# Patient Record
Sex: MALE | Race: WHITE | NOT HISPANIC OR LATINO | Employment: FULL TIME | URBAN - METROPOLITAN AREA
[De-identification: names, ages, dates, MRNs, and addresses within clinical notes are randomized per-mention and may not be internally consistent; named-entity substitution may affect disease eponyms.]

---

## 2017-09-05 ENCOUNTER — HOSPITAL ENCOUNTER (EMERGENCY)
Facility: HOSPITAL | Age: 32
Discharge: HOME | End: 2017-09-05
Attending: EMERGENCY MEDICINE
Payer: MEDICAID

## 2017-09-05 VITALS
OXYGEN SATURATION: 93 % | DIASTOLIC BLOOD PRESSURE: 94 MMHG | TEMPERATURE: 98 F | BODY MASS INDEX: 30.46 KG/M2 | WEIGHT: 224.88 LBS | HEIGHT: 72 IN | HEART RATE: 88 BPM | SYSTOLIC BLOOD PRESSURE: 145 MMHG | RESPIRATION RATE: 20 BRPM

## 2017-09-05 DIAGNOSIS — R10.9 FLANK PAIN: Primary | ICD-10-CM

## 2017-09-05 LAB
ALBUMIN SERPL BCP-MCNC: 3.6 G/DL
ALP SERPL-CCNC: 105 U/L
ALT SERPL W/O P-5'-P-CCNC: 32 U/L
ANION GAP SERPL CALC-SCNC: 13 MMOL/L
AST SERPL-CCNC: 14 U/L
BACTERIA #/AREA URNS HPF: NORMAL /HPF
BASOPHILS NFR BLD: 0 %
BILIRUB SERPL-MCNC: 0.5 MG/DL
BILIRUB UR QL STRIP: NEGATIVE
BUN SERPL-MCNC: 28 MG/DL
CALCIUM SERPL-MCNC: 9.4 MG/DL
CHLORIDE SERPL-SCNC: 105 MMOL/L
CLARITY UR: CLEAR
CO2 SERPL-SCNC: 20 MMOL/L
COLOR UR: YELLOW
CREAT SERPL-MCNC: 2 MG/DL
DIFFERENTIAL METHOD: ABNORMAL
EOSINOPHIL NFR BLD: 4 %
ERYTHROCYTE [DISTWIDTH] IN BLOOD BY AUTOMATED COUNT: 15.4 %
EST. GFR  (AFRICAN AMERICAN): 50 ML/MIN/1.73 M^2
EST. GFR  (NON AFRICAN AMERICAN): 43 ML/MIN/1.73 M^2
GLUCOSE SERPL-MCNC: 229 MG/DL
GLUCOSE UR QL STRIP: ABNORMAL
HCT VFR BLD AUTO: 49.4 %
HGB BLD-MCNC: 16.7 G/DL
HGB UR QL STRIP: ABNORMAL
HYALINE CASTS #/AREA URNS LPF: 1 /LPF
KETONES UR QL STRIP: NEGATIVE
LEUKOCYTE ESTERASE UR QL STRIP: NEGATIVE
LYMPHOCYTES NFR BLD: 40 %
MCH RBC QN AUTO: 29 PG
MCHC RBC AUTO-ENTMCNC: 33.9 G/DL
MCV RBC AUTO: 86 FL
MICROSCOPIC COMMENT: NORMAL
MONOCYTES NFR BLD: 4 %
NEUTROPHILS NFR BLD: 52 %
NITRITE UR QL STRIP: NEGATIVE
PH UR STRIP: 5 [PH] (ref 5–8)
PLATELET # BLD AUTO: 215 K/UL
PLATELET BLD QL SMEAR: ABNORMAL
PMV BLD AUTO: 8.7 FL
POTASSIUM SERPL-SCNC: 3.8 MMOL/L
PROT SERPL-MCNC: 7.1 G/DL
PROT UR QL STRIP: ABNORMAL
RBC # BLD AUTO: 5.77 M/UL
RBC #/AREA URNS HPF: 0 /HPF (ref 0–4)
SODIUM SERPL-SCNC: 138 MMOL/L
SP GR UR STRIP: 1.02 (ref 1–1.03)
URN SPEC COLLECT METH UR: ABNORMAL
UROBILINOGEN UR STRIP-ACNC: NEGATIVE EU/DL
WBC # BLD AUTO: 15.8 K/UL
WBC #/AREA URNS HPF: 1 /HPF (ref 0–5)

## 2017-09-05 PROCEDURE — 96361 HYDRATE IV INFUSION ADD-ON: CPT

## 2017-09-05 PROCEDURE — 81000 URINALYSIS NONAUTO W/SCOPE: CPT

## 2017-09-05 PROCEDURE — 99284 EMERGENCY DEPT VISIT MOD MDM: CPT | Mod: 25

## 2017-09-05 PROCEDURE — 25000003 PHARM REV CODE 250: Performed by: EMERGENCY MEDICINE

## 2017-09-05 PROCEDURE — 80053 COMPREHEN METABOLIC PANEL: CPT

## 2017-09-05 PROCEDURE — 96376 TX/PRO/DX INJ SAME DRUG ADON: CPT

## 2017-09-05 PROCEDURE — 96374 THER/PROPH/DIAG INJ IV PUSH: CPT

## 2017-09-05 PROCEDURE — 85027 COMPLETE CBC AUTOMATED: CPT

## 2017-09-05 PROCEDURE — 63600175 PHARM REV CODE 636 W HCPCS: Performed by: EMERGENCY MEDICINE

## 2017-09-05 PROCEDURE — 85007 BL SMEAR W/DIFF WBC COUNT: CPT

## 2017-09-05 PROCEDURE — 36415 COLL VENOUS BLD VENIPUNCTURE: CPT

## 2017-09-05 RX ORDER — MORPHINE SULFATE 4 MG/ML
4 INJECTION, SOLUTION INTRAMUSCULAR; INTRAVENOUS
Status: COMPLETED | OUTPATIENT
Start: 2017-09-05 | End: 2017-09-05

## 2017-09-05 RX ORDER — MORPHINE SULFATE 4 MG/ML
8 INJECTION, SOLUTION INTRAMUSCULAR; INTRAVENOUS
Status: COMPLETED | OUTPATIENT
Start: 2017-09-05 | End: 2017-09-05

## 2017-09-05 RX ADMIN — MORPHINE SULFATE 4 MG: 4 INJECTION, SOLUTION INTRAMUSCULAR; INTRAVENOUS at 07:09

## 2017-09-05 RX ADMIN — MORPHINE SULFATE 8 MG: 4 INJECTION, SOLUTION INTRAMUSCULAR; INTRAVENOUS at 06:09

## 2017-09-05 RX ADMIN — SODIUM CHLORIDE 1000 ML: 0.9 INJECTION, SOLUTION INTRAVENOUS at 07:09

## 2017-09-05 RX ADMIN — SODIUM CHLORIDE 1000 ML: 0.9 INJECTION, SOLUTION INTRAVENOUS at 06:09

## 2017-09-05 NOTE — ED NOTES
Patient identifiers for Jose Miguel Brennan checked and correct.  LOC: Patient is awake, alert, and aware of environment with an appropriate affect. Patient is oriented x 3 and speaking appropriately.  APPEARANCE: Patient resting comfortably and in no acute distress. Patient is clean and well groomed, patient's clothing is properly fastened.  SKIN: The skin is warm and dry. Patient has normal skin turgor and moist mucus membrances. Skin is intact; no bruising or breakdown noted.  MUSCULOSKELETAL: Patient is moving all extremities well, no obvious deformities noted. Pulses intact.   RESPIRATORY: Airway is open and patent. Respirations are spontaneous and non-labored with normal effort and rate.  CARDIAC: Patient has a normal rate and rhythm. No peripheral edema noted. Capillary refill < 3 seconds.  ABDOMEN: No distention noted. Bowel sounds active in all 4 quadrants. Pain to the left lower quadrants that radiates over the umbilical area to the left flank. Denies n/v/d  NEUROLOGICAL: PERRL. Facial expression is symmetrical. Hand grasps are equal bilaterally. Normal sensation in all extremities when touched with finger.  Allergies reported:   Review of patient's allergies indicates:   Allergen Reactions    Zithromax [azithromycin]

## 2017-09-05 NOTE — ED PROVIDER NOTES
Encounter Date: 9/5/2017       History     Chief Complaint   Patient presents with    Flank Pain     bilateral     HPI   31-year-old man with a history of IgA nephropathy, diabetes, asthma who presents emergency department complaining of constant bilateral flank pain over the past couple of days which was worse this morning.  He has had several similar episodes over the past month.  States he usually gets IV fluids and pain medicine with improvement.  He has multiple episodes of these a month.  Denies any fever.  No nausea vomiting or shortness of breath.  Does have a history of kidney stones.  Review of patient's allergies indicates:   Allergen Reactions    Zithromax [azithromycin]      Past Medical History:   Diagnosis Date    Asthma     Diabetes mellitus     diet controlled    IgA nephropathy      Past Surgical History:   Procedure Laterality Date    nose polyp removal       Family History   Problem Relation Age of Onset    Hypertension Maternal Grandmother     Cancer Maternal Grandfather     Diabetes Maternal Grandfather     Heart disease Maternal Grandfather     Heart disease Mother     Stroke Mother     Diabetes Mother      Social History   Substance Use Topics    Smoking status: Former Smoker     Types: Cigars, Cigarettes     Quit date: 12/29/2014    Smokeless tobacco: Never Used    Alcohol use No     Review of Systems  REVIEW OF SYSTEMS  CONSTITUTIONAL: Negative for fever.  HEENT:  Negative for sore throat.   HEART:   Negative for chest pain..  LUNG:  Negative for shortness of breath.  ABDOMEN:  Negative for nausea.  Positive for flank pain  :  No discharge, dysuria  EXTREMITIES:  No swelling  NEURO:  Negative for weakness.   SKIN:  Negative for rash.  Psych: No depression  HEME: Does not bruise/bleed easily.           Physical Exam     Initial Vitals [09/05/17 0548]   BP Pulse Resp Temp SpO2   (!) 145/94 88 20 97.9 °F (36.6 °C) (!) 93 %      MAP       111         Physical Exam  Physical  Exam   Nursing note and vitals reviewed.   Constitutional: Oriented to person, place, and time. Appears well-developed and well-nourished.   HENT:   Head: Normocephalic and atraumatic.   Eyes: EOM are normal.   Neck: Normal range of motion. Neck supple.   Cardiovascular: Normal rate.   Pulmonary/Chest: Effort normal. Clear BS b/l   Abdominal: Soft, Non tender, no distension.  Bilateral CVA tenderness.    Musculoskeletal: Normal range of motion.  No midline tenderness to palpation of the spine.    Neurological:Alert and oriented to person, place, and time.   Psychiatric: Normal mood and affect. Behavior is normal.         ED Course   Procedures  Labs Reviewed   URINALYSIS - Abnormal; Notable for the following:        Result Value    Protein, UA 2+ (*)     Glucose, UA 2+ (*)     Occult Blood UA Trace (*)     All other components within normal limits   CBC W/ AUTO DIFFERENTIAL - Abnormal; Notable for the following:     WBC 15.80 (*)     RDW 15.4 (*)     MPV 8.7 (*)     All other components within normal limits   COMPREHENSIVE METABOLIC PANEL - Abnormal; Notable for the following:     CO2 20 (*)     Glucose 229 (*)     BUN, Bld 28 (*)     Creatinine 2.0 (*)     eGFR if  50 (*)     eGFR if non  43 (*)     All other components within normal limits   URINALYSIS MICROSCOPIC             Medical Decision Making:   Initial Assessment:   31-year-old man with history of IgA nephropathy and frequent visits for bilateral flank pain presents for bilateral flank pain.  Leukocytosis of 15 K without fever.  He has had chronic leukocytosis of his past 4 ED visits.  Renal function is at his baseline.  Urinalysis shows no evidence of infection or hematuria.  Doubt obstructive uropathy or kidney stone.  Patient receiving IV fluids and pain medicine pending reassessment.  Care turned over to Dr. Maynard at end of shift pending  Pain controll and PO challenge.                   ED Course      Clinical  Impression:   The encounter diagnosis was Flank pain.                           Mikel Mancuso MD  09/11/17 0518

## 2017-09-06 ENCOUNTER — HOSPITAL ENCOUNTER (EMERGENCY)
Facility: HOSPITAL | Age: 32
Discharge: HOME | End: 2017-09-06
Attending: EMERGENCY MEDICINE
Payer: MEDICAID

## 2017-09-06 VITALS
TEMPERATURE: 98 F | BODY MASS INDEX: 30.34 KG/M2 | OXYGEN SATURATION: 92 % | DIASTOLIC BLOOD PRESSURE: 70 MMHG | HEART RATE: 81 BPM | RESPIRATION RATE: 22 BRPM | HEIGHT: 72 IN | WEIGHT: 224 LBS | SYSTOLIC BLOOD PRESSURE: 134 MMHG

## 2017-09-06 DIAGNOSIS — R73.9 HYPERGLYCEMIA: ICD-10-CM

## 2017-09-06 DIAGNOSIS — K52.9 GASTROENTERITIS: ICD-10-CM

## 2017-09-06 DIAGNOSIS — R10.9 ABDOMINAL PAIN: ICD-10-CM

## 2017-09-06 DIAGNOSIS — E86.0 DEHYDRATION: Primary | ICD-10-CM

## 2017-09-06 LAB
ALBUMIN SERPL BCP-MCNC: 3.7 G/DL
ALP SERPL-CCNC: 111 U/L
ALT SERPL W/O P-5'-P-CCNC: 33 U/L
AMORPH CRY URNS QL MICRO: NORMAL
ANION GAP SERPL CALC-SCNC: 11 MMOL/L
AST SERPL-CCNC: 15 U/L
BACTERIA #/AREA URNS HPF: NORMAL /HPF
BASOPHILS # BLD AUTO: 0.1 K/UL
BASOPHILS NFR BLD: 0.7 %
BILIRUB SERPL-MCNC: 0.6 MG/DL
BILIRUB UR QL STRIP: NEGATIVE
BUN SERPL-MCNC: 28 MG/DL
CALCIUM SERPL-MCNC: 10.1 MG/DL
CHLORIDE SERPL-SCNC: 104 MMOL/L
CLARITY UR: CLEAR
CO2 SERPL-SCNC: 22 MMOL/L
COLOR UR: YELLOW
CREAT SERPL-MCNC: 1.9 MG/DL
DIFFERENTIAL METHOD: ABNORMAL
EOSINOPHIL # BLD AUTO: 0.5 K/UL
EOSINOPHIL NFR BLD: 3.8 %
ERYTHROCYTE [DISTWIDTH] IN BLOOD BY AUTOMATED COUNT: 14.8 %
EST. GFR  (AFRICAN AMERICAN): 53 ML/MIN/1.73 M^2
EST. GFR  (NON AFRICAN AMERICAN): 46 ML/MIN/1.73 M^2
GLUCOSE SERPL-MCNC: 277 MG/DL
GLUCOSE UR QL STRIP: ABNORMAL
HCT VFR BLD AUTO: 50.9 %
HGB BLD-MCNC: 17 G/DL
HGB UR QL STRIP: ABNORMAL
HYALINE CASTS #/AREA URNS LPF: 1 /LPF
KETONES UR QL STRIP: NEGATIVE
LACTATE SERPL-SCNC: 1.6 MMOL/L
LEUKOCYTE ESTERASE UR QL STRIP: NEGATIVE
LIPASE SERPL-CCNC: 94 U/L
LYMPHOCYTES # BLD AUTO: 3.6 K/UL
LYMPHOCYTES NFR BLD: 27 %
MCH RBC QN AUTO: 28.9 PG
MCHC RBC AUTO-ENTMCNC: 33.5 G/DL
MCV RBC AUTO: 86 FL
MICROSCOPIC COMMENT: NORMAL
MONOCYTES # BLD AUTO: 0.5 K/UL
MONOCYTES NFR BLD: 3.4 %
NEUTROPHILS # BLD AUTO: 8.7 K/UL
NEUTROPHILS NFR BLD: 65.1 %
NITRITE UR QL STRIP: NEGATIVE
PH UR STRIP: 6 [PH] (ref 5–8)
PLATELET # BLD AUTO: 205 K/UL
PMV BLD AUTO: 8.9 FL
POTASSIUM SERPL-SCNC: 4.1 MMOL/L
PROT SERPL-MCNC: 7.3 G/DL
PROT UR QL STRIP: ABNORMAL
RBC # BLD AUTO: 5.89 M/UL
RBC #/AREA URNS HPF: 2 /HPF (ref 0–4)
SODIUM SERPL-SCNC: 137 MMOL/L
SP GR UR STRIP: 1.02 (ref 1–1.03)
SQUAMOUS #/AREA URNS HPF: 2 /HPF
URN SPEC COLLECT METH UR: ABNORMAL
UROBILINOGEN UR STRIP-ACNC: NEGATIVE EU/DL
WBC # BLD AUTO: 13.3 K/UL
WBC #/AREA URNS HPF: 1 /HPF (ref 0–5)

## 2017-09-06 PROCEDURE — 96374 THER/PROPH/DIAG INJ IV PUSH: CPT

## 2017-09-06 PROCEDURE — 83605 ASSAY OF LACTIC ACID: CPT

## 2017-09-06 PROCEDURE — 25000003 PHARM REV CODE 250: Performed by: EMERGENCY MEDICINE

## 2017-09-06 PROCEDURE — 99284 EMERGENCY DEPT VISIT MOD MDM: CPT | Mod: 25

## 2017-09-06 PROCEDURE — 36415 COLL VENOUS BLD VENIPUNCTURE: CPT

## 2017-09-06 PROCEDURE — 85025 COMPLETE CBC W/AUTO DIFF WBC: CPT

## 2017-09-06 PROCEDURE — 80053 COMPREHEN METABOLIC PANEL: CPT

## 2017-09-06 PROCEDURE — 63600175 PHARM REV CODE 636 W HCPCS: Performed by: EMERGENCY MEDICINE

## 2017-09-06 PROCEDURE — 83690 ASSAY OF LIPASE: CPT

## 2017-09-06 PROCEDURE — 96372 THER/PROPH/DIAG INJ SC/IM: CPT

## 2017-09-06 PROCEDURE — 81000 URINALYSIS NONAUTO W/SCOPE: CPT

## 2017-09-06 PROCEDURE — 96375 TX/PRO/DX INJ NEW DRUG ADDON: CPT

## 2017-09-06 RX ORDER — DICYCLOMINE HYDROCHLORIDE 10 MG/ML
20 INJECTION INTRAMUSCULAR
Status: COMPLETED | OUTPATIENT
Start: 2017-09-06 | End: 2017-09-06

## 2017-09-06 RX ORDER — DIPHENOXYLATE HYDROCHLORIDE AND ATROPINE SULFATE 2.5; .025 MG/1; MG/1
2 TABLET ORAL 4 TIMES DAILY PRN
Qty: 14 TABLET | Refills: 0 | Status: ON HOLD | OUTPATIENT
Start: 2017-09-06 | End: 2017-09-09 | Stop reason: HOSPADM

## 2017-09-06 RX ORDER — SODIUM CHLORIDE 9 MG/ML
1000 INJECTION, SOLUTION INTRAVENOUS
Status: COMPLETED | OUTPATIENT
Start: 2017-09-06 | End: 2017-09-06

## 2017-09-06 RX ORDER — METOCLOPRAMIDE HYDROCHLORIDE 5 MG/ML
10 INJECTION INTRAMUSCULAR; INTRAVENOUS
Status: COMPLETED | OUTPATIENT
Start: 2017-09-06 | End: 2017-09-06

## 2017-09-06 RX ORDER — MORPHINE SULFATE 10 MG/ML
10 INJECTION INTRAMUSCULAR; INTRAVENOUS; SUBCUTANEOUS
Status: COMPLETED | OUTPATIENT
Start: 2017-09-06 | End: 2017-09-06

## 2017-09-06 RX ADMIN — SODIUM CHLORIDE 1000 ML: 0.9 INJECTION, SOLUTION INTRAVENOUS at 06:09

## 2017-09-06 RX ADMIN — DICYCLOMINE HYDROCHLORIDE 20 MG: 10 INJECTION INTRAMUSCULAR at 06:09

## 2017-09-06 RX ADMIN — MORPHINE SULFATE 10 MG: 10 INJECTION, SOLUTION INTRAMUSCULAR; INTRAVENOUS at 06:09

## 2017-09-06 RX ADMIN — METOCLOPRAMIDE 10 MG: 5 INJECTION, SOLUTION INTRAMUSCULAR; INTRAVENOUS at 06:09

## 2017-09-06 NOTE — ED NOTES
Pt up to the bathroom for the second time. Having diarrhea. Foul smelling gas. Pt reports being nauseated. Pt says abdominal pain started around 4 am. No vomiting but diarrhea. Pt alert and oriented and moaning in pain with every breath.

## 2017-09-06 NOTE — ED NOTES
Aware of D/C pending. Pt calling for safe ride home since recent narcotic pain meds given prior to D/C. Stable

## 2017-09-06 NOTE — ED PROVIDER NOTES
Encounter Date: 9/6/2017       History     Chief Complaint   Patient presents with    Abdominal Pain     lower quadrant abd pain unchanged from previous ER visit     Chief complaint: Flank and abdominal pain    History of present illness:Jose Miguel Brennan is a 31 y.o. male who presents with  a 3-4 day history of bilateral flank pain for a one-day history of diffuse abdominal pain.  She was seen here yesterday for same with worsening symptoms.  He has recurrent episodes of flank pain that is routinely treated with IV analgesics and IV hydration.  He denies any fever is a one-day history of diarrhea.  He has nausea without vomiting.  His past medical history is significant for diabetes, asthma and IgA nephropathy.          Review of patient's allergies indicates:   Allergen Reactions    Zithromax [azithromycin]      Past Medical History:   Diagnosis Date    Asthma     Diabetes mellitus     diet controlled    IgA nephropathy      Past Surgical History:   Procedure Laterality Date    nose polyp removal       Family History   Problem Relation Age of Onset    Hypertension Maternal Grandmother     Cancer Maternal Grandfather     Diabetes Maternal Grandfather     Heart disease Maternal Grandfather     Heart disease Mother     Stroke Mother     Diabetes Mother      Social History   Substance Use Topics    Smoking status: Former Smoker     Types: Cigars, Cigarettes     Quit date: 12/29/2014    Smokeless tobacco: Never Used    Alcohol use No     Review of Systems   Constitutional: Negative for activity change, appetite change, chills, fatigue and fever.   Eyes: Negative for visual disturbance.   Respiratory: Negative for apnea and shortness of breath.    Cardiovascular: Negative for chest pain and palpitations.   Gastrointestinal: Positive for abdominal pain, diarrhea and nausea. Negative for abdominal distention and vomiting.   Genitourinary: Positive for flank pain. Negative for difficulty urinating.    Musculoskeletal: Negative for neck pain.   Skin: Negative for pallor and rash.   Neurological: Negative for headaches.   Hematological: Does not bruise/bleed easily.   Psychiatric/Behavioral: Negative for agitation.       Physical Exam     Initial Vitals [09/06/17 0528]   BP Pulse Resp Temp SpO2   (!) 177/122 96 (!) 22 97.8 °F (36.6 °C) 95 %      MAP       140.33         Physical Exam    Nursing note and vitals reviewed.  Constitutional: He appears well-developed and well-nourished.   HENT:   Head: Normocephalic and atraumatic.   Eyes: Conjunctivae are normal.   Neck: Normal range of motion. Neck supple.   Cardiovascular: Normal rate.   Pulmonary/Chest: No respiratory distress.   Abdominal: Soft. There is tenderness (iffuse abdominal tenderness). There is no rebound and no guarding.   Musculoskeletal: Normal range of motion.   Neurological: He is alert and oriented to person, place, and time.   Skin: Skin is warm and dry.   Psychiatric: He has a normal mood and affect.         ED Course   Procedures  Labs Reviewed   CBC W/ AUTO DIFFERENTIAL   COMPREHENSIVE METABOLIC PANEL   LACTIC ACID, PLASMA   LIPASE   URINALYSIS             Medical Decision Making:   History:   Old Records Summarized: records from previous admission(s).  Clinical Tests:   Lab Tests: Ordered and Reviewed  The following lab test(s) were unremarkable: CBC and CMP  ED Management:  Jose Miguel rBennan is a 31 y.o. male who presents with  diffuse crampy abdominal pain with associated flank pain and diarrhea.  CT of the abdomen and pelvis is essentially normal.  Symptoms are most consistent with a viral gastroenteritis.  There is no evidence of bowel obstruction, ureterolithiasis or bacterial enteritis.                   ED Course      Clinical Impression:   The primary encounter diagnosis was Dehydration. Diagnoses of Abdominal pain, Gastroenteritis, and Hyperglycemia were also pertinent to this visit.                           David Groves  MD GERARDO  09/06/17 0707

## 2017-09-07 ENCOUNTER — HOSPITAL ENCOUNTER (OUTPATIENT)
Facility: HOSPITAL | Age: 32
Discharge: HOME | End: 2017-09-09
Attending: EMERGENCY MEDICINE
Payer: MEDICAID

## 2017-09-07 DIAGNOSIS — N18.9 ACUTE ON CHRONIC RENAL INSUFFICIENCY: ICD-10-CM

## 2017-09-07 DIAGNOSIS — N28.9 ACUTE ON CHRONIC RENAL INSUFFICIENCY: ICD-10-CM

## 2017-09-07 DIAGNOSIS — E86.0 SEVERE DEHYDRATION: Primary | ICD-10-CM

## 2017-09-07 LAB
BASOPHILS # BLD AUTO: 0.1 K/UL
BASOPHILS NFR BLD: 0.6 %
DIFFERENTIAL METHOD: ABNORMAL
EOSINOPHIL # BLD AUTO: 0.5 K/UL
EOSINOPHIL NFR BLD: 3.7 %
ERYTHROCYTE [DISTWIDTH] IN BLOOD BY AUTOMATED COUNT: 15 %
HCT VFR BLD AUTO: 52.8 %
HGB BLD-MCNC: 17.7 G/DL
LYMPHOCYTES # BLD AUTO: 4 K/UL
LYMPHOCYTES NFR BLD: 32.2 %
MCH RBC QN AUTO: 28.6 PG
MCHC RBC AUTO-ENTMCNC: 33.6 G/DL
MCV RBC AUTO: 85 FL
MONOCYTES # BLD AUTO: 0.7 K/UL
MONOCYTES NFR BLD: 6 %
NEUTROPHILS # BLD AUTO: 7.2 K/UL
NEUTROPHILS NFR BLD: 57.5 %
PLATELET # BLD AUTO: 220 K/UL
PMV BLD AUTO: 9.2 FL
RBC # BLD AUTO: 6.2 M/UL
WBC # BLD AUTO: 12.4 K/UL

## 2017-09-07 PROCEDURE — 96375 TX/PRO/DX INJ NEW DRUG ADDON: CPT

## 2017-09-07 PROCEDURE — 96367 TX/PROPH/DG ADDL SEQ IV INF: CPT

## 2017-09-07 PROCEDURE — 85025 COMPLETE CBC W/AUTO DIFF WBC: CPT

## 2017-09-07 PROCEDURE — 99284 EMERGENCY DEPT VISIT MOD MDM: CPT | Mod: 25

## 2017-09-07 PROCEDURE — 36415 COLL VENOUS BLD VENIPUNCTURE: CPT

## 2017-09-07 PROCEDURE — 83690 ASSAY OF LIPASE: CPT

## 2017-09-07 PROCEDURE — 80053 COMPREHEN METABOLIC PANEL: CPT

## 2017-09-07 PROCEDURE — 25000003 PHARM REV CODE 250: Performed by: EMERGENCY MEDICINE

## 2017-09-07 PROCEDURE — 86140 C-REACTIVE PROTEIN: CPT

## 2017-09-07 PROCEDURE — 63600175 PHARM REV CODE 636 W HCPCS: Performed by: EMERGENCY MEDICINE

## 2017-09-07 PROCEDURE — 96365 THER/PROPH/DIAG IV INF INIT: CPT

## 2017-09-07 RX ADMIN — SODIUM CHLORIDE 1000 ML: 0.9 INJECTION, SOLUTION INTRAVENOUS at 11:09

## 2017-09-07 RX ADMIN — PROMETHAZINE HYDROCHLORIDE 25 MG: 25 INJECTION INTRAMUSCULAR; INTRAVENOUS at 11:09

## 2017-09-08 PROBLEM — N28.9 ACUTE ON CHRONIC RENAL INSUFFICIENCY: Status: ACTIVE | Noted: 2017-09-08

## 2017-09-08 PROBLEM — E44.1 MILD MALNUTRITION: Status: ACTIVE | Noted: 2017-09-08

## 2017-09-08 PROBLEM — N18.9 ACUTE ON CHRONIC RENAL INSUFFICIENCY: Status: ACTIVE | Noted: 2017-09-08

## 2017-09-08 PROBLEM — R19.7 DIARRHEA: Status: ACTIVE | Noted: 2017-09-08

## 2017-09-08 PROBLEM — E86.0 DEHYDRATION: Status: ACTIVE | Noted: 2017-09-08

## 2017-09-08 PROBLEM — R74.8 ELEVATED LIPASE: Status: ACTIVE | Noted: 2017-09-08

## 2017-09-08 PROBLEM — J45.909 ASTHMA: Status: ACTIVE | Noted: 2017-09-08

## 2017-09-08 PROBLEM — K52.9 GASTROENTERITIS: Status: ACTIVE | Noted: 2017-09-08

## 2017-09-08 PROBLEM — I16.0 HYPERTENSIVE URGENCY: Status: ACTIVE | Noted: 2017-09-08

## 2017-09-08 PROBLEM — N17.9 AKI (ACUTE KIDNEY INJURY): Status: ACTIVE | Noted: 2017-09-08

## 2017-09-08 LAB
ALBUMIN SERPL BCP-MCNC: 3 G/DL
ALBUMIN SERPL BCP-MCNC: 3.6 G/DL
ALP SERPL-CCNC: 116 U/L
ALP SERPL-CCNC: 95 U/L
ALT SERPL W/O P-5'-P-CCNC: 31 U/L
ALT SERPL W/O P-5'-P-CCNC: 35 U/L
ANION GAP SERPL CALC-SCNC: 14 MMOL/L
ANION GAP SERPL CALC-SCNC: 9 MMOL/L
AST SERPL-CCNC: 16 U/L
AST SERPL-CCNC: 19 U/L
BACTERIA #/AREA URNS HPF: ABNORMAL /HPF
BASOPHILS # BLD AUTO: 0 K/UL
BASOPHILS NFR BLD: 0.2 %
BILIRUB SERPL-MCNC: 0.7 MG/DL
BILIRUB SERPL-MCNC: 1 MG/DL
BILIRUB UR QL STRIP: NEGATIVE
BUN SERPL-MCNC: 29 MG/DL
BUN SERPL-MCNC: 30 MG/DL
CALCIUM SERPL-MCNC: 8.5 MG/DL
CALCIUM SERPL-MCNC: 9.5 MG/DL
CHLORIDE SERPL-SCNC: 106 MMOL/L
CHLORIDE SERPL-SCNC: 109 MMOL/L
CLARITY UR: CLEAR
CO2 SERPL-SCNC: 18 MMOL/L
CO2 SERPL-SCNC: 20 MMOL/L
COLOR UR: YELLOW
CREAT SERPL-MCNC: 2.3 MG/DL
CREAT SERPL-MCNC: 2.8 MG/DL
CRP SERPL-MCNC: 32.2 MG/L
DIFFERENTIAL METHOD: ABNORMAL
EOSINOPHIL # BLD AUTO: 0.5 K/UL
EOSINOPHIL NFR BLD: 4.7 %
ERYTHROCYTE [DISTWIDTH] IN BLOOD BY AUTOMATED COUNT: 15 %
EST. GFR  (AFRICAN AMERICAN): 33 ML/MIN/1.73 M^2
EST. GFR  (AFRICAN AMERICAN): 42 ML/MIN/1.73 M^2
EST. GFR  (NON AFRICAN AMERICAN): 29 ML/MIN/1.73 M^2
EST. GFR  (NON AFRICAN AMERICAN): 36 ML/MIN/1.73 M^2
GLUCOSE SERPL-MCNC: 167 MG/DL
GLUCOSE SERPL-MCNC: 194 MG/DL
GLUCOSE UR QL STRIP: NEGATIVE
HCT VFR BLD AUTO: 46.5 %
HGB BLD-MCNC: 15.6 G/DL
HGB UR QL STRIP: ABNORMAL
HYALINE CASTS #/AREA URNS LPF: 1 /LPF
KETONES UR QL STRIP: NEGATIVE
LEUKOCYTE ESTERASE UR QL STRIP: NEGATIVE
LIPASE SERPL-CCNC: 104 U/L
LYMPHOCYTES # BLD AUTO: 3.7 K/UL
LYMPHOCYTES NFR BLD: 37.6 %
MAGNESIUM SERPL-MCNC: 1.8 MG/DL
MCH RBC QN AUTO: 28.9 PG
MCHC RBC AUTO-ENTMCNC: 33.6 G/DL
MCV RBC AUTO: 86 FL
MICROSCOPIC COMMENT: ABNORMAL
MONOCYTES # BLD AUTO: 0.7 K/UL
MONOCYTES NFR BLD: 7 %
NEUTROPHILS # BLD AUTO: 5 K/UL
NEUTROPHILS NFR BLD: 50.5 %
NITRITE UR QL STRIP: NEGATIVE
PH UR STRIP: 6 [PH] (ref 5–8)
PHOSPHATE SERPL-MCNC: 3.3 MG/DL
PLATELET # BLD AUTO: 181 K/UL
PMV BLD AUTO: 9.8 FL
POCT GLUCOSE: 135 MG/DL (ref 70–110)
POCT GLUCOSE: 171 MG/DL (ref 70–110)
POTASSIUM SERPL-SCNC: 3.9 MMOL/L
POTASSIUM SERPL-SCNC: 4 MMOL/L
PROT SERPL-MCNC: 5.9 G/DL
PROT SERPL-MCNC: 7.3 G/DL
PROT UR QL STRIP: ABNORMAL
RBC # BLD AUTO: 5.4 M/UL
RBC #/AREA URNS HPF: 5 /HPF (ref 0–4)
SODIUM SERPL-SCNC: 138 MMOL/L
SODIUM SERPL-SCNC: 138 MMOL/L
SP GR UR STRIP: 1.02 (ref 1–1.03)
SQUAMOUS #/AREA URNS HPF: 7 /HPF
URN SPEC COLLECT METH UR: ABNORMAL
UROBILINOGEN UR STRIP-ACNC: NEGATIVE EU/DL
WBC # BLD AUTO: 9.9 K/UL
WBC #/AREA URNS HPF: 3 /HPF (ref 0–5)

## 2017-09-08 PROCEDURE — 84100 ASSAY OF PHOSPHORUS: CPT

## 2017-09-08 PROCEDURE — 81000 URINALYSIS NONAUTO W/SCOPE: CPT

## 2017-09-08 PROCEDURE — 25000003 PHARM REV CODE 250: Performed by: NURSE PRACTITIONER

## 2017-09-08 PROCEDURE — 85025 COMPLETE CBC W/AUTO DIFF WBC: CPT

## 2017-09-08 PROCEDURE — 63600175 PHARM REV CODE 636 W HCPCS: Performed by: EMERGENCY MEDICINE

## 2017-09-08 PROCEDURE — S0030 INJECTION, METRONIDAZOLE: HCPCS | Performed by: NURSE PRACTITIONER

## 2017-09-08 PROCEDURE — 96365 THER/PROPH/DIAG IV INF INIT: CPT

## 2017-09-08 PROCEDURE — 96376 TX/PRO/DX INJ SAME DRUG ADON: CPT

## 2017-09-08 PROCEDURE — 83036 HEMOGLOBIN GLYCOSYLATED A1C: CPT

## 2017-09-08 PROCEDURE — 96372 THER/PROPH/DIAG INJ SC/IM: CPT

## 2017-09-08 PROCEDURE — 82962 GLUCOSE BLOOD TEST: CPT

## 2017-09-08 PROCEDURE — G0378 HOSPITAL OBSERVATION PER HR: HCPCS

## 2017-09-08 PROCEDURE — 80053 COMPREHEN METABOLIC PANEL: CPT

## 2017-09-08 PROCEDURE — 96366 THER/PROPH/DIAG IV INF ADDON: CPT

## 2017-09-08 PROCEDURE — 36415 COLL VENOUS BLD VENIPUNCTURE: CPT

## 2017-09-08 PROCEDURE — 96367 TX/PROPH/DG ADDL SEQ IV INF: CPT

## 2017-09-08 PROCEDURE — 96375 TX/PRO/DX INJ NEW DRUG ADDON: CPT

## 2017-09-08 PROCEDURE — 83735 ASSAY OF MAGNESIUM: CPT

## 2017-09-08 PROCEDURE — 99900035 HC TECH TIME PER 15 MIN (STAT)

## 2017-09-08 PROCEDURE — 63600175 PHARM REV CODE 636 W HCPCS: Performed by: NURSE PRACTITIONER

## 2017-09-08 RX ORDER — AMOXICILLIN 250 MG
1 CAPSULE ORAL 2 TIMES DAILY
Status: DISCONTINUED | OUTPATIENT
Start: 2017-09-08 | End: 2017-09-09 | Stop reason: HOSPADM

## 2017-09-08 RX ORDER — HEPARIN SODIUM 5000 [USP'U]/ML
5000 INJECTION, SOLUTION INTRAVENOUS; SUBCUTANEOUS EVERY 12 HOURS
Status: DISCONTINUED | OUTPATIENT
Start: 2017-09-08 | End: 2017-09-09 | Stop reason: HOSPADM

## 2017-09-08 RX ORDER — ONDANSETRON 2 MG/ML
8 INJECTION INTRAMUSCULAR; INTRAVENOUS EVERY 8 HOURS PRN
Status: DISCONTINUED | OUTPATIENT
Start: 2017-09-08 | End: 2017-09-08

## 2017-09-08 RX ORDER — IBUPROFEN 200 MG
16 TABLET ORAL
Status: DISCONTINUED | OUTPATIENT
Start: 2017-09-08 | End: 2017-09-09 | Stop reason: HOSPADM

## 2017-09-08 RX ORDER — PANTOPRAZOLE SODIUM 40 MG/1
40 TABLET, DELAYED RELEASE ORAL DAILY
Status: DISCONTINUED | OUTPATIENT
Start: 2017-09-08 | End: 2017-09-09 | Stop reason: HOSPADM

## 2017-09-08 RX ORDER — CIPROFLOXACIN 2 MG/ML
400 INJECTION, SOLUTION INTRAVENOUS
Status: DISCONTINUED | OUTPATIENT
Start: 2017-09-08 | End: 2017-09-09 | Stop reason: HOSPADM

## 2017-09-08 RX ORDER — INSULIN ASPART 100 [IU]/ML
0-5 INJECTION, SOLUTION INTRAVENOUS; SUBCUTANEOUS
Status: DISCONTINUED | OUTPATIENT
Start: 2017-09-08 | End: 2017-09-09 | Stop reason: HOSPADM

## 2017-09-08 RX ORDER — ACETAMINOPHEN 325 MG/1
650 TABLET ORAL EVERY 6 HOURS PRN
Status: DISCONTINUED | OUTPATIENT
Start: 2017-09-08 | End: 2017-09-09 | Stop reason: HOSPADM

## 2017-09-08 RX ORDER — ONDANSETRON 2 MG/ML
4 INJECTION INTRAMUSCULAR; INTRAVENOUS EVERY 4 HOURS PRN
Status: DISCONTINUED | OUTPATIENT
Start: 2017-09-08 | End: 2017-09-09 | Stop reason: HOSPADM

## 2017-09-08 RX ORDER — MORPHINE SULFATE 2 MG/ML
6 INJECTION, SOLUTION INTRAMUSCULAR; INTRAVENOUS
Status: COMPLETED | OUTPATIENT
Start: 2017-09-08 | End: 2017-09-08

## 2017-09-08 RX ORDER — MORPHINE SULFATE 4 MG/ML
4 INJECTION, SOLUTION INTRAMUSCULAR; INTRAVENOUS EVERY 4 HOURS PRN
Status: DISCONTINUED | OUTPATIENT
Start: 2017-09-08 | End: 2017-09-08

## 2017-09-08 RX ORDER — METRONIDAZOLE 500 MG/100ML
500 INJECTION, SOLUTION INTRAVENOUS
Status: DISCONTINUED | OUTPATIENT
Start: 2017-09-08 | End: 2017-09-09 | Stop reason: HOSPADM

## 2017-09-08 RX ORDER — DICYCLOMINE HYDROCHLORIDE 10 MG/1
10 CAPSULE ORAL 4 TIMES DAILY
Status: DISCONTINUED | OUTPATIENT
Start: 2017-09-08 | End: 2017-09-09 | Stop reason: HOSPADM

## 2017-09-08 RX ORDER — LANOLIN ALCOHOL/MO/W.PET/CERES
400 CREAM (GRAM) TOPICAL DAILY
Status: DISCONTINUED | OUTPATIENT
Start: 2017-09-08 | End: 2017-09-09 | Stop reason: HOSPADM

## 2017-09-08 RX ORDER — GLUCAGON 1 MG
1 KIT INJECTION
Status: DISCONTINUED | OUTPATIENT
Start: 2017-09-08 | End: 2017-09-09 | Stop reason: HOSPADM

## 2017-09-08 RX ORDER — SODIUM CHLORIDE 9 MG/ML
INJECTION, SOLUTION INTRAVENOUS CONTINUOUS
Status: DISCONTINUED | OUTPATIENT
Start: 2017-09-08 | End: 2017-09-09 | Stop reason: HOSPADM

## 2017-09-08 RX ORDER — IBUPROFEN 200 MG
24 TABLET ORAL
Status: DISCONTINUED | OUTPATIENT
Start: 2017-09-08 | End: 2017-09-09 | Stop reason: HOSPADM

## 2017-09-08 RX ORDER — IPRATROPIUM BROMIDE AND ALBUTEROL SULFATE 2.5; .5 MG/3ML; MG/3ML
3 SOLUTION RESPIRATORY (INHALATION) EVERY 4 HOURS PRN
Status: DISCONTINUED | OUTPATIENT
Start: 2017-09-08 | End: 2017-09-09 | Stop reason: HOSPADM

## 2017-09-08 RX ADMIN — CIPROFLOXACIN 400 MG: 2 INJECTION, SOLUTION INTRAVENOUS at 03:09

## 2017-09-08 RX ADMIN — PROMETHAZINE HYDROCHLORIDE 6.25 MG: 25 INJECTION INTRAMUSCULAR; INTRAVENOUS at 09:09

## 2017-09-08 RX ADMIN — SODIUM CHLORIDE: 0.9 INJECTION, SOLUTION INTRAVENOUS at 03:09

## 2017-09-08 RX ADMIN — MORPHINE SULFATE 6 MG: 2 INJECTION, SOLUTION INTRAMUSCULAR; INTRAVENOUS at 12:09

## 2017-09-08 RX ADMIN — METRONIDAZOLE 500 MG: 500 INJECTION, SOLUTION INTRAVENOUS at 03:09

## 2017-09-08 RX ADMIN — HEPARIN SODIUM 5000 UNITS: 5000 INJECTION, SOLUTION INTRAVENOUS; SUBCUTANEOUS at 09:09

## 2017-09-08 RX ADMIN — DICYCLOMINE HYDROCHLORIDE 10 MG: 10 CAPSULE ORAL at 12:09

## 2017-09-08 RX ADMIN — CIPROFLOXACIN 400 MG: 2 INJECTION, SOLUTION INTRAVENOUS at 02:09

## 2017-09-08 RX ADMIN — DICYCLOMINE HYDROCHLORIDE 10 MG: 10 CAPSULE ORAL at 03:09

## 2017-09-08 RX ADMIN — ONDANSETRON 8 MG: 2 INJECTION INTRAMUSCULAR; INTRAVENOUS at 03:09

## 2017-09-08 RX ADMIN — STANDARDIZED SENNA CONCENTRATE AND DOCUSATE SODIUM 1 TABLET: 8.6; 5 TABLET, FILM COATED ORAL at 08:09

## 2017-09-08 RX ADMIN — METRONIDAZOLE 500 MG: 500 INJECTION, SOLUTION INTRAVENOUS at 05:09

## 2017-09-08 RX ADMIN — SODIUM CHLORIDE: 0.9 INJECTION, SOLUTION INTRAVENOUS at 02:09

## 2017-09-08 RX ADMIN — MORPHINE SULFATE 4 MG: 4 INJECTION INTRAVENOUS at 09:09

## 2017-09-08 RX ADMIN — DICYCLOMINE HYDROCHLORIDE 10 MG: 10 CAPSULE ORAL at 11:09

## 2017-09-08 RX ADMIN — MAGNESIUM OXIDE TAB 400 MG (241.3 MG ELEMENTAL MG) 400 MG: 400 (241.3 MG) TAB at 02:09

## 2017-09-08 RX ADMIN — DICYCLOMINE HYDROCHLORIDE 10 MG: 10 CAPSULE ORAL at 05:09

## 2017-09-08 RX ADMIN — MORPHINE SULFATE 4 MG: 4 INJECTION INTRAVENOUS at 03:09

## 2017-09-08 RX ADMIN — METRONIDAZOLE 500 MG: 500 INJECTION, SOLUTION INTRAVENOUS at 10:09

## 2017-09-08 RX ADMIN — PANTOPRAZOLE SODIUM 40 MG: 40 TABLET, DELAYED RELEASE ORAL at 09:09

## 2017-09-08 RX ADMIN — HEPARIN SODIUM 5000 UNITS: 5000 INJECTION, SOLUTION INTRAVENOUS; SUBCUTANEOUS at 08:09

## 2017-09-08 NOTE — PLAN OF CARE
Problem: Fall Risk (Adult)  Goal: Absence of Falls  Patient will demonstrate the desired outcomes by discharge/transition of care.   Outcome: Ongoing (interventions implemented as appropriate)  Alert/oriented  Pain to abdomen controlled with prn medication as ordered  Denies nausea  Glucose monitored  Sinus rhythm   Tolerating IVFs  Safe

## 2017-09-08 NOTE — ED PROVIDER NOTES
Encounter Date: 9/7/2017    SCRIBE #1 NOTE: Mini ALFONSO, am scribing for, and in the presence of, Dr. Hairston.       History     Chief Complaint   Patient presents with    Abdominal Pain     c/o generalized abdominal pain x 3 days with N/V/D     9/7/2017  11:15 PM     Chief Complaint: Abdominal pain    The patient is a 31 y.o. male with PMHx of IgA nephropathy, asthma and DM who presents with abdominal pain. Patient c/o gradual onset of crampy diffuse abdominal pain which has been intermittent for the past 3 days. Pt states his symptoms started within 2 hours of eating home cooked chicken. Associated with nausea, vomiting, diarrhea and decreased appetite. His wife had chicken as well and developed diarrhea. However, her symptoms subsided. Denies any fevers or foul smelling or dark colored feces. No recent sick contacts. No recent exposure to health care settings. Pt states he had 3 episodes of diarrhea while waiting in the ED. Shx of nose polyp removal.    This is the patient's third visit to the ED for the same symptoms. He had a negative CT scan of abdomen and diagnosed with viral enteritis. He was sent home with rx for immodium.      The history is provided by the patient.     Review of patient's allergies indicates:   Allergen Reactions    Zithromax [azithromycin]      Past Medical History:   Diagnosis Date    Asthma     Diabetes mellitus     diet controlled    IgA nephropathy      Past Surgical History:   Procedure Laterality Date    nose polyp removal       Family History   Problem Relation Age of Onset    Hypertension Maternal Grandmother     Cancer Maternal Grandfather     Diabetes Maternal Grandfather     Heart disease Maternal Grandfather     Heart disease Mother     Stroke Mother     Diabetes Mother      Social History   Substance Use Topics    Smoking status: Former Smoker     Types: Cigars, Cigarettes     Quit date: 12/29/2014    Smokeless tobacco: Never Used    Alcohol use No      Review of Systems   Constitutional: Negative for appetite change, chills and fever.   HENT: Negative for congestion, rhinorrhea and sore throat.    Respiratory: Negative for cough and shortness of breath.    Cardiovascular: Negative for chest pain.   Gastrointestinal: Negative for abdominal pain, diarrhea, nausea and vomiting.   Genitourinary: Negative for dysuria.   Musculoskeletal: Negative for back pain and myalgias.   Skin: Negative for rash.   Neurological: Negative for weakness and numbness.   Hematological: Does not bruise/bleed easily.   All other systems reviewed and are negative.      Physical Exam     Initial Vitals [09/07/17 2151]   BP Pulse Resp Temp SpO2   (!) 134/91 100 16 98 °F (36.7 °C) 96 %      MAP       105.33         Physical Exam    Nursing note and vitals reviewed.  Constitutional: He appears well-developed and well-nourished. No distress.   HENT:   Head: Normocephalic and atraumatic.   Mouth/Throat: Mucous membranes are normal.   Eyes: EOM are normal. Pupils are equal, round, and reactive to light.   Neck: Normal range of motion.   Cardiovascular: Normal rate, regular rhythm, normal heart sounds and intact distal pulses. Exam reveals no gallop and no friction rub.    No murmur heard.  Pulmonary/Chest: Breath sounds normal. He has no wheezes. He has no rhonchi. He has no rales.   Abdominal: Soft. He exhibits no distension. There is tenderness. There is no rebound and no guarding.   Reports generalized abdominal TTP.   Musculoskeletal: Normal range of motion. He exhibits no edema.   Neurological: He is alert and oriented to person, place, and time.   Skin: Skin is dry. No rash noted.   Psychiatric: He has a normal mood and affect.         ED Course   Procedures  Labs Reviewed - No data to display          Medical Decision Making:   Initial Assessment:   Patient was interviewed and examined and is progressing with ongoing abdominal pain and does have concern for underlying dehydration.  He  has a history of IgA nephropathy and has had intractable diarrhea and vomiting for the patient.  IV was established and he was provided bolus and maintain his hydration with antiemetic.  Screening labs will be obtained to assess for evidence underlying dehydration, as well as radiographs of the abdomen to rule out evidence of obstruction, ileus, perforation.  ED Management:  Labs are most significant for evidence of acute on chronic kidney disease with an elevation in creatinine and BUNs.  This is consistent with prerenal causes including dehydration.  I do think the patient warrants admission for further stabilization of his symptoms and correction of his kidney function.  The patient is in agreement with this.  Case was discussed with Mrs. Del Rosario who agreed to admit the patient.  He was transferred to a [facility] telemetry bed in guarded condition.            Scribe Attestation:   Scribe #1: I performed the above scribed service and the documentation accurately describes the services I performed. I attest to the accuracy of the note.    Attending Attestation:           Physician Attestation for Scribe:  Physician Attestation Statement for Scribe #1: I, Dr. Hairston, reviewed documentation, as scribed by Mini Portillo in my presence, and it is both accurate and complete.                 ED Course      Clinical Impression:   The primary encounter diagnosis was Severe dehydration. A diagnosis of Acute on chronic renal insufficiency was also pertinent to this visit.    Disposition:   Disposition: Placed in Observation  Condition: Leigha Hairston MD  09/09/17 3983

## 2017-09-08 NOTE — SUBJECTIVE & OBJECTIVE
Interval History:  Reports feels better    Review of Systems   Constitutional: Positive for appetite change. Negative for activity change, chills, diaphoresis, fatigue and fever.   HENT: Negative for ear discharge, ear pain and facial swelling.    Eyes: Negative for pain and redness.   Respiratory: Negative for apnea, cough, choking, chest tightness, shortness of breath, wheezing and stridor.    Cardiovascular: Negative for chest pain, palpitations and leg swelling.   Gastrointestinal: Positive for abdominal pain, diarrhea and nausea. Negative for constipation and vomiting.   Endocrine: Negative for polydipsia and polyphagia.   Genitourinary: Negative for difficulty urinating, dysuria, flank pain and hematuria.   Musculoskeletal: Negative for arthralgias, gait problem, neck pain and neck stiffness.   Skin: Negative for color change.   Allergic/Immunologic: Negative for food allergies.   Neurological: Negative for seizures, syncope, speech difficulty and weakness.   Hematological: Does not bruise/bleed easily.   Psychiatric/Behavioral: Negative for agitation, behavioral problems, confusion, hallucinations and suicidal ideas. The patient is not nervous/anxious.      Objective:     Vital Signs (Most Recent):  Temp: 97.8 °F (36.6 °C) (09/08/17 1500)  Pulse: 71 (09/08/17 1500)  Resp: 18 (09/08/17 1500)  BP: 108/61 (09/08/17 1500)  SpO2: 100 % (09/08/17 1500) Vital Signs (24h Range):  Temp:  [97.5 °F (36.4 °C)-98 °F (36.7 °C)] 97.8 °F (36.6 °C)  Pulse:  [] 71  Resp:  [16-18] 18  SpO2:  [96 %-100 %] 100 %  BP: (102-134)/(59-91) 108/61     Weight: 99.8 kg (220 lb)  Body mass index is 29.84 kg/m².    Intake/Output Summary (Last 24 hours) at 09/08/17 1808  Last data filed at 09/08/17 1700   Gross per 24 hour   Intake             1020 ml   Output             1350 ml   Net             -330 ml      Physical Exam   Constitutional: He is oriented to person, place, and time. He appears well-developed and well-nourished. No  distress.   HENT:   Head: Normocephalic and atraumatic.   Eyes: Conjunctivae and EOM are normal. Pupils are equal, round, and reactive to light. Right eye exhibits no discharge. Left eye exhibits no discharge.   Neck: Normal range of motion. Neck supple. No JVD present.   Cardiovascular: Normal rate, regular rhythm, normal heart sounds and intact distal pulses.    No murmur heard.  Pulmonary/Chest: Effort normal and breath sounds normal. No stridor. No respiratory distress. He has no wheezes. He has no rales. He exhibits no tenderness.   Abdominal: Soft. Bowel sounds are normal. There is tenderness. There is no guarding.   Obese  Mild generalized discomfort with palpation- No rebound   Genitourinary:   Genitourinary Comments: Not examined   Musculoskeletal: Normal range of motion. He exhibits no edema, tenderness or deformity.   Neurological: He is alert and oriented to person, place, and time. No cranial nerve deficit.   Skin: Skin is warm and dry. Capillary refill takes less than 2 seconds. He is not diaphoretic.   Psychiatric: He has a normal mood and affect. His behavior is normal. Judgment and thought content normal.       Significant Labs: Reviewed    Significant Imaging: Reviewed

## 2017-09-08 NOTE — ASSESSMENT & PLAN NOTE
Elevated lipase/ Diarrhea-  CL diet and advance as tolerated to 1800 ADA  Continue IVF  Continue cipro and flagyl  Prn pain and antiemetic medication

## 2017-09-08 NOTE — H&P
[facility] Hospital Medicine  History & Physical    Patient Name: Jose Miguel Brennan  MRN: [id number]  Admission Date: 9/7/2017  Attending Physician: Tisha Dewitt MD   Primary Care Provider: Harman Lugo MD         Patient information was obtained from patient and ER records.     Subjective:     Principal Problem:Acute on chronic renal insufficiency    Chief Complaint:   Chief Complaint   Patient presents with    Abdominal Pain     c/o generalized abdominal pain x 3 days with N/V/D        HPI: This is a 31 y.o. male with PMHx of IgA nephropathy, asthma and DM who presents with abdominal pain. Patient c/o gradual onset of crampy diffuse abdominal pain which has been intermittent for the past 3 days. Pt states his symptoms started within 2 hours of eating home cooked chicken. Associated with nausea, vomiting, diarrhea and decreased appetite. His wife had chicken as well and developed diarrhea. However, her symptoms subsided. Denies any fevers or foul smelling or dark colored feces. No recent sick contacts. No recent exposure to health care settings. Pt states he had 3 episodes of diarrhea while waiting in the ED. Shx of nose polyp removal.     This is the patient's third visit to the ED for the same symptoms. He had a recent negative CT scan of abdomen and diagnosed with viral enteritis. He was sent home with rx for immodium. Patient had KUB and ct abdomen without contrast with no acute issues. Patient placed in hospital for possible gastroenteritis for further evaluation and treatment.     Interval History:  Reports feels better    Review of Systems   Constitutional: Positive for appetite change. Negative for activity change, chills, diaphoresis, fatigue and fever.   HENT: Negative for ear discharge, ear pain and facial swelling.    Eyes: Negative for pain and redness.   Respiratory: Negative for apnea, cough, choking, chest tightness, shortness of breath, wheezing and stridor.     Cardiovascular: Negative for chest pain, palpitations and leg swelling.   Gastrointestinal: Positive for abdominal pain, diarrhea and nausea. Negative for constipation and vomiting.   Endocrine: Negative for polydipsia and polyphagia.   Genitourinary: Negative for difficulty urinating, dysuria, flank pain and hematuria.   Musculoskeletal: Negative for arthralgias, gait problem, neck pain and neck stiffness.   Skin: Negative for color change.   Allergic/Immunologic: Negative for food allergies.   Neurological: Negative for seizures, syncope, speech difficulty and weakness.   Hematological: Does not bruise/bleed easily.   Psychiatric/Behavioral: Negative for agitation, behavioral problems, confusion, hallucinations and suicidal ideas. The patient is not nervous/anxious.      Objective:     Vital Signs (Most Recent):  Temp: 97.8 °F (36.6 °C) (09/08/17 1500)  Pulse: 71 (09/08/17 1500)  Resp: 18 (09/08/17 1500)  BP: 108/61 (09/08/17 1500)  SpO2: 100 % (09/08/17 1500) Vital Signs (24h Range):  Temp:  [97.5 °F (36.4 °C)-98 °F (36.7 °C)] 97.8 °F (36.6 °C)  Pulse:  [] 71  Resp:  [16-18] 18  SpO2:  [96 %-100 %] 100 %  BP: (102-134)/(59-91) 108/61     Weight: 99.8 kg (220 lb)  Body mass index is 29.84 kg/m².    Intake/Output Summary (Last 24 hours) at 09/08/17 1808  Last data filed at 09/08/17 1700   Gross per 24 hour   Intake             1020 ml   Output             1350 ml   Net             -330 ml      Physical Exam   Constitutional: He is oriented to person, place, and time. He appears well-developed and well-nourished. No distress.   HENT:   Head: Normocephalic and atraumatic.   Eyes: Conjunctivae and EOM are normal. Pupils are equal, round, and reactive to light. Right eye exhibits no discharge. Left eye exhibits no discharge.   Neck: Normal range of motion. Neck supple. No JVD present.   Cardiovascular: Normal rate, regular rhythm, normal heart sounds and intact distal pulses.    No murmur  heard.  Pulmonary/Chest: Effort normal and breath sounds normal. No stridor. No respiratory distress. He has no wheezes. He has no rales. He exhibits no tenderness.   Abdominal: Soft. Bowel sounds are normal. There is tenderness. There is no guarding.   Obese  Mild generalized discomfort with palpation- No rebound   Genitourinary:   Genitourinary Comments: Not examined   Musculoskeletal: Normal range of motion. He exhibits no edema, tenderness or deformity.   Neurological: He is alert and oriented to person, place, and time. No cranial nerve deficit.   Skin: Skin is warm and dry. Capillary refill takes less than 2 seconds. He is not diaphoretic.   Psychiatric: He has a normal mood and affect. His behavior is normal. Judgment and thought content normal.       Significant Labs: Reviewed    Significant Imaging: Reviewed    Assessment/Plan:     * Acute on chronic renal insufficiency    TRACEY Secondary to dehydration-  Continue IVF  Trend BMP          Elevated lipase    Monitor  Trend          Diarrhea              Mild malnutrition    Monitor          Dehydration    Continue IVF          TRACEY (acute kidney injury)              Hypertensive urgency    Resolved          Asthma    Monitor O2 sats  Duonebs q 4 hours prn          Gastroenteritis    Elevated lipase/ Diarrhea-  CL diet and advance as tolerated to 1800 ADA  Continue IVF  Continue cipro and flagyl  Check stools  Prn pain and antiemetic medication          Type 2 diabetes mellitus with stage 3 chronic kidney disease, without long-term current use of insulin    DM diet  Accuchecks with correctional SSI          Hypertension associated with diabetes    Monitor  Home atenolol on hold at this time            VTE Risk Mitigation         Ordered     heparin (porcine) injection 5,000 Units  Every 12 hours     Route:  Subcutaneous        09/08/17 0234     Medium Risk of VTE  Once      09/08/17 0234        ROLAND Barron  Department of Hospital Medicine   [facility]    Time spent seeing patient( greater than 1/2 spent in direct contact) : 78 minutes

## 2017-09-08 NOTE — PLAN OF CARE
Problem: Patient Care Overview  Goal: Plan of Care Review  Outcome: Ongoing (interventions implemented as appropriate)  Pt lying in bed, respirations even and unlabored, no acute distress noted.  He has been sleeping a lot this shift.  He has also been medicated a couple of times.  Denies pain and discomfort at present.  Antibiotic therapy continues.  Side rails up times two, bed low and locked, call light within reach.

## 2017-09-08 NOTE — HPI
This is a 31 y.o. male with PMHx of IgA nephropathy, asthma and DM who presents with abdominal pain. Patient c/o gradual onset of crampy diffuse abdominal pain which has been intermittent for the past 3 days. Pt states his symptoms started within 2 hours of eating home cooked chicken. Associated with nausea, vomiting, diarrhea and decreased appetite. His wife had chicken as well and developed diarrhea. However, her symptoms subsided. Denies any fevers or foul smelling or dark colored feces. No recent sick contacts. No recent exposure to health care settings. Pt states he had 3 episodes of diarrhea while waiting in the ED. Shx of nose polyp removal.     This is the patient's third visit to the ED for the same symptoms. He had a recent negative CT scan of abdomen and diagnosed with viral enteritis. He was sent home with rx for immodium. Patient had KUB and ct abdomen without contrast with no acute issues. Patient placed in hospital for possible gastroenteritis for further evaluation and treatment.

## 2017-09-08 NOTE — PLAN OF CARE
CM met with patient at bedside and address verified, patient denies having insurance. PCP is Dr. Lugo, Patient lives with Nancy Cornejo- [phone]- spouse and reports independence at home, works for a [employer], pharmacy is [facility]. Patient plans to return home with no needs.

## 2017-09-08 NOTE — NURSING
Received patient to floor. C/o pain to upper abdominal area. Abdomen guarded. Non distended. States the last time he vomited was 12 noon yesterday 9/7/2017. Denies any other ailments at this time. Discussed plan of care and pain management. Verbalized understanding. Call light in reach.

## 2017-09-08 NOTE — ED NOTES
Assumed care:  Patient awake, alert and oriented x 3, skin warm and dry, in NAD.  CO NV&D and abd pain.  States that he came in yesterday and was given medication to take at home, but is now getting worse.

## 2017-09-09 VITALS
BODY MASS INDEX: 29.8 KG/M2 | HEART RATE: 76 BPM | RESPIRATION RATE: 18 BRPM | DIASTOLIC BLOOD PRESSURE: 73 MMHG | WEIGHT: 220 LBS | SYSTOLIC BLOOD PRESSURE: 136 MMHG | OXYGEN SATURATION: 94 % | HEIGHT: 72 IN | TEMPERATURE: 99 F

## 2017-09-09 PROBLEM — R19.7 DIARRHEA: Status: RESOLVED | Noted: 2017-09-08 | Resolved: 2017-09-09

## 2017-09-09 PROBLEM — I16.0 HYPERTENSIVE URGENCY: Status: RESOLVED | Noted: 2017-09-08 | Resolved: 2017-09-09

## 2017-09-09 PROBLEM — R74.8 ELEVATED LIPASE: Status: RESOLVED | Noted: 2017-09-08 | Resolved: 2017-09-09

## 2017-09-09 PROBLEM — N17.9 AKI (ACUTE KIDNEY INJURY): Status: RESOLVED | Noted: 2017-09-08 | Resolved: 2017-09-09

## 2017-09-09 PROBLEM — N18.9 ACUTE ON CHRONIC RENAL INSUFFICIENCY: Status: RESOLVED | Noted: 2017-09-08 | Resolved: 2017-09-09

## 2017-09-09 PROBLEM — E86.0 DEHYDRATION: Status: RESOLVED | Noted: 2017-09-08 | Resolved: 2017-09-09

## 2017-09-09 PROBLEM — N28.9 ACUTE ON CHRONIC RENAL INSUFFICIENCY: Status: RESOLVED | Noted: 2017-09-08 | Resolved: 2017-09-09

## 2017-09-09 LAB
ALBUMIN SERPL BCP-MCNC: 3 G/DL
ALP SERPL-CCNC: 97 U/L
ALT SERPL W/O P-5'-P-CCNC: 38 U/L
ANION GAP SERPL CALC-SCNC: 9 MMOL/L
AST SERPL-CCNC: 19 U/L
BASOPHILS # BLD AUTO: 0 K/UL
BASOPHILS NFR BLD: 0.2 %
BILIRUB SERPL-MCNC: 0.4 MG/DL
BUN SERPL-MCNC: 23 MG/DL
C DIFF GDH STL QL: NEGATIVE
C DIFF TOX A+B STL QL IA: NEGATIVE
CALCIUM SERPL-MCNC: 9.3 MG/DL
CHLORIDE SERPL-SCNC: 109 MMOL/L
CO2 SERPL-SCNC: 22 MMOL/L
CREAT SERPL-MCNC: 1.9 MG/DL
DIFFERENTIAL METHOD: ABNORMAL
EOSINOPHIL # BLD AUTO: 0.5 K/UL
EOSINOPHIL NFR BLD: 6 %
ERYTHROCYTE [DISTWIDTH] IN BLOOD BY AUTOMATED COUNT: 14.9 %
EST. GFR  (AFRICAN AMERICAN): 53 ML/MIN/1.73 M^2
EST. GFR  (NON AFRICAN AMERICAN): 46 ML/MIN/1.73 M^2
ESTIMATED AVG GLUCOSE: 214 MG/DL
GLUCOSE SERPL-MCNC: 132 MG/DL
HBA1C MFR BLD HPLC: 9.1 %
HCT VFR BLD AUTO: 45.7 %
HGB BLD-MCNC: 15.4 G/DL
LIPASE SERPL-CCNC: 36 U/L
LYMPHOCYTES # BLD AUTO: 2 K/UL
LYMPHOCYTES NFR BLD: 24.2 %
MCH RBC QN AUTO: 29.2 PG
MCHC RBC AUTO-ENTMCNC: 33.8 G/DL
MCV RBC AUTO: 87 FL
MONOCYTES # BLD AUTO: 0.4 K/UL
MONOCYTES NFR BLD: 4.9 %
NEUTROPHILS # BLD AUTO: 5.3 K/UL
NEUTROPHILS NFR BLD: 64.7 %
PLATELET # BLD AUTO: 166 K/UL
PMV BLD AUTO: 9.5 FL
POCT GLUCOSE: 154 MG/DL (ref 70–110)
POTASSIUM SERPL-SCNC: 3.9 MMOL/L
PROT SERPL-MCNC: 6.1 G/DL
RBC # BLD AUTO: 5.28 M/UL
SODIUM SERPL-SCNC: 140 MMOL/L
WBC # BLD AUTO: 8.2 K/UL

## 2017-09-09 PROCEDURE — 82962 GLUCOSE BLOOD TEST: CPT

## 2017-09-09 PROCEDURE — 63600175 PHARM REV CODE 636 W HCPCS: Performed by: NURSE PRACTITIONER

## 2017-09-09 PROCEDURE — 36415 COLL VENOUS BLD VENIPUNCTURE: CPT

## 2017-09-09 PROCEDURE — 96361 HYDRATE IV INFUSION ADD-ON: CPT

## 2017-09-09 PROCEDURE — 25000003 PHARM REV CODE 250: Performed by: NURSE PRACTITIONER

## 2017-09-09 PROCEDURE — 96372 THER/PROPH/DIAG INJ SC/IM: CPT

## 2017-09-09 PROCEDURE — G0378 HOSPITAL OBSERVATION PER HR: HCPCS

## 2017-09-09 PROCEDURE — 96376 TX/PRO/DX INJ SAME DRUG ADON: CPT

## 2017-09-09 PROCEDURE — 99900035 HC TECH TIME PER 15 MIN (STAT)

## 2017-09-09 PROCEDURE — 87449 NOS EACH ORGANISM AG IA: CPT

## 2017-09-09 PROCEDURE — 96367 TX/PROPH/DG ADDL SEQ IV INF: CPT

## 2017-09-09 PROCEDURE — 87427 SHIGA-LIKE TOXIN AG IA: CPT

## 2017-09-09 PROCEDURE — 85025 COMPLETE CBC W/AUTO DIFF WBC: CPT

## 2017-09-09 PROCEDURE — 87045 FECES CULTURE AEROBIC BACT: CPT

## 2017-09-09 PROCEDURE — 87046 STOOL CULTR AEROBIC BACT EA: CPT

## 2017-09-09 PROCEDURE — 83690 ASSAY OF LIPASE: CPT

## 2017-09-09 PROCEDURE — S0030 INJECTION, METRONIDAZOLE: HCPCS | Performed by: NURSE PRACTITIONER

## 2017-09-09 PROCEDURE — 80053 COMPREHEN METABOLIC PANEL: CPT

## 2017-09-09 RX ORDER — DICYCLOMINE HYDROCHLORIDE 10 MG/1
10 CAPSULE ORAL 3 TIMES DAILY PRN
Qty: 20 CAPSULE | Refills: 0 | Status: SHIPPED | OUTPATIENT
Start: 2017-09-09 | End: 2017-09-29

## 2017-09-09 RX ORDER — DICYCLOMINE HYDROCHLORIDE 10 MG/1
10 CAPSULE ORAL 3 TIMES DAILY PRN
Qty: 20 CAPSULE | Refills: 0 | Status: SHIPPED | OUTPATIENT
Start: 2017-09-09 | End: 2017-09-09

## 2017-09-09 RX ORDER — CIPROFLOXACIN 500 MG/1
500 TABLET ORAL EVERY 12 HOURS
Qty: 14 TABLET | Refills: 0 | Status: SHIPPED | OUTPATIENT
Start: 2017-09-09 | End: 2017-09-16

## 2017-09-09 RX ORDER — LANOLIN ALCOHOL/MO/W.PET/CERES
400 CREAM (GRAM) TOPICAL DAILY
Refills: 0 | COMMUNITY
Start: 2017-09-10 | End: 2019-07-07

## 2017-09-09 RX ORDER — METRONIDAZOLE 500 MG/1
500 TABLET ORAL EVERY 8 HOURS
Qty: 30 TABLET | Refills: 0 | Status: SHIPPED | OUTPATIENT
Start: 2017-09-09 | End: 2017-09-09

## 2017-09-09 RX ORDER — METRONIDAZOLE 500 MG/1
500 TABLET ORAL EVERY 8 HOURS
Qty: 30 TABLET | Refills: 0 | Status: SHIPPED | OUTPATIENT
Start: 2017-09-09 | End: 2017-09-19

## 2017-09-09 RX ORDER — ACETAMINOPHEN 325 MG/1
650 TABLET ORAL EVERY 6 HOURS PRN
Refills: 0 | COMMUNITY
Start: 2017-09-09 | End: 2019-07-07

## 2017-09-09 RX ORDER — PANTOPRAZOLE SODIUM 40 MG/1
40 TABLET, DELAYED RELEASE ORAL DAILY
Qty: 14 TABLET | Refills: 0 | Status: SHIPPED | OUTPATIENT
Start: 2017-09-10 | End: 2017-09-09

## 2017-09-09 RX ORDER — CIPROFLOXACIN 500 MG/1
500 TABLET ORAL EVERY 12 HOURS
Qty: 14 TABLET | Refills: 0 | Status: SHIPPED | OUTPATIENT
Start: 2017-09-09 | End: 2017-09-09

## 2017-09-09 RX ORDER — PANTOPRAZOLE SODIUM 40 MG/1
40 TABLET, DELAYED RELEASE ORAL DAILY
Qty: 14 TABLET | Refills: 0 | Status: SHIPPED | OUTPATIENT
Start: 2017-09-10 | End: 2019-07-07

## 2017-09-09 RX ADMIN — PANTOPRAZOLE SODIUM 40 MG: 40 TABLET, DELAYED RELEASE ORAL at 09:09

## 2017-09-09 RX ADMIN — CIPROFLOXACIN 400 MG: 2 INJECTION, SOLUTION INTRAVENOUS at 03:09

## 2017-09-09 RX ADMIN — METRONIDAZOLE 500 MG: 500 INJECTION, SOLUTION INTRAVENOUS at 02:09

## 2017-09-09 RX ADMIN — DICYCLOMINE HYDROCHLORIDE 10 MG: 10 CAPSULE ORAL at 06:09

## 2017-09-09 RX ADMIN — HEPARIN SODIUM 5000 UNITS: 5000 INJECTION, SOLUTION INTRAVENOUS; SUBCUTANEOUS at 09:09

## 2017-09-09 RX ADMIN — DICYCLOMINE HYDROCHLORIDE 10 MG: 10 CAPSULE ORAL at 12:09

## 2017-09-09 RX ADMIN — MAGNESIUM OXIDE TAB 400 MG (241.3 MG ELEMENTAL MG) 400 MG: 400 (241.3 MG) TAB at 09:09

## 2017-09-09 RX ADMIN — SODIUM CHLORIDE: 0.9 INJECTION, SOLUTION INTRAVENOUS at 03:09

## 2017-09-09 RX ADMIN — METRONIDAZOLE 500 MG: 500 INJECTION, SOLUTION INTRAVENOUS at 09:09

## 2017-09-09 NOTE — DISCHARGE INSTRUCTIONS
Thank you for choosing [facility] for your medical care. The primary doctor who is taking care of you at the time of your discharge is Shane Pena MD.     You were admitted to the hospital with Acute on chronic renal insufficiency.     Please note your discharge instructions, including diet/activity restrictions, follow-up appointments, and medication changes.  If you have any questions about your medical issues, prescriptions, or any other questions, please feel free to contact the [facility] Medicine Dept at [phone] and we will help.    Please direct all long term medication refills and follow up to your primary care provider, Harman Lugo MD. Thank you again for letting us take care of your health care needs.    Please note the following discharge instructions per your discharging physician-  Coretta Flor NP

## 2017-09-09 NOTE — PLAN OF CARE
09/09/17 1335   Final Note   Assessment Type Final Discharge Note   Discharge Disposition Home

## 2017-09-09 NOTE — HOSPITAL COURSE
The patient was monitored closely during his stay. He was placed on IVF for hydration and placed on bentyl, cipro, and flagyl for suspected gastroenteritis. Patient had dramatic improvement overnight and was discharged to home. His diarrhea and abdominal discomfort was resolving and he was tolerating po intake well.

## 2017-09-09 NOTE — PLAN OF CARE
09/08/17 2004   Patient Assessment/Suction   Level of Consciousness (AVPU) alert   Respiratory Effort Normal;Unlabored   Expansion/Accessory Muscles/Retractions no use of accessory muscles   PRE-TX-O2-ETCO2   O2 Device (Oxygen Therapy) room air   SpO2 95 %   Pulse 63   Aerosol Therapy   $ Aerosol Therapy Charges PRN treatment not required   Respiratory Treatment Status PRN treatment not required   Pt tolerates RA well. No PRN treatment required at this time.

## 2017-09-09 NOTE — PLAN OF CARE
Problem: Patient Care Overview  Goal: Plan of Care Review  Outcome: Ongoing (interventions implemented as appropriate)  Pt safe. ABx infused as ordered. BG monitored, insulin given per sliding scale. NS @ 100ml/hr.

## 2017-09-09 NOTE — PLAN OF CARE
09/09/17 0800   Patient Assessment/Suction   Level of Consciousness (AVPU) alert   All Lung Fields Breath Sounds clear   PRE-TX-O2-ETCO2   O2 Device (Oxygen Therapy) room air   SpO2 99 %   Pulse 62   Resp 16

## 2017-09-09 NOTE — PROGRESS NOTES
Pt cleared for d/c. Education provided regarding home medication regimen, follow up appts, when to seek medical attention, bland diet, chronic kidney disease, DM2 and dehydration.  Verbalized understanding.  Pt waiting for arrival of family member who will drive him home.

## 2017-09-09 NOTE — DISCHARGE SUMMARY
[facility] Hospital Medicine  Discharge Summary      Patient Name: Jose Miguel Brennan  MRN: [id number]  Admission Date: 9/7/2017  Hospital Length of Stay: 0 days  Discharge Date and Time:  09/09/2017 12:03 PM  Attending Physician: Shane Pena MD   Discharging Provider: ROLAND Barron  Primary Care Provider: Harman Lugo MD      HPI:   This is a 31 y.o. male with PMHx of IgA nephropathy, asthma and DM who presents with abdominal pain. Patient c/o gradual onset of crampy diffuse abdominal pain which has been intermittent for the past 3 days. Pt states his symptoms started within 2 hours of eating home cooked chicken. Associated with nausea, vomiting, diarrhea and decreased appetite. His wife had chicken as well and developed diarrhea. However, her symptoms subsided. Denies any fevers or foul smelling or dark colored feces. No recent sick contacts. No recent exposure to health care settings. Pt states he had 3 episodes of diarrhea while waiting in the ED. Shx of nose polyp removal.     This is the patient's third visit to the ED for the same symptoms. He had a recent negative CT scan of abdomen and diagnosed with viral enteritis. He was sent home with rx for immodium. Patient had KUB and ct abdomen without contrast with no acute issues. Patient placed in hospital for possible gastroenteritis for further evaluation and treatment.     * No surgery found *      Indwelling Lines/Drains at time of discharge:   Lines/Drains/Airways          No matching active lines, drains, or airways        Hospital Course:   The patient was monitored closely during his stay. He was placed on IVF for hydration and placed on bentyl, cipro, and flagyl for suspected gastroenteritis. Patient had dramatic improvement overnight and was discharged to home. His diarrhea and abdominal discomfort was resolving and he was tolerating po intake well. Stools still pending at time of discharge.     Consults:      Significant Diagnostic Studies: Labs:   CMP   Recent Labs  Lab 09/07/17  2345 09/08/17  0413 09/09/17  0433    138 140   K 3.9 4.0 3.9    109 109   CO2 18* 20* 22*   * 167* 132*   BUN 30* 29* 23*   CREATININE 2.8* 2.3* 1.9*   CALCIUM 9.5 8.5* 9.3   PROT 7.3 5.9* 6.1   ALBUMIN 3.6 3.0* 3.0*   BILITOT 1.0 0.7 0.4   ALKPHOS 116 95 97   AST 19 16 19   ALT 35 31 38   ANIONGAP 14 9 9   ESTGFRAFRICA 33* 42* 53*   EGFRNONAA 29* 36* 46*    and CBC   Recent Labs  Lab 09/07/17  2345 09/08/17 0413 09/09/17  0433   WBC 12.40 9.90 8.20   HGB 17.7 15.6 15.4   HCT 52.8 46.5 45.7    181 166       Pending Diagnostic Studies:     None        Final Active Diagnoses:    Diagnosis Date Noted POA    Gastroenteritis [K52.9] 09/08/2017 Yes    Asthma [J45.909] 09/08/2017 Yes    Mild malnutrition [E44.1] 09/08/2017 Yes    Type 2 diabetes mellitus with stage 3 chronic kidney disease, without long-term current use of insulin [E11.22, N18.3] 04/10/2014 Yes    Hypertension associated with diabetes [E11.59, I10] 03/12/2014 Yes      Problems Resolved During this Admission:    Diagnosis Date Noted Date Resolved POA    PRINCIPAL PROBLEM:  Acute on chronic renal insufficiency [N28.9, N18.9] 09/08/2017 09/09/2017 Yes    Hypertensive urgency [I16.0] 09/08/2017 09/09/2017 Yes    TRACEY (acute kidney injury) [N17.9] 09/08/2017 09/09/2017 Yes    Dehydration [E86.0] 09/08/2017 09/09/2017 Yes    Diarrhea [R19.7] 09/08/2017 09/09/2017 Yes    Elevated lipase [R74.8] 09/08/2017 09/09/2017 Yes      No new Assessment & Plan notes have been filed under this hospital service since the last note was generated.  Service: Hospital Medicine      Discharged Condition: stable    Disposition: Home or Self Care    Follow Up:  Follow-up Information     Harman Lugo MD In 2 weeks.    Specialty:  Family Medicine  Why:  Follow up for gastroenteritis, TRACEY, dehydration  Contact information:  [id number]  [facility]  [id number]  [phone]                 Patient Instructions:     Diet general   Order Comments: Soft bland diet  Drink plenty of fluids     Activity as tolerated     Call MD for:  temperature >100.4     Call MD for:  persistent nausea and vomiting or diarrhea     Call MD for:  severe uncontrolled pain     Call MD for:   Order Comments: Any decline in condition       Medications:  Reconciled Home Medications:   Current Discharge Medication List      START taking these medications    Details   acetaminophen (TYLENOL) 325 MG tablet Take 2 tablets (650 mg total) by mouth every 6 (six) hours as needed.  Refills: 0      ciprofloxacin HCl (CIPRO) 500 MG tablet Take 1 tablet (500 mg total) by mouth every 12 (twelve) hours.  Qty: 14 tablet, Refills: 0      dicyclomine (BENTYL) 10 MG capsule Take 1 capsule (10 mg total) by mouth 3 (three) times daily as needed (abd pain/ cramps).  Qty: 20 capsule, Refills: 0      magnesium oxide (MAG-OX) 400 mg tablet Take 1 tablet (400 mg total) by mouth once daily.  Refills: 0      metronidazole (FLAGYL) 500 MG tablet Take 1 tablet (500 mg total) by mouth every 8 (eight) hours.  Qty: 30 tablet, Refills: 0      pantoprazole (PROTONIX) 40 MG tablet Take 1 tablet (40 mg total) by mouth once daily.  Qty: 14 tablet, Refills: 0         CONTINUE these medications which have NOT CHANGED    Details   atenolol (TENORMIN) 50 MG tablet Take 50 mg by mouth once daily.         STOP taking these medications       diphenoxylate-atropine 2.5-0.025 mg (LOMOTIL) 2.5-0.025 mg per tablet Comments:   Reason for Stopping:             Time spent on the discharge of patient: 48 minutes    HOS POC IP DISCHARGE SUMMARY    Coretta Flor, ROLAND  Department of Hospital Medicine  [facility]

## 2017-09-10 ENCOUNTER — HOSPITAL ENCOUNTER (EMERGENCY)
Facility: HOSPITAL | Age: 32
Discharge: HOME | End: 2017-09-10
Attending: EMERGENCY MEDICINE
Payer: MEDICAID

## 2017-09-10 VITALS
SYSTOLIC BLOOD PRESSURE: 166 MMHG | OXYGEN SATURATION: 99 % | RESPIRATION RATE: 18 BRPM | HEIGHT: 72 IN | TEMPERATURE: 98 F | DIASTOLIC BLOOD PRESSURE: 107 MMHG | HEART RATE: 97 BPM

## 2017-09-10 DIAGNOSIS — K90.0 CELIAC DISEASE: ICD-10-CM

## 2017-09-10 DIAGNOSIS — R11.2 NON-INTRACTABLE VOMITING WITH NAUSEA, UNSPECIFIED VOMITING TYPE: Primary | ICD-10-CM

## 2017-09-10 DIAGNOSIS — R10.9 ABDOMINAL PAIN, UNSPECIFIED LOCATION: ICD-10-CM

## 2017-09-10 LAB
ALBUMIN SERPL BCP-MCNC: 3.8 G/DL
ALP SERPL-CCNC: 114 U/L
ALT SERPL W/O P-5'-P-CCNC: 81 U/L
ANION GAP SERPL CALC-SCNC: 11 MMOL/L
AST SERPL-CCNC: 60 U/L
BACTERIA #/AREA URNS HPF: ABNORMAL /HPF
BASOPHILS # BLD AUTO: 0.1 K/UL
BASOPHILS NFR BLD: 0.6 %
BILIRUB SERPL-MCNC: 0.6 MG/DL
BILIRUB UR QL STRIP: NEGATIVE
BUN SERPL-MCNC: 19 MG/DL
CALCIUM SERPL-MCNC: 10.5 MG/DL
CHLORIDE SERPL-SCNC: 110 MMOL/L
CLARITY UR: ABNORMAL
CO2 SERPL-SCNC: 21 MMOL/L
COLOR UR: YELLOW
CREAT SERPL-MCNC: 1.9 MG/DL
DIFFERENTIAL METHOD: ABNORMAL
EOSINOPHIL # BLD AUTO: 0.7 K/UL
EOSINOPHIL NFR BLD: 5.5 %
ERYTHROCYTE [DISTWIDTH] IN BLOOD BY AUTOMATED COUNT: 15.3 %
EST. GFR  (AFRICAN AMERICAN): 53 ML/MIN/1.73 M^2
EST. GFR  (NON AFRICAN AMERICAN): 46 ML/MIN/1.73 M^2
GLUCOSE SERPL-MCNC: 130 MG/DL
GLUCOSE UR QL STRIP: ABNORMAL
HCT VFR BLD AUTO: 48.7 %
HGB BLD-MCNC: 16.8 G/DL
HGB UR QL STRIP: ABNORMAL
HYALINE CASTS #/AREA URNS LPF: 10 /LPF
KETONES UR QL STRIP: NEGATIVE
LACTATE SERPL-SCNC: 1.2 MMOL/L
LEUKOCYTE ESTERASE UR QL STRIP: NEGATIVE
LIPASE SERPL-CCNC: 39 U/L
LYMPHOCYTES # BLD AUTO: 3 K/UL
LYMPHOCYTES NFR BLD: 25 %
MCH RBC QN AUTO: 29.3 PG
MCHC RBC AUTO-ENTMCNC: 34.4 G/DL
MCV RBC AUTO: 85 FL
MICROSCOPIC COMMENT: ABNORMAL
MONOCYTES # BLD AUTO: 0.5 K/UL
MONOCYTES NFR BLD: 4.6 %
NEUTROPHILS # BLD AUTO: 7.6 K/UL
NEUTROPHILS NFR BLD: 64.3 %
NITRITE UR QL STRIP: NEGATIVE
PH UR STRIP: 6 [PH] (ref 5–8)
PLATELET # BLD AUTO: 220 K/UL
PMV BLD AUTO: 8.7 FL
POTASSIUM SERPL-SCNC: 4 MMOL/L
PROT SERPL-MCNC: 7.3 G/DL
PROT UR QL STRIP: ABNORMAL
RBC # BLD AUTO: 5.73 M/UL
RBC #/AREA URNS HPF: 5 /HPF (ref 0–4)
SODIUM SERPL-SCNC: 142 MMOL/L
SP GR UR STRIP: 1.02 (ref 1–1.03)
URN SPEC COLLECT METH UR: ABNORMAL
UROBILINOGEN UR STRIP-ACNC: NEGATIVE EU/DL
WBC # BLD AUTO: 11.8 K/UL
WBC #/AREA URNS HPF: 5 /HPF (ref 0–5)

## 2017-09-10 PROCEDURE — 80053 COMPREHEN METABOLIC PANEL: CPT

## 2017-09-10 PROCEDURE — 63600175 PHARM REV CODE 636 W HCPCS: Performed by: EMERGENCY MEDICINE

## 2017-09-10 PROCEDURE — 96375 TX/PRO/DX INJ NEW DRUG ADDON: CPT

## 2017-09-10 PROCEDURE — 96374 THER/PROPH/DIAG INJ IV PUSH: CPT

## 2017-09-10 PROCEDURE — 25000003 PHARM REV CODE 250: Performed by: EMERGENCY MEDICINE

## 2017-09-10 PROCEDURE — 83605 ASSAY OF LACTIC ACID: CPT

## 2017-09-10 PROCEDURE — 99284 EMERGENCY DEPT VISIT MOD MDM: CPT | Mod: 25

## 2017-09-10 PROCEDURE — 85025 COMPLETE CBC W/AUTO DIFF WBC: CPT

## 2017-09-10 PROCEDURE — 81000 URINALYSIS NONAUTO W/SCOPE: CPT

## 2017-09-10 PROCEDURE — 96361 HYDRATE IV INFUSION ADD-ON: CPT

## 2017-09-10 PROCEDURE — 36415 COLL VENOUS BLD VENIPUNCTURE: CPT

## 2017-09-10 PROCEDURE — 83516 IMMUNOASSAY NONANTIBODY: CPT

## 2017-09-10 PROCEDURE — 83690 ASSAY OF LIPASE: CPT

## 2017-09-10 RX ORDER — PROMETHAZINE HYDROCHLORIDE 25 MG/1
25 SUPPOSITORY RECTAL EVERY 6 HOURS PRN
Qty: 10 SUPPOSITORY | Refills: 0 | Status: SHIPPED | OUTPATIENT
Start: 2017-09-10 | End: 2019-07-07

## 2017-09-10 RX ORDER — METOCLOPRAMIDE HYDROCHLORIDE 5 MG/ML
10 INJECTION INTRAMUSCULAR; INTRAVENOUS
Status: COMPLETED | OUTPATIENT
Start: 2017-09-10 | End: 2017-09-10

## 2017-09-10 RX ORDER — HALOPERIDOL 5 MG/ML
5 INJECTION INTRAMUSCULAR
Status: COMPLETED | OUTPATIENT
Start: 2017-09-10 | End: 2017-09-10

## 2017-09-10 RX ORDER — HYOSCYAMINE SULFATE 0.5 MG/ML
0.5 INJECTION, SOLUTION SUBCUTANEOUS
Status: DISCONTINUED | OUTPATIENT
Start: 2017-09-10 | End: 2017-09-10

## 2017-09-10 RX ORDER — MORPHINE SULFATE 10 MG/ML
10 INJECTION INTRAMUSCULAR; INTRAVENOUS; SUBCUTANEOUS
Status: COMPLETED | OUTPATIENT
Start: 2017-09-10 | End: 2017-09-10

## 2017-09-10 RX ORDER — SODIUM CHLORIDE 9 MG/ML
1000 INJECTION, SOLUTION INTRAVENOUS
Status: COMPLETED | OUTPATIENT
Start: 2017-09-10 | End: 2017-09-10

## 2017-09-10 RX ADMIN — MORPHINE SULFATE 10 MG: 10 INJECTION, SOLUTION INTRAMUSCULAR; INTRAVENOUS at 10:09

## 2017-09-10 RX ADMIN — SODIUM CHLORIDE 1000 ML: 0.9 INJECTION, SOLUTION INTRAVENOUS at 08:09

## 2017-09-10 RX ADMIN — METOCLOPRAMIDE 10 MG: 5 INJECTION, SOLUTION INTRAMUSCULAR; INTRAVENOUS at 08:09

## 2017-09-10 RX ADMIN — HALOPERIDOL LACTATE 5 MG: 5 INJECTION, SOLUTION INTRAMUSCULAR at 09:09

## 2017-09-11 ENCOUNTER — TELEPHONE (OUTPATIENT)
Dept: MEDSURG UNIT | Facility: HOSPITAL | Age: 32
End: 2017-09-11

## 2017-09-11 NOTE — ED PROVIDER NOTES
Encounter Date: 9/10/2017    SCRIBE #1 NOTE: I, Vira Rush, am scribing for, and in the presence of, Dr. Groves.       History     Chief Complaint   Patient presents with    Abdominal Pain     and kidney problems and just left the hospital yesterday       09/10/2017 8:09 PM     Chief Complaint: Abd pain      Jose Miguel Brennan is a 31 y.o. male with a history of IgA nephropathy, Asthma, and DM who presents to the ED with complaints of abd pain associates with nausea, vomiting and diarrhea. Pt also notes a decrease in appetite. He states his last meal was yesterday. Pt reports he was discharged yesterday. He denies abd bloating. Allergens include Zithromax.     The history is provided by the patient.     Review of patient's allergies indicates:   Allergen Reactions    Zithromax [azithromycin]      Past Medical History:   Diagnosis Date    Asthma     Diabetes mellitus     diet controlled    IgA nephropathy      Past Surgical History:   Procedure Laterality Date    nose polyp removal       Family History   Problem Relation Age of Onset    Hypertension Maternal Grandmother     Cancer Maternal Grandfather     Diabetes Maternal Grandfather     Heart disease Maternal Grandfather     Heart disease Mother     Stroke Mother     Diabetes Mother      Social History   Substance Use Topics    Smoking status: Former Smoker     Types: Cigars, Cigarettes     Quit date: 12/29/2014    Smokeless tobacco: Never Used    Alcohol use No     Review of Systems   Constitutional: Positive for appetite change. Negative for activity change, chills, fatigue and fever.   Eyes: Negative for visual disturbance.   Respiratory: Negative for apnea and shortness of breath.    Cardiovascular: Negative for chest pain and palpitations.   Gastrointestinal: Positive for abdominal pain, diarrhea, nausea and vomiting. Negative for abdominal distention.   Genitourinary: Negative for difficulty urinating.   Musculoskeletal: Negative for neck  pain.   Skin: Negative for pallor and rash.   Neurological: Negative for headaches.   Hematological: Does not bruise/bleed easily.   Psychiatric/Behavioral: Negative for agitation.       Physical Exam     Initial Vitals [09/10/17 1950]   BP Pulse Resp Temp SpO2   (!) 166/107 97 18 97.9 °F (36.6 °C) 99 %      MAP       126.67         Physical Exam    Nursing note and vitals reviewed.  Constitutional: He appears well-developed and well-nourished.   HENT:   Head: Normocephalic and atraumatic.   Eyes: Conjunctivae and EOM are normal. Pupils are equal, round, and reactive to light.   Neck: Normal range of motion. Neck supple.   Cardiovascular: Normal rate and regular rhythm. Exam reveals no gallop and no friction rub.    No murmur heard.  Pulmonary/Chest: Breath sounds normal. No respiratory distress. He has no wheezes. He has no rhonchi. He has no rales.   Abdominal: Soft. He exhibits no distension. There is no tenderness.   Musculoskeletal: Normal range of motion. He exhibits no tenderness.   Neurological: He is alert and oriented to person, place, and time.   Skin: Skin is warm and dry.   Psychiatric: He has a normal mood and affect.         ED Course   Procedures  Labs Reviewed   CBC W/ AUTO DIFFERENTIAL - Abnormal; Notable for the following:        Result Value    RDW 15.3 (*)     MPV 8.7 (*)     Eos # 0.7 (*)     All other components within normal limits   COMPREHENSIVE METABOLIC PANEL - Abnormal; Notable for the following:     CO2 21 (*)     Glucose 130 (*)     Creatinine 1.9 (*)     AST 60 (*)     ALT 81 (*)     eGFR if  53 (*)     eGFR if non  46 (*)     All other components within normal limits   URINALYSIS - Abnormal; Notable for the following:     Appearance, UA Hazy (*)     Protein, UA 2+ (*)     Glucose, UA 1+ (*)     Occult Blood UA Trace (*)     All other components within normal limits   URINALYSIS MICROSCOPIC - Abnormal; Notable for the following:     RBC, UA 5 (*)      Bacteria, UA Few (*)     Hyaline Casts, UA 10 (*)     All other components within normal limits   LACTIC ACID, PLASMA   LIPASE   TISSUE TRANSGLUTAMINASE, IGA             Medical Decision Making:   History:   Old Records Summarized: records from previous admission(s).  Clinical Tests:   Lab Tests: Ordered and Reviewed  The following lab test(s) were unremarkable: CBC, CMP, Lipase and Lactate  ED Management:  Jose Miguel Brennan is a 31 y.o. male who presents with  recurrent diffuse crampy abdominal pain with associated nausea and vomiting.  He has had multiple presentations to the emergency department for same and was recently admitted.  He has IgA nephropathy which is associated with as high is a 35% coexistence of celiac disease.  I suspect that the symptoms are related to celiac disease.  He is encouraged to strictly adhere to a gluten-free diet.  Once again he has no gross cytosis to suggest bacterial enteritis or bowel perforation.  He is encouraged to follow-up with gastroenterology for further evaluation.  Anti-tissue transglutaminase is submitted            Scribe Attestation:   Scribe #1: I performed the above scribed service and the documentation accurately describes the services I performed. I attest to the accuracy of the note.    Attending Attestation:           Physician Attestation for Scribe:  Physician Attestation Statement for Scribe #1: I, Dr. Groves, reviewed documentation, as scribed by Vira Rush in my presence, and it is both accurate and complete.                 ED Course      Clinical Impression:   No diagnosis found.                           David Groves III, MD  09/10/17 4363

## 2017-09-13 LAB — TTG IGA SER IA-ACNC: 5 UNITS

## 2017-09-14 LAB — BACTERIA STL CULT: NORMAL

## 2017-10-22 ENCOUNTER — HOSPITAL ENCOUNTER (EMERGENCY)
Facility: HOSPITAL | Age: 32
Discharge: HOME | End: 2017-10-22
Attending: EMERGENCY MEDICINE
Payer: MEDICAID

## 2017-10-22 VITALS
BODY MASS INDEX: 30.48 KG/M2 | RESPIRATION RATE: 20 BRPM | HEIGHT: 72 IN | WEIGHT: 225 LBS | DIASTOLIC BLOOD PRESSURE: 91 MMHG | OXYGEN SATURATION: 100 % | SYSTOLIC BLOOD PRESSURE: 136 MMHG | HEART RATE: 80 BPM | TEMPERATURE: 98 F

## 2017-10-22 DIAGNOSIS — R10.9 FLANK PAIN: Primary | ICD-10-CM

## 2017-10-22 DIAGNOSIS — E86.0 DEHYDRATION: ICD-10-CM

## 2017-10-22 DIAGNOSIS — N18.9 CHRONIC KIDNEY DISEASE, UNSPECIFIED CKD STAGE: ICD-10-CM

## 2017-10-22 LAB
ALBUMIN SERPL BCP-MCNC: 3.6 G/DL
ALP SERPL-CCNC: 111 U/L
ALT SERPL W/O P-5'-P-CCNC: 27 U/L
ANION GAP SERPL CALC-SCNC: 10 MMOL/L
AST SERPL-CCNC: 16 U/L
BACTERIA #/AREA URNS HPF: ABNORMAL /HPF
BASOPHILS # BLD AUTO: 0.1 K/UL
BASOPHILS NFR BLD: 0.7 %
BILIRUB SERPL-MCNC: 1.1 MG/DL
BILIRUB UR QL STRIP: NEGATIVE
BUN SERPL-MCNC: 18 MG/DL
CALCIUM SERPL-MCNC: 9.6 MG/DL
CHLORIDE SERPL-SCNC: 107 MMOL/L
CLARITY UR: CLEAR
CO2 SERPL-SCNC: 21 MMOL/L
COLOR UR: YELLOW
CREAT SERPL-MCNC: 1.9 MG/DL
DIFFERENTIAL METHOD: ABNORMAL
EOSINOPHIL # BLD AUTO: 1 K/UL
EOSINOPHIL NFR BLD: 7.1 %
ERYTHROCYTE [DISTWIDTH] IN BLOOD BY AUTOMATED COUNT: 14.9 %
EST. GFR  (AFRICAN AMERICAN): 53 ML/MIN/1.73 M^2
EST. GFR  (NON AFRICAN AMERICAN): 46 ML/MIN/1.73 M^2
GLUCOSE SERPL-MCNC: 193 MG/DL
GLUCOSE UR QL STRIP: ABNORMAL
HCT VFR BLD AUTO: 51.3 %
HGB BLD-MCNC: 17.3 G/DL
HGB UR QL STRIP: ABNORMAL
HYALINE CASTS #/AREA URNS LPF: 3 /LPF
KETONES UR QL STRIP: NEGATIVE
LEUKOCYTE ESTERASE UR QL STRIP: NEGATIVE
LYMPHOCYTES # BLD AUTO: 3.8 K/UL
LYMPHOCYTES NFR BLD: 26.9 %
MCH RBC QN AUTO: 29.2 PG
MCHC RBC AUTO-ENTMCNC: 33.6 G/DL
MCV RBC AUTO: 87 FL
MICROSCOPIC COMMENT: ABNORMAL
MONOCYTES # BLD AUTO: 0.7 K/UL
MONOCYTES NFR BLD: 5.3 %
NEUTROPHILS # BLD AUTO: 8.5 K/UL
NEUTROPHILS NFR BLD: 60 %
NITRITE UR QL STRIP: NEGATIVE
PH UR STRIP: 6 [PH] (ref 5–8)
PLATELET # BLD AUTO: 233 K/UL
PMV BLD AUTO: 9.4 FL
POTASSIUM SERPL-SCNC: 4.1 MMOL/L
PROT SERPL-MCNC: 7 G/DL
PROT UR QL STRIP: ABNORMAL
RBC # BLD AUTO: 5.91 M/UL
RBC #/AREA URNS HPF: 1 /HPF (ref 0–4)
SODIUM SERPL-SCNC: 138 MMOL/L
SP GR UR STRIP: >=1.03 (ref 1–1.03)
SQUAMOUS #/AREA URNS HPF: 1 /HPF
URN SPEC COLLECT METH UR: ABNORMAL
UROBILINOGEN UR STRIP-ACNC: NEGATIVE EU/DL
WBC # BLD AUTO: 14.1 K/UL
WBC #/AREA URNS HPF: 2 /HPF (ref 0–5)

## 2017-10-22 PROCEDURE — 96361 HYDRATE IV INFUSION ADD-ON: CPT

## 2017-10-22 PROCEDURE — 99283 EMERGENCY DEPT VISIT LOW MDM: CPT | Mod: 25

## 2017-10-22 PROCEDURE — 36415 COLL VENOUS BLD VENIPUNCTURE: CPT

## 2017-10-22 PROCEDURE — 25000003 PHARM REV CODE 250: Performed by: PHYSICIAN ASSISTANT

## 2017-10-22 PROCEDURE — 85025 COMPLETE CBC W/AUTO DIFF WBC: CPT

## 2017-10-22 PROCEDURE — 80053 COMPREHEN METABOLIC PANEL: CPT

## 2017-10-22 PROCEDURE — 96374 THER/PROPH/DIAG INJ IV PUSH: CPT

## 2017-10-22 PROCEDURE — 63600175 PHARM REV CODE 636 W HCPCS: Performed by: PHYSICIAN ASSISTANT

## 2017-10-22 PROCEDURE — 81000 URINALYSIS NONAUTO W/SCOPE: CPT

## 2017-10-22 RX ORDER — MORPHINE SULFATE 2 MG/ML
6 INJECTION, SOLUTION INTRAMUSCULAR; INTRAVENOUS
Status: COMPLETED | OUTPATIENT
Start: 2017-10-22 | End: 2017-10-22

## 2017-10-22 RX ADMIN — SODIUM CHLORIDE 1000 ML: 0.9 INJECTION, SOLUTION INTRAVENOUS at 12:10

## 2017-10-22 RX ADMIN — MORPHINE SULFATE 6 MG: 2 INJECTION, SOLUTION INTRAMUSCULAR; INTRAVENOUS at 11:10

## 2017-10-22 NOTE — DISCHARGE INSTRUCTIONS
Drink plenty of fluids.  Make an appointment with your nephrologist and primary care provider.  For worsening symptoms, chest pain, shortness of breath, increased abdominal pain, high grade fever, stroke or stroke like symptoms, immediately go to the nearest Emergency Room or call 911 as soon as possible.

## 2017-10-22 NOTE — ED PROVIDER NOTES
Encounter Date: 10/22/2017       History     Chief Complaint   Patient presents with    Flank Pain     bilateral pain started Friday     Patient is a 32 year old male who presents with bilateral kidney pain for about a week. He has PMH significant for asthma, DM and IgA nephropathy. He reports he has been trying to drink more water with no improvement. He sees Dr. Jordan, nephrology, at [facility]. He reports he usually has to get IVF and pain medication with resolution of his symptoms. He does report urinary frequency. He denied dysuria, difficulty urinating, hematuria, nausea, vomiting, abdominal pain or fever.      The history is provided by the patient.     Review of patient's allergies indicates:   Allergen Reactions    Zithromax [azithromycin]      Past Medical History:   Diagnosis Date    Asthma     Diabetes mellitus     diet controlled    IgA nephropathy      Past Surgical History:   Procedure Laterality Date    nose polyp removal       Family History   Problem Relation Age of Onset    Hypertension Maternal Grandmother     Cancer Maternal Grandfather     Diabetes Maternal Grandfather     Heart disease Maternal Grandfather     Heart disease Mother     Stroke Mother     Diabetes Mother      Social History   Substance Use Topics    Smoking status: Former Smoker     Types: Cigars, Cigarettes     Quit date: 12/29/2014    Smokeless tobacco: Never Used    Alcohol use No     Review of Systems   Constitutional: Negative for activity change, appetite change, chills and fever.   HENT: Negative for congestion, rhinorrhea and sore throat.    Respiratory: Negative for cough, chest tightness and shortness of breath.    Cardiovascular: Negative for chest pain.   Gastrointestinal: Negative for abdominal pain, diarrhea, nausea and vomiting.   Genitourinary: Positive for flank pain and frequency. Negative for dysuria and hematuria.   Musculoskeletal: Negative for back pain, neck pain and neck  stiffness.   Skin: Negative for rash.   Neurological: Negative for dizziness, syncope, numbness and headaches.       Physical Exam     Initial Vitals [10/22/17 1111]   BP Pulse Resp Temp SpO2   (!) 169/91 100 20 97.9 °F (36.6 °C) 100 %      MAP       117         Physical Exam    Constitutional: Vital signs are normal. He appears well-developed and well-nourished. He is cooperative.  Non-toxic appearance. He does not have a sickly appearance.   HENT:   Head: Normocephalic and atraumatic.   Right Ear: External ear normal.   Left Ear: External ear normal.   Nose: Nose normal.   Mouth/Throat: Oropharynx is clear and moist.   Eyes: Conjunctivae and lids are normal. Pupils are equal, round, and reactive to light.   Neck: Normal range of motion and full passive range of motion without pain. Neck supple.   Cardiovascular: Normal rate and regular rhythm.   Pulmonary/Chest: Breath sounds normal. No respiratory distress. He has no wheezes.   Abdominal: Soft. Normal appearance. There is no tenderness. There is CVA tenderness. There is no rigidity, no rebound and no guarding.   Abdomen is soft and non-tender. Bilateral CVA tenderness.    Neurological: He is alert and oriented to person, place, and time.   Skin: Skin is warm, dry and intact. No rash noted.         ED Course   Procedures  Labs Reviewed   CBC W/ AUTO DIFFERENTIAL - Abnormal; Notable for the following:        Result Value    WBC 14.10 (*)     RDW 14.9 (*)     Gran # 8.5 (*)     Eos # 1.0 (*)     All other components within normal limits   COMPREHENSIVE METABOLIC PANEL - Abnormal; Notable for the following:     CO2 21 (*)     Glucose 193 (*)     Creatinine 1.9 (*)     Total Bilirubin 1.1 (*)     eGFR if  53 (*)     eGFR if non  46 (*)     All other components within normal limits   URINALYSIS - Abnormal; Notable for the following:     Specific Gravity, UA >=1.030 (*)     Protein, UA 3+ (*)     Glucose, UA 1+ (*)     Occult Blood UA  Trace (*)     All other components within normal limits   URINALYSIS MICROSCOPIC - Abnormal; Notable for the following:     Bacteria, UA Few (*)     Hyaline Casts, UA 3 (*)     All other components within normal limits             Medical Decision Making:   History:   Old Medical Records: I decided to obtain old medical records.  Clinical Tests:   Lab Tests: Ordered       APC / Resident Notes:   This is an urgent evaluation of a 32 year old male who presents with bilateral flank pain and dehydration. He reports multiple episodes of similar symptoms which usually resolves with IVF hydration and pain medication. He denied nausea, vomiting or abdominal pain. He reports urinary frequency. He is well appearing. Vital signs are stable. His abdomen is soft and non-tender. There is no rebound or guarding. Mild bilateral CVA tenderness. UA is negative. He has stable CKD, unchanged. Mild leukocytosis which is similar to multiple previous visits. He was given two liters of IVF and morphine with resolution of his symptoms. He will be discharged to follow up with nephrology. Discussed results with patient. Return precautions given. Patient is to follow up with their primary care provider. Case was discussed with Dr. Mancuso who is in agreement with the plan of care. All questions answered.                 ED Course      Clinical Impression:   The primary encounter diagnosis was Flank pain. Diagnoses of Dehydration and Chronic kidney disease, unspecified CKD stage were also pertinent to this visit.                           Bri Song PA-C  10/22/17 0495

## 2017-10-22 NOTE — ED NOTES
"Pt here for eval of acute on chronic "kidney problems" (presents with bilat flank tenderness). Otherwise, unremarkable exam. "I have to get IVFs every months or so" per pt  "

## 2017-11-24 ENCOUNTER — HOSPITAL ENCOUNTER (EMERGENCY)
Facility: HOSPITAL | Age: 32
Discharge: HOME | End: 2017-11-24
Attending: EMERGENCY MEDICINE
Payer: MEDICAID

## 2017-11-24 VITALS
DIASTOLIC BLOOD PRESSURE: 76 MMHG | TEMPERATURE: 99 F | BODY MASS INDEX: 30.52 KG/M2 | WEIGHT: 225 LBS | RESPIRATION RATE: 16 BRPM | HEART RATE: 76 BPM | SYSTOLIC BLOOD PRESSURE: 151 MMHG | OXYGEN SATURATION: 98 %

## 2017-11-24 DIAGNOSIS — R10.9 FLANK PAIN: Primary | ICD-10-CM

## 2017-11-24 LAB
ALBUMIN SERPL BCP-MCNC: 3.6 G/DL
ALLENS TEST: ABNORMAL
ALP SERPL-CCNC: 108 U/L
ALT SERPL W/O P-5'-P-CCNC: 19 U/L
ANION GAP SERPL CALC-SCNC: 14 MMOL/L
AST SERPL-CCNC: 17 U/L
B-OH-BUTYR BLD STRIP-SCNC: 0.2 MMOL/L
BACTERIA #/AREA URNS HPF: NORMAL /HPF
BASOPHILS # BLD AUTO: 0 K/UL
BASOPHILS NFR BLD: 0.3 %
BILIRUB SERPL-MCNC: 0.3 MG/DL
BILIRUB UR QL STRIP: NEGATIVE
BUN SERPL-MCNC: 17 MG/DL
CALCIUM SERPL-MCNC: 9.4 MG/DL
CHLORIDE SERPL-SCNC: 107 MMOL/L
CLARITY UR: CLEAR
CO2 SERPL-SCNC: 16 MMOL/L
COLOR UR: YELLOW
CREAT SERPL-MCNC: 1.5 MG/DL
DELSYS: ABNORMAL
DIFFERENTIAL METHOD: ABNORMAL
EOSINOPHIL # BLD AUTO: 0.9 K/UL
EOSINOPHIL NFR BLD: 6.1 %
ERYTHROCYTE [DISTWIDTH] IN BLOOD BY AUTOMATED COUNT: 14.1 %
EST. GFR  (AFRICAN AMERICAN): >60 ML/MIN/1.73 M^2
EST. GFR  (NON AFRICAN AMERICAN): >60 ML/MIN/1.73 M^2
GLUCOSE SERPL-MCNC: 269 MG/DL
GLUCOSE UR QL STRIP: ABNORMAL
HCO3 UR-SCNC: 24.2 MMOL/L (ref 24–28)
HCT VFR BLD AUTO: 47.9 %
HGB BLD-MCNC: 16.7 G/DL
HGB UR QL STRIP: ABNORMAL
HYALINE CASTS #/AREA URNS LPF: 0 /LPF
KETONES UR QL STRIP: NEGATIVE
LEUKOCYTE ESTERASE UR QL STRIP: NEGATIVE
LYMPHOCYTES # BLD AUTO: 3.8 K/UL
LYMPHOCYTES NFR BLD: 25.7 %
MCH RBC QN AUTO: 30.4 PG
MCHC RBC AUTO-ENTMCNC: 34.9 G/DL
MCV RBC AUTO: 87 FL
MICROSCOPIC COMMENT: NORMAL
MODE: ABNORMAL
MONOCYTES # BLD AUTO: 0.7 K/UL
MONOCYTES NFR BLD: 4.7 %
NEUTROPHILS # BLD AUTO: 9.3 K/UL
NEUTROPHILS NFR BLD: 63.2 %
NITRITE UR QL STRIP: NEGATIVE
PCO2 BLDA: 44.4 MMHG (ref 35–45)
PH SMN: 7.34 [PH] (ref 7.35–7.45)
PH UR STRIP: 6 [PH] (ref 5–8)
PLATELET # BLD AUTO: 230 K/UL
PMV BLD AUTO: 9.3 FL
PO2 BLDA: 59 MMHG (ref 40–60)
POC BE: -1 MMOL/L
POC SATURATED O2: 89 % (ref 95–100)
POC TCO2: 26 MMOL/L (ref 24–29)
POCT GLUCOSE: 213 MG/DL (ref 70–110)
POTASSIUM SERPL-SCNC: 4.1 MMOL/L
PROT SERPL-MCNC: 9 G/DL
PROT UR QL STRIP: ABNORMAL
RBC # BLD AUTO: 5.5 M/UL
RBC #/AREA URNS HPF: 0 /HPF (ref 0–4)
SAMPLE: ABNORMAL
SITE: ABNORMAL
SODIUM SERPL-SCNC: 137 MMOL/L
SP GR UR STRIP: 1.02 (ref 1–1.03)
SQUAMOUS #/AREA URNS HPF: NORMAL /HPF
URN SPEC COLLECT METH UR: ABNORMAL
UROBILINOGEN UR STRIP-ACNC: NEGATIVE EU/DL
WBC # BLD AUTO: 14.7 K/UL
WBC #/AREA URNS HPF: 0 /HPF (ref 0–5)
YEAST URNS QL MICRO: NORMAL

## 2017-11-24 PROCEDURE — 82010 KETONE BODYS QUAN: CPT

## 2017-11-24 PROCEDURE — 36415 COLL VENOUS BLD VENIPUNCTURE: CPT

## 2017-11-24 PROCEDURE — 80053 COMPREHEN METABOLIC PANEL: CPT

## 2017-11-24 PROCEDURE — 99283 EMERGENCY DEPT VISIT LOW MDM: CPT | Mod: 25

## 2017-11-24 PROCEDURE — 25000003 PHARM REV CODE 250: Performed by: NURSE PRACTITIONER

## 2017-11-24 PROCEDURE — 96361 HYDRATE IV INFUSION ADD-ON: CPT

## 2017-11-24 PROCEDURE — 82962 GLUCOSE BLOOD TEST: CPT

## 2017-11-24 PROCEDURE — 85025 COMPLETE CBC W/AUTO DIFF WBC: CPT

## 2017-11-24 PROCEDURE — 63600175 PHARM REV CODE 636 W HCPCS

## 2017-11-24 PROCEDURE — 96360 HYDRATION IV INFUSION INIT: CPT

## 2017-11-24 PROCEDURE — 81000 URINALYSIS NONAUTO W/SCOPE: CPT

## 2017-11-24 PROCEDURE — 82803 BLOOD GASES ANY COMBINATION: CPT

## 2017-11-24 PROCEDURE — 25000003 PHARM REV CODE 250: Performed by: EMERGENCY MEDICINE

## 2017-11-24 RX ORDER — OXYCODONE AND ACETAMINOPHEN 5; 325 MG/1; MG/1
1 TABLET ORAL
Status: COMPLETED | OUTPATIENT
Start: 2017-11-24 | End: 2017-11-24

## 2017-11-24 RX ORDER — MORPHINE SULFATE 4 MG/ML
INJECTION, SOLUTION INTRAMUSCULAR; INTRAVENOUS
Status: COMPLETED
Start: 2017-11-24 | End: 2017-11-24

## 2017-11-24 RX ORDER — HYDROMORPHONE HYDROCHLORIDE 1 MG/ML
1 INJECTION, SOLUTION INTRAMUSCULAR; INTRAVENOUS; SUBCUTANEOUS
Status: DISCONTINUED | OUTPATIENT
Start: 2017-11-24 | End: 2017-11-24

## 2017-11-24 RX ORDER — MORPHINE SULFATE 2 MG/ML
6 INJECTION, SOLUTION INTRAMUSCULAR; INTRAVENOUS
Status: DISCONTINUED | OUTPATIENT
Start: 2017-11-24 | End: 2017-11-24 | Stop reason: HOSPADM

## 2017-11-24 RX ADMIN — OXYCODONE AND ACETAMINOPHEN 1 TABLET: 5; 325 TABLET ORAL at 01:11

## 2017-11-24 RX ADMIN — MORPHINE SULFATE 6 MG: 4 INJECTION INTRAVENOUS at 10:11

## 2017-11-24 RX ADMIN — SODIUM CHLORIDE 1000 ML: 0.9 INJECTION, SOLUTION INTRAVENOUS at 10:11

## 2017-11-24 RX ADMIN — DICYCLOMINE HYDROCHLORIDE: 10 SOLUTION ORAL at 10:11

## 2017-11-24 RX ADMIN — SODIUM CHLORIDE 1000 ML: 0.9 INJECTION, SOLUTION INTRAVENOUS at 12:11

## 2017-11-24 NOTE — ED PROVIDER NOTES
"Encounter Date: 11/24/2017    SCRIBE #1 NOTE: I, Miriam Funes, am scribing for, and in the presence of, BRANNON Corbett.       History     Chief Complaint   Patient presents with    Flank Pain     bilat. started last night       11/24/2017 10:22 AM     Chief complaint: Flank pain      Jose Miguel Brennan is a 32 y.o. male with DM IgA nephropathy and asthma who presents to the ED with an onset of bilateral flank upon waking up this morning with associated mild dysuria. The pain intermittently radiates to the abdomen. He states that he "needs fluids." The patient was seen in the ER on 9/10 and on 10/22 for the similar symptoms where he received IV fluids & pain medication and discharge. His Nephrologist is Dr. Ari Jordan. He denies fever, chills, nausea, vomiting, hematuria, urinary frequency or urgency, or any other symptoms at this time. No pertinent SHx noted. Azithromycin drug allergy noted.      The history is provided by the patient.     Review of patient's allergies indicates:   Allergen Reactions    Zithromax [azithromycin]      Past Medical History:   Diagnosis Date    Asthma     Diabetes mellitus     diet controlled    IgA nephropathy      Past Surgical History:   Procedure Laterality Date    nose polyp removal       Family History   Problem Relation Age of Onset    Hypertension Maternal Grandmother     Cancer Maternal Grandfather     Diabetes Maternal Grandfather     Heart disease Maternal Grandfather     Heart disease Mother     Stroke Mother     Diabetes Mother      Social History   Substance Use Topics    Smoking status: Former Smoker     Types: Cigars, Cigarettes     Quit date: 12/29/2014    Smokeless tobacco: Never Used    Alcohol use No     Review of Systems   Constitutional: Negative for chills and fever.   HENT: Negative for congestion, rhinorrhea and sore throat.    Eyes: Negative for pain and redness.   Respiratory: Negative for cough and shortness of breath.    Cardiovascular: " Negative for chest pain and palpitations.   Gastrointestinal: Positive for abdominal pain (radiating, intermittent). Negative for diarrhea and nausea.   Genitourinary: Positive for dysuria (mild) and flank pain (bilateral). Negative for frequency, hematuria and urgency.   Musculoskeletal: Negative for gait problem, myalgias and neck pain.   Skin: Negative for rash.   Neurological: Negative for dizziness, light-headedness and headaches.     Physical Exam     Initial Vitals [11/24/17 1006]   BP Pulse Resp Temp SpO2   (!) 166/114 84 16 98.7 °F (37.1 °C) 95 %      MAP       131.33         Physical Exam    Nursing note and vitals reviewed.  Constitutional: Vital signs are normal. He appears well-developed and well-nourished. He is not diaphoretic. He is active. He does not have a sickly appearance. No distress.   HENT:   Head: Normocephalic and atraumatic.   Eyes: Conjunctivae are normal.   Neck: Normal range of motion and full passive range of motion without pain.   Cardiovascular: Normal rate, regular rhythm and normal heart sounds. Exam reveals no gallop and no friction rub.    No murmur heard.  Pulmonary/Chest: Breath sounds normal. He has no wheezes. He has no rhonchi. He has no rales.   Abdominal: Soft. Bowel sounds are normal. There is no tenderness. There is CVA tenderness.   Bilateral CVA tenderness.    Musculoskeletal: Normal range of motion.   Neurological: He is alert and oriented to person, place, and time. Gait normal.   Skin: Skin is warm and dry. Capillary refill takes less than 2 seconds.   Psychiatric: He has a normal mood and affect.       ED Course   Procedures  Labs Reviewed   CBC W/ AUTO DIFFERENTIAL - Abnormal; Notable for the following:        Result Value    WBC 14.70 (*)     Gran # 9.3 (*)     Eos # 0.9 (*)     All other components within normal limits   COMPREHENSIVE METABOLIC PANEL - Abnormal; Notable for the following:     CO2 16 (*)     Glucose 269 (*)     Creatinine 1.5 (*)     Total  Protein 9.0 (*)     All other components within normal limits   URINALYSIS - Abnormal; Notable for the following:     Protein, UA 2+ (*)     Glucose, UA 4+ (*)     Occult Blood UA Trace (*)     All other components within normal limits   ISTAT PROCEDURE - Abnormal; Notable for the following:     POC PH 7.344 (*)     POC SATURATED O2 89 (*)     All other components within normal limits   POCT GLUCOSE - Abnormal; Notable for the following:     POCT Glucose 213 (*)     All other components within normal limits   BETA - HYDROXYBUTYRATE, SERUM   URINALYSIS MICROSCOPIC   POCT GLUCOSE MONITORING CONTINUOUS           Medical Decision Making:   History:   Old Medical Records: I decided to obtain old medical records.  Clinical Tests:   Lab Tests: Reviewed and Ordered       APC / Resident Notes:   Jose Miguel Peguero is a 32 year old male presenting to the ED with c/o bilateral flank pain. He requests IV pain medication and fluids. He was given two liters NS and IV morphine. He stated that his pain was minimally improved. PO percocet was given with some improvement as well. Mild leukocytosis without fever or evidence of UTI on urinalysis. Creatinine is at 1.5 which is lower than it has been on previous labs. Patient's CO2 was 16 and glucose was >200 so additional labs were drawn to r/o DKA. These labs were unremarkable. I do not suspect nephrolithiasis, pyeloneprhitis. He was instructed to follow up with his nephrologist but was also provided with specific return precautions. Based on my clinical evaluation, I do not appreciate any immediate, emergent, or life threatening condition or etiology that warrants additional workup today and feel that the patient can be discharged with close follow up care.          Scribe Attestation:   Scribe #1: I performed the above scribed service and the documentation accurately describes the services I performed. I attest to the accuracy of the note.    I, Ginger Corbett NP-C, personally  performed the services described in this documentation. All medical record entries made by the scribe were at my direction and in my presence.  I have reviewed the chart and agree that the record reflects my personal performance and is accurate and complete. BRANNON Jackson.  10:45 PM 11/24/2017          ED Course      Clinical Impression:     1. Flank pain          Disposition:   Disposition: Discharged  Condition: Stable                        Fern Corbett NP  11/24/17 9239

## 2017-11-29 ENCOUNTER — HOSPITAL ENCOUNTER (EMERGENCY)
Facility: HOSPITAL | Age: 32
Discharge: HOME | End: 2017-11-29
Attending: EMERGENCY MEDICINE
Payer: MEDICAID

## 2017-11-29 VITALS
OXYGEN SATURATION: 98 % | SYSTOLIC BLOOD PRESSURE: 152 MMHG | HEART RATE: 91 BPM | BODY MASS INDEX: 30.54 KG/M2 | RESPIRATION RATE: 16 BRPM | TEMPERATURE: 98 F | WEIGHT: 225.5 LBS | HEIGHT: 72 IN | DIASTOLIC BLOOD PRESSURE: 104 MMHG

## 2017-11-29 DIAGNOSIS — S39.012A LUMBAR STRAIN, INITIAL ENCOUNTER: Primary | ICD-10-CM

## 2017-11-29 DIAGNOSIS — R10.9 FLANK PAIN: ICD-10-CM

## 2017-11-29 LAB
ALBUMIN SERPL BCP-MCNC: 3.5 G/DL
ALP SERPL-CCNC: 113 U/L
ALT SERPL W/O P-5'-P-CCNC: 26 U/L
ANION GAP SERPL CALC-SCNC: 10 MMOL/L
AST SERPL-CCNC: 13 U/L
BACTERIA #/AREA URNS HPF: ABNORMAL /HPF
BASOPHILS # BLD AUTO: 0.5 K/UL
BASOPHILS NFR BLD: 2.8 %
BILIRUB SERPL-MCNC: 0.7 MG/DL
BILIRUB UR QL STRIP: NEGATIVE
BUN SERPL-MCNC: 21 MG/DL
CALCIUM SERPL-MCNC: 9.5 MG/DL
CHLORIDE SERPL-SCNC: 104 MMOL/L
CLARITY UR: CLEAR
CO2 SERPL-SCNC: 24 MMOL/L
COLOR UR: YELLOW
CREAT SERPL-MCNC: 2.1 MG/DL
DIFFERENTIAL METHOD: ABNORMAL
EOSINOPHIL # BLD AUTO: 1.1 K/UL
EOSINOPHIL NFR BLD: 6.8 %
ERYTHROCYTE [DISTWIDTH] IN BLOOD BY AUTOMATED COUNT: 14.9 %
EST. GFR  (AFRICAN AMERICAN): 47 ML/MIN/1.73 M^2
EST. GFR  (NON AFRICAN AMERICAN): 40 ML/MIN/1.73 M^2
GLUCOSE SERPL-MCNC: 199 MG/DL
GLUCOSE UR QL STRIP: NEGATIVE
HCT VFR BLD AUTO: 48 %
HGB BLD-MCNC: 16.2 G/DL
HGB UR QL STRIP: ABNORMAL
HYALINE CASTS #/AREA URNS LPF: 5 /LPF
KETONES UR QL STRIP: NEGATIVE
LEUKOCYTE ESTERASE UR QL STRIP: NEGATIVE
LYMPHOCYTES # BLD AUTO: 5 K/UL
LYMPHOCYTES NFR BLD: 30.6 %
MCH RBC QN AUTO: 29.4 PG
MCHC RBC AUTO-ENTMCNC: 33.7 G/DL
MCV RBC AUTO: 87 FL
MICROSCOPIC COMMENT: ABNORMAL
MONOCYTES # BLD AUTO: 0.8 K/UL
MONOCYTES NFR BLD: 4.8 %
NEUTROPHILS # BLD AUTO: 9 K/UL
NEUTROPHILS NFR BLD: 55 %
NITRITE UR QL STRIP: NEGATIVE
PH UR STRIP: 6 [PH] (ref 5–8)
PLATELET # BLD AUTO: 217 K/UL
PMV BLD AUTO: 9 FL
POTASSIUM SERPL-SCNC: 3.8 MMOL/L
PROT SERPL-MCNC: 6.6 G/DL
PROT UR QL STRIP: ABNORMAL
RBC # BLD AUTO: 5.51 M/UL
RBC #/AREA URNS HPF: 0 /HPF (ref 0–4)
SODIUM SERPL-SCNC: 138 MMOL/L
SP GR UR STRIP: >=1.03 (ref 1–1.03)
URN SPEC COLLECT METH UR: ABNORMAL
UROBILINOGEN UR STRIP-ACNC: NEGATIVE EU/DL
WBC # BLD AUTO: 16.3 K/UL
WBC #/AREA URNS HPF: 1 /HPF (ref 0–5)

## 2017-11-29 PROCEDURE — 85025 COMPLETE CBC W/AUTO DIFF WBC: CPT

## 2017-11-29 PROCEDURE — 99284 EMERGENCY DEPT VISIT MOD MDM: CPT

## 2017-11-29 PROCEDURE — 81000 URINALYSIS NONAUTO W/SCOPE: CPT

## 2017-11-29 PROCEDURE — 80053 COMPREHEN METABOLIC PANEL: CPT

## 2017-11-29 PROCEDURE — 36415 COLL VENOUS BLD VENIPUNCTURE: CPT

## 2017-11-29 PROCEDURE — 25000003 PHARM REV CODE 250: Performed by: EMERGENCY MEDICINE

## 2017-11-29 RX ORDER — TRAMADOL HYDROCHLORIDE 50 MG/1
50 TABLET ORAL EVERY 6 HOURS PRN
Qty: 20 TABLET | Refills: 0 | Status: SHIPPED | OUTPATIENT
Start: 2017-11-29 | End: 2017-12-09

## 2017-11-29 RX ORDER — OXYCODONE HYDROCHLORIDE 5 MG/1
10 TABLET ORAL
Status: COMPLETED | OUTPATIENT
Start: 2017-11-29 | End: 2017-11-29

## 2017-11-29 RX ORDER — METAXALONE 800 MG/1
800 TABLET ORAL 3 TIMES DAILY
Qty: 15 TABLET | Refills: 0 | Status: SHIPPED | OUTPATIENT
Start: 2017-11-29 | End: 2017-12-04

## 2017-11-29 RX ADMIN — OXYCODONE HYDROCHLORIDE 10 MG: 5 TABLET ORAL at 06:11

## 2017-11-29 NOTE — ED PROVIDER NOTES
Encounter Date: 11/29/2017       History     Chief Complaint   Patient presents with    Flank Pain     Pt reports passing a kidney stone at home this morning     Chief complaint: I have kidney stones    History of present illness:Jose Miguel Brennan is a 32 y.o. male who presents with  a five-day history of bilateral sharp back and flank pain.  The pain is worse with movement.  He reports that he suspects that he has a kidney stone and saw something that he felt was a kidney stone.  He has had 5 renal ultrasounds and 2 CTs of the abdomen with no evidence of ureterolithiasis.  He denies any fever and has no dysuria or urinary frequency.  He has no weakness or numbness and denies any known injury.  Past medical history is significant for IgA nephropathy, diabetes and has had frequent ED visits for flank and abdominal pain.          Review of patient's allergies indicates:   Allergen Reactions    Zithromax [azithromycin]      Past Medical History:   Diagnosis Date    Asthma     Diabetes mellitus     diet controlled    IgA nephropathy      Past Surgical History:   Procedure Laterality Date    nose polyp removal       Family History   Problem Relation Age of Onset    Hypertension Maternal Grandmother     Cancer Maternal Grandfather     Diabetes Maternal Grandfather     Heart disease Maternal Grandfather     Heart disease Mother     Stroke Mother     Diabetes Mother      Social History   Substance Use Topics    Smoking status: Former Smoker     Types: Cigars, Cigarettes     Quit date: 12/29/2014    Smokeless tobacco: Never Used    Alcohol use No     Review of Systems   Constitutional: Negative for activity change, appetite change, chills, fatigue and fever.   Eyes: Negative for visual disturbance.   Respiratory: Negative for apnea and shortness of breath.    Cardiovascular: Negative for chest pain and palpitations.   Gastrointestinal: Negative for abdominal distention and abdominal pain.   Genitourinary:  Positive for flank pain. Negative for difficulty urinating.   Musculoskeletal: Negative for neck pain.   Skin: Negative for pallor and rash.   Neurological: Negative for headaches.   Hematological: Does not bruise/bleed easily.   Psychiatric/Behavioral: Negative for agitation.       Physical Exam     Initial Vitals [11/29/17 0606]   BP Pulse Resp Temp SpO2   (!) 152/104 91 16 97.8 °F (36.6 °C) 98 %      MAP       120         Physical Exam    Nursing note and vitals reviewed.  Constitutional: He appears well-developed and well-nourished.   HENT:   Head: Normocephalic and atraumatic.   Eyes: Conjunctivae are normal.   Neck: Normal range of motion. Neck supple.   Cardiovascular: Normal rate, regular rhythm and normal heart sounds. Exam reveals no gallop and no friction rub.    No murmur heard.  Pulmonary/Chest: Breath sounds normal. No respiratory distress. He has no wheezes. He has no rhonchi. He has no rales.   Abdominal: Soft. He exhibits no distension. There is no tenderness. There is no rebound.   No CVA tenderness   Musculoskeletal: Normal range of motion. He exhibits tenderness (mild bilateralupper lumbar paraspinous tenderness).   Neurological: He is alert and oriented to person, place, and time.   Skin: Skin is warm and dry.   Psychiatric: He has a normal mood and affect.         ED Course   Procedures  Labs Reviewed   URINALYSIS - Abnormal; Notable for the following:        Result Value    Specific Gravity, UA >=1.030 (*)     Protein, UA 2+ (*)     Occult Blood UA Trace (*)     All other components within normal limits   COMPREHENSIVE METABOLIC PANEL - Abnormal; Notable for the following:     Glucose 199 (*)     BUN, Bld 21 (*)     Creatinine 2.1 (*)     eGFR if  47 (*)     eGFR if non  40 (*)     All other components within normal limits   CBC W/ AUTO DIFFERENTIAL - Abnormal; Notable for the following:     WBC 16.30 (*)     RDW 14.9 (*)     MPV 9.0 (*)     Gran # 9.0 (*)      Lymph # 5.0 (*)     Eos # 1.1 (*)     Baso # 0.50 (*)     Basophil% 2.8 (*)     All other components within normal limits   URINALYSIS MICROSCOPIC - Abnormal; Notable for the following:     Hyaline Casts, UA 5 (*)     All other components within normal limits             Medical Decision Making:   History:   Old Records Summarized: records from previous admission(s).  Independently Interpreted Test(s):   I have ordered and independently interpreted X-rays - see summary below.       <> Summary of X-Ray Reading(s): KUB independently interpreted by me demonstrates a normal gas pattern with no radiopaque stones seen  ED Management:  Jose Miguel Brennan is a 32 y.o. male who presents with  positional back pain strongly suggestive of a myofascial strain.  X-rays fail to demonstrate any radiopaque stone with no microscopic hematuria.                   ED Course      Clinical Impression:   The primary encounter diagnosis was Lumbar strain, initial encounter. A diagnosis of Flank pain was also pertinent to this visit.                           David Groves III, MD  12/06/17 3920

## 2018-06-27 NOTE — ASSESSMENT & PLAN NOTE
Continue IVF     Quality 130: Documentation Of Current Medications In The Medical Record: Current Medications Documented Detail Level: Detailed Quality 110: Preventive Care And Screening: Influenza Immunization: Influenza Immunization Ordered or Recommended, but not Administered due to system reason Quality 111:Pneumonia Vaccination Status For Older Adults: Pneumococcal Vaccination not Administered or Previously Received, Reason not Otherwise Specified

## 2018-10-20 ENCOUNTER — OCCUPATIONAL HEALTH (OUTPATIENT)
Dept: URGENT CARE | Facility: CLINIC | Age: 33
End: 2018-10-20

## 2018-10-20 ENCOUNTER — HOSPITAL ENCOUNTER (EMERGENCY)
Facility: HOSPITAL | Age: 33
Discharge: HOME | End: 2018-10-20
Attending: EMERGENCY MEDICINE
Payer: MEDICAID

## 2018-10-20 VITALS
SYSTOLIC BLOOD PRESSURE: 158 MMHG | BODY MASS INDEX: 29.8 KG/M2 | TEMPERATURE: 98 F | OXYGEN SATURATION: 98 % | WEIGHT: 220 LBS | HEIGHT: 72 IN | DIASTOLIC BLOOD PRESSURE: 108 MMHG | RESPIRATION RATE: 18 BRPM | HEART RATE: 80 BPM

## 2018-10-20 DIAGNOSIS — R10.9 FLANK PAIN: ICD-10-CM

## 2018-10-20 LAB
ALBUMIN SERPL BCP-MCNC: 3.8 G/DL
ALP SERPL-CCNC: 93 U/L
ALT SERPL W/O P-5'-P-CCNC: 30 U/L
ANION GAP SERPL CALC-SCNC: 12 MMOL/L
AST SERPL-CCNC: 14 U/L
BACTERIA #/AREA URNS HPF: NORMAL /HPF
BASOPHILS # BLD AUTO: 0.1 K/UL
BASOPHILS NFR BLD: 1 %
BILIRUB SERPL-MCNC: 0.5 MG/DL
BILIRUB UR QL STRIP: NEGATIVE
BUN SERPL-MCNC: 20 MG/DL
CALCIUM SERPL-MCNC: 10 MG/DL
CHLORIDE SERPL-SCNC: 102 MMOL/L
CLARITY UR: CLEAR
CO2 SERPL-SCNC: 23 MMOL/L
COLOR UR: YELLOW
CREAT SERPL-MCNC: 1.8 MG/DL
DIFFERENTIAL METHOD: ABNORMAL
EOSINOPHIL # BLD AUTO: 0.7 K/UL
EOSINOPHIL NFR BLD: 5.7 %
ERYTHROCYTE [DISTWIDTH] IN BLOOD BY AUTOMATED COUNT: 14.6 %
EST. GFR  (AFRICAN AMERICAN): 56 ML/MIN/1.73 M^2
EST. GFR  (NON AFRICAN AMERICAN): 48 ML/MIN/1.73 M^2
GLUCOSE SERPL-MCNC: 232 MG/DL
GLUCOSE UR QL STRIP: ABNORMAL
HCT VFR BLD AUTO: 49.4 %
HGB BLD-MCNC: 16.3 G/DL
HGB UR QL STRIP: NEGATIVE
HYALINE CASTS #/AREA URNS LPF: 0 /LPF
KETONES UR QL STRIP: NEGATIVE
LEUKOCYTE ESTERASE UR QL STRIP: NEGATIVE
LYMPHOCYTES # BLD AUTO: 3.5 K/UL
LYMPHOCYTES NFR BLD: 28.4 %
MCH RBC QN AUTO: 28.9 PG
MCHC RBC AUTO-ENTMCNC: 33 G/DL
MCV RBC AUTO: 88 FL
MICROSCOPIC COMMENT: NORMAL
MONOCYTES # BLD AUTO: 0.5 K/UL
MONOCYTES NFR BLD: 4.4 %
NEUTROPHILS # BLD AUTO: 7.4 K/UL
NEUTROPHILS NFR BLD: 60.5 %
NITRITE UR QL STRIP: NEGATIVE
PH UR STRIP: 6 [PH] (ref 5–8)
PLATELET # BLD AUTO: 219 K/UL
PMV BLD AUTO: 9.4 FL
POTASSIUM SERPL-SCNC: 4.3 MMOL/L
PROT SERPL-MCNC: 6.8 G/DL
PROT UR QL STRIP: ABNORMAL
RBC # BLD AUTO: 5.64 M/UL
RBC #/AREA URNS HPF: 0 /HPF (ref 0–4)
SODIUM SERPL-SCNC: 137 MMOL/L
SP GR UR STRIP: 1.02 (ref 1–1.03)
SQUAMOUS #/AREA URNS HPF: 1 /HPF
URN SPEC COLLECT METH UR: ABNORMAL
UROBILINOGEN UR STRIP-ACNC: NEGATIVE EU/DL
WBC # BLD AUTO: 12.3 K/UL
WBC #/AREA URNS HPF: 1 /HPF (ref 0–5)
YEAST URNS QL MICRO: NORMAL

## 2018-10-20 PROCEDURE — 96361 HYDRATE IV INFUSION ADD-ON: CPT

## 2018-10-20 PROCEDURE — 80305 DRUG TEST PRSMV DIR OPT OBS: CPT | Mod: S$GLB,,, | Performed by: EMERGENCY MEDICINE

## 2018-10-20 PROCEDURE — 99285 EMERGENCY DEPT VISIT HI MDM: CPT | Mod: 25

## 2018-10-20 PROCEDURE — 81000 URINALYSIS NONAUTO W/SCOPE: CPT

## 2018-10-20 PROCEDURE — 96374 THER/PROPH/DIAG INJ IV PUSH: CPT

## 2018-10-20 PROCEDURE — 80053 COMPREHEN METABOLIC PANEL: CPT

## 2018-10-20 PROCEDURE — 85025 COMPLETE CBC W/AUTO DIFF WBC: CPT

## 2018-10-20 PROCEDURE — 36415 COLL VENOUS BLD VENIPUNCTURE: CPT

## 2018-10-20 PROCEDURE — 25000003 PHARM REV CODE 250: Performed by: EMERGENCY MEDICINE

## 2018-10-20 PROCEDURE — 63600175 PHARM REV CODE 636 W HCPCS: Performed by: EMERGENCY MEDICINE

## 2018-10-20 RX ORDER — MORPHINE SULFATE 4 MG/ML
2 INJECTION, SOLUTION INTRAMUSCULAR; INTRAVENOUS
Status: COMPLETED | OUTPATIENT
Start: 2018-10-20 | End: 2018-10-20

## 2018-10-20 RX ADMIN — MORPHINE SULFATE 2 MG: 4 INJECTION, SOLUTION INTRAMUSCULAR; INTRAVENOUS at 12:10

## 2018-10-20 RX ADMIN — SODIUM CHLORIDE 1000 ML: 0.9 INJECTION, SOLUTION INTRAVENOUS at 12:10

## 2018-10-20 NOTE — ED PROVIDER NOTES
"Encounter Date: 10/20/2018    SCRIBE #1 NOTE: I, Shane Hadley, am scribing for, and in the presence of, Dr. Mancuso .       History     Chief Complaint   Patient presents with    Flank Pain     bilateral x 3 days     Testicle Pain     Time seen by provider: 11:54 AM on 10/20/2018      Jose Miguel Brennan is a 33 y.o. male with a PMHx of renal stones and DM who presents to the ED for further evaluation of bilateral flank pain that started 3 days ago. Patient states that he has a history of kidney stones and reports that this pain feels similar. Patient admits that the pain started to radiate down into his testicles yesterday evening. The patient reports that he currently does not have testicle pain. He does endorse difficulty urinating, admitting that "I haven't been able to go all morning." Patient denies redness or swelling to the testicles, he denies fever, hematuria, abdominal pain, nausea, and vomiting. Patient has no pertinent PSHx.       The history is provided by the patient.     Review of patient's allergies indicates:   Allergen Reactions    Zithromax [azithromycin]      Past Medical History:   Diagnosis Date    Asthma     Diabetes mellitus     diet controlled    IgA nephropathy      Past Surgical History:   Procedure Laterality Date    nose polyp removal       Family History   Problem Relation Age of Onset    Hypertension Maternal Grandmother     Cancer Maternal Grandfather     Diabetes Maternal Grandfather     Heart disease Maternal Grandfather     Heart disease Mother     Stroke Mother     Diabetes Mother      Social History     Tobacco Use    Smoking status: Former Smoker     Types: Cigars, Cigarettes     Last attempt to quit: 12/29/2014     Years since quitting: 3.8    Smokeless tobacco: Never Used   Substance Use Topics    Alcohol use: No    Drug use: No     Review of Systems  REVIEW OF SYSTEMS  CONSTITUTIONAL: Negative for fever.  HEENT:  Negative for sore throat.   HEART:   " Negative for chest pain..  LUNG:  Negative for shortness of breath.  ABDOMEN:  Negative for nausea., vomiting, and abdominal pain.   :  No discharge, dysuria, hematuria. Positive for difficulty urinating, flank pain, testicle pain, and decrease urination.   EXTREMITIES:  No swelling  NEURO:  Negative for weakness.   SKIN:  Negative for rash.  Psych: No depression  HEME: Does not bruise/bleed easily.           Physical Exam     Initial Vitals [10/20/18 1133]   BP Pulse Resp Temp SpO2   (!) 158/108 80 18 97.8 °F (36.6 °C) 98 %      MAP       --         Physical Exam    Nursing note and vitals reviewed.  Constitutional: He appears well-developed and well-nourished.  Non-toxic appearance. No distress.   HENT:   Head: Normocephalic and atraumatic.   Eyes: EOM are normal. Pupils are equal, round, and reactive to light.   Neck: Normal range of motion. Neck supple. No neck rigidity. No JVD present.   Cardiovascular: Normal rate, regular rhythm, normal heart sounds and intact distal pulses. Exam reveals no gallop and no friction rub.    No murmur heard.  Pulmonary/Chest: Breath sounds normal. He has no wheezes. He has no rhonchi. He has no rales.   Abdominal: Soft. Bowel sounds are normal. He exhibits no distension. There is no tenderness. There is no rigidity, no rebound, no guarding and no CVA tenderness.   Genitourinary: Testes normal and penis normal. Cremasteric reflex is present. Right testis shows no swelling and no tenderness. Cremasteric reflex is not absent on the right side. Left testis shows no swelling and no tenderness. Cremasteric reflex is not absent on the left side. Circumcised.   Musculoskeletal: Normal range of motion.   Neurological: He is alert and oriented to person, place, and time. He has normal strength and normal reflexes. No cranial nerve deficit or sensory deficit. He exhibits normal muscle tone. Coordination normal. GCS eye subscore is 4. GCS verbal subscore is 5. GCS motor subscore is 6.    Skin: Skin is warm and dry.   Psychiatric: He has a normal mood and affect. His speech is normal and behavior is normal. He is not actively hallucinating.         ED Course   Procedures  Labs Reviewed   URINALYSIS - Abnormal; Notable for the following components:       Result Value    Protein, UA 2+ (*)     Glucose, UA 4+ (*)     All other components within normal limits   CBC W/ AUTO DIFFERENTIAL - Abnormal; Notable for the following components:    RDW 14.6 (*)     Eos # 0.7 (*)     All other components within normal limits   COMPREHENSIVE METABOLIC PANEL - Abnormal; Notable for the following components:    Glucose 232 (*)     Creatinine 1.8 (*)     eGFR if  56 (*)     eGFR if non  48 (*)     All other components within normal limits   URINALYSIS MICROSCOPIC          Imaging Results          X-Ray Abdomen AP 1 View (KUB) (Final result)  Result time 10/20/18 12:13:04    Final result by Erwin Fenton MD (10/20/18 12:13:04)                 Impression:      1. There is a nonspecific, nonobstructed bowel gas pattern.      Electronically signed by: Erwin Fenton MD  Date:    10/20/2018  Time:    12:13             Narrative:    EXAMINATION:  XR ABDOMEN AP 1 VIEW    CLINICAL HISTORY:  Unspecified abdominal pain    TECHNIQUE:  AP View(s) of the abdomen was performed.    COMPARISON:  Plain films of the abdomen dated 11/29/2017 and 09/07/2017    FINDINGS:  There is a nonspecific bowel gas pattern without dilated loops of bowel and scattered air is seen throughout the colon.  There is no obvious soft tissue mass.  There are no abnormal calcifications.  Bone density is normal.  The psoas shadows are intact.                                 Medical Decision Making:   History:   Old Medical Records: I decided to obtain old medical records.  Initial Assessment:   33-year-old man with a history of IgA nephropathy and renal stones presents emergency department complaining of bilateral  flank pain radiating to his testicle.  He is afebrile well-appearing, nontoxic and does not appear to be in any pain. No CVA tenderness or abdominal tenderness.  Genitourinary examination is normal. Laboratory evaluation reveals improving renal function.  No leukocytosis or evidence of urinary tract infection.  Urinalysis without red blood cells.  X-ray abdomen KUB shows no radiopaque renal stones or acute abnormalities.  I have low suspicion for obstructive renal stone, testicular torsion, appendicitis, diverticulitis, incarcerated hernia.  Patient is provided with reassurance.  Return precautions discussed.  He is discharged improved in no acute distress.  Clinical Tests:   Lab Tests: Ordered and Reviewed  Radiological Study: Ordered and Reviewed            Scribe Attestation:   Scribe #1: I performed the above scribed service and the documentation accurately describes the services I performed. I attest to the accuracy of the note.     I, Bartolome Ruelas, personally performed the services described in this documentation. All medical record entries made by the scribe were at my direction and in my presence.  I have reviewed the chart and agree that the record reflects my personal performance and is accurate and complete. Mikel Mancuso MD.           Clinical Impression:   The encounter diagnosis was Flank pain.      Disposition:   Disposition: Discharged  Condition: Stable                        Mikel Mancuso MD  10/20/18 1285

## 2018-12-14 ENCOUNTER — HOSPITAL ENCOUNTER (EMERGENCY)
Facility: HOSPITAL | Age: 33
Discharge: HOME | End: 2018-12-14
Attending: EMERGENCY MEDICINE
Payer: MEDICAID

## 2018-12-14 VITALS
SYSTOLIC BLOOD PRESSURE: 159 MMHG | DIASTOLIC BLOOD PRESSURE: 112 MMHG | HEIGHT: 72 IN | HEART RATE: 92 BPM | TEMPERATURE: 98 F | WEIGHT: 220 LBS | OXYGEN SATURATION: 97 % | RESPIRATION RATE: 20 BRPM | BODY MASS INDEX: 29.8 KG/M2

## 2018-12-14 DIAGNOSIS — R10.9 BILATERAL FLANK PAIN: Primary | ICD-10-CM

## 2018-12-14 LAB
ALBUMIN SERPL BCP-MCNC: 3.9 G/DL
ALP SERPL-CCNC: 117 U/L
ALT SERPL W/O P-5'-P-CCNC: 25 U/L
ANION GAP SERPL CALC-SCNC: 8 MMOL/L
AST SERPL-CCNC: 18 U/L
BACTERIA #/AREA URNS HPF: NORMAL /HPF
BASOPHILS # BLD AUTO: 0.2 K/UL
BASOPHILS NFR BLD: 1.2 %
BILIRUB SERPL-MCNC: 0.6 MG/DL
BILIRUB UR QL STRIP: NEGATIVE
BUN SERPL-MCNC: 22 MG/DL
CALCIUM SERPL-MCNC: 9.8 MG/DL
CHLORIDE SERPL-SCNC: 106 MMOL/L
CLARITY UR: CLEAR
CO2 SERPL-SCNC: 22 MMOL/L
COLOR UR: YELLOW
CREAT SERPL-MCNC: 1.8 MG/DL
DIFFERENTIAL METHOD: ABNORMAL
EOSINOPHIL # BLD AUTO: 1 K/UL
EOSINOPHIL NFR BLD: 7.2 %
ERYTHROCYTE [DISTWIDTH] IN BLOOD BY AUTOMATED COUNT: 14.2 %
EST. GFR  (AFRICAN AMERICAN): 56 ML/MIN/1.73 M^2
EST. GFR  (NON AFRICAN AMERICAN): 48 ML/MIN/1.73 M^2
GLUCOSE SERPL-MCNC: 257 MG/DL
GLUCOSE UR QL STRIP: ABNORMAL
HCT VFR BLD AUTO: 51.9 %
HGB BLD-MCNC: 17.1 G/DL
HGB UR QL STRIP: NEGATIVE
HYALINE CASTS #/AREA URNS LPF: 0 /LPF
KETONES UR QL STRIP: NEGATIVE
LEUKOCYTE ESTERASE UR QL STRIP: NEGATIVE
LYMPHOCYTES # BLD AUTO: 4.3 K/UL
LYMPHOCYTES NFR BLD: 31.2 %
MCH RBC QN AUTO: 28.7 PG
MCHC RBC AUTO-ENTMCNC: 33 G/DL
MCV RBC AUTO: 87 FL
MICROSCOPIC COMMENT: NORMAL
MONOCYTES # BLD AUTO: 0.7 K/UL
MONOCYTES NFR BLD: 5 %
NEUTROPHILS # BLD AUTO: 7.6 K/UL
NEUTROPHILS NFR BLD: 55.4 %
NITRITE UR QL STRIP: NEGATIVE
PH UR STRIP: 6 [PH] (ref 5–8)
PLATELET # BLD AUTO: 233 K/UL
PMV BLD AUTO: 8.9 FL
POTASSIUM SERPL-SCNC: 4.8 MMOL/L
PROT SERPL-MCNC: 7.4 G/DL
PROT UR QL STRIP: ABNORMAL
RBC # BLD AUTO: 5.97 M/UL
RBC #/AREA URNS HPF: 0 /HPF (ref 0–4)
SODIUM SERPL-SCNC: 136 MMOL/L
SP GR UR STRIP: 1.02 (ref 1–1.03)
SQUAMOUS #/AREA URNS HPF: 1 /HPF
URN SPEC COLLECT METH UR: ABNORMAL
UROBILINOGEN UR STRIP-ACNC: NEGATIVE EU/DL
WBC # BLD AUTO: 13.8 K/UL
WBC #/AREA URNS HPF: 2 /HPF (ref 0–5)
YEAST URNS QL MICRO: NORMAL

## 2018-12-14 PROCEDURE — 99284 EMERGENCY DEPT VISIT MOD MDM: CPT

## 2018-12-14 PROCEDURE — 81000 URINALYSIS NONAUTO W/SCOPE: CPT

## 2018-12-14 PROCEDURE — 96374 THER/PROPH/DIAG INJ IV PUSH: CPT

## 2018-12-14 PROCEDURE — 85025 COMPLETE CBC W/AUTO DIFF WBC: CPT

## 2018-12-14 PROCEDURE — 63600175 PHARM REV CODE 636 W HCPCS: Performed by: EMERGENCY MEDICINE

## 2018-12-14 PROCEDURE — 80053 COMPREHEN METABOLIC PANEL: CPT

## 2018-12-14 PROCEDURE — 36415 COLL VENOUS BLD VENIPUNCTURE: CPT

## 2018-12-14 RX ORDER — KETOROLAC TROMETHAMINE 30 MG/ML
10 INJECTION, SOLUTION INTRAMUSCULAR; INTRAVENOUS
Status: COMPLETED | OUTPATIENT
Start: 2018-12-14 | End: 2018-12-14

## 2018-12-14 RX ADMIN — KETOROLAC TROMETHAMINE 10 MG: 30 INJECTION, SOLUTION INTRAMUSCULAR at 01:12

## 2018-12-14 NOTE — ED NOTES
To Rm 2 still C/O bilat flank pain, non-specific to palpation. States hurting several days and no different today.

## 2018-12-14 NOTE — ED PROVIDER NOTES
Encounter Date: 12/14/2018    SCRIBE #1 NOTE: I, Shane Hadley, am scribing for, and in the presence of, Dr. Ramirez.       History     Chief Complaint   Patient presents with    Flank Pain     bilateral flank and radiates to testicles history of kidney stones     Time seen by provider: 1:03 PM on 12/14/2018      Jose Miguel Brennan is a 33 y.o. male with a PMHx of kidney stones, DM, IgA nephropathy, and chronic flank pain who presents to the ED for further evaluation of bilateral testicular pain that started 1 day ago. The patient reports that he started with the testicular pain last night, along with bilateral flank pain and burning with urination. He states that this testicular pain is deep inside the testicle and is achy. He relays that the pain is constant. Patient admits that this flank pain is achy and sore in the back. He admits that both sides are equal. Patient does state that he did pass a kidney stone last night. He does admit that 1 night ago he had night sweats too. The patient relays that he is having a slight crampy pain in his lower abdomen. Patient does admit that he has had frequent chronic flank pain and admits that it typically goes away He denies diarrhea, vomiting, blood in urine, blood in stool, SOB, chest pain, or fever. The patient has a PSHx of nose polyp removal.       The history is provided by the patient.     Review of patient's allergies indicates:   Allergen Reactions    Zithromax [azithromycin]      Past Medical History:   Diagnosis Date    Asthma     Diabetes mellitus     diet controlled    IgA nephropathy      Past Surgical History:   Procedure Laterality Date    nose polyp removal       Family History   Problem Relation Age of Onset    Hypertension Maternal Grandmother     Cancer Maternal Grandfather     Diabetes Maternal Grandfather     Heart disease Maternal Grandfather     Heart disease Mother     Stroke Mother     Diabetes Mother      Social History      Tobacco Use    Smoking status: Former Smoker     Types: Cigars, Cigarettes     Last attempt to quit: 12/29/2014     Years since quitting: 3.9    Smokeless tobacco: Never Used   Substance Use Topics    Alcohol use: No    Drug use: No     Review of Systems   Constitutional: Positive for diaphoresis. Negative for fever.   HENT: Negative for congestion.    Respiratory: Negative for shortness of breath.    Cardiovascular: Negative for chest pain.   Gastrointestinal: Positive for abdominal pain. Negative for blood in stool, diarrhea, nausea and vomiting.   Genitourinary: Positive for dysuria, flank pain and testicular pain. Negative for hematuria.   Musculoskeletal: Negative for myalgias.   Skin: Negative for rash.   Neurological: Negative for headaches.   Hematological: Does not bruise/bleed easily.       Physical Exam     Initial Vitals [12/14/18 1021]   BP Pulse Resp Temp SpO2   (!) 159/112 92 20 98.2 °F (36.8 °C) 97 %      MAP       --         Physical Exam    Nursing note and vitals reviewed.  Constitutional: He appears well-developed and well-nourished. He is not diaphoretic. No distress.   HENT:   Head: Normocephalic and atraumatic.   Mouth/Throat: Oropharynx is clear and moist.   Eyes: Conjunctivae are normal.   Neck: Neck supple.   Cardiovascular: Normal rate, regular rhythm, normal heart sounds and intact distal pulses. Exam reveals no gallop and no friction rub.    No murmur heard.  2+ DP and PT pulses bilaterally   Pulmonary/Chest: Breath sounds normal. He has no wheezes. He has no rhonchi. He has no rales.   Abdominal: Soft. He exhibits no distension. There is no tenderness.   Genitourinary: Testes normal and penis normal. Right testis shows no tenderness. Left testis shows no tenderness. Uncircumcised. No discharge found.   Genitourinary Comments: Testicular exam preformed standing with the testicles having normal lie. No testicle or epididymal pain or tenderness. No erythema or excessive warmth to  the testicle or scrotal region.    Musculoskeletal: Normal range of motion. He exhibits tenderness.   Bilateral flank tenderness. No midline spinal tenderness.    Neurological: He is alert and oriented to person, place, and time.   5/5 strength and normal sensation in bilateral upper and lower extremities.    Skin: No rash noted. No erythema.         ED Course   Procedures  Labs Reviewed   URINALYSIS, REFLEX TO URINE CULTURE - Abnormal; Notable for the following components:       Result Value    Protein, UA 2+ (*)     Glucose, UA 4+ (*)     All other components within normal limits    Narrative:     Preferred Collection Type->Urine, Clean Catch   CBC W/ AUTO DIFFERENTIAL - Abnormal; Notable for the following components:    WBC 13.80 (*)     MPV 8.9 (*)     Eos # 1.0 (*)     All other components within normal limits   COMPREHENSIVE METABOLIC PANEL - Abnormal; Notable for the following components:    CO2 22 (*)     Glucose 257 (*)     BUN, Bld 22 (*)     Creatinine 1.8 (*)     eGFR if  56 (*)     eGFR if non  48 (*)     All other components within normal limits   URINALYSIS MICROSCOPIC    Narrative:     Preferred Collection Type->Urine, Clean Catch          Imaging Results          CT Renal Stone Study ABD Pelvis WO (Final result)  Result time 12/14/18 13:17:16    Final result by Minal Langston MD (12/14/18 13:17:16)                 Impression:      No opaque renal or ureteral stone or ureteral obstruction.  Questionable mild diffuse bladder wall thickening versus merely incomplete distention and recommend correlation clinically if there is clinical consideration for cystitis.    Diffuse fatty infiltration of the liver.      Electronically signed by: Minal Langston MD  Date:    12/14/2018  Time:    13:17             Narrative:    EXAMINATION:  CT RENAL STONE STUDY ABD PELVIS WO    CLINICAL HISTORY:  Flank pain, stone disease suspected;    TECHNIQUE:  Low dose axial images, sagittal  and coronal reformations were obtained from the lung bases to the pubic symphysis.  Contrast was not administered.    COMPARISON:  9/6/2017    FINDINGS:  Liver; diffuse fatty infiltration    Gallbladder and bile ducts; faintly opaque stones suggested layering in the gallbladder without CT findings of acute cholecystitis or biliary duct dilatation    Spleen; unremarkable appearance    Adrenal glands; unremarkable appearance    Pancreas; unremarkable appearance    Abdominal aorta; no aneurysm    Stomach, bowel, mesentery normal appendix.  No appreciable bowel wall thickening or inflammatory change.  No free intraperitoneal air or fluid.  No abscess    Kidneys, ureters, bladder; no hydronephrosis opaque renal or ureteral stone or ureteral obstruction.  Unremarkable appearance of the kidneys.  Mild diffuse bladder wall thickening versus incomplete distention.    Osseous structures; degenerative changes at the hips and in the visualized thoracolumbar spine.  Slight cuneiform appearance of T11 appearing similar to the prior exam                            (rad read)    The patient was informed of the incidental finding(s) as well as the need for PCP or specialist follow-up for reevaluation and possible further investigation or monitoring.     Medical Decision Making:   History:   Old Medical Records: I decided to obtain old medical records.  Clinical Tests:   Lab Tests: Ordered and Reviewed  Radiological Study: Ordered and Reviewed            Scribe Attestation:   Scribe #1: I performed the above scribed service and the documentation accurately describes the services I performed. I attest to the accuracy of the note.    I, Dr. Uzair Ramirez, personally performed the services described in this documentation. All medical record entries made by the scribe were at my direction and in my presence.  I have reviewed the chart and agree that the record reflects my personal performance and is accurate and complete. Uzair  MD James.  10:21 PM 12/14/2018    Jose Miguel Brennan is a 33 y.o. male presenting with new caps intermittent, recurrent bilateral flank pain occasionally radiating to testicles.  He has no reproducible testicular tenderness to palpation or other objective findings and has had similar pain in the past.  I have very low suspicion for testicular torsion, abscess, epididymitis.  I do not think further testicular imaging is indicated.  He has no abdominal pain or tenderness on exam with very low suspicion for emergent intra-abdominal process.  CT protocol prior to my seeing the patient shows no acute process including no obstructive uropathy.  UTI is unlikely. I doubt atypical appendicitis, psoas abscess.  He is appropriate for outpatient follow-up.  Symptomatic treatment as necessary reviewed. The patient has a nonfocal neurological examination with no red flags.  I doubt acute spinal cord processes requiring further spinal imaging such as CT or MRI.  I doubt epidural abscesses, severe deep space infection, transverse myelitis, discitis, cauda equina syndrome, or other emergent conditions.  Return precautions reviewed.           Clinical Impression:   The encounter diagnosis was Bilateral flank pain.      Disposition:   Disposition: Discharged  Condition: Stable                        Uzair Ramirez MD  12/14/18 [id number]

## 2018-12-14 NOTE — PROGRESS NOTES
12/14/201811:59 AM  I have evaluated and performed a medical screening assessment on this patient while awaiting a thorough evaluation and treatment. All of the emergency department beds are occupied at this time. When appropriate, laboratory studies will be ordered from triage. The patient has been advised of this process and care plan. Patient complains of flank pain and dysuria and testicular pain.     Orders pending:urinalysis and labs  Disposition:stable

## 2019-07-07 ENCOUNTER — HOSPITAL ENCOUNTER (EMERGENCY)
Facility: HOSPITAL | Age: 34
Discharge: HOME | End: 2019-07-07
Attending: FAMILY MEDICINE
Payer: MEDICAID

## 2019-07-07 VITALS
DIASTOLIC BLOOD PRESSURE: 82 MMHG | WEIGHT: 218.25 LBS | OXYGEN SATURATION: 96 % | TEMPERATURE: 98 F | RESPIRATION RATE: 14 BRPM | HEART RATE: 65 BPM | BODY MASS INDEX: 29.6 KG/M2 | SYSTOLIC BLOOD PRESSURE: 124 MMHG

## 2019-07-07 DIAGNOSIS — R10.9 FLANK PAIN: ICD-10-CM

## 2019-07-07 DIAGNOSIS — R10.13 EPIGASTRIC ABDOMINAL PAIN: ICD-10-CM

## 2019-07-07 DIAGNOSIS — N30.90 CYSTITIS: Primary | ICD-10-CM

## 2019-07-07 LAB
ALBUMIN SERPL BCP-MCNC: 3.7 G/DL (ref 3.5–5.2)
ALP SERPL-CCNC: 119 U/L (ref 55–135)
ALT SERPL W/O P-5'-P-CCNC: 20 U/L (ref 10–44)
ANION GAP SERPL CALC-SCNC: 16 MMOL/L (ref 8–16)
AST SERPL-CCNC: 13 U/L (ref 10–40)
BACTERIA #/AREA URNS AUTO: NORMAL /HPF
BASOPHILS # BLD AUTO: 0.04 K/UL (ref 0–0.2)
BASOPHILS NFR BLD: 0.3 % (ref 0–1.9)
BILIRUB SERPL-MCNC: 0.5 MG/DL (ref 0.1–1)
BILIRUB UR QL STRIP: NEGATIVE
BUN SERPL-MCNC: 24 MG/DL (ref 6–20)
CALCIUM SERPL-MCNC: 9.1 MG/DL (ref 8.7–10.5)
CHLORIDE SERPL-SCNC: 107 MMOL/L (ref 95–110)
CK SERPL-CCNC: 77 U/L (ref 20–200)
CLARITY UR REFRACT.AUTO: CLEAR
CO2 SERPL-SCNC: 16 MMOL/L (ref 23–29)
COLOR UR AUTO: YELLOW
CREAT SERPL-MCNC: 2.2 MG/DL (ref 0.5–1.4)
DIFFERENTIAL METHOD: ABNORMAL
EOSINOPHIL # BLD AUTO: 0.7 K/UL (ref 0–0.5)
EOSINOPHIL NFR BLD: 4.8 % (ref 0–8)
ERYTHROCYTE [DISTWIDTH] IN BLOOD BY AUTOMATED COUNT: 14.4 % (ref 11.5–14.5)
EST. GFR  (AFRICAN AMERICAN): 43.9 ML/MIN/1.73 M^2
EST. GFR  (NON AFRICAN AMERICAN): 38 ML/MIN/1.73 M^2
GLUCOSE SERPL-MCNC: 169 MG/DL (ref 70–110)
GLUCOSE UR QL STRIP: ABNORMAL
HCT VFR BLD AUTO: 48.4 % (ref 40–54)
HGB BLD-MCNC: 16.5 G/DL (ref 14–18)
HGB UR QL STRIP: ABNORMAL
HYALINE CASTS UR QL AUTO: 0 /LPF
KETONES UR QL STRIP: NEGATIVE
LEUKOCYTE ESTERASE UR QL STRIP: NEGATIVE
LIPASE SERPL-CCNC: 109 U/L (ref 4–60)
LYMPHOCYTES # BLD AUTO: 5.2 K/UL (ref 1–4.8)
LYMPHOCYTES NFR BLD: 34 % (ref 18–48)
MCH RBC QN AUTO: 29 PG (ref 27–31)
MCHC RBC AUTO-ENTMCNC: 34.1 G/DL (ref 32–36)
MCV RBC AUTO: 85 FL (ref 82–98)
MICROSCOPIC COMMENT: NORMAL
MONOCYTES # BLD AUTO: 0.8 K/UL (ref 0.3–1)
MONOCYTES NFR BLD: 5.5 % (ref 4–15)
NEUTROPHILS # BLD AUTO: 8.4 K/UL (ref 1.8–7.7)
NEUTROPHILS NFR BLD: 55.4 % (ref 38–73)
NITRITE UR QL STRIP: NEGATIVE
PH UR STRIP: 6 [PH] (ref 5–8)
PLATELET # BLD AUTO: 220 K/UL (ref 150–350)
PMV BLD AUTO: 10.7 FL (ref 9.2–12.9)
POTASSIUM SERPL-SCNC: 3.7 MMOL/L (ref 3.5–5.1)
PROT SERPL-MCNC: 6.8 G/DL (ref 6–8.4)
PROT UR QL STRIP: ABNORMAL
RBC # BLD AUTO: 5.69 M/UL (ref 4.6–6.2)
RBC #/AREA URNS AUTO: 0 /HPF (ref 0–4)
SODIUM SERPL-SCNC: 139 MMOL/L (ref 136–145)
SP GR UR STRIP: 1.02 (ref 1–1.03)
TROPONIN I SERPL DL<=0.01 NG/ML-MCNC: 0.01 NG/ML (ref 0–0.03)
URN SPEC COLLECT METH UR: ABNORMAL
UROBILINOGEN UR STRIP-ACNC: NEGATIVE EU/DL
WBC # BLD AUTO: 15.21 K/UL (ref 3.9–12.7)
WBC #/AREA URNS AUTO: 1 /HPF (ref 0–5)

## 2019-07-07 PROCEDURE — 82550 ASSAY OF CK (CPK): CPT | Mod: ER

## 2019-07-07 PROCEDURE — 99285 EMERGENCY DEPT VISIT HI MDM: CPT | Mod: 25,ER

## 2019-07-07 PROCEDURE — 93010 EKG 12-LEAD: ICD-10-PCS | Mod: ,,, | Performed by: NUCLEAR MEDICINE

## 2019-07-07 PROCEDURE — 80053 COMPREHEN METABOLIC PANEL: CPT | Mod: ER

## 2019-07-07 PROCEDURE — 83690 ASSAY OF LIPASE: CPT | Mod: ER

## 2019-07-07 PROCEDURE — 93005 ELECTROCARDIOGRAM TRACING: CPT | Mod: ER

## 2019-07-07 PROCEDURE — 93010 ELECTROCARDIOGRAM REPORT: CPT | Mod: ,,, | Performed by: NUCLEAR MEDICINE

## 2019-07-07 PROCEDURE — 96374 THER/PROPH/DIAG INJ IV PUSH: CPT | Mod: ER,59

## 2019-07-07 PROCEDURE — 63600175 PHARM REV CODE 636 W HCPCS: Mod: ER | Performed by: FAMILY MEDICINE

## 2019-07-07 PROCEDURE — 96361 HYDRATE IV INFUSION ADD-ON: CPT | Mod: ER

## 2019-07-07 PROCEDURE — 81000 URINALYSIS NONAUTO W/SCOPE: CPT | Mod: ER

## 2019-07-07 PROCEDURE — 84484 ASSAY OF TROPONIN QUANT: CPT | Mod: ER

## 2019-07-07 PROCEDURE — 99900035 HC TECH TIME PER 15 MIN (STAT): Mod: ER

## 2019-07-07 PROCEDURE — 85025 COMPLETE CBC W/AUTO DIFF WBC: CPT | Mod: ER

## 2019-07-07 PROCEDURE — 25000003 PHARM REV CODE 250: Mod: ER | Performed by: FAMILY MEDICINE

## 2019-07-07 RX ORDER — MORPHINE SULFATE 4 MG/ML
4 INJECTION, SOLUTION INTRAMUSCULAR; INTRAVENOUS
Status: COMPLETED | OUTPATIENT
Start: 2019-07-07 | End: 2019-07-07

## 2019-07-07 RX ORDER — HYDROCODONE BITARTRATE AND ACETAMINOPHEN 5; 325 MG/1; MG/1
1 TABLET ORAL
Status: DISCONTINUED | OUTPATIENT
Start: 2019-07-07 | End: 2019-07-07

## 2019-07-07 RX ORDER — CEPHALEXIN 500 MG/1
500 CAPSULE ORAL EVERY 12 HOURS
Qty: 10 CAPSULE | Refills: 0 | Status: SHIPPED | OUTPATIENT
Start: 2019-07-07 | End: 2019-07-12

## 2019-07-07 RX ADMIN — MORPHINE SULFATE 4 MG: 4 INJECTION INTRAVENOUS at 05:07

## 2019-07-07 RX ADMIN — SODIUM CHLORIDE 1000 ML: 0.9 INJECTION, SOLUTION INTRAVENOUS at 06:07

## 2019-07-07 RX ADMIN — SODIUM CHLORIDE 1000 ML: 0.9 INJECTION, SOLUTION INTRAVENOUS at 05:07

## 2019-07-07 NOTE — ED PROVIDER NOTES
"Encounter Date: 7/7/2019       History     Chief Complaint   Patient presents with    Flank Pain     Pt states "I have kidney issues and I think I'm dehydrated".     This is a 33-year-old  male with history of IgA nephropathy who presents emergency department with 1 day history of bilateral flank pain. Patient states that he felt dehydrated and feels that it is kidneys.  Reports aching sensation in the flanks, has had 2 similar episodes in the past.  Patient also has history of kidney stones.  Reports urinary hesitancy/ urgency but denies dysuria or hematuria.  Denies fevers chills. Reports mild epigastric pain with no radiation.  States that the last time this happened, he was diagnosed with pancreatitis.  Has not taken anything to help relieve symptoms. Unknown exacerbating factors.  Denies nausea, vomiting. Diarrhea, constipation.        Review of patient's allergies indicates:   Allergen Reactions    Zithromax [azithromycin]      Past Medical History:   Diagnosis Date    Asthma     Diabetes mellitus     diet controlled    IgA nephropathy      Past Surgical History:   Procedure Laterality Date    nose polyp removal       Family History   Problem Relation Age of Onset    Hypertension Maternal Grandmother     Cancer Maternal Grandfather     Diabetes Maternal Grandfather     Heart disease Maternal Grandfather     Heart disease Mother     Stroke Mother     Diabetes Mother      Social History     Tobacco Use    Smoking status: Current Every Day Smoker     Types: Cigarettes    Smokeless tobacco: Never Used    Tobacco comment: 1-2 cig per day   Substance Use Topics    Alcohol use: No    Drug use: No     Review of Systems   Constitutional: Negative for chills, fatigue and fever.   HENT: Negative for postnasal drip, rhinorrhea, sneezing, sore throat and trouble swallowing.    Respiratory: Negative for cough, chest tightness and shortness of breath.    Cardiovascular: Negative for chest pain and " palpitations.   Gastrointestinal: Negative for abdominal distention, abdominal pain, constipation, diarrhea, nausea and vomiting.   Genitourinary: Positive for difficulty urinating, flank pain and urgency. Negative for dysuria, enuresis and hematuria.   Musculoskeletal: Negative for arthralgias, back pain, gait problem and myalgias.   Skin: Negative for rash.   Neurological: Negative for dizziness, syncope, weakness and light-headedness.   Hematological: Does not bruise/bleed easily.       Physical Exam     Initial Vitals [07/07/19 0440]   BP Pulse Resp Temp SpO2   (!) 144/105 87 18 97.9 °F (36.6 °C) 96 %      MAP       --         Vitals:    07/07/19 0547 07/07/19 0601 07/07/19 0616 07/07/19 0701   BP: (!) 148/100 (!) 153/98 (!) 154/102 138/88   Pulse: 80 75 71 69   Resp: 18 18 18 16   Temp:       TempSrc:       SpO2: 96% 96% 96% 97%   Weight:        07/07/19 0732   BP: 124/82   Pulse: 65   Resp: 14   Temp:    TempSrc:    SpO2: 96%   Weight:        Physical Exam    Nursing note and vitals reviewed.  Constitutional: He appears well-developed and well-nourished. No distress.   HENT:   Head: Normocephalic.   Right Ear: External ear normal.   Left Ear: External ear normal.   Nose: Nose normal.   Mouth/Throat: Oropharynx is clear and moist.   Eyes: Conjunctivae and EOM are normal. Pupils are equal, round, and reactive to light.   Neck: Normal range of motion. Neck supple.   Cardiovascular: Normal rate, regular rhythm and normal heart sounds.   Pulmonary/Chest: Breath sounds normal. No respiratory distress.   Abdominal: Soft. Bowel sounds are normal. He exhibits no distension. There is no tenderness. There is no rebound and no guarding.   Genitourinary:   Genitourinary Comments: Bilateral CVA tenderness   Musculoskeletal: Normal range of motion.   Neurological: He is alert and oriented to person, place, and time. He has normal strength. No sensory deficit.   Skin: Skin is warm. Capillary refill takes less than 2 seconds.  No rash noted.   Psychiatric: He has a normal mood and affect. His behavior is normal. Thought content normal.         ED Course   Procedures  Labs Reviewed   CBC W/ AUTO DIFFERENTIAL - Abnormal; Notable for the following components:       Result Value    WBC 15.21 (*)     Gran # (ANC) 8.4 (*)     Lymph # 5.2 (*)     Eos # 0.7 (*)     All other components within normal limits   COMPREHENSIVE METABOLIC PANEL - Abnormal; Notable for the following components:    CO2 16 (*)     Glucose 169 (*)     BUN, Bld 24 (*)     Creatinine 2.2 (*)     eGFR if  43.9 (*)     eGFR if non  38.0 (*)     All other components within normal limits   URINALYSIS, REFLEX TO URINE CULTURE - Abnormal; Notable for the following components:    Protein, UA 2+ (*)     Glucose, UA Trace (*)     Occult Blood UA Trace (*)     All other components within normal limits    Narrative:     Preferred Collection Type->Urine, Clean Catch   LIPASE - Abnormal; Notable for the following components:    Lipase 109 (*)     All other components within normal limits   TROPONIN I   CK   URINALYSIS MICROSCOPIC    Narrative:     Preferred Collection Type->Urine, Clean Catch   CK     EKG Readings: (Independently Interpreted)   Heart rate 84, normal sinus rhythm, no STEMI     ECG Results          EKG 12-lead (In process)  Result time 07/07/19 09:34:02    In process by Interface, Lab In [facility] (07/07/19 09:34:02)                 Narrative:    Test Reason :     Vent. Rate : 084 BPM     Atrial Rate : 084 BPM     P-R Int : 164 ms          QRS Dur : 098 ms      QT Int : 396 ms       P-R-T Axes : 044 -68 030 degrees     QTc Int : 467 ms    Normal sinus rhythm  Possible Left atrial enlargement  Left anterior fascicular block  Abnormal ECG  When compared with ECG of 16-MAR-2014 16:51,  Left anterior fascicular block is now Present  RSR' pattern in V1 is no longer Present    Referred By: [email or web address]   SELF           Confirmed By:                               Results for orders placed or performed during the hospital encounter of 07/07/19   CBC auto differential   Result Value Ref Range    WBC 15.21 (H) 3.90 - 12.70 K/uL    RBC 5.69 4.60 - 6.20 M/uL    Hemoglobin 16.5 14.0 - 18.0 g/dL    Hematocrit 48.4 40.0 - 54.0 %    Mean Corpuscular Volume 85 82 - 98 fL    Mean Corpuscular Hemoglobin 29.0 27.0 - 31.0 pg    Mean Corpuscular Hemoglobin Conc 34.1 32.0 - 36.0 g/dL    RDW 14.4 11.5 - 14.5 %    Platelets 220 150 - 350 K/uL    MPV 10.7 9.2 - 12.9 fL    Gran # (ANC) 8.4 (H) 1.8 - 7.7 K/uL    Lymph # 5.2 (H) 1.0 - 4.8 K/uL    Mono # 0.8 0.3 - 1.0 K/uL    Eos # 0.7 (H) 0.0 - 0.5 K/uL    Baso # 0.04 0.00 - 0.20 K/uL    Gran% 55.4 38.0 - 73.0 %    Lymph% 34.0 18.0 - 48.0 %    Mono% 5.5 4.0 - 15.0 %    Eosinophil% 4.8 0.0 - 8.0 %    Basophil% 0.3 0.0 - 1.9 %    Differential Method Automated    Comprehensive metabolic panel   Result Value Ref Range    Sodium 139 136 - 145 mmol/L    Potassium 3.7 3.5 - 5.1 mmol/L    Chloride 107 95 - 110 mmol/L    CO2 16 (L) 23 - 29 mmol/L    Glucose 169 (H) 70 - 110 mg/dL    BUN, Bld 24 (H) 6 - 20 mg/dL    Creatinine 2.2 (H) 0.5 - 1.4 mg/dL    Calcium 9.1 8.7 - 10.5 mg/dL    Total Protein 6.8 6.0 - 8.4 g/dL    Albumin 3.7 3.5 - 5.2 g/dL    Total Bilirubin 0.5 0.1 - 1.0 mg/dL    Alkaline Phosphatase 119 55 - 135 U/L    AST 13 10 - 40 U/L    ALT 20 10 - 44 U/L    Anion Gap 16 8 - 16 mmol/L    eGFR if African American 43.9 (A) >60 mL/min/1.73 m^2    eGFR if non  38.0 (A) >60 mL/min/1.73 m^2   Urinalysis, Reflex to Urine Culture Urine, Clean Catch   Result Value Ref Range    Specimen UA Urine, Clean Catch     Color, UA Yellow Yellow, Straw, Kristina    Appearance, UA Clear Clear    pH, UA 6.0 5.0 - 8.0    Specific Gravity, UA 1.025 1.005 - 1.030    Protein, UA 2+ (A) Negative    Glucose, UA Trace (A) Negative    Ketones, UA Negative Negative    Bilirubin (UA) Negative Negative    Occult Blood UA Trace (A) Negative    Nitrite, UA  Negative Negative    Urobilinogen, UA Negative <2.0 EU/dL    Leukocytes, UA Negative Negative   Lipase   Result Value Ref Range    Lipase 109 (H) 4 - 60 U/L   Troponin I   Result Value Ref Range    Troponin I 0.008 0.000 - 0.026 ng/mL   Urinalysis Microscopic   Result Value Ref Range    RBC, UA 0 0 - 4 /hpf    WBC, UA 1 0 - 5 /hpf    Bacteria Rare None-Occ /hpf    Hyaline Casts, UA 0 0-1/lpf /lpf    Microscopic Comment SEE COMMENT    CK   Result Value Ref Range    CPK 77 20 - 200 U/L       Imaging Results          CT Renal Stone Study ABD Pelvis WO (Final result)  Result time 07/07/19 06:49:03    Final result by Jay Funes MD (07/07/19 06:49:03)                 Impression:      1. There is no CT evidence of obstructive uropathy or urolithiasis.  2. There is no CT evidence of an acute intra-abdominal inflammatory process.  3. Stable pattern bladder wall thickening in the setting of suboptimal bladder distention.  This is nonspecific.  Cystitis sequela not excluded.  4. Mild fatty liver.  5. Mild distal colonic diverticulosis.  All CT scans at this facility are performed  using dose modulation techniques as appropriate to performed exam including the following:  automated exposure control; adjustment of mA and/or kV according to the patients size (this includes techniques or standardized protocols for targeted exams where dose is matched to indication/reason for exam: i.e. extremities or head);  iterative reconstruction technique.      Electronically signed by: Jay Funes MD  Date:    07/07/2019  Time:    06:49             Narrative:    EXAMINATION:  CT RENAL STONE STUDY ABD PELVIS WO    CLINICAL HISTORY:  Flank pain, stone disease suspected;  bilateral flank pain.  IgA nephropathy.    TECHNIQUE:  Abdominal and pelvic CT performed without contrast.  Coronal and sagittal reformations.    COMPARISON:  12/14/2018.    FINDINGS:  Abdominal CT.  The lung bases are clear.    Mild fatty liver focal sparing gallbladder  fossa.  Otherwise, noncontrast evaluation liver, spleen, pancreas, adrenal glands and kidneys is normal.  Specifically, there is no obstructive uropathy or urolithiasis.    Normal aorta.  No adenopathy.  The gallbladder is unremarkable.  There is no bile duct dilatation.    No bowel obstruction.  Normal appendix.  Mild diverticulosis descending and sigmoid colon without diverticulitis.  The GI tract is otherwise unremarkable.    The bones are unremarkable.    Pelvic CT.  The pelvic ring is intact.  Pincer type acetabular morphology/spurring.  The pelvic bowel loops are otherwise unremarkable.  There is a stable pattern of thick walled bladder with suboptimal distension.  Normal ureters.  Normal sized prostate gland.  No pelvic mass, free fluid, or lymphadenopathy.                                 Medical Decision Making:   ED Management:  Jose Miguel Brennan was given education on their disease process and medications.  Please keep hydrated and drink lots of fluids.  At this time her creatinine level is 2.2 which is around your most recent baseline.  Take antibiotics as prescribed.  Have your creatinine level redrawn within 3-5 days by her primary care physician.  If he have worsening symptoms/pain fevers chills, please do not hesitate to come back to the emergency department.    At this time patient is medically stable for discharge. Patient's creatinine level is 2.2 which is approximately at baseline for the past year but slightly increased.  Patient received 2 L in total with clinical improvement.  At this time I have no clinical suspicion for intra-abdominal process including appendicitis, infectious intra-abdominal etiology, cholelithiasis.  All questions are answered appropriately patient was stable for discharge. Patient verbalized understanding.  Discussed discharge instructions thoroughly as above.                    Medications   sodium chloride 0.9% bolus 1,000 mL (0 mLs Intravenous Stopped 7/7/19 0602)    morphine injection 4 mg (4 mg Intravenous Given 7/7/19 0537)   sodium chloride 0.9% bolus 1,000 mL (0 mLs Intravenous Stopped 7/7/19 0730)       ED Course as of Jul 08 0047   Sun Jul 07, 2019 0522 Patient handed off to Dr. Leyva, who will resume medical management at this time    [MICHAEL]   0550 RE-eval: Patient reports mild to moderate improvement flank pain.    [MICHAEL]   0555 Lipase(!): 109 [MICHAEL]   0555 Lipase(!): 109 [MICHAEL]   0555 Lipase(!): 109 [MICHAEL]   0610 Upon review of patient's chart, I have noted that patient was last admitted in 2017 for abdominal pain and Jer.  Patient's creatinine level was 2.8 upon that of admission and had significantly improved with IV hydration.  At this time patient's creatinine is about 2.2, slightly above his  baseline but is not equivalent to an acute kidney injury based on criteria.  Patient had a similar presentation of this nature upon last ED visit and was discharged appropriately with close follow-up.  I will give patient 1 L fluids and antibiotics outpatient for potential cystitis as the clinical diagnosis as well as read on CT.  I have educated the patient on hydration at home and to be compliant with antibiotics.  Recommend that he have a creatinine level done within 3-5 days by primary care physician.  If there is any worsening symptoms and abdominal pain, fevers chills I advised patient to come back to the emergency department without hesitation.  At this time I do not have a clinical suspicion for appendicitis, pancreatitis or acute infectious intra-abdominal process involving large and small intestines resulting in an acute abdomen.  Patient verbalizes understanding and is hemodynamically stable for discharge. All questions were answered appropriately     [MICHAEL]      ED Course User Index  [MICHAEL] Erica Sandoval MD     Clinical Impression:       ICD-10-CM ICD-9-CM   1. Cystitis N30.90 595.9   2. Epigastric abdominal pain R10.13 789.06   3. Flank pain R10.9 789.09  "        Disposition:   Disposition: Discharged  Condition: Stable       Portions of this note may have been created with voice recognition software. Occasional "wrong-word" or "sound-a-like" substitutions may have occurred due to the inherent limitations of voice recognition software. Please, read the note carefully and recognize, using context, where substitutions have occurred.                        Erica Sandoval MD  07/08/19 0050    "

## 2019-07-07 NOTE — ED NOTES
"Patient denies having a ride home. Patient states "I parked at the truck stop and walked to ER. 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 I will go to my truck and go to sleep after this." MD aware. Patient instructed that he will have to wait a few hours prior to leaving ED due to receiving Morphine and don't have a ride home.   "

## 2019-07-07 NOTE — ED NOTES
Pt lying in stretcher, NAD. Resp e/u. Reports flank pain has improved since starting second liter of fluids. Fluids infusing to gravity without s/s of infection or infiltration. No other questions or complaints at this time. Pt aware of plan of care. Will continue to monitor.

## 2019-07-07 NOTE — DISCHARGE INSTRUCTIONS
Please keep hydrated and drink lots of fluids.  At this time her creatinine level is 2.2 which is around your most recent baseline.  Take antibiotics as prescribed.  Have your creatinine level redrawn within 3-5 days by her primary care physician.  If he have worsening symptoms/pain fevers chills, please do not hesitate to come back to the emergency department.

## 2019-08-06 DIAGNOSIS — M25.562 PAIN IN LEFT KNEE: Primary | ICD-10-CM

## 2019-08-26 ENCOUNTER — HOSPITAL ENCOUNTER (EMERGENCY)
Facility: HOSPITAL | Age: 34
Discharge: HOME | End: 2019-08-26
Attending: EMERGENCY MEDICINE
Payer: MEDICAID

## 2019-08-26 VITALS
SYSTOLIC BLOOD PRESSURE: 139 MMHG | WEIGHT: 222 LBS | RESPIRATION RATE: 18 BRPM | BODY MASS INDEX: 30.07 KG/M2 | HEART RATE: 60 BPM | TEMPERATURE: 98 F | OXYGEN SATURATION: 99 % | HEIGHT: 72 IN | DIASTOLIC BLOOD PRESSURE: 96 MMHG

## 2019-08-26 DIAGNOSIS — E86.0 DEHYDRATION: Primary | ICD-10-CM

## 2019-08-26 LAB
ALBUMIN SERPL BCP-MCNC: 3.7 G/DL (ref 3.5–5.2)
ALP SERPL-CCNC: 91 U/L (ref 55–135)
ALT SERPL W/O P-5'-P-CCNC: 25 U/L (ref 10–44)
ANION GAP SERPL CALC-SCNC: 10 MMOL/L (ref 8–16)
AST SERPL-CCNC: 16 U/L (ref 10–40)
BACTERIA #/AREA URNS HPF: NEGATIVE /HPF
BASOPHILS # BLD AUTO: 0.07 K/UL (ref 0–0.2)
BASOPHILS NFR BLD: 0.7 % (ref 0–1.9)
BILIRUB SERPL-MCNC: 0.9 MG/DL (ref 0.1–1)
BILIRUB UR QL STRIP: NEGATIVE
BUN SERPL-MCNC: 29 MG/DL (ref 6–20)
CALCIUM SERPL-MCNC: 9 MG/DL (ref 8.7–10.5)
CHLORIDE SERPL-SCNC: 102 MMOL/L (ref 95–110)
CLARITY UR: CLEAR
CO2 SERPL-SCNC: 22 MMOL/L (ref 23–29)
COLOR UR: COLORLESS
CREAT SERPL-MCNC: 2 MG/DL (ref 0.5–1.4)
DIFFERENTIAL METHOD: ABNORMAL
EOSINOPHIL # BLD AUTO: 0.7 K/UL (ref 0–0.5)
EOSINOPHIL NFR BLD: 6.4 % (ref 0–8)
ERYTHROCYTE [DISTWIDTH] IN BLOOD BY AUTOMATED COUNT: 13.6 % (ref 11.5–14.5)
EST. GFR  (AFRICAN AMERICAN): 49.2 ML/MIN/1.73 M^2
EST. GFR  (NON AFRICAN AMERICAN): 42.6 ML/MIN/1.73 M^2
GLUCOSE SERPL-MCNC: 240 MG/DL (ref 70–110)
GLUCOSE UR QL STRIP: ABNORMAL
HCT VFR BLD AUTO: 49.3 % (ref 40–54)
HGB BLD-MCNC: 16.1 G/DL (ref 14–18)
HGB UR QL STRIP: NEGATIVE
HYALINE CASTS #/AREA URNS LPF: 0 /LPF
IMM GRANULOCYTES # BLD AUTO: 0.12 K/UL (ref 0–0.04)
IMM GRANULOCYTES NFR BLD AUTO: 1.2 % (ref 0–0.5)
KETONES UR QL STRIP: NEGATIVE
LEUKOCYTE ESTERASE UR QL STRIP: NEGATIVE
LIPASE SERPL-CCNC: 211 U/L (ref 4–60)
LYMPHOCYTES # BLD AUTO: 3.5 K/UL (ref 1–4.8)
LYMPHOCYTES NFR BLD: 34.1 % (ref 18–48)
MCH RBC QN AUTO: 29.1 PG (ref 27–31)
MCHC RBC AUTO-ENTMCNC: 32.7 G/DL (ref 32–36)
MCV RBC AUTO: 89 FL (ref 82–98)
MICROSCOPIC COMMENT: ABNORMAL
MONOCYTES # BLD AUTO: 0.6 K/UL (ref 0.3–1)
MONOCYTES NFR BLD: 5.8 % (ref 4–15)
NEUTROPHILS # BLD AUTO: 5.4 K/UL (ref 1.8–7.7)
NEUTROPHILS NFR BLD: 51.8 % (ref 38–73)
NITRITE UR QL STRIP: NEGATIVE
NRBC BLD-RTO: 0 /100 WBC
PH UR STRIP: 6 [PH] (ref 5–8)
PLATELET # BLD AUTO: 225 K/UL (ref 150–350)
PMV BLD AUTO: 10.9 FL (ref 9.2–12.9)
POTASSIUM SERPL-SCNC: 4.9 MMOL/L (ref 3.5–5.1)
PROT SERPL-MCNC: 6.5 G/DL (ref 6–8.4)
PROT UR QL STRIP: ABNORMAL
RBC # BLD AUTO: 5.54 M/UL (ref 4.6–6.2)
RBC #/AREA URNS HPF: 0 /HPF (ref 0–4)
SODIUM SERPL-SCNC: 134 MMOL/L (ref 136–145)
SP GR UR STRIP: ABNORMAL (ref 1–1.03)
SQUAMOUS #/AREA URNS HPF: 0 /HPF
URN SPEC COLLECT METH UR: ABNORMAL
UROBILINOGEN UR STRIP-ACNC: NEGATIVE EU/DL
WBC # BLD AUTO: 10.34 K/UL (ref 3.9–12.7)
WBC #/AREA URNS HPF: 0 /HPF (ref 0–5)
YEAST URNS QL MICRO: ABNORMAL

## 2019-08-26 PROCEDURE — 85025 COMPLETE CBC W/AUTO DIFF WBC: CPT

## 2019-08-26 PROCEDURE — 83690 ASSAY OF LIPASE: CPT

## 2019-08-26 PROCEDURE — 96360 HYDRATION IV INFUSION INIT: CPT

## 2019-08-26 PROCEDURE — 36415 COLL VENOUS BLD VENIPUNCTURE: CPT

## 2019-08-26 PROCEDURE — 81001 URINALYSIS AUTO W/SCOPE: CPT

## 2019-08-26 PROCEDURE — 99284 EMERGENCY DEPT VISIT MOD MDM: CPT | Mod: 25

## 2019-08-26 PROCEDURE — 80053 COMPREHEN METABOLIC PANEL: CPT

## 2019-08-26 PROCEDURE — 63600175 PHARM REV CODE 636 W HCPCS: Performed by: EMERGENCY MEDICINE

## 2019-08-26 RX ADMIN — SODIUM CHLORIDE 500 ML: 0.9 INJECTION, SOLUTION INTRAVENOUS at 04:08

## 2019-08-26 NOTE — ED NOTES
"Pt states he is having stomache aches and back pain. States "his kidneys are hurting." Also thinks he is a little dehydrated.He is alert and oriented and in no acute distress.  "

## 2019-08-26 NOTE — ED PROVIDER NOTES
Encounter Date: 8/26/2019       History     Chief Complaint   Patient presents with    Dehydration     33-year-old male presents complaining of dehydration patient reports that he is having a dry mouth, patient has a history of IgA nephropathy and diabetes patient has no other complaints at this time.  Patient reports that usually when he feels this way he has some dehydration.        Review of patient's allergies indicates:   Allergen Reactions    Zithromax [azithromycin]      Past Medical History:   Diagnosis Date    Asthma     Diabetes mellitus     diet controlled    IgA nephropathy      Past Surgical History:   Procedure Laterality Date    nose polyp removal       Family History   Problem Relation Age of Onset    Hypertension Maternal Grandmother     Cancer Maternal Grandfather     Diabetes Maternal Grandfather     Heart disease Maternal Grandfather     Heart disease Mother     Stroke Mother     Diabetes Mother      Social History     Tobacco Use    Smoking status: Current Every Day Smoker     Types: Cigarettes    Smokeless tobacco: Never Used    Tobacco comment: 1-2 cig per day   Substance Use Topics    Alcohol use: No    Drug use: No     Review of Systems   Constitutional: Negative for fever.   HENT: Negative for congestion, rhinorrhea, sore throat and trouble swallowing.    Eyes: Negative for visual disturbance.   Respiratory: Negative for cough, chest tightness, shortness of breath and wheezing.    Cardiovascular: Negative for chest pain, palpitations and leg swelling.   Gastrointestinal: Negative for abdominal distention, abdominal pain, constipation, diarrhea, nausea and vomiting.   Genitourinary: Negative for difficulty urinating, dysuria, flank pain and frequency.   Musculoskeletal: Negative for arthralgias, back pain, joint swelling and neck pain.   Skin: Negative for color change and rash.   Neurological: Negative for dizziness, syncope, speech difficulty, weakness, numbness and  headaches.   All other systems reviewed and are negative.      Physical Exam     Initial Vitals [08/26/19 1238]   BP Pulse Resp Temp SpO2   (!) 139/93 69 18 98.2 °F (36.8 °C) 100 %      MAP       --         Physical Exam    Nursing note and vitals reviewed.  Constitutional: He appears well-developed and well-nourished. He is not diaphoretic. No distress.   HENT:   Head: Normocephalic and atraumatic.   Right Ear: External ear normal.   Left Ear: External ear normal.   Nose: Nose normal.   Mouth/Throat: Oropharynx is clear and moist. No oropharyngeal exudate.   Eyes: Conjunctivae and EOM are normal. Pupils are equal, round, and reactive to light. Right eye exhibits no discharge. Left eye exhibits no discharge. No scleral icterus.   Neck: Normal range of motion. Neck supple. No thyromegaly present. No tracheal deviation present. No JVD present.   Cardiovascular: Normal rate, regular rhythm, normal heart sounds and intact distal pulses. Exam reveals no gallop and no friction rub.    No murmur heard.  Pulmonary/Chest: Breath sounds normal. No stridor. No respiratory distress. He has no wheezes. He has no rhonchi. He has no rales. He exhibits no tenderness.   Abdominal: Soft. Bowel sounds are normal. He exhibits no distension and no mass. There is no tenderness. There is no rebound and no guarding.   Musculoskeletal: Normal range of motion. He exhibits no edema or tenderness.   Lymphadenopathy:     He has no cervical adenopathy.   Neurological: He is alert and oriented to person, place, and time. He has normal strength and normal reflexes. He displays normal reflexes. No cranial nerve deficit or sensory deficit.   Skin: Skin is warm and dry. No rash noted. No erythema.         ED Course   Procedures  Labs Reviewed   CBC W/ AUTO DIFFERENTIAL - Abnormal; Notable for the following components:       Result Value    Immature Granulocytes 1.2 (*)     Immature Grans (Abs) 0.12 (*)     Eos # 0.7 (*)     All other components  within normal limits    Narrative:     For upper or mid abdominal pain.   COMPREHENSIVE METABOLIC PANEL - Abnormal; Notable for the following components:    Sodium 134 (*)     CO2 22 (*)     Glucose 240 (*)     BUN, Bld 29 (*)     Creatinine 2.0 (*)     eGFR if  49.2 (*)     eGFR if non  42.6 (*)     All other components within normal limits    Narrative:     For upper or mid abdominal pain.   LIPASE - Abnormal; Notable for the following components:    Lipase 211 (*)     All other components within normal limits    Narrative:     For upper or mid abdominal pain.   URINALYSIS - Abnormal; Notable for the following components:    Color, UA Colorless (*)     Protein, UA 1+ (*)     Glucose, UA 3+ (*)     All other components within normal limits    Narrative:     In and Out Cath as needed it patient unable to void   URINALYSIS MICROSCOPIC - Abnormal; Notable for the following components:    Hyaline Casts, UA 0.00 (*)     All other components within normal limits    Narrative:     In and Out Cath as needed it patient unable to void          Imaging Results    None          Medical Decision Making:   History:   Old Medical Records: I decided to obtain old medical records.  Initial Assessment:   Urgent evaluation of a 33-year-old male presenting with complaint of dehydration patient will be given IV fluids, will monitor, patient's labs show no significant acute abnormalities at this time patient does have chronic kidney injury.              Attending Attestation:             Attending ED Notes:   The patient's labs show chronic kidney disease without signs of acute worsening.  Patient tolerating p.o. in the ER.  Patient is diagnosed with dehydration he is to follow up with PCP in the next 2-3 days and cautioned to return immediately to the ER for any worsening or for any further concerns.             Clinical Impression:       ICD-10-CM ICD-9-CM   1. Dehydration E86.0 276.51                                 Regan Peguero MD  08/26/19 4330

## 2019-10-17 ENCOUNTER — HOSPITAL ENCOUNTER (OUTPATIENT)
Facility: HOSPITAL | Age: 34
LOS: 1 days | Discharge: HOME | End: 2019-10-19
Attending: EMERGENCY MEDICINE | Admitting: HOSPITALIST
Payer: MEDICAID

## 2019-10-17 DIAGNOSIS — N18.9 RENAL FAILURE (ARF), ACUTE ON CHRONIC: ICD-10-CM

## 2019-10-17 DIAGNOSIS — R07.9 CHEST PAIN: ICD-10-CM

## 2019-10-17 DIAGNOSIS — N17.9 AKI (ACUTE KIDNEY INJURY): ICD-10-CM

## 2019-10-17 DIAGNOSIS — N17.9 RENAL FAILURE (ARF), ACUTE ON CHRONIC: ICD-10-CM

## 2019-10-17 DIAGNOSIS — K52.9 GASTROENTERITIS: Primary | ICD-10-CM

## 2019-10-17 DIAGNOSIS — R19.7 DIARRHEA, UNSPECIFIED TYPE: ICD-10-CM

## 2019-10-17 DIAGNOSIS — E86.0 DEHYDRATION: ICD-10-CM

## 2019-10-17 DIAGNOSIS — R19.7 DIARRHEA: ICD-10-CM

## 2019-10-17 LAB
ALBUMIN SERPL BCP-MCNC: 3.9 G/DL (ref 3.5–5.2)
ALP SERPL-CCNC: 109 U/L (ref 55–135)
ALT SERPL W/O P-5'-P-CCNC: 21 U/L (ref 10–44)
ANION GAP SERPL CALC-SCNC: 11 MMOL/L (ref 8–16)
AST SERPL-CCNC: 13 U/L (ref 10–40)
BACTERIA #/AREA URNS HPF: NEGATIVE /HPF
BASOPHILS # BLD AUTO: 0.04 K/UL (ref 0–0.2)
BASOPHILS NFR BLD: 0.3 % (ref 0–1.9)
BILIRUB SERPL-MCNC: 1.1 MG/DL (ref 0.1–1)
BILIRUB UR QL STRIP: NEGATIVE
BUN SERPL-MCNC: 47 MG/DL (ref 6–20)
CALCIUM SERPL-MCNC: 9.1 MG/DL (ref 8.7–10.5)
CHLORIDE SERPL-SCNC: 105 MMOL/L (ref 95–110)
CLARITY UR: CLEAR
CO2 SERPL-SCNC: 20 MMOL/L (ref 23–29)
COLOR UR: YELLOW
CREAT SERPL-MCNC: 4.3 MG/DL (ref 0.5–1.4)
DIFFERENTIAL METHOD: ABNORMAL
EOSINOPHIL # BLD AUTO: 0.7 K/UL (ref 0–0.5)
EOSINOPHIL NFR BLD: 5 % (ref 0–8)
ERYTHROCYTE [DISTWIDTH] IN BLOOD BY AUTOMATED COUNT: 13.8 % (ref 11.5–14.5)
EST. GFR  (AFRICAN AMERICAN): 19.4 ML/MIN/1.73 M^2
EST. GFR  (NON AFRICAN AMERICAN): 16.8 ML/MIN/1.73 M^2
ESTIMATED AVG GLUCOSE: 203 MG/DL (ref 68–131)
GLUCOSE SERPL-MCNC: 131 MG/DL (ref 70–110)
GLUCOSE SERPL-MCNC: 153 MG/DL (ref 70–110)
GLUCOSE SERPL-MCNC: 159 MG/DL (ref 70–110)
GLUCOSE SERPL-MCNC: 99 MG/DL (ref 70–110)
GLUCOSE UR QL STRIP: NEGATIVE
HBA1C MFR BLD HPLC: 8.7 % (ref 4.5–6.2)
HCT VFR BLD AUTO: 53 % (ref 40–54)
HGB BLD-MCNC: 17 G/DL (ref 14–18)
HGB UR QL STRIP: ABNORMAL
HYALINE CASTS #/AREA URNS LPF: 32 /LPF
IMM GRANULOCYTES # BLD AUTO: 0.06 K/UL (ref 0–0.04)
IMM GRANULOCYTES NFR BLD AUTO: 0.4 % (ref 0–0.5)
KETONES UR QL STRIP: NEGATIVE
LEUKOCYTE ESTERASE UR QL STRIP: NEGATIVE
LIPASE SERPL-CCNC: 42 U/L (ref 4–60)
LYMPHOCYTES # BLD AUTO: 3.7 K/UL (ref 1–4.8)
LYMPHOCYTES NFR BLD: 27.4 % (ref 18–48)
MCH RBC QN AUTO: 28.5 PG (ref 27–31)
MCHC RBC AUTO-ENTMCNC: 32.1 G/DL (ref 32–36)
MCV RBC AUTO: 89 FL (ref 82–98)
MICROSCOPIC COMMENT: ABNORMAL
MONOCYTES # BLD AUTO: 1.4 K/UL (ref 0.3–1)
MONOCYTES NFR BLD: 10 % (ref 4–15)
NEUTROPHILS # BLD AUTO: 7.7 K/UL (ref 1.8–7.7)
NEUTROPHILS NFR BLD: 56.9 % (ref 38–73)
NITRITE UR QL STRIP: NEGATIVE
NRBC BLD-RTO: 0 /100 WBC
PH UR STRIP: 6 [PH] (ref 5–8)
PLATELET # BLD AUTO: 258 K/UL (ref 150–350)
PMV BLD AUTO: 11.3 FL (ref 9.2–12.9)
POTASSIUM SERPL-SCNC: 4.4 MMOL/L (ref 3.5–5.1)
PROT SERPL-MCNC: 7.3 G/DL (ref 6–8.4)
PROT UR QL STRIP: ABNORMAL
RBC # BLD AUTO: 5.96 M/UL (ref 4.6–6.2)
RBC #/AREA URNS HPF: 28 /HPF (ref 0–4)
SODIUM SERPL-SCNC: 136 MMOL/L (ref 136–145)
SP GR UR STRIP: 1.01 (ref 1–1.03)
SQUAMOUS #/AREA URNS HPF: 2 /HPF
URN SPEC COLLECT METH UR: ABNORMAL
UROBILINOGEN UR STRIP-ACNC: NEGATIVE EU/DL
WBC # BLD AUTO: 13.53 K/UL (ref 3.9–12.7)
WBC #/AREA URNS HPF: 0 /HPF (ref 0–5)

## 2019-10-17 PROCEDURE — 83036 HEMOGLOBIN GLYCOSYLATED A1C: CPT

## 2019-10-17 PROCEDURE — 96361 HYDRATE IV INFUSION ADD-ON: CPT

## 2019-10-17 PROCEDURE — G0378 HOSPITAL OBSERVATION PER HR: HCPCS

## 2019-10-17 PROCEDURE — 96376 TX/PRO/DX INJ SAME DRUG ADON: CPT

## 2019-10-17 PROCEDURE — 81001 URINALYSIS AUTO W/SCOPE: CPT

## 2019-10-17 PROCEDURE — 63600175 PHARM REV CODE 636 W HCPCS: Performed by: HOSPITALIST

## 2019-10-17 PROCEDURE — 80053 COMPREHEN METABOLIC PANEL: CPT

## 2019-10-17 PROCEDURE — 99285 EMERGENCY DEPT VISIT HI MDM: CPT | Mod: 25

## 2019-10-17 PROCEDURE — 83690 ASSAY OF LIPASE: CPT

## 2019-10-17 PROCEDURE — 96375 TX/PRO/DX INJ NEW DRUG ADDON: CPT

## 2019-10-17 PROCEDURE — 96372 THER/PROPH/DIAG INJ SC/IM: CPT | Mod: 59

## 2019-10-17 PROCEDURE — 96374 THER/PROPH/DIAG INJ IV PUSH: CPT

## 2019-10-17 PROCEDURE — 85025 COMPLETE CBC W/AUTO DIFF WBC: CPT

## 2019-10-17 PROCEDURE — 63600175 PHARM REV CODE 636 W HCPCS: Performed by: EMERGENCY MEDICINE

## 2019-10-17 RX ORDER — SODIUM,POTASSIUM PHOSPHATES 280-250MG
2 POWDER IN PACKET (EA) ORAL
Status: DISCONTINUED | OUTPATIENT
Start: 2019-10-17 | End: 2019-10-19 | Stop reason: HOSPADM

## 2019-10-17 RX ORDER — ATORVASTATIN CALCIUM 20 MG/1
20 TABLET, FILM COATED ORAL DAILY
COMMUNITY
End: 2020-06-11

## 2019-10-17 RX ORDER — LISINOPRIL 20 MG/1
20 TABLET ORAL DAILY
Status: ON HOLD | COMMUNITY
End: 2020-05-09 | Stop reason: HOSPADM

## 2019-10-17 RX ORDER — MORPHINE SULFATE 2 MG/ML
2 INJECTION, SOLUTION INTRAMUSCULAR; INTRAVENOUS EVERY 6 HOURS PRN
Status: DISCONTINUED | OUTPATIENT
Start: 2019-10-17 | End: 2019-10-19 | Stop reason: HOSPADM

## 2019-10-17 RX ORDER — SODIUM CHLORIDE 9 MG/ML
1000 INJECTION, SOLUTION INTRAVENOUS
Status: COMPLETED | OUTPATIENT
Start: 2019-10-17 | End: 2019-10-17

## 2019-10-17 RX ORDER — IBUPROFEN 800 MG/1
800 TABLET ORAL 3 TIMES DAILY PRN
Status: ON HOLD | COMMUNITY
End: 2019-10-19 | Stop reason: HOSPADM

## 2019-10-17 RX ORDER — ACETAMINOPHEN 10 MG/ML
1000 INJECTION, SOLUTION INTRAVENOUS ONCE
Status: COMPLETED | OUTPATIENT
Start: 2019-10-17 | End: 2019-10-17

## 2019-10-17 RX ORDER — POTASSIUM CHLORIDE 20 MEQ/15ML
40 SOLUTION ORAL
Status: DISCONTINUED | OUTPATIENT
Start: 2019-10-17 | End: 2019-10-19 | Stop reason: HOSPADM

## 2019-10-17 RX ORDER — ACETAMINOPHEN 325 MG/1
650 TABLET ORAL EVERY 4 HOURS PRN
Status: DISCONTINUED | OUTPATIENT
Start: 2019-10-17 | End: 2019-10-19 | Stop reason: HOSPADM

## 2019-10-17 RX ORDER — IBUPROFEN 200 MG
16 TABLET ORAL
Status: DISCONTINUED | OUTPATIENT
Start: 2019-10-17 | End: 2019-10-19 | Stop reason: HOSPADM

## 2019-10-17 RX ORDER — DICYCLOMINE HYDROCHLORIDE 10 MG/1
10 CAPSULE ORAL 4 TIMES DAILY
Status: DISCONTINUED | OUTPATIENT
Start: 2019-10-17 | End: 2019-10-17

## 2019-10-17 RX ORDER — SODIUM CHLORIDE 0.9 % (FLUSH) 0.9 %
10 SYRINGE (ML) INJECTION
Status: DISCONTINUED | OUTPATIENT
Start: 2019-10-17 | End: 2019-10-19 | Stop reason: HOSPADM

## 2019-10-17 RX ORDER — DICYCLOMINE HYDROCHLORIDE 10 MG/ML
20 INJECTION INTRAMUSCULAR
Status: COMPLETED | OUTPATIENT
Start: 2019-10-17 | End: 2019-10-17

## 2019-10-17 RX ORDER — INSULIN ASPART 100 [IU]/ML
1-10 INJECTION, SOLUTION INTRAVENOUS; SUBCUTANEOUS
Status: DISCONTINUED | OUTPATIENT
Start: 2019-10-17 | End: 2019-10-19 | Stop reason: HOSPADM

## 2019-10-17 RX ORDER — LANOLIN ALCOHOL/MO/W.PET/CERES
800 CREAM (GRAM) TOPICAL
Status: DISCONTINUED | OUTPATIENT
Start: 2019-10-17 | End: 2019-10-19 | Stop reason: HOSPADM

## 2019-10-17 RX ORDER — DICYCLOMINE HYDROCHLORIDE 10 MG/ML
10 INJECTION INTRAMUSCULAR 4 TIMES DAILY
Status: DISCONTINUED | OUTPATIENT
Start: 2019-10-17 | End: 2019-10-18

## 2019-10-17 RX ORDER — GLUCAGON 1 MG
1 KIT INJECTION
Status: DISCONTINUED | OUTPATIENT
Start: 2019-10-17 | End: 2019-10-19 | Stop reason: HOSPADM

## 2019-10-17 RX ORDER — INSULIN GLARGINE 300 [IU]/ML
15 INJECTION, SOLUTION SUBCUTANEOUS DAILY
Status: ON HOLD | COMMUNITY
End: 2020-05-09 | Stop reason: HOSPADM

## 2019-10-17 RX ORDER — GLIPIZIDE 10 MG/1
10 TABLET ORAL
COMMUNITY
End: 2019-10-17

## 2019-10-17 RX ORDER — ONDANSETRON 2 MG/ML
4 INJECTION INTRAMUSCULAR; INTRAVENOUS
Status: COMPLETED | OUTPATIENT
Start: 2019-10-17 | End: 2019-10-17

## 2019-10-17 RX ORDER — IBUPROFEN 200 MG
24 TABLET ORAL
Status: DISCONTINUED | OUTPATIENT
Start: 2019-10-17 | End: 2019-10-19 | Stop reason: HOSPADM

## 2019-10-17 RX ORDER — ONDANSETRON 2 MG/ML
4 INJECTION INTRAMUSCULAR; INTRAVENOUS EVERY 8 HOURS PRN
Status: DISCONTINUED | OUTPATIENT
Start: 2019-10-17 | End: 2019-10-19 | Stop reason: HOSPADM

## 2019-10-17 RX ORDER — SODIUM CHLORIDE 9 MG/ML
1000 INJECTION, SOLUTION INTRAVENOUS
Status: DISCONTINUED | OUTPATIENT
Start: 2019-10-17 | End: 2019-10-17

## 2019-10-17 RX ORDER — SODIUM CHLORIDE 9 MG/ML
INJECTION, SOLUTION INTRAVENOUS CONTINUOUS
Status: DISCONTINUED | OUTPATIENT
Start: 2019-10-17 | End: 2019-10-19

## 2019-10-17 RX ADMIN — SODIUM CHLORIDE 1000 ML: 900 INJECTION INTRAVENOUS at 06:10

## 2019-10-17 RX ADMIN — DICYCLOMINE HYDROCHLORIDE 20 MG: 10 INJECTION INTRAMUSCULAR at 07:10

## 2019-10-17 RX ADMIN — SODIUM CHLORIDE: 0.9 INJECTION, SOLUTION INTRAVENOUS at 09:10

## 2019-10-17 RX ADMIN — SODIUM CHLORIDE 1000 ML: 0.9 INJECTION, SOLUTION INTRAVENOUS at 07:10

## 2019-10-17 RX ADMIN — ONDANSETRON 4 MG: 2 INJECTION INTRAMUSCULAR; INTRAVENOUS at 02:10

## 2019-10-17 RX ADMIN — MORPHINE SULFATE 2 MG: 2 INJECTION, SOLUTION INTRAMUSCULAR; INTRAVENOUS at 06:10

## 2019-10-17 RX ADMIN — ACETAMINOPHEN 1000 MG: 10 INJECTION, SOLUTION INTRAVENOUS at 03:10

## 2019-10-17 RX ADMIN — ONDANSETRON 4 MG: 2 INJECTION INTRAMUSCULAR; INTRAVENOUS at 06:10

## 2019-10-17 RX ADMIN — ONDANSETRON 4 MG: 2 INJECTION INTRAMUSCULAR; INTRAVENOUS at 07:10

## 2019-10-17 RX ADMIN — SODIUM CHLORIDE: 0.9 INJECTION, SOLUTION INTRAVENOUS at 06:10

## 2019-10-17 NOTE — H&P
[facility] Medicine  History & Physical    DOS:10/17/2019  Time: 09:30am    Patient Name: Jose Miguel Brennan  MRN: [id number]  Admission Date: 10/17/2019  Attending Physician: Saul Leung MD   Primary Care Provider: Ritchie Funes MD         Patient information was obtained from patient and ER records.     Subjective:     Principal Problem:Renal failure (ARF), acute on chronic    Chief Complaint:   Chief Complaint   Patient presents with    Diarrhea    Nausea        HPI: 34-year-old gentleman with past medical history of IgA nephropathy(baseline serum creatinine of 2), hypertension, and IDDM, asthma presented with chief complaint of diarrhea.  Upon further asking patient mentioned that he was in his usual state of health until 3 days ago when he started experiencing nausea followed by multiple watery bowel movements to a point where he was not able to keep anything down.  He reports multiple family members are experiencing similar symptoms and thought his symptoms will resolve however his diarrhea got worse and he decided to come to ED.  He reports some chills otherwise denies any fever, headache, dizziness, chest pain, palpitations, vomiting, abdominal pain or dysuria.  He has noticed that his urine output has decreased.    Past Medical History:   Diagnosis Date    Asthma     Diabetes mellitus     diet controlled    IgA nephropathy        Past Surgical History:   Procedure Laterality Date    nose polyp removal         Review of patient's allergies indicates:   Allergen Reactions    Zithromax [azithromycin]        No current facility-administered medications on file prior to encounter.      Current Outpatient Medications on File Prior to Encounter   Medication Sig    atenolol (TENORMIN) 50 MG tablet Take 50 mg by mouth once daily.    atorvastatin (LIPITOR) 20 MG tablet Take 20 mg by mouth once daily.    ibuprofen (ADVIL,MOTRIN) 800 MG tablet Take 800 mg by mouth 3 (three)  times daily as needed for Pain.    insulin glargine, TOUJEO, (TOUJEO SOLOSTAR U-300 INSULIN) 300 unit/mL (1.5 mL) InPn pen Inject 15 Units into the skin once daily.    lisinopril (PRINIVIL,ZESTRIL) 20 MG tablet Take 20 mg by mouth once daily.    [DISCONTINUED] glipiZIDE (GLUCOTROL) 10 MG tablet Take 10 mg by mouth 2 (two) times daily before meals.    [DISCONTINUED] baclofen (LIORESAL) 10 MG tablet Take 1 tablet (10 mg total) by mouth 4 (four) times daily. for 5 days     Family History     Problem Relation (Age of Onset)    Cancer Maternal Grandfather    Diabetes Maternal Grandfather, Mother    Heart disease Maternal Grandfather, Mother    Hypertension Maternal Grandmother    Stroke Mother        Tobacco Use    Smoking status: Current Every Day Smoker     Types: Cigarettes    Smokeless tobacco: Never Used    Tobacco comment: 1-2 cig per day   Substance and Sexual Activity    Alcohol use: No    Drug use: No    Sexual activity: Yes     Partners: Female     Review of Systems   Constitutional: Positive for chills and fatigue. Negative for activity change and appetite change.   HENT: Negative for congestion and sore throat.    Eyes: Negative for discharge and visual disturbance.   Respiratory: Negative for cough and chest tightness.    Cardiovascular: Negative for chest pain and leg swelling.   Gastrointestinal: Positive for diarrhea and nausea. Negative for abdominal pain.   Genitourinary: Negative for dysuria and hematuria.   Musculoskeletal: Negative for myalgias.   Skin: Negative for pallor and rash.   Neurological: Negative for dizziness and weakness.   Psychiatric/Behavioral: Negative for behavioral problems. The patient is not nervous/anxious.    All other systems reviewed and are negative.    Objective:     Vital Signs (Most Recent):  Temp: 97.4 °F (36.3 °C) (10/17/19 0934)  Pulse: 70 (10/17/19 0934)  Resp: 16 (10/17/19 0934)  BP: 130/86 (10/17/19 0934)  SpO2: 100 % (10/17/19 0934) Vital Signs (24h  Range):  Temp:  [97.4 °F (36.3 °C)-98.4 °F (36.9 °C)] 97.4 °F (36.3 °C)  Pulse:  [60-87] 70  Resp:  [16] 16  SpO2:  [98 %-100 %] 100 %  BP: (117-130)/(77-86) 130/86     Weight: 98.1 kg (216 lb 4.3 oz)  Body mass index is 29.33 kg/m².    Physical Exam   Constitutional: He is oriented to person, place, and time. He appears well-developed and well-nourished.   HENT:   Head: Atraumatic.   Dry oral mucosa   Eyes: Pupils are equal, round, and reactive to light. EOM are normal.   Neck: Neck supple. No JVD present.   Cardiovascular: Normal rate, regular rhythm and normal heart sounds. Exam reveals no gallop.   No murmur heard.  Pulmonary/Chest: Breath sounds normal. No respiratory distress. He has no wheezes.   Abdominal: Soft. Bowel sounds are normal. He exhibits no distension. There is no rebound and no guarding.   Musculoskeletal: He exhibits no edema.   Lymphadenopathy:     He has no cervical adenopathy.   Neurological: He is alert and oriented to person, place, and time. No cranial nerve deficit.   Skin: Skin is warm. Capillary refill takes less than 2 seconds. No rash noted.   Psychiatric: His behavior is normal.   Nursing note and vitals reviewed.        CRANIAL NERVES     CN III, IV, VI   Pupils are equal, round, and reactive to light.  Extraocular motions are normal.        Significant Labs: All pertinent labs within the past 24 hours have been reviewed.    Significant Imaging: I have reviewed all pertinent imaging results/findings within the past 24 hours.    Assessment/Plan:     * Renal failure (ARF), acute on chronic  Patient has known IgA nephropathy proven by biopsy(as per patient)  According to patient his baseline serum creatinine is 2  He has noticed decreased urine output for last 3 days  Has not followed up with his nephrologist for a long time  Current worsening may be due to viral gastroenteritis/worsening IgA nephropathy  Continue IV hydration  Check urine studies  Renal ultrasound  Consult  Nephrology  Accurate charting of urine output    Diarrhea  Likely viral gastroenteritis vs worsening IgA nephropathy  Check stool studies  Currently we will watch him without antibiotics and CT abdomen did not show any signs of diverticulitis  Aggressive IV hydration and electrolyte replacement  Clear liquids if tolerated      IgA nephropathy  Patient says he was diagnosed with IgA nephropathy by biopsy at [facility]  Baseline creatinine is 2  Consult Nephrology      Hypertension associated with diabetes  Resume home medications with holding parameters      Type 2 diabetes mellitus with stage 3 chronic kidney disease, without long-term current use of insulin  Accu-Cheks  Sliding scale insulin  Check A1c        VTE Risk Mitigation (From admission, onward)         Ordered     Place sequential compression device  Until discontinued      10/17/19 0832     IP VTE LOW RISK PATIENT  Once      10/17/19 0832                   Saul Leung MD  Department of Hospital Medicine   [facility]

## 2019-10-17 NOTE — HPI
34-year-old gentleman with past medical history of IgA nephropathy(baseline serum creatinine of 2), hypertension, and IDDM, asthma presented with chief complaint of diarrhea.  Upon further asking patient mentioned that he was in his usual state of health until 3 days ago when he started experiencing nausea followed by multiple watery bowel movements to a point where he was not able to keep anything down.  He reports multiple family members are experiencing similar symptoms and thought his symptoms will resolve however his diarrhea got worse and he decided to come to ED.  He reports some chills otherwise denies any fever, headache, dizziness, chest pain, palpitations, vomiting, abdominal pain or dysuria.  He has noticed that his urine output has decreased.

## 2019-10-17 NOTE — ASSESSMENT & PLAN NOTE
Patient has known IgA nephropathy proven by biopsy(as per patient)  According to patient his baseline serum creatinine is 2  He has noticed decreased urine output for last 3 days  Has not followed up with his nephrologist for a long time  Current worsening may be due to viral gastroenteritis/worsening IgA nephropathy  Continue IV hydration  Check urine studies  Renal ultrasound  Consult Nephrology  Accurate charting of urine output

## 2019-10-17 NOTE — ED PROVIDER NOTES
Encounter Date: 10/17/2019       History     Chief Complaint   Patient presents with    Diarrhea    Nausea     Patient here with reported nausea, diarrhea onset 3 days ago patient states that gastrointestinal illness this present in the family however he has been persistently ill he denies eating anything out of the ordinary prior to onset of the symptoms he has had chills but no specific fever patient has a history of IgA nephropathy and diabetes he denies any blood in the stool he denies any vomiting he states he does have some crampy abdominal pain associated with the diarrhea and the symptoms are exacerbated by anything p.o. he denies previous gastrointestinal illnesses states that his uncle has a history of colitis but he has never had difficulty himself he has taken Zofran at home without improvement        Review of patient's allergies indicates:   Allergen Reactions    Zithromax [azithromycin]      Past Medical History:   Diagnosis Date    Asthma     Diabetes mellitus     diet controlled    IgA nephropathy      Past Surgical History:   Procedure Laterality Date    nose polyp removal       Family History   Problem Relation Age of Onset    Hypertension Maternal Grandmother     Cancer Maternal Grandfather     Diabetes Maternal Grandfather     Heart disease Maternal Grandfather     Heart disease Mother     Stroke Mother     Diabetes Mother      Social History     Tobacco Use    Smoking status: Current Every Day Smoker     Types: Cigarettes    Smokeless tobacco: Never Used    Tobacco comment: 1-2 cig per day   Substance Use Topics    Alcohol use: No    Drug use: No     Review of Systems   Constitutional: Positive for appetite change and chills. Negative for diaphoresis and fever.   HENT: Negative.    Eyes: Negative.    Respiratory: Negative.    Cardiovascular: Negative.    Gastrointestinal: Positive for abdominal pain, diarrhea and nausea. Negative for anal bleeding, blood in stool,  constipation and vomiting.   Endocrine: Negative.    Genitourinary: Negative.    Musculoskeletal: Negative.    Skin: Negative.    Allergic/Immunologic: Negative.    Neurological: Negative.    Hematological: Negative.    Psychiatric/Behavioral: Negative.        Physical Exam     Initial Vitals [10/17/19 0513]   BP Pulse Resp Temp SpO2   119/77 87 16 98.4 °F (36.9 °C) 100 %      MAP       --         Physical Exam    Constitutional: He appears well-developed and well-nourished. No distress.   HENT:   Head: Normocephalic and atraumatic.   Right Ear: External ear normal.   Left Ear: External ear normal.   Nose: Nose normal.   Mouth/Throat: Oropharynx is clear and moist.   Eyes: Conjunctivae and EOM are normal. Pupils are equal, round, and reactive to light.   Neck: Normal range of motion. Neck supple.   Cardiovascular: Normal rate, regular rhythm, normal heart sounds and intact distal pulses.   Pulmonary/Chest: Breath sounds normal.   Abdominal: Soft. Bowel sounds are increased. There is generalized tenderness.   Musculoskeletal: Normal range of motion. He exhibits no edema.   Neurological: He is alert and oriented to person, place, and time. He has normal strength. GCS score is 15. GCS eye subscore is 4. GCS verbal subscore is 5. GCS motor subscore is 6.   Skin: Skin is warm and dry. Capillary refill takes less than 2 seconds.   Psychiatric: He has a normal mood and affect. His behavior is normal.         ED Course   Procedures  Labs Reviewed   CBC W/ AUTO DIFFERENTIAL - Abnormal; Notable for the following components:       Result Value    WBC 13.53 (*)     Immature Grans (Abs) 0.06 (*)     Mono # 1.4 (*)     Eos # 0.7 (*)     All other components within normal limits   COMPREHENSIVE METABOLIC PANEL - Abnormal; Notable for the following components:    CO2 20 (*)     Glucose 159 (*)     BUN, Bld 47 (*)     Creatinine 4.3 (*)     Total Bilirubin 1.1 (*)     eGFR if  19.4 (*)     eGFR if non   American 16.8 (*)     All other components within normal limits   URINALYSIS, REFLEX TO URINE CULTURE - Abnormal; Notable for the following components:    Protein, UA 2+ (*)     Occult Blood UA Trace (*)     All other components within normal limits    Narrative:     Preferred Collection Type->Urine, Clean Catch  Specimen Source->Urine   URINALYSIS MICROSCOPIC - Abnormal; Notable for the following components:    RBC, UA 28 (*)     Hyaline Casts, UA 32 (*)     All other components within normal limits    Narrative:     Preferred Collection Type->Urine, Clean Catch  Specimen Source->Urine   LIPASE          Imaging Results    None          Medical Decision Making:   ED Management:  Patient discussed the case with Dr. Leung who will evaluate patient will admit for hydration secondary to acute kidney injury                      Clinical Impression:       ICD-10-CM ICD-9-CM   1. Gastroenteritis K52.9 558.9   2. Diarrhea, unspecified type R19.7 787.91   3. Dehydration E86.0 276.51   4. TRACEY (acute kidney injury) N17.9 584.9   5. Diarrhea R19.7 787.91   6. Chest pain R07.9 786.50                                Raheel Dixon MD  10/17/19 0833       Raheel Dixon MD  01/28/20 2024

## 2019-10-17 NOTE — ASSESSMENT & PLAN NOTE
Patient says he was diagnosed with IgA nephropathy by biopsy at [facility]  Baseline creatinine is 2  Consult Nephrology

## 2019-10-17 NOTE — ED NOTES
Presents to ER with c/o nausea and diarrhea for 3 days. Also s/o some mild abdominal pain. No vomiting or fever. AAOx4

## 2019-10-17 NOTE — CONSULTS
INPATIENT NEPHROLOGY CONSULT   [facility] NEPHROLOGY    Patient Name: Jose Miguel Brennan  Date: 10/17/2019    Reason for consultation: TRACEY    Chief Complaint:   Chief Complaint   Patient presents with    Diarrhea    Nausea       History of Present Illness:  34-year-old gentleman with past medical history of HTN, HLD, DM and IgA nephropathy(baseline serum creatinine of 2; biopsied at age 15), hypertension, and IDDM, asthma presented with chief complaint of diarrhea.  Upon further asking patient mentioned that he was in his usual state of health until 3 days ago when he started experiencing nausea followed by multiple watery bowel movements to a point where he was not able to keep anything down.  He reports multiple family members are experiencing similar symptoms and thought his symptoms will resolve however his diarrhea got worse and he decided to come to ED.  He reports some chills otherwise denies any fever, headache, dizziness, chest pain, palpitations, vomiting, abdominal pain or dysuria.  He has noticed that his urine output has decreased. There are no exac/reliev factors. We are consulted for TRACEY.    Baseline Cr 1386-9913- Cr 1.8-2.2  UA 2+ protein, trace blood    Renal US pending  CT AP negative    Notable home meds: lisinopril, ibuprofen PRN only    Followed by nephrology on [place]- has been a while since has seen them but says Cr 2.    Plan of Care:    Assessment:  [place], baseline stage III CKD- hx of IgA nephropathy  Hypotension/Volume depletion 2/2 suspected gastroenteritis  Acidosis  Proteinuria/Hematuria (presevered albumin)    Plan:    -  I think TRACEY is prerenal 2/2 infectious gastroenteritis rather than flare of IgA nephropathy. Agree with fluid challenge. Keep lisinopril on hold. No NSAIDs. Renal u/s is pending. Monitor UOP.  - Acidosis is 2/2 diarrhea and mild- will monitor.   - If there is no improvement in renal function, will discuss renal bx. I am not that impressed with urinary  findings (can see proteinuria/hematuria from diabetic nephropathy- A1C 8.7 and/or ATN).     Thank you for allowing us to participate in this patient's care. We will continue to follow.    Vital Signs:  Temp Readings from Last 3 Encounters:   10/17/19 97.4 °F (36.3 °C) (Oral)   08/26/19 97.6 °F (36.4 °C) (Oral)   07/07/19 98.1 °F (36.7 °C)       Pulse Readings from Last 3 Encounters:   10/17/19 70   08/26/19 60   07/07/19 65       BP Readings from Last 3 Encounters:   10/17/19 130/86   08/26/19 (!) 139/96   07/07/19 (!) 142/97       Weight:  Wt Readings from Last 3 Encounters:   10/17/19 98.1 kg (216 lb 4.3 oz)   08/26/19 100.7 kg (222 lb)   07/07/19 100.3 kg (221 lb 1.2 oz)       Past Medical & Surgical History:  Past Medical History:   Diagnosis Date    Asthma     Diabetes mellitus     diet controlled    IgA nephropathy        Past Surgical History:   Procedure Laterality Date    nose polyp removal         Past Social History:  Social History     Socioeconomic History    Marital status: Single     Spouse name: Not on file    Number of children: Not on file    Years of education: Not on file    Highest education level: Not on file   Occupational History    Not on file   Social Needs    Financial resource strain: Not on file    Food insecurity:     Worry: Not on file     Inability: Not on file    Transportation needs:     Medical: Not on file     Non-medical: Not on file   Tobacco Use    Smoking status: Current Every Day Smoker     Types: Cigarettes    Smokeless tobacco: Never Used    Tobacco comment: 1-2 cig per day   Substance and Sexual Activity    Alcohol use: No    Drug use: No    Sexual activity: Yes     Partners: Female   Lifestyle    Physical activity:     Days per week: Not on file     Minutes per session: Not on file    Stress: Not on file   Relationships    Social connections:     Talks on phone: Not on file     Gets together: Not on file     Attends Jain service: Not on file      Active member of club or organization: Not on file     Attends meetings of clubs or organizations: Not on file     Relationship status: Not on file   Other Topics Concern    Not on file   Social History Narrative    Not on file       Medications:  No current facility-administered medications on file prior to encounter.      Current Outpatient Medications on File Prior to Encounter   Medication Sig Dispense Refill    atenolol (TENORMIN) 50 MG tablet Take 50 mg by mouth once daily.      atorvastatin (LIPITOR) 20 MG tablet Take 20 mg by mouth once daily.      ibuprofen (ADVIL,MOTRIN) 800 MG tablet Take 800 mg by mouth 3 (three) times daily as needed for Pain.      insulin glargine, TOUJEO, (TOUJEO SOLOSTAR U-300 INSULIN) 300 unit/mL (1.5 mL) InPn pen Inject 15 Units into the skin once daily.      lisinopril (PRINIVIL,ZESTRIL) 20 MG tablet Take 20 mg by mouth once daily.      [DISCONTINUED] glipiZIDE (GLUCOTROL) 10 MG tablet Take 10 mg by mouth 2 (two) times daily before meals.      [DISCONTINUED] baclofen (LIORESAL) 10 MG tablet Take 1 tablet (10 mg total) by mouth 4 (four) times daily. for 5 days 20 tablet 0     Scheduled Meds:  Continuous Infusions:   sodium chloride 0.9%       PRN Meds:.acetaminophen, dextrose 50%, dextrose 50%, glucagon (human recombinant), glucose, glucose, insulin aspart U-100, magnesium oxide, magnesium oxide, ondansetron, potassium chloride 10%, potassium chloride 10%, potassium, sodium phosphates, potassium, sodium phosphates, sodium chloride 0.9%    Allergies:  Zithromax [azithromycin]    Past Family History:  Reviewed; refer to Hospitalist Admission Note    Review of Systems:  Review of Systems - All 14 systems reviewed and negative, except as noted in HPI    Physical Exam:  /86 (BP Location: Right arm, Patient Position: Lying)   Pulse 70   Temp 97.4 °F (36.3 °C) (Oral)   Resp 16   Ht 6' (1.829 m)   Wt 98.1 kg (216 lb 4.3 oz)   SpO2 100%   BMI 29.33 kg/m²      INS/OUTS:  No intake/output data recorded.  No intake/output data recorded.    General Appearance:    NAD, AAO x 3, cooperative, appears stated age   Head:    Normocephalic, atraumatic   Eyes:    PER, EOMI, and conjunctiva/sclera clear bilaterally        Throat:   Moist mucus membranes, no thrush or oral lesions, normal      dentition   Back:     No CVA tenderness   Lungs:     Clear to auscultation bilaterally, no wheeze, crackles, rales      or rhonchi, symmetric air movement, respirations unlabored   Heart:    Regular rate and rhythm, S1 and S2 normal, no murmur, rub   or gallop   Abdomen:     Soft, non-tender, non-distended, bowel sounds active all four   quadrants, no RT or guarding, no masses, no organomegaly   Extremities:   Warm and well perfused, distal pulses intact, no cyanosis or    peripheral edema   MSK:   No joint or muscle swelling, tenderness or deformity   Skin:   Skin color, texture, turgor normal, no rashes or lesions   Neurologic/Psychiatric:   CNII-XII intact, normal strength and sensation       throughout, no asterixis; normal affect, memory, judgement     and insight     Results:  Lab Results   Component Value Date     10/17/2019    K 4.4 10/17/2019     10/17/2019    CO2 20 (L) 10/17/2019    BUN 47 (H) 10/17/2019    CREATININE 4.3 (H) 10/17/2019    CALCIUM 9.1 10/17/2019    ANIONGAP 11 10/17/2019    ESTGFRAFRICA 19.4 (A) 10/17/2019    EGFRNONAA 16.8 (A) 10/17/2019       Lab Results   Component Value Date    CALCIUM 9.1 10/17/2019    PHOS 3.3 09/08/2017       Recent Labs   Lab 10/17/19  0542   WBC 13.53*   RBC 5.96   HGB 17.0   HCT 53.0      MCV 89   MCH 28.5   MCHC 32.1       I have personally reviewed pertinent radiological imaging and reports.    I have spent > 70 minutes providing care for this patient for the above diagnoses. These services have included chart/data/imaging review, evaluation, exam, formulation of plan, , note preparation, and discussions  with staff involved in this patient's care.    Mahnaz Conte MD MPH  [facility] [address] [address]  [phone] (p)  [phone] (f)

## 2019-10-18 LAB
ALBUMIN SERPL BCP-MCNC: 3.7 G/DL (ref 3.5–5.2)
ANION GAP SERPL CALC-SCNC: 5 MMOL/L (ref 8–16)
BASOPHILS # BLD AUTO: 0.04 K/UL (ref 0–0.2)
BASOPHILS NFR BLD: 0.4 % (ref 0–1.9)
BUN SERPL-MCNC: 30 MG/DL (ref 6–20)
C DIFF GDH STL QL: NEGATIVE
C DIFF TOX A+B STL QL IA: NEGATIVE
CALCIUM SERPL-MCNC: 9 MG/DL (ref 8.7–10.5)
CHLORIDE SERPL-SCNC: 109 MMOL/L (ref 95–110)
CHLORIDE UR-SCNC: 83 MMOL/L (ref 25–200)
CO2 SERPL-SCNC: 23 MMOL/L (ref 23–29)
CREAT SERPL-MCNC: 2.5 MG/DL (ref 0.5–1.4)
CREAT UR-MCNC: 52 MG/DL (ref 23–375)
CRP SERPL-MCNC: 1.65 MG/DL (ref 0–0.75)
DIFFERENTIAL METHOD: ABNORMAL
EOSINOPHIL # BLD AUTO: 0.4 K/UL (ref 0–0.5)
EOSINOPHIL NFR BLD: 3.9 % (ref 0–8)
ERYTHROCYTE [DISTWIDTH] IN BLOOD BY AUTOMATED COUNT: 14 % (ref 11.5–14.5)
EST. GFR  (AFRICAN AMERICAN): 37.3 ML/MIN/1.73 M^2
EST. GFR  (NON AFRICAN AMERICAN): 32.3 ML/MIN/1.73 M^2
GLUCOSE SERPL-MCNC: 101 MG/DL (ref 70–110)
GLUCOSE SERPL-MCNC: 103 MG/DL (ref 70–110)
GLUCOSE SERPL-MCNC: 105 MG/DL (ref 70–110)
GLUCOSE SERPL-MCNC: 113 MG/DL (ref 70–110)
HCT VFR BLD AUTO: 51.4 % (ref 40–54)
HGB BLD-MCNC: 16.2 G/DL (ref 14–18)
IMM GRANULOCYTES # BLD AUTO: 0.05 K/UL (ref 0–0.04)
IMM GRANULOCYTES NFR BLD AUTO: 0.5 % (ref 0–0.5)
LYMPHOCYTES # BLD AUTO: 3.3 K/UL (ref 1–4.8)
LYMPHOCYTES NFR BLD: 30.5 % (ref 18–48)
MAGNESIUM SERPL-MCNC: 1.8 MG/DL (ref 1.6–2.6)
MCH RBC QN AUTO: 28.7 PG (ref 27–31)
MCHC RBC AUTO-ENTMCNC: 31.5 G/DL (ref 32–36)
MCV RBC AUTO: 91 FL (ref 82–98)
MONOCYTES # BLD AUTO: 0.8 K/UL (ref 0.3–1)
MONOCYTES NFR BLD: 7.6 % (ref 4–15)
NEUTROPHILS # BLD AUTO: 6.2 K/UL (ref 1.8–7.7)
NEUTROPHILS NFR BLD: 57.1 % (ref 38–73)
NRBC BLD-RTO: 0 /100 WBC
OB PNL STL: NEGATIVE
OSMOLALITY UR: 296 MOSM/KG (ref 50–1200)
PHOSPHATE SERPL-MCNC: 2.8 MG/DL (ref 2.7–4.5)
PHOSPHATE SERPL-MCNC: 2.8 MG/DL (ref 2.7–4.5)
PLATELET # BLD AUTO: 200 K/UL (ref 150–350)
PMV BLD AUTO: 11.1 FL (ref 9.2–12.9)
POTASSIUM SERPL-SCNC: 4.6 MMOL/L (ref 3.5–5.1)
POTASSIUM UR-SCNC: 15 MMOL/L (ref 15–95)
PROT UR-MCNC: 25 MG/DL (ref 0–15)
PROT/CREAT UR: 0.48 MG/G{CREAT} (ref 0–0.2)
RBC # BLD AUTO: 5.64 M/UL (ref 4.6–6.2)
SODIUM SERPL-SCNC: 137 MMOL/L (ref 136–145)
SODIUM UR-SCNC: 70 MMOL/L (ref 20–250)
UUN UR-MCNC: 291 MG/DL (ref 140–1050)
WBC # BLD AUTO: 10.77 K/UL (ref 3.9–12.7)
WBC #/AREA STL HPF: NORMAL /[HPF]

## 2019-10-18 PROCEDURE — 84133 ASSAY OF URINE POTASSIUM: CPT

## 2019-10-18 PROCEDURE — 82570 ASSAY OF URINE CREATININE: CPT

## 2019-10-18 PROCEDURE — 85025 COMPLETE CBC W/AUTO DIFF WBC: CPT

## 2019-10-18 PROCEDURE — 84300 ASSAY OF URINE SODIUM: CPT

## 2019-10-18 PROCEDURE — 87449 NOS EACH ORGANISM AG IA: CPT

## 2019-10-18 PROCEDURE — 86140 C-REACTIVE PROTEIN: CPT

## 2019-10-18 PROCEDURE — 84540 ASSAY OF URINE/UREA-N: CPT

## 2019-10-18 PROCEDURE — 63600175 PHARM REV CODE 636 W HCPCS: Performed by: HOSPITALIST

## 2019-10-18 PROCEDURE — 36415 COLL VENOUS BLD VENIPUNCTURE: CPT

## 2019-10-18 PROCEDURE — G0378 HOSPITAL OBSERVATION PER HR: HCPCS

## 2019-10-18 PROCEDURE — 80069 RENAL FUNCTION PANEL: CPT

## 2019-10-18 PROCEDURE — 82272 OCCULT BLD FECES 1-3 TESTS: CPT

## 2019-10-18 PROCEDURE — 83935 ASSAY OF URINE OSMOLALITY: CPT

## 2019-10-18 PROCEDURE — 82436 ASSAY OF URINE CHLORIDE: CPT

## 2019-10-18 PROCEDURE — 87046 STOOL CULTR AEROBIC BACT EA: CPT | Mod: 59

## 2019-10-18 PROCEDURE — 83735 ASSAY OF MAGNESIUM: CPT

## 2019-10-18 PROCEDURE — 63600175 PHARM REV CODE 636 W HCPCS: Performed by: INTERNAL MEDICINE

## 2019-10-18 PROCEDURE — 87045 FECES CULTURE AEROBIC BACT: CPT

## 2019-10-18 PROCEDURE — 89055 LEUKOCYTE ASSESSMENT FECAL: CPT

## 2019-10-18 RX ORDER — DICYCLOMINE HYDROCHLORIDE 10 MG/1
10 CAPSULE ORAL 4 TIMES DAILY
Status: DISCONTINUED | OUTPATIENT
Start: 2019-10-18 | End: 2019-10-19 | Stop reason: HOSPADM

## 2019-10-18 RX ADMIN — MORPHINE SULFATE 2 MG: 2 INJECTION, SOLUTION INTRAMUSCULAR; INTRAVENOUS at 05:10

## 2019-10-18 RX ADMIN — SODIUM CHLORIDE: 0.9 INJECTION, SOLUTION INTRAVENOUS at 05:10

## 2019-10-18 RX ADMIN — SODIUM CHLORIDE: 0.9 INJECTION, SOLUTION INTRAVENOUS at 08:10

## 2019-10-18 NOTE — PROGRESS NOTES
[facility] Medicine  Progress Note    Patient Name: Jose Miguel Brennan  MRN: [id number]  Patient Class: OP- Observation   Admission Date: 10/17/2019  Length of Stay: 1 days  Attending Physician: Tapan Dasilva MD  Primary Care Provider: Ritchie Funes MD        Subjective:     Principal Problem:Renal failure (ARF), acute on chronic        HPI:  34-year-old gentleman with past medical history of IgA nephropathy(baseline serum creatinine of 2), hypertension, and IDDM, asthma presented with chief complaint of diarrhea.  Upon further asking patient mentioned that he was in his usual state of health until 3 days ago when he started experiencing nausea followed by multiple watery bowel movements to a point where he was not able to keep anything down.  He reports multiple family members are experiencing similar symptoms and thought his symptoms will resolve however his diarrhea got worse and he decided to come to ED.  He reports some chills otherwise denies any fever, headache, dizziness, chest pain, palpitations, vomiting, abdominal pain or dysuria.  He has noticed that his urine output has decreased.    Overview/Hospital Course:  10/18: Loose stools resolved. Denies abdominal pain. Wants to eat.         Review of Systems   Constitutional: Positive for chills and fatigue. Negative for activity change and appetite change.   HENT: Negative for congestion and sore throat.    Eyes: Negative for discharge and visual disturbance.   Respiratory: Negative for cough and chest tightness.    Cardiovascular: Negative for chest pain and leg swelling.   Gastrointestinal: Positive for diarrhea and nausea. Negative for abdominal pain.   Genitourinary: Negative for dysuria and hematuria.   Musculoskeletal: Negative for myalgias.   Skin: Negative for pallor and rash.   Neurological: Negative for dizziness and weakness.   Psychiatric/Behavioral: Negative for behavioral problems. The patient is not  nervous/anxious.    All other systems reviewed and are negative.    Objective:     Vital Signs (Most Recent):  Temp: 97.8 °F (36.6 °C) (10/18/19 1700)  Pulse: 68 (10/18/19 1700)  Resp: 18 (10/18/19 1700)  BP: 136/78 (10/18/19 1700)  SpO2: 99 % (10/18/19 1700) Vital Signs (24h Range):  Temp:  [97.8 °F (36.6 °C)-98.1 °F (36.7 °C)] 97.8 °F (36.6 °C)  Pulse:  [65-68] 68  Resp:  [17-18] 18  SpO2:  [96 %-100 %] 99 %  BP: (122-138)/(74-97) 136/78     Weight: 98.1 kg (216 lb 4.3 oz)  Body mass index is 29.33 kg/m².    Physical Exam   Constitutional: He is oriented to person, place, and time. He appears well-developed and well-nourished.   HENT:   Head: Atraumatic.   Dry oral mucosa   Eyes: Pupils are equal, round, and reactive to light. EOM are normal.   Neck: Neck supple. No JVD present.   Cardiovascular: Normal rate, regular rhythm and normal heart sounds. Exam reveals no gallop.   No murmur heard.  Pulmonary/Chest: Breath sounds normal. No respiratory distress. He has no wheezes.   Abdominal: Soft. Bowel sounds are normal. He exhibits no distension. There is no rebound and no guarding.   Musculoskeletal: He exhibits no edema.   Lymphadenopathy:     He has no cervical adenopathy.   Neurological: He is alert and oriented to person, place, and time. No cranial nerve deficit.   Skin: Skin is warm. Capillary refill takes less than 2 seconds. No rash noted.   Psychiatric: His behavior is normal.   Nursing note and vitals reviewed.         Significant Labs: All pertinent labs within the past 24 hours have been reviewed.   Cr improving    Significant Imaging: I have reviewed all pertinent imaging results/findings     Reading Physician Reading Date Result Priority   Neymar Zamarripa MD 10/17/2019 STAT      Narrative       CMS MANDATED QUALITY DATA - CT RADIATION - 436    All CT scans at this facility utilize dose modulation, iterative reconstruction, and/or weight based dosing when appropriate to reduce radiation dose to as low as  reasonably achievable.    EXAMINATION:  CT ABDOMEN PELVIS WITHOUT CONTRAST    CLINICAL HISTORY:  Nausea, vomiting, diarrhea;    TECHNIQUE:  CT abdomen and pelvis without IV or oral contrast. Study is tailored for detection of urinary tract calculi and evaluation of solid organs, hollow viscera, and vascular structures is limited.    COMPARISON:  CT 07/04/2018 partially visualized lung bases are clear.    FINDINGS:  CT Abdomen:    Partially visualized lung bases are clear.    Noncontrast liver, contracted gallbladder pancreas, spleen, and left adrenal are.  4 mm right adrenal myelolipoma unchanged.    Noncontrast kidneys are normal without hydronephrosis or urolithiasis.    Aorta is of normal caliber.  Few colonic diverticula are present.  Normal appendix is noted.  Intestines show no other abnormality.  No free intraperitoneal gas.    No acute osseous abnormality.    CT Pelvis:    Bladder is normal.  No free pelvic fluid.      Impression       Negative for hydronephrosis, urolithiasis, or other acute abnormality.             Assessment/Plan:      * Renal failure (ARF), acute on chronic  Patient has known IgA nephropathy proven by biopsy(as per patient)  Likely pre-renal etiology in setting of viral GE and hypovolemia, continue IVF and repeat labs in am     Diarrhea  Likely viral gastroenteritis  Stool studies negative   Continue IVF  ADAT      Type 2 diabetes mellitus with stage 3 chronic kidney disease, without long-term current use of insulin  Accu-Cheks  Sliding scale insulin      Hypertension associated with diabetes  Resume home medications with holding parameters      IgA nephropathy  Diagnosed with IgA nephropathy by biopsy at [facility]  Nephrology recommendations appreciated        VTE Risk Mitigation (From admission, onward)         Ordered     Place sequential compression device  Until discontinued      10/17/19 0832     IP VTE LOW RISK PATIENT  Once      10/17/19 0832                       Tapan Dasilva MD  Department of Hospital Medicine   [facility]

## 2019-10-18 NOTE — ASSESSMENT & PLAN NOTE
Patient has known IgA nephropathy proven by biopsy(as per patient)  Likely pre-renal etiology in setting of viral GE and hypovolemia, continue IVF and repeat labs in am

## 2019-10-18 NOTE — PROGRESS NOTES
INPATIENT NEPHROLOGY PROGRESS NOTE  [facility] NEPHROLOGY    Patient Name: Jose Miguel Brennan  Date: 10/18/2019    Reason for consultation: TRACEY    Chief Complaint:   Chief Complaint   Patient presents with    Diarrhea    Nausea       History of Present Illness:  34-year-old gentleman with past medical history of HTN, HLD, DM and IgA nephropathy(baseline serum creatinine of 2; biopsied at age 15), hypertension, and IDDM, asthma presented with chief complaint of diarrhea. We are consulted for TRACEY.    · Interval History/Subjective:    - BP is better, on RA, UOP 2L  - c/w abd pain, loose stools improved, no n/v  - stool studies are all negative- likely viral gastroenteritis     · Review of Systems: All 14 systems reviewed and negative, except as noted in HPI    Plan of Care:    Assessment:  Madonna, baseline stage III CKD- hx of IgA nephropathy- prerenal 2/2 diarrhea  Hypotension/Volume depletion 2/2 suspected gastroenteritis  Acidosis  Proteinuria/Hematuria (presevered albumin; A1C 8.7- likely chronic issue)     Plan:     - Renal function is improved with fluid challenge. He is nonoliguric. Cut back NS to 75cc/hr- hope to d/c in AM. Keep lisinopril on hold. No NSAIDs. Renal u/s is negative for obstruction.  - Acidosis is resolved with improvement in renal function and GI symptoms.  - I see no indication for a renal biopsy. Will recheck UA as outpatient for hematuria/proteinuria.     Thank you for allowing us to participate in this patient's care. We will continue to follow.    Medications:  No current facility-administered medications on file prior to encounter.      Current Outpatient Medications on File Prior to Encounter   Medication Sig Dispense Refill    atenolol (TENORMIN) 50 MG tablet Take 50 mg by mouth once daily.      atorvastatin (LIPITOR) 20 MG tablet Take 20 mg by mouth once daily.      ibuprofen (ADVIL,MOTRIN) 800 MG tablet Take 800 mg by mouth 3 (three) times daily as needed for Pain.      insulin  glargine, TOUJEO, (TOUJEO SOLOSTAR U-300 INSULIN) 300 unit/mL (1.5 mL) InPn pen Inject 15 Units into the skin once daily.      lisinopril (PRINIVIL,ZESTRIL) 20 MG tablet Take 20 mg by mouth once daily.       Scheduled Meds:   dicyclomine  10 mg Intramuscular QID     Continuous Infusions:   sodium chloride 0.9% 100 mL/hr at 10/18/19 0525     PRN Meds:.acetaminophen, dextrose 50%, dextrose 50%, glucagon (human recombinant), glucose, glucose, insulin aspart U-100, magnesium oxide, magnesium oxide, morphine, ondansetron, potassium chloride 10%, potassium chloride 10%, potassium, sodium phosphates, potassium, sodium phosphates, sodium chloride 0.9%    Allergies:  Zithromax [azithromycin]    Vital Signs:  Temp Readings from Last 3 Encounters:   10/18/19 97.8 °F (36.6 °C) (Oral)   08/26/19 97.6 °F (36.4 °C) (Oral)   07/07/19 98.1 °F (36.7 °C)       Pulse Readings from Last 3 Encounters:   10/18/19 67   08/26/19 60   07/07/19 65       BP Readings from Last 3 Encounters:   10/18/19 134/88   08/26/19 (!) 139/96   07/07/19 (!) 142/97       Weight:  Wt Readings from Last 3 Encounters:   10/17/19 98.1 kg (216 lb 4.3 oz)   08/26/19 100.7 kg (222 lb)   07/07/19 100.3 kg (221 lb 1.2 oz)       INS/OUTS:  I/O last 3 completed shifts:  In: 3340 [P.O.:2140; I.V.:1200]  Out: 2000 [Urine:2000]  No intake/output data recorded.    Physical Exam:  Constitutional: nad, aao x 3  Heart: rrr, no m/r/g, wwp, no edema  Lungs: ctab, no w/r/r/c, no lb  Abdomen: s/t/nd, +BS    Results:  Lab Results   Component Value Date     10/18/2019    K 4.6 10/18/2019     10/18/2019    CO2 23 10/18/2019    BUN 30 (H) 10/18/2019    CREATININE 2.5 (H) 10/18/2019    CALCIUM 9.0 10/18/2019    ANIONGAP 5 (L) 10/18/2019    ESTGFRAFRICA 37.3 (A) 10/18/2019    EGFRNONAA 32.3 (A) 10/18/2019       Lab Results   Component Value Date    CALCIUM 9.0 10/18/2019    PHOS 2.8 10/18/2019    PHOS 2.8 10/18/2019       Recent Labs   Lab 10/18/19  0544   WBC 10.77    RBC 5.64   HGB 16.2   HCT 51.4      MCV 91   MCH 28.7   MCHC 31.5*       I have personally reviewed pertinent radiological imaging and reports.    I have spent > 35 minutes providing care for this patient for the above diagnoses. These services have included chart/data/imaging review, evaluation, exam, formulation of plan, , note preparation, and discussions with staff involved in this patient's care.    Mahnaz Conte MD MPH  [facility] [address] [address]  [phone] (p)  [phone] (f)

## 2019-10-18 NOTE — PLAN OF CARE
Voices some improvement in abdominal pain and reduction of loose stools. Stools remain loose. Awaiting stool results.  Fluids continue at 100cc.hr to prevent Dehydration. Clear fluids continues and encouraged. Intake and output continues. Aware of need for urine. Aware to call for assist and needs.

## 2019-10-18 NOTE — HOSPITAL COURSE
10/18: Loose stools resolved. Denies abdominal pain. Wants to eat.   10/19: Feels back to baseline, asymptomatic. Cr back to baseline. BP running high but meds have been on hold.   Will d/c home with follow up outpatient with nephrology for repeat labs/urine studies.   Advised to seek immediate medical attention for any new, worsening, or recurrent symptoms.

## 2019-10-18 NOTE — SUBJECTIVE & OBJECTIVE
Review of Systems   Constitutional: Positive for chills and fatigue. Negative for activity change and appetite change.   HENT: Negative for congestion and sore throat.    Eyes: Negative for discharge and visual disturbance.   Respiratory: Negative for cough and chest tightness.    Cardiovascular: Negative for chest pain and leg swelling.   Gastrointestinal: Positive for diarrhea and nausea. Negative for abdominal pain.   Genitourinary: Negative for dysuria and hematuria.   Musculoskeletal: Negative for myalgias.   Skin: Negative for pallor and rash.   Neurological: Negative for dizziness and weakness.   Psychiatric/Behavioral: Negative for behavioral problems. The patient is not nervous/anxious.    All other systems reviewed and are negative.    Objective:     Vital Signs (Most Recent):  Temp: 97.8 °F (36.6 °C) (10/18/19 1700)  Pulse: 68 (10/18/19 1700)  Resp: 18 (10/18/19 1700)  BP: 136/78 (10/18/19 1700)  SpO2: 99 % (10/18/19 1700) Vital Signs (24h Range):  Temp:  [97.8 °F (36.6 °C)-98.1 °F (36.7 °C)] 97.8 °F (36.6 °C)  Pulse:  [65-68] 68  Resp:  [17-18] 18  SpO2:  [96 %-100 %] 99 %  BP: (122-138)/(74-97) 136/78     Weight: 98.1 kg (216 lb 4.3 oz)  Body mass index is 29.33 kg/m².    Physical Exam   Constitutional: He is oriented to person, place, and time. He appears well-developed and well-nourished.   HENT:   Head: Atraumatic.   Dry oral mucosa   Eyes: Pupils are equal, round, and reactive to light. EOM are normal.   Neck: Neck supple. No JVD present.   Cardiovascular: Normal rate, regular rhythm and normal heart sounds. Exam reveals no gallop.   No murmur heard.  Pulmonary/Chest: Breath sounds normal. No respiratory distress. He has no wheezes.   Abdominal: Soft. Bowel sounds are normal. He exhibits no distension. There is no rebound and no guarding.   Musculoskeletal: He exhibits no edema.   Lymphadenopathy:     He has no cervical adenopathy.   Neurological: He is alert and oriented to person, place, and  time. No cranial nerve deficit.   Skin: Skin is warm. Capillary refill takes less than 2 seconds. No rash noted.   Psychiatric: His behavior is normal.   Nursing note and vitals reviewed.         Significant Labs: All pertinent labs within the past 24 hours have been reviewed.   Cr improving    Significant Imaging: I have reviewed all pertinent imaging results/findings     Reading Physician Reading Date Result Priority   Neymar Zamarripa MD 10/17/2019 STAT      Narrative       CMS MANDATED QUALITY DATA - CT RADIATION - 436    All CT scans at this facility utilize dose modulation, iterative reconstruction, and/or weight based dosing when appropriate to reduce radiation dose to as low as reasonably achievable.    EXAMINATION:  CT ABDOMEN PELVIS WITHOUT CONTRAST    CLINICAL HISTORY:  Nausea, vomiting, diarrhea;    TECHNIQUE:  CT abdomen and pelvis without IV or oral contrast. Study is tailored for detection of urinary tract calculi and evaluation of solid organs, hollow viscera, and vascular structures is limited.    COMPARISON:  CT 07/04/2018 partially visualized lung bases are clear.    FINDINGS:  CT Abdomen:    Partially visualized lung bases are clear.    Noncontrast liver, contracted gallbladder pancreas, spleen, and left adrenal are.  4 mm right adrenal myelolipoma unchanged.    Noncontrast kidneys are normal without hydronephrosis or urolithiasis.    Aorta is of normal caliber.  Few colonic diverticula are present.  Normal appendix is noted.  Intestines show no other abnormality.  No free intraperitoneal gas.    No acute osseous abnormality.    CT Pelvis:    Bladder is normal.  No free pelvic fluid.      Impression       Negative for hydronephrosis, urolithiasis, or other acute abnormality.

## 2019-10-18 NOTE — PLAN OF CARE
Problem: Adult Inpatient Plan of Care  Goal: Plan of Care Review  Outcome: Ongoing, Progressing  Goal: Patient-Specific Goal (Individualization)  Outcome: Ongoing, Progressing  Goal: Absence of Hospital-Acquired Illness or Injury  Outcome: Ongoing, Progressing  Goal: Optimal Comfort and Wellbeing  Outcome: Ongoing, Progressing  Goal: Readiness for Transition of Care  Outcome: Ongoing, Progressing  Goal: Rounds/Family Conference  Outcome: Ongoing, Progressing     Problem: Diabetes Comorbidity  Goal: Blood Glucose Level Within Desired Range  Outcome: Ongoing, Progressing     Problem: Fall Injury Risk  Goal: Absence of Fall and Fall-Related Injury  Outcome: Ongoing, Progressing     Problem: Infection  Goal: Infection Symptom Resolution  Outcome: Ongoing, Progressing     Problem: Pain Acute  Goal: Optimal Pain Control  Outcome: Ongoing, Progressing     Problem: Fluid Volume Deficit  Goal: Fluid Balance  Outcome: Ongoing, Progressing     Problem: Diarrhea (Gastroenteritis)  Goal: Effective Diarrhea Management  Outcome: Ongoing, Progressing     Problem: Fluid Imbalance (Gastroenteritis)  Goal: Fluid Balance  Outcome: Ongoing, Progressing     Problem: Nausea and Vomiting (Gastroenteritis)  Goal: Nausea and Vomiting Relief  Outcome: Ongoing, Progressing

## 2019-10-18 NOTE — ASSESSMENT & PLAN NOTE
Diagnosed with IgA nephropathy by biopsy at [facility]  Nephrology recommendations appreciated

## 2019-10-18 NOTE — PLAN OF CARE
Pt has no complaints of pain. No longer having diarrhea, advanced diet to full liquid with mashed potatoes or grits, no dairy

## 2019-10-19 VITALS
HEIGHT: 72 IN | OXYGEN SATURATION: 97 % | RESPIRATION RATE: 18 BRPM | TEMPERATURE: 98 F | SYSTOLIC BLOOD PRESSURE: 144 MMHG | DIASTOLIC BLOOD PRESSURE: 88 MMHG | HEART RATE: 74 BPM | BODY MASS INDEX: 29.29 KG/M2 | WEIGHT: 216.25 LBS

## 2019-10-19 PROBLEM — N17.9 RENAL FAILURE (ARF), ACUTE ON CHRONIC: Status: RESOLVED | Noted: 2019-10-17 | Resolved: 2019-10-19

## 2019-10-19 PROBLEM — N18.9 RENAL FAILURE (ARF), ACUTE ON CHRONIC: Status: RESOLVED | Noted: 2019-10-17 | Resolved: 2019-10-19

## 2019-10-19 PROBLEM — R19.7 DIARRHEA: Status: RESOLVED | Noted: 2019-10-17 | Resolved: 2019-10-19

## 2019-10-19 LAB
ALBUMIN SERPL BCP-MCNC: 3.3 G/DL (ref 3.5–5.2)
ANION GAP SERPL CALC-SCNC: 5 MMOL/L (ref 8–16)
BUN SERPL-MCNC: 20 MG/DL (ref 6–20)
CALCIUM SERPL-MCNC: 9 MG/DL (ref 8.7–10.5)
CHLORIDE SERPL-SCNC: 111 MMOL/L (ref 95–110)
CO2 SERPL-SCNC: 21 MMOL/L (ref 23–29)
CREAT SERPL-MCNC: 1.8 MG/DL (ref 0.5–1.4)
EST. GFR  (AFRICAN AMERICAN): 55.5 ML/MIN/1.73 M^2
EST. GFR  (NON AFRICAN AMERICAN): 48 ML/MIN/1.73 M^2
GLUCOSE SERPL-MCNC: 126 MG/DL (ref 70–110)
GLUCOSE SERPL-MCNC: 98 MG/DL (ref 70–110)
PHOSPHATE SERPL-MCNC: 2.8 MG/DL (ref 2.7–4.5)
POTASSIUM SERPL-SCNC: 4.1 MMOL/L (ref 3.5–5.1)
SODIUM SERPL-SCNC: 137 MMOL/L (ref 136–145)

## 2019-10-19 PROCEDURE — 82962 GLUCOSE BLOOD TEST: CPT

## 2019-10-19 PROCEDURE — 25000003 PHARM REV CODE 250: Performed by: INTERNAL MEDICINE

## 2019-10-19 PROCEDURE — 36415 COLL VENOUS BLD VENIPUNCTURE: CPT

## 2019-10-19 PROCEDURE — 80069 RENAL FUNCTION PANEL: CPT

## 2019-10-19 PROCEDURE — 63600175 PHARM REV CODE 636 W HCPCS: Performed by: HOSPITALIST

## 2019-10-19 PROCEDURE — G0378 HOSPITAL OBSERVATION PER HR: HCPCS

## 2019-10-19 RX ADMIN — MORPHINE SULFATE 2 MG: 2 INJECTION, SOLUTION INTRAMUSCULAR; INTRAVENOUS at 03:10

## 2019-10-19 RX ADMIN — DICYCLOMINE HYDROCHLORIDE 10 MG: 10 CAPSULE ORAL at 09:10

## 2019-10-19 NOTE — ASSESSMENT & PLAN NOTE
Diagnosed with IgA nephropathy by biopsy at [facility]  Nephrology recommendations appreciated

## 2019-10-19 NOTE — PROGRESS NOTES
INPATIENT NEPHROLOGY PROGRESS NOTE  [facility] NEPHROLOGY    Patient Name: Jose Miguel Brennan  Date: 10/19/2019    Reason for consultation: TRACEY    Chief Complaint:   Chief Complaint   Patient presents with    Diarrhea    Nausea       History of Present Illness:  34-year-old gentleman with past medical history of HTN, HLD, DM and IgA nephropathy(baseline serum creatinine of 2; biopsied at age 15), hypertension, and IDDM, asthma presented with chief complaint of diarrhea. We are consulted for TRACEY.    · Interval History/Subjective:    - -140s, on RA, UOP 1.1L  - no cp, dyspnea, abd pain, edema    · Review of Systems: All 14 systems reviewed and negative, except as noted in HPI    Plan of Care:    Assessment:  Madonna, baseline stage III CKD- hx of IgA nephropathy- prerenal 2/2 diarrhea  Hypotension/Volume depletion 2/2 suspected gastroenteritis  Acidosis  Proteinuria/Hematuria (presevered albumin; A1C 8.7- likely chronic issue)     Plan:     - Renal function is improved with fluid challenge, now back to baseline range. He is nonoliguric. D/C NS IVFs. No NSAIDs.   - Hypotension is resolved. Can resume lisinopril at discharge.  - Acidosis remains resolved with improvement in renal function and GI symptoms.  - I see no indication for a renal biopsy. Will recheck UA as outpatient for hematuria/proteinuria.    He is stable for d/c from a renal standpoint.  Renal panel in 1 week.  Renal f/u in 4 weeks.  Updated Dr. Dasilva.     Thank you for allowing us to participate in this patient's care. We will continue to follow.    Medications:  No current facility-administered medications on file prior to encounter.      Current Outpatient Medications on File Prior to Encounter   Medication Sig Dispense Refill    atenolol (TENORMIN) 50 MG tablet Take 50 mg by mouth once daily.      atorvastatin (LIPITOR) 20 MG tablet Take 20 mg by mouth once daily.      ibuprofen (ADVIL,MOTRIN) 800 MG tablet Take 800 mg by mouth 3 (three)  times daily as needed for Pain.      insulin glargine, TOUJEO, (TOUJEO SOLOSTAR U-300 INSULIN) 300 unit/mL (1.5 mL) InPn pen Inject 15 Units into the skin once daily.      lisinopril (PRINIVIL,ZESTRIL) 20 MG tablet Take 20 mg by mouth once daily.       Scheduled Meds:   dicyclomine  10 mg Oral QID     Continuous Infusions:   sodium chloride 0.9% 75 mL/hr at 10/18/19 2009     PRN Meds:.acetaminophen, dextrose 50%, dextrose 50%, glucagon (human recombinant), glucose, glucose, insulin aspart U-100, magnesium oxide, magnesium oxide, morphine, ondansetron, potassium chloride 10%, potassium chloride 10%, potassium, sodium phosphates, potassium, sodium phosphates, sodium chloride 0.9%    Allergies:  Zithromax [azithromycin]    Vital Signs:  Temp Readings from Last 3 Encounters:   10/19/19 97.9 °F (36.6 °C) (Oral)   08/26/19 97.6 °F (36.4 °C) (Oral)   07/07/19 98.1 °F (36.7 °C)       Pulse Readings from Last 3 Encounters:   10/19/19 74   08/26/19 60   07/07/19 65       BP Readings from Last 3 Encounters:   10/19/19 (!) 144/88   08/26/19 (!) 139/96   07/07/19 (!) 142/97       Weight:  Wt Readings from Last 3 Encounters:   10/17/19 98.1 kg (216 lb 4.3 oz)   08/26/19 100.7 kg (222 lb)   07/07/19 100.3 kg (221 lb 1.2 oz)       INS/OUTS:  I/O last 3 completed shifts:  In: 6401.3 [P.O.:1300; I.V.:5101.3]  Out: 2000 [Urine:2000]  No intake/output data recorded.    Physical Exam:  Constitutional: nad, aao x 3  Heart: rrr, no m/r/g, wwp, no edema  Lungs: ctab, no w/r/r/c, no lb  Abdomen: s/nt/nd, +BS    Results:  Lab Results   Component Value Date     10/19/2019    K 4.1 10/19/2019     (H) 10/19/2019    CO2 21 (L) 10/19/2019    BUN 20 10/19/2019    CREATININE 1.8 (H) 10/19/2019    CALCIUM 9.0 10/19/2019    ANIONGAP 5 (L) 10/19/2019    ESTGFRAFRICA 55.5 (A) 10/19/2019    EGFRNONAA 48.0 (A) 10/19/2019       Lab Results   Component Value Date    CALCIUM 9.0 10/19/2019    PHOS 2.8 10/19/2019       No results for  input(s): WBC, RBC, HGB, HCT, PLT, MCV, MCH, MCHC in the last 24 hours.    I have personally reviewed pertinent radiological imaging and reports.    I have spent > 35 minutes providing care for this patient for the above diagnoses. These services have included chart/data/imaging review, evaluation, exam, formulation of plan, , note preparation, and discussions with staff involved in this patient's care.    Mahnaz Conte MD MPH  [facility] [address] [address]  [phone] (p)  [phone] (f)

## 2019-10-19 NOTE — DISCHARGE SUMMARY
[facility] Medicine  Discharge Summary      Patient Name: Jose Miguel Brennan  MRN: [id number]  Admission Date: 10/17/2019  Hospital Length of Stay: 1 days  Discharge Date and Time:  10/19/2019 10:43 AM  Attending Physician: Tapan Dasilva MD   Discharging Provider: Tapan Dasilva MD  Primary Care Provider: Ritchie Funes MD      HPI:   34-year-old gentleman with past medical history of IgA nephropathy(baseline serum creatinine of 2), hypertension, and IDDM, asthma presented with chief complaint of diarrhea.  Upon further asking patient mentioned that he was in his usual state of health until 3 days ago when he started experiencing nausea followed by multiple watery bowel movements to a point where he was not able to keep anything down.  He reports multiple family members are experiencing similar symptoms and thought his symptoms will resolve however his diarrhea got worse and he decided to come to ED.  He reports some chills otherwise denies any fever, headache, dizziness, chest pain, palpitations, vomiting, abdominal pain or dysuria.  He has noticed that his urine output has decreased.          Hospital Course:   10/18: Loose stools resolved. Denies abdominal pain. Wants to eat.   10/19: Feels back to baseline, asymptomatic. Cr back to baseline. BP running high but meds have been on hold.   Will d/c home with follow up outpatient with nephrology for repeat labs/urine studies.   Advised to seek immediate medical attention for any new, worsening, or recurrent symptoms.      Consults:   Consults (From admission, onward)        Status Ordering Provider     Inpatient consult to Nephrology  Once     Provider:  Mahnaz Conte MD    [facility]          Type 2 diabetes mellitus with stage 3 chronic kidney disease, without long-term current use of insulin  Resume home insulin       Hypertension associated with diabetes  Resume home medications       IgA  nephropathy  Diagnosed with IgA nephropathy by biopsy at [facility]  Nephrology recommendations appreciated        Final Active Diagnoses:    Diagnosis Date Noted POA    Type 2 diabetes mellitus with stage 3 chronic kidney disease, without long-term current use of insulin [E11.22, N18.3] 04/10/2014 Yes    IgA nephropathy [N02.8] 03/12/2014 Yes    Hypertension associated with diabetes [E11.59, I10] 03/12/2014 Yes      Problems Resolved During this Admission:    Diagnosis Date Noted Date Resolved POA    PRINCIPAL PROBLEM:  Renal failure (ARF), acute on chronic [N17.9, N18.9] 10/17/2019 10/19/2019 Yes    Diarrhea [R19.7] 10/17/2019 10/19/2019 Yes       Discharged Condition: good    Disposition: Home or Self Care    Follow Up:  Follow-up Information     Mahnaz Conte MD In 1 month.    Specialty:  Nephrology  Contact information:  [facility] [address] [facility]  [place] [address]  [phone]                 Patient Instructions:      Diet diabetic     Activity as tolerated       Significant Diagnostic Studies: Labs:   BMP:   Recent Labs   Lab 10/18/19  0544 10/19/19  0610   * 98    137   K 4.6 4.1    111*   CO2 23 21*   BUN 30* 20   CREATININE 2.5* 1.8*   CALCIUM 9.0 9.0   MG 1.8  --        Pending Diagnostic Studies:     None         Medications:  Reconciled Home Medications:      Medication List      CONTINUE taking these medications    atenolol 50 MG tablet  Commonly known as:  TENORMIN  Take 50 mg by mouth once daily.     atorvastatin 20 MG tablet  Commonly known as:  LIPITOR  Take 20 mg by mouth once daily.     lisinopril 20 MG tablet  Commonly known as:  PRINIVIL,ZESTRIL  Take 20 mg by mouth once daily.     TOUJEO SOLOSTAR U-300 INSULIN 300 unit/mL (1.5 mL) Inpn pen  Generic drug:  insulin glargine (TOUJEO)  Inject 15 Units into the skin once daily.        STOP taking these medications    ibuprofen 800 MG tablet  Commonly known as:  ADVIL,MOTRIN             Indwelling Lines/Drains at time of discharge:   Lines/Drains/Airways     None                 Time spent on the discharge of patient: 32 minutes  Patient was seen and examined on the date of discharge and determined to be suitable for discharge.         Tapan Dasilva MD  Department of Hospital Medicine  [facility]

## 2019-10-20 LAB
BACTERIA STL CULT: NORMAL
BACTERIA STL CULT: NORMAL

## 2019-12-03 ENCOUNTER — HOSPITAL ENCOUNTER (EMERGENCY)
Facility: HOSPITAL | Age: 34
Discharge: HOME | End: 2019-12-03
Attending: EMERGENCY MEDICINE
Payer: MEDICAID

## 2019-12-03 VITALS
SYSTOLIC BLOOD PRESSURE: 114 MMHG | HEART RATE: 53 BPM | DIASTOLIC BLOOD PRESSURE: 76 MMHG | HEIGHT: 72 IN | BODY MASS INDEX: 29.8 KG/M2 | OXYGEN SATURATION: 97 % | RESPIRATION RATE: 16 BRPM | TEMPERATURE: 98 F | WEIGHT: 220 LBS

## 2019-12-03 DIAGNOSIS — M54.6 ACUTE BILATERAL THORACIC BACK PAIN: ICD-10-CM

## 2019-12-03 DIAGNOSIS — R73.9 HYPERGLYCEMIA: ICD-10-CM

## 2019-12-03 DIAGNOSIS — N28.9 NEPHROPATHY: ICD-10-CM

## 2019-12-03 DIAGNOSIS — R10.9 FLANK PAIN: Primary | ICD-10-CM

## 2019-12-03 LAB
ALBUMIN SERPL BCP-MCNC: 3.8 G/DL (ref 3.5–5.2)
ALP SERPL-CCNC: 83 U/L (ref 55–135)
ALT SERPL W/O P-5'-P-CCNC: 23 U/L (ref 10–44)
ANION GAP SERPL CALC-SCNC: 7 MMOL/L (ref 8–16)
AST SERPL-CCNC: 17 U/L (ref 10–40)
BACTERIA #/AREA URNS HPF: NEGATIVE /HPF
BASOPHILS # BLD AUTO: 0.08 K/UL (ref 0–0.2)
BASOPHILS NFR BLD: 0.6 % (ref 0–1.9)
BILIRUB SERPL-MCNC: 0.8 MG/DL (ref 0.1–1)
BILIRUB UR QL STRIP: NEGATIVE
BUN SERPL-MCNC: 28 MG/DL (ref 6–20)
CALCIUM SERPL-MCNC: 8.9 MG/DL (ref 8.7–10.5)
CHLORIDE SERPL-SCNC: 103 MMOL/L (ref 95–110)
CLARITY UR: CLEAR
CO2 SERPL-SCNC: 27 MMOL/L (ref 23–29)
COLOR UR: YELLOW
CREAT SERPL-MCNC: 1.9 MG/DL (ref 0.5–1.4)
DIFFERENTIAL METHOD: ABNORMAL
EOSINOPHIL # BLD AUTO: 0.8 K/UL (ref 0–0.5)
EOSINOPHIL NFR BLD: 6.2 % (ref 0–8)
ERYTHROCYTE [DISTWIDTH] IN BLOOD BY AUTOMATED COUNT: 13.6 % (ref 11.5–14.5)
EST. GFR  (AFRICAN AMERICAN): 52 ML/MIN/1.73 M^2
EST. GFR  (NON AFRICAN AMERICAN): 45 ML/MIN/1.73 M^2
GLUCOSE SERPL-MCNC: 203 MG/DL (ref 70–110)
GLUCOSE UR QL STRIP: ABNORMAL
HCT VFR BLD AUTO: 49.4 % (ref 40–54)
HGB BLD-MCNC: 16.1 G/DL (ref 14–18)
HGB UR QL STRIP: NEGATIVE
HYALINE CASTS #/AREA URNS LPF: 0 /LPF
IMM GRANULOCYTES # BLD AUTO: 0.05 K/UL (ref 0–0.04)
IMM GRANULOCYTES NFR BLD AUTO: 0.4 % (ref 0–0.5)
KETONES UR QL STRIP: NEGATIVE
LEUKOCYTE ESTERASE UR QL STRIP: NEGATIVE
LYMPHOCYTES # BLD AUTO: 4.2 K/UL (ref 1–4.8)
LYMPHOCYTES NFR BLD: 31.5 % (ref 18–48)
MCH RBC QN AUTO: 28.7 PG (ref 27–31)
MCHC RBC AUTO-ENTMCNC: 32.6 G/DL (ref 32–36)
MCV RBC AUTO: 88 FL (ref 82–98)
MICROSCOPIC COMMENT: NORMAL
MONOCYTES # BLD AUTO: 0.7 K/UL (ref 0.3–1)
MONOCYTES NFR BLD: 5.1 % (ref 4–15)
NEUTROPHILS # BLD AUTO: 7.5 K/UL (ref 1.8–7.7)
NEUTROPHILS NFR BLD: 56.2 % (ref 38–73)
NITRITE UR QL STRIP: NEGATIVE
NRBC BLD-RTO: 0 /100 WBC
PH UR STRIP: 6 [PH] (ref 5–8)
PLATELET # BLD AUTO: 199 K/UL (ref 150–350)
PMV BLD AUTO: 10.7 FL (ref 9.2–12.9)
POTASSIUM SERPL-SCNC: 4.2 MMOL/L (ref 3.5–5.1)
PROT SERPL-MCNC: 6.5 G/DL (ref 6–8.4)
PROT UR QL STRIP: ABNORMAL
RBC # BLD AUTO: 5.61 M/UL (ref 4.6–6.2)
RBC #/AREA URNS HPF: 0 /HPF (ref 0–4)
SODIUM SERPL-SCNC: 137 MMOL/L (ref 136–145)
SP GR UR STRIP: 1.01 (ref 1–1.03)
SQUAMOUS #/AREA URNS HPF: 0 /HPF
URN SPEC COLLECT METH UR: ABNORMAL
UROBILINOGEN UR STRIP-ACNC: NEGATIVE EU/DL
WBC # BLD AUTO: 13.41 K/UL (ref 3.9–12.7)
WBC #/AREA URNS HPF: 0 /HPF (ref 0–5)

## 2019-12-03 PROCEDURE — 99283 EMERGENCY DEPT VISIT LOW MDM: CPT

## 2019-12-03 PROCEDURE — 80053 COMPREHEN METABOLIC PANEL: CPT

## 2019-12-03 PROCEDURE — 81001 URINALYSIS AUTO W/SCOPE: CPT

## 2019-12-03 PROCEDURE — 85025 COMPLETE CBC W/AUTO DIFF WBC: CPT

## 2019-12-03 RX ORDER — PEN NEEDLE, DIABETIC 31 GX5/16"
NEEDLE, DISPOSABLE MISCELLANEOUS
Refills: 3 | COMMUNITY
Start: 2019-09-19 | End: 2019-12-03 | Stop reason: ALTCHOICE

## 2019-12-03 RX ORDER — TRAMADOL HYDROCHLORIDE 50 MG/1
50 TABLET ORAL EVERY 6 HOURS PRN
Qty: 20 TABLET | Refills: 0 | Status: SHIPPED | OUTPATIENT
Start: 2019-12-03 | End: 2020-01-08 | Stop reason: ALTCHOICE

## 2019-12-03 RX ORDER — METHOCARBAMOL 500 MG/1
500 TABLET, FILM COATED ORAL 4 TIMES DAILY
Qty: 28 TABLET | Refills: 0 | Status: SHIPPED | OUTPATIENT
Start: 2019-12-03 | End: 2019-12-10

## 2019-12-03 NOTE — ED PROVIDER NOTES
Encounter Date: 12/3/2019       History     Chief Complaint   Patient presents with    Flank Pain     BILAT, ONSET 2 DAYS     34-year-old male who has a history of an IgA nephropathy and diabetes mellitus, presents emergency room with complaints of bilateral flank pain of 2 days duration.  No history of any trauma.  Patient admits that his pain is worse with movement.  No fever or chills. No radiation to his buttock legs.  No complaints of any dysuria or hematuria.  He does complain of some urinary frequency.  No associated nausea vomiting. The patient denies having tried any over-the-counter medication for his discomfort thus far.        Review of patient's allergies indicates:   Allergen Reactions    Zithromax [azithromycin]      Past Medical History:   Diagnosis Date    Asthma     Diabetes mellitus     diet controlled    Hyperlipidemia     Hypertension     IgA nephropathy      Past Surgical History:   Procedure Laterality Date    nose polyp removal       Family History   Problem Relation Age of Onset    Hypertension Maternal Grandmother     Cancer Maternal Grandfather     Diabetes Maternal Grandfather     Heart disease Maternal Grandfather     Heart disease Mother     Stroke Mother     Diabetes Mother      Social History     Tobacco Use    Smoking status: Current Every Day Smoker     Types: Cigarettes    Smokeless tobacco: Never Used    Tobacco comment: 1-2 cig per day   Substance Use Topics    Alcohol use: No    Drug use: No     Review of Systems   Constitutional: Negative for activity change, chills, diaphoresis and fever.   HENT: Negative.  Negative for congestion, ear pain, rhinorrhea, sinus pain and sore throat.    Eyes: Negative for pain.   Respiratory: Negative for cough and shortness of breath.    Cardiovascular: Negative for chest pain.   Gastrointestinal: Negative for abdominal pain, constipation, diarrhea, nausea and vomiting.   Genitourinary: Positive for flank pain and frequency.  Negative for hematuria.   Musculoskeletal: Positive for back pain.   Skin: Negative for pallor, rash and wound.   Neurological: Negative for syncope, speech difficulty and headaches.   All other systems reviewed and are negative.      Physical Exam     Initial Vitals [12/03/19 0934]   BP Pulse Resp Temp SpO2   (!) 149/97 66 16 97.8 °F (36.6 °C) 98 %      MAP       --         Physical Exam    Vitals reviewed.  Constitutional: He appears well-developed and well-nourished. He is not diaphoretic. No distress.   HENT:   Head: Normocephalic and atraumatic.   Right Ear: External ear normal.   Left Ear: External ear normal.   Nose: Nose normal.   Mouth/Throat: Oropharynx is clear and moist.   Eyes: Conjunctivae and EOM are normal. Pupils are equal, round, and reactive to light. Right eye exhibits no discharge. Left eye exhibits no discharge.   Neck: Normal range of motion. Neck supple. No thyromegaly present. No JVD present.   Cardiovascular: Normal rate, regular rhythm, normal heart sounds and intact distal pulses. Exam reveals no gallop and no friction rub.    No murmur heard.  Pulmonary/Chest: Breath sounds normal. No respiratory distress. He has no wheezes. He has no rhonchi. He has no rales. He exhibits no tenderness.   Abdominal: Soft. Bowel sounds are normal. He exhibits no distension. There is no tenderness. There is no rebound and no guarding.   Musculoskeletal: Normal range of motion. He exhibits no edema or tenderness.   Lymphadenopathy:     He has no cervical adenopathy.   Neurological: He is alert and oriented to person, place, and time. He has normal strength and normal reflexes. No cranial nerve deficit or sensory deficit. GCS score is 15. GCS eye subscore is 4. GCS verbal subscore is 5. GCS motor subscore is 6.   Skin: Skin is warm and dry. Capillary refill takes less than 2 seconds. No rash noted. No erythema. No pallor.   Psychiatric: He has a normal mood and affect. His behavior is normal. Judgment and  thought content normal.         ED Course   Procedures  Labs Reviewed   CBC W/ AUTO DIFFERENTIAL - Abnormal; Notable for the following components:       Result Value    WBC 13.41 (*)     Immature Grans (Abs) 0.05 (*)     Eos # 0.8 (*)     All other components within normal limits   COMPREHENSIVE METABOLIC PANEL   URINALYSIS, REFLEX TO URINE CULTURE   URINALYSIS MICROSCOPIC          Imaging Results    None                       Attending Attestation:             Attending ED Notes:   Diagnostic studies include a urinalysis which shows some 2+ proteinuria and 2+ glycosuria.  CBC is white count 13.4.  Chemistries are remarkable for glucose of 203, BUN 20 and creatinine 1.9.  This patient clinically has musculoskeletal lower thoracic paravertebral muscle tenderness. He is unable to take NSAIDs therefore be given prescriptions for Robaxin and tramadol.                        Clinical Impression:       ICD-10-CM ICD-9-CM   1. Flank pain R10.9 789.09   2. Acute bilateral thoracic back pain M54.6 724.1   3. Nephropathy N28.9 583.9   4. Hyperglycemia R73.9 790.29                             Austin Velásquez Jr., MD  12/03/19 1126

## 2020-01-08 ENCOUNTER — HOSPITAL ENCOUNTER (OUTPATIENT)
Facility: HOSPITAL | Age: 35
Discharge: HOME | End: 2020-01-09
Attending: EMERGENCY MEDICINE | Admitting: INTERNAL MEDICINE
Payer: MEDICAID

## 2020-01-08 DIAGNOSIS — N17.9 ACUTE KIDNEY INJURY: ICD-10-CM

## 2020-01-08 DIAGNOSIS — E87.5 HYPERKALEMIA: ICD-10-CM

## 2020-01-08 DIAGNOSIS — E86.0 DEHYDRATION: ICD-10-CM

## 2020-01-08 DIAGNOSIS — K52.9 GASTROENTERITIS: Primary | ICD-10-CM

## 2020-01-08 DIAGNOSIS — R19.7 DIARRHEA, UNSPECIFIED TYPE: ICD-10-CM

## 2020-01-08 LAB
ALBUMIN SERPL BCP-MCNC: 4.1 G/DL (ref 3.5–5.2)
ALP SERPL-CCNC: 109 U/L (ref 55–135)
ALT SERPL W/O P-5'-P-CCNC: 24 U/L (ref 10–44)
ANION GAP SERPL CALC-SCNC: 10 MMOL/L (ref 8–16)
ANION GAP SERPL CALC-SCNC: 13 MMOL/L (ref 8–16)
AST SERPL-CCNC: 18 U/L (ref 10–40)
BACTERIA #/AREA URNS HPF: NEGATIVE /HPF
BASOPHILS # BLD AUTO: 0.05 K/UL (ref 0–0.2)
BASOPHILS NFR BLD: 0.2 % (ref 0–1.9)
BILIRUB SERPL-MCNC: 1.2 MG/DL (ref 0.1–1)
BILIRUB UR QL STRIP: NEGATIVE
BUN SERPL-MCNC: 38 MG/DL (ref 6–20)
BUN SERPL-MCNC: 44 MG/DL (ref 6–20)
CALCIUM SERPL-MCNC: 8.1 MG/DL (ref 8.7–10.5)
CALCIUM SERPL-MCNC: 9.1 MG/DL (ref 8.7–10.5)
CHLORIDE SERPL-SCNC: 103 MMOL/L (ref 95–110)
CHLORIDE SERPL-SCNC: 111 MMOL/L (ref 95–110)
CLARITY UR: CLEAR
CO2 SERPL-SCNC: 16 MMOL/L (ref 23–29)
CO2 SERPL-SCNC: 18 MMOL/L (ref 23–29)
COLOR UR: YELLOW
CREAT SERPL-MCNC: 3.1 MG/DL (ref 0.5–1.4)
CREAT SERPL-MCNC: 3.5 MG/DL (ref 0.5–1.4)
DIFFERENTIAL METHOD: ABNORMAL
EOSINOPHIL # BLD AUTO: 0.4 K/UL (ref 0–0.5)
EOSINOPHIL NFR BLD: 2.1 % (ref 0–8)
ERYTHROCYTE [DISTWIDTH] IN BLOOD BY AUTOMATED COUNT: 13.7 % (ref 11.5–14.5)
EST. GFR  (AFRICAN AMERICAN): 24.9 ML/MIN/1.73 M^2
EST. GFR  (AFRICAN AMERICAN): 28.8 ML/MIN/1.73 M^2
EST. GFR  (NON AFRICAN AMERICAN): 21.5 ML/MIN/1.73 M^2
EST. GFR  (NON AFRICAN AMERICAN): 24.9 ML/MIN/1.73 M^2
GLUCOSE SERPL-MCNC: 156 MG/DL (ref 70–110)
GLUCOSE SERPL-MCNC: 201 MG/DL (ref 70–110)
GLUCOSE SERPL-MCNC: 333 MG/DL (ref 70–110)
GLUCOSE UR QL STRIP: ABNORMAL
HCT VFR BLD AUTO: 58.1 % (ref 40–54)
HGB BLD-MCNC: 18.8 G/DL (ref 14–18)
HGB UR QL STRIP: NEGATIVE
HYALINE CASTS #/AREA URNS LPF: 6 /LPF
IMM GRANULOCYTES # BLD AUTO: 0.12 K/UL (ref 0–0.04)
IMM GRANULOCYTES NFR BLD AUTO: 0.6 % (ref 0–0.5)
KETONES UR QL STRIP: NEGATIVE
LACTATE SERPL-SCNC: 2.6 MMOL/L (ref 0.5–1.9)
LEUKOCYTE ESTERASE UR QL STRIP: NEGATIVE
LIPASE SERPL-CCNC: 206 U/L (ref 4–60)
LYMPHOCYTES # BLD AUTO: 2 K/UL (ref 1–4.8)
LYMPHOCYTES NFR BLD: 9.4 % (ref 18–48)
MCH RBC QN AUTO: 27.7 PG (ref 27–31)
MCHC RBC AUTO-ENTMCNC: 32.4 G/DL (ref 32–36)
MCV RBC AUTO: 86 FL (ref 82–98)
MICROSCOPIC COMMENT: ABNORMAL
MONOCYTES # BLD AUTO: 1.1 K/UL (ref 0.3–1)
MONOCYTES NFR BLD: 5.2 % (ref 4–15)
NEUTROPHILS # BLD AUTO: 17.6 K/UL (ref 1.8–7.7)
NEUTROPHILS NFR BLD: 82.5 % (ref 38–73)
NITRITE UR QL STRIP: NEGATIVE
NRBC BLD-RTO: 0 /100 WBC
PH UR STRIP: 6 [PH] (ref 5–8)
PLATELET # BLD AUTO: 263 K/UL (ref 150–350)
PMV BLD AUTO: 11.4 FL (ref 9.2–12.9)
POTASSIUM SERPL-SCNC: 4.4 MMOL/L (ref 3.5–5.1)
POTASSIUM SERPL-SCNC: 6.4 MMOL/L (ref 3.5–5.1)
PROT SERPL-MCNC: 6.9 G/DL (ref 6–8.4)
PROT UR QL STRIP: ABNORMAL
RBC # BLD AUTO: 6.78 M/UL (ref 4.6–6.2)
RBC #/AREA URNS HPF: 1 /HPF (ref 0–4)
SODIUM SERPL-SCNC: 132 MMOL/L (ref 136–145)
SODIUM SERPL-SCNC: 139 MMOL/L (ref 136–145)
SP GR UR STRIP: 1.01 (ref 1–1.03)
SQUAMOUS #/AREA URNS HPF: 1 /HPF
URN SPEC COLLECT METH UR: ABNORMAL
UROBILINOGEN UR STRIP-ACNC: NEGATIVE EU/DL
WBC # BLD AUTO: 21.36 K/UL (ref 3.9–12.7)
WBC #/AREA URNS HPF: 1 /HPF (ref 0–5)
YEAST URNS QL MICRO: ABNORMAL

## 2020-01-08 PROCEDURE — 25000003 PHARM REV CODE 250: Performed by: INTERNAL MEDICINE

## 2020-01-08 PROCEDURE — G0378 HOSPITAL OBSERVATION PER HR: HCPCS

## 2020-01-08 PROCEDURE — 80048 BASIC METABOLIC PNL TOTAL CA: CPT

## 2020-01-08 PROCEDURE — 87040 BLOOD CULTURE FOR BACTERIA: CPT | Mod: 59

## 2020-01-08 PROCEDURE — S0028 INJECTION, FAMOTIDINE, 20 MG: HCPCS | Performed by: EMERGENCY MEDICINE

## 2020-01-08 PROCEDURE — 99900035 HC TECH TIME PER 15 MIN (STAT)

## 2020-01-08 PROCEDURE — 89055 LEUKOCYTE ASSESSMENT FECAL: CPT

## 2020-01-08 PROCEDURE — 96374 THER/PROPH/DIAG INJ IV PUSH: CPT

## 2020-01-08 PROCEDURE — 63600175 PHARM REV CODE 636 W HCPCS: Performed by: INTERNAL MEDICINE

## 2020-01-08 PROCEDURE — 96375 TX/PRO/DX INJ NEW DRUG ADDON: CPT

## 2020-01-08 PROCEDURE — 25000242 PHARM REV CODE 250 ALT 637 W/ HCPCS: Performed by: EMERGENCY MEDICINE

## 2020-01-08 PROCEDURE — 99285 EMERGENCY DEPT VISIT HI MDM: CPT | Mod: 25

## 2020-01-08 PROCEDURE — 36415 COLL VENOUS BLD VENIPUNCTURE: CPT

## 2020-01-08 PROCEDURE — 96365 THER/PROPH/DIAG IV INF INIT: CPT | Mod: 59

## 2020-01-08 PROCEDURE — 63600175 PHARM REV CODE 636 W HCPCS: Performed by: NURSE PRACTITIONER

## 2020-01-08 PROCEDURE — 81001 URINALYSIS AUTO W/SCOPE: CPT

## 2020-01-08 PROCEDURE — 25000003 PHARM REV CODE 250: Performed by: EMERGENCY MEDICINE

## 2020-01-08 PROCEDURE — 96361 HYDRATE IV INFUSION ADD-ON: CPT

## 2020-01-08 PROCEDURE — S0030 INJECTION, METRONIDAZOLE: HCPCS | Performed by: INTERNAL MEDICINE

## 2020-01-08 PROCEDURE — 83690 ASSAY OF LIPASE: CPT

## 2020-01-08 PROCEDURE — 83605 ASSAY OF LACTIC ACID: CPT | Mod: 91

## 2020-01-08 PROCEDURE — 93005 ELECTROCARDIOGRAM TRACING: CPT

## 2020-01-08 PROCEDURE — 63600175 PHARM REV CODE 636 W HCPCS: Performed by: EMERGENCY MEDICINE

## 2020-01-08 PROCEDURE — 83605 ASSAY OF LACTIC ACID: CPT

## 2020-01-08 PROCEDURE — 96366 THER/PROPH/DIAG IV INF ADDON: CPT

## 2020-01-08 PROCEDURE — 85025 COMPLETE CBC W/AUTO DIFF WBC: CPT

## 2020-01-08 PROCEDURE — 80053 COMPREHEN METABOLIC PANEL: CPT

## 2020-01-08 PROCEDURE — 96365 THER/PROPH/DIAG IV INF INIT: CPT

## 2020-01-08 PROCEDURE — 94644 CONT INHLJ TX 1ST HOUR: CPT

## 2020-01-08 RX ORDER — IBUPROFEN 200 MG
24 TABLET ORAL
Status: DISCONTINUED | OUTPATIENT
Start: 2020-01-08 | End: 2020-01-09 | Stop reason: HOSPADM

## 2020-01-08 RX ORDER — IBUPROFEN 200 MG
16 TABLET ORAL
Status: DISCONTINUED | OUTPATIENT
Start: 2020-01-08 | End: 2020-01-09 | Stop reason: HOSPADM

## 2020-01-08 RX ORDER — ONDANSETRON 2 MG/ML
4 INJECTION INTRAMUSCULAR; INTRAVENOUS EVERY 8 HOURS PRN
Status: DISCONTINUED | OUTPATIENT
Start: 2020-01-08 | End: 2020-01-09 | Stop reason: HOSPADM

## 2020-01-08 RX ORDER — ACETAMINOPHEN 325 MG/1
650 TABLET ORAL EVERY 4 HOURS PRN
Status: DISCONTINUED | OUTPATIENT
Start: 2020-01-08 | End: 2020-01-09 | Stop reason: HOSPADM

## 2020-01-08 RX ORDER — INSULIN ASPART 100 [IU]/ML
0-5 INJECTION, SOLUTION INTRAVENOUS; SUBCUTANEOUS
Status: DISCONTINUED | OUTPATIENT
Start: 2020-01-08 | End: 2020-01-09 | Stop reason: HOSPADM

## 2020-01-08 RX ORDER — ACETAMINOPHEN 325 MG/1
650 TABLET ORAL EVERY 8 HOURS PRN
Status: DISCONTINUED | OUTPATIENT
Start: 2020-01-08 | End: 2020-01-09 | Stop reason: HOSPADM

## 2020-01-08 RX ORDER — ALBUTEROL SULFATE 0.83 MG/ML
10 SOLUTION RESPIRATORY (INHALATION)
Status: COMPLETED | OUTPATIENT
Start: 2020-01-08 | End: 2020-01-08

## 2020-01-08 RX ORDER — LEVOFLOXACIN 5 MG/ML
750 INJECTION, SOLUTION INTRAVENOUS
Status: DISCONTINUED | OUTPATIENT
Start: 2020-01-08 | End: 2020-01-09 | Stop reason: HOSPADM

## 2020-01-08 RX ORDER — HYOSCYAMINE SULFATE 0.12 MG/1
0.12 TABLET SUBLINGUAL
Status: COMPLETED | OUTPATIENT
Start: 2020-01-08 | End: 2020-01-08

## 2020-01-08 RX ORDER — HYDROCODONE BITARTRATE AND ACETAMINOPHEN 5; 325 MG/1; MG/1
1 TABLET ORAL ONCE
Status: COMPLETED | OUTPATIENT
Start: 2020-01-08 | End: 2020-01-08

## 2020-01-08 RX ORDER — SODIUM CHLORIDE 9 MG/ML
INJECTION, SOLUTION INTRAVENOUS CONTINUOUS
Status: DISCONTINUED | OUTPATIENT
Start: 2020-01-08 | End: 2020-01-09 | Stop reason: HOSPADM

## 2020-01-08 RX ORDER — ONDANSETRON 2 MG/ML
4 INJECTION INTRAMUSCULAR; INTRAVENOUS
Status: DISCONTINUED | OUTPATIENT
Start: 2020-01-08 | End: 2020-01-08

## 2020-01-08 RX ORDER — INDOMETHACIN 25 MG/1
50 CAPSULE ORAL
Status: COMPLETED | OUTPATIENT
Start: 2020-01-08 | End: 2020-01-08

## 2020-01-08 RX ORDER — ONDANSETRON 2 MG/ML
4 INJECTION INTRAMUSCULAR; INTRAVENOUS
Status: COMPLETED | OUTPATIENT
Start: 2020-01-08 | End: 2020-01-08

## 2020-01-08 RX ORDER — SODIUM CHLORIDE 0.9 % (FLUSH) 0.9 %
10 SYRINGE (ML) INJECTION
Status: DISCONTINUED | OUTPATIENT
Start: 2020-01-08 | End: 2020-01-09 | Stop reason: HOSPADM

## 2020-01-08 RX ORDER — GLUCAGON 1 MG
1 KIT INJECTION
Status: DISCONTINUED | OUTPATIENT
Start: 2020-01-08 | End: 2020-01-09 | Stop reason: HOSPADM

## 2020-01-08 RX ORDER — TALC
6 POWDER (GRAM) TOPICAL NIGHTLY PRN
Status: DISCONTINUED | OUTPATIENT
Start: 2020-01-08 | End: 2020-01-09 | Stop reason: HOSPADM

## 2020-01-08 RX ORDER — FAMOTIDINE 10 MG/ML
20 INJECTION INTRAVENOUS
Status: COMPLETED | OUTPATIENT
Start: 2020-01-08 | End: 2020-01-08

## 2020-01-08 RX ORDER — METRONIDAZOLE 500 MG/100ML
500 INJECTION, SOLUTION INTRAVENOUS
Status: DISCONTINUED | OUTPATIENT
Start: 2020-01-08 | End: 2020-01-09 | Stop reason: HOSPADM

## 2020-01-08 RX ORDER — ATORVASTATIN CALCIUM 20 MG/1
20 TABLET, FILM COATED ORAL DAILY
Status: DISCONTINUED | OUTPATIENT
Start: 2020-01-09 | End: 2020-01-09 | Stop reason: HOSPADM

## 2020-01-08 RX ORDER — IPRATROPIUM BROMIDE AND ALBUTEROL SULFATE 2.5; .5 MG/3ML; MG/3ML
3 SOLUTION RESPIRATORY (INHALATION) EVERY 6 HOURS PRN
Status: DISCONTINUED | OUTPATIENT
Start: 2020-01-08 | End: 2020-01-09 | Stop reason: HOSPADM

## 2020-01-08 RX ADMIN — SODIUM CHLORIDE, SODIUM LACTATE, POTASSIUM CHLORIDE, AND CALCIUM CHLORIDE 1000 ML: .6; .31; .03; .02 INJECTION, SOLUTION INTRAVENOUS at 01:01

## 2020-01-08 RX ADMIN — HYDROCODONE BITARTRATE AND ACETAMINOPHEN 1 TABLET: 5; 325 TABLET ORAL at 09:01

## 2020-01-08 RX ADMIN — SODIUM CHLORIDE: 0.9 INJECTION, SOLUTION INTRAVENOUS at 08:01

## 2020-01-08 RX ADMIN — CALCIUM GLUCONATE 1 G: 94 INJECTION, SOLUTION INTRAVENOUS at 03:01

## 2020-01-08 RX ADMIN — HUMAN INSULIN 10 UNITS: 100 INJECTION, SOLUTION SUBCUTANEOUS at 02:01

## 2020-01-08 RX ADMIN — ALBUTEROL SULFATE 10 MG: 2.5 SOLUTION RESPIRATORY (INHALATION) at 02:01

## 2020-01-08 RX ADMIN — HYOSCYAMINE SULFATE 0.12 MG: 0.12 TABLET ORAL at 03:01

## 2020-01-08 RX ADMIN — ONDANSETRON HYDROCHLORIDE 4 MG: 2 INJECTION, SOLUTION INTRAMUSCULAR; INTRAVENOUS at 12:01

## 2020-01-08 RX ADMIN — FAMOTIDINE 20 MG: 10 INJECTION INTRAVENOUS at 12:01

## 2020-01-08 RX ADMIN — METRONIDAZOLE 500 MG: 500 INJECTION, SOLUTION INTRAVENOUS at 04:01

## 2020-01-08 RX ADMIN — LEVOFLOXACIN 750 MG: 750 INJECTION, SOLUTION INTRAVENOUS at 06:01

## 2020-01-08 RX ADMIN — SODIUM BICARBONATE 50 MEQ: 84 INJECTION, SOLUTION INTRAVENOUS at 03:01

## 2020-01-08 RX ADMIN — DEXTROSE MONOHYDRATE 25 G: 25 INJECTION, SOLUTION INTRAVENOUS at 03:01

## 2020-01-08 RX ADMIN — SODIUM CHLORIDE 1000 ML: 0.9 INJECTION, SOLUTION INTRAVENOUS at 12:01

## 2020-01-08 RX ADMIN — SODIUM CHLORIDE 2994 ML: 0.9 INJECTION, SOLUTION INTRAVENOUS at 02:01

## 2020-01-08 NOTE — HPI
34-year-old white male with known past medical history of insulin-dependent diabetes mellitus, hypertension, hyperlipidemia, IgA nephropathy, CKD 3 with baseline creatinine of 2 presented to emergency room today 01/08/2020 with complains of profuse diarrhea for 2 days.  Patient reports he is having watery diarrhea for the last 2 days with 20 episodes during the day.  Patient denies any blood or mucus in his stools but reports diarrhea wakes him up in the middle of the night as well.  Patient's significant other reports that since he found out that she is pregnant, patient developed severe diarrhea and thinks that it is nerves related.  Patient report he tried to replace the lost fluid with plain water rather than power rate or Pedialyte and this morning he developed severe cough and leg cramping.  Patient denies any history of contact with sick individual, denies fever, chills.   Patient reports he was admitted 3 months ago with the same complaint and he failed to follow-up with his nephrologist after the hospital discharge. Patient reports last he saw a nephrologist was greater than 5 years ago in [facility].  Patient reports he does not have a primary care physician either.  Patient reports his diabetes is diet controlled mostly but when he has an infection his blood glucose starts to run higher.  Patient reports he takes insulin if needed   Patient denied history of hyperlipidemia but noted in his Mar that he does take Lipitor  When patient arrived to the ER his blood pressure was 93/54 sitting with pulse of 80 and O2 sat of 97%.  Patient looks severely dehydrated.  Fluid bolus was given in the ER and antibiotic started by the ER physician  Blood work personally reviewed shows WBC count of 21.36, H&H 18.8/58.1 with no bands  CMP showed sodium of 132, potassium of 6.4, bicarb of 16 with anion gap of 13, BUN creatinine 44/3.5 with GFR of 21.5, glucose was 333 and lipase was 206  Patient did not look septic but  did look severely dehydrated and sick with diarrhea  Blood cultures were collected  CT abdomen and pelvis without contrast was done which showed mildly dilated loops of small bowel in left upper quadrant with stranding in adjacent mesentery and small subcentimeter mesenteric lymph nodes.  Finding secondary to enteritis due to infection or inflammatory process  X-ray personally reviewed chest was negative  Hospital Medicine was consulted for observation for gastroenteritis, Messi I have with hyperkalemia and hyperglycemia  At the time of my examination patient blood pressure was 103/73 in the room.  Patient reported abdominal pain has improved but .  Nausea was controlled.  Discussed will keep him NPO till his nausea resolved and abdominal pain is better and then we will resume him with clear liquids and gradually advance with cardiac/renal/diabetic diet  Discussed will do stool studies to rule out bacterial infection as well  Did discussed with patient that we will hold his blood pressure medications for now and will resume as blood pressure allows  Patient verbalized understanding

## 2020-01-08 NOTE — ASSESSMENT & PLAN NOTE
Bacterial versus viral  Twenty watery bowel movements in the last 24 hr  Gastroenteritis causing ROSIO   Will continue levofloxacin 750 mg Q 48 hr due to Rosio and Flagyl 500 mg q.8 hours  Stool studies ordered for WBC, ova parasite, stool culture  Blood cultures collected.  Patient does not look septic but does look ill  IV fluid normal saline at 125 mL/hour  Med surge with tele monitoring  NPO for now.  Will start clear liquid once nausea and abdominal pain resolves and will advance as tolerated.  Keeping in mind patient will need to be on cardiac/renal/diabetic diet

## 2020-01-08 NOTE — ASSESSMENT & PLAN NOTE
BP upon arrival to the ER was 93/54  On my examination, blood pressure was improved to 103/73  Will hold anti hypertensive medications for now, will resume as blood pressure allows

## 2020-01-08 NOTE — ASSESSMENT & PLAN NOTE
Last A1c noted in chart was 10/17/2019 and was 8.7  Currently NPO, will continue insulin sliding scale low  CBG AC and HS  Noted from patient's chart review that patient has not been taking his insulin for the last 5-6 months as he has not established himself with a primary care physician  Patient reports his diabetes is diet controlled and is elevated only when he is having infection  Will repeat A1c in a.m.

## 2020-01-08 NOTE — ASSESSMENT & PLAN NOTE
Possibly due to profuse diarrhea  BUN creatinine 44/3.5 with GFR 21.5  Patient does have an underlying CKD 3 due to IgA nephropathy  Continue normal saline at 125 cc/hour  Strict I&Os  Repeat BMP at 6:00 p.m. today and again in the morning

## 2020-01-08 NOTE — H&P
[facility] Medicine  History & Physical    Patient Name: Jose Miguel Brennan  MRN: [id number]  Admission Date: 1/8/2020  Attending Physician: Drake Pringle MD   Primary Care Provider: Horace Doherty Jr, MD         Patient information was obtained from patient, spouse/SO, past medical records and ER records.   Patient personally discussed with emergency physician Dr. Aj    Subjective:     Principal Problem:Gastroenteritis    Chief Complaint:   Chief Complaint   Patient presents with    Abdominal Pain    Diarrhea    Nausea        HPI: 34-year-old white male with known past medical history of insulin-dependent diabetes mellitus, hypertension, hyperlipidemia, IgA nephropathy, CKD 3 with baseline creatinine of 2 presented to emergency room today 01/08/2020 with complains of profuse diarrhea for 2 days.  Patient reports he is having watery diarrhea for the last 2 days with 20 episodes during the day.  Patient denies any blood or mucus in his stools but reports diarrhea wakes him up in the middle of the night as well.  Patient's significant other reports that since he found out that she is pregnant, patient developed severe diarrhea and thinks that it is nerves related.  Patient report he tried to replace the lost fluid with plain water rather than power rate or Pedialyte and this morning he developed severe cough and leg cramping.  Patient denies any history of contact with sick individual, denies fever, chills.   Patient reports he was admitted 3 months ago with the same complaint and he failed to follow-up with his nephrologist after the hospital discharge. Patient reports last he saw a nephrologist was greater than 5 years ago in [facility].  Patient reports he does not have a primary care physician either.  Patient reports his diabetes is diet controlled mostly but when he has an infection his blood glucose starts to run higher.  Patient reports he takes insulin if needed   Patient  denied history of hyperlipidemia but noted in his Mar that he does take Lipitor  When patient arrived to the ER his blood pressure was 93/54 sitting with pulse of 80 and O2 sat of 97%.  Patient looks severely dehydrated.  Fluid bolus was given in the ER and antibiotic started by the ER physician  Blood work personally reviewed shows WBC count of 21.36, H&H 18.8/58.1 with no bands  CMP showed sodium of 132, potassium of 6.4, bicarb of 16 with anion gap of 13, BUN creatinine 44/3.5 with GFR of 21.5, glucose was 333 and lipase was 206  Patient did not look septic but did look severely dehydrated and sick with diarrhea  Blood cultures were collected  CT abdomen and pelvis without contrast was done which showed mildly dilated loops of small bowel in left upper quadrant with stranding in adjacent mesentery and small subcentimeter mesenteric lymph nodes.  Finding secondary to enteritis due to infection or inflammatory process  X-ray personally reviewed chest was negative  Hospital Medicine was consulted for observation for gastroenteritis, Messi I have with hyperkalemia and hyperglycemia  At the time of my examination patient blood pressure was 103/73 in the room.  Patient reported abdominal pain has improved but .  Nausea was controlled.  Discussed will keep him NPO till his nausea resolved and abdominal pain is better and then we will resume him with clear liquids and gradually advance with cardiac/renal/diabetic diet  Discussed will do stool studies to rule out bacterial infection as well  Did discussed with patient that we will hold his blood pressure medications for now and will resume as blood pressure allows  Patient verbalized understanding    Past Medical History:   Diagnosis Date    Asthma     Diabetes mellitus     diet controlled    Hyperlipidemia     Hypertension     IgA nephropathy        Past Surgical History:   Procedure Laterality Date    nose polyp removal         Review of patient's  allergies indicates:   Allergen Reactions    Zithromax [azithromycin]        No current facility-administered medications on file prior to encounter.      Current Outpatient Medications on File Prior to Encounter   Medication Sig    atenolol (TENORMIN) 50 MG tablet Take 50 mg by mouth once daily.    atorvastatin (LIPITOR) 20 MG tablet Take 20 mg by mouth once daily.    lisinopril (PRINIVIL,ZESTRIL) 20 MG tablet Take 20 mg by mouth once daily.    insulin glargine, TOUJEO, (TOUJEO SOLOSTAR U-300 INSULIN) 300 unit/mL (1.5 mL) InPn pen Inject 15 Units into the skin once daily.    [DISCONTINUED] traMADol (ULTRAM) 50 mg tablet Take 1 tablet (50 mg total) by mouth every 6 (six) hours as needed for Pain.     Family History     Problem Relation (Age of Onset)    Cancer Maternal Grandfather    Diabetes Maternal Grandfather, Mother    Heart disease Maternal Grandfather, Mother    Hypertension Maternal Grandmother    Stroke Mother        Tobacco Use    Smoking status: Current Every Day Smoker     Types: Cigars    Smokeless tobacco: Never Used    Tobacco comment: One cigar a day   Substance and Sexual Activity    Alcohol use: No    Drug use: No    Sexual activity: Yes     Partners: Female     Review of Systems   Constitutional: Positive for activity change, appetite change and fatigue. Negative for chills, diaphoresis and fever.   HENT: Negative.    Eyes: Negative.    Respiratory: Negative.    Cardiovascular: Negative.    Gastrointestinal: Positive for abdominal pain, diarrhea (Profuse watery with no blood or mucus) and nausea. Negative for vomiting.   Endocrine: Negative.    Genitourinary:        IgA nephropathy, baseline CKD 3   Musculoskeletal:        Muscle cramps this morning   Skin: Negative.    Neurological: Positive for weakness.   Psychiatric/Behavioral: Negative.      Objective:     Vital Signs (Most Recent):  Temp: 97.6 °F (36.4 °C) (01/08/20 1121)  Pulse: 82 (01/08/20 1445)  Resp: 18 (01/08/20 1445)  BP:  (!) 93/54 (01/08/20 1121)  SpO2: 100 % (01/08/20 1445) Vital Signs (24h Range):  Temp:  [97.6 °F (36.4 °C)] 97.6 °F (36.4 °C)  Pulse:  [80-82] 82  Resp:  [16-18] 18  SpO2:  [97 %-100 %] 100 %  BP: (93)/(54) 93/54     Weight: 99.8 kg (220 lb)  Body mass index is 29.84 kg/m².    Physical Exam   Constitutional: He is oriented to person, place, and time. He appears well-developed and well-nourished. No distress.   Ill looking   HENT:   Head: Normocephalic and atraumatic.   Mouth/Throat: Oropharynx is clear and moist.   Eyes: Pupils are equal, round, and reactive to light. EOM are normal. No scleral icterus.   Neck: Normal range of motion. Neck supple.   Cardiovascular: Normal rate, regular rhythm and intact distal pulses. Exam reveals no gallop and no friction rub.   Murmur (2/6 systolic murmur) heard.  Pulmonary/Chest: Effort normal and breath sounds normal. He has no wheezes. He has no rales.   Abdominal: There is tenderness.   Hyperactive bowel sounds   Musculoskeletal: Normal range of motion. He exhibits no edema, tenderness or deformity.   Neurological: He is alert and oriented to person, place, and time.   Skin: Skin is warm and dry. No rash noted. He is not diaphoretic.   Psychiatric: He has a normal mood and affect. His behavior is normal. Judgment and thought content normal.   Nursing note and vitals reviewed.        CRANIAL NERVES     CN III, IV, VI   Pupils are equal, round, and reactive to light.  Extraocular motions are normal.        Significant Labs:   A1C:   Recent Labs   Lab 10/17/19  0542   HGBA1C 8.7*     Blood Culture: No results for input(s): LABBLOO in the last 48 hours.  CBC:   Recent Labs   Lab 01/08/20  1220   WBC 21.36*   HGB 18.8*   HCT 58.1*        CMP:   Recent Labs   Lab 01/08/20  1220   *   K 6.4*      CO2 16*   *   BUN 44*   CREATININE 3.5*   CALCIUM 9.1   PROT 6.9   ALBUMIN 4.1   BILITOT 1.2*   ALKPHOS 109   AST 18   ALT 24   ANIONGAP 13   EGFRNONAA 21.5*      Cardiac Markers: No results for input(s): CKMB, MYOGLOBIN, BNP, TROPISTAT in the last 48 hours.  Lactic Acid: No results for input(s): LACTATE in the last 48 hours.  Lipase:   Recent Labs   Lab 01/08/20  1220   LIPASE 206*     Lipid Panel: No results for input(s): CHOL, HDL, LDLCALC, TRIG, CHOLHDL in the last 48 hours.  Magnesium: No results for input(s): MG in the last 48 hours.  TSH: No results for input(s): TSH in the last 4320 hours.  Urine Studies: No results for input(s): COLORU, APPEARANCEUA, PHUR, SPECGRAV, PROTEINUA, GLUCUA, KETONESU, BILIRUBINUA, OCCULTUA, NITRITE, UROBILINOGEN, LEUKOCYTESUR, RBCUA, WBCUA, BACTERIA, SQUAMEPITHEL, HYALINECASTS in the last 48 hours.    Invalid input(s): WRIGHTSUR    Significant Imaging: I have reviewed all pertinent imaging results/findings within the past 24 hours.   X-Ray Chest AP Portable   Final Result      No acute cardiopulmonary abnormality.         Electronically signed by: Jose Miguel Gilman MD   Date:    01/08/2020   Time:    15:35      CT Abdomen Pelvis  Without Contrast   Final Result      Mildly dilated loops of small bowel in the left upper quadrant with stranding in the adjacent mesentery and small subcentimeter mesenteric lymph nodes.  Findings most likely are secondary to enteritis due to infectious or inflammatory process.      There is no free fluid or adenopathy      No evidence of renal stones         Electronically signed by: Lori Emerson MD   Date:    01/08/2020   Time:    11:56        Personally reviewed patient last admit from [id number].  Noted his last A1c done on 10/17/2019 was 8.7    Assessment/Plan:     * Gastroenteritis  Bacterial versus viral  Twenty watery bowel movements in the last 24 hr  Gastroenteritis causing ROSIO   Will continue levofloxacin 750 mg Q 48 hr due to Rosio and Flagyl 500 mg q.8 hours  Stool studies ordered for WBC, ova parasite, stool culture  Blood cultures collected.  Patient does not look septic but does look ill  IV  fluid normal saline at 125 mL/hour  Med surge with tele monitoring  NPO for now.  Will start clear liquid once nausea and abdominal pain resolves and will advance as tolerated.  Keeping in mind patient will need to be on cardiac/renal/diabetic diet  Lactic acid ordered, results pending/unavailable at in the system      TRACEY (acute kidney injury)  Possibly due to profuse diarrhea  BUN creatinine 44/3.5 with GFR 21.5  Patient does have an underlying CKD 3 due to IgA nephropathy  Continue normal saline at 125 cc/hour  Strict I&Os  Repeat BMP at 6:00 p.m. today and again in the morning  In the ER Pt K was 6.6 and pt received albuterol neb 10 mg x1, dextrose 25 g IV x1, regular insulin 10 units IV x1 and bicarb 50 mEq IV x1,   Ordered repeat BMP at 6:00 p.m.      Type 2 diabetes mellitus with stage 3 chronic kidney disease, without long-term current use of insulin  Last A1c noted in chart was 10/17/2019 and was 8.7  Currently NPO, will continue insulin sliding scale low  CBG AC and HS  Noted from patient's chart review that patient has not been taking his insulin for the last 5-6 months as he has not established himself with a primary care physician  Patient reports his diabetes is diet controlled and is elevated only when he is having infection  Will repeat A1c in a.m.      IgA nephropathy  Diagnosed at the age of 15 with renal biopsy  Patient was seen by Dr. Conte on his last admissions in October 2019 but fails to follow up with her in outpatient setting after the discharge  Counseled patient regarding compliance with his appointments and follow ups with primary care and nephrologist      Hypertension associated with diabetes  BP upon arrival to the ER was 93/54  On my examination, blood pressure was improved to 103/73  Will hold anti hypertensive medications for now, will resume as blood pressure allows      Asthma  Duo nebs q.6 hours p.r.n.  Currently not wheezing on admission        VTE Risk Mitigation (From  admission, onward)         Ordered     Place sequential compression device  Until discontinued      01/08/20 1612     Place OPHELIA hose  Until discontinued      01/08/20 1612                   Drake Pringle MD  Department of Hospital Medicine   [facility]

## 2020-01-08 NOTE — ASSESSMENT & PLAN NOTE
Diagnosed at the age of 15 with renal biopsy  Patient was seen by Dr. Conte on his last admissions in October 2019 but fails to follow up with her in outpatient setting after the discharge  Counseled patient regarding compliance with his appointments and follow ups with primary care and nephrologist

## 2020-01-08 NOTE — SUBJECTIVE & OBJECTIVE
Past Medical History:   Diagnosis Date    Asthma     Diabetes mellitus     diet controlled    Hyperlipidemia     Hypertension     IgA nephropathy        Past Surgical History:   Procedure Laterality Date    nose polyp removal         Review of patient's allergies indicates:   Allergen Reactions    Zithromax [azithromycin]        No current facility-administered medications on file prior to encounter.      Current Outpatient Medications on File Prior to Encounter   Medication Sig    atenolol (TENORMIN) 50 MG tablet Take 50 mg by mouth once daily.    atorvastatin (LIPITOR) 20 MG tablet Take 20 mg by mouth once daily.    lisinopril (PRINIVIL,ZESTRIL) 20 MG tablet Take 20 mg by mouth once daily.    insulin glargine, TOUJEO, (TOUJEO SOLOSTAR U-300 INSULIN) 300 unit/mL (1.5 mL) InPn pen Inject 15 Units into the skin once daily.    [DISCONTINUED] traMADol (ULTRAM) 50 mg tablet Take 1 tablet (50 mg total) by mouth every 6 (six) hours as needed for Pain.     Family History     Problem Relation (Age of Onset)    Cancer Maternal Grandfather    Diabetes Maternal Grandfather, Mother    Heart disease Maternal Grandfather, Mother    Hypertension Maternal Grandmother    Stroke Mother        Tobacco Use    Smoking status: Current Every Day Smoker     Types: Cigars    Smokeless tobacco: Never Used    Tobacco comment: One cigar a day   Substance and Sexual Activity    Alcohol use: No    Drug use: No    Sexual activity: Yes     Partners: Female     Review of Systems   Constitutional: Positive for activity change, appetite change and fatigue. Negative for chills, diaphoresis and fever.   HENT: Negative.    Eyes: Negative.    Respiratory: Negative.    Cardiovascular: Negative.    Gastrointestinal: Positive for abdominal pain, diarrhea (Profuse watery with no blood or mucus) and nausea. Negative for vomiting.   Endocrine: Negative.    Genitourinary:        IgA nephropathy, baseline CKD 3   Musculoskeletal:        Muscle  cramps this morning   Skin: Negative.    Neurological: Positive for weakness.   Psychiatric/Behavioral: Negative.      Objective:     Vital Signs (Most Recent):  Temp: 97.6 °F (36.4 °C) (01/08/20 1121)  Pulse: 82 (01/08/20 1445)  Resp: 18 (01/08/20 1445)  BP: (!) 93/54 (01/08/20 1121)  SpO2: 100 % (01/08/20 1445) Vital Signs (24h Range):  Temp:  [97.6 °F (36.4 °C)] 97.6 °F (36.4 °C)  Pulse:  [80-82] 82  Resp:  [16-18] 18  SpO2:  [97 %-100 %] 100 %  BP: (93)/(54) 93/54     Weight: 99.8 kg (220 lb)  Body mass index is 29.84 kg/m².    Physical Exam   Constitutional: He is oriented to person, place, and time. He appears well-developed and well-nourished. No distress.   Ill looking   HENT:   Head: Normocephalic and atraumatic.   Mouth/Throat: Oropharynx is clear and moist.   Eyes: Pupils are equal, round, and reactive to light. EOM are normal. No scleral icterus.   Neck: Normal range of motion. Neck supple.   Cardiovascular: Normal rate, regular rhythm and intact distal pulses. Exam reveals no gallop and no friction rub.   Murmur (2/6 systolic murmur) heard.  Pulmonary/Chest: Effort normal and breath sounds normal. He has no wheezes. He has no rales.   Abdominal: There is tenderness.   Hyperactive bowel sounds   Musculoskeletal: Normal range of motion. He exhibits no edema, tenderness or deformity.   Neurological: He is alert and oriented to person, place, and time.   Skin: Skin is warm and dry. No rash noted. He is not diaphoretic.   Psychiatric: He has a normal mood and affect. His behavior is normal. Judgment and thought content normal.   Nursing note and vitals reviewed.        CRANIAL NERVES     CN III, IV, VI   Pupils are equal, round, and reactive to light.  Extraocular motions are normal.        Significant Labs:   A1C:   Recent Labs   Lab 10/17/19  0542   HGBA1C 8.7*     Blood Culture: No results for input(s): LABBLOO in the last 48 hours.  CBC:   Recent Labs   Lab 01/08/20  1220   WBC 21.36*   HGB 18.8*   HCT  58.1*        CMP:   Recent Labs   Lab 01/08/20  1220   *   K 6.4*      CO2 16*   *   BUN 44*   CREATININE 3.5*   CALCIUM 9.1   PROT 6.9   ALBUMIN 4.1   BILITOT 1.2*   ALKPHOS 109   AST 18   ALT 24   ANIONGAP 13   EGFRNONAA 21.5*     Cardiac Markers: No results for input(s): CKMB, MYOGLOBIN, BNP, TROPISTAT in the last 48 hours.  Lactic Acid: No results for input(s): LACTATE in the last 48 hours.  Lipase:   Recent Labs   Lab 01/08/20  1220   LIPASE 206*     Lipid Panel: No results for input(s): CHOL, HDL, LDLCALC, TRIG, CHOLHDL in the last 48 hours.  Magnesium: No results for input(s): MG in the last 48 hours.  TSH: No results for input(s): TSH in the last 4320 hours.  Urine Studies: No results for input(s): COLORU, APPEARANCEUA, PHUR, SPECGRAV, PROTEINUA, GLUCUA, KETONESU, BILIRUBINUA, OCCULTUA, NITRITE, UROBILINOGEN, LEUKOCYTESUR, RBCUA, WBCUA, BACTERIA, SQUAMEPITHEL, HYALINECASTS in the last 48 hours.    Invalid input(s): WRIGHTSUR    Significant Imaging: I have reviewed all pertinent imaging results/findings within the past 24 hours.   X-Ray Chest AP Portable   Final Result      No acute cardiopulmonary abnormality.         Electronically signed by: Jose Miguel Gilman MD   Date:    01/08/2020   Time:    15:35      CT Abdomen Pelvis  Without Contrast   Final Result      Mildly dilated loops of small bowel in the left upper quadrant with stranding in the adjacent mesentery and small subcentimeter mesenteric lymph nodes.  Findings most likely are secondary to enteritis due to infectious or inflammatory process.      There is no free fluid or adenopathy      No evidence of renal stones         Electronically signed by: Lori Emerson MD   Date:    01/08/2020   Time:    11:56        Personally reviewed patient last admit from [id number].  Noted his last A1c done on 10/17/2019 was 8.7

## 2020-01-08 NOTE — ED PROVIDER NOTES
Encounter Date: 1/8/2020       History     Chief Complaint   Patient presents with    Abdominal Pain    Diarrhea    Nausea     34-year-old male presented emergency department with abdominal pain and cramps and nausea and diarrhea for the past 2 days.  Patient said he had about 20 bowel movements daily for the past 2 days.  Patient denies any chest pain or shortness of breath. Patient complains of generalized malaise and weakness. Denies fever or chills.        Review of patient's allergies indicates:   Allergen Reactions    Zithromax [azithromycin]      Past Medical History:   Diagnosis Date    Asthma     Diabetes mellitus     diet controlled    Hyperlipidemia     Hypertension     IgA nephropathy      Past Surgical History:   Procedure Laterality Date    nose polyp removal       Family History   Problem Relation Age of Onset    Hypertension Maternal Grandmother     Cancer Maternal Grandfather     Diabetes Maternal Grandfather     Heart disease Maternal Grandfather     Heart disease Mother     Stroke Mother     Diabetes Mother      Social History     Tobacco Use    Smoking status: Former Smoker     Types: Cigarettes    Smokeless tobacco: Never Used    Tobacco comment: stopped few days ago   Substance Use Topics    Alcohol use: No    Drug use: No     Review of Systems   Constitutional: Negative.    HENT: Negative.    Eyes: Negative.    Respiratory: Negative.    Cardiovascular: Negative.    Gastrointestinal: Positive for abdominal pain, diarrhea and nausea. Negative for blood in stool, constipation, rectal pain and vomiting.   Endocrine: Negative.    Genitourinary: Negative.    Musculoskeletal: Negative.    Skin: Negative.    Allergic/Immunologic: Negative.    Neurological: Negative.    Hematological: Negative.    Psychiatric/Behavioral: Negative for agitation.   All other systems reviewed and are negative.      Physical Exam     Initial Vitals [01/08/20 1121]   BP Pulse Resp Temp SpO2   (!) 93/54  80 16 97.6 °F (36.4 °C) 97 %      MAP       --         Physical Exam    Nursing note and vitals reviewed.  Constitutional: He appears well-developed and well-nourished.   HENT:   Head: Normocephalic and atraumatic.   Nose: Nose normal.   Eyes: Conjunctivae and EOM are normal.   Neck: Normal range of motion. No tracheal deviation present.   Cardiovascular: Normal rate, regular rhythm, normal heart sounds and intact distal pulses. Exam reveals no friction rub.    No murmur heard.  Pulmonary/Chest: Breath sounds normal. No respiratory distress. He has no wheezes. He has no rales.   Abdominal: Soft. He exhibits no distension. There is tenderness in the epigastric area and left lower quadrant. There is no CVA tenderness.   Musculoskeletal: Normal range of motion.   Neurological: He is alert and oriented to person, place, and time. He has normal strength.   Skin: Skin is warm and dry. Capillary refill takes less than 2 seconds.   Psychiatric: He has a normal mood and affect. Thought content normal.         ED Course   Procedures  Labs Reviewed   CBC W/ AUTO DIFFERENTIAL   COMPREHENSIVE METABOLIC PANEL   LIPASE   URINALYSIS          Imaging Results    None          Medical Decision Making:   Differential Diagnosis:   34-year-old male presented emergency department with abdominal cramps and nausea and diarrhea for the past 2 days.  Patient extremely weak and feeling dehydrated.  Patient said he was having more than 20 bowel movements a day.  Patient feeling extremely ill.  Patient hypotensive and responded to IV fluids.  Patient has evidence of acute kidney injury and symptoms consistent with gastroenteritis.  Broad-spectrum antibiotic given given the acuity of illness.  CT scan shows evidence of enteritis.  As patient started feeling better Hospital Medicine consulted for evaluation for admission.  Patient noted to be hyperkalemic likely secondary to acute kidney injury and hyperkalemia treated.  Patient did have  improvement of symptoms.  Hospital Medicine evaluating the patient for admission.  Clinical Tests:   Lab Tests: Reviewed  Radiological Study: Reviewed  Medical Tests: Reviewed                                 Clinical Impression:       ICD-10-CM ICD-9-CM   1. Gastroenteritis K52.9 558.9   2. Hyperkalemia E87.5 276.7   3. Dehydration E86.0 276.51   4. Acute kidney injury N17.9 584.9   5. Diarrhea, unspecified type R19.7 787.91                             Chris Aj MD  01/08/20 1449

## 2020-01-09 VITALS
DIASTOLIC BLOOD PRESSURE: 74 MMHG | TEMPERATURE: 98 F | SYSTOLIC BLOOD PRESSURE: 115 MMHG | HEIGHT: 72 IN | RESPIRATION RATE: 18 BRPM | HEART RATE: 69 BPM | BODY MASS INDEX: 29.51 KG/M2 | WEIGHT: 217.88 LBS | OXYGEN SATURATION: 97 %

## 2020-01-09 LAB
ALBUMIN SERPL BCP-MCNC: 3 G/DL (ref 3.5–5.2)
ALP SERPL-CCNC: 77 U/L (ref 55–135)
ALT SERPL W/O P-5'-P-CCNC: 18 U/L (ref 10–44)
ANION GAP SERPL CALC-SCNC: 7 MMOL/L (ref 8–16)
AST SERPL-CCNC: 10 U/L (ref 10–40)
BASOPHILS # BLD AUTO: 0.02 K/UL (ref 0–0.2)
BASOPHILS NFR BLD: 0.2 % (ref 0–1.9)
BILIRUB SERPL-MCNC: 0.9 MG/DL (ref 0.1–1)
BUN SERPL-MCNC: 33 MG/DL (ref 6–20)
CALCIUM SERPL-MCNC: 8.2 MG/DL (ref 8.7–10.5)
CHLORIDE SERPL-SCNC: 111 MMOL/L (ref 95–110)
CO2 SERPL-SCNC: 19 MMOL/L (ref 23–29)
CREAT SERPL-MCNC: 2.7 MG/DL (ref 0.5–1.4)
DIFFERENTIAL METHOD: ABNORMAL
EOSINOPHIL # BLD AUTO: 0.5 K/UL (ref 0–0.5)
EOSINOPHIL NFR BLD: 4.8 % (ref 0–8)
ERYTHROCYTE [DISTWIDTH] IN BLOOD BY AUTOMATED COUNT: 13.7 % (ref 11.5–14.5)
EST. GFR  (AFRICAN AMERICAN): 34 ML/MIN/1.73 M^2
EST. GFR  (NON AFRICAN AMERICAN): 29.4 ML/MIN/1.73 M^2
GLUCOSE SERPL-MCNC: 114 MG/DL (ref 70–110)
GLUCOSE SERPL-MCNC: 126 MG/DL (ref 70–110)
HCT VFR BLD AUTO: 46.8 % (ref 40–54)
HGB BLD-MCNC: 15.5 G/DL (ref 14–18)
IMM GRANULOCYTES # BLD AUTO: 0.05 K/UL (ref 0–0.04)
IMM GRANULOCYTES NFR BLD AUTO: 0.5 % (ref 0–0.5)
LACTATE SERPL-SCNC: 1.5 MMOL/L (ref 0.5–1.9)
LDH SERPL L TO P-CCNC: 2.53 MMOL/L (ref 0.5–2.2)
LYMPHOCYTES # BLD AUTO: 3.1 K/UL (ref 1–4.8)
LYMPHOCYTES NFR BLD: 29.9 % (ref 18–48)
MAGNESIUM SERPL-MCNC: 1.7 MG/DL (ref 1.6–2.6)
MCH RBC QN AUTO: 28.9 PG (ref 27–31)
MCHC RBC AUTO-ENTMCNC: 33.1 G/DL (ref 32–36)
MCV RBC AUTO: 87 FL (ref 82–98)
MONOCYTES # BLD AUTO: 0.6 K/UL (ref 0.3–1)
MONOCYTES NFR BLD: 5.4 % (ref 4–15)
NEUTROPHILS # BLD AUTO: 6.2 K/UL (ref 1.8–7.7)
NEUTROPHILS NFR BLD: 59.2 % (ref 38–73)
NRBC BLD-RTO: 0 /100 WBC
PHOSPHATE SERPL-MCNC: 3.2 MG/DL (ref 2.7–4.5)
PLATELET # BLD AUTO: 163 K/UL (ref 150–350)
PMV BLD AUTO: 11.2 FL (ref 9.2–12.9)
POTASSIUM SERPL-SCNC: 4.5 MMOL/L (ref 3.5–5.1)
PROT SERPL-MCNC: 5.4 G/DL (ref 6–8.4)
RBC # BLD AUTO: 5.37 M/UL (ref 4.6–6.2)
SAMPLE: ABNORMAL
SODIUM SERPL-SCNC: 137 MMOL/L (ref 136–145)
WBC # BLD AUTO: 10.38 K/UL (ref 3.9–12.7)
WBC #/AREA STL HPF: ABNORMAL /[HPF]

## 2020-01-09 PROCEDURE — 85025 COMPLETE CBC W/AUTO DIFF WBC: CPT

## 2020-01-09 PROCEDURE — 96376 TX/PRO/DX INJ SAME DRUG ADON: CPT

## 2020-01-09 PROCEDURE — S0030 INJECTION, METRONIDAZOLE: HCPCS | Performed by: INTERNAL MEDICINE

## 2020-01-09 PROCEDURE — 84100 ASSAY OF PHOSPHORUS: CPT

## 2020-01-09 PROCEDURE — 80053 COMPREHEN METABOLIC PANEL: CPT

## 2020-01-09 PROCEDURE — G0378 HOSPITAL OBSERVATION PER HR: HCPCS

## 2020-01-09 PROCEDURE — 25000003 PHARM REV CODE 250: Performed by: INTERNAL MEDICINE

## 2020-01-09 PROCEDURE — 83735 ASSAY OF MAGNESIUM: CPT

## 2020-01-09 PROCEDURE — 99900035 HC TECH TIME PER 15 MIN (STAT)

## 2020-01-09 PROCEDURE — 36415 COLL VENOUS BLD VENIPUNCTURE: CPT

## 2020-01-09 PROCEDURE — 94761 N-INVAS EAR/PLS OXIMETRY MLT: CPT

## 2020-01-09 RX ORDER — METRONIDAZOLE 500 MG/1
500 TABLET ORAL EVERY 8 HOURS
Qty: 15 TABLET | Refills: 0 | Status: SHIPPED | OUTPATIENT
Start: 2020-01-09 | End: 2020-01-14

## 2020-01-09 RX ORDER — LEVOFLOXACIN 500 MG/1
500 TABLET, FILM COATED ORAL DAILY
Status: DISCONTINUED | OUTPATIENT
Start: 2020-01-09 | End: 2020-01-09 | Stop reason: HOSPADM

## 2020-01-09 RX ORDER — LEVOFLOXACIN 500 MG/1
500 TABLET, FILM COATED ORAL DAILY
Qty: 5 TABLET | Refills: 0 | Status: SHIPPED | OUTPATIENT
Start: 2020-01-09 | End: 2020-01-14

## 2020-01-09 RX ORDER — METRONIDAZOLE 250 MG/1
500 TABLET ORAL EVERY 8 HOURS
Status: DISCONTINUED | OUTPATIENT
Start: 2020-01-09 | End: 2020-01-09 | Stop reason: HOSPADM

## 2020-01-09 RX ADMIN — ATORVASTATIN CALCIUM 20 MG: 20 TABLET, FILM COATED ORAL at 09:01

## 2020-01-09 RX ADMIN — METRONIDAZOLE 500 MG: 500 INJECTION, SOLUTION INTRAVENOUS at 01:01

## 2020-01-09 RX ADMIN — METRONIDAZOLE 500 MG: 500 INJECTION, SOLUTION INTRAVENOUS at 09:01

## 2020-01-09 NOTE — PLAN OF CARE
01/09/20 0816   Patient Assessment/Suction   Level of Consciousness (AVPU) alert   Respiratory Effort Unlabored;Normal   Expansion/Accessory Muscles/Retractions no use of accessory muscles;no retractions;expansion symmetric   All Lung Fields Breath Sounds clear   Rhythm/Pattern, Respiratory unlabored;pattern regular;depth regular   Cough Frequency infrequent   Cough Type good   PRE-TX-O2   O2 Device (Oxygen Therapy) room air   SpO2 97 %   Pulse Oximetry Type Intermittent   $ Pulse Oximetry - Multiple Charge Pulse Oximetry - Multiple   Pulse 69   Resp 18   Aerosol Therapy   $ Aerosol Therapy Charges PRN treatment not required

## 2020-01-09 NOTE — DISCHARGE SUMMARY
[facility] Medicine  Discharge Summary      Patient Name: Jose Miguel Brennan  MRN: [id number]  Admission Date: 1/8/2020  Hospital Length of Stay: 0 days  Discharge Date and Time:  01/09/2020 9:39 AM  Attending Physician: Raul Martinez DO   Discharging Provider: Raul Martinez DO  Primary Care Provider: Horace Doherty Jr, MD      HPI:   34-year-old white male with known past medical history of insulin-dependent diabetes mellitus, hypertension, hyperlipidemia, IgA nephropathy, CKD 3 with baseline creatinine of 2 presented to emergency room today 01/08/2020 with complains of profuse diarrhea for 2 days.  Patient reports he is having watery diarrhea for the last 2 days with 20 episodes during the day.  Patient denies any blood or mucus in his stools but reports diarrhea wakes him up in the middle of the night as well.  Patient's significant other reports that since he found out that she is pregnant, patient developed severe diarrhea and thinks that it is nerves related.  Patient report he tried to replace the lost fluid with plain water rather than power rate or Pedialyte and this morning he developed severe cough and leg cramping.  Patient denies any history of contact with sick individual, denies fever, chills.   Patient reports he was admitted 3 months ago with the same complaint and he failed to follow-up with his nephrologist after the hospital discharge. Patient reports last he saw a nephrologist was greater than 5 years ago in [facility].  Patient reports he does not have a primary care physician either.  Patient reports his diabetes is diet controlled mostly but when he has an infection his blood glucose starts to run higher.  Patient reports he takes insulin if needed   Patient denied history of hyperlipidemia but noted in his Mar that he does take Lipitor  When patient arrived to the ER his blood pressure was 93/54 sitting with pulse of 80 and O2 sat of 97%.  Patient looks  severely dehydrated.  Fluid bolus was given in the ER and antibiotic started by the ER physician  Blood work personally reviewed shows WBC count of 21.36, H&H 18.8/58.1 with no bands  CMP showed sodium of 132, potassium of 6.4, bicarb of 16 with anion gap of 13, BUN creatinine 44/3.5 with GFR of 21.5, glucose was 333 and lipase was 206  Patient did not look septic but did look severely dehydrated and sick with diarrhea  Blood cultures were collected  CT abdomen and pelvis without contrast was done which showed mildly dilated loops of small bowel in left upper quadrant with stranding in adjacent mesentery and small subcentimeter mesenteric lymph nodes.  Finding secondary to enteritis due to infection or inflammatory process  X-ray personally reviewed chest was negative  Hospital Medicine was consulted for observation for gastroenteritis, Messi I have with hyperkalemia and hyperglycemia  At the time of my examination patient blood pressure was 103/73 in the room.  Patient reported abdominal pain has improved but .  Nausea was controlled.  Discussed will keep him NPO till his nausea resolved and abdominal pain is better and then we will resume him with clear liquids and gradually advance with cardiac/renal/diabetic diet  Discussed will do stool studies to rule out bacterial infection as well  Did discussed with patient that we will hold his blood pressure medications for now and will resume as blood pressure allows  Patient verbalized understanding    * No surgery found *      Hospital Course:   Patient is a 34-year-old male who was admitted with crampy abdominal pain and diarrhea.  He was placed on IV Levaquin and Flagyl.  His white count is now normal.  He states his diarrhea has resolved.  Patient's denies abdominal pain nausea vomiting.  He will be given diabetic diet.  If he tolerates patient can be discharged home later today.  Follow-up with PCP in 3 days  Follow-up with Dr. Conte 1 week  Return  emergency department of [facility]     Consults:   Consults (From admission, onward)        Status Ordering Provider     Inpatient consult to Hospitalist  Once     Provider:  Drake Fnues MD    Acknowledged DRAKE FUNES          No new Assessment & Plan notes have been filed under this hospital service since the last note was generated.  Service: Hospital Medicine    Final Active Diagnoses:    Diagnosis Date Noted POA    PRINCIPAL PROBLEM:  Gastroenteritis [K52.9] 09/08/2017 Yes    TRACEY (acute kidney injury) [N17.9] 09/08/2017 Unknown    Asthma [J45.909] 09/08/2017 Yes    Type 2 diabetes mellitus with stage 3 chronic kidney disease, without long-term current use of insulin [E11.22, N18.3] 04/10/2014 Yes    IgA nephropathy [N02.8] 03/12/2014 Yes    Hypertension associated with diabetes [E11.59, I10] 03/12/2014 Yes      Problems Resolved During this Admission:       Discharged Condition: good  Patient appears in no distress  Interacting appropriately  Heart is regular  Lungs are clear  Abdomen is soft nontender  Neuro patient is alert oriented x3  Disposition: Home or Self Care    Follow Up:  Follow-up Information     Horace Doherty Jr, MD In 3 days.    Specialty:  Family Medicine  Contact information:  [address] [facility] [address]  [phone]             Mahnaz Conte MD In 1 week.    Specialty:  Nephrology  Contact information:  [address] [facility] [address] [facility] [address]  [place] [address]  [phone]                 Patient Instructions:      Diet diabetic     Diet renal       Significant Diagnostic Studies: Labs:   BMP:   Recent Labs   Lab 01/08/20  1220 01/08/20 1915 01/09/20  0456   * 201* 126*   * 139 137   K 6.4* 4.4 4.5    111* 111*   CO2 16* 18* 19*   BUN 44* 38* 33*   CREATININE 3.5* 3.1* 2.7*   CALCIUM 9.1 8.1* 8.2*   MG  --   --  1.7   , CMP   Recent Labs   Lab 01/08/20  1220 01/08/20 1915 01/09/20  0456   * 139 137   K 6.4* 4.4 4.5    111* 111*   CO2 16* 18*  19*   * 201* 126*   BUN 44* 38* 33*   CREATININE 3.5* 3.1* 2.7*   CALCIUM 9.1 8.1* 8.2*   PROT 6.9  --  5.4*   ALBUMIN 4.1  --  3.0*   BILITOT 1.2*  --  0.9   ALKPHOS 109  --  77   AST 18  --  10   ALT 24  --  18   ANIONGAP 13 10 7*   ESTGFRAFRICA 24.9* 28.8* 34.0*   EGFRNONAA 21.5* 24.9* 29.4*    and CBC   Recent Labs   Lab 01/08/20  1220 01/09/20  0456   WBC 21.36* 10.38   HGB 18.8* 15.5   HCT 58.1* 46.8    163       Pending Diagnostic Studies:     Procedure Component Value Units Date/Time    Stool Exam-Ova,Cysts,Parasites [[id number]] Collected:  01/08/20 1818    Order Status:  Sent Lab Status:  In process Updated:  01/08/20 1819    Specimen:  Stool     Urinalysis - Clean Catch [[id number]] Collected:  01/08/20 1630    Order Status:  Sent Lab Status:  In process Updated:  01/08/20 1636    Specimen:  Urine, Clean Catch          Medications:  Reconciled Home Medications:      Medication List      START taking these medications    levoFLOXacin 500 MG tablet  Commonly known as:  LEVAQUIN  Take 1 tablet (500 mg total) by mouth once daily. for 5 days     metroNIDAZOLE 500 MG tablet  Commonly known as:  FLAGYL  Take 1 tablet (500 mg total) by mouth every 8 (eight) hours. for 5 days        CONTINUE taking these medications    atenolol 50 MG tablet  Commonly known as:  TENORMIN  Take 50 mg by mouth once daily.     atorvastatin 20 MG tablet  Commonly known as:  LIPITOR  Take 20 mg by mouth once daily.     lisinopril 20 MG tablet  Commonly known as:  PRINIVIL,ZESTRIL  Take 20 mg by mouth once daily.     Toujeo SoloStar U-300 Insulin 300 unit/mL (1.5 mL) Inpn pen  Generic drug:  insulin glargine (TOUJEO)  Inject 15 Units into the skin once daily.            Indwelling Lines/Drains at time of discharge:   Lines/Drains/Airways     None                 Time spent on the discharge of patient: 20 minutes  Patient was seen and examined on the date of discharge and determined to be suitable for discharge.         Raul TODD  DO Juan  Department of Hospital Medicine  [facility]

## 2020-01-09 NOTE — NURSING
Pt given discharge instructions, understanding verbalized. IVs discontinued, pt tolerated well. Pt discharged via ambulation to personal vehicle.

## 2020-01-09 NOTE — PLAN OF CARE
01/09/20 0950   Final Note   Assessment Type Final Discharge Note   Anticipated Discharge Disposition Home

## 2020-01-09 NOTE — PLAN OF CARE
01/08/20 2100   Patient Assessment/Suction   Level of Consciousness (AVPU) alert   Respiratory Effort Unlabored   Expansion/Accessory Muscles/Retractions no use of accessory muscles   All Lung Fields Breath Sounds clear   Rhythm/Pattern, Respiratory unlabored   Cough Frequency infrequent   Cough Type nonproductive   PRE-TX-O2   O2 Device (Oxygen Therapy) room air   SpO2 98 %   Pulse 64   Resp 18   Aerosol Therapy   $ Aerosol Therapy Charges PRN treatment not required   Respiratory Treatment Status (SVN) PRN treatment not required

## 2020-01-09 NOTE — HOSPITAL COURSE
Patient is a 34-year-old male who was admitted with crampy abdominal pain and diarrhea.  He was placed on IV Levaquin and Flagyl.  His white count is now normal.  He states his diarrhea has resolved.  Patient's denies abdominal pain nausea vomiting.  He will be given diabetic diet.  If he tolerates patient can be discharged home later today.  Follow-up with PCP in 3 days  Follow-up with Dr. Conte 1 week  Return emergency department of [facility]

## 2020-01-09 NOTE — PLAN OF CARE
Problem: Fall Injury Risk  Goal: Absence of Fall and Fall-Related Injury  Outcome: Ongoing, Progressing     Problem: Diabetes Comorbidity  Goal: Blood Glucose Level Within Desired Range  Outcome: Ongoing, Progressing

## 2020-01-13 LAB
BACTERIA BLD CULT: NORMAL
BACTERIA BLD CULT: NORMAL

## 2020-01-22 NOTE — SUBJECTIVE & OBJECTIVE
Past Medical History:   Diagnosis Date    Asthma     Diabetes mellitus     diet controlled    IgA nephropathy        Past Surgical History:   Procedure Laterality Date    nose polyp removal         Review of patient's allergies indicates:   Allergen Reactions    Zithromax [azithromycin]        No current facility-administered medications on file prior to encounter.      Current Outpatient Medications on File Prior to Encounter   Medication Sig    atenolol (TENORMIN) 50 MG tablet Take 50 mg by mouth once daily.    atorvastatin (LIPITOR) 20 MG tablet Take 20 mg by mouth once daily.    ibuprofen (ADVIL,MOTRIN) 800 MG tablet Take 800 mg by mouth 3 (three) times daily as needed for Pain.    insulin glargine, TOUJEO, (TOUJEO SOLOSTAR U-300 INSULIN) 300 unit/mL (1.5 mL) InPn pen Inject 15 Units into the skin once daily.    lisinopril (PRINIVIL,ZESTRIL) 20 MG tablet Take 20 mg by mouth once daily.    [DISCONTINUED] glipiZIDE (GLUCOTROL) 10 MG tablet Take 10 mg by mouth 2 (two) times daily before meals.    [DISCONTINUED] baclofen (LIORESAL) 10 MG tablet Take 1 tablet (10 mg total) by mouth 4 (four) times daily. for 5 days     Family History     Problem Relation (Age of Onset)    Cancer Maternal Grandfather    Diabetes Maternal Grandfather, Mother    Heart disease Maternal Grandfather, Mother    Hypertension Maternal Grandmother    Stroke Mother        Tobacco Use    Smoking status: Current Every Day Smoker     Types: Cigarettes    Smokeless tobacco: Never Used    Tobacco comment: 1-2 cig per day   Substance and Sexual Activity    Alcohol use: No    Drug use: No    Sexual activity: Yes     Partners: Female     Review of Systems   Constitutional: Positive for chills and fatigue. Negative for activity change and appetite change.   HENT: Negative for congestion and sore throat.    Eyes: Negative for discharge and visual disturbance.   Respiratory: Negative for cough and chest tightness.    Cardiovascular:  Negative for chest pain and leg swelling.   Gastrointestinal: Positive for diarrhea and nausea. Negative for abdominal pain.   Genitourinary: Negative for dysuria and hematuria.   Musculoskeletal: Negative for myalgias.   Skin: Negative for pallor and rash.   Neurological: Negative for dizziness and weakness.   Psychiatric/Behavioral: Negative for behavioral problems. The patient is not nervous/anxious.    All other systems reviewed and are negative.    Objective:     Vital Signs (Most Recent):  Temp: 97.4 °F (36.3 °C) (10/17/19 0934)  Pulse: 70 (10/17/19 0934)  Resp: 16 (10/17/19 0934)  BP: 130/86 (10/17/19 0934)  SpO2: 100 % (10/17/19 0934) Vital Signs (24h Range):  Temp:  [97.4 °F (36.3 °C)-98.4 °F (36.9 °C)] 97.4 °F (36.3 °C)  Pulse:  [60-87] 70  Resp:  [16] 16  SpO2:  [98 %-100 %] 100 %  BP: (117-130)/(77-86) 130/86     Weight: 98.1 kg (216 lb 4.3 oz)  Body mass index is 29.33 kg/m².    Physical Exam   Constitutional: He is oriented to person, place, and time. He appears well-developed and well-nourished.   HENT:   Head: Atraumatic.   Dry oral mucosa   Eyes: Pupils are equal, round, and reactive to light. EOM are normal.   Neck: Neck supple. No JVD present.   Cardiovascular: Normal rate, regular rhythm and normal heart sounds. Exam reveals no gallop.   No murmur heard.  Pulmonary/Chest: Breath sounds normal. No respiratory distress. He has no wheezes.   Abdominal: Soft. Bowel sounds are normal. He exhibits no distension. There is no rebound and no guarding.   Musculoskeletal: He exhibits no edema.   Lymphadenopathy:     He has no cervical adenopathy.   Neurological: He is alert and oriented to person, place, and time. No cranial nerve deficit.   Skin: Skin is warm. Capillary refill takes less than 2 seconds. No rash noted.   Psychiatric: His behavior is normal.   Nursing note and vitals reviewed.        CRANIAL NERVES     CN III, IV, VI   Pupils are equal, round, and reactive to light.  Extraocular motions are  normal.        Significant Labs: All pertinent labs within the past 24 hours have been reviewed.    Significant Imaging: I have reviewed all pertinent imaging results/findings within the past 24 hours.   Fusiform Excision Additional Text (Leave Blank If You Do Not Want): The margin was drawn around the clinically apparent lesion.  A fusiform shape was then drawn on the skin incorporating the lesion and margins.  Incisions were then made along these lines to the appropriate tissue plane and the lesion was extirpated.

## 2020-01-28 ENCOUNTER — HOSPITAL ENCOUNTER (EMERGENCY)
Facility: HOSPITAL | Age: 35
Discharge: HOME | End: 2020-01-28
Attending: EMERGENCY MEDICINE
Payer: MEDICAID

## 2020-01-28 VITALS
SYSTOLIC BLOOD PRESSURE: 121 MMHG | OXYGEN SATURATION: 98 % | RESPIRATION RATE: 18 BRPM | HEIGHT: 72 IN | HEART RATE: 68 BPM | WEIGHT: 219 LBS | TEMPERATURE: 98 F | BODY MASS INDEX: 29.66 KG/M2 | DIASTOLIC BLOOD PRESSURE: 68 MMHG

## 2020-01-28 DIAGNOSIS — R19.7 DIARRHEA, UNSPECIFIED TYPE: Primary | ICD-10-CM

## 2020-01-28 LAB
ALBUMIN SERPL BCP-MCNC: 4.3 G/DL (ref 3.5–5.2)
ALP SERPL-CCNC: 91 U/L (ref 55–135)
ALT SERPL W/O P-5'-P-CCNC: 28 U/L (ref 10–44)
ANION GAP SERPL CALC-SCNC: 9 MMOL/L (ref 8–16)
AST SERPL-CCNC: 23 U/L (ref 10–40)
BACTERIA #/AREA URNS HPF: NEGATIVE /HPF
BASOPHILS # BLD AUTO: 0.14 K/UL (ref 0–0.2)
BASOPHILS NFR BLD: 0.6 % (ref 0–1.9)
BILIRUB SERPL-MCNC: 1.1 MG/DL (ref 0.1–1)
BILIRUB UR QL STRIP: NEGATIVE
BUN SERPL-MCNC: 24 MG/DL (ref 6–20)
CALCIUM SERPL-MCNC: 9.8 MG/DL (ref 8.7–10.5)
CHLORIDE SERPL-SCNC: 103 MMOL/L (ref 95–110)
CLARITY UR: CLEAR
CO2 SERPL-SCNC: 28 MMOL/L (ref 23–29)
COLOR UR: YELLOW
CREAT SERPL-MCNC: 2.1 MG/DL (ref 0.5–1.4)
DIFFERENTIAL METHOD: ABNORMAL
EOSINOPHIL # BLD AUTO: 2 K/UL (ref 0–0.5)
EOSINOPHIL NFR BLD: 8.5 % (ref 0–8)
ERYTHROCYTE [DISTWIDTH] IN BLOOD BY AUTOMATED COUNT: 13.4 % (ref 11.5–14.5)
EST. GFR  (AFRICAN AMERICAN): 46.1 ML/MIN/1.73 M^2
EST. GFR  (NON AFRICAN AMERICAN): 39.9 ML/MIN/1.73 M^2
GLUCOSE SERPL-MCNC: 166 MG/DL (ref 70–110)
GLUCOSE UR QL STRIP: ABNORMAL
HCT VFR BLD AUTO: 56.5 % (ref 40–54)
HGB BLD-MCNC: 17.9 G/DL (ref 14–18)
HGB UR QL STRIP: ABNORMAL
HYALINE CASTS #/AREA URNS LPF: 15 /LPF
IMM GRANULOCYTES # BLD AUTO: 0.11 K/UL (ref 0–0.04)
IMM GRANULOCYTES NFR BLD AUTO: 0.5 % (ref 0–0.5)
KETONES UR QL STRIP: NEGATIVE
LEUKOCYTE ESTERASE UR QL STRIP: NEGATIVE
LIPASE SERPL-CCNC: 76 U/L (ref 4–60)
LYMPHOCYTES # BLD AUTO: 3.7 K/UL (ref 1–4.8)
LYMPHOCYTES NFR BLD: 16 % (ref 18–48)
MCH RBC QN AUTO: 27.9 PG (ref 27–31)
MCHC RBC AUTO-ENTMCNC: 31.7 G/DL (ref 32–36)
MCV RBC AUTO: 88 FL (ref 82–98)
MICROSCOPIC COMMENT: ABNORMAL
MONOCYTES # BLD AUTO: 1.6 K/UL (ref 0.3–1)
MONOCYTES NFR BLD: 7 % (ref 4–15)
NEUTROPHILS # BLD AUTO: 15.7 K/UL (ref 1.8–7.7)
NEUTROPHILS NFR BLD: 67.4 % (ref 38–73)
NITRITE UR QL STRIP: NEGATIVE
NRBC BLD-RTO: 0 /100 WBC
PH UR STRIP: 6 [PH] (ref 5–8)
PLATELET # BLD AUTO: 276 K/UL (ref 150–350)
PMV BLD AUTO: 11.1 FL (ref 9.2–12.9)
POTASSIUM SERPL-SCNC: 4.9 MMOL/L (ref 3.5–5.1)
PROT SERPL-MCNC: 7.5 G/DL (ref 6–8.4)
PROT UR QL STRIP: ABNORMAL
RBC # BLD AUTO: 6.42 M/UL (ref 4.6–6.2)
RBC #/AREA URNS HPF: 2 /HPF (ref 0–4)
SODIUM SERPL-SCNC: 140 MMOL/L (ref 136–145)
SP GR UR STRIP: 1.01 (ref 1–1.03)
SQUAMOUS #/AREA URNS HPF: 5 /HPF
URN SPEC COLLECT METH UR: ABNORMAL
UROBILINOGEN UR STRIP-ACNC: NEGATIVE EU/DL
WBC # BLD AUTO: 23.19 K/UL (ref 3.9–12.7)
WBC #/AREA URNS HPF: 5 /HPF (ref 0–5)

## 2020-01-28 PROCEDURE — 85025 COMPLETE CBC W/AUTO DIFF WBC: CPT

## 2020-01-28 PROCEDURE — 99284 EMERGENCY DEPT VISIT MOD MDM: CPT | Mod: 25

## 2020-01-28 PROCEDURE — 81001 URINALYSIS AUTO W/SCOPE: CPT

## 2020-01-28 PROCEDURE — 80053 COMPREHEN METABOLIC PANEL: CPT

## 2020-01-28 PROCEDURE — 96361 HYDRATE IV INFUSION ADD-ON: CPT

## 2020-01-28 PROCEDURE — 36415 COLL VENOUS BLD VENIPUNCTURE: CPT

## 2020-01-28 PROCEDURE — 96374 THER/PROPH/DIAG INJ IV PUSH: CPT

## 2020-01-28 PROCEDURE — 83690 ASSAY OF LIPASE: CPT

## 2020-01-28 PROCEDURE — 63600175 PHARM REV CODE 636 W HCPCS: Performed by: EMERGENCY MEDICINE

## 2020-01-28 RX ORDER — ONDANSETRON 2 MG/ML
4 INJECTION INTRAMUSCULAR; INTRAVENOUS
Status: COMPLETED | OUTPATIENT
Start: 2020-01-28 | End: 2020-01-28

## 2020-01-28 RX ORDER — CIPROFLOXACIN 500 MG/1
500 TABLET ORAL 2 TIMES DAILY
Qty: 10 TABLET | Refills: 0 | Status: SHIPPED | OUTPATIENT
Start: 2020-01-28 | End: 2020-02-02

## 2020-01-28 RX ORDER — ONDANSETRON 4 MG/1
4 TABLET, ORALLY DISINTEGRATING ORAL EVERY 8 HOURS PRN
Qty: 12 TABLET | Refills: 0 | Status: ON HOLD | OUTPATIENT
Start: 2020-01-28 | End: 2020-05-09 | Stop reason: HOSPADM

## 2020-01-28 RX ADMIN — ONDANSETRON HYDROCHLORIDE 4 MG: 2 INJECTION, SOLUTION INTRAMUSCULAR; INTRAVENOUS at 05:01

## 2020-01-28 RX ADMIN — SODIUM CHLORIDE, SODIUM LACTATE, POTASSIUM CHLORIDE, AND CALCIUM CHLORIDE 1000 ML: .6; .31; .03; .02 INJECTION, SOLUTION INTRAVENOUS at 06:01

## 2020-01-28 NOTE — ED PROVIDER NOTES
Encounter Date: 1/28/2020       History     Chief Complaint   Patient presents with    Diarrhea     started today at 7AM     34-year-old male with a past medical history of diabetes, hypertension, hyperlipidemia, IgA nephropathy presents emergency department diarrhea.  The patient states that he woke up this morning with diarrhea.  He has had 6 episodes of watery stools.  Patient denies any abdominal pain.  He is tolerating p.o. well with no nausea or vomiting.  Denies any fevers or chills.  He has no urinary symptoms.  His last hospitalization was earlier this month for similar symptoms. He did complete a course of antibiotics after discharge. No other recent antibiotics or travel.        Review of patient's allergies indicates:   Allergen Reactions    Zithromax [azithromycin] Rash     Past Medical History:   Diagnosis Date    Asthma     Diabetes mellitus     diet controlled    Hyperlipidemia     Hypertension     IgA nephropathy      Past Surgical History:   Procedure Laterality Date    nose polyp removal       Family History   Problem Relation Age of Onset    Hypertension Maternal Grandmother     Cancer Maternal Grandfather     Diabetes Maternal Grandfather     Heart disease Maternal Grandfather     Heart disease Mother     Stroke Mother     Diabetes Mother      Social History     Tobacco Use    Smoking status: Current Every Day Smoker     Types: Cigars    Smokeless tobacco: Never Used    Tobacco comment: One cigar a day   Substance Use Topics    Alcohol use: No    Drug use: No     Review of Systems   Constitutional: Negative for chills, diaphoresis, fatigue and fever.   HENT: Negative for congestion.    Eyes: Negative for visual disturbance.   Respiratory: Negative for cough, shortness of breath, wheezing and stridor.    Cardiovascular: Negative for chest pain.   Gastrointestinal: Positive for diarrhea. Negative for abdominal pain, nausea and vomiting.   Genitourinary: Negative for decreased  urine volume, dysuria, flank pain and hematuria.   Musculoskeletal: Negative for back pain.   Skin: Negative for rash.   Neurological: Negative for weakness, light-headedness, numbness and headaches.   Psychiatric/Behavioral: Negative for confusion.   All other systems reviewed and are negative.      Physical Exam     Initial Vitals [01/28/20 1529]   BP Pulse Resp Temp SpO2   (!) 140/92 77 16 97.6 °F (36.4 °C) 99 %      MAP       --         Physical Exam    Nursing note and vitals reviewed.  Constitutional: He appears well-developed and well-nourished. No distress.   HENT:   Head: Normocephalic and atraumatic.   Eyes: EOM are normal.   Neck: Normal range of motion. Neck supple.   Cardiovascular: Normal rate, regular rhythm, normal heart sounds and intact distal pulses.   No murmur heard.  Pulmonary/Chest: Breath sounds normal. No respiratory distress. He has no wheezes. He has no rhonchi. He has no rales.   Abdominal: Soft. Bowel sounds are normal. He exhibits no distension. There is no tenderness. There is no rebound and no guarding.   Musculoskeletal: Normal range of motion.   Neurological: He is alert and oriented to person, place, and time.   Skin: Skin is warm and dry. Capillary refill takes less than 2 seconds.   Psychiatric: He has a normal mood and affect.         ED Course   Procedures  Labs Reviewed   CBC W/ AUTO DIFFERENTIAL - Abnormal; Notable for the following components:       Result Value    WBC 23.19 (*)     RBC 6.42 (*)     Hematocrit 56.5 (*)     Mean Corpuscular Hemoglobin Conc 31.7 (*)     Gran # (ANC) 15.7 (*)     Immature Grans (Abs) 0.11 (*)     Mono # 1.6 (*)     Eos # 2.0 (*)     Lymph% 16.0 (*)     Eosinophil% 8.5 (*)     All other components within normal limits    Narrative:     For upper or mid abdominal pain.   COMPREHENSIVE METABOLIC PANEL - Abnormal; Notable for the following components:    Glucose 166 (*)     BUN, Bld 24 (*)     Creatinine 2.1 (*)     Total Bilirubin 1.1 (*)     eGFR  if  46.1 (*)     eGFR if non  39.9 (*)     All other components within normal limits    Narrative:     For upper or mid abdominal pain.   LIPASE - Abnormal; Notable for the following components:    Lipase 76 (*)     All other components within normal limits    Narrative:     For upper or mid abdominal pain.   CULTURE, STOOL   CLOSTRIDIUM DIFFICILE   URINALYSIS   URINALYSIS MICROSCOPIC   STOOL EXAM-OVA,CYSTS,PARASITES   WBC, STOOL   OCCULT BLOOD X 1, STOOL          Imaging Results    None          Medical Decision Making:   ED Management:  34-year-old male presents emergency department with diarrhea.  Differential includes infectious versus viral versus inflammatory versus intra-abdominal process.  The patient's abdominal exam is extremely benign.  He does have a leukocytosis however this could be a stress response.  His renal function is at baseline.  Electrolytes are within normal limits.  Lipase is essentially unremarkable. The patient was in the emergency department worse for several hours and cannot provide any stool samples.  He will be treated empirically with ciprofloxacin and encouraged follow-up with PCP with a stool sample.  He will also be discharged Zofran instructions to stay hydrated .  Detailed return precautions were discussed.    Sruthi Dudley MD  Emergency Medicine  01/28/2020 6:05 PM                                   Clinical Impression:       ICD-10-CM ICD-9-CM   1. Diarrhea, unspecified type R19.7 787.91                             Sruthi Dudley MD  01/28/20 1805

## 2020-01-29 NOTE — DISCHARGE INSTRUCTIONS
Drink At least 100 oz of water for the next 2-3 days as well as a couple of bottles of electrolyte replacement solution such as Gatorade or Powerade.  Take antibiotics as prescribed.  Follow up with your primary care physician was stool sample to send for studies.  Return for worsening symptoms as discussed.

## 2020-03-09 ENCOUNTER — CLINICAL SUPPORT (OUTPATIENT)
Dept: URGENT CARE | Facility: CLINIC | Age: 35
End: 2020-03-09

## 2020-03-09 DIAGNOSIS — Z02.89 ENCOUNTER FOR PHYSICAL EXAMINATION RELATED TO EMPLOYMENT: ICD-10-CM

## 2020-03-09 PROCEDURE — 99499 PR PHYSICAL - DOT/CDL: ICD-10-PCS | Mod: S$GLB,,, | Performed by: EMERGENCY MEDICINE

## 2020-03-09 PROCEDURE — 99499 UNLISTED E&M SERVICE: CPT | Mod: S$GLB,,, | Performed by: EMERGENCY MEDICINE

## 2020-05-07 ENCOUNTER — HOSPITAL ENCOUNTER (INPATIENT)
Facility: HOSPITAL | Age: 35
LOS: 2 days | Discharge: HOME | DRG: 682 | End: 2020-05-09
Attending: EMERGENCY MEDICINE | Admitting: INTERNAL MEDICINE
Payer: MEDICAID

## 2020-05-07 DIAGNOSIS — K52.9 GASTROENTERITIS: ICD-10-CM

## 2020-05-07 DIAGNOSIS — E86.0 SEVERE DEHYDRATION: ICD-10-CM

## 2020-05-07 DIAGNOSIS — R07.9 CHEST PAIN: ICD-10-CM

## 2020-05-07 DIAGNOSIS — R19.7 INTRACTABLE DIARRHEA: ICD-10-CM

## 2020-05-07 DIAGNOSIS — R73.9 HYPERGLYCEMIA: ICD-10-CM

## 2020-05-07 DIAGNOSIS — N17.9 AKI (ACUTE KIDNEY INJURY): Primary | ICD-10-CM

## 2020-05-07 DIAGNOSIS — R19.7 DIARRHEA, UNSPECIFIED TYPE: ICD-10-CM

## 2020-05-07 PROBLEM — K85.90 ACUTE PANCREATITIS: Status: ACTIVE | Noted: 2020-05-07

## 2020-05-07 LAB
ALBUMIN SERPL BCP-MCNC: 4.1 G/DL (ref 3.5–5.2)
ALP SERPL-CCNC: 111 U/L (ref 55–135)
ALT SERPL W/O P-5'-P-CCNC: 28 U/L (ref 10–44)
ANION GAP SERPL CALC-SCNC: 12 MMOL/L (ref 8–16)
AST SERPL-CCNC: 13 U/L (ref 10–40)
BACTERIA #/AREA URNS HPF: NEGATIVE /HPF
BASOPHILS # BLD AUTO: 0.06 K/UL (ref 0–0.2)
BASOPHILS NFR BLD: 0.4 % (ref 0–1.9)
BILIRUB SERPL-MCNC: 0.9 MG/DL (ref 0.1–1)
BILIRUB UR QL STRIP: NEGATIVE
BUN SERPL-MCNC: 48 MG/DL (ref 6–20)
C DIFF GDH STL QL: NEGATIVE
C DIFF TOX A+B STL QL IA: NEGATIVE
CALCIUM SERPL-MCNC: 9.4 MG/DL (ref 8.7–10.5)
CHLORIDE SERPL-SCNC: 102 MMOL/L (ref 95–110)
CLARITY UR: CLEAR
CO2 SERPL-SCNC: 19 MMOL/L (ref 23–29)
COLOR UR: YELLOW
CREAT SERPL-MCNC: 2.5 MG/DL (ref 0.5–1.4)
CRP SERPL-MCNC: 0.83 MG/DL
DIFFERENTIAL METHOD: ABNORMAL
EOSINOPHIL # BLD AUTO: 0.5 K/UL (ref 0–0.5)
EOSINOPHIL NFR BLD: 3.4 % (ref 0–8)
ERYTHROCYTE [DISTWIDTH] IN BLOOD BY AUTOMATED COUNT: 13.8 % (ref 11.5–14.5)
EST. GFR  (AFRICAN AMERICAN): 37.3 ML/MIN/1.73 M^2
EST. GFR  (NON AFRICAN AMERICAN): 32.3 ML/MIN/1.73 M^2
ESTIMATED AVG GLUCOSE: 266 MG/DL (ref 68–131)
GLUCOSE SERPL-MCNC: 234 MG/DL (ref 70–110)
GLUCOSE SERPL-MCNC: 268 MG/DL (ref 70–110)
GLUCOSE SERPL-MCNC: 301 MG/DL (ref 70–110)
GLUCOSE SERPL-MCNC: 353 MG/DL (ref 70–110)
GLUCOSE UR QL STRIP: ABNORMAL
HBA1C MFR BLD HPLC: 10.9 % (ref 4.5–6.2)
HCT VFR BLD AUTO: 52.8 % (ref 40–54)
HGB BLD-MCNC: 17.2 G/DL (ref 14–18)
HGB UR QL STRIP: NEGATIVE
HYALINE CASTS #/AREA URNS LPF: 2 /LPF
IMM GRANULOCYTES # BLD AUTO: 0.09 K/UL (ref 0–0.04)
IMM GRANULOCYTES NFR BLD AUTO: 0.6 % (ref 0–0.5)
KETONES UR QL STRIP: NEGATIVE
LEUKOCYTE ESTERASE UR QL STRIP: NEGATIVE
LIPASE SERPL-CCNC: 269 U/L (ref 4–60)
LYMPHOCYTES # BLD AUTO: 4 K/UL (ref 1–4.8)
LYMPHOCYTES NFR BLD: 25.5 % (ref 18–48)
MCH RBC QN AUTO: 28.7 PG (ref 27–31)
MCHC RBC AUTO-ENTMCNC: 32.6 G/DL (ref 32–36)
MCV RBC AUTO: 88 FL (ref 82–98)
MICROSCOPIC COMMENT: ABNORMAL
MONOCYTES # BLD AUTO: 1 K/UL (ref 0.3–1)
MONOCYTES NFR BLD: 6 % (ref 4–15)
NEUTROPHILS # BLD AUTO: 10.2 K/UL (ref 1.8–7.7)
NEUTROPHILS NFR BLD: 64.1 % (ref 38–73)
NITRITE UR QL STRIP: NEGATIVE
NRBC BLD-RTO: 0 /100 WBC
OB PNL STL: POSITIVE
PH UR STRIP: 6 [PH] (ref 5–8)
PLATELET # BLD AUTO: 227 K/UL (ref 150–350)
PMV BLD AUTO: 11 FL (ref 9.2–12.9)
POTASSIUM SERPL-SCNC: 4.2 MMOL/L (ref 3.5–5.1)
PROT SERPL-MCNC: 7.4 G/DL (ref 6–8.4)
PROT UR QL STRIP: ABNORMAL
RBC # BLD AUTO: 6 M/UL (ref 4.6–6.2)
RBC #/AREA URNS HPF: 0 /HPF (ref 0–4)
SARS-COV-2 RDRP RESP QL NAA+PROBE: NEGATIVE
SODIUM SERPL-SCNC: 133 MMOL/L (ref 136–145)
SP GR UR STRIP: 1.01 (ref 1–1.03)
SQUAMOUS #/AREA URNS HPF: 0 /HPF
TSH SERPL DL<=0.005 MIU/L-ACNC: 1.5 UIU/ML (ref 0.34–5.6)
URN SPEC COLLECT METH UR: ABNORMAL
UROBILINOGEN UR STRIP-ACNC: NEGATIVE EU/DL
WBC # BLD AUTO: 15.84 K/UL (ref 3.9–12.7)
WBC #/AREA STL HPF: ABNORMAL /[HPF]
WBC #/AREA URNS HPF: 0 /HPF (ref 0–5)
YEAST URNS QL MICRO: ABNORMAL

## 2020-05-07 PROCEDURE — 86140 C-REACTIVE PROTEIN: CPT

## 2020-05-07 PROCEDURE — 84443 ASSAY THYROID STIM HORMONE: CPT

## 2020-05-07 PROCEDURE — 63600175 PHARM REV CODE 636 W HCPCS: Performed by: FAMILY MEDICINE

## 2020-05-07 PROCEDURE — 36415 COLL VENOUS BLD VENIPUNCTURE: CPT

## 2020-05-07 PROCEDURE — 87324 CLOSTRIDIUM AG IA: CPT

## 2020-05-07 PROCEDURE — 63600175 PHARM REV CODE 636 W HCPCS: Performed by: INTERNAL MEDICINE

## 2020-05-07 PROCEDURE — 87046 STOOL CULTR AEROBIC BACT EA: CPT | Mod: 59

## 2020-05-07 PROCEDURE — 63600175 PHARM REV CODE 636 W HCPCS

## 2020-05-07 PROCEDURE — 83690 ASSAY OF LIPASE: CPT

## 2020-05-07 PROCEDURE — 12000002 HC ACUTE/MED SURGE SEMI-PRIVATE ROOM

## 2020-05-07 PROCEDURE — 89055 LEUKOCYTE ASSESSMENT FECAL: CPT

## 2020-05-07 PROCEDURE — 83036 HEMOGLOBIN GLYCOSYLATED A1C: CPT

## 2020-05-07 PROCEDURE — 87389 HIV-1 AG W/HIV-1&-2 AB AG IA: CPT

## 2020-05-07 PROCEDURE — 81001 URINALYSIS AUTO W/SCOPE: CPT

## 2020-05-07 PROCEDURE — G0378 HOSPITAL OBSERVATION PER HR: HCPCS

## 2020-05-07 PROCEDURE — U0002 COVID-19 LAB TEST NON-CDC: HCPCS

## 2020-05-07 PROCEDURE — 87045 FECES CULTURE AEROBIC BACT: CPT

## 2020-05-07 PROCEDURE — 82272 OCCULT BLD FECES 1-3 TESTS: CPT

## 2020-05-07 PROCEDURE — 25000003 PHARM REV CODE 250: Performed by: INTERNAL MEDICINE

## 2020-05-07 PROCEDURE — 87015 SPECIMEN INFECT AGNT CONCNTJ: CPT

## 2020-05-07 PROCEDURE — 87086 URINE CULTURE/COLONY COUNT: CPT

## 2020-05-07 PROCEDURE — 96361 HYDRATE IV INFUSION ADD-ON: CPT

## 2020-05-07 PROCEDURE — 99285 EMERGENCY DEPT VISIT HI MDM: CPT | Mod: 25

## 2020-05-07 PROCEDURE — 96374 THER/PROPH/DIAG INJ IV PUSH: CPT

## 2020-05-07 PROCEDURE — 25000003 PHARM REV CODE 250

## 2020-05-07 PROCEDURE — 87449 NOS EACH ORGANISM AG IA: CPT

## 2020-05-07 PROCEDURE — 25000003 PHARM REV CODE 250: Performed by: FAMILY MEDICINE

## 2020-05-07 PROCEDURE — 87209 SMEAR COMPLEX STAIN: CPT

## 2020-05-07 PROCEDURE — 80053 COMPREHEN METABOLIC PANEL: CPT

## 2020-05-07 PROCEDURE — 87040 BLOOD CULTURE FOR BACTERIA: CPT | Mod: 59

## 2020-05-07 PROCEDURE — 85025 COMPLETE CBC W/AUTO DIFF WBC: CPT

## 2020-05-07 RX ORDER — IBUPROFEN 200 MG
16 TABLET ORAL
Status: DISCONTINUED | OUTPATIENT
Start: 2020-05-07 | End: 2020-05-09 | Stop reason: HOSPADM

## 2020-05-07 RX ORDER — ONDANSETRON 2 MG/ML
4 INJECTION INTRAMUSCULAR; INTRAVENOUS
Status: COMPLETED | OUTPATIENT
Start: 2020-05-07 | End: 2020-05-07

## 2020-05-07 RX ORDER — POLYETHYLENE GLYCOL 3350 17 G/17G
340 POWDER, FOR SOLUTION ORAL ONCE
Status: COMPLETED | OUTPATIENT
Start: 2020-05-07 | End: 2020-05-07

## 2020-05-07 RX ORDER — ATENOLOL 25 MG/1
50 TABLET ORAL DAILY
Status: DISCONTINUED | OUTPATIENT
Start: 2020-05-07 | End: 2020-05-09 | Stop reason: HOSPADM

## 2020-05-07 RX ORDER — SODIUM CHLORIDE 9 MG/ML
INJECTION, SOLUTION INTRAVENOUS CONTINUOUS
Status: DISCONTINUED | OUTPATIENT
Start: 2020-05-07 | End: 2020-05-09 | Stop reason: HOSPADM

## 2020-05-07 RX ORDER — INSULIN ASPART 100 [IU]/ML
1-10 INJECTION, SOLUTION INTRAVENOUS; SUBCUTANEOUS
Status: DISCONTINUED | OUTPATIENT
Start: 2020-05-07 | End: 2020-05-09 | Stop reason: HOSPADM

## 2020-05-07 RX ORDER — GLUCAGON 1 MG
1 KIT INJECTION
Status: DISCONTINUED | OUTPATIENT
Start: 2020-05-07 | End: 2020-05-09 | Stop reason: HOSPADM

## 2020-05-07 RX ORDER — SODIUM CHLORIDE 0.9 % (FLUSH) 0.9 %
10 SYRINGE (ML) INJECTION
Status: DISCONTINUED | OUTPATIENT
Start: 2020-05-07 | End: 2020-05-09 | Stop reason: HOSPADM

## 2020-05-07 RX ORDER — LEVOFLOXACIN 5 MG/ML
500 INJECTION, SOLUTION INTRAVENOUS
Status: DISCONTINUED | OUTPATIENT
Start: 2020-05-07 | End: 2020-05-07

## 2020-05-07 RX ORDER — CIPROFLOXACIN 500 MG/1
500 TABLET ORAL
Status: DISCONTINUED | OUTPATIENT
Start: 2020-05-07 | End: 2020-05-07

## 2020-05-07 RX ORDER — MORPHINE SULFATE 4 MG/ML
INJECTION, SOLUTION INTRAMUSCULAR; INTRAVENOUS
Status: COMPLETED
Start: 2020-05-07 | End: 2020-05-07

## 2020-05-07 RX ORDER — MORPHINE SULFATE 4 MG/ML
4 INJECTION, SOLUTION INTRAMUSCULAR; INTRAVENOUS EVERY 4 HOURS PRN
Status: DISCONTINUED | OUTPATIENT
Start: 2020-05-07 | End: 2020-05-09 | Stop reason: HOSPADM

## 2020-05-07 RX ORDER — ONDANSETRON 4 MG/1
4 TABLET, ORALLY DISINTEGRATING ORAL EVERY 8 HOURS PRN
Status: DISCONTINUED | OUTPATIENT
Start: 2020-05-07 | End: 2020-05-09 | Stop reason: HOSPADM

## 2020-05-07 RX ORDER — DICYCLOMINE HYDROCHLORIDE 10 MG/1
20 CAPSULE ORAL
Status: COMPLETED | OUTPATIENT
Start: 2020-05-07 | End: 2020-05-07

## 2020-05-07 RX ORDER — IBUPROFEN 200 MG
24 TABLET ORAL
Status: DISCONTINUED | OUTPATIENT
Start: 2020-05-07 | End: 2020-05-09 | Stop reason: HOSPADM

## 2020-05-07 RX ADMIN — INSULIN ASPART 3 UNITS: 100 INJECTION, SOLUTION INTRAVENOUS; SUBCUTANEOUS at 09:05

## 2020-05-07 RX ADMIN — ONDANSETRON 4 MG: 2 INJECTION INTRAMUSCULAR; INTRAVENOUS at 05:05

## 2020-05-07 RX ADMIN — PIPERACILLIN AND TAZOBACTAM 3.38 G: 3; .375 INJECTION, POWDER, FOR SOLUTION INTRAVENOUS at 11:05

## 2020-05-07 RX ADMIN — MORPHINE SULFATE 4 MG: 4 INJECTION INTRAVENOUS at 11:05

## 2020-05-07 RX ADMIN — ONDANSETRON 4 MG: 4 TABLET, ORALLY DISINTEGRATING ORAL at 11:05

## 2020-05-07 RX ADMIN — POLYETHYLENE GLYCOL 3350 340 G: 17 POWDER, FOR SOLUTION ORAL at 02:05

## 2020-05-07 RX ADMIN — ONDANSETRON 4 MG: 4 TABLET, ORALLY DISINTEGRATING ORAL at 09:05

## 2020-05-07 RX ADMIN — SODIUM CHLORIDE, SODIUM LACTATE, POTASSIUM CHLORIDE, AND CALCIUM CHLORIDE 1000 ML: .6; .31; .03; .02 INJECTION, SOLUTION INTRAVENOUS at 06:05

## 2020-05-07 RX ADMIN — PIPERACILLIN AND TAZOBACTAM 3.38 G: 3; .375 INJECTION, POWDER, FOR SOLUTION INTRAVENOUS at 07:05

## 2020-05-07 RX ADMIN — DICYCLOMINE HYDROCHLORIDE 20 MG: 10 CAPSULE ORAL at 05:05

## 2020-05-07 RX ADMIN — INSULIN ASPART 8 UNITS: 100 INJECTION, SOLUTION INTRAVENOUS; SUBCUTANEOUS at 01:05

## 2020-05-07 RX ADMIN — ATENOLOL 50 MG: 25 TABLET ORAL at 11:05

## 2020-05-07 RX ADMIN — SODIUM CHLORIDE, SODIUM LACTATE, POTASSIUM CHLORIDE, AND CALCIUM CHLORIDE 1000 ML: .6; .31; .03; .02 INJECTION, SOLUTION INTRAVENOUS at 05:05

## 2020-05-07 RX ADMIN — MORPHINE SULFATE 4 MG: 4 INJECTION INTRAVENOUS at 06:05

## 2020-05-07 RX ADMIN — SODIUM CHLORIDE: 0.9 INJECTION, SOLUTION INTRAVENOUS at 11:05

## 2020-05-07 NOTE — PLAN OF CARE
Problem: Adult Inpatient Plan of Care  Goal: Plan of Care Review  Outcome: Ongoing, Progressing  Goal: Patient-Specific Goal (Individualization)  Outcome: Ongoing, Progressing  Goal: Absence of Hospital-Acquired Illness or Injury  Outcome: Ongoing, Progressing  Goal: Optimal Comfort and Wellbeing  Outcome: Ongoing, Progressing  Goal: Readiness for Transition of Care  Outcome: Ongoing, Progressing  Goal: Rounds/Family Conference  Outcome: Ongoing, Progressing     Problem: Diabetes Comorbidity  Goal: Blood Glucose Level Within Desired Range  Outcome: Ongoing, Progressing     Problem: Electrolyte Imbalance (Acute Kidney Injury/Impairment)  Goal: Serum Electrolyte Balance  Outcome: Ongoing, Progressing     Problem: Fluid Imbalance (Acute Kidney Injury/Impairment)  Goal: Optimal Fluid Balance  Outcome: Ongoing, Progressing     Problem: Hematologic Alteration (Acute Kidney Injury/Impairment)  Goal: Hemoglobin, Hematocrit and Platelets Within Normal Range  Outcome: Ongoing, Progressing     Problem: Oral Intake Inadequate (Acute Kidney Injury/Impairment)  Goal: Optimal Nutrition Intake  Outcome: Ongoing, Progressing     Problem: Renal Function Impairment (Acute Kidney Injury/Impairment)  Goal: Effective Renal Function  Outcome: Ongoing, Progressing     Problem: Infection  Goal: Infection Symptom Resolution  Outcome: Ongoing, Progressing     Problem: Fall Injury Risk  Goal: Absence of Fall and Fall-Related Injury  Outcome: Ongoing, Progressing     Problem: Pain Acute  Goal: Optimal Pain Control  Outcome: Ongoing, Progressing     Problem: Diarrhea  Goal: Fluid and Electrolyte Balance  Outcome: Ongoing, Progressing

## 2020-05-07 NOTE — ASSESSMENT & PLAN NOTE
Slight Messi I but relatively kidney function near baseline    Plan   IV fluids  Patient follow-up with Dr. Conte and last seen about a week ago

## 2020-05-07 NOTE — ASSESSMENT & PLAN NOTE
Patient qualify for chronic diarrhea given started previous 6 months ago failed lactose restriction  Previous possible positive E coli O157:H7     Plan   Stool culture  C diff  study  IV hydration  IV antibiotics  GI consultation.  Patient may benefit from colonoscopy possible inpatient or outpatient  Lipase elevation possible from diarrhea, unlikely pancreatitis  Possible trial of loperamide, cholestyramine if c diff is negative

## 2020-05-07 NOTE — H&P
"[facility] Medicine  History & Physical    Patient Name: Jose Miguel Brennan  MRN: [id number]  Admission Date: 5/7/2020  Attending Physician: No att. providers found   Primary Care Provider: Horace Doherty Jr, MD         Patient information was obtained from patient and ER records.     Subjective:     Principal Problem:Diarrhea    Chief Complaint:   Chief Complaint   Patient presents with    Diarrhea     "diarrhea since yesterday / dehydrated"         HPI: 35yo M with PMH of  DM, IgA nephropathy/CKD, HTN, HLD , chronic diarrhoea came  with recurrent diarrhea.  He had  previous repeated episodes of diarrhea  with acute kidney injuries and admitted to the hospital.  His chronic diarrhea started about 6 months ago but he was doing well in between.    This time he was having diarrhea about 10-15 times of watery and associated with nausea  And crampy abdominal pain and came to the hospital.  He was recently admitted to the hospital with the same episode and CT scan was done with enteritis but with no other mass or other gross abnormality was found.  It ER patient had acute kidney injury, elevated white cell and patient was admitted.  He was given IV fluid and stool studies are sent including  C diff.  No sick contacts. No fevers. No recent illnesses. He had EGD in the past about a year ago and he was told Sofia but he never done a colonoscopy before.  Previous stool culture appear to be with  E coli O157:H7 isolated but the way it was reported in the system is extremely confusing and it need to confirm with micro lab    Past Medical History:   Diagnosis Date    Asthma     Diabetes mellitus     diet controlled    Hyperlipidemia     Hypertension     IgA nephropathy        Past Surgical History:   Procedure Laterality Date    nose polyp removal         Review of patient's allergies indicates:   Allergen Reactions    Zithromax [azithromycin] Rash       No current facility-administered " medications on file prior to encounter.      Current Outpatient Medications on File Prior to Encounter   Medication Sig    atenolol (TENORMIN) 50 MG tablet Take 50 mg by mouth once daily.    atorvastatin (LIPITOR) 20 MG tablet Take 20 mg by mouth once daily.    insulin glargine, TOUJEO, (TOUJEO SOLOSTAR U-300 INSULIN) 300 unit/mL (1.5 mL) InPn pen Inject 15 Units into the skin once daily.    lisinopril (PRINIVIL,ZESTRIL) 20 MG tablet Take 20 mg by mouth once daily.    ondansetron (ZOFRAN-ODT) 4 MG TbDL Take 1 tablet (4 mg total) by mouth every 8 (eight) hours as needed.     Family History     Problem Relation (Age of Onset)    Cancer Maternal Grandfather    Diabetes Maternal Grandfather, Mother    Heart disease Maternal Grandfather, Mother    Hypertension Maternal Grandmother    Stroke Mother        Tobacco Use    Smoking status: Current Every Day Smoker     Types: Cigars    Smokeless tobacco: Never Used    Tobacco comment: One cigar a day   Substance and Sexual Activity    Alcohol use: No    Drug use: No    Sexual activity: Yes     Partners: Female     Review of Systems   Constitutional: Negative for activity change.   HENT: Negative for congestion.    Respiratory: Negative for shortness of breath.    Cardiovascular: Negative for chest pain.   Gastrointestinal: Positive for abdominal distention and abdominal pain.   Neurological: Negative for dizziness and facial asymmetry.   Psychiatric/Behavioral: Negative for agitation.     Objective:     Vital Signs (Most Recent):  Temp: 98.2 °F (36.8 °C) (05/07/20 0434)  Pulse: 71 (05/07/20 0530)  Resp: 16 (05/07/20 0434)  BP: 126/83 (05/07/20 0530)  SpO2: 97 % (05/07/20 0530) Vital Signs (24h Range):  Temp:  [98.2 °F (36.8 °C)] 98.2 °F (36.8 °C)  Pulse:  [71-80] 71  Resp:  [16] 16  SpO2:  [97 %-99 %] 97 %  BP: (126-158)/(83-94) 126/83     Weight: 102.1 kg (225 lb)  Body mass index is 30.52 kg/m².    Physical Exam   Constitutional: He is oriented to person, place,  and time. He appears well-developed. No distress.   HENT:   Head: Normocephalic.   Eyes: Pupils are equal, round, and reactive to light. EOM are normal.   Neck: Neck supple.   Cardiovascular: Normal rate and regular rhythm.   Pulmonary/Chest: No respiratory distress.   Abdominal: He exhibits distension. He exhibits no mass. There is tenderness. There is no guarding.   Neurological: He is alert and oriented to person, place, and time.   Skin: No rash noted.         CRANIAL NERVES     CN III, IV, VI   Pupils are equal, round, and reactive to light.  Extraocular motions are normal.        Significant Labs:   BMP:   Recent Labs   Lab 05/07/20  0502   *   *   K 4.2      CO2 19*   BUN 48*   CREATININE 2.5*   CALCIUM 9.4     CBC:   Recent Labs   Lab 05/07/20  0502   WBC 15.84*   HGB 17.2   HCT 52.8          Significant Imaging: I have reviewed and interpreted all pertinent imaging results/findings within the past 24 hours.    Assessment/Plan:     * Diarrhea  Patient qualify for chronic diarrhea given started previous 6 months ago failed lactose restriction  Previous possible positive E coli O157:H7     Plan   Stool culture  C diff  study  IV hydration  IV antibiotics  GI consultation.  Patient may benefit from colonoscopy possible inpatient or outpatient  Lipase elevation possible from diarrhea, unlikely pancreatitis  Possible trial of loperamide, cholestyramine if c diff is negative    MESSI (acute kidney injury)  IV hydration       IgA nephropathy  Slight Messi I but relatively kidney function near baseline    Plan   IV fluids  Patient follow-up with Dr. Conte and last seen about a week ago      Type 2 diabetes mellitus with stage 3 chronic kidney disease, without long-term current use of insulin  Continue home insulin   Monitor renal function       Hypertension associated with diabetes  Stable   Home meds      VTE Risk Mitigation (From admission, onward)         Ordered     IP VTE HIGH RISK PATIENT   Once      05/07/20 0621     Place sequential compression device  Until discontinued      05/07/20 0621                   Darryl Griffiht MD  Department of Hospital Medicine   Former[facility]

## 2020-05-07 NOTE — ED NOTES
LOC: The patient is awake, alert, and oriented to place, time, situation. Affect is appropriate.  Speech is appropriate and clear.     APPEARANCE: Patient resting comfortably in no acute distress.  Patient is clean and well groomed.    SKIN: The skin is warm and dry; color consistent with ethnicity.  Patient has normal skin turgor and moist mucus membranes.  Skin intact; no breakdown or bruising noted.     MUSCULOSKELETAL: Patient moving upper and lower extremities without difficulty.  Denies weakness.     RESPIRATORY: Airway is open and patent. Respirations spontaneous, even, easy, and non-labored.  Patient has a normal effort and rate.  No accessory muscle use noted. Denies cough.     CARDIAC:  Normal rhythm and rate noted.  No peripheral edema noted. No complaints of chest pain.      ABDOMEN: Abdomen is distended and tender to palpation.. Patient c/o of multiple diarrhea episodes. Patient states this has happened a few times within the last 6 months.      NEUROLOGIC: Eyes open spontaneously.  Behavior appropriate to situation.  Follows commands; facial expression symmetrical.  Purposeful motor response noted; normal sensation in all extremities.

## 2020-05-07 NOTE — HPI
35yo M with PMH of  DM, IgA nephropathy/CKD, HTN, HLD , chronic diarrhoea came  with recurrent diarrhea.  He had  previous repeated episodes of diarrhea  with acute kidney injuries and admitted to the hospital.  His chronic diarrhea started about 6 months ago but he was doing well in between.    This time he was having diarrhea about 10-15 times of watery and associated with nausea  And crampy abdominal pain and came to the hospital.  He was recently admitted to the hospital with the same episode and CT scan was done with enteritis but with no other mass or other gross abnormality was found.  It ER patient had acute kidney injury, elevated white cell and patient was admitted.  He was given IV fluid and stool studies are sent including  C diff.  No sick contacts. No fevers. No recent illnesses. He had EGD in the past about a year ago and he was told Sofia but he never done a colonoscopy before.  Previous stool culture appear to be with  E coli O157:H7 isolated but the way it was reported in the system is extremely confusing and it need to confirm with micro lab

## 2020-05-07 NOTE — SUBJECTIVE & OBJECTIVE
Past Medical History:   Diagnosis Date    Asthma     Diabetes mellitus     diet controlled    Hyperlipidemia     Hypertension     IgA nephropathy        Past Surgical History:   Procedure Laterality Date    nose polyp removal         Review of patient's allergies indicates:   Allergen Reactions    Zithromax [azithromycin] Rash       No current facility-administered medications on file prior to encounter.      Current Outpatient Medications on File Prior to Encounter   Medication Sig    atenolol (TENORMIN) 50 MG tablet Take 50 mg by mouth once daily.    atorvastatin (LIPITOR) 20 MG tablet Take 20 mg by mouth once daily.    insulin glargine, TOUJEO, (TOUJEO SOLOSTAR U-300 INSULIN) 300 unit/mL (1.5 mL) InPn pen Inject 15 Units into the skin once daily.    lisinopril (PRINIVIL,ZESTRIL) 20 MG tablet Take 20 mg by mouth once daily.    ondansetron (ZOFRAN-ODT) 4 MG TbDL Take 1 tablet (4 mg total) by mouth every 8 (eight) hours as needed.     Family History     Problem Relation (Age of Onset)    Cancer Maternal Grandfather    Diabetes Maternal Grandfather, Mother    Heart disease Maternal Grandfather, Mother    Hypertension Maternal Grandmother    Stroke Mother        Tobacco Use    Smoking status: Current Every Day Smoker     Types: Cigars    Smokeless tobacco: Never Used    Tobacco comment: One cigar a day   Substance and Sexual Activity    Alcohol use: No    Drug use: No    Sexual activity: Yes     Partners: Female     Review of Systems   Constitutional: Negative for activity change.   HENT: Negative for congestion.    Respiratory: Negative for shortness of breath.    Cardiovascular: Negative for chest pain.   Gastrointestinal: Positive for abdominal distention and abdominal pain.   Neurological: Negative for dizziness and facial asymmetry.   Psychiatric/Behavioral: Negative for agitation.     Objective:     Vital Signs (Most Recent):  Temp: 98.2 °F (36.8 °C) (05/07/20 0434)  Pulse: 71 (05/07/20  0530)  Resp: 16 (05/07/20 0434)  BP: 126/83 (05/07/20 0530)  SpO2: 97 % (05/07/20 0530) Vital Signs (24h Range):  Temp:  [98.2 °F (36.8 °C)] 98.2 °F (36.8 °C)  Pulse:  [71-80] 71  Resp:  [16] 16  SpO2:  [97 %-99 %] 97 %  BP: (126-158)/(83-94) 126/83     Weight: 102.1 kg (225 lb)  Body mass index is 30.52 kg/m².    Physical Exam   Constitutional: He is oriented to person, place, and time. He appears well-developed. No distress.   HENT:   Head: Normocephalic.   Eyes: Pupils are equal, round, and reactive to light. EOM are normal.   Neck: Neck supple.   Cardiovascular: Normal rate and regular rhythm.   Pulmonary/Chest: No respiratory distress.   Abdominal: He exhibits distension. He exhibits no mass. There is tenderness. There is no guarding.   Neurological: He is alert and oriented to person, place, and time.   Skin: No rash noted.         CRANIAL NERVES     CN III, IV, VI   Pupils are equal, round, and reactive to light.  Extraocular motions are normal.        Significant Labs:   BMP:   Recent Labs   Lab 05/07/20  0502   *   *   K 4.2      CO2 19*   BUN 48*   CREATININE 2.5*   CALCIUM 9.4     CBC:   Recent Labs   Lab 05/07/20  0502   WBC 15.84*   HGB 17.2   HCT 52.8          Significant Imaging: I have reviewed and interpreted all pertinent imaging results/findings within the past 24 hours.

## 2020-05-07 NOTE — CONSULTS
"GASTROENTEROLOGY INPATIENT CONSULT NOTE  Patient Name: Jose Miguel Brennan  Patient MRN: [id number]  Patient : 1985    Admit Date: 2020  Service date: 2020    Reason for Consult: diarrhea    PCP: Horace Doherty Jr, MD    Chief Complaint   Patient presents with    Diarrhea     "diarrhea since yesterday / dehydrated"        HPI: Patient is a 34 y.o. male with PMHx DM I, HTN, HLD, IgA nephropathy w/ CKD presents for evaluation of diarrhea / weakness. Acute / subacute, intermittent, progressive over past 1-2 days. States has had episodes similar to this x 2 over past 6 months that last 2-3 days then spontaneously resolve. No signs of bleeding but endorses some cramping with these episodes. Remote EGD years ago but no prior colon. No current treatment for IgA    CHART REVIEW;   Labs  - Negative O&P / C. Diff neg  CT Abd - mild enteritis to mid small bowel  '17 ttg IGA negative     Past Medical History:  Past Medical History:   Diagnosis Date    Asthma     Diabetes mellitus     diet controlled    Hyperlipidemia     Hypertension     IgA nephropathy         Past Surgical History:  Past Surgical History:   Procedure Laterality Date    nose polyp removal          Home Medications:  Medications Prior to Admission   Medication Sig Dispense Refill Last Dose    atenolol (TENORMIN) 50 MG tablet Take 50 mg by mouth once daily.   2020    atorvastatin (LIPITOR) 20 MG tablet Take 20 mg by mouth once daily.   Unknown    insulin glargine, TOUJEO, (TOUJEO SOLOSTAR U-300 INSULIN) 300 unit/mL (1.5 mL) InPn pen Inject 15 Units into the skin once daily.   Unknown    lisinopril (PRINIVIL,ZESTRIL) 20 MG tablet Take 20 mg by mouth once daily.   Unknown    ondansetron (ZOFRAN-ODT) 4 MG TbDL Take 1 tablet (4 mg total) by mouth every 8 (eight) hours as needed. 12 tablet 0 Unknown       Inpatient Medications:   atenoloL  50 mg Oral Daily    insulin detemir U-100  15 Units Subcutaneous Daily    " levoFLOXacin  500 mg Intravenous Q24H     dextrose 50%, dextrose 50%, glucagon (human recombinant), glucose, glucose, ondansetron, sodium chloride 0.9%    Review of patient's allergies indicates:   Allergen Reactions    Zithromax [azithromycin] Rash       Social History:   Social History     Occupational History    Not on file   Tobacco Use    Smoking status: Current Every Day Smoker     Types: Cigars    Smokeless tobacco: Never Used    Tobacco comment: One cigar a day   Substance and Sexual Activity    Alcohol use: No    Drug use: No    Sexual activity: Yes     Partners: Female       Family History:   Family History   Problem Relation Age of Onset    Hypertension Maternal Grandmother     Cancer Maternal Grandfather     Diabetes Maternal Grandfather     Heart disease Maternal Grandfather     Heart disease Mother     Stroke Mother     Diabetes Mother        Review of Systems:  A 10 point review of systems was performed and was normal, except as mentioned in the HPI, including constitutional, HEENT, heme, lymph, cardiovascular, respiratory, gastrointestinal, genitourinary, neurologic, endocrine, psychiatric and musculoskeletal.      OBJECTIVE:    Physical Exam:  24 Hour Vital Sign Ranges: Temp:  [98.2 °F (36.8 °C)] 98.2 °F (36.8 °C)  Pulse:  [71-80] 71  Resp:  [16] 16  SpO2:  [97 %-99 %] 97 %  BP: (126-158)/(83-94) 126/83  Most recent vitals: /83   Pulse 71   Temp 98.2 °F (36.8 °C) (Oral)   Resp 16   Ht 6' (1.829 m)   Wt 102.1 kg (225 lb)   SpO2 97%   BMI 30.52 kg/m²    GEN: well-developed, well-nourished, awake and alert, non-toxic appearing adult  HEENT: PERRL, sclera anicteric, oral mucosa pink and moist without lesion  NECK: trachea midline; Good ROM  CV: regular rate and rhythm, no murmurs or gallops  RESP: clear to auscultation bilaterally, no wheezes, rhonci or rales  ABD: soft, min-tender, non-distended, normal bowel sounds  EXT: no swelling or edema, 2+ pulses distally  SKIN: no  "rashes or jaundice  PSYCH: normal affect    Labs:   Recent Labs     05/07/20  0502   WBC 15.84*   MCV 88        Recent Labs     05/07/20  0502   *   K 4.2      CO2 19*   BUN 48*   *     No results for input(s): ALB in the last 72 hours.    Invalid input(s): ALKP, SGOT, SGPT, TBIL, DBIL, TPRO  No results for input(s): PT, INR, PTT in the last 72 hours.      Radiology Review:  No orders to display         IMPRESSION / RECOMMENDATIONS:  34 y.o. male with PMHx DM I, HTN, HLD, IgA nephropathy w/ CKD presents for evaluation of diarrhea / weakness. No clear etiology and unusual w/ cyclical episodes. With h/o other autoimmune diseases and "enteritis" in 1/'20 on imaging, concerns for celiac, IBD, etc. Will also check elastase to ensure no EPI. Risks, benefits, alternative discussed in detail with patient / family regarding anticipated procedure and possible complications.     -Send HIV, stool PCR, TSH, CRP, elastase (CRP / elastase may be falsely abnormal given other issues)  -EGD / colon w/ biopsies and TI intubation to r/o celiac, IBD, etc.   -If w/u unremarkable, may need evaluation of small bowel w/ enterography +/- VCE    Thank you for this consult.    Hammad Castorena III  5/7/2020  9:30 AM        "

## 2020-05-07 NOTE — ED PROVIDER NOTES
"Encounter Date: 5/7/2020       History     Chief Complaint   Patient presents with    Diarrhea     "diarrhea since yesterday / dehydrated"      HPI   35yo M with h/o DM, IgA nephropathy/CKD, HTN, HLD who presents with diarrhea. The pt has had 2 similar episodes, and required admission in January for TRACEY on CKD associated with his severe diarrhea. This episode started 2 days ago. He has frequent non bloody diarrhea. Associated crampy abdominal pain that is diffuse and improves with defecation. No change in urination. He has been eating and drinking normally with some nausea but no vomiting. No sick contacts. No fevers. No recent illnesses. He states he had a colonoscopy years ago that was normal.    Review of patient's allergies indicates:   Allergen Reactions    Zithromax [azithromycin] Rash     Past Medical History:   Diagnosis Date    Asthma     Diabetes mellitus     diet controlled    Hyperlipidemia     Hypertension     IgA nephropathy      Past Surgical History:   Procedure Laterality Date    nose polyp removal       Family History   Problem Relation Age of Onset    Hypertension Maternal Grandmother     Cancer Maternal Grandfather     Diabetes Maternal Grandfather     Heart disease Maternal Grandfather     Heart disease Mother     Stroke Mother     Diabetes Mother      Social History     Tobacco Use    Smoking status: Current Every Day Smoker     Types: Cigars    Smokeless tobacco: Never Used    Tobacco comment: One cigar a day   Substance Use Topics    Alcohol use: No    Drug use: No     Review of Systems   Constitutional: Negative for fever.   HENT: Negative for sore throat.    Respiratory: Negative for shortness of breath.    Cardiovascular: Negative for chest pain.   Gastrointestinal: Positive for abdominal pain, diarrhea and nausea.   Genitourinary: Negative for dysuria.   Musculoskeletal: Negative for back pain.   Skin: Negative for rash.   Neurological: Negative for weakness. "   Hematological: Does not bruise/bleed easily.       Physical Exam     Initial Vitals [05/07/20 0434]   BP Pulse Resp Temp SpO2   (!) 158/94 80 16 98.2 °F (36.8 °C) 99 %      MAP       --         Physical Exam    Constitutional: He appears well-developed and well-nourished. He is not diaphoretic.   HENT:   Head: Normocephalic and atraumatic.   Eyes: Conjunctivae and EOM are normal.   Neck: Normal range of motion. Neck supple.   Cardiovascular: Normal rate, regular rhythm and normal heart sounds. Exam reveals no gallop and no friction rub.    No murmur heard.  Pulmonary/Chest: Breath sounds normal. No respiratory distress. He has no wheezes. He has no rhonchi. He has no rales.   Abdominal: Soft. He exhibits no distension. There is tenderness. There is no rebound and no guarding.   There is tenderness to palpation, worst in the epigastrium. No guarding or rebound.   Musculoskeletal: He exhibits no edema or tenderness.   Neurological: He is alert and oriented to person, place, and time.   Skin: Skin is warm and dry.   Psychiatric: He has a normal mood and affect. Thought content normal.       ED Course   Procedures  Labs Reviewed   CULTURE, STOOL   CLOSTRIDIUM DIFFICILE   COMPREHENSIVE METABOLIC PANEL   CBC W/ AUTO DIFFERENTIAL   LIPASE   STOOL EXAM-OVA,CYSTS,PARASITES   WBC, STOOL   OCCULT BLOOD X 1, STOOL      MDM  33yo M with h/o CKD and DM presents with diarrhea  VS remarkable for HTN to 158/94. Afebrile. Given cramping nature of diarrhea with no focal pain and resolution after defecation, I have lower suspicion for surgical abdomen. Will check basic labs to evaluate lytes and renal function. Lipase added to r/o pancreatitis. Stool studies pending. 1L LR ordered along with bentyl and zofran.    Shane Veliz MD  Resident, PGY-3  5/7/2020 5:22 AM     PGY3 UPDATE:  Patient has had 5 episodes of watery diarrhea since arrival. 2nd liter IVF ordered. His Cr today is 2.5 with eGFR 32.3. He is around baseline GFR. His  glucose of 353 is likely worsening his fluid deficit. Will administer insulin (which he has not taken today). Leukocytosis of 15.84 suggests possible infection. Lipase is 269 which has been elevated previously as well. He was given 500mg levofloxacin po for infectious diarrhea. With his high risk renal disorder, it is concerning that he will be unable to keep up with hydration at home. Prescribed 500mg cipro here and have consulted hospitalist service.    Shane Veliz MD  Resident, PGY-3  5/7/2020 6:12 AM      Imaging Results    None                                          Clinical Impression:       ICD-10-CM ICD-9-CM   1. TRACEY (acute kidney injury) N17.9 584.9   2. Intractable diarrhea R19.7 787.91   3. Severe dehydration E86.0 276.51   4. Hyperglycemia R73.9 790.29                                Shane Veliz MD  Resident  05/07/20 0606

## 2020-05-08 LAB
ALBUMIN SERPL BCP-MCNC: 3.3 G/DL (ref 3.5–5.2)
ALP SERPL-CCNC: 97 U/L (ref 55–135)
ALT SERPL W/O P-5'-P-CCNC: 57 U/L (ref 10–44)
ANION GAP SERPL CALC-SCNC: 7 MMOL/L (ref 8–16)
AST SERPL-CCNC: 42 U/L (ref 10–40)
BASOPHILS # BLD AUTO: 0.01 K/UL (ref 0–0.2)
BASOPHILS NFR BLD: 0.1 % (ref 0–1.9)
BILIRUB SERPL-MCNC: 1.5 MG/DL (ref 0.1–1)
BUN SERPL-MCNC: 37 MG/DL (ref 6–20)
CALCIUM SERPL-MCNC: 8.4 MG/DL (ref 8.7–10.5)
CHLORIDE SERPL-SCNC: 107 MMOL/L (ref 95–110)
CO2 SERPL-SCNC: 20 MMOL/L (ref 23–29)
CREAT SERPL-MCNC: 2.8 MG/DL (ref 0.5–1.4)
DIFFERENTIAL METHOD: ABNORMAL
EOSINOPHIL # BLD AUTO: 0.6 K/UL (ref 0–0.5)
EOSINOPHIL NFR BLD: 5.3 % (ref 0–8)
ERYTHROCYTE [DISTWIDTH] IN BLOOD BY AUTOMATED COUNT: 14 % (ref 11.5–14.5)
EST. GFR  (AFRICAN AMERICAN): 32.5 ML/MIN/1.73 M^2
EST. GFR  (NON AFRICAN AMERICAN): 28.2 ML/MIN/1.73 M^2
GLUCOSE SERPL-MCNC: 148 MG/DL (ref 70–110)
GLUCOSE SERPL-MCNC: 208 MG/DL (ref 70–110)
GLUCOSE SERPL-MCNC: 227 MG/DL (ref 70–110)
GLUCOSE SERPL-MCNC: 246 MG/DL (ref 70–110)
HCT VFR BLD AUTO: 48.2 % (ref 40–54)
HGB BLD-MCNC: 15.6 G/DL (ref 14–18)
HIV 1+2 AB+HIV1 P24 AG SERPL QL IA: NON REACTIVE
IMM GRANULOCYTES # BLD AUTO: 0.05 K/UL (ref 0–0.04)
IMM GRANULOCYTES NFR BLD AUTO: 0.5 % (ref 0–0.5)
LYMPHOCYTES # BLD AUTO: 2.4 K/UL (ref 1–4.8)
LYMPHOCYTES NFR BLD: 22.4 % (ref 18–48)
MAGNESIUM SERPL-MCNC: 1.8 MG/DL (ref 1.6–2.6)
MCH RBC QN AUTO: 29 PG (ref 27–31)
MCHC RBC AUTO-ENTMCNC: 32.4 G/DL (ref 32–36)
MCV RBC AUTO: 90 FL (ref 82–98)
MONOCYTES # BLD AUTO: 0.9 K/UL (ref 0.3–1)
MONOCYTES NFR BLD: 8.8 % (ref 4–15)
NEUTROPHILS # BLD AUTO: 6.7 K/UL (ref 1.8–7.7)
NEUTROPHILS NFR BLD: 62.9 % (ref 38–73)
NRBC BLD-RTO: 0 /100 WBC
O+P STL TRI STN: NORMAL
PLATELET # BLD AUTO: 153 K/UL (ref 150–350)
PMV BLD AUTO: 11 FL (ref 9.2–12.9)
POTASSIUM SERPL-SCNC: 4.3 MMOL/L (ref 3.5–5.1)
PROT SERPL-MCNC: 5.9 G/DL (ref 6–8.4)
RBC # BLD AUTO: 5.38 M/UL (ref 4.6–6.2)
SODIUM SERPL-SCNC: 134 MMOL/L (ref 136–145)
WBC # BLD AUTO: 10.61 K/UL (ref 3.9–12.7)

## 2020-05-08 PROCEDURE — 12000002 HC ACUTE/MED SURGE SEMI-PRIVATE ROOM

## 2020-05-08 PROCEDURE — 80053 COMPREHEN METABOLIC PANEL: CPT

## 2020-05-08 PROCEDURE — 43239 EGD BIOPSY SINGLE/MULTIPLE: CPT | Performed by: INTERNAL MEDICINE

## 2020-05-08 PROCEDURE — 36415 COLL VENOUS BLD VENIPUNCTURE: CPT

## 2020-05-08 PROCEDURE — 85025 COMPLETE CBC W/AUTO DIFF WBC: CPT

## 2020-05-08 PROCEDURE — 99152 MOD SED SAME PHYS/QHP 5/>YRS: CPT | Performed by: INTERNAL MEDICINE

## 2020-05-08 PROCEDURE — 25000003 PHARM REV CODE 250: Performed by: FAMILY MEDICINE

## 2020-05-08 PROCEDURE — 63600175 PHARM REV CODE 636 W HCPCS: Performed by: INTERNAL MEDICINE

## 2020-05-08 PROCEDURE — 99153 MOD SED SAME PHYS/QHP EA: CPT | Performed by: INTERNAL MEDICINE

## 2020-05-08 PROCEDURE — 63600175 PHARM REV CODE 636 W HCPCS: Performed by: FAMILY MEDICINE

## 2020-05-08 PROCEDURE — 27200043 HC FORCEPS, BIOPSY: Performed by: INTERNAL MEDICINE

## 2020-05-08 PROCEDURE — 83735 ASSAY OF MAGNESIUM: CPT

## 2020-05-08 PROCEDURE — 45380 COLONOSCOPY AND BIOPSY: CPT | Performed by: INTERNAL MEDICINE

## 2020-05-08 RX ORDER — DIAZEPAM 10 MG/2ML
INJECTION INTRAMUSCULAR
Status: COMPLETED | OUTPATIENT
Start: 2020-05-08 | End: 2020-05-08

## 2020-05-08 RX ORDER — SODIUM CHLORIDE 9 MG/ML
INJECTION, SOLUTION INTRAVENOUS CONTINUOUS
Status: DISCONTINUED | OUTPATIENT
Start: 2020-05-08 | End: 2020-05-08

## 2020-05-08 RX ORDER — FENTANYL CITRATE 50 UG/ML
INJECTION, SOLUTION INTRAMUSCULAR; INTRAVENOUS
Status: COMPLETED | OUTPATIENT
Start: 2020-05-08 | End: 2020-05-08

## 2020-05-08 RX ORDER — MIDAZOLAM HYDROCHLORIDE 5 MG/ML
INJECTION INTRAMUSCULAR; INTRAVENOUS
Status: COMPLETED | OUTPATIENT
Start: 2020-05-08 | End: 2020-05-08

## 2020-05-08 RX ORDER — SODIUM CHLORIDE 0.9 % (FLUSH) 0.9 %
2 SYRINGE (ML) INJECTION
Status: DISCONTINUED | OUTPATIENT
Start: 2020-05-08 | End: 2020-05-09 | Stop reason: HOSPADM

## 2020-05-08 RX ORDER — ENOXAPARIN SODIUM 100 MG/ML
40 INJECTION SUBCUTANEOUS NIGHTLY
Status: DISCONTINUED | OUTPATIENT
Start: 2020-05-08 | End: 2020-05-09 | Stop reason: HOSPADM

## 2020-05-08 RX ADMIN — MIDAZOLAM HYDROCHLORIDE 2 MG: 5 INJECTION, SOLUTION INTRAMUSCULAR; INTRAVENOUS at 10:05

## 2020-05-08 RX ADMIN — FENTANYL CITRATE 50 MCG: 50 INJECTION, SOLUTION INTRAMUSCULAR; INTRAVENOUS at 10:05

## 2020-05-08 RX ADMIN — PIPERACILLIN AND TAZOBACTAM 3.38 G: 3; .375 INJECTION, POWDER, FOR SOLUTION INTRAVENOUS at 02:05

## 2020-05-08 RX ADMIN — SODIUM CHLORIDE: 0.9 INJECTION, SOLUTION INTRAVENOUS at 11:05

## 2020-05-08 RX ADMIN — PIPERACILLIN AND TAZOBACTAM 3.38 G: 3; .375 INJECTION, POWDER, FOR SOLUTION INTRAVENOUS at 10:05

## 2020-05-08 RX ADMIN — MIDAZOLAM HYDROCHLORIDE 1 MG: 5 INJECTION, SOLUTION INTRAMUSCULAR; INTRAVENOUS at 10:05

## 2020-05-08 RX ADMIN — DIAZEPAM 5 MG: 5 INJECTION, SOLUTION INTRAMUSCULAR; INTRAVENOUS at 11:05

## 2020-05-08 RX ADMIN — MORPHINE SULFATE 4 MG: 4 INJECTION INTRAVENOUS at 09:05

## 2020-05-08 RX ADMIN — INSULIN ASPART 2 UNITS: 100 INJECTION, SOLUTION INTRAVENOUS; SUBCUTANEOUS at 09:05

## 2020-05-08 RX ADMIN — ENOXAPARIN SODIUM 40 MG: 100 INJECTION SUBCUTANEOUS at 09:05

## 2020-05-08 RX ADMIN — DIAZEPAM 5 MG: 5 INJECTION, SOLUTION INTRAMUSCULAR; INTRAVENOUS at 10:05

## 2020-05-08 RX ADMIN — MORPHINE SULFATE 4 MG: 4 INJECTION INTRAVENOUS at 02:05

## 2020-05-08 RX ADMIN — SODIUM CHLORIDE: 0.9 INJECTION, SOLUTION INTRAVENOUS at 01:05

## 2020-05-08 RX ADMIN — FENTANYL CITRATE 25 MCG: 50 INJECTION, SOLUTION INTRAMUSCULAR; INTRAVENOUS at 10:05

## 2020-05-08 NOTE — PROGRESS NOTES
[facility] Medicine  Progress Note    Patient Name: Jose Miguel Brennan  MRN: [id number]  Patient Class: OP- Observation   Admission Date: 5/7/2020  4:34 AM  Attending Physician: Theresa Mendez MD  Primary Care Provider: Horace Doherty Jr, MD  Face-to-Face encounter date: 05/08/2020    Assessment & Plan:   Jose Miguel Brennan is a 34 y.o. male with:    TRACEY on CKD + Hypotension due to Volume Depletion  Due to acute on chronic cyclical diarrhea due to possible gastroenteritis  Concern for celiac, IBD  Acute Pancreatitis   Complicated by IgA nephropathy   - GI following, thank you  - EGD, colonscopy  - aggressive IVFs  - zosyn  - infectious workup including stool studies    Uncontrolled DM1 with hyperglycemia  - A1c 10.9  - SSI  - levamir   - adjusting DM meds as needed    Other chronic conditions:  Home meds as appropriate  Electrolyte derangement:  Trending BMP, Mg; Replacement prn  DVT ppx:  lovenox  FULL CODE    Discharge Planning:     Home when medically stable with nephrology and GI follow-up.    Subjective:      HPI:    33yo M with PMH of  DM, IgA nephropathy/CKD, HTN, HLD , chronic diarrhoea came  with recurrent diarrhea.  He had  previous repeated episodes of diarrhea  with acute kidney injuries and admitted to the hospital.  His chronic diarrhea started about 6 months ago but he was doing well in between.    This time he was having diarrhea about 10-15 times of watery and associated with nausea  And crampy abdominal pain and came to the hospital.  He was recently admitted to the hospital with the same episode and CT scan was done with enteritis but with no other mass or other gross abnormality was found.  It ER patient had acute kidney injury, elevated white cell and patient was admitted.  He was given IV fluid and stool studies are sent including  C diff.  No sick contacts. No fevers. No recent illnesses. He had EGD in the past about a year ago and he was told Sofia but he never  done a colonoscopy before.  Previous stool culture appear to be with  E coli O157:H7 isolated but the way it was reported in the system is extremely confusing and it need to confirm with micro lab    Hospital Course:    5/8:  Pt reports he feels overall improved since yesterday, but continues to have abdominal pain and diarrhea.      Review of Systems   All systems reviewed and are negative except as noted per above.  Objective:     Physical Exam  BP 98/60   Pulse 64   Temp 98.6 °F (37 °C)   Resp 18   Ht 6' (1.829 m)   Wt 98.6 kg (217 lb 6 oz)   SpO2 98%   BMI 29.48 kg/m²     Gen: alert, responsive   HEENT:  Eyes - no pallor  External ears with no lesions  Nares patent  Mouth - lips chapped  CV: RRR  Lungs: CTA B/L  Abd: +BS, soft, ND, +generalized TTP  Ext: no atrophy or edema  Skin: warm, dry  Neuro: grossly intact  Psych: pleasant    Recent Labs   Lab 05/08/20  0457   WBC 10.61   HGB 15.6   HCT 48.2        Recent Labs   Lab 05/08/20  0457   CALCIUM 8.4*   ALBUMIN 3.3*   PROT 5.9*   *   K 4.3   CO2 20*      BUN 37*   CREATININE 2.8*   ALKPHOS 97   ALT 57*   AST 42*   BILITOT 1.5*     No results found for: POCTGLUCOSE   Microbiology Results (last 7 days)     Procedure Component Value Units Date/Time    Stool culture **cannot be ordered stat** [[id number]] Collected:  05/07/20 0517    Order Status:  Completed Specimen:  Stool Updated:  05/08/20 1008     Stool Culture No Salmonella,Shigella,Vibrio,Campylobacter.      No E coli 0157:H7 isolated.    Urine Culture High Risk [[id number]] Collected:  05/07/20 1100    Order Status:  Completed Specimen:  Urine, Clean Catch Updated:  05/08/20 0826     Urine Culture, Routine No growth to date    Narrative:       Indicated criteria for high risk culture:->Other  Other (specify):->high risk    Blood culture [[id number]] Collected:  05/07/20 1818    Order Status:  Completed Specimen:  Blood from Antecubital, Right Updated:  05/08/20 0117     Blood Culture,  Routine No Growth to date    Narrative:       Collection has been rescheduled by KS3 at 05/07/2020 16:23 Reason:   Patient unavailable  Collection has been rescheduled by KS3 at 05/07/2020 16:23 Reason:   Patient unavailable    Blood culture [[id number]] Collected:  05/07/20 1822    Order Status:  Completed Specimen:  Blood Updated:  05/08/20 0117     Blood Culture, Routine No Growth to date    Narrative:       Collection has been rescheduled by KS3 at 05/07/2020 16:23 Reason:   Patient unavailable  Collection has been rescheduled by KS3 at 05/07/2020 16:23 Reason:   Patient unavailable    Clostridium difficile EIA [[id number]] Collected:  05/07/20 0517    Order Status:  Completed Specimen:  Stool Updated:  05/07/20 0758     C. diff Antigen Negative     C difficile Toxins A+B, EIA Negative     Comment: Testing not recommended for children <24 months old.           No orders to display       All labs, images, and other studies - including those not included in this note -- were reviewed personally by me.    Inpatient medications  Scheduled Meds:   atenoloL  50 mg Oral Daily    insulin detemir U-100  15 Units Subcutaneous Daily    piperacillin-tazobactam (ZOSYN) IVPB  3.375 g Intravenous Q8H     Continuous Infusions:   sodium chloride 0.9% 200 mL/hr at 05/08/20 0117    sodium chloride 0.9%      sodium chloride 0.9%       PRN Meds:.dextrose 50%, dextrose 50%, glucagon (human recombinant), glucose, glucose, insulin aspart U-100, morphine, ondansetron, sodium chloride 0.9%, sodium chloride 0.9%    Above encounter included review of the medical records, interviewing and examining the patient face-to-face, discussion with family and other health care providers, ordering and interpreting lab/test results and formulating a plan of care.     Theresa Mendez MD  [facility] Hospitalist

## 2020-05-08 NOTE — [employer] PATIENT INSTRUCTIONS
Discharge Summary/Instructions after an Endoscopic Procedure  Patient Name: Jose Miguel Lebron  Patient MRN: [id number]  Patient YOB: 1985  Friday, May 8, 2020  Hammad Castorena III, MD  RESTRICTIONS:  During your procedure today, you received medications for sedation.  These   medications may affect your judgment, balance and coordination.  Therefore,   for 24 hours, you have the following restrictions:   - DO NOT drive a car, operate machinery, make legal/financial decisions,   sign important papers or drink alcohol.    ACTIVITY:  Today: no heavy lifting, straining or running due to procedural   sedation/anesthesia.  The following day: return to full activity including work.  DIET:  Eat and drink normally unless instructed otherwise.     TREATMENT FOR COMMON SIDE EFFECTS:  - Mild abdominal pain, nausea, belching, bloating or excessive gas:  rest,   eat lightly and use a heating pad.  - Sore Throat: treat with throat lozenges and/or gargle with warm salt   water.  - Because air was used during the procedure, expelling large amounts of air   from your rectum or belching is normal.  - If a bowel prep was taken, you may not have a bowel movement for 1-3 days.    This is normal.  SYMPTOMS TO WATCH FOR AND REPORT TO YOUR PHYSICIAN:  1. Abdominal pain or bloating, other than gas cramps.  2. Chest pain.  3. Back pain.  4. Signs of infection such as: chills or fever occurring within 24 hours   after the procedure.  5. Rectal bleeding, which would show as bright red, maroon, or black stools.   (A tablespoon of blood from the rectum is not serious, especially if   hemorrhoids are present.)  6. Vomiting.  7. Weakness or dizziness.  GO DIRECTLY TO THE NEAREST EMERGENCY ROOM IF YOU HAVE ANY OF THE FOLLOWING:      Difficulty breathing              Chills and/or fever over 101 F   Persistent vomiting and/or vomiting blood   Severe abdominal pain   Severe chest pain   Black, tarry stools   Bleeding- more than one  tablespoon   Any other symptom or condition that you feel may need urgent attention  Your doctor recommends these additional instructions:  If any biopsies were taken, your doctors clinic will contact you in 1 to 2   weeks with any results.  - Return patient to hospital ren for ongoing care.   - F/u pathology from biopsies and f/u elastase / PCR (HIV, CRP, TSH negative   thusfar)  For questions, problems or results please call your physician - Hammad Castorena III, MD at Work:  [phone].  [facility], EMERGENCY ROOM PHONE NUMBER: [phone]  IF A COMPLICATION OR EMERGENCY SITUATION ARISES AND YOU ARE UNABLE TO REACH   YOUR PHYSICIAN - GO DIRECTLY TO THE EMERGENCY ROOM.  Hammad Castorena III, MD  5/8/2020 11:24:22 AM  This report has been verified and signed electronically.  [employer]

## 2020-05-08 NOTE — PROVATION PATIENT INSTRUCTIONS
Discharge Summary/Instructions after an Endoscopic Procedure  Patient Name: Jose Miguel Lebron  Patient MRN: [id number]  Patient YOB: 1985  Friday, May 8, 2020  Hammad Castorena III, MD  RESTRICTIONS:  During your procedure today, you received medications for sedation.  These   medications may affect your judgment, balance and coordination.  Therefore,   for 24 hours, you have the following restrictions:   - DO NOT drive a car, operate machinery, make legal/financial decisions,   sign important papers or drink alcohol.    ACTIVITY:  Today: no heavy lifting, straining or running due to procedural   sedation/anesthesia.  The following day: return to full activity including work.  DIET:  Eat and drink normally unless instructed otherwise.     TREATMENT FOR COMMON SIDE EFFECTS:  - Mild abdominal pain, nausea, belching, bloating or excessive gas:  rest,   eat lightly and use a heating pad.  - Sore Throat: treat with throat lozenges and/or gargle with warm salt   water.  - Because air was used during the procedure, expelling large amounts of air   from your rectum or belching is normal.  - If a bowel prep was taken, you may not have a bowel movement for 1-3 days.    This is normal.  SYMPTOMS TO WATCH FOR AND REPORT TO YOUR PHYSICIAN:  1. Abdominal pain or bloating, other than gas cramps.  2. Chest pain.  3. Back pain.  4. Signs of infection such as: chills or fever occurring within 24 hours   after the procedure.  5. Rectal bleeding, which would show as bright red, maroon, or black stools.   (A tablespoon of blood from the rectum is not serious, especially if   hemorrhoids are present.)  6. Vomiting.  7. Weakness or dizziness.  GO DIRECTLY TO THE NEAREST EMERGENCY ROOM IF YOU HAVE ANY OF THE FOLLOWING:      Difficulty breathing              Chills and/or fever over 101 F   Persistent vomiting and/or vomiting blood   Severe abdominal pain   Severe chest pain   Black, tarry stools   Bleeding- more than one  tablespoon   Any other symptom or condition that you feel may need urgent attention  Your doctor recommends these additional instructions:  If any biopsies were taken, your doctors clinic will contact you in 1 to 2   weeks with any results.  - Return patient to hospital ren for ongoing care.   - Await pathology results.  For questions, problems or results please call your physician - Hammad Castorena III, MD at Work:  [phone].  [facility], EMERGENCY ROOM PHONE NUMBER: [phone]  IF A COMPLICATION OR EMERGENCY SITUATION ARISES AND YOU ARE UNABLE TO REACH   YOUR PHYSICIAN - GO DIRECTLY TO THE EMERGENCY ROOM.  Hammad Castorena III, MD  5/8/2020 11:21:38 AM  This report has been verified and signed electronically.  [employer]

## 2020-05-09 VITALS
TEMPERATURE: 98 F | WEIGHT: 217.38 LBS | RESPIRATION RATE: 19 BRPM | DIASTOLIC BLOOD PRESSURE: 87 MMHG | SYSTOLIC BLOOD PRESSURE: 139 MMHG | HEIGHT: 72 IN | BODY MASS INDEX: 29.44 KG/M2 | OXYGEN SATURATION: 100 % | HEART RATE: 76 BPM

## 2020-05-09 PROBLEM — K85.90 ACUTE PANCREATITIS: Status: RESOLVED | Noted: 2020-05-07 | Resolved: 2020-05-09

## 2020-05-09 PROBLEM — R19.7 DIARRHEA: Status: RESOLVED | Noted: 2019-10-17 | Resolved: 2020-05-09

## 2020-05-09 PROBLEM — K52.9 GASTROENTERITIS: Status: RESOLVED | Noted: 2017-09-08 | Resolved: 2020-05-09

## 2020-05-09 LAB
ALBUMIN SERPL BCP-MCNC: 2.9 G/DL (ref 3.5–5.2)
ALP SERPL-CCNC: 107 U/L (ref 55–135)
ALT SERPL W/O P-5'-P-CCNC: 63 U/L (ref 10–44)
ANION GAP SERPL CALC-SCNC: 4 MMOL/L (ref 8–16)
AST SERPL-CCNC: 23 U/L (ref 10–40)
BASOPHILS # BLD AUTO: 0.02 K/UL (ref 0–0.2)
BASOPHILS NFR BLD: 0.2 % (ref 0–1.9)
BILIRUB SERPL-MCNC: 0.7 MG/DL (ref 0.1–1)
BUN SERPL-MCNC: 25 MG/DL (ref 6–20)
CALCIUM SERPL-MCNC: 8.3 MG/DL (ref 8.7–10.5)
CHLORIDE SERPL-SCNC: 111 MMOL/L (ref 95–110)
CO2 SERPL-SCNC: 22 MMOL/L (ref 23–29)
CREAT SERPL-MCNC: 2.1 MG/DL (ref 0.5–1.4)
DIFFERENTIAL METHOD: ABNORMAL
EOSINOPHIL # BLD AUTO: 0.6 K/UL (ref 0–0.5)
EOSINOPHIL NFR BLD: 6.6 % (ref 0–8)
ERYTHROCYTE [DISTWIDTH] IN BLOOD BY AUTOMATED COUNT: 14.1 % (ref 11.5–14.5)
EST. GFR  (AFRICAN AMERICAN): 46.1 ML/MIN/1.73 M^2
EST. GFR  (NON AFRICAN AMERICAN): 39.9 ML/MIN/1.73 M^2
GLUCOSE SERPL-MCNC: 178 MG/DL (ref 70–110)
GLUCOSE SERPL-MCNC: 183 MG/DL (ref 70–110)
GLUCOSE SERPL-MCNC: 224 MG/DL (ref 70–110)
HCT VFR BLD AUTO: 46.1 % (ref 40–54)
HGB BLD-MCNC: 14.6 G/DL (ref 14–18)
IMM GRANULOCYTES # BLD AUTO: 0.04 K/UL (ref 0–0.04)
IMM GRANULOCYTES NFR BLD AUTO: 0.5 % (ref 0–0.5)
LYMPHOCYTES # BLD AUTO: 3.3 K/UL (ref 1–4.8)
LYMPHOCYTES NFR BLD: 37.9 % (ref 18–48)
MAGNESIUM SERPL-MCNC: 1.8 MG/DL (ref 1.6–2.6)
MCH RBC QN AUTO: 28.6 PG (ref 27–31)
MCHC RBC AUTO-ENTMCNC: 31.7 G/DL (ref 32–36)
MCV RBC AUTO: 90 FL (ref 82–98)
MONOCYTES # BLD AUTO: 0.5 K/UL (ref 0.3–1)
MONOCYTES NFR BLD: 5.8 % (ref 4–15)
NEUTROPHILS # BLD AUTO: 4.2 K/UL (ref 1.8–7.7)
NEUTROPHILS NFR BLD: 49 % (ref 38–73)
NRBC BLD-RTO: 0 /100 WBC
PLATELET # BLD AUTO: 144 K/UL (ref 150–350)
PMV BLD AUTO: 10.8 FL (ref 9.2–12.9)
POTASSIUM SERPL-SCNC: 4.1 MMOL/L (ref 3.5–5.1)
PROT SERPL-MCNC: 5.6 G/DL (ref 6–8.4)
RBC # BLD AUTO: 5.1 M/UL (ref 4.6–6.2)
SODIUM SERPL-SCNC: 137 MMOL/L (ref 136–145)
STOOL CULTURE: NORMAL
STOOL CULTURE: NORMAL
WBC # BLD AUTO: 8.61 K/UL (ref 3.9–12.7)

## 2020-05-09 PROCEDURE — 87507 IADNA-DNA/RNA PROBE TQ 12-25: CPT

## 2020-05-09 PROCEDURE — 63600175 PHARM REV CODE 636 W HCPCS: Performed by: FAMILY MEDICINE

## 2020-05-09 PROCEDURE — 83735 ASSAY OF MAGNESIUM: CPT

## 2020-05-09 PROCEDURE — 36415 COLL VENOUS BLD VENIPUNCTURE: CPT

## 2020-05-09 PROCEDURE — 63600175 PHARM REV CODE 636 W HCPCS: Performed by: INTERNAL MEDICINE

## 2020-05-09 PROCEDURE — 85025 COMPLETE CBC W/AUTO DIFF WBC: CPT

## 2020-05-09 PROCEDURE — 25000003 PHARM REV CODE 250: Performed by: INTERNAL MEDICINE

## 2020-05-09 PROCEDURE — C9399 UNCLASSIFIED DRUGS OR BIOLOG: HCPCS | Performed by: INTERNAL MEDICINE

## 2020-05-09 PROCEDURE — 82656 EL-1 FECAL QUAL/SEMIQ: CPT

## 2020-05-09 PROCEDURE — 80053 COMPREHEN METABOLIC PANEL: CPT

## 2020-05-09 RX ORDER — CEFDINIR 300 MG/1
300 CAPSULE ORAL 2 TIMES DAILY
Qty: 20 CAPSULE | Refills: 0 | Status: SHIPPED | OUTPATIENT
Start: 2020-05-09 | End: 2020-05-19

## 2020-05-09 RX ORDER — HYDROCODONE BITARTRATE AND ACETAMINOPHEN 5; 325 MG/1; MG/1
1 TABLET ORAL EVERY 6 HOURS PRN
Qty: 15 TABLET | Refills: 0 | Status: SHIPPED | OUTPATIENT
Start: 2020-05-09 | End: 2020-06-11

## 2020-05-09 RX ORDER — INSULIN GLARGINE 100 [IU]/ML
20 INJECTION, SOLUTION SUBCUTANEOUS DAILY
Qty: 3 ML | Refills: 0 | Status: SHIPPED | OUTPATIENT
Start: 2020-05-09 | End: 2021-05-09

## 2020-05-09 RX ORDER — ONDANSETRON 4 MG/1
8 TABLET, ORALLY DISINTEGRATING ORAL EVERY 6 HOURS PRN
Qty: 30 TABLET | Refills: 0 | Status: SHIPPED | OUTPATIENT
Start: 2020-05-09 | End: 2021-08-17

## 2020-05-09 RX ADMIN — ATENOLOL 50 MG: 25 TABLET ORAL at 10:05

## 2020-05-09 RX ADMIN — PIPERACILLIN AND TAZOBACTAM 3.38 G: 3; .375 INJECTION, POWDER, FOR SOLUTION INTRAVENOUS at 10:05

## 2020-05-09 RX ADMIN — PIPERACILLIN AND TAZOBACTAM 3.38 G: 3; .375 INJECTION, POWDER, FOR SOLUTION INTRAVENOUS at 06:05

## 2020-05-09 RX ADMIN — MORPHINE SULFATE 4 MG: 4 INJECTION INTRAVENOUS at 04:05

## 2020-05-09 RX ADMIN — INSULIN ASPART 2 UNITS: 100 INJECTION, SOLUTION INTRAVENOUS; SUBCUTANEOUS at 10:05

## 2020-05-09 RX ADMIN — INSULIN DETEMIR 15 UNITS: 100 INJECTION, SOLUTION SUBCUTANEOUS at 10:05

## 2020-05-09 RX ADMIN — INSULIN ASPART 4 UNITS: 100 INJECTION, SOLUTION INTRAVENOUS; SUBCUTANEOUS at 01:05

## 2020-05-09 NOTE — DISCHARGE SUMMARY
Counts include 234 beds at the [facility] Medicine  Discharge Summary      Patient Name: Jose Miguel Brennan  MRN: [id number]  Admission Date: 5/7/2020  Discharge Date and Time: 5/9/2020  1:59 PM  Discharging Provider: Theresa Mendez MD  Primary Care Provider: Horace Doherty Jr, MD    HPI:     33yo M with PMH of  DM, IgA nephropathy/CKD, HTN, HLD , chronic diarrhoea came  with recurrent diarrhea.  He had  previous repeated episodes of diarrhea  with acute kidney injuries and admitted to the hospital.  His chronic diarrhea started about 6 months ago but he was doing well in between.    This time he was having diarrhea about 10-15 times of watery and associated with nausea  And crampy abdominal pain and came to the hospital.  He was recently admitted to the hospital with the same episode and CT scan was done with enteritis but with no other mass or other gross abnormality was found.  It ER patient had acute kidney injury, elevated white cell and patient was admitted.  He was given IV fluid and stool studies are sent including  C diff.  No sick contacts. No fevers. No recent illnesses. He had EGD in the past about a year ago and he was told Sofia but he never done a colonoscopy before.  Previous stool culture appear to be with  E coli O157:H7 isolated but the way it was reported in the system is extremely confusing and it need to confirm with micro lab    Procedure(s) (LRB):  EGD (ESOPHAGOGASTRODUODENOSCOPY) (N/A)  COLONOSCOPY (N/A)      Hospital Course:     5/8:  Pt reports he feels overall improved since yesterday, but continues to have abdominal pain and diarrhea.      5/9:  Pt's abdominal pain and diarrhea have resolved.  TRACEY improved.  Pt considered stable for discharge home and will f/u with outpt specialists, including nephrology and GI follow-up.    TRACEY (improved) on CKD + Hypotension (resolved) due to Volume Depletion (resolved)  Due to acute on chronic cyclical diarrhea (improved) due to possible  gastroenteritis  Concern for celiac, IBD  Acute Pancreatitis (resolved)  Complicated by IgA nephropathy (stable)  - GI following, thank you  - EGD, colonscopy this admission with results as noted below  - aggressive IVFs  - zosyn  - infectious workup including stool studies     Uncontrolled DM1 with hyperglycemia  - A1c 10.9  - SSI  - levamir   - adjusting DM meds as needed     Other chronic conditions:  Home meds as appropriate  Electrolyte derangement:  Trending BMP, Mg; Replacement prn  DVT ppx:  lovenox  FULL CODE    DISCHARGE PHYSICAL EXAM  /87 (BP Location: Left arm, Patient Position: Lying)   Pulse 76   Temp 97.8 °F (36.6 °C) (Oral)   Resp 19   Ht 6' (1.829 m)   Wt 98.6 kg (217 lb 6 oz)   SpO2 100%   BMI 29.48 kg/m²     Gen: alert, responsive  HEENT:  Eyes - no pallor  External ears with no lesions  Nares patent  Mouth - lips chapped  CV: RRR  Lungs: CTA B/L  Abd: +BS, soft, NT, ND  Ext: no atrophy or edema  Skin: warm, dry  Neuro: grossly intact  Psych: pleasant    Colonscopy and Upper GI 5/8/2020:    The esophagus was normal.       Patchy moderate inflammation characterized by congestion (edema),        erosions, erythema and friability was found in the gastric body.        Biopsies were taken with a cold forceps for histology.       The examined duodenum and second portion of the duodenum were        normal. Biopsies for histology were taken with a cold forceps for        evaluation of celiac disease.       A few localized erosions without bleeding were found in the duodenal        bulb. Biopsies were taken with a cold forceps for histology.         The terminal ileum appeared normal.       The entire examined colon appeared normal. Biopsies were taken with        a cold forceps for histology.       A moderate amount of stool was found in the entire colon,        interfering with visualization.    Consults:   Consults (From admission, onward)        Status Ordering Provider     Inpatient  consult to Gastroenterology  Once     Provider:  Hammad Castorena III, MD    Completed SD LASSITER        Final Active Diagnoses:    Diagnosis Date Noted POA    TRACEY (acute kidney injury) [N17.9] 09/08/2017 Yes    Type 2 diabetes mellitus with stage 3 chronic kidney disease, without long-term current use of insulin [E11.22, N18.3] 04/10/2014 Yes    IgA nephropathy [N02.8] 03/12/2014 Yes    Hypertension associated with diabetes [E11.59, I10] 03/12/2014 Yes      Problems Resolved During this Admission:    Diagnosis Date Noted Date Resolved POA    PRINCIPAL PROBLEM:  Gastroenteritis [K52.9] 09/08/2017 05/09/2020 Yes    Acute pancreatitis [K85.90] 05/07/2020 05/09/2020 Yes    Diarrhea [R19.7] 10/17/2019 05/09/2020 Yes       Discharged Condition: stable    Disposition: Home or Self Care    Follow Up:  Follow-up Information     Horace Doherty Jr, MD. Call today.    Specialty:  Family Medicine  Why:  for a tele-health visit to manage your diabetes and follow-up with your kidney function.  Contact information:  [address] [facility] [place] [address]  [phone]                 Patient Instructions:      Ambulatory referral/consult to Gastroenterology   Standing Status: Future   Referral Priority: Routine Referral Type: Consultation   Referral Reason: Specialty Services Required   Referred to Provider: HAMMAD CASTORENA III Requested Specialty: Gastroenterology   Number of Visits Requested: 1     Pending Diagnostic Studies:     Procedure Component Value Units Date/Time    Gastrointestinal Pathogens Panel, PCR [[id number]] Collected:  05/09/20 0815    Order Status:  Sent Lab Status:  In process Updated:  05/09/20 0828    Specimen:  Stool     Pancreatic elastase, fecal [[id number]] Collected:  05/09/20 0815    Order Status:  Sent Lab Status:  In process Updated:  05/09/20 0828    Specimen:  Stool     Specimen to Pathology - Surgery [[id number]] Collected:  05/08/20 1123    Order Status:  Sent Lab Status:  No result      Specimen:  Tissue          Medications:  Reconciled Home Medications:      Medication List      START taking these medications    cefdinir 300 MG capsule  Commonly known as:  OMNICEF  Take 1 capsule (300 mg total) by mouth 2 (two) times daily. for 10 days     HYDROcodone-acetaminophen 5-325 mg per tablet  Commonly known as:  NORCO  Take 1 tablet by mouth every 6 (six) hours as needed (SEVERE PAIN).     insulin glargine 100 units/mL (3mL) SubQ pen  Commonly known as:  BASAGLAR KWIKPEN U-100 INSULIN  Inject 20 Units into the skin once daily.  Replaces:  TOUJEO SOLOSTAR U-300 INSULIN 300 unit/mL (1.5 mL) Inpn pen        CHANGE how you take these medications    ondansetron 4 MG Tbdl  Commonly known as:  ZOFRAN-ODT  Take 2 tablets (8 mg total) by mouth every 6 (six) hours as needed (nausea; dissolve in your mouth).  What changed:    · how much to take  · when to take this  · reasons to take this        CONTINUE taking these medications    atenoloL 50 MG tablet  Commonly known as:  TENORMIN  Take 50 mg by mouth once daily.     atorvastatin 20 MG tablet  Commonly known as:  LIPITOR  Take 20 mg by mouth once daily.        STOP taking these medications    lisinopriL 20 MG tablet  Commonly known as:  PRINIVIL,ZESTRIL     TOUJEO SOLOSTAR U-300 INSULIN 300 unit/mL (1.5 mL) Inpn pen  Generic drug:  insulin glargine (TOUJEO)  Replaced by:  insulin glargine 100 units/mL (3mL) SubQ pen          Theresa Mendez MD  Department of Hospital Medicine  [facility]

## 2020-05-10 LAB — BACTERIA UR CULT: NO GROWTH

## 2020-05-12 LAB
ADV 40+41 DNA STL QL NAA+NON-PROBE: NOT DETECTED
ASTRO TYP 1-8 RNA STL QL NAA+NON-PROBE: NOT DETECTED
BACTERIA BLD CULT: NORMAL
BACTERIA BLD CULT: NORMAL
C CAYETANENSIS DNA STL QL NAA+NON-PROBE: NOT DETECTED
C COLI+JEJ+UPSA DNA STL QL NAA+NON-PROBE: NOT DETECTED
C DIF TOX TCDA+TCDB STL QL NAA+NON-PROBE: NOT DETECTED
CRYPTOSP DNA STL QL NAA+NON-PROBE: NOT DETECTED
E COLI O157 DNA STL QL NAA+NON-PROBE: NORMAL
E HISTOLYT DNA STL QL NAA+NON-PROBE: NOT DETECTED
EC STX1+STX2 GENES STL QL NAA+NON-PROBE: NOT DETECTED
ENTEROAGGREGATIVE E COLI: NOT DETECTED
ENTEROPATHOGENIC E COLI: NOT DETECTED
ETEC LTA+ST1A+ST1B TOX ST NAA+NON-PROBE: NOT DETECTED
G LAMBLIA DNA STL QL NAA+NON-PROBE: NOT DETECTED
GPP - SALMONELLA: NOT DETECTED
GPP - VIBRIO CHOLERA: NOT DETECTED
GPP - YERSINIA ENTEROCOLITICA: NOT DETECTED
NOROVIRUS GI+II RNA STL QL NAA+NON-PROBE: NOT DETECTED
PLESIOMONAS SHIGELLOIDES: NOT DETECTED
RVA RNA STL QL NAA+NON-PROBE: NOT DETECTED
SAPO I+II+IV+V RNA STL QL NAA+NON-PROBE: NOT DETECTED
SHIGELLA SP+EIEC IPAH STL QL NAA+PROBE: NOT DETECTED
VIBRIO: NOT DETECTED

## 2020-05-16 LAB — ELASTASE PANC STL-MCNT: 432 UG ELAST./G

## 2020-06-11 ENCOUNTER — HOSPITAL ENCOUNTER (EMERGENCY)
Facility: HOSPITAL | Age: 35
Discharge: HOME | End: 2020-06-11
Attending: EMERGENCY MEDICINE
Payer: MEDICAID

## 2020-06-11 VITALS
SYSTOLIC BLOOD PRESSURE: 120 MMHG | RESPIRATION RATE: 16 BRPM | OXYGEN SATURATION: 97 % | HEART RATE: 57 BPM | DIASTOLIC BLOOD PRESSURE: 76 MMHG | BODY MASS INDEX: 29.8 KG/M2 | WEIGHT: 220 LBS | HEIGHT: 72 IN | TEMPERATURE: 99 F

## 2020-06-11 DIAGNOSIS — N17.9 AKI (ACUTE KIDNEY INJURY): Primary | ICD-10-CM

## 2020-06-11 LAB
ALBUMIN SERPL BCP-MCNC: 3.7 G/DL (ref 3.5–5.2)
ALP SERPL-CCNC: 104 U/L (ref 55–135)
ALT SERPL W/O P-5'-P-CCNC: 26 U/L (ref 10–44)
ANION GAP SERPL CALC-SCNC: 11 MMOL/L (ref 8–16)
AST SERPL-CCNC: 17 U/L (ref 10–40)
BACTERIA #/AREA URNS HPF: NEGATIVE /HPF
BASOPHILS # BLD AUTO: 0.06 K/UL (ref 0–0.2)
BASOPHILS NFR BLD: 0.5 % (ref 0–1.9)
BILIRUB SERPL-MCNC: 1.1 MG/DL (ref 0.1–1)
BILIRUB UR QL STRIP: NEGATIVE
BUN SERPL-MCNC: 30 MG/DL (ref 6–20)
CALCIUM SERPL-MCNC: 9.1 MG/DL (ref 8.7–10.5)
CHLORIDE SERPL-SCNC: 102 MMOL/L (ref 95–110)
CLARITY UR: CLEAR
CO2 SERPL-SCNC: 22 MMOL/L (ref 23–29)
COLOR UR: YELLOW
CREAT SERPL-MCNC: 3.1 MG/DL (ref 0.5–1.4)
DIFFERENTIAL METHOD: ABNORMAL
EOSINOPHIL # BLD AUTO: 0.8 K/UL (ref 0–0.5)
EOSINOPHIL NFR BLD: 6.6 % (ref 0–8)
ERYTHROCYTE [DISTWIDTH] IN BLOOD BY AUTOMATED COUNT: 14 % (ref 11.5–14.5)
EST. GFR  (AFRICAN AMERICAN): 28.8 ML/MIN/1.73 M^2
EST. GFR  (NON AFRICAN AMERICAN): 24.9 ML/MIN/1.73 M^2
GLUCOSE SERPL-MCNC: 321 MG/DL (ref 70–110)
GLUCOSE UR QL STRIP: ABNORMAL
HCT VFR BLD AUTO: 50 % (ref 40–54)
HGB BLD-MCNC: 16.4 G/DL (ref 14–18)
HGB UR QL STRIP: ABNORMAL
HYALINE CASTS #/AREA URNS LPF: 2 /LPF
IMM GRANULOCYTES # BLD AUTO: 0.06 K/UL (ref 0–0.04)
IMM GRANULOCYTES NFR BLD AUTO: 0.5 % (ref 0–0.5)
KETONES UR QL STRIP: NEGATIVE
LEUKOCYTE ESTERASE UR QL STRIP: NEGATIVE
LYMPHOCYTES # BLD AUTO: 3.9 K/UL (ref 1–4.8)
LYMPHOCYTES NFR BLD: 31.2 % (ref 18–48)
MCH RBC QN AUTO: 28.6 PG (ref 27–31)
MCHC RBC AUTO-ENTMCNC: 32.8 G/DL (ref 32–36)
MCV RBC AUTO: 87 FL (ref 82–98)
MICROSCOPIC COMMENT: ABNORMAL
MONOCYTES # BLD AUTO: 0.8 K/UL (ref 0.3–1)
MONOCYTES NFR BLD: 6.2 % (ref 4–15)
NEUTROPHILS # BLD AUTO: 7 K/UL (ref 1.8–7.7)
NEUTROPHILS NFR BLD: 55 % (ref 38–73)
NITRITE UR QL STRIP: NEGATIVE
NRBC BLD-RTO: 0 /100 WBC
PH UR STRIP: 6 [PH] (ref 5–8)
PLATELET # BLD AUTO: 192 K/UL (ref 150–350)
PMV BLD AUTO: 11 FL (ref 9.2–12.9)
POTASSIUM SERPL-SCNC: 3.9 MMOL/L (ref 3.5–5.1)
PROT SERPL-MCNC: 6.5 G/DL (ref 6–8.4)
PROT UR QL STRIP: ABNORMAL
RBC # BLD AUTO: 5.73 M/UL (ref 4.6–6.2)
RBC #/AREA URNS HPF: 3 /HPF (ref 0–4)
SARS-COV-2 RDRP RESP QL NAA+PROBE: NEGATIVE
SODIUM SERPL-SCNC: 135 MMOL/L (ref 136–145)
SP GR UR STRIP: 1.01 (ref 1–1.03)
SQUAMOUS #/AREA URNS HPF: 0 /HPF
URN SPEC COLLECT METH UR: ABNORMAL
UROBILINOGEN UR STRIP-ACNC: NEGATIVE EU/DL
WBC # BLD AUTO: 12.61 K/UL (ref 3.9–12.7)
WBC #/AREA URNS HPF: 0 /HPF (ref 0–5)
YEAST URNS QL MICRO: ABNORMAL

## 2020-06-11 PROCEDURE — 63600175 PHARM REV CODE 636 W HCPCS: Performed by: EMERGENCY MEDICINE

## 2020-06-11 PROCEDURE — 99285 EMERGENCY DEPT VISIT HI MDM: CPT | Mod: 25

## 2020-06-11 PROCEDURE — 81001 URINALYSIS AUTO W/SCOPE: CPT

## 2020-06-11 PROCEDURE — U0002 COVID-19 LAB TEST NON-CDC: HCPCS

## 2020-06-11 PROCEDURE — 85025 COMPLETE CBC W/AUTO DIFF WBC: CPT

## 2020-06-11 PROCEDURE — 96375 TX/PRO/DX INJ NEW DRUG ADDON: CPT

## 2020-06-11 PROCEDURE — 25000003 PHARM REV CODE 250: Performed by: EMERGENCY MEDICINE

## 2020-06-11 PROCEDURE — 80053 COMPREHEN METABOLIC PANEL: CPT

## 2020-06-11 PROCEDURE — 96374 THER/PROPH/DIAG INJ IV PUSH: CPT

## 2020-06-11 PROCEDURE — 96361 HYDRATE IV INFUSION ADD-ON: CPT

## 2020-06-11 RX ORDER — SODIUM CHLORIDE 9 MG/ML
1000 INJECTION, SOLUTION INTRAVENOUS
Status: COMPLETED | OUTPATIENT
Start: 2020-06-11 | End: 2020-06-11

## 2020-06-11 RX ORDER — MORPHINE SULFATE 4 MG/ML
4 INJECTION, SOLUTION INTRAMUSCULAR; INTRAVENOUS
Status: COMPLETED | OUTPATIENT
Start: 2020-06-11 | End: 2020-06-11

## 2020-06-11 RX ORDER — ONDANSETRON 2 MG/ML
4 INJECTION INTRAMUSCULAR; INTRAVENOUS
Status: COMPLETED | OUTPATIENT
Start: 2020-06-11 | End: 2020-06-11

## 2020-06-11 RX ORDER — ERGOCALCIFEROL 1.25 MG/1
50000 CAPSULE ORAL
COMMUNITY
Start: 2020-04-29 | End: 2021-08-17

## 2020-06-11 RX ADMIN — SODIUM CHLORIDE 1000 ML: 0.9 INJECTION, SOLUTION INTRAVENOUS at 08:06

## 2020-06-11 RX ADMIN — ONDANSETRON 4 MG: 2 INJECTION INTRAMUSCULAR; INTRAVENOUS at 07:06

## 2020-06-11 RX ADMIN — SODIUM CHLORIDE 1000 ML: 0.9 INJECTION, SOLUTION INTRAVENOUS at 07:06

## 2020-06-11 RX ADMIN — MORPHINE SULFATE 4 MG: 4 INJECTION INTRAVENOUS at 07:06

## 2020-06-11 NOTE — ED PROVIDER NOTES
Encounter Date: 6/11/2020       History     Chief Complaint   Patient presents with    Flank Pain     Patient presents complaining of 4 days of hematuria, bilateral back pain.  Patient has a history of IgA nephropathy, nephritis.  Patient had recent admission in May for similar presentation but that time he had diarrhea.  He denies any nausea vomiting or diarrhea.  At the worst symptoms are moderate.  No fever.  Nothing really makes it better or worse.        Review of patient's allergies indicates:   Allergen Reactions    Zithromax [azithromycin] Rash     Past Medical History:   Diagnosis Date    Asthma     Diabetes mellitus     diet controlled    Hyperlipidemia     Hypertension     IgA nephropathy      Past Surgical History:   Procedure Laterality Date    COLONOSCOPY N/A 5/8/2020    Procedure: COLONOSCOPY;  Surgeon: Hammad Castorena III, MD;  Location: [facility];  Service: Endoscopy;  Laterality: N/A;    ESOPHAGOGASTRODUODENOSCOPY N/A 5/8/2020    Procedure: EGD (ESOPHAGOGASTRODUODENOSCOPY);  Surgeon: Hammad Castorena III, MD;  Location: [facility];  Service: Endoscopy;  Laterality: N/A;    nose polyp removal       Family History   Problem Relation Age of Onset    Hypertension Maternal Grandmother     Cancer Maternal Grandfather     Diabetes Maternal Grandfather     Heart disease Maternal Grandfather     Heart disease Mother     Stroke Mother     Diabetes Mother      Social History     Tobacco Use    Smoking status: Current Every Day Smoker     Types: Cigars    Smokeless tobacco: Never Used    Tobacco comment: One cigar a day   Substance Use Topics    Alcohol use: No    Drug use: No     Review of Systems   Gastrointestinal: Negative for abdominal pain, nausea and vomiting.   Genitourinary: Positive for hematuria.   All other systems reviewed and are negative.      Physical Exam     Initial Vitals [06/11/20 0621]   BP Pulse Resp Temp SpO2   (!) 157/107 73 16 99 °F (37.2 °C) 99 %      MAP        --         Physical Exam    Nursing note and vitals reviewed.  Constitutional: He appears well-developed and well-nourished. He is not diaphoretic. No distress.   HENT:   Head: Normocephalic and atraumatic.   Mouth/Throat: Oropharynx is clear and moist.   Eyes: EOM are normal.   Neck: Normal range of motion. Neck supple.   Cardiovascular: Normal rate, regular rhythm, normal heart sounds and intact distal pulses.   Pulmonary/Chest: Breath sounds normal. No respiratory distress.   Abdominal: Soft.   Musculoskeletal: Normal range of motion.   Neurological: He is alert and oriented to person, place, and time. He has normal strength.   Skin: Skin is warm and dry.   Psychiatric: He has a normal mood and affect. His behavior is normal. Judgment and thought content normal.         ED Course   Procedures  Labs Reviewed   CBC W/ AUTO DIFFERENTIAL   COMPREHENSIVE METABOLIC PANEL   URINALYSIS, REFLEX TO URINE CULTURE          Imaging Results    None          Medical Decision Making:   ED Management:  Patient presenting with hematuria.  Blood work shows acute on chronic kidney injury.  CT scan is without acute findings.  Patient receiving IV fluids require admission to the hospital for further IV hydration secondary to worsening kidney function.     After consulted Hospital Medicine and Nephrology.  Hospital medicine and I agree patient is not require admission.  Nephrology review the case Dr. Martino and agrees patient can be safely discharged with follow-up within 1 week.  He recommends repeating test in 1 week.  Advised return if any worsening hematuria or any concern.  Patient be discharged in stable condition detailed return precautions discussed.                                 Clinical Impression:       ICD-10-CM ICD-9-CM   1. TRACEY (acute kidney injury) N17.9 584.9                                Regan Lee MD  06/11/20 1028

## 2020-06-11 NOTE — ED NOTES
"C/O BILATERAL FLANK PAIN FOR 4 DAYS.  STATES BLOOD IN URINE YESTERDAY.  ALERT , ORIENTED AND AMBULATORY. NO RD. NON DISTENDED ABDOMEN.  MAEW.  NO DEFORMITY. ++ PULSES X 4.  < 3" CAPILLARY REFILL.  SKIN WDI.  UA REQUESTED. IV ACCESS OBTAINED  AND LABS DRAWN. 10/10 PAIN REPORTED.  "

## 2020-06-24 ENCOUNTER — CLINICAL SUPPORT (OUTPATIENT)
Dept: URGENT CARE | Facility: CLINIC | Age: 35
End: 2020-06-24
Payer: MEDICAID

## 2020-06-24 VITALS
DIASTOLIC BLOOD PRESSURE: 86 MMHG | BODY MASS INDEX: 30.88 KG/M2 | HEART RATE: 70 BPM | SYSTOLIC BLOOD PRESSURE: 124 MMHG | TEMPERATURE: 97 F | RESPIRATION RATE: 16 BRPM | HEIGHT: 72 IN | WEIGHT: 228 LBS | OXYGEN SATURATION: 98 %

## 2020-06-24 DIAGNOSIS — M25.512 ACUTE PAIN OF LEFT SHOULDER: ICD-10-CM

## 2020-06-24 DIAGNOSIS — S43.402A SPRAIN OF LEFT SHOULDER, UNSPECIFIED SHOULDER SPRAIN TYPE, INITIAL ENCOUNTER: Primary | ICD-10-CM

## 2020-06-24 PROCEDURE — 73030 X-RAY EXAM OF SHOULDER: CPT | Mod: LT,S$GLB,, | Performed by: NURSE PRACTITIONER

## 2020-06-24 PROCEDURE — 99214 OFFICE O/P EST MOD 30 MIN: CPT | Mod: 25,S$GLB,, | Performed by: NURSE PRACTITIONER

## 2020-06-24 PROCEDURE — 99214 PR OFFICE/OUTPT VISIT, EST, LEVL IV, 30-39 MIN: ICD-10-PCS | Mod: 25,S$GLB,, | Performed by: NURSE PRACTITIONER

## 2020-06-24 PROCEDURE — 73030 PR  X-RAY SHOULDER 2+ VW: ICD-10-PCS | Mod: LT,S$GLB,, | Performed by: NURSE PRACTITIONER

## 2020-06-24 RX ORDER — TRAMADOL HYDROCHLORIDE 50 MG/1
50 TABLET ORAL EVERY 6 HOURS
Qty: 8 TABLET | Refills: 0 | Status: SHIPPED | OUTPATIENT
Start: 2020-06-24 | End: 2021-08-17

## 2020-06-24 NOTE — PATIENT INSTRUCTIONS
RICE     Rest an injury, elevate it, and use ice and compression as directed.   RICE stands for rest, ice, compression, and elevation. These can limit pain and swelling after an injury. RICE may be recommended to help treat fractures, sprains, strains, and bruises or bumps.   Home care  The following explain the details of RICE:  · Rest. Limit the use of the injured body part. This helps prevent further damage to the body part and gives it time to heal. In some cases, you may need a sling, brace, splint, or cast to help keep the body part still until it has healed.  · Ice. Applying ice right after an injury helps relieve pain and swelling. Wrap a bag of ice in a thin towel. Then, place it over the injured area. Do this for 10 to 15 minutes every 3 to 4 hours. Continue for the next 1 to 3 days or until your symptoms improve. Never put ice directly on your skin or ice an area longer than 15 minutes at a time.  · Compression. Putting pressure on an injury helps reduce swelling and provides support. Wrap the injured area firmly with an elastic bandage/wrap. Make sure not to wrap the bandage too tightly or you will cut off blood flow to the injured area. If your bandage loosens, rewrap it.  · Elevation. Keeping an injury raised above the level of your heart reduces swelling, pain, and throbbing. For instance, if you have a broken leg, it may help to rest your leg on several pillows when sitting or lying down. Try to keep the injured area elevated for at least 2 to 3 hours per day.  Follow-up care  Follow up with your healthcare provider, or as advised.  When to seek medical advice  Call your healthcare provider right away if any of these occur:  · Fever of 100.4°F (38°C) or higher, or as directed by your healthcare provider  · Increased pain or swelling in the injured body part  · Injured body part becomes cold, blue, numb, or tingly  · Signs of infection. These include warmth in the skin, redness, drainage, or bad  smell coming from the injured body part.  Date Last Reviewed: 1/18/2016  © 7989-9454 [employer]. [address] [place] [address], [place] [address]. All rights reserved. This information is not intended as a substitute for professional medical care. Always follow your healthcare professional's instructions.        Shoulder Sprain  A sprain is a stretching or tearing of the ligaments that hold a joint together. A sprain may take up to 8 weeks to fully heal, depending on how severe it is. Moderate to severe shoulder sprains are treated with a sling or shoulder immobilizer. Minor sprains can be treated without any special support.  Home care  The following guidelines will help you care for your injury at home:  · If a sling was given to you, leave it in place for the time advised by your healthcare provider. If you arent sure how long to wear it, ask for advice. If the sling becomes loose, adjust it so that your forearm is level with the ground. Your shoulder should feel well supported.  · Put an ice pack on the injured area for 20 minutes every 1 to 2 hours the first day. You can make your own ice pack by putting ice cubes in a plastic bag. A bag of frozen peas or something similar works well too. Wrap the bag in a thin towel. Continue with ice packs 3 to 4 times a day for the next 2 to 3 days. Then use the pack as needed to ease pain and swelling.  · You may use acetaminophen or ibuprofen to control pain, unless another pain medicine was prescribed. If you have chronic liver or kidney disease, talk with your healthcare provider before using these medicines. Also talk with your provider if youve had a stomach ulcer or gastrointestinal bleeding.  · Shoulder joints become stiff if left in a sling for too long. You should start range of motion exercises about 7 to 10 days after the injury. Talk with your provider to find out what type of exercises to do and how soon to start.  Follow-up care  Follow up with your  healthcare provider, or as advised.  Any X-rays you had today dont show any broken bones, breaks, or fractures. Sometimes fractures dont show up on the first X-ray. Bruises and sprains can sometimes hurt as much as a fracture. These injuries can take time to heal completely. If your symptoms dont improve or they get worse, talk with your provider. You may need a repeat X-ray or other treatments.  When to seek medical advice  Call your healthcare provider right away if any of these occur:  · Shoulder pain or swelling in your arm that gets worse  · Fingers become cold, blue, numb, or tingly  · Large amount of bruising of the shoulder or upper arm  · Fever or chills  Date Last Reviewed: 8/1/2016 © 2000-2017 [employer]. [address] [place] [address], [place] [address]. All rights reserved. This information is not intended as a substitute for professional medical care. Always follow your healthcare professional's instructions.        Shoulder Problems  Arthritis, injury, bone disease, and torn muscles and tendons can cause pain, stiffness, and sometimes swelling in your shoulder. Then even simple movements become painful and difficult.    Osteoarthritis  Osteoarthritis is a wearing away of your joint. Your cartilage becomes cracked and pitted, and your socket may wear down. Eventually, your bone is exposed and may develop growths called spurs. Without a cushion of cartilage, your joint becomes stiff and painful. It may feel as if its grinding or slipping out of place when you move your arm.    Inflammatory (rheumatoid) arthritis  Inflammatory arthritis is a chronic joint disease. Your synovium (the membrane that lines your joints) thickens. It then forms a tissue growth (pannus) that clings to your cartilage and releases chemicals that destroy it. Your joint may become red, swollen, and warm. Pain may radiate into your neck and arm. Over time, your joint may get stiff and your muscles may weaken from  disuse. Your bone may also be destroyed.     Fracture  A fracture can occur when you fall on an outstretched hand or elbow. The ball and/or tuberosities can break off, leaving your arm bone in pieces. A fractured shoulder is painful and may be black and blue and look deformed.    Avascular necrosis  A number of conditions, including long-term use of steroids or alcohol, can cause the blood supply to your bone to be cut off. As the bone dies, it collapses. Your shoulder becomes painful and movement is limited.    Rotator cuff tear  A chronic rotator cuff tear may lead to severe arthritis. As the ball rides up against your acromion, your joint becomes painful, stiff, and weak. Surgery can relieve the pain, but you may never regain flexibility and strength.  Date Last Reviewed: 9/26/2015  © 2458-0649 [employer]. [address] [place] [address]. All rights reserved. This information is not intended as a substitute for professional medical care. Always follow your healthcare professional's instructions.

## 2020-06-24 NOTE — PROGRESS NOTES
Subjective:       Patient ID: Jose Miguel Brennan is a 34 y.o. male.    Vitals:  height is 6' (1.829 m) and weight is 103.4 kg (228 lb). His temperature is 97.4 °F (36.3 °C). His blood pressure is 124/86 and his pulse is 70. His respiration is 16 and oxygen saturation is 98%.     Chief Complaint: Shoulder Pain (acute onset of left shoulder pain with edema. Pt. denies any trauma to area)    HPI    Constitution: Negative for appetite change, chills and fever.   HENT: Negative for ear pain, congestion and sore throat.    Neck: Negative for painful lymph nodes.   Eyes: Negative for eye discharge and eye redness.   Respiratory: Negative for cough.    Gastrointestinal: Negative for vomiting and diarrhea.   Genitourinary: Negative for dysuria.   Musculoskeletal: Positive for pain and trauma (lifted a heavy generator, pain began shortly after with swelling to shoulder apperaing the next day). Negative for muscle ache.   Skin: Negative for rash.   Neurological: Negative for headaches and seizures.   Hematologic/Lymphatic: Negative for swollen lymph nodes.       Objective:      Physical Exam   Constitutional: He is oriented to person, place, and time. He appears well-developed. He is cooperative.  Non-toxic appearance. He does not appear ill. No distress.   HENT:   Head: Normocephalic and atraumatic.   Right Ear: Hearing, tympanic membrane, external ear and ear canal normal.   Left Ear: Hearing, tympanic membrane, external ear and ear canal normal.   Nose: Nose normal. No mucosal edema, rhinorrhea or nasal deformity. No epistaxis. Right sinus exhibits no maxillary sinus tenderness and no frontal sinus tenderness. Left sinus exhibits no maxillary sinus tenderness and no frontal sinus tenderness.   Mouth/Throat: Uvula is midline, oropharynx is clear and moist and mucous membranes are normal. No trismus in the jaw. Normal dentition. No uvula swelling. No posterior oropharyngeal erythema.   Eyes: Conjunctivae and lids are  normal. Right eye exhibits no discharge. Left eye exhibits no discharge. No scleral icterus.   Neck: Trachea normal, normal range of motion, full passive range of motion without pain and phonation normal. Neck supple.   Cardiovascular: Normal rate, regular rhythm, normal heart sounds and normal pulses.   Pulmonary/Chest: Effort normal and breath sounds normal. No respiratory distress.   Abdominal: Soft. Normal appearance and bowel sounds are normal. He exhibits no distension, no pulsatile midline mass and no mass. There is no abdominal tenderness.   Musculoskeletal:      Left shoulder: He exhibits decreased range of motion, tenderness, swelling and pain. He exhibits no bony tenderness, no effusion, no crepitus, no deformity, no laceration, no spasm, normal pulse and normal strength.      Comments: Decreased active and passive ROM, localized swelling to anterior shoulder over glenohumeral area   Neurological: He is alert and oriented to person, place, and time. He exhibits normal muscle tone. Coordination normal.   Skin: Skin is warm, dry, intact, not diaphoretic and not pale.   Psychiatric: His speech is normal and behavior is normal. Judgment and thought content normal.   Nursing note and vitals reviewed.        Assessment:       1. Sprain of left shoulder, unspecified shoulder sprain type, initial encounter    2. Acute pain of left shoulder        Plan:     xray reviewed by me, no fracture or dislocation  Will prescribe some tramadol, recommended tylenol as well for pain as patient has stage 3 kidney disease and uncontrolled DM    Sprain of left shoulder, unspecified shoulder sprain type, initial encounter  -     Ambulatory referral/consult to Orthopedics    Acute pain of left shoulder  -     XR SHOULDER COMPLETE 2 OR MORE VIEWS LEFT; Future; Expected date: 06/24/2020    Other orders  -     traMADoL (ULTRAM) 50 mg tablet; Take 1 tablet (50 mg total) by mouth every 6 (six) hours.  Dispense: 8 tablet; Refill:  0

## 2020-06-30 ENCOUNTER — OFFICE VISIT (OUTPATIENT)
Dept: URGENT CARE | Facility: CLINIC | Age: 35
End: 2020-06-30
Payer: MEDICAID

## 2020-06-30 VITALS
RESPIRATION RATE: 16 BRPM | HEART RATE: 72 BPM | TEMPERATURE: 98 F | HEIGHT: 72 IN | SYSTOLIC BLOOD PRESSURE: 138 MMHG | DIASTOLIC BLOOD PRESSURE: 90 MMHG | WEIGHT: 230 LBS | BODY MASS INDEX: 31.15 KG/M2 | OXYGEN SATURATION: 98 %

## 2020-06-30 DIAGNOSIS — N17.9 AKI (ACUTE KIDNEY INJURY): ICD-10-CM

## 2020-06-30 DIAGNOSIS — E11.22 TYPE 2 DIABETES MELLITUS WITH STAGE 3 CHRONIC KIDNEY DISEASE, WITHOUT LONG-TERM CURRENT USE OF INSULIN: Primary | ICD-10-CM

## 2020-06-30 DIAGNOSIS — Z76.0 MEDICATION REFILL: ICD-10-CM

## 2020-06-30 DIAGNOSIS — N18.30 TYPE 2 DIABETES MELLITUS WITH STAGE 3 CHRONIC KIDNEY DISEASE, WITHOUT LONG-TERM CURRENT USE OF INSULIN: Primary | ICD-10-CM

## 2020-06-30 PROCEDURE — 99214 OFFICE O/P EST MOD 30 MIN: CPT | Mod: S$GLB,,, | Performed by: NURSE PRACTITIONER

## 2020-06-30 PROCEDURE — 99214 PR OFFICE/OUTPT VISIT, EST, LEVL IV, 30-39 MIN: ICD-10-PCS | Mod: S$GLB,,, | Performed by: NURSE PRACTITIONER

## 2020-06-30 RX ORDER — INSULIN GLARGINE 300 U/ML
30 INJECTION, SOLUTION SUBCUTANEOUS DAILY
Qty: 3 ML | Refills: 0 | Status: SHIPPED | OUTPATIENT
Start: 2020-06-30 | End: 2021-08-17

## 2020-06-30 NOTE — PROGRESS NOTES
Subjective:       Patient ID: Jose Miguel Brennan is a 34 y.o. male.    Vitals:  height is 6' (1.829 m) and weight is 104.3 kg (230 lb). His oral temperature is 97.9 °F (36.6 °C). His blood pressure is 138/90 (abnormal) and his pulse is 72. His respiration is 16 and oxygen saturation is 98%.     Chief Complaint: Medication Refill    Here for medication refill of toujeo; pt states cant get into see his new PCP until a month    Medication Refill  Pertinent negatives include no chills, congestion, coughing, fever, headaches, myalgias, rash, sore throat or vomiting.       Constitution: Negative for appetite change, chills and fever.   HENT: Negative for ear pain, congestion and sore throat.    Neck: Negative for painful lymph nodes.   Eyes: Negative for eye discharge and eye redness.   Respiratory: Negative for cough.    Gastrointestinal: Negative for vomiting and diarrhea.   Genitourinary: Negative for dysuria.   Musculoskeletal: Negative for muscle ache.   Skin: Negative for rash and erythema.   Neurological: Negative for headaches and seizures.   Hematologic/Lymphatic: Negative for swollen lymph nodes.       Objective:      Physical Exam   Constitutional: He is oriented to person, place, and time. He appears well-developed.   HENT:   Head: Normocephalic and atraumatic. Head is without abrasion, without contusion and without laceration.   Right Ear: External ear normal.   Left Ear: External ear normal.   Nose: Nose normal.   Mouth/Throat: Oropharynx is clear and moist and mucous membranes are normal.   Eyes: Pupils are equal, round, and reactive to light. Conjunctivae, EOM and lids are normal.   Neck: Trachea normal, full passive range of motion without pain and phonation normal. Neck supple.   Cardiovascular: Normal rate, regular rhythm and normal heart sounds.   Pulmonary/Chest: Effort normal and breath sounds normal. No stridor. No respiratory distress.   Musculoskeletal: Normal range of motion.   Neurological:  He is alert and oriented to person, place, and time.   Skin: Skin is warm, dry, intact and no rash. Capillary refill takes less than 2 seconds. abrasion, burn, bruising, erythema and ecchymosis  Psychiatric: His speech is normal and behavior is normal. Judgment and thought content normal.   Nursing note and vitals reviewed.        Assessment:       1. Type 2 diabetes mellitus with stage 3 chronic kidney disease, without long-term current use of insulin    2. TRACEY (acute kidney injury)    3. Medication refill        Plan:       Spoke with pharmacy who provided Toujeo RX information. Will refill for a month and instructed to keep appointment with PCP  Type 2 diabetes mellitus with stage 3 chronic kidney disease, without long-term current use of insulin    TRACEY (acute kidney injury)    Medication refill    Other orders  -     insulin glargine U-300 conc (TOUJEO MAX U-300 SOLOSTAR) 300 unit/mL (3 mL) InPn; Inject 30 Units into the skin once daily.  Dispense: 3 mL; Refill: 0

## 2020-07-07 ENCOUNTER — HOSPITAL ENCOUNTER (EMERGENCY)
Facility: HOSPITAL | Age: 35
Discharge: HOME | End: 2020-07-07
Attending: EMERGENCY MEDICINE
Payer: MEDICAID

## 2020-07-07 VITALS
TEMPERATURE: 98 F | BODY MASS INDEX: 29.8 KG/M2 | RESPIRATION RATE: 18 BRPM | DIASTOLIC BLOOD PRESSURE: 107 MMHG | WEIGHT: 220 LBS | HEART RATE: 65 BPM | HEIGHT: 72 IN | SYSTOLIC BLOOD PRESSURE: 158 MMHG | OXYGEN SATURATION: 96 %

## 2020-07-07 DIAGNOSIS — E10.10 DIABETIC KETOACIDOSIS WITHOUT COMA ASSOCIATED WITH TYPE 1 DIABETES MELLITUS: ICD-10-CM

## 2020-07-07 DIAGNOSIS — E10.65 UNCONTROLLED TYPE 1 DIABETES MELLITUS WITH HYPERGLYCEMIA: Primary | ICD-10-CM

## 2020-07-07 LAB
ALBUMIN SERPL BCP-MCNC: 3.7 G/DL (ref 3.5–5.2)
ALP SERPL-CCNC: 89 U/L (ref 55–135)
ALT SERPL W/O P-5'-P-CCNC: 29 U/L (ref 10–44)
ANION GAP SERPL CALC-SCNC: 12 MMOL/L (ref 8–16)
ANION GAP SERPL CALC-SCNC: 9 MMOL/L (ref 8–16)
AST SERPL-CCNC: 17 U/L (ref 10–40)
B-OH-BUTYR BLD STRIP-SCNC: 0.1 MMOL/L (ref 0–0.5)
BACTERIA #/AREA URNS HPF: NEGATIVE /HPF
BASOPHILS # BLD AUTO: 0.06 K/UL (ref 0–0.2)
BASOPHILS NFR BLD: 0.5 % (ref 0–1.9)
BILIRUB SERPL-MCNC: 0.8 MG/DL (ref 0.1–1)
BILIRUB UR QL STRIP: NEGATIVE
BUN SERPL-MCNC: 29 MG/DL (ref 6–20)
BUN SERPL-MCNC: 37 MG/DL (ref 6–20)
CALCIUM SERPL-MCNC: 8.1 MG/DL (ref 8.7–10.5)
CALCIUM SERPL-MCNC: 8.4 MG/DL (ref 8.7–10.5)
CHLORIDE SERPL-SCNC: 100 MMOL/L (ref 95–110)
CHLORIDE SERPL-SCNC: 108 MMOL/L (ref 95–110)
CLARITY UR: CLEAR
CO2 SERPL-SCNC: 19 MMOL/L (ref 23–29)
CO2 SERPL-SCNC: 21 MMOL/L (ref 23–29)
COLOR UR: COLORLESS
CREAT SERPL-MCNC: 1.8 MG/DL (ref 0.5–1.4)
CREAT SERPL-MCNC: 2.1 MG/DL (ref 0.5–1.4)
DIFFERENTIAL METHOD: ABNORMAL
EOSINOPHIL # BLD AUTO: 0.8 K/UL (ref 0–0.5)
EOSINOPHIL NFR BLD: 7 % (ref 0–8)
ERYTHROCYTE [DISTWIDTH] IN BLOOD BY AUTOMATED COUNT: 13.9 % (ref 11.5–14.5)
EST. GFR  (AFRICAN AMERICAN): 46.1 ML/MIN/1.73 M^2
EST. GFR  (AFRICAN AMERICAN): 55.5 ML/MIN/1.73 M^2
EST. GFR  (NON AFRICAN AMERICAN): 39.9 ML/MIN/1.73 M^2
EST. GFR  (NON AFRICAN AMERICAN): 48 ML/MIN/1.73 M^2
GLUCOSE SERPL-MCNC: 145 MG/DL (ref 70–110)
GLUCOSE SERPL-MCNC: 204 MG/DL (ref 70–110)
GLUCOSE SERPL-MCNC: 220 MG/DL (ref 70–110)
GLUCOSE SERPL-MCNC: 249 MG/DL (ref 70–110)
GLUCOSE SERPL-MCNC: 345 MG/DL (ref 70–110)
GLUCOSE SERPL-MCNC: 353 MG/DL (ref 70–110)
GLUCOSE UR QL STRIP: ABNORMAL
HCT VFR BLD AUTO: 48.8 % (ref 40–54)
HGB BLD-MCNC: 16.2 G/DL (ref 14–18)
HGB UR QL STRIP: NEGATIVE
HYALINE CASTS #/AREA URNS LPF: 0 /LPF
IMM GRANULOCYTES # BLD AUTO: 0.08 K/UL (ref 0–0.04)
IMM GRANULOCYTES NFR BLD AUTO: 0.7 % (ref 0–0.5)
KETONES UR QL STRIP: NEGATIVE
LEUKOCYTE ESTERASE UR QL STRIP: NEGATIVE
LYMPHOCYTES # BLD AUTO: 4.4 K/UL (ref 1–4.8)
LYMPHOCYTES NFR BLD: 36.8 % (ref 18–48)
MCH RBC QN AUTO: 29.2 PG (ref 27–31)
MCHC RBC AUTO-ENTMCNC: 33.2 G/DL (ref 32–36)
MCV RBC AUTO: 88 FL (ref 82–98)
MICROSCOPIC COMMENT: NORMAL
MONOCYTES # BLD AUTO: 0.7 K/UL (ref 0.3–1)
MONOCYTES NFR BLD: 5.8 % (ref 4–15)
NEUTROPHILS # BLD AUTO: 5.9 K/UL (ref 1.8–7.7)
NEUTROPHILS NFR BLD: 49.2 % (ref 38–73)
NITRITE UR QL STRIP: NEGATIVE
NRBC BLD-RTO: 0 /100 WBC
PH UR STRIP: 6 [PH] (ref 5–8)
PLATELET # BLD AUTO: 189 K/UL (ref 150–350)
PMV BLD AUTO: 11.2 FL (ref 9.2–12.9)
POTASSIUM SERPL-SCNC: 4.1 MMOL/L (ref 3.5–5.1)
POTASSIUM SERPL-SCNC: 4.4 MMOL/L (ref 3.5–5.1)
PROT SERPL-MCNC: 6.2 G/DL (ref 6–8.4)
PROT UR QL STRIP: ABNORMAL
RBC # BLD AUTO: 5.54 M/UL (ref 4.6–6.2)
RBC #/AREA URNS HPF: 0 /HPF (ref 0–4)
SODIUM SERPL-SCNC: 131 MMOL/L (ref 136–145)
SODIUM SERPL-SCNC: 138 MMOL/L (ref 136–145)
SP GR UR STRIP: 1.01 (ref 1–1.03)
SQUAMOUS #/AREA URNS HPF: 0 /HPF
URN SPEC COLLECT METH UR: ABNORMAL
UROBILINOGEN UR STRIP-ACNC: NEGATIVE EU/DL
WBC # BLD AUTO: 12.06 K/UL (ref 3.9–12.7)
WBC #/AREA URNS HPF: 0 /HPF (ref 0–5)
YEAST URNS QL MICRO: NORMAL

## 2020-07-07 PROCEDURE — 82010 KETONE BODYS QUAN: CPT

## 2020-07-07 PROCEDURE — 25000003 PHARM REV CODE 250: Performed by: EMERGENCY MEDICINE

## 2020-07-07 PROCEDURE — 99284 EMERGENCY DEPT VISIT MOD MDM: CPT | Mod: 25

## 2020-07-07 PROCEDURE — 85025 COMPLETE CBC W/AUTO DIFF WBC: CPT

## 2020-07-07 PROCEDURE — 80053 COMPREHEN METABOLIC PANEL: CPT

## 2020-07-07 PROCEDURE — 96376 TX/PRO/DX INJ SAME DRUG ADON: CPT

## 2020-07-07 PROCEDURE — 80048 BASIC METABOLIC PNL TOTAL CA: CPT | Mod: XB

## 2020-07-07 PROCEDURE — 36415 COLL VENOUS BLD VENIPUNCTURE: CPT

## 2020-07-07 PROCEDURE — 96374 THER/PROPH/DIAG INJ IV PUSH: CPT

## 2020-07-07 PROCEDURE — 63600175 PHARM REV CODE 636 W HCPCS: Performed by: EMERGENCY MEDICINE

## 2020-07-07 PROCEDURE — 96361 HYDRATE IV INFUSION ADD-ON: CPT

## 2020-07-07 PROCEDURE — 82962 GLUCOSE BLOOD TEST: CPT

## 2020-07-07 PROCEDURE — 81001 URINALYSIS AUTO W/SCOPE: CPT

## 2020-07-07 RX ORDER — SODIUM CHLORIDE 9 MG/ML
1000 INJECTION, SOLUTION INTRAVENOUS
Status: COMPLETED | OUTPATIENT
Start: 2020-07-07 | End: 2020-07-07

## 2020-07-07 RX ORDER — ALLOPURINOL 100 MG/1
100 TABLET ORAL DAILY
COMMUNITY
End: 2021-08-17

## 2020-07-07 RX ADMIN — SODIUM CHLORIDE 1000 ML: 0.9 INJECTION, SOLUTION INTRAVENOUS at 02:07

## 2020-07-07 RX ADMIN — HUMAN INSULIN 4 UNITS: 100 INJECTION, SOLUTION SUBCUTANEOUS at 10:07

## 2020-07-07 RX ADMIN — SODIUM CHLORIDE 1000 ML: 0.9 INJECTION, SOLUTION INTRAVENOUS at 10:07

## 2020-07-07 RX ADMIN — HUMAN INSULIN 4 UNITS: 100 INJECTION, SOLUTION SUBCUTANEOUS at 12:07

## 2020-07-07 RX ADMIN — SODIUM CHLORIDE 1000 ML: 0.9 INJECTION, SOLUTION INTRAVENOUS at 12:07

## 2020-07-07 RX ADMIN — SODIUM CHLORIDE 1000 ML: 0.9 INJECTION, SOLUTION INTRAVENOUS at 07:07

## 2020-07-07 RX ADMIN — HUMAN INSULIN 8 UNITS: 100 INJECTION, SOLUTION SUBCUTANEOUS at 07:07

## 2020-07-07 RX ADMIN — HUMAN INSULIN 6 UNITS: 100 INJECTION, SOLUTION SUBCUTANEOUS at 02:07

## 2020-07-07 NOTE — ED PROVIDER NOTES
Encounter Date: 7/7/2020       History     Chief Complaint   Patient presents with    Hyperglycemia     has not taken insulin in 3 weeks    Flank Pain     both sides states kidneys acting up     34-year-old male who has a history of hypertension, hyperlipidemia, asthma, insulin-dependent diabetes mellitus and gout, presents emergency room with complaints of having elevated blood sugar.  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  The patient additionally has some IgA  nephropathy.  He has denied any fever.  No nausea vomiting or diarrhea.  No dysuria and but notes that diminished urinary output.  The patient has stage III  CKD.  No respiratory complaints including cough shortness of breath or wheeze.  Patient does smoke 1 cigar daily but denies alcohol use.  He is employed as a .        Review of patient's allergies indicates:   Allergen Reactions    Zithromax [azithromycin] Rash     Past Medical History:   Diagnosis Date    Asthma     Diabetes mellitus     diet controlled    Diabetes mellitus, type 2     Hyperlipidemia     Hypertension     IgA nephropathy      Past Surgical History:   Procedure Laterality Date    COLONOSCOPY N/A 5/8/2020    Procedure: COLONOSCOPY;  Surgeon: Hammad Castorena III, MD;  Location: [facility];  Service: Endoscopy;  Laterality: N/A;    ESOPHAGOGASTRODUODENOSCOPY N/A 5/8/2020    Procedure: EGD (ESOPHAGOGASTRODUODENOSCOPY);  Surgeon: Hammad Castorena III, MD;  Location: [facility];  Service: Endoscopy;  Laterality: N/A;    nose polyp removal       Family History   Problem Relation Age of Onset    Hypertension Maternal Grandmother     Cancer Maternal Grandfather     Diabetes Maternal Grandfather     Heart disease Maternal Grandfather     Heart disease Mother     Stroke Mother     Diabetes Mother      Social History     Tobacco Use    Smoking status: Current Every Day Smoker     Packs/day: 0.25     Years:  5.00     Pack years: 1.25     Types: Cigars    Smokeless tobacco: Never Used    Tobacco comment: One cigar a day   Substance Use Topics    Alcohol use: No    Drug use: No     Review of Systems   Constitutional: Negative for activity change, appetite change, chills, diaphoresis and fever.   HENT: Negative for congestion, ear pain, rhinorrhea, sinus pain, sore throat and trouble swallowing.    Eyes: Negative for photophobia, pain, redness and visual disturbance.   Respiratory: Negative for cough and shortness of breath.    Cardiovascular: Negative for chest pain.   Gastrointestinal: Negative for abdominal pain, diarrhea, nausea and vomiting.   Genitourinary: Positive for decreased urine volume. Negative for difficulty urinating, flank pain, frequency and hematuria.   Musculoskeletal: Negative for arthralgias, back pain, joint swelling and myalgias.   Skin: Negative for pallor, rash and wound.   Neurological: Negative for dizziness, seizures, syncope, weakness, light-headedness and headaches.   All other systems reviewed and are negative.      Physical Exam     Initial Vitals [07/07/20 0711]   BP Pulse Resp Temp SpO2   (!) 167/114 78 18 97.8 °F (36.6 °C) 99 %      MAP       --         Physical Exam    Vitals reviewed.  Constitutional: He appears well-developed and well-nourished. He is not diaphoretic. No distress.   HENT:   Head: Normocephalic and atraumatic.   Right Ear: External ear normal.   Left Ear: External ear normal.   Nose: Nose normal.   Mouth/Throat: Oropharynx is clear and moist.   Thickened oral secretions   Eyes: Conjunctivae and EOM are normal. Pupils are equal, round, and reactive to light. Right eye exhibits no discharge. Left eye exhibits no discharge.   Neck: Normal range of motion. Neck supple. No JVD present.   Cardiovascular: Normal rate, regular rhythm, normal heart sounds and intact distal pulses. Exam reveals no gallop and no friction rub.    No murmur heard.  Pulmonary/Chest: Breath  sounds normal. No respiratory distress. He has no wheezes. He has no rhonchi. He has no rales.   Abdominal: Soft. Bowel sounds are normal. He exhibits no distension. There is no abdominal tenderness. There is no rebound and no guarding.   Musculoskeletal: Normal range of motion. No tenderness or edema.   Lymphadenopathy:     He has no cervical adenopathy.   Neurological: He is alert and oriented to person, place, and time. He has normal strength. GCS score is 15. GCS eye subscore is 4. GCS verbal subscore is 5. GCS motor subscore is 6.   Skin: Skin is warm and dry. Capillary refill takes less than 2 seconds. No rash noted. No erythema. No pallor.   Psychiatric: He has a normal mood and affect. His behavior is normal. Judgment and thought content normal.         ED Course   Procedures  Labs Reviewed - No data to display       Imaging Results    None                       Attending Attestation:             Attending ED Notes:   34 Year old male who is presented with uncontrolled diabetes and some mild acidosis, was observed emergency room for multiple hours and hydrated along with being given IV insulin.  This was done and attempt to prevent the patient from requiring hospitalization.  The patient will be able to be discharged at this point to follow up with a primary care physician.  He is to restart his insulin, Toujeo as he had previously administered, that being 15 units twice daily subcutaneously.  He will be instructed to return to the hospital if he has any other problems.                        Clinical Impression:       ICD-10-CM ICD-9-CM   1. Uncontrolled type 1 diabetes mellitus with hyperglycemia  E10.65 250.83     790.29   2. Diabetic ketoacidosis without coma associated with type 1 diabetes mellitus  E10.10 250.11                                Austin Velásquez Jr., MD  07/07/20 1529

## 2020-07-07 NOTE — DISCHARGE INSTRUCTIONS
Encourage oral fluids.  Continue monitoring blood sugar throughout the day.  Adhere to a diabetic diet.  Restart your Toujeo as of this evening.  Return to the emergency room if needed.  Otherwise follow up with your primary care provider within the week.

## 2020-07-08 ENCOUNTER — HOSPITAL ENCOUNTER (EMERGENCY)
Facility: HOSPITAL | Age: 35
Discharge: HOME | End: 2020-07-08
Attending: EMERGENCY MEDICINE
Payer: MEDICAID

## 2020-07-08 VITALS
OXYGEN SATURATION: 96 % | TEMPERATURE: 98 F | SYSTOLIC BLOOD PRESSURE: 127 MMHG | BODY MASS INDEX: 29.8 KG/M2 | HEART RATE: 56 BPM | DIASTOLIC BLOOD PRESSURE: 80 MMHG | RESPIRATION RATE: 22 BRPM | HEIGHT: 72 IN | WEIGHT: 220 LBS

## 2020-07-08 DIAGNOSIS — S39.012A STRAIN OF LUMBAR REGION, INITIAL ENCOUNTER: Primary | ICD-10-CM

## 2020-07-08 DIAGNOSIS — R73.9 HYPERGLYCEMIA: ICD-10-CM

## 2020-07-08 LAB
ALBUMIN SERPL BCP-MCNC: 3.6 G/DL (ref 3.5–5.2)
ALP SERPL-CCNC: 102 U/L (ref 55–135)
ALT SERPL W/O P-5'-P-CCNC: 33 U/L (ref 10–44)
ANION GAP SERPL CALC-SCNC: 10 MMOL/L (ref 8–16)
AST SERPL-CCNC: 19 U/L (ref 10–40)
B-OH-BUTYR BLD STRIP-SCNC: 0.1 MMOL/L (ref 0–0.5)
BACTERIA #/AREA URNS HPF: NORMAL /HPF
BASOPHILS # BLD AUTO: 0.07 K/UL (ref 0–0.2)
BASOPHILS NFR BLD: 0.5 % (ref 0–1.9)
BILIRUB SERPL-MCNC: 0.4 MG/DL (ref 0.1–1)
BILIRUB UR QL STRIP: NEGATIVE
BUN SERPL-MCNC: 25 MG/DL (ref 6–20)
CALCIUM SERPL-MCNC: 9 MG/DL (ref 8.7–10.5)
CHLORIDE SERPL-SCNC: 104 MMOL/L (ref 95–110)
CLARITY UR: CLEAR
CO2 SERPL-SCNC: 23 MMOL/L (ref 23–29)
COLOR UR: YELLOW
CREAT SERPL-MCNC: 1.9 MG/DL (ref 0.5–1.4)
DIFFERENTIAL METHOD: ABNORMAL
EOSINOPHIL # BLD AUTO: 0.8 K/UL (ref 0–0.5)
EOSINOPHIL NFR BLD: 6 % (ref 0–8)
ERYTHROCYTE [DISTWIDTH] IN BLOOD BY AUTOMATED COUNT: 13.9 % (ref 11.5–14.5)
EST. GFR  (AFRICAN AMERICAN): 52 ML/MIN/1.73 M^2
EST. GFR  (NON AFRICAN AMERICAN): 45 ML/MIN/1.73 M^2
GLUCOSE SERPL-MCNC: 306 MG/DL (ref 70–110)
GLUCOSE UR QL STRIP: ABNORMAL
HCT VFR BLD AUTO: 49.2 % (ref 40–54)
HGB BLD-MCNC: 16.6 G/DL (ref 14–18)
HGB UR QL STRIP: ABNORMAL
HYALINE CASTS #/AREA URNS LPF: 0 /LPF
IMM GRANULOCYTES # BLD AUTO: 0.11 K/UL (ref 0–0.04)
IMM GRANULOCYTES NFR BLD AUTO: 0.8 % (ref 0–0.5)
KETONES UR QL STRIP: NEGATIVE
LEUKOCYTE ESTERASE UR QL STRIP: NEGATIVE
LYMPHOCYTES # BLD AUTO: 4.9 K/UL (ref 1–4.8)
LYMPHOCYTES NFR BLD: 36.6 % (ref 18–48)
MCH RBC QN AUTO: 30 PG (ref 27–31)
MCHC RBC AUTO-ENTMCNC: 33.7 G/DL (ref 32–36)
MCV RBC AUTO: 89 FL (ref 82–98)
MICROSCOPIC COMMENT: NORMAL
MONOCYTES # BLD AUTO: 0.8 K/UL (ref 0.3–1)
MONOCYTES NFR BLD: 6 % (ref 4–15)
NEUTROPHILS # BLD AUTO: 6.6 K/UL (ref 1.8–7.7)
NEUTROPHILS NFR BLD: 50.1 % (ref 38–73)
NITRITE UR QL STRIP: NEGATIVE
NRBC BLD-RTO: 0 /100 WBC
PH UR STRIP: 6 [PH] (ref 5–8)
PLATELET # BLD AUTO: 195 K/UL (ref 150–350)
PMV BLD AUTO: 11.2 FL (ref 9.2–12.9)
POCT GLUCOSE: 310 MG/DL (ref 70–110)
POTASSIUM SERPL-SCNC: 4.2 MMOL/L (ref 3.5–5.1)
PROT SERPL-MCNC: 7.7 G/DL (ref 6–8.4)
PROT UR QL STRIP: ABNORMAL
RBC # BLD AUTO: 5.53 M/UL (ref 4.6–6.2)
RBC #/AREA URNS HPF: 0 /HPF (ref 0–4)
SODIUM SERPL-SCNC: 137 MMOL/L (ref 136–145)
SP GR UR STRIP: 1.02 (ref 1–1.03)
SQUAMOUS #/AREA URNS HPF: 0 /HPF
URN SPEC COLLECT METH UR: ABNORMAL
UROBILINOGEN UR STRIP-ACNC: NEGATIVE EU/DL
WBC # BLD AUTO: 13.24 K/UL (ref 3.9–12.7)
WBC #/AREA URNS HPF: 0 /HPF (ref 0–5)
YEAST URNS QL MICRO: NORMAL

## 2020-07-08 PROCEDURE — 63600175 PHARM REV CODE 636 W HCPCS: Performed by: EMERGENCY MEDICINE

## 2020-07-08 PROCEDURE — 85025 COMPLETE CBC W/AUTO DIFF WBC: CPT

## 2020-07-08 PROCEDURE — 82962 GLUCOSE BLOOD TEST: CPT

## 2020-07-08 PROCEDURE — 81000 URINALYSIS NONAUTO W/SCOPE: CPT

## 2020-07-08 PROCEDURE — 36415 COLL VENOUS BLD VENIPUNCTURE: CPT

## 2020-07-08 PROCEDURE — 82010 KETONE BODYS QUAN: CPT

## 2020-07-08 PROCEDURE — 80053 COMPREHEN METABOLIC PANEL: CPT

## 2020-07-08 PROCEDURE — 96374 THER/PROPH/DIAG INJ IV PUSH: CPT

## 2020-07-08 PROCEDURE — 25000003 PHARM REV CODE 250: Performed by: EMERGENCY MEDICINE

## 2020-07-08 PROCEDURE — 99284 EMERGENCY DEPT VISIT MOD MDM: CPT | Mod: 25

## 2020-07-08 RX ORDER — SODIUM CHLORIDE 9 MG/ML
1000 INJECTION, SOLUTION INTRAVENOUS
Status: COMPLETED | OUTPATIENT
Start: 2020-07-08 | End: 2020-07-08

## 2020-07-08 RX ORDER — CLONIDINE HYDROCHLORIDE 0.1 MG/1
0.1 TABLET ORAL
Status: COMPLETED | OUTPATIENT
Start: 2020-07-08 | End: 2020-07-08

## 2020-07-08 RX ORDER — METHOCARBAMOL 500 MG/1
1000 TABLET, FILM COATED ORAL 3 TIMES DAILY
Qty: 30 TABLET | Refills: 0 | Status: SHIPPED | OUTPATIENT
Start: 2020-07-08 | End: 2020-07-13

## 2020-07-08 RX ORDER — MORPHINE SULFATE 10 MG/ML
10 INJECTION INTRAMUSCULAR; INTRAVENOUS; SUBCUTANEOUS
Status: COMPLETED | OUTPATIENT
Start: 2020-07-08 | End: 2020-07-08

## 2020-07-08 RX ADMIN — CLONIDINE HYDROCHLORIDE 0.1 MG: 0.1 TABLET ORAL at 06:07

## 2020-07-08 RX ADMIN — MORPHINE SULFATE 10 MG: 10 INJECTION, SOLUTION INTRAMUSCULAR; INTRAVENOUS at 06:07

## 2020-07-08 RX ADMIN — SODIUM CHLORIDE 1000 ML: 0.9 INJECTION, SOLUTION INTRAVENOUS at 06:07

## 2020-07-08 NOTE — Clinical Note
Jose Miguel Brennan was seen and treated in our emergency department on 7/8/2020.  He may return to work on 07/09/2020.  Avoid heavy lifting greater than 20 lbs     If you have any questions or concerns, please don't hesitate to call.      David Groves III, MD

## 2020-07-08 NOTE — ED PROVIDER NOTES
Encounter Date: 7/8/2020       History     Chief Complaint   Patient presents with    Flank Pain     Hima kidney pain x 3-4 day.  Seen at [facility] yesterday     Chief complaint:  Kidney pain    HPI:  34-year-old male presents with a 3-4 day history of worsening positional bilateral lumbar pain.  He attributes the pain to his IgA nephropathy.  He denies any fever and has no dysuria or urinary frequency.  He was seen at [facility] for similar symptoms and found to have a markedly elevated glucose.  He has not taken his insulin due to financial considerations.    His past medical history is significant for diabetes, IgA nephropathy, hyperlipidemia, asthma and hypertension.        Review of patient's allergies indicates:   Allergen Reactions    Zithromax [azithromycin] Rash     Past Medical History:   Diagnosis Date    Asthma     Diabetes mellitus     diet controlled    Diabetes mellitus, type 2     Hyperlipidemia     Hypertension     IgA nephropathy      Past Surgical History:   Procedure Laterality Date    COLONOSCOPY N/A 5/8/2020    Procedure: COLONOSCOPY;  Surgeon: Hammad Castorena III, MD;  Location: [facility];  Service: Endoscopy;  Laterality: N/A;    ESOPHAGOGASTRODUODENOSCOPY N/A 5/8/2020    Procedure: EGD (ESOPHAGOGASTRODUODENOSCOPY);  Surgeon: Hammad Castorena III, MD;  Location: [facility];  Service: Endoscopy;  Laterality: N/A;    nose polyp removal       Family History   Problem Relation Age of Onset    Hypertension Maternal Grandmother     Cancer Maternal Grandfather     Diabetes Maternal Grandfather     Heart disease Maternal Grandfather     Heart disease Mother     Stroke Mother     Diabetes Mother      Social History     Tobacco Use    Smoking status: Current Every Day Smoker     Packs/day: 0.25     Years: 5.00     Pack years: 1.25     Types: Cigars    Smokeless tobacco: Never Used    Tobacco comment: One cigar a day   Substance Use Topics    Alcohol use: No    Drug use: No      Review of Systems   Constitutional: Negative for activity change, appetite change, chills, fatigue and fever.   Eyes: Negative for visual disturbance.   Respiratory: Negative for apnea and shortness of breath.    Cardiovascular: Negative for chest pain and palpitations.   Gastrointestinal: Negative for abdominal distention and abdominal pain.   Genitourinary: Negative for difficulty urinating.   Musculoskeletal: Positive for back pain. Negative for neck pain.   Skin: Negative for pallor and rash.   Neurological: Negative for headaches.   Hematological: Does not bruise/bleed easily.   Psychiatric/Behavioral: Negative for agitation.       Physical Exam     Initial Vitals [07/08/20 0611]   BP Pulse Resp Temp SpO2   (!) 185/116 77 16 97.9 °F (36.6 °C) 98 %      MAP       --         Physical Exam    Nursing note and vitals reviewed.  Constitutional: He appears well-developed and well-nourished.   HENT:   Head: Normocephalic and atraumatic.   Eyes: Conjunctivae are normal.   Neck: Normal range of motion. Neck supple.   Cardiovascular: Normal rate, regular rhythm and normal heart sounds. Exam reveals no gallop and no friction rub.    No murmur heard.  Pulmonary/Chest: Breath sounds normal. No respiratory distress. He has no wheezes. He has no rhonchi. He has no rales.   Abdominal: Soft. He exhibits no distension. There is no abdominal tenderness.   Musculoskeletal: Normal range of motion. Tenderness present.      Comments: Bilateral lumbar paraspinous tenderness   Neurological: He is alert and oriented to person, place, and time.   Skin: Skin is warm and dry.   Psychiatric: He has a normal mood and affect.         ED Course   Procedures  Labs Reviewed   CBC W/ AUTO DIFFERENTIAL - Abnormal; Notable for the following components:       Result Value    WBC 13.24 (*)     Immature Granulocytes 0.8 (*)     Immature Grans (Abs) 0.11 (*)     Lymph # 4.9 (*)     Eos # 0.8 (*)     All other components within normal limits    COMPREHENSIVE METABOLIC PANEL - Abnormal; Notable for the following components:    Glucose 306 (*)     BUN, Bld 25 (*)     Creatinine 1.9 (*)     eGFR if  52 (*)     eGFR if non  45 (*)     All other components within normal limits   URINALYSIS, REFLEX TO URINE CULTURE - Abnormal; Notable for the following components:    Protein, UA 1+ (*)     Glucose, UA 4+ (*)     Occult Blood UA Trace (*)     All other components within normal limits    Narrative:     Specimen Source->Urine   POCT GLUCOSE - Abnormal; Notable for the following components:    POCT Glucose 310 (*)     All other components within normal limits   BETA - HYDROXYBUTYRATE, SERUM   URINALYSIS MICROSCOPIC    Narrative:     Specimen Source->Urine          Imaging Results    None          Medical Decision Making:   ED Management:  34-year-old male presents with complaints of kidney pain.  He has bilateral pain which is predominantly lumbar paraspinous pain which she has positional.  The symptoms are strongly suggestive of a myofascial strain.  Urinalysis is normal with no evidence of UTI.  The bilateral location of the pain makes renal disease an  unlikely cause of pain.                                 Clinical Impression:       ICD-10-CM ICD-9-CM   1. Strain of lumbar region, initial encounter  S39.012A 847.2   2. Hyperglycemia  R73.9 790.29                                David Groves III, MD  07/08/20 2253

## 2020-07-09 ENCOUNTER — HOSPITAL ENCOUNTER (EMERGENCY)
Facility: HOSPITAL | Age: 35
Discharge: HOME | End: 2020-07-09
Attending: EMERGENCY MEDICINE
Payer: MEDICAID

## 2020-07-09 VITALS
TEMPERATURE: 98 F | SYSTOLIC BLOOD PRESSURE: 137 MMHG | OXYGEN SATURATION: 97 % | HEART RATE: 65 BPM | DIASTOLIC BLOOD PRESSURE: 91 MMHG | HEIGHT: 72 IN | WEIGHT: 220 LBS | RESPIRATION RATE: 16 BRPM | BODY MASS INDEX: 29.8 KG/M2

## 2020-07-09 DIAGNOSIS — R73.9 HYPERGLYCEMIA: Primary | ICD-10-CM

## 2020-07-09 DIAGNOSIS — N18.9 CHRONIC KIDNEY DISEASE, UNSPECIFIED CKD STAGE: ICD-10-CM

## 2020-07-09 LAB
ALBUMIN SERPL BCP-MCNC: 3.8 G/DL (ref 3.5–5.2)
ALP SERPL-CCNC: 101 U/L (ref 55–135)
ALT SERPL W/O P-5'-P-CCNC: 33 U/L (ref 10–44)
ANION GAP SERPL CALC-SCNC: 12 MMOL/L (ref 8–16)
AST SERPL-CCNC: 26 U/L (ref 10–40)
BACTERIA #/AREA URNS HPF: NEGATIVE /HPF
BASOPHILS # BLD AUTO: 0.05 K/UL (ref 0–0.2)
BASOPHILS NFR BLD: 0.4 % (ref 0–1.9)
BILIRUB SERPL-MCNC: 1 MG/DL (ref 0.1–1)
BILIRUB UR QL STRIP: NEGATIVE
BUN SERPL-MCNC: 27 MG/DL (ref 6–20)
CALCIUM SERPL-MCNC: 9.6 MG/DL (ref 8.7–10.5)
CHLORIDE SERPL-SCNC: 100 MMOL/L (ref 95–110)
CLARITY UR: CLEAR
CO2 SERPL-SCNC: 20 MMOL/L (ref 23–29)
COLOR UR: COLORLESS
CREAT SERPL-MCNC: 1.9 MG/DL (ref 0.5–1.4)
DIFFERENTIAL METHOD: ABNORMAL
EOSINOPHIL # BLD AUTO: 0.8 K/UL (ref 0–0.5)
EOSINOPHIL NFR BLD: 6.3 % (ref 0–8)
ERYTHROCYTE [DISTWIDTH] IN BLOOD BY AUTOMATED COUNT: 13.8 % (ref 11.5–14.5)
EST. GFR  (AFRICAN AMERICAN): 52 ML/MIN/1.73 M^2
EST. GFR  (NON AFRICAN AMERICAN): 45 ML/MIN/1.73 M^2
GLUCOSE SERPL-MCNC: 340 MG/DL (ref 70–110)
GLUCOSE SERPL-MCNC: 347 MG/DL (ref 70–110)
GLUCOSE UR QL STRIP: ABNORMAL
HCT VFR BLD AUTO: 51.5 % (ref 40–54)
HGB BLD-MCNC: 17.1 G/DL (ref 14–18)
HGB UR QL STRIP: NEGATIVE
HYALINE CASTS #/AREA URNS LPF: 0 /LPF
IMM GRANULOCYTES # BLD AUTO: 0.08 K/UL (ref 0–0.04)
IMM GRANULOCYTES NFR BLD AUTO: 0.6 % (ref 0–0.5)
KETONES UR QL STRIP: NEGATIVE
LEUKOCYTE ESTERASE UR QL STRIP: NEGATIVE
LYMPHOCYTES # BLD AUTO: 4.5 K/UL (ref 1–4.8)
LYMPHOCYTES NFR BLD: 36.1 % (ref 18–48)
MCH RBC QN AUTO: 29.1 PG (ref 27–31)
MCHC RBC AUTO-ENTMCNC: 33.2 G/DL (ref 32–36)
MCV RBC AUTO: 88 FL (ref 82–98)
MICROSCOPIC COMMENT: NORMAL
MONOCYTES # BLD AUTO: 0.6 K/UL (ref 0.3–1)
MONOCYTES NFR BLD: 5 % (ref 4–15)
NEUTROPHILS # BLD AUTO: 6.5 K/UL (ref 1.8–7.7)
NEUTROPHILS NFR BLD: 51.6 % (ref 38–73)
NITRITE UR QL STRIP: NEGATIVE
NRBC BLD-RTO: 0 /100 WBC
PH UR STRIP: 6 [PH] (ref 5–8)
PLATELET # BLD AUTO: 168 K/UL (ref 150–350)
PMV BLD AUTO: 11 FL (ref 9.2–12.9)
POTASSIUM SERPL-SCNC: 4.4 MMOL/L (ref 3.5–5.1)
PROT SERPL-MCNC: 6.9 G/DL (ref 6–8.4)
PROT UR QL STRIP: ABNORMAL
RBC # BLD AUTO: 5.87 M/UL (ref 4.6–6.2)
RBC #/AREA URNS HPF: 0 /HPF (ref 0–4)
SODIUM SERPL-SCNC: 132 MMOL/L (ref 136–145)
SP GR UR STRIP: 1.01 (ref 1–1.03)
SQUAMOUS #/AREA URNS HPF: 0 /HPF
URN SPEC COLLECT METH UR: ABNORMAL
UROBILINOGEN UR STRIP-ACNC: NEGATIVE EU/DL
WBC # BLD AUTO: 12.48 K/UL (ref 3.9–12.7)
WBC #/AREA URNS HPF: 0 /HPF (ref 0–5)
YEAST URNS QL MICRO: NORMAL

## 2020-07-09 PROCEDURE — 85025 COMPLETE CBC W/AUTO DIFF WBC: CPT

## 2020-07-09 PROCEDURE — 63600175 PHARM REV CODE 636 W HCPCS: Performed by: EMERGENCY MEDICINE

## 2020-07-09 PROCEDURE — 96361 HYDRATE IV INFUSION ADD-ON: CPT

## 2020-07-09 PROCEDURE — 96374 THER/PROPH/DIAG INJ IV PUSH: CPT

## 2020-07-09 PROCEDURE — 80053 COMPREHEN METABOLIC PANEL: CPT

## 2020-07-09 PROCEDURE — 99284 EMERGENCY DEPT VISIT MOD MDM: CPT | Mod: 25

## 2020-07-09 PROCEDURE — 81001 URINALYSIS AUTO W/SCOPE: CPT

## 2020-07-09 PROCEDURE — 82962 GLUCOSE BLOOD TEST: CPT

## 2020-07-09 PROCEDURE — 25000003 PHARM REV CODE 250: Performed by: EMERGENCY MEDICINE

## 2020-07-09 RX ORDER — METHOCARBAMOL 500 MG/1
500 TABLET, FILM COATED ORAL
Status: COMPLETED | OUTPATIENT
Start: 2020-07-09 | End: 2020-07-09

## 2020-07-09 RX ORDER — ONDANSETRON 2 MG/ML
4 INJECTION INTRAMUSCULAR; INTRAVENOUS
Status: COMPLETED | OUTPATIENT
Start: 2020-07-09 | End: 2020-07-09

## 2020-07-09 RX ORDER — ACETAMINOPHEN 325 MG/1
650 TABLET ORAL
Status: COMPLETED | OUTPATIENT
Start: 2020-07-09 | End: 2020-07-09

## 2020-07-09 RX ADMIN — SODIUM CHLORIDE 1000 ML: 0.9 INJECTION, SOLUTION INTRAVENOUS at 07:07

## 2020-07-09 RX ADMIN — METHOCARBAMOL 500 MG: 500 TABLET ORAL at 07:07

## 2020-07-09 RX ADMIN — ONDANSETRON 4 MG: 2 INJECTION INTRAMUSCULAR; INTRAVENOUS at 07:07

## 2020-07-09 RX ADMIN — ACETAMINOPHEN 650 MG: 325 TABLET ORAL at 07:07

## 2020-07-09 NOTE — DISCHARGE INSTRUCTIONS
It is important to follow up with your primary care physician and to go to the [facility] and  your insulin prescription.

## 2020-07-09 NOTE — ED PROVIDER NOTES
Encounter Date: 7/9/2020       History     Chief Complaint   Patient presents with    Hyperglycemia     >400 @ home CBG; has been out of insulin x 3 days s/t trouble filling prescription    Flank Pain     x3 days; denies n/v/d     Patient presents complaining of chronic back pain and high blood sugar.  Patient states he is not taking his insulin.  Patient states he went to the pharmacy but they did not have it.  Patient presented yesterday for the same complaint.  He has had multiple ER visits in the last 1 month.        Review of patient's allergies indicates:   Allergen Reactions    Zithromax [azithromycin] Rash     Past Medical History:   Diagnosis Date    Asthma     Diabetes mellitus     diet controlled    Diabetes mellitus, type 2     Hyperlipidemia     Hypertension     IgA nephropathy      Past Surgical History:   Procedure Laterality Date    COLONOSCOPY N/A 5/8/2020    Procedure: COLONOSCOPY;  Surgeon: Hammad Castorena III, MD;  Location: [facility];  Service: Endoscopy;  Laterality: N/A;    ESOPHAGOGASTRODUODENOSCOPY N/A 5/8/2020    Procedure: EGD (ESOPHAGOGASTRODUODENOSCOPY);  Surgeon: Hammad Castorena III, MD;  Location: [facility];  Service: Endoscopy;  Laterality: N/A;    nose polyp removal       Family History   Problem Relation Age of Onset    Hypertension Maternal Grandmother     Cancer Maternal Grandfather     Diabetes Maternal Grandfather     Heart disease Maternal Grandfather     Heart disease Mother     Stroke Mother     Diabetes Mother      Social History     Tobacco Use    Smoking status: Current Every Day Smoker     Packs/day: 0.25     Years: 5.00     Pack years: 1.25     Types: Cigars    Smokeless tobacco: Never Used    Tobacco comment: One cigar a day   Substance Use Topics    Alcohol use: No    Drug use: No     Review of Systems   Constitutional: Negative for fever.   Genitourinary: Positive for flank pain.   All other systems reviewed and are  negative.      Physical Exam     Initial Vitals [07/09/20 0616]   BP Pulse Resp Temp SpO2   (!) 168/110 72 18 98.9 °F (37.2 °C) 99 %      MAP       --         Physical Exam    Nursing note and vitals reviewed.  Constitutional: He appears well-developed and well-nourished. He is not diaphoretic. No distress.   HENT:   Head: Normocephalic and atraumatic.   Eyes: EOM are normal.   Neck: Normal range of motion. Neck supple.   Pulmonary/Chest: No respiratory distress.   No tachypnea   Abdominal: Soft.   Musculoskeletal: Normal range of motion.   Neurological: He is alert and oriented to person, place, and time. He has normal strength.   Skin: Skin is warm and dry.   Psychiatric: He has a normal mood and affect. His behavior is normal. Judgment and thought content normal.         ED Course   Procedures  Labs Reviewed   CBC W/ AUTO DIFFERENTIAL - Abnormal; Notable for the following components:       Result Value    Immature Granulocytes 0.6 (*)     Immature Grans (Abs) 0.08 (*)     Eos # 0.8 (*)     All other components within normal limits   POCT GLUCOSE - Abnormal; Notable for the following components:    POC Glucose 340 (*)     All other components within normal limits   COMPREHENSIVE METABOLIC PANEL   URINALYSIS, REFLEX TO URINE CULTURE   POCT GLUCOSE MONITORING CONTINUOUS          Imaging Results    None          Medical Decision Making:   History:   Old Records Summarized: records from previous admission(s).       <> Summary of Records: Narrative & Impression      CMS MANDATED QUALITY DATA - CT RADIATION 436. CT scans at this  facility utilize dose modulation, iterative reconstruction, and/or  weight based dosing when appropriate to reduce radiation dose to as  low as reasonably achievable.     PROCEDURE:    CT RENAL STONE STUDY ABD PELVIS WO  dated  6/11/2020  7:12 AM     CLINICAL HISTORY:   Male 34 years of age.   Flank pain, stone disease  suspected     TECHNIQUE:  CT of the Abdomen and Pelvis was performed.  Intravenous  contrast was not administered.     COMPARISON:  None     FINDINGS:     Included lung bases are clear.     There is no nephrolithiasis, hydronephrosis, ureteral calculus or  bladder calculus. There is cortical thinning of the kidneys, left  worse than right. The liver is enlarged. Long axis diameter is 24 cm.  The tip is nearly to the level of the iliac crest. The gallbladder,  pancreas, spleen and adrenal glands are normal.     Bowel is not dilated or thickened. There is mild left colonic  diverticulosis without diverticulitis. The appendix is normal.     There is no ascites. There is no lymphadenopathy. Unenhanced aorta is  normal.     Urinary bladder is mildly filled. Prostate gland and seminal vesicles  are normal.     Bones and soft tissues are normal.     IMPRESSION:        1. Cortical thinning of the kidneys (left worse than right). No  nephrolithiasis, ureteral stone, or sign of recently passed stone.  2. Hepatomegaly.  3. Diverticulosis coli without diverticulitis.     Electronically Signed by Damari Mac on 6/11/2020 7:28 AM   Encounter      View Encounter         Signed by    Signed Credentials Date/Time  Phone Pager  DAMARI MAC MD 6/11/2020 07:21 [phone]   Exam Details    Performed Procedure Technologist Supporting Staff Performing Physician  CT Renal Stone Study ABD Pelvis WO Star Zarco, RT      Appointment Date/Status Modality Department   6/11/2020     Completed [facility] CT1 AQUILION PRIME 80 LIMIT 450 LBS [facility] CT SCAN     Begin Exam End Exam  End Exam Questionnaires  6/11/2020  7:11 AM 6/11/2020  7:16 AM  IMAGING END ALL     OUTSIDE READ    Reason for Exam  Priority: STAT  Flank pain, stone disease suspected  Comments:   Order Report     Order Details    ED Management:  I reassured patient.  Patient's blood work shows no acute change.  Patient has chronic kidney disease.  He received 500cc normal saline- blood sugar was not emergently high.  No signs of DKA.  I advised him to  follow up with his primary care doctor and to go  his insulin.  Patient discharged stable condition.  Detailed return precautions discussed.                                 Clinical Impression:       ICD-10-CM ICD-9-CM   1. Hyperglycemia  R73.9 790.29   2. Chronic kidney disease, unspecified CKD stage  N18.9 585.9                                Regan Lee MD  07/09/20 0732

## 2020-12-23 ENCOUNTER — LAB VISIT (OUTPATIENT)
Dept: LAB | Facility: HOSPITAL | Age: 35
End: 2020-12-23
Attending: INTERNAL MEDICINE
Payer: MEDICAID

## 2020-12-23 DIAGNOSIS — N18.30 CHRONIC KIDNEY DISEASE, STAGE III (MODERATE): Primary | ICD-10-CM

## 2020-12-23 DIAGNOSIS — E55.9 AVITAMINOSIS D: ICD-10-CM

## 2020-12-23 LAB
25(OH)D3+25(OH)D2 SERPL-MCNC: 21 NG/ML (ref 30–96)
ALBUMIN SERPL BCP-MCNC: 3.9 G/DL (ref 3.5–5.2)
ANION GAP SERPL CALC-SCNC: 10 MMOL/L (ref 8–16)
BUN SERPL-MCNC: 24 MG/DL (ref 6–20)
CALCIUM SERPL-MCNC: 9.5 MG/DL (ref 8.7–10.5)
CHLORIDE SERPL-SCNC: 104 MMOL/L (ref 95–110)
CO2 SERPL-SCNC: 25 MMOL/L (ref 23–29)
CREAT SERPL-MCNC: 1.9 MG/DL (ref 0.5–1.4)
EST. GFR  (AFRICAN AMERICAN): 51.7 ML/MIN/1.73 M^2
EST. GFR  (NON AFRICAN AMERICAN): 44.7 ML/MIN/1.73 M^2
GLUCOSE SERPL-MCNC: 124 MG/DL (ref 70–110)
PHOSPHATE SERPL-MCNC: 4.7 MG/DL (ref 2.7–4.5)
POTASSIUM SERPL-SCNC: 3.7 MMOL/L (ref 3.5–5.1)
PROT UR-MCNC: 291 MG/DL (ref 6–15)
PTH-INTACT SERPL-MCNC: 87.8 PG/ML (ref 9–77)
SODIUM SERPL-SCNC: 139 MMOL/L (ref 136–145)

## 2020-12-23 PROCEDURE — 80069 RENAL FUNCTION PANEL: CPT

## 2020-12-23 PROCEDURE — 36415 COLL VENOUS BLD VENIPUNCTURE: CPT

## 2020-12-23 PROCEDURE — 83970 ASSAY OF PARATHORMONE: CPT

## 2020-12-23 PROCEDURE — 84156 ASSAY OF PROTEIN URINE: CPT

## 2020-12-23 PROCEDURE — 82306 VITAMIN D 25 HYDROXY: CPT

## 2021-02-17 ENCOUNTER — HOSPITAL ENCOUNTER (EMERGENCY)
Age: 36
Discharge: HOME | End: 2021-02-17
Attending: EMERGENCY MEDICINE
Payer: MEDICAID

## 2021-02-17 ENCOUNTER — APPOINTMENT (OUTPATIENT)
Dept: CT IMAGING | Age: 36
End: 2021-02-17
Payer: MEDICAID

## 2021-02-17 VITALS
OXYGEN SATURATION: 98 % | TEMPERATURE: 98.3 F | SYSTOLIC BLOOD PRESSURE: 148 MMHG | HEART RATE: 82 BPM | BODY MASS INDEX: 29.8 KG/M2 | RESPIRATION RATE: 16 BRPM | DIASTOLIC BLOOD PRESSURE: 84 MMHG | WEIGHT: 220 LBS | HEIGHT: 72 IN

## 2021-02-17 DIAGNOSIS — S09.93XA BLUNT TRAUMA OF FACE, INITIAL ENCOUNTER: Primary | ICD-10-CM
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
## 2021-02-17 DIAGNOSIS — S16.1XXA NECK STRAIN, INITIAL ENCOUNTER: ICD-10-CM

## 2021-02-17 PROCEDURE — 90715 TDAP VACCINE 7 YRS/> IM: CPT | Performed by: EMERGENCY MEDICINE

## 2021-02-17 PROCEDURE — 96372 THER/PROPH/DIAG INJ SC/IM: CPT

## 2021-02-17 PROCEDURE — 6360000002 HC RX W HCPCS: Performed by: EMERGENCY MEDICINE

## 2021-02-17 PROCEDURE — 99284 EMERGENCY DEPT VISIT MOD MDM: CPT

## 2021-02-17 PROCEDURE — 90471 IMMUNIZATION ADMIN: CPT | Performed by: EMERGENCY MEDICINE

## 2021-02-17 PROCEDURE — 70450 CT HEAD/BRAIN W/O DYE: CPT

## 2021-02-17 PROCEDURE — 72125 CT NECK SPINE W/O DYE: CPT

## 2021-02-17 PROCEDURE — 6370000000 HC RX 637 (ALT 250 FOR IP): Performed by: EMERGENCY MEDICINE

## 2021-02-17 RX ORDER — HYDROCODONE BITARTRATE AND ACETAMINOPHEN 5; 325 MG/1; MG/1
1 TABLET ORAL ONCE
Status: COMPLETED | OUTPATIENT
Start: 2021-02-17 | End: 2021-02-17

## 2021-02-17 RX ORDER — ATENOLOL 50 MG/1
50 TABLET ORAL DAILY
COMMUNITY

## 2021-02-17 RX ADMIN — TETANUS TOXOID, REDUCED DIPHTHERIA TOXOID AND ACELLULAR PERTUSSIS VACCINE, ADSORBED 0.5 ML: 5; 2.5; 8; 8; 2.5 SUSPENSION INTRAMUSCULAR at 10:39

## 2021-02-17 RX ADMIN — HYDROCODONE BITARTRATE AND ACETAMINOPHEN 1 TABLET: 5; 325 TABLET ORAL at 10:41

## 2021-02-17 ASSESSMENT — PAIN SCALES - GENERAL
PAINLEVEL_OUTOF10: 8
PAINLEVEL_OUTOF10: 6

## 2021-02-17 ASSESSMENT — PAIN DESCRIPTION - LOCATION: LOCATION: FACE

## 2021-02-17 ASSESSMENT — PAIN DESCRIPTION - ORIENTATION: ORIENTATION: LEFT
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
## 2021-02-24 VITALS
WEIGHT: 220 LBS | TEMPERATURE: 98 F | HEIGHT: 72 IN | HEART RATE: 69 BPM | RESPIRATION RATE: 19 BRPM | DIASTOLIC BLOOD PRESSURE: 110 MMHG | BODY MASS INDEX: 29.8 KG/M2 | OXYGEN SATURATION: 96 % | SYSTOLIC BLOOD PRESSURE: 173 MMHG

## 2021-02-24 LAB — GLUCOSE SERPL-MCNC: 221 MG/DL (ref 70–110)

## 2021-02-24 PROCEDURE — 99284 EMERGENCY DEPT VISIT MOD MDM: CPT | Mod: 25

## 2021-02-24 PROCEDURE — 82962 GLUCOSE BLOOD TEST: CPT

## 2021-02-25 ENCOUNTER — HOSPITAL ENCOUNTER (EMERGENCY)
Facility: HOSPITAL | Age: 36
Discharge: LEFT BEFORE DISCHARGE | End: 2021-02-25
Payer: MEDICAID

## 2021-02-26 ENCOUNTER — CLINICAL SUPPORT (OUTPATIENT)
Dept: URGENT CARE | Facility: CLINIC | Age: 36
End: 2021-02-26

## 2021-02-26 PROCEDURE — 99499 PR PHYSICAL - DOT/CDL: ICD-10-PCS | Mod: S$GLB,,, | Performed by: NURSE PRACTITIONER

## 2021-02-26 PROCEDURE — 99499 UNLISTED E&M SERVICE: CPT | Mod: S$GLB,,, | Performed by: NURSE PRACTITIONER

## 2021-05-31 ENCOUNTER — HOSPITAL ENCOUNTER (EMERGENCY)
Facility: HOSPITAL | Age: 36
Discharge: HOME | End: 2021-06-01
Attending: EMERGENCY MEDICINE
Payer: MEDICAID

## 2021-05-31 DIAGNOSIS — R10.9 FLANK PAIN: Primary | ICD-10-CM

## 2021-05-31 LAB
ALBUMIN SERPL BCP-MCNC: 3.8 G/DL (ref 3.5–5.2)
ALP SERPL-CCNC: 81 U/L (ref 55–135)
ALT SERPL W/O P-5'-P-CCNC: 29 U/L (ref 10–44)
ANION GAP SERPL CALC-SCNC: 9 MMOL/L (ref 8–16)
AST SERPL-CCNC: 20 U/L (ref 10–40)
BACTERIA #/AREA URNS HPF: NEGATIVE /HPF
BASOPHILS # BLD AUTO: 0.05 K/UL (ref 0–0.2)
BASOPHILS NFR BLD: 0.4 % (ref 0–1.9)
BILIRUB SERPL-MCNC: 0.8 MG/DL (ref 0.1–1)
BILIRUB UR QL STRIP: NEGATIVE
BUN SERPL-MCNC: 30 MG/DL (ref 6–20)
CALCIUM SERPL-MCNC: 8.8 MG/DL (ref 8.7–10.5)
CHLORIDE SERPL-SCNC: 104 MMOL/L (ref 95–110)
CLARITY UR: CLEAR
CO2 SERPL-SCNC: 25 MMOL/L (ref 23–29)
COLOR UR: COLORLESS
CREAT SERPL-MCNC: 1.8 MG/DL (ref 0.5–1.4)
DIFFERENTIAL METHOD: ABNORMAL
EOSINOPHIL # BLD AUTO: 0.8 K/UL (ref 0–0.5)
EOSINOPHIL NFR BLD: 6.6 % (ref 0–8)
ERYTHROCYTE [DISTWIDTH] IN BLOOD BY AUTOMATED COUNT: 13.8 % (ref 11.5–14.5)
EST. GFR  (AFRICAN AMERICAN): 55.1 ML/MIN/1.73 M^2
EST. GFR  (NON AFRICAN AMERICAN): 47.7 ML/MIN/1.73 M^2
GLUCOSE SERPL-MCNC: 251 MG/DL (ref 70–110)
GLUCOSE UR QL STRIP: ABNORMAL
HCT VFR BLD AUTO: 48.5 % (ref 40–54)
HGB BLD-MCNC: 16.1 G/DL (ref 14–18)
HGB UR QL STRIP: NEGATIVE
HYALINE CASTS #/AREA URNS LPF: 1 /LPF
IMM GRANULOCYTES # BLD AUTO: 0.04 K/UL (ref 0–0.04)
IMM GRANULOCYTES NFR BLD AUTO: 0.3 % (ref 0–0.5)
KETONES UR QL STRIP: NEGATIVE
LEUKOCYTE ESTERASE UR QL STRIP: NEGATIVE
LYMPHOCYTES # BLD AUTO: 4 K/UL (ref 1–4.8)
LYMPHOCYTES NFR BLD: 31 % (ref 18–48)
MCH RBC QN AUTO: 29.8 PG (ref 27–31)
MCHC RBC AUTO-ENTMCNC: 33.2 G/DL (ref 32–36)
MCV RBC AUTO: 90 FL (ref 82–98)
MICROSCOPIC COMMENT: NORMAL
MONOCYTES # BLD AUTO: 0.7 K/UL (ref 0.3–1)
MONOCYTES NFR BLD: 5.4 % (ref 4–15)
NEUTROPHILS # BLD AUTO: 7.2 K/UL (ref 1.8–7.7)
NEUTROPHILS NFR BLD: 56.3 % (ref 38–73)
NITRITE UR QL STRIP: NEGATIVE
NRBC BLD-RTO: 0 /100 WBC
PH UR STRIP: 7 [PH] (ref 5–8)
PLATELET # BLD AUTO: 196 K/UL (ref 150–450)
PMV BLD AUTO: 11.3 FL (ref 9.2–12.9)
POTASSIUM SERPL-SCNC: 5.1 MMOL/L (ref 3.5–5.1)
PROT SERPL-MCNC: 6.4 G/DL (ref 6–8.4)
PROT UR QL STRIP: ABNORMAL
RBC # BLD AUTO: 5.4 M/UL (ref 4.6–6.2)
RBC #/AREA URNS HPF: 0 /HPF (ref 0–4)
SODIUM SERPL-SCNC: 138 MMOL/L (ref 136–145)
SP GR UR STRIP: 1.01 (ref 1–1.03)
SQUAMOUS #/AREA URNS HPF: 0 /HPF
URN SPEC COLLECT METH UR: ABNORMAL
UROBILINOGEN UR STRIP-ACNC: NEGATIVE EU/DL
WBC # BLD AUTO: 12.77 K/UL (ref 3.9–12.7)
WBC #/AREA URNS HPF: 0 /HPF (ref 0–5)
YEAST URNS QL MICRO: NORMAL

## 2021-05-31 PROCEDURE — 85025 COMPLETE CBC W/AUTO DIFF WBC: CPT | Performed by: EMERGENCY MEDICINE

## 2021-05-31 PROCEDURE — 99284 EMERGENCY DEPT VISIT MOD MDM: CPT | Mod: 25

## 2021-05-31 PROCEDURE — 80053 COMPREHEN METABOLIC PANEL: CPT | Performed by: EMERGENCY MEDICINE

## 2021-05-31 PROCEDURE — 63600175 PHARM REV CODE 636 W HCPCS: Performed by: EMERGENCY MEDICINE

## 2021-05-31 PROCEDURE — 81001 URINALYSIS AUTO W/SCOPE: CPT | Performed by: EMERGENCY MEDICINE

## 2021-05-31 PROCEDURE — 96375 TX/PRO/DX INJ NEW DRUG ADDON: CPT

## 2021-05-31 PROCEDURE — 36415 COLL VENOUS BLD VENIPUNCTURE: CPT | Performed by: EMERGENCY MEDICINE

## 2021-05-31 PROCEDURE — 96374 THER/PROPH/DIAG INJ IV PUSH: CPT

## 2021-05-31 RX ORDER — INSULIN GLARGINE 100 [IU]/ML
30 INJECTION, SOLUTION SUBCUTANEOUS NIGHTLY
COMMUNITY
End: 2021-10-19

## 2021-05-31 RX ORDER — ONDANSETRON 2 MG/ML
4 INJECTION INTRAMUSCULAR; INTRAVENOUS
Status: COMPLETED | OUTPATIENT
Start: 2021-05-31 | End: 2021-05-31

## 2021-05-31 RX ORDER — HYDROMORPHONE HYDROCHLORIDE 1 MG/ML
0.5 INJECTION, SOLUTION INTRAMUSCULAR; INTRAVENOUS; SUBCUTANEOUS
Status: COMPLETED | OUTPATIENT
Start: 2021-05-31 | End: 2021-05-31

## 2021-05-31 RX ORDER — TAMSULOSIN HYDROCHLORIDE 0.4 MG/1
0.4 CAPSULE ORAL DAILY
COMMUNITY
End: 2021-08-17

## 2021-05-31 RX ORDER — LOSARTAN POTASSIUM 50 MG/1
50 TABLET ORAL DAILY
COMMUNITY
End: 2021-09-15 | Stop reason: SDUPTHER

## 2021-05-31 RX ADMIN — ONDANSETRON 4 MG: 2 INJECTION INTRAMUSCULAR; INTRAVENOUS at 11:05

## 2021-05-31 RX ADMIN — SODIUM CHLORIDE, SODIUM LACTATE, POTASSIUM CHLORIDE, AND CALCIUM CHLORIDE 1000 ML: .6; .31; .03; .02 INJECTION, SOLUTION INTRAVENOUS at 11:05

## 2021-05-31 RX ADMIN — HYDROMORPHONE HYDROCHLORIDE 0.5 MG: 1 INJECTION, SOLUTION INTRAMUSCULAR; INTRAVENOUS; SUBCUTANEOUS at 11:05

## 2021-06-01 VITALS
TEMPERATURE: 98 F | OXYGEN SATURATION: 99 % | RESPIRATION RATE: 16 BRPM | WEIGHT: 220 LBS | HEIGHT: 72 IN | SYSTOLIC BLOOD PRESSURE: 138 MMHG | HEART RATE: 76 BPM | BODY MASS INDEX: 29.8 KG/M2 | DIASTOLIC BLOOD PRESSURE: 75 MMHG

## 2021-06-01 RX ORDER — METHOCARBAMOL 500 MG/1
1000 TABLET, FILM COATED ORAL 3 TIMES DAILY PRN
Qty: 15 TABLET | Refills: 0 | Status: SHIPPED | OUTPATIENT
Start: 2021-06-01 | End: 2021-06-06

## 2021-06-09 ENCOUNTER — HOSPITAL ENCOUNTER (EMERGENCY)
Facility: HOSPITAL | Age: 36
Discharge: HOME | End: 2021-06-09
Attending: EMERGENCY MEDICINE
Payer: MEDICAID

## 2021-06-09 VITALS
TEMPERATURE: 99 F | BODY MASS INDEX: 29.8 KG/M2 | OXYGEN SATURATION: 96 % | WEIGHT: 220 LBS | SYSTOLIC BLOOD PRESSURE: 146 MMHG | HEIGHT: 72 IN | RESPIRATION RATE: 18 BRPM | DIASTOLIC BLOOD PRESSURE: 84 MMHG | HEART RATE: 66 BPM

## 2021-06-09 DIAGNOSIS — R10.9 FLANK PAIN: ICD-10-CM

## 2021-06-09 DIAGNOSIS — D72.829 LEUKOCYTOSIS, UNSPECIFIED TYPE: ICD-10-CM

## 2021-06-09 DIAGNOSIS — R73.9 HYPERGLYCEMIA: ICD-10-CM

## 2021-06-09 DIAGNOSIS — Z87.448 HISTORY OF PRIMARY IGA NEPHROPATHY: ICD-10-CM

## 2021-06-09 DIAGNOSIS — N28.9 DECREASED RENAL FUNCTION: Primary | ICD-10-CM

## 2021-06-09 LAB
ALBUMIN SERPL BCP-MCNC: 3.8 G/DL (ref 3.5–5.2)
ALP SERPL-CCNC: 82 U/L (ref 55–135)
ALT SERPL W/O P-5'-P-CCNC: 26 U/L (ref 10–44)
ANION GAP SERPL CALC-SCNC: 11 MMOL/L (ref 8–16)
AST SERPL-CCNC: 16 U/L (ref 10–40)
BACTERIA #/AREA URNS HPF: NEGATIVE /HPF
BASOPHILS # BLD AUTO: 0.07 K/UL (ref 0–0.2)
BASOPHILS NFR BLD: 0.5 % (ref 0–1.9)
BILIRUB SERPL-MCNC: 0.3 MG/DL (ref 0.1–1)
BILIRUB UR QL STRIP: NEGATIVE
BUN SERPL-MCNC: 38 MG/DL (ref 6–20)
CALCIUM SERPL-MCNC: 9.4 MG/DL (ref 8.7–10.5)
CHLORIDE SERPL-SCNC: 101 MMOL/L (ref 95–110)
CLARITY UR: CLEAR
CO2 SERPL-SCNC: 23 MMOL/L (ref 23–29)
COLOR UR: YELLOW
CREAT SERPL-MCNC: 2.1 MG/DL (ref 0.5–1.4)
DIFFERENTIAL METHOD: ABNORMAL
EOSINOPHIL # BLD AUTO: 0.9 K/UL (ref 0–0.5)
EOSINOPHIL NFR BLD: 6 % (ref 0–8)
ERYTHROCYTE [DISTWIDTH] IN BLOOD BY AUTOMATED COUNT: 13.9 % (ref 11.5–14.5)
EST. GFR  (AFRICAN AMERICAN): 45.8 ML/MIN/1.73 M^2
EST. GFR  (NON AFRICAN AMERICAN): 39.6 ML/MIN/1.73 M^2
GLUCOSE SERPL-MCNC: 261 MG/DL (ref 70–110)
GLUCOSE UR QL STRIP: ABNORMAL
HCT VFR BLD AUTO: 47.9 % (ref 40–54)
HGB BLD-MCNC: 16.1 G/DL (ref 14–18)
HGB UR QL STRIP: NEGATIVE
HYALINE CASTS #/AREA URNS LPF: 1 /LPF
IMM GRANULOCYTES # BLD AUTO: 0.08 K/UL (ref 0–0.04)
IMM GRANULOCYTES NFR BLD AUTO: 0.6 % (ref 0–0.5)
KETONES UR QL STRIP: NEGATIVE
LEUKOCYTE ESTERASE UR QL STRIP: NEGATIVE
LYMPHOCYTES # BLD AUTO: 4.2 K/UL (ref 1–4.8)
LYMPHOCYTES NFR BLD: 30.1 % (ref 18–48)
MCH RBC QN AUTO: 30.3 PG (ref 27–31)
MCHC RBC AUTO-ENTMCNC: 33.6 G/DL (ref 32–36)
MCV RBC AUTO: 90 FL (ref 82–98)
MICROSCOPIC COMMENT: NORMAL
MONOCYTES # BLD AUTO: 0.8 K/UL (ref 0.3–1)
MONOCYTES NFR BLD: 5.5 % (ref 4–15)
NEUTROPHILS # BLD AUTO: 8.1 K/UL (ref 1.8–7.7)
NEUTROPHILS NFR BLD: 57.3 % (ref 38–73)
NITRITE UR QL STRIP: NEGATIVE
NRBC BLD-RTO: 0 /100 WBC
PH UR STRIP: 6 [PH] (ref 5–8)
PLATELET # BLD AUTO: 201 K/UL (ref 150–450)
PMV BLD AUTO: 11 FL (ref 9.2–12.9)
POTASSIUM SERPL-SCNC: 4.8 MMOL/L (ref 3.5–5.1)
PROT SERPL-MCNC: 6.6 G/DL (ref 6–8.4)
PROT UR QL STRIP: ABNORMAL
RBC # BLD AUTO: 5.31 M/UL (ref 4.6–6.2)
RBC #/AREA URNS HPF: 1 /HPF (ref 0–4)
SODIUM SERPL-SCNC: 135 MMOL/L (ref 136–145)
SP GR UR STRIP: 1.01 (ref 1–1.03)
SQUAMOUS #/AREA URNS HPF: 0 /HPF
URN SPEC COLLECT METH UR: ABNORMAL
UROBILINOGEN UR STRIP-ACNC: NEGATIVE EU/DL
WBC # BLD AUTO: 14.05 K/UL (ref 3.9–12.7)
WBC #/AREA URNS HPF: 0 /HPF (ref 0–5)

## 2021-06-09 PROCEDURE — 81001 URINALYSIS AUTO W/SCOPE: CPT | Performed by: NURSE PRACTITIONER

## 2021-06-09 PROCEDURE — 96361 HYDRATE IV INFUSION ADD-ON: CPT

## 2021-06-09 PROCEDURE — 96375 TX/PRO/DX INJ NEW DRUG ADDON: CPT

## 2021-06-09 PROCEDURE — 96376 TX/PRO/DX INJ SAME DRUG ADON: CPT

## 2021-06-09 PROCEDURE — 25000003 PHARM REV CODE 250: Performed by: NURSE PRACTITIONER

## 2021-06-09 PROCEDURE — 85025 COMPLETE CBC W/AUTO DIFF WBC: CPT | Performed by: NURSE PRACTITIONER

## 2021-06-09 PROCEDURE — 99284 EMERGENCY DEPT VISIT MOD MDM: CPT | Mod: 25

## 2021-06-09 PROCEDURE — 63600175 PHARM REV CODE 636 W HCPCS: Performed by: NURSE PRACTITIONER

## 2021-06-09 PROCEDURE — 96374 THER/PROPH/DIAG INJ IV PUSH: CPT

## 2021-06-09 PROCEDURE — 80053 COMPREHEN METABOLIC PANEL: CPT | Performed by: NURSE PRACTITIONER

## 2021-06-09 RX ORDER — KETOROLAC TROMETHAMINE 30 MG/ML
15 INJECTION, SOLUTION INTRAMUSCULAR; INTRAVENOUS
Status: COMPLETED | OUTPATIENT
Start: 2021-06-09 | End: 2021-06-09

## 2021-06-09 RX ORDER — ONDANSETRON 2 MG/ML
4 INJECTION INTRAMUSCULAR; INTRAVENOUS
Status: COMPLETED | OUTPATIENT
Start: 2021-06-09 | End: 2021-06-09

## 2021-06-09 RX ORDER — ONDANSETRON 4 MG/1
4 TABLET, FILM COATED ORAL EVERY 6 HOURS
Qty: 12 TABLET | Refills: 0 | Status: SHIPPED | OUTPATIENT
Start: 2021-06-09 | End: 2021-08-17

## 2021-06-09 RX ORDER — MORPHINE SULFATE 4 MG/ML
4 INJECTION, SOLUTION INTRAMUSCULAR; INTRAVENOUS
Status: COMPLETED | OUTPATIENT
Start: 2021-06-09 | End: 2021-06-09

## 2021-06-09 RX ORDER — HYDROCODONE BITARTRATE AND ACETAMINOPHEN 7.5; 325 MG/1; MG/1
1 TABLET ORAL EVERY 4 HOURS PRN
Qty: 10 TABLET | Refills: 0 | Status: SHIPPED | OUTPATIENT
Start: 2021-06-09 | End: 2021-08-17

## 2021-06-09 RX ADMIN — SODIUM CHLORIDE 1000 ML: 0.9 INJECTION, SOLUTION INTRAVENOUS at 01:06

## 2021-06-09 RX ADMIN — ONDANSETRON 4 MG: 2 INJECTION INTRAMUSCULAR; INTRAVENOUS at 03:06

## 2021-06-09 RX ADMIN — MORPHINE SULFATE 4 MG: 4 INJECTION INTRAVENOUS at 03:06

## 2021-06-09 RX ADMIN — ONDANSETRON 4 MG: 2 INJECTION INTRAMUSCULAR; INTRAVENOUS at 01:06

## 2021-06-09 RX ADMIN — KETOROLAC TROMETHAMINE 15 MG: 30 INJECTION, SOLUTION INTRAMUSCULAR; INTRAVENOUS at 01:06

## 2021-06-30 ENCOUNTER — HOSPITAL ENCOUNTER (EMERGENCY)
Facility: HOSPITAL | Age: 36
Discharge: HOME | End: 2021-06-30
Attending: EMERGENCY MEDICINE
Payer: MEDICAID

## 2021-06-30 VITALS
RESPIRATION RATE: 20 BRPM | TEMPERATURE: 98 F | DIASTOLIC BLOOD PRESSURE: 91 MMHG | WEIGHT: 214 LBS | OXYGEN SATURATION: 100 % | HEART RATE: 81 BPM | SYSTOLIC BLOOD PRESSURE: 140 MMHG | HEIGHT: 72 IN | BODY MASS INDEX: 28.99 KG/M2

## 2021-06-30 DIAGNOSIS — M54.50 ACUTE BILATERAL LOW BACK PAIN WITHOUT SCIATICA: ICD-10-CM

## 2021-06-30 DIAGNOSIS — R10.9 BILATERAL FLANK PAIN: Primary | ICD-10-CM

## 2021-06-30 DIAGNOSIS — N18.9 CHRONIC KIDNEY DISEASE, UNSPECIFIED CKD STAGE: ICD-10-CM

## 2021-06-30 LAB
ALBUMIN SERPL BCP-MCNC: 3.8 G/DL (ref 3.5–5.2)
ALP SERPL-CCNC: 95 U/L (ref 55–135)
ALT SERPL W/O P-5'-P-CCNC: 19 U/L (ref 10–44)
ANION GAP SERPL CALC-SCNC: 9 MMOL/L (ref 8–16)
AST SERPL-CCNC: 13 U/L (ref 10–40)
BACTERIA #/AREA URNS HPF: NEGATIVE /HPF
BASOPHILS # BLD AUTO: 0.04 K/UL (ref 0–0.2)
BASOPHILS NFR BLD: 0.3 % (ref 0–1.9)
BILIRUB SERPL-MCNC: 1 MG/DL (ref 0.1–1)
BILIRUB UR QL STRIP: NEGATIVE
BUN SERPL-MCNC: 33 MG/DL (ref 6–20)
CALCIUM SERPL-MCNC: 9.1 MG/DL (ref 8.7–10.5)
CHLORIDE SERPL-SCNC: 104 MMOL/L (ref 95–110)
CLARITY UR: CLEAR
CO2 SERPL-SCNC: 26 MMOL/L (ref 23–29)
COLOR UR: COLORLESS
CREAT SERPL-MCNC: 2 MG/DL (ref 0.5–1.4)
DIFFERENTIAL METHOD: ABNORMAL
EOSINOPHIL # BLD AUTO: 0.7 K/UL (ref 0–0.5)
EOSINOPHIL NFR BLD: 5.4 % (ref 0–8)
ERYTHROCYTE [DISTWIDTH] IN BLOOD BY AUTOMATED COUNT: 13.5 % (ref 11.5–14.5)
EST. GFR  (AFRICAN AMERICAN): 48.5 ML/MIN/1.73 M^2
EST. GFR  (NON AFRICAN AMERICAN): 42 ML/MIN/1.73 M^2
GLUCOSE SERPL-MCNC: 282 MG/DL (ref 70–110)
GLUCOSE UR QL STRIP: ABNORMAL
HCT VFR BLD AUTO: 49.8 % (ref 40–54)
HGB BLD-MCNC: 16.3 G/DL (ref 14–18)
HGB UR QL STRIP: NEGATIVE
HYALINE CASTS #/AREA URNS LPF: 1 /LPF
IMM GRANULOCYTES # BLD AUTO: 0.04 K/UL (ref 0–0.04)
IMM GRANULOCYTES NFR BLD AUTO: 0.3 % (ref 0–0.5)
KETONES UR QL STRIP: NEGATIVE
LEUKOCYTE ESTERASE UR QL STRIP: NEGATIVE
LYMPHOCYTES # BLD AUTO: 3.4 K/UL (ref 1–4.8)
LYMPHOCYTES NFR BLD: 27.5 % (ref 18–48)
MCH RBC QN AUTO: 29.5 PG (ref 27–31)
MCHC RBC AUTO-ENTMCNC: 32.7 G/DL (ref 32–36)
MCV RBC AUTO: 90 FL (ref 82–98)
MICROSCOPIC COMMENT: NORMAL
MONOCYTES # BLD AUTO: 0.9 K/UL (ref 0.3–1)
MONOCYTES NFR BLD: 7.3 % (ref 4–15)
NEUTROPHILS # BLD AUTO: 7.3 K/UL (ref 1.8–7.7)
NEUTROPHILS NFR BLD: 59.2 % (ref 38–73)
NITRITE UR QL STRIP: NEGATIVE
NRBC BLD-RTO: 0 /100 WBC
PH UR STRIP: 6 [PH] (ref 5–8)
PLATELET # BLD AUTO: 197 K/UL (ref 150–450)
PMV BLD AUTO: 11 FL (ref 9.2–12.9)
POTASSIUM SERPL-SCNC: 4.3 MMOL/L (ref 3.5–5.1)
PROT SERPL-MCNC: 6.9 G/DL (ref 6–8.4)
PROT UR QL STRIP: ABNORMAL
RBC # BLD AUTO: 5.53 M/UL (ref 4.6–6.2)
RBC #/AREA URNS HPF: 0 /HPF (ref 0–4)
SODIUM SERPL-SCNC: 139 MMOL/L (ref 136–145)
SP GR UR STRIP: 1.01 (ref 1–1.03)
SQUAMOUS #/AREA URNS HPF: 0 /HPF
URN SPEC COLLECT METH UR: ABNORMAL
UROBILINOGEN UR STRIP-ACNC: NEGATIVE EU/DL
WBC # BLD AUTO: 12.38 K/UL (ref 3.9–12.7)
WBC #/AREA URNS HPF: 0 /HPF (ref 0–5)
YEAST URNS QL MICRO: NORMAL

## 2021-06-30 PROCEDURE — 81001 URINALYSIS AUTO W/SCOPE: CPT | Performed by: EMERGENCY MEDICINE

## 2021-06-30 PROCEDURE — 80053 COMPREHEN METABOLIC PANEL: CPT | Performed by: EMERGENCY MEDICINE

## 2021-06-30 PROCEDURE — 96375 TX/PRO/DX INJ NEW DRUG ADDON: CPT

## 2021-06-30 PROCEDURE — 85025 COMPLETE CBC W/AUTO DIFF WBC: CPT | Performed by: EMERGENCY MEDICINE

## 2021-06-30 PROCEDURE — 63600175 PHARM REV CODE 636 W HCPCS: Performed by: EMERGENCY MEDICINE

## 2021-06-30 PROCEDURE — 96374 THER/PROPH/DIAG INJ IV PUSH: CPT

## 2021-06-30 PROCEDURE — 99284 EMERGENCY DEPT VISIT MOD MDM: CPT | Mod: 25

## 2021-06-30 RX ORDER — SODIUM CHLORIDE, SODIUM LACTATE, POTASSIUM CHLORIDE, CALCIUM CHLORIDE 600; 310; 30; 20 MG/100ML; MG/100ML; MG/100ML; MG/100ML
1000 INJECTION, SOLUTION INTRAVENOUS
Status: COMPLETED | OUTPATIENT
Start: 2021-06-30 | End: 2021-06-30

## 2021-06-30 RX ORDER — MORPHINE SULFATE 4 MG/ML
4 INJECTION, SOLUTION INTRAMUSCULAR; INTRAVENOUS
Status: COMPLETED | OUTPATIENT
Start: 2021-06-30 | End: 2021-06-30

## 2021-06-30 RX ORDER — ONDANSETRON 4 MG/1
4 TABLET, ORALLY DISINTEGRATING ORAL
Status: DISCONTINUED | OUTPATIENT
Start: 2021-06-30 | End: 2021-06-30 | Stop reason: HOSPADM

## 2021-06-30 RX ORDER — HYDROMORPHONE HYDROCHLORIDE 1 MG/ML
0.5 INJECTION, SOLUTION INTRAMUSCULAR; INTRAVENOUS; SUBCUTANEOUS
Status: DISCONTINUED | OUTPATIENT
Start: 2021-06-30 | End: 2021-06-30 | Stop reason: HOSPADM

## 2021-06-30 RX ORDER — ONDANSETRON 2 MG/ML
4 INJECTION INTRAMUSCULAR; INTRAVENOUS
Status: COMPLETED | OUTPATIENT
Start: 2021-06-30 | End: 2021-06-30

## 2021-06-30 RX ADMIN — ONDANSETRON 4 MG: 2 INJECTION INTRAMUSCULAR; INTRAVENOUS at 03:06

## 2021-06-30 RX ADMIN — MORPHINE SULFATE 4 MG: 4 INJECTION, SOLUTION INTRAMUSCULAR; INTRAVENOUS at 03:06

## 2021-06-30 RX ADMIN — SODIUM CHLORIDE, SODIUM LACTATE, POTASSIUM CHLORIDE, AND CALCIUM CHLORIDE 1000 ML: .6; .31; .03; .02 INJECTION, SOLUTION INTRAVENOUS at 03:06

## 2021-07-15 ENCOUNTER — IMMUNIZATION (OUTPATIENT)
Dept: PHARMACY | Facility: CLINIC | Age: 36
End: 2021-07-15
Payer: MEDICAID

## 2021-07-15 DIAGNOSIS — Z23 NEED FOR VACCINATION: Primary | ICD-10-CM

## 2021-07-23 ENCOUNTER — NUTRITION (OUTPATIENT)
Dept: NUTRITION | Facility: HOSPITAL | Age: 36
End: 2021-07-23
Payer: MEDICAID

## 2021-07-23 DIAGNOSIS — E11.65 UNCONTROLLED TYPE 2 DIABETES MELLITUS WITH HYPERGLYCEMIA: Primary | ICD-10-CM

## 2021-07-23 PROCEDURE — G0108 DIAB MANAGE TRN  PER INDIV: HCPCS

## 2021-08-17 ENCOUNTER — OFFICE VISIT (OUTPATIENT)
Dept: FAMILY MEDICINE | Facility: CLINIC | Age: 36
End: 2021-08-17
Payer: MEDICAID

## 2021-08-17 VITALS
TEMPERATURE: 98 F | HEIGHT: 72 IN | DIASTOLIC BLOOD PRESSURE: 94 MMHG | BODY MASS INDEX: 29.66 KG/M2 | HEART RATE: 70 BPM | SYSTOLIC BLOOD PRESSURE: 122 MMHG | WEIGHT: 219 LBS | OXYGEN SATURATION: 95 %

## 2021-08-17 DIAGNOSIS — R14.0 BLOATING: ICD-10-CM

## 2021-08-17 DIAGNOSIS — Z79.4 TYPE 2 DIABETES MELLITUS WITHOUT COMPLICATION, WITH LONG-TERM CURRENT USE OF INSULIN: Primary | ICD-10-CM

## 2021-08-17 DIAGNOSIS — E11.9 TYPE 2 DIABETES MELLITUS WITHOUT COMPLICATION, WITH LONG-TERM CURRENT USE OF INSULIN: Primary | ICD-10-CM

## 2021-08-17 DIAGNOSIS — F41.9 ANXIETY: ICD-10-CM

## 2021-08-17 DIAGNOSIS — I10 ESSENTIAL HYPERTENSION: ICD-10-CM

## 2021-08-17 PROCEDURE — 99203 PR OFFICE/OUTPT VISIT, NEW, LEVL III, 30-44 MIN: ICD-10-PCS | Mod: S$GLB,,, | Performed by: NURSE PRACTITIONER

## 2021-08-17 PROCEDURE — 99203 OFFICE O/P NEW LOW 30 MIN: CPT | Mod: S$GLB,,, | Performed by: NURSE PRACTITIONER

## 2021-08-17 RX ORDER — SERTRALINE HYDROCHLORIDE 25 MG/1
25 TABLET, FILM COATED ORAL NIGHTLY
Qty: 30 TABLET | Refills: 1 | Status: SHIPPED | OUTPATIENT
Start: 2021-08-17 | End: 2021-10-19

## 2021-08-25 ENCOUNTER — TELEPHONE (OUTPATIENT)
Dept: FAMILY MEDICINE | Facility: CLINIC | Age: 36
End: 2021-08-25

## 2021-08-25 DIAGNOSIS — Z79.4 TYPE 2 DIABETES MELLITUS WITHOUT COMPLICATION, WITH LONG-TERM CURRENT USE OF INSULIN: Primary | ICD-10-CM

## 2021-08-25 DIAGNOSIS — E11.9 TYPE 2 DIABETES MELLITUS WITHOUT COMPLICATION, WITH LONG-TERM CURRENT USE OF INSULIN: Primary | ICD-10-CM

## 2021-08-26 ENCOUNTER — PATIENT MESSAGE (OUTPATIENT)
Dept: FAMILY MEDICINE | Facility: CLINIC | Age: 36
End: 2021-08-26

## 2021-08-26 DIAGNOSIS — E11.9 TYPE 2 DIABETES MELLITUS WITHOUT COMPLICATION, WITH LONG-TERM CURRENT USE OF INSULIN: Primary | ICD-10-CM

## 2021-08-26 DIAGNOSIS — Z79.4 TYPE 2 DIABETES MELLITUS WITHOUT COMPLICATION, WITH LONG-TERM CURRENT USE OF INSULIN: Primary | ICD-10-CM

## 2021-08-26 RX ORDER — DAPAGLIFLOZIN 5 MG/1
5 TABLET, FILM COATED ORAL DAILY
Qty: 90 TABLET | Refills: 0 | Status: SHIPPED | OUTPATIENT
Start: 2021-08-26 | End: 2021-10-19

## 2021-08-27 RX ORDER — INSULIN GLARGINE 100 [IU]/ML
30 INJECTION, SOLUTION SUBCUTANEOUS NIGHTLY
Status: CANCELLED | OUTPATIENT
Start: 2021-08-27

## 2021-08-27 RX ORDER — INSULIN GLARGINE 100 [IU]/ML
30 INJECTION, SOLUTION SUBCUTANEOUS DAILY
Qty: 6 ML | Refills: 1 | Status: SHIPPED | OUTPATIENT
Start: 2021-08-27 | End: 2022-10-19

## 2021-09-13 ENCOUNTER — OFFICE VISIT (OUTPATIENT)
Dept: URGENT CARE | Facility: CLINIC | Age: 36
End: 2021-09-13
Payer: MEDICAID

## 2021-09-13 VITALS
DIASTOLIC BLOOD PRESSURE: 85 MMHG | TEMPERATURE: 97 F | HEART RATE: 70 BPM | BODY MASS INDEX: 29.85 KG/M2 | OXYGEN SATURATION: 98 % | SYSTOLIC BLOOD PRESSURE: 131 MMHG | WEIGHT: 220.38 LBS | RESPIRATION RATE: 16 BRPM | HEIGHT: 72 IN

## 2021-09-13 DIAGNOSIS — Z11.52 ENCOUNTER FOR SCREENING FOR COVID-19: ICD-10-CM

## 2021-09-13 DIAGNOSIS — J01.90 ACUTE RHINOSINUSITIS: Primary | ICD-10-CM

## 2021-09-13 DIAGNOSIS — Z20.822 COVID-19 VIRUS NOT DETECTED: ICD-10-CM

## 2021-09-13 LAB
CTP QC/QA: YES
SARS-COV-2 RDRP RESP QL NAA+PROBE: NEGATIVE

## 2021-09-13 PROCEDURE — 99213 OFFICE O/P EST LOW 20 MIN: CPT | Mod: S$GLB,,, | Performed by: NURSE PRACTITIONER

## 2021-09-13 PROCEDURE — 87635 SARS-COV-2 COVID-19 AMP PRB: CPT | Mod: QW,S$GLB,, | Performed by: NURSE PRACTITIONER

## 2021-09-13 PROCEDURE — 99213 PR OFFICE/OUTPT VISIT, EST, LEVL III, 20-29 MIN: ICD-10-PCS | Mod: S$GLB,,, | Performed by: NURSE PRACTITIONER

## 2021-09-13 PROCEDURE — 87635 PR SARS-COV-2 COVID-19 AMPLIFIED PROBE: ICD-10-PCS | Mod: QW,S$GLB,, | Performed by: NURSE PRACTITIONER

## 2021-09-13 RX ORDER — LORATADINE 10 MG/1
10 TABLET ORAL DAILY
Qty: 30 TABLET | Refills: 0 | Status: SHIPPED | OUTPATIENT
Start: 2021-09-13 | End: 2021-10-19

## 2021-09-15 ENCOUNTER — OFFICE VISIT (OUTPATIENT)
Dept: FAMILY MEDICINE | Facility: CLINIC | Age: 36
End: 2021-09-15
Payer: MEDICAID

## 2021-09-15 VITALS
HEART RATE: 74 BPM | SYSTOLIC BLOOD PRESSURE: 138 MMHG | DIASTOLIC BLOOD PRESSURE: 94 MMHG | OXYGEN SATURATION: 98 % | BODY MASS INDEX: 29.93 KG/M2 | TEMPERATURE: 98 F | WEIGHT: 221 LBS | HEIGHT: 72 IN

## 2021-09-15 DIAGNOSIS — Z79.4 TYPE 2 DIABETES MELLITUS WITHOUT COMPLICATION, WITH LONG-TERM CURRENT USE OF INSULIN: ICD-10-CM

## 2021-09-15 DIAGNOSIS — E11.9 TYPE 2 DIABETES MELLITUS WITHOUT COMPLICATION, WITH LONG-TERM CURRENT USE OF INSULIN: ICD-10-CM

## 2021-09-15 DIAGNOSIS — J06.9 ACUTE URI: ICD-10-CM

## 2021-09-15 DIAGNOSIS — M25.50 ARTHRALGIA, UNSPECIFIED JOINT: ICD-10-CM

## 2021-09-15 DIAGNOSIS — I10 ESSENTIAL HYPERTENSION: Primary | ICD-10-CM

## 2021-09-15 PROCEDURE — 99214 PR OFFICE/OUTPT VISIT, EST, LEVL IV, 30-39 MIN: ICD-10-PCS | Mod: S$GLB,,, | Performed by: NURSE PRACTITIONER

## 2021-09-15 PROCEDURE — 99214 OFFICE O/P EST MOD 30 MIN: CPT | Mod: S$GLB,,, | Performed by: NURSE PRACTITIONER

## 2021-09-15 RX ORDER — LOSARTAN POTASSIUM 50 MG/1
50 TABLET ORAL 2 TIMES DAILY
Qty: 60 TABLET | Refills: 1 | Status: SHIPPED | OUTPATIENT
Start: 2021-09-15 | End: 2022-01-19 | Stop reason: CLARIF

## 2021-09-16 ENCOUNTER — LAB VISIT (OUTPATIENT)
Dept: LAB | Facility: HOSPITAL | Age: 36
End: 2021-09-16
Attending: NURSE PRACTITIONER
Payer: MEDICAID

## 2021-09-16 DIAGNOSIS — E11.9 TYPE 2 DIABETES MELLITUS WITHOUT COMPLICATION, WITH LONG-TERM CURRENT USE OF INSULIN: ICD-10-CM

## 2021-09-16 DIAGNOSIS — Z79.4 TYPE 2 DIABETES MELLITUS WITHOUT COMPLICATION, WITH LONG-TERM CURRENT USE OF INSULIN: ICD-10-CM

## 2021-09-16 DIAGNOSIS — M25.50 ARTHRALGIA, UNSPECIFIED JOINT: ICD-10-CM

## 2021-09-16 DIAGNOSIS — I10 ESSENTIAL HYPERTENSION: ICD-10-CM

## 2021-09-16 LAB
ALBUMIN SERPL BCP-MCNC: 3.8 G/DL (ref 3.5–5.2)
ALP SERPL-CCNC: 80 U/L (ref 55–135)
ALT SERPL W/O P-5'-P-CCNC: 33 U/L (ref 10–44)
ANION GAP SERPL CALC-SCNC: 10 MMOL/L (ref 8–16)
AST SERPL-CCNC: 19 U/L (ref 10–40)
BASOPHILS # BLD AUTO: 0.06 K/UL (ref 0–0.2)
BASOPHILS NFR BLD: 0.4 % (ref 0–1.9)
BILIRUB SERPL-MCNC: 0.9 MG/DL (ref 0.1–1)
BUN SERPL-MCNC: 41 MG/DL (ref 6–20)
CALCIUM SERPL-MCNC: 8.9 MG/DL (ref 8.7–10.5)
CHLORIDE SERPL-SCNC: 105 MMOL/L (ref 95–110)
CHOLEST SERPL-MCNC: 187 MG/DL (ref 120–199)
CHOLEST/HDLC SERPL: 8.9 {RATIO} (ref 2–5)
CO2 SERPL-SCNC: 25 MMOL/L (ref 23–29)
CREAT SERPL-MCNC: 2.3 MG/DL (ref 0.5–1.4)
DIFFERENTIAL METHOD: ABNORMAL
EOSINOPHIL # BLD AUTO: 0.8 K/UL (ref 0–0.5)
EOSINOPHIL NFR BLD: 5.9 % (ref 0–8)
ERYTHROCYTE [DISTWIDTH] IN BLOOD BY AUTOMATED COUNT: 14 % (ref 11.5–14.5)
EST. GFR  (AFRICAN AMERICAN): 41 ML/MIN/1.73 M^2
EST. GFR  (NON AFRICAN AMERICAN): 35.5 ML/MIN/1.73 M^2
GLUCOSE SERPL-MCNC: 194 MG/DL (ref 70–110)
HCT VFR BLD AUTO: 48.9 % (ref 40–54)
HDLC SERPL-MCNC: 21 MG/DL (ref 40–75)
HDLC SERPL: 11.2 % (ref 20–50)
HGB BLD-MCNC: 16.1 G/DL (ref 14–18)
IMM GRANULOCYTES # BLD AUTO: 0.09 K/UL (ref 0–0.04)
IMM GRANULOCYTES NFR BLD AUTO: 0.6 % (ref 0–0.5)
LDLC SERPL CALC-MCNC: ABNORMAL MG/DL (ref 63–159)
LYMPHOCYTES # BLD AUTO: 4.5 K/UL (ref 1–4.8)
LYMPHOCYTES NFR BLD: 31.7 % (ref 18–48)
MCH RBC QN AUTO: 29.4 PG (ref 27–31)
MCHC RBC AUTO-ENTMCNC: 32.9 G/DL (ref 32–36)
MCV RBC AUTO: 89 FL (ref 82–98)
MONOCYTES # BLD AUTO: 0.8 K/UL (ref 0.3–1)
MONOCYTES NFR BLD: 6 % (ref 4–15)
NEUTROPHILS # BLD AUTO: 7.8 K/UL (ref 1.8–7.7)
NEUTROPHILS NFR BLD: 55.4 % (ref 38–73)
NONHDLC SERPL-MCNC: 166 MG/DL
NRBC BLD-RTO: 0 /100 WBC
PLATELET # BLD AUTO: 193 K/UL (ref 150–450)
PMV BLD AUTO: 10.9 FL (ref 9.2–12.9)
POTASSIUM SERPL-SCNC: 4.2 MMOL/L (ref 3.5–5.1)
PROT SERPL-MCNC: 6.7 G/DL (ref 6–8.4)
RBC # BLD AUTO: 5.47 M/UL (ref 4.6–6.2)
SODIUM SERPL-SCNC: 140 MMOL/L (ref 136–145)
TRIGL SERPL-MCNC: 870 MG/DL (ref 30–150)
TSH SERPL DL<=0.005 MIU/L-ACNC: 1.78 UIU/ML (ref 0.34–5.6)
WBC # BLD AUTO: 14.08 K/UL (ref 3.9–12.7)

## 2021-09-16 PROCEDURE — 84443 ASSAY THYROID STIM HORMONE: CPT | Performed by: NURSE PRACTITIONER

## 2021-09-16 PROCEDURE — 86431 RHEUMATOID FACTOR QUANT: CPT | Performed by: NURSE PRACTITIONER

## 2021-09-16 PROCEDURE — 36415 COLL VENOUS BLD VENIPUNCTURE: CPT | Performed by: NURSE PRACTITIONER

## 2021-09-16 PROCEDURE — 80061 LIPID PANEL: CPT | Performed by: NURSE PRACTITIONER

## 2021-09-16 PROCEDURE — 80053 COMPREHEN METABOLIC PANEL: CPT | Performed by: NURSE PRACTITIONER

## 2021-09-16 PROCEDURE — 83036 HEMOGLOBIN GLYCOSYLATED A1C: CPT | Performed by: NURSE PRACTITIONER

## 2021-09-16 PROCEDURE — 85025 COMPLETE CBC W/AUTO DIFF WBC: CPT | Performed by: NURSE PRACTITIONER

## 2021-09-17 ENCOUNTER — TELEPHONE (OUTPATIENT)
Dept: FAMILY MEDICINE | Facility: CLINIC | Age: 36
End: 2021-09-17

## 2021-09-17 LAB
ESTIMATED AVG GLUCOSE: 232 MG/DL (ref 68–131)
HBA1C MFR BLD: 9.7 % (ref 4.5–6.2)
RHEUMATOID FACT SERPL-ACNC: 10.8 IU/ML (ref 0–13.9)

## 2021-10-19 ENCOUNTER — OFFICE VISIT (OUTPATIENT)
Dept: FAMILY MEDICINE | Facility: CLINIC | Age: 36
End: 2021-10-19
Payer: MEDICAID

## 2021-10-19 ENCOUNTER — LAB VISIT (OUTPATIENT)
Dept: LAB | Facility: HOSPITAL | Age: 36
End: 2021-10-19
Attending: INTERNAL MEDICINE
Payer: MEDICAID

## 2021-10-19 VITALS
SYSTOLIC BLOOD PRESSURE: 136 MMHG | DIASTOLIC BLOOD PRESSURE: 86 MMHG | OXYGEN SATURATION: 95 % | HEART RATE: 91 BPM | BODY MASS INDEX: 29.66 KG/M2 | TEMPERATURE: 98 F | HEIGHT: 72 IN | WEIGHT: 219 LBS

## 2021-10-19 DIAGNOSIS — I10 ESSENTIAL HYPERTENSION: Primary | ICD-10-CM

## 2021-10-19 DIAGNOSIS — R80.9 PROTEINURIA: Primary | ICD-10-CM

## 2021-10-19 DIAGNOSIS — E78.2 MIXED HYPERLIPIDEMIA: ICD-10-CM

## 2021-10-19 DIAGNOSIS — Z23 NEED FOR VACCINATION: ICD-10-CM

## 2021-10-19 DIAGNOSIS — D72.828 OTHER ELEVATED WHITE BLOOD CELL (WBC) COUNT: ICD-10-CM

## 2021-10-19 DIAGNOSIS — N18.30 CHRONIC KIDNEY DISEASE, STAGE III (MODERATE): ICD-10-CM

## 2021-10-19 DIAGNOSIS — E11.9 TYPE 2 DIABETES MELLITUS WITHOUT COMPLICATION, WITH LONG-TERM CURRENT USE OF INSULIN: ICD-10-CM

## 2021-10-19 DIAGNOSIS — Z79.4 TYPE 2 DIABETES MELLITUS WITHOUT COMPLICATION, WITH LONG-TERM CURRENT USE OF INSULIN: ICD-10-CM

## 2021-10-19 LAB
ALBUMIN SERPL BCP-MCNC: 4 G/DL (ref 3.5–5.2)
ANION GAP SERPL CALC-SCNC: 10 MMOL/L (ref 8–16)
BACTERIA #/AREA URNS HPF: NEGATIVE /HPF
BILIRUB UR QL STRIP: NEGATIVE
BUN SERPL-MCNC: 30 MG/DL (ref 6–20)
CALCIUM SERPL-MCNC: 9.6 MG/DL (ref 8.7–10.5)
CHLORIDE SERPL-SCNC: 109 MMOL/L (ref 95–110)
CLARITY UR: CLEAR
CO2 SERPL-SCNC: 25 MMOL/L (ref 23–29)
COLOR UR: YELLOW
CREAT SERPL-MCNC: 1.9 MG/DL (ref 0.5–1.4)
CREAT UR-MCNC: 82 MG/DL (ref 23–375)
EST. GFR  (AFRICAN AMERICAN): 51.3 ML/MIN/1.73 M^2
EST. GFR  (NON AFRICAN AMERICAN): 44.4 ML/MIN/1.73 M^2
ESTIMATED AVG GLUCOSE: 237 MG/DL (ref 68–131)
GLUCOSE SERPL-MCNC: 180 MG/DL (ref 70–110)
GLUCOSE UR QL STRIP: ABNORMAL
HBA1C MFR BLD: 9.9 % (ref 4.5–6.2)
HGB UR QL STRIP: NEGATIVE
HYALINE CASTS #/AREA URNS LPF: 0 /LPF
KETONES UR QL STRIP: NEGATIVE
LEUKOCYTE ESTERASE UR QL STRIP: NEGATIVE
MICROSCOPIC COMMENT: ABNORMAL
NITRITE UR QL STRIP: NEGATIVE
PH UR STRIP: 6 [PH] (ref 5–8)
PHOSPHATE SERPL-MCNC: 3 MG/DL (ref 2.7–4.5)
POTASSIUM SERPL-SCNC: 4.7 MMOL/L (ref 3.5–5.1)
PROT UR QL STRIP: ABNORMAL
PROT UR-MCNC: 124 MG/DL (ref 6–15)
PROT/CREAT UR: 1.51 MG/G{CREAT} (ref 0–0.2)
RBC #/AREA URNS HPF: 1 /HPF (ref 0–4)
SODIUM SERPL-SCNC: 144 MMOL/L (ref 136–145)
SP GR UR STRIP: 1.01 (ref 1–1.03)
SQUAMOUS #/AREA URNS HPF: 0 /HPF
URN SPEC COLLECT METH UR: ABNORMAL
UROBILINOGEN UR STRIP-ACNC: NEGATIVE EU/DL
WBC #/AREA URNS HPF: 0 /HPF (ref 0–5)

## 2021-10-19 PROCEDURE — 80069 RENAL FUNCTION PANEL: CPT | Performed by: INTERNAL MEDICINE

## 2021-10-19 PROCEDURE — 99214 PR OFFICE/OUTPT VISIT, EST, LEVL IV, 30-39 MIN: ICD-10-PCS | Mod: 25,S$GLB,, | Performed by: NURSE PRACTITIONER

## 2021-10-19 PROCEDURE — 90686 IIV4 VACC NO PRSV 0.5 ML IM: CPT | Mod: S$GLB,,, | Performed by: NURSE PRACTITIONER

## 2021-10-19 PROCEDURE — 83036 HEMOGLOBIN GLYCOSYLATED A1C: CPT | Performed by: INTERNAL MEDICINE

## 2021-10-19 PROCEDURE — 99214 OFFICE O/P EST MOD 30 MIN: CPT | Mod: 25,S$GLB,, | Performed by: NURSE PRACTITIONER

## 2021-10-19 PROCEDURE — 90471 FLU VACCINE (QUAD) GREATER THAN OR EQUAL TO 3YO PRESERVATIVE FREE IM: ICD-10-PCS | Mod: S$GLB,,, | Performed by: NURSE PRACTITIONER

## 2021-10-19 PROCEDURE — 90471 IMMUNIZATION ADMIN: CPT | Mod: S$GLB,,, | Performed by: NURSE PRACTITIONER

## 2021-10-19 PROCEDURE — 36415 COLL VENOUS BLD VENIPUNCTURE: CPT | Performed by: INTERNAL MEDICINE

## 2021-10-19 PROCEDURE — 82570 ASSAY OF URINE CREATININE: CPT | Performed by: INTERNAL MEDICINE

## 2021-10-19 PROCEDURE — 90686 FLU VACCINE (QUAD) GREATER THAN OR EQUAL TO 3YO PRESERVATIVE FREE IM: ICD-10-PCS | Mod: S$GLB,,, | Performed by: NURSE PRACTITIONER

## 2021-10-19 PROCEDURE — 81001 URINALYSIS AUTO W/SCOPE: CPT | Performed by: INTERNAL MEDICINE

## 2021-10-19 RX ORDER — ATORVASTATIN CALCIUM 10 MG/1
10 TABLET, FILM COATED ORAL NIGHTLY
Qty: 90 TABLET | Refills: 0 | Status: SHIPPED | OUTPATIENT
Start: 2021-10-19 | End: 2022-01-19 | Stop reason: SDUPTHER

## 2021-10-19 RX ORDER — DAPAGLIFLOZIN 10 MG/1
10 TABLET, FILM COATED ORAL DAILY
COMMUNITY
End: 2022-01-19

## 2021-10-19 RX ORDER — GLUCOSAM/CHONDRO/HERB 149/HYAL 750-100 MG
1 TABLET ORAL 2 TIMES DAILY
Qty: 180 CAPSULE | Refills: 1 | Status: ON HOLD | OUTPATIENT
Start: 2021-10-19 | End: 2023-08-21

## 2021-11-07 ENCOUNTER — HOSPITAL ENCOUNTER (EMERGENCY)
Facility: HOSPITAL | Age: 36
Discharge: HOME | End: 2021-11-07
Attending: EMERGENCY MEDICINE
Payer: MEDICAID

## 2021-11-07 VITALS
DIASTOLIC BLOOD PRESSURE: 81 MMHG | RESPIRATION RATE: 16 BRPM | BODY MASS INDEX: 28.44 KG/M2 | HEIGHT: 72 IN | SYSTOLIC BLOOD PRESSURE: 136 MMHG | TEMPERATURE: 98 F | OXYGEN SATURATION: 99 % | WEIGHT: 210 LBS | HEART RATE: 64 BPM

## 2021-11-07 DIAGNOSIS — R10.9 FLANK PAIN: Primary | ICD-10-CM

## 2021-11-07 LAB
ALBUMIN SERPL BCP-MCNC: 3.6 G/DL (ref 3.5–5.2)
ALBUMIN SERPL BCP-MCNC: 3.8 G/DL (ref 3.5–5.2)
ALP SERPL-CCNC: 71 U/L (ref 55–135)
ALP SERPL-CCNC: 76 U/L (ref 55–135)
ALT SERPL W/O P-5'-P-CCNC: 18 U/L (ref 10–44)
ALT SERPL W/O P-5'-P-CCNC: 22 U/L (ref 10–44)
AMPHET+METHAMPHET UR QL: NEGATIVE
ANION GAP SERPL CALC-SCNC: 8 MMOL/L (ref 8–16)
ANION GAP SERPL CALC-SCNC: 9 MMOL/L (ref 8–16)
AST SERPL-CCNC: 12 U/L (ref 10–40)
AST SERPL-CCNC: 12 U/L (ref 10–40)
BACTERIA #/AREA URNS HPF: NEGATIVE /HPF
BARBITURATES UR QL SCN>200 NG/ML: NEGATIVE
BASOPHILS # BLD AUTO: 0.07 K/UL (ref 0–0.2)
BASOPHILS NFR BLD: 0.5 % (ref 0–1.9)
BENZODIAZ UR QL SCN>200 NG/ML: NEGATIVE
BILIRUB SERPL-MCNC: 0.4 MG/DL (ref 0.1–1)
BILIRUB SERPL-MCNC: 0.7 MG/DL (ref 0.1–1)
BILIRUB UR QL STRIP: NEGATIVE
BUN SERPL-MCNC: 31 MG/DL (ref 6–20)
BUN SERPL-MCNC: 34 MG/DL (ref 6–20)
BZE UR QL SCN: NEGATIVE
CALCIUM SERPL-MCNC: 8.6 MG/DL (ref 8.7–10.5)
CALCIUM SERPL-MCNC: 9.2 MG/DL (ref 8.7–10.5)
CANNABINOIDS UR QL SCN: NEGATIVE
CHLORIDE SERPL-SCNC: 105 MMOL/L (ref 95–110)
CHLORIDE SERPL-SCNC: 109 MMOL/L (ref 95–110)
CK SERPL-CCNC: 67 U/L (ref 20–200)
CLARITY UR: CLEAR
CO2 SERPL-SCNC: 25 MMOL/L (ref 23–29)
CO2 SERPL-SCNC: 28 MMOL/L (ref 23–29)
COLOR UR: COLORLESS
CREAT SERPL-MCNC: 2 MG/DL (ref 0.5–1.4)
CREAT SERPL-MCNC: 2.2 MG/DL (ref 0.5–1.4)
CREAT UR-MCNC: 39 MG/DL (ref 23–375)
DIFFERENTIAL METHOD: ABNORMAL
EOSINOPHIL # BLD AUTO: 0.8 K/UL (ref 0–0.5)
EOSINOPHIL NFR BLD: 6 % (ref 0–8)
ERYTHROCYTE [DISTWIDTH] IN BLOOD BY AUTOMATED COUNT: 14.1 % (ref 11.5–14.5)
EST. GFR  (AFRICAN AMERICAN): 43 ML/MIN/1.73 M^2
EST. GFR  (AFRICAN AMERICAN): 48.2 ML/MIN/1.73 M^2
EST. GFR  (NON AFRICAN AMERICAN): 37.2 ML/MIN/1.73 M^2
EST. GFR  (NON AFRICAN AMERICAN): 41.7 ML/MIN/1.73 M^2
GLUCOSE SERPL-MCNC: 142 MG/DL (ref 70–110)
GLUCOSE SERPL-MCNC: 223 MG/DL (ref 70–110)
GLUCOSE UR QL STRIP: ABNORMAL
HCT VFR BLD AUTO: 49 % (ref 40–54)
HGB BLD-MCNC: 15.9 G/DL (ref 14–18)
HGB UR QL STRIP: NEGATIVE
HYALINE CASTS #/AREA URNS LPF: 0 /LPF
IMM GRANULOCYTES # BLD AUTO: 0.05 K/UL (ref 0–0.04)
IMM GRANULOCYTES NFR BLD AUTO: 0.4 % (ref 0–0.5)
KETONES UR QL STRIP: NEGATIVE
LEUKOCYTE ESTERASE UR QL STRIP: NEGATIVE
LYMPHOCYTES # BLD AUTO: 4.3 K/UL (ref 1–4.8)
LYMPHOCYTES NFR BLD: 32.6 % (ref 18–48)
MCH RBC QN AUTO: 29.9 PG (ref 27–31)
MCHC RBC AUTO-ENTMCNC: 32.4 G/DL (ref 32–36)
MCV RBC AUTO: 92 FL (ref 82–98)
MICROSCOPIC COMMENT: NORMAL
MONOCYTES # BLD AUTO: 0.8 K/UL (ref 0.3–1)
MONOCYTES NFR BLD: 5.9 % (ref 4–15)
NEUTROPHILS # BLD AUTO: 7.2 K/UL (ref 1.8–7.7)
NEUTROPHILS NFR BLD: 54.6 % (ref 38–73)
NITRITE UR QL STRIP: NEGATIVE
NRBC BLD-RTO: 0 /100 WBC
OPIATES UR QL SCN: NEGATIVE
PCP UR QL SCN>25 NG/ML: NEGATIVE
PH UR STRIP: 6 [PH] (ref 5–8)
PLATELET # BLD AUTO: 215 K/UL (ref 150–450)
PMV BLD AUTO: 11.3 FL (ref 9.2–12.9)
POTASSIUM SERPL-SCNC: 4.1 MMOL/L (ref 3.5–5.1)
POTASSIUM SERPL-SCNC: 4.3 MMOL/L (ref 3.5–5.1)
PROT SERPL-MCNC: 6.5 G/DL (ref 6–8.4)
PROT SERPL-MCNC: 6.6 G/DL (ref 6–8.4)
PROT UR QL STRIP: ABNORMAL
RBC # BLD AUTO: 5.31 M/UL (ref 4.6–6.2)
RBC #/AREA URNS HPF: 0 /HPF (ref 0–4)
SODIUM SERPL-SCNC: 142 MMOL/L (ref 136–145)
SODIUM SERPL-SCNC: 142 MMOL/L (ref 136–145)
SP GR UR STRIP: 1.01 (ref 1–1.03)
SQUAMOUS #/AREA URNS HPF: 0 /HPF
TOXICOLOGY INFORMATION: NORMAL
URN SPEC COLLECT METH UR: ABNORMAL
UROBILINOGEN UR STRIP-ACNC: NEGATIVE EU/DL
WBC # BLD AUTO: 13.19 K/UL (ref 3.9–12.7)
WBC #/AREA URNS HPF: 0 /HPF (ref 0–5)
YEAST URNS QL MICRO: NORMAL

## 2021-11-07 PROCEDURE — 80053 COMPREHEN METABOLIC PANEL: CPT | Performed by: NURSE PRACTITIONER

## 2021-11-07 PROCEDURE — 25000003 PHARM REV CODE 250: Performed by: EMERGENCY MEDICINE

## 2021-11-07 PROCEDURE — 96361 HYDRATE IV INFUSION ADD-ON: CPT

## 2021-11-07 PROCEDURE — 96360 HYDRATION IV INFUSION INIT: CPT

## 2021-11-07 PROCEDURE — 82550 ASSAY OF CK (CPK): CPT | Performed by: NURSE PRACTITIONER

## 2021-11-07 PROCEDURE — 36415 COLL VENOUS BLD VENIPUNCTURE: CPT | Performed by: NURSE PRACTITIONER

## 2021-11-07 PROCEDURE — 81001 URINALYSIS AUTO W/SCOPE: CPT | Performed by: NURSE PRACTITIONER

## 2021-11-07 PROCEDURE — 80053 COMPREHEN METABOLIC PANEL: CPT | Mod: 91 | Performed by: EMERGENCY MEDICINE

## 2021-11-07 PROCEDURE — 85025 COMPLETE CBC W/AUTO DIFF WBC: CPT | Performed by: NURSE PRACTITIONER

## 2021-11-07 PROCEDURE — 80307 DRUG TEST PRSMV CHEM ANLYZR: CPT | Performed by: NURSE PRACTITIONER

## 2021-11-07 PROCEDURE — 99284 EMERGENCY DEPT VISIT MOD MDM: CPT | Mod: 25

## 2021-11-07 RX ADMIN — SODIUM CHLORIDE 1000 ML: 0.9 INJECTION, SOLUTION INTRAVENOUS at 08:11

## 2021-11-07 RX ADMIN — SODIUM CHLORIDE 1000 ML: 0.9 INJECTION, SOLUTION INTRAVENOUS at 06:11

## 2022-01-19 ENCOUNTER — OFFICE VISIT (OUTPATIENT)
Dept: FAMILY MEDICINE | Facility: CLINIC | Age: 37
End: 2022-01-19
Payer: MEDICAID

## 2022-01-19 VITALS
HEIGHT: 72 IN | WEIGHT: 220 LBS | SYSTOLIC BLOOD PRESSURE: 140 MMHG | DIASTOLIC BLOOD PRESSURE: 102 MMHG | TEMPERATURE: 98 F | OXYGEN SATURATION: 95 % | BODY MASS INDEX: 29.8 KG/M2 | HEART RATE: 79 BPM

## 2022-01-19 DIAGNOSIS — Z79.4 TYPE 2 DIABETES MELLITUS WITHOUT COMPLICATION, WITH LONG-TERM CURRENT USE OF INSULIN: ICD-10-CM

## 2022-01-19 DIAGNOSIS — I10 ESSENTIAL HYPERTENSION: Primary | ICD-10-CM

## 2022-01-19 DIAGNOSIS — E11.9 TYPE 2 DIABETES MELLITUS WITHOUT COMPLICATION, WITH LONG-TERM CURRENT USE OF INSULIN: ICD-10-CM

## 2022-01-19 DIAGNOSIS — M25.512 ACUTE PAIN OF LEFT SHOULDER: ICD-10-CM

## 2022-01-19 DIAGNOSIS — E78.2 MIXED HYPERLIPIDEMIA: ICD-10-CM

## 2022-01-19 LAB — HBA1C MFR BLD: 8.3 %

## 2022-01-19 PROCEDURE — 99214 OFFICE O/P EST MOD 30 MIN: CPT | Mod: S$GLB,,, | Performed by: NURSE PRACTITIONER

## 2022-01-19 PROCEDURE — 1160F PR REVIEW ALL MEDS BY PRESCRIBER/CLIN PHARMACIST DOCUMENTED: ICD-10-PCS | Mod: S$GLB,,, | Performed by: NURSE PRACTITIONER

## 2022-01-19 PROCEDURE — 99214 PR OFFICE/OUTPT VISIT, EST, LEVL IV, 30-39 MIN: ICD-10-PCS | Mod: S$GLB,,, | Performed by: NURSE PRACTITIONER

## 2022-01-19 PROCEDURE — 3008F PR BODY MASS INDEX (BMI) DOCUMENTED: ICD-10-PCS | Mod: S$GLB,,, | Performed by: NURSE PRACTITIONER

## 2022-01-19 PROCEDURE — 4010F ACE/ARB THERAPY RXD/TAKEN: CPT | Mod: S$GLB,,, | Performed by: NURSE PRACTITIONER

## 2022-01-19 PROCEDURE — 83036 HEMOGLOBIN GLYCOSYLATED A1C: CPT | Mod: QW,,, | Performed by: NURSE PRACTITIONER

## 2022-01-19 PROCEDURE — 4010F PR ACE/ARB THEARPY RXD/TAKEN: ICD-10-PCS | Mod: S$GLB,,, | Performed by: NURSE PRACTITIONER

## 2022-01-19 PROCEDURE — 3008F BODY MASS INDEX DOCD: CPT | Mod: S$GLB,,, | Performed by: NURSE PRACTITIONER

## 2022-01-19 PROCEDURE — 3080F PR MOST RECENT DIASTOLIC BLOOD PRESSURE >= 90 MM HG: ICD-10-PCS | Mod: S$GLB,,, | Performed by: NURSE PRACTITIONER

## 2022-01-19 PROCEDURE — 3077F PR MOST RECENT SYSTOLIC BLOOD PRESSURE >= 140 MM HG: ICD-10-PCS | Mod: S$GLB,,, | Performed by: NURSE PRACTITIONER

## 2022-01-19 PROCEDURE — 3077F SYST BP >= 140 MM HG: CPT | Mod: S$GLB,,, | Performed by: NURSE PRACTITIONER

## 2022-01-19 PROCEDURE — 1160F RVW MEDS BY RX/DR IN RCRD: CPT | Mod: S$GLB,,, | Performed by: NURSE PRACTITIONER

## 2022-01-19 PROCEDURE — 83036 POCT HEMOGLOBIN A1C: ICD-10-PCS | Mod: QW,,, | Performed by: NURSE PRACTITIONER

## 2022-01-19 PROCEDURE — 3052F HG A1C>EQUAL 8.0%<EQUAL 9.0%: CPT | Mod: S$GLB,,, | Performed by: NURSE PRACTITIONER

## 2022-01-19 PROCEDURE — 3052F PR MOST RECENT HEMOGLOBIN A1C LEVEL 8.0 - < 9.0%: ICD-10-PCS | Mod: S$GLB,,, | Performed by: NURSE PRACTITIONER

## 2022-01-19 PROCEDURE — 3080F DIAST BP >= 90 MM HG: CPT | Mod: S$GLB,,, | Performed by: NURSE PRACTITIONER

## 2022-01-19 RX ORDER — LOSARTAN POTASSIUM 100 MG/1
100 TABLET ORAL DAILY
COMMUNITY
End: 2022-09-12 | Stop reason: SDUPTHER

## 2022-01-19 RX ORDER — AMLODIPINE BESYLATE 5 MG/1
5 TABLET ORAL DAILY
Qty: 30 TABLET | Refills: 1 | Status: SHIPPED | OUTPATIENT
Start: 2022-01-19 | End: 2022-03-03 | Stop reason: SDUPTHER

## 2022-01-19 RX ORDER — DAPAGLIFLOZIN 5 MG/1
5 TABLET, FILM COATED ORAL DAILY
COMMUNITY
Start: 2021-11-08 | End: 2022-03-18

## 2022-01-19 RX ORDER — ATORVASTATIN CALCIUM 10 MG/1
10 TABLET, FILM COATED ORAL NIGHTLY
Qty: 90 TABLET | Refills: 1 | Status: SHIPPED | OUTPATIENT
Start: 2022-01-19 | End: 2022-08-09 | Stop reason: SDUPTHER

## 2022-01-19 NOTE — PATIENT INSTRUCTIONS
"Patient Education       Diabetes and Diet   The Basics   Written by the doctors and editors at [facility]   Why is diet important in diabetes? -- Diet is important because it is part of diabetes treatment. Many people need to change what they eat and how much they eat to help treat their diabetes. It is important for people to treat their diabetes so that they:  · Keep their blood sugar at or near a normal level  · Prevent long-term problems, such as heart or kidney problems, that can happen in people with diabetes  Changing your diet can also help treat obesity, high blood pressure, and high cholesterol. These conditions can affect people with diabetes and can lead to future problems, such as heart attacks or strokes.  Who will work with me to change my diet? -- Your doctor or nurse will work with you to make a food plan to change your diet. They might also recommend that you work with a "dietitian." A dietitian is an expert on food and eating.  Do I need to eat at the same times every day? -- When and how often you should eat depends, in part, on the diabetes medicines you take. For example:  · People who take about the same amount of insulin at the same time each day (called a "fixed regimen") should eat meals at the same times. This is also true for people who take pills that increase insulin levels, such as sulfonylureas. Eating meals at the same time every day helps prevent low blood sugar.  · People who adjust the dose and timing of their insulin each day (called a "flexible regimen") do not always have to eat meals at the same time. That's because they can time their insulin dose for before they plan to eat, and also adjust the dose for how much they plan to eat.  · People who take medicines that don't usually cause low blood sugar, such as metformin, don't have to eat meals at the same time every day.  What do I need to think about when planning what to eat? -- Our bodies break down the food we eat into small " "pieces called carbohydrates, proteins, and fats.  When planning what to eat, people with diabetes need to think about:  · Carbohydrates (or "carbs") - Carbohydrates, which are sugars that our bodies use for energy, can raise a person's blood sugar level. Your doctor, nurse, or dietitian will tell you how many carbohydrates you should eat at each meal or snack. Foods that have carbohydrates include:  ? Bread, pasta, and rice  ? Vegetables and fruits  ? Dairy foods  ? Foods and drinks with added sugar  It is best to get your carbohydrates from fruits, vegetables, whole grains, and low-fat milk. It is best to avoid drinks with added sugar, like soda, juices, and sports drinks.   · Protein - Your doctor, nurse, or dietitian will tell you how much protein you should eat each day. It is best to eat lean meats, fish, eggs, beans, peas, soy products, nuts, and seeds.  · Fats - The type of fat you eat is more important than the amount of fat. "Saturated" and "trans" fats can increase your risk for heart problems, like a heart attack.  ? Foods that have saturated fats include meat, butter, cheese, and ice cream.  ? Foods that have trans fats include processed food with "partially hydrogenated oils" on the ingredient list. This may include fried foods, store bought cookies, muffins, pies, and cakes.  "Monounsaturated" and "polyunsaturated" fats are better for you. Foods with these types of fat include fish, avocado, olive oil, and nuts.  · Calories - People need to eat a certain amount of calories each day to keep their weight the same. People who are overweight and want to lose weight need to eat fewer calories each day.  · Fiber - Eating foods with a lot of fiber can help control a person's blood sugar level. Foods that have a lot of fiber include apples, green beans, peas, beans, lentils, nuts, oatmeal, and whole grains.  · Salt - People who have high blood pressure should not eat foods that contain a lot of salt (also " called sodium). People with high blood pressure should also eat healthy foods, such as fruits, vegetables, and low-fat dairy foods.  · Alcohol - Having more than 1 drink (for women) or 2 drinks (for men) a day can raise blood sugar levels. Also, drinks that have fruit juice or soda in them can raise blood sugar levels.  What can I do if I need to lose weight? -- If you need to lose weight, you can:  · Exercise - Try to get at least 30 minutes of physical activity a day, most days of the week. Even gentle exercise, like walking, is good for your health. Some people with diabetes need to change their medicine dose before they exercise. They might also need to check their blood sugar levels before and after exercising.  · Eat fewer calories - Your doctor, nurse, or dietitian can tell you how many calories you should eat each day in order to lose weight.  If you are worried about your weight, size, or shape, talk with your doctor, nurse, or dietitian. They can help you make changes to improve your health.  Can I eat the same foods as my family? -- Yes. You do not need to eat special foods if you have diabetes. You and your family can eat the same foods. Changing your diet is mostly about eating healthy foods and not eating too much.  What are the other parts of diabetes treatment? -- Besides changing your diet, the other parts of diabetes treatment are:  · Exercise  · Medicines  Some people with diabetes need to learn how to match their diet and exercise with their medicine dose. For example, people who use insulin might need to choose the dose of insulin they give themselves. To choose their dose, they need to think about:  · What they plan to eat at the next meal  · How much exercise they plan to do  · What their blood sugar level is  If the diet and exercise do not match the medicine dose, a person's blood sugar level can get too low or too high. Blood sugar levels that are too low or too high can cause  problems.  All topics are updated as new evidence becomes available and our peer review process is complete.  This topic retrieved from [employer] on: Sep 21, 2021.  Topic 47223 Version 7.0  Release: 29.4.2 - C29.263  © 2021 UpToDate, Inc. and/or its affiliates. All rights reserved.  Consumer Information Use and Disclaimer   This information is not specific medical advice and does not replace information you receive from your health care provider. This is only a brief summary of general information. It does NOT include all information about conditions, illnesses, injuries, tests, procedures, treatments, therapies, discharge instructions or life-style choices that may apply to you. You must talk with your health care provider for complete information about your health and treatment options. This information should not be used to decide whether or not to accept your health care provider's advice, instructions or recommendations. Only your health care provider has the knowledge and training to provide advice that is right for you. The use of this information is governed by the [employer] End User License Agreement, available at [email or web address].The use of [employer] content is governed by the [employer] Terms of Use. ©2021 UpToDate, Inc. All rights reserved.  Copyright   © 2021 UpToDate, Inc. and/or its affiliates. All rights reserved.

## 2022-01-19 NOTE — PROGRESS NOTES
SUBJECTIVE:      Patient ID: Jose Miguel Brennan is a 36 y.o. male.    Chief Complaint: Hypertension and Shoulder Pain (And numbness down lt arm)    Patient is here today to f/u on dm and htn. He has an appt with Dr Conte today. Had labs for Dr Conte yesterday. He is taking 35 units of lantus daily, home glucose readings are 130-140. He is not taking the farxiga daily because he feels it keeps him in the bathroom. He is tolerating lipitor well and taking it as prescribed. Complaining of left shoulder pain today    Diabetes  He presents for his follow-up diabetic visit. He has type 2 diabetes mellitus. The initial diagnosis of diabetes was made 10 years ago. His disease course has been improving. Pertinent negatives for hypoglycemia include no confusion, dizziness, headaches, nervousness/anxiousness, pallor, seizures or speech difficulty. Pertinent negatives for diabetes include no blurred vision, no chest pain, no foot paresthesias, no foot ulcerations, no polydipsia, no polyphagia, no polyuria, no visual change and no weakness. There are no hypoglycemic complications. Symptoms are stable. There are no diabetic complications. Risk factors for coronary artery disease include diabetes mellitus, male sex and hypertension. Current diabetic treatment includes insulin injections and oral agent (monotherapy). He is compliant with treatment most of the time. His weight is stable. He is following a generally unhealthy diet. When asked about meal planning, he reported none. He rarely participates in exercise. An ACE inhibitor/angiotensin II receptor blocker is being taken. He does not see a podiatrist.Eye exam is current.   Hypertension  This is a chronic problem. The problem is unchanged. The problem is controlled. Pertinent negatives include no blurred vision, chest pain, headaches, neck pain, palpitations or shortness of breath. Risk factors for coronary artery disease include diabetes mellitus, dyslipidemia, male  gender and smoking/tobacco exposure. Past treatments include angiotensin blockers. The current treatment provides mild improvement.   Shoulder Pain   The pain is present in the left shoulder. This is a recurrent problem. The current episode started more than 1 month ago. The problem occurs daily. The problem has been gradually worsening. The quality of the pain is described as aching. The pain is moderate. Associated symptoms include a limited range of motion. Pertinent negatives include no headaches. The symptoms are aggravated by lying down. He has tried acetaminophen for the symptoms. The treatment provided mild relief. His past medical history is significant for diabetes.       Past Surgical History:   Procedure Laterality Date    COLONOSCOPY N/A 5/8/2020    Procedure: COLONOSCOPY;  Surgeon: Hammad Castorena III, MD;  [facility];  Service: Endoscopy;  Laterality: N/A;    ESOPHAGOGASTRODUODENOSCOPY N/A 5/8/2020    Procedure: EGD (ESOPHAGOGASTRODUODENOSCOPY);  Surgeon: Hmamad Castorena III, MD;  Location: [facility];  Service: Endoscopy;  Laterality: N/A;    nose polyp removal       Family History   Problem Relation Age of Onset    Hypertension Maternal Grandmother     Cancer Maternal Grandfather     Diabetes Maternal Grandfather     Heart disease Maternal Grandfather     Heart disease Mother     Stroke Mother     Diabetes Mother       Social History     Socioeconomic History    Marital status: Single   Tobacco Use    Smoking status: Current Every Day Smoker     Packs/day: 0.25     Years: 5.00     Pack years: 1.25     Types: Cigars    Smokeless tobacco: Never Used    Tobacco comment: One cigar a day   Substance and Sexual Activity    Alcohol use: Yes    Drug use: No    Sexual activity: Yes     Partners: Female     Current Outpatient Medications   Medication Sig Dispense Refill    atorvastatin (LIPITOR) 10 MG tablet Take 1 tablet (10 mg total) by mouth every evening. 90 tablet 0     FARXIGA 5 mg Tab tablet Take 5 mg by mouth once daily.      insulin (LANTUS SOLOSTAR U-100 INSULIN) glargine 100 units/mL (3mL) SubQ pen Inject 30 Units into the skin once daily. 6 mL 1    losartan (COZAAR) 100 MG tablet Take 100 mg by mouth once daily.      omega 3-dha-epa-fish oil 1,000 mg (120 mg-180 mg) Cap Take 1 capsule by mouth 2 (two) times daily. 180 capsule 1    amLODIPine (NORVASC) 5 MG tablet Take 1 tablet (5 mg total) by mouth once daily. 30 tablet 1     No current facility-administered medications for this visit.     Review of patient's allergies indicates:   Allergen Reactions    Zithromax [azithromycin] Rash      Past Medical History:   Diagnosis Date    Anxiety     Asthma     Depression     Diabetes mellitus     diet controlled    Diabetes mellitus, type 2     GERD (gastroesophageal reflux disease)     Gout     Hyperlipidemia     Hypertension     IgA nephropathy     Insomnia      Past Surgical History:   Procedure Laterality Date    COLONOSCOPY N/A 5/8/2020    Procedure: COLONOSCOPY;  Surgeon: Hammad Castorena III, MD;  [facility]: [facility];  Service: Endoscopy;  Laterality: N/A;    ESOPHAGOGASTRODUODENOSCOPY N/A 5/8/2020    Procedure: EGD (ESOPHAGOGASTRODUODENOSCOPY);  Surgeon: Hammad Castorena III, MD;  Location: [facility];  Service: Endoscopy;  Laterality: N/A;    nose polyp removal         Review of Systems   Constitutional: Negative for activity change, appetite change, chills, diaphoresis and unexpected weight change.   HENT: Negative for ear discharge, facial swelling, hearing loss, nosebleeds, rhinorrhea and trouble swallowing.    Eyes: Negative for blurred vision, photophobia, pain, discharge and visual disturbance.   Respiratory: Negative for apnea, choking, chest tightness, shortness of breath and wheezing.    Cardiovascular: Negative for chest pain and palpitations.   Gastrointestinal: Negative for abdominal pain, blood in stool, constipation, diarrhea and  vomiting.   Endocrine: Negative for polydipsia, polyphagia and polyuria.   Genitourinary: Negative for difficulty urinating, hematuria and urgency.   Musculoskeletal: Positive for arthralgias. Negative for gait problem, joint swelling and neck pain.   Skin: Negative for pallor.   Neurological: Negative for dizziness, seizures, speech difficulty, weakness and headaches.   Hematological: Does not bruise/bleed easily.   Psychiatric/Behavioral: Negative for agitation, confusion, dysphoric mood, self-injury, sleep disturbance and suicidal ideas. The patient is not nervous/anxious.       OBJECTIVE:      Vitals:    01/19/22 0838   BP: (!) 140/102   Pulse: 79   Temp: 98.1 °F (36.7 °C)   SpO2: 95%   Weight: 99.8 kg (220 lb)   Height: 6' (1.829 m)     Physical Exam  Vitals and nursing note reviewed.   Constitutional:       General: He is not in acute distress.     Appearance: He is well-developed and well-nourished.   HENT:      Head: Normocephalic and atraumatic.      Nose: Nose normal.      Mouth/Throat:      Mouth: Oropharynx is clear and moist and mucous membranes are normal.      Pharynx: Uvula midline.   Eyes:      General: Lids are normal.      Extraocular Movements: EOM normal.      Conjunctiva/sclera: Conjunctivae normal.      Pupils: Pupils are equal, round, and reactive to light.      Right eye: Pupil is round and reactive.      Left eye: Pupil is round and reactive.   Neck:      Thyroid: No thyromegaly.      Vascular: No carotid bruit.   Cardiovascular:      Rate and Rhythm: Normal rate and regular rhythm.      Pulses: Normal pulses and intact distal pulses.      Heart sounds: Normal heart sounds. No murmur heard.      Pulmonary:      Effort: Pulmonary effort is normal.      Breath sounds: Normal breath sounds. No wheezing, rhonchi or rales.   Abdominal:      General: Bowel sounds are normal.      Palpations: Abdomen is soft. Abdomen is not rigid. There is no hepatosplenomegaly.      Tenderness: There is no  abdominal tenderness.   Musculoskeletal:      Left shoulder: Tenderness present. Decreased range of motion.      Cervical back: Normal range of motion and neck supple.      Right lower leg: No edema.      Left lower leg: No edema.   Lymphadenopathy:      Cervical: No cervical adenopathy.      Upper Body:   No axillary adenopathy present.  Skin:     General: Skin is warm and dry.      Nails: There is no clubbing or cyanosis.   Neurological:      Mental Status: He is alert and oriented to person, place, and time.   Psychiatric:         Mood and Affect: Mood and affect and mood normal.         Speech: Speech normal.         Behavior: Behavior normal. Behavior is cooperative.         Thought Content: Thought content normal.         Office Visit on 01/19/2022   Component Date Value Ref Range Status    Hemoglobin A1C 01/19/2022 8.3  % Final   ]  Assessment:       1. Essential hypertension    2. Type 2 diabetes mellitus without complication, with long-term current use of insulin    3. Mixed hyperlipidemia    4. Acute pain of left shoulder        Plan:       Essential hypertension  -  start   amLODIPine (NORVASC) 5 MG tablet; Take 1 tablet (5 mg total) by mouth once daily.  Dispense: 30 tablet; Refill: 1  Cont losartan    Type 2 diabetes mellitus without complication, with long-term current use of insulin  -     Hemoglobin A1C, POCT                      Improved 8.3  Restart farxiga     Mixed hyperlipidemia  -     Lipid Panel; Future; Expected date: 02/02/2022  Cont lipitor    Acute pain of left shoulder  -     Ambulatory referral/consult to Orthopedics; Future; Expected date: 01/26/2022        Follow up in about 4 weeks (around 2/16/2022) for htn.      1/19/2022 Jose Grant, WU, FNP

## 2022-01-27 ENCOUNTER — OFFICE VISIT (OUTPATIENT)
Dept: ORTHOPEDICS | Facility: CLINIC | Age: 37
End: 2022-01-27
Payer: MEDICAID

## 2022-01-27 VITALS
SYSTOLIC BLOOD PRESSURE: 138 MMHG | HEIGHT: 72 IN | DIASTOLIC BLOOD PRESSURE: 77 MMHG | WEIGHT: 220 LBS | BODY MASS INDEX: 29.8 KG/M2

## 2022-01-27 DIAGNOSIS — M54.12 CERVICAL RADICULITIS: ICD-10-CM

## 2022-01-27 DIAGNOSIS — M25.512 ACUTE PAIN OF LEFT SHOULDER: ICD-10-CM

## 2022-01-27 DIAGNOSIS — M75.42 IMPINGEMENT SYNDROME OF SHOULDER, LEFT: Primary | ICD-10-CM

## 2022-01-27 PROCEDURE — 99203 OFFICE O/P NEW LOW 30 MIN: CPT | Mod: S$GLB,,, | Performed by: ORTHOPAEDIC SURGERY

## 2022-01-27 PROCEDURE — 3052F PR MOST RECENT HEMOGLOBIN A1C LEVEL 8.0 - < 9.0%: ICD-10-PCS | Mod: S$GLB,,, | Performed by: ORTHOPAEDIC SURGERY

## 2022-01-27 PROCEDURE — 1160F PR REVIEW ALL MEDS BY PRESCRIBER/CLIN PHARMACIST DOCUMENTED: ICD-10-PCS | Mod: S$GLB,,, | Performed by: ORTHOPAEDIC SURGERY

## 2022-01-27 PROCEDURE — 3008F PR BODY MASS INDEX (BMI) DOCUMENTED: ICD-10-PCS | Mod: S$GLB,,, | Performed by: ORTHOPAEDIC SURGERY

## 2022-01-27 PROCEDURE — 3008F BODY MASS INDEX DOCD: CPT | Mod: S$GLB,,, | Performed by: ORTHOPAEDIC SURGERY

## 2022-01-27 PROCEDURE — 3078F PR MOST RECENT DIASTOLIC BLOOD PRESSURE < 80 MM HG: ICD-10-PCS | Mod: S$GLB,,, | Performed by: ORTHOPAEDIC SURGERY

## 2022-01-27 PROCEDURE — 4010F PR ACE/ARB THEARPY RXD/TAKEN: ICD-10-PCS | Mod: S$GLB,,, | Performed by: ORTHOPAEDIC SURGERY

## 2022-01-27 PROCEDURE — 1160F RVW MEDS BY RX/DR IN RCRD: CPT | Mod: S$GLB,,, | Performed by: ORTHOPAEDIC SURGERY

## 2022-01-27 PROCEDURE — 3052F HG A1C>EQUAL 8.0%<EQUAL 9.0%: CPT | Mod: S$GLB,,, | Performed by: ORTHOPAEDIC SURGERY

## 2022-01-27 PROCEDURE — 3075F SYST BP GE 130 - 139MM HG: CPT | Mod: S$GLB,,, | Performed by: ORTHOPAEDIC SURGERY

## 2022-01-27 PROCEDURE — 99203 PR OFFICE/OUTPT VISIT, NEW, LEVL III, 30-44 MIN: ICD-10-PCS | Mod: S$GLB,,, | Performed by: ORTHOPAEDIC SURGERY

## 2022-01-27 PROCEDURE — 3078F DIAST BP <80 MM HG: CPT | Mod: S$GLB,,, | Performed by: ORTHOPAEDIC SURGERY

## 2022-01-27 PROCEDURE — 3075F PR MOST RECENT SYSTOLIC BLOOD PRESS GE 130-139MM HG: ICD-10-PCS | Mod: S$GLB,,, | Performed by: ORTHOPAEDIC SURGERY

## 2022-01-27 PROCEDURE — 4010F ACE/ARB THERAPY RXD/TAKEN: CPT | Mod: S$GLB,,, | Performed by: ORTHOPAEDIC SURGERY

## 2022-01-27 NOTE — PROGRESS NOTES
[facility]    Subjective:     Chief Complaint:   Chief Complaint   Patient presents with    Left Shoulder - Pain     Started a few months ago. Hx of torn RC 10+ years ago. Pain does radiate down arms to hands. Hands are getting numb. Pain is much worse at night.       Past Medical History:   Diagnosis Date    Anxiety     Asthma     Depression     Diabetes mellitus     diet controlled    Diabetes mellitus, type 2     GERD (gastroesophageal reflux disease)     Gout     Hyperlipidemia     Hypertension     IgA nephropathy     Insomnia        Past Surgical History:   Procedure Laterality Date    COLONOSCOPY N/A 5/8/2020    Procedure: COLONOSCOPY;  Surgeon: Hammad Castorena III, MD;  Location: [facility];  Service: Endoscopy;  Laterality: N/A;    ESOPHAGOGASTRODUODENOSCOPY N/A 5/8/2020    Procedure: EGD (ESOPHAGOGASTRODUODENOSCOPY);  Surgeon: Hammad Castorena III, MD;  Location: [facility];  Service: Endoscopy;  Laterality: N/A;    nose polyp removal         Current Outpatient Medications   Medication Sig    amLODIPine (NORVASC) 5 MG tablet Take 1 tablet (5 mg total) by mouth once daily.    atorvastatin (LIPITOR) 10 MG tablet Take 1 tablet (10 mg total) by mouth every evening.    FARXIGA 5 mg Tab tablet Take 5 mg by mouth once daily.    insulin (LANTUS SOLOSTAR U-100 INSULIN) glargine 100 units/mL (3mL) SubQ pen Inject 30 Units into the skin once daily.    losartan (COZAAR) 100 MG tablet Take 100 mg by mouth once daily.    omega 3-dha-epa-fish oil 1,000 mg (120 mg-180 mg) Cap Take 1 capsule by mouth 2 (two) times daily.     No current facility-administered medications for this visit.       Review of patient's allergies indicates:   Allergen Reactions    Zithromax [azithromycin] Rash       Family History   Problem Relation Age of Onset    Hypertension Maternal Grandmother     Cancer Maternal Grandfather     Diabetes Maternal Grandfather     Heart disease Maternal Grandfather      Heart disease Mother     Stroke Mother     Diabetes Mother        Social History     Socioeconomic History    Marital status: Single   Tobacco Use    Smoking status: Current Every Day Smoker     Packs/day: 0.25     Years: 5.00     Pack years: 1.25     Types: Cigars    Smokeless tobacco: Never Used    Tobacco comment: One cigar a day   Substance and Sexual Activity    Alcohol use: Yes    Drug use: No    Sexual activity: Yes     Partners: Female       History of present illness:  Patient comes in today for the left shoulder.  Actually his chief complaint is pain in his left arm that radiates all the way to his finger tips he is also having numbness and tingling in the hand.  The hand is going numb.  He is having significant pain between the shoulder blades.  This has been going on for about 4 months.  It has rapidly got worse.  He denies any bowel or bladder dysfunction denies any difficulty ambulating.  Denies any shuffling of his gait.      Review of Systems:    Constitution: Negative for chills, fever, and sweats.  Negative for unexplained weight loss.    HENT:  Negative for headaches and blurry vision.    Cardiovascular:Negative for chest pain or irregular heart beat. Negative for hypertension.    Respiratory:  Negative for cough and shortness of breath.    Gastrointestinal: Negative for abdominal pain, heartburn, melena, nausea, and vomitting.    Genitourinary:  Negative bladder incontinence and dysuria.    Musculoskeletal:  See HPI for details.     Neurological: Negative for numbness.    Psychiatric/Behavioral: Negative for depression.  The patient is not nervous/anxious.      Endocrine: Negative for polyuria    Hematologic/Lymphatic: Negative for bleeding problem.  Does not bruise/bleed easily.    Skin: Negative for poor would healing and rash    Objective:      Physical Examination:    Vital Signs:    Vitals:    01/27/22 0748   BP: 138/77       Body mass index is 29.84 kg/m².    This a well-developed,  well nourished patient in no acute distress.  They are alert and oriented and cooperative to examination.        Patient has full range of motion of the left shoulder.  He does have mild impingement.  He has a very positive Spurling sign.  He has pain with motion of the cervical spine.  He has full range of motion cervical spine.  His rotator cuff is grossly intact but mildly tender.  Patient has no hyper reflexia.  He has a normal gait  Pertinent New Results:    XRAY Report / Interpretation:   AP and lateral of the left shoulder is within normal limits.  Type 1 acromion.    AP and lateral the cervical spine demonstrates mild degenerative changes of the cervical spine    Assessment/Plan:       cervical radiculitis.  Because this is fairly rapidly progressed I have ordered an MRI of the cervical spine.  I am concerned that he has a herniated disc which is causing compression of the cervical spine.  We will see him back with that MRI    This note was created using Dragon voice recognition software that occasionally misinterpreted phrases or words.

## 2022-02-11 ENCOUNTER — HOSPITAL ENCOUNTER (OUTPATIENT)
Dept: RADIOLOGY | Facility: HOSPITAL | Age: 37
Discharge: HOME | End: 2022-02-11
Attending: ORTHOPAEDIC SURGERY
Payer: MEDICAID

## 2022-02-11 DIAGNOSIS — M54.12 CERVICAL RADICULITIS: ICD-10-CM

## 2022-02-11 PROCEDURE — 72141 MRI NECK SPINE W/O DYE: CPT | Mod: TC,PO

## 2022-02-14 ENCOUNTER — TELEPHONE (OUTPATIENT)
Dept: ORTHOPEDICS | Facility: CLINIC | Age: 37
End: 2022-02-14

## 2022-02-14 ENCOUNTER — OFFICE VISIT (OUTPATIENT)
Dept: ORTHOPEDICS | Facility: CLINIC | Age: 37
End: 2022-02-14
Payer: MEDICAID

## 2022-02-14 VITALS — BODY MASS INDEX: 29.8 KG/M2 | HEIGHT: 72 IN | WEIGHT: 220 LBS

## 2022-02-14 DIAGNOSIS — M47.812 FACET ARTHRITIS OF CERVICAL REGION: Primary | ICD-10-CM

## 2022-02-14 DIAGNOSIS — M75.42 IMPINGEMENT SYNDROME OF SHOULDER, LEFT: ICD-10-CM

## 2022-02-14 DIAGNOSIS — M54.12 CERVICAL RADICULOPATHY: ICD-10-CM

## 2022-02-14 DIAGNOSIS — G62.9 NEUROPATHY: ICD-10-CM

## 2022-02-14 PROCEDURE — 3052F HG A1C>EQUAL 8.0%<EQUAL 9.0%: CPT | Mod: S$GLB,,, | Performed by: ORTHOPAEDIC SURGERY

## 2022-02-14 PROCEDURE — 99214 OFFICE O/P EST MOD 30 MIN: CPT | Mod: S$GLB,,, | Performed by: ORTHOPAEDIC SURGERY

## 2022-02-14 PROCEDURE — 3052F PR MOST RECENT HEMOGLOBIN A1C LEVEL 8.0 - < 9.0%: ICD-10-PCS | Mod: S$GLB,,, | Performed by: ORTHOPAEDIC SURGERY

## 2022-02-14 PROCEDURE — 3008F BODY MASS INDEX DOCD: CPT | Mod: S$GLB,,, | Performed by: ORTHOPAEDIC SURGERY

## 2022-02-14 PROCEDURE — 3008F PR BODY MASS INDEX (BMI) DOCUMENTED: ICD-10-PCS | Mod: S$GLB,,, | Performed by: ORTHOPAEDIC SURGERY

## 2022-02-14 PROCEDURE — 4010F PR ACE/ARB THEARPY RXD/TAKEN: ICD-10-PCS | Mod: S$GLB,,, | Performed by: ORTHOPAEDIC SURGERY

## 2022-02-14 PROCEDURE — 99214 PR OFFICE/OUTPT VISIT, EST, LEVL IV, 30-39 MIN: ICD-10-PCS | Mod: S$GLB,,, | Performed by: ORTHOPAEDIC SURGERY

## 2022-02-14 PROCEDURE — 1160F PR REVIEW ALL MEDS BY PRESCRIBER/CLIN PHARMACIST DOCUMENTED: ICD-10-PCS | Mod: S$GLB,,, | Performed by: ORTHOPAEDIC SURGERY

## 2022-02-14 PROCEDURE — 4010F ACE/ARB THERAPY RXD/TAKEN: CPT | Mod: S$GLB,,, | Performed by: ORTHOPAEDIC SURGERY

## 2022-02-14 PROCEDURE — 1160F RVW MEDS BY RX/DR IN RCRD: CPT | Mod: S$GLB,,, | Performed by: ORTHOPAEDIC SURGERY

## 2022-02-14 RX ORDER — GABAPENTIN 300 MG/1
CAPSULE ORAL
Qty: 90 CAPSULE | Refills: 2 | Status: SHIPPED | OUTPATIENT
Start: 2022-02-14 | End: 2022-09-11 | Stop reason: SDUPTHER

## 2022-02-14 NOTE — TELEPHONE ENCOUNTER
Spoke with the patient. [facility] not accept his insurance. He would like to go to [facility] on [address]. Orders sent.

## 2022-02-14 NOTE — PROGRESS NOTES
Subjective:       Patient ID: Jose Miguel Brennan is a 36 y.o. male.    Chief Complaint: Pain of the Neck (Pt is here for MRI results Cervical, bilateral numbness and tingling that radiates into shoulders and to fingers, Lt is worse, pain keeps him awake at night. )      History of Present Illness    Prior to meeting with the patient I reviewed the medical chart in [facility]. This included reviewing the previous progress notes from our office, review of the patient's last appointment with their primary care provider, review of any visits to the emergency room, and review of any pain management appointments or procedures.   This gentleman presents for 4 month history of the initial onset of numbness and pain in the left arm down to his fingers and then more recently in both arms his symptoms are constant basis.  He does have some intermittent neck pain no bowel or bladder incontinence does have a history of diabetes.    Current Medications  Current Outpatient Medications   Medication Sig Dispense Refill    amLODIPine (NORVASC) 5 MG tablet Take 1 tablet (5 mg total) by mouth once daily. 30 tablet 1    atorvastatin (LIPITOR) 10 MG tablet Take 1 tablet (10 mg total) by mouth every evening. 90 tablet 1    FARXIGA 5 mg Tab tablet Take 5 mg by mouth once daily.      insulin (LANTUS SOLOSTAR U-100 INSULIN) glargine 100 units/mL (3mL) SubQ pen Inject 30 Units into the skin once daily. 6 mL 1    losartan (COZAAR) 100 MG tablet Take 100 mg by mouth once daily.      omega 3-dha-epa-fish oil 1,000 mg (120 mg-180 mg) Cap Take 1 capsule by mouth 2 (two) times daily. 180 capsule 1    gabapentin (NEURONTIN) 300 MG capsule Take 1 tablet every night x 7 days. Increase to twice a day x 7 days. Increase to three times a day. 90 capsule 2     No current facility-administered medications for this visit.       Allergies  Review of patient's allergies indicates:   Allergen Reactions    Zithromax [azithromycin] Rash       Past Medical  History  Past Medical History:   Diagnosis Date    Anxiety     Asthma     Depression     Diabetes mellitus     diet controlled    Diabetes mellitus, type 2     GERD (gastroesophageal reflux disease)     Gout     Hyperlipidemia     Hypertension     IgA nephropathy     Insomnia        Surgical History  Past Surgical History:   Procedure Laterality Date    COLONOSCOPY N/A 5/8/2020    Procedure: COLONOSCOPY;  Surgeon: Hammad Castorena III, MD;  Location: [facility];  Service: Endoscopy;  Laterality: N/A;    ESOPHAGOGASTRODUODENOSCOPY N/A 5/8/2020    Procedure: EGD (ESOPHAGOGASTRODUODENOSCOPY);  Surgeon: Hammad Castorena III, MD;  Location: [facility];  Service: Endoscopy;  Laterality: N/A;    nose polyp removal         Family History:   Family History   Problem Relation Age of Onset    Hypertension Maternal Grandmother     Cancer Maternal Grandfather     Diabetes Maternal Grandfather     Heart disease Maternal Grandfather     Heart disease Mother     Stroke Mother     Diabetes Mother        Social History:   Social History     Socioeconomic History    Marital status: Single   Tobacco Use    Smoking status: Current Every Day Smoker     Packs/day: 0.25     Years: 5.00     Pack years: 1.25     Types: Cigars    Smokeless tobacco: Never Used    Tobacco comment: One cigar a day   Substance and Sexual Activity    Alcohol use: Yes    Drug use: No    Sexual activity: Yes     Partners: Female       Hospitalization/Major Diagnostic Procedure:     Review of Systems     General/Constitutional:  Chills denies. Fatigue denies. Fever denies. Weight gain denies. Weight loss denies.    Respiratory:  Shortness of breath denies.    Cardiovascular:  Chest pain denies.    Gastrointestinal:  Constipation denies. Diarrhea denies. Nausea denies. Vomiting denies.     Hematology:  Easy bruising denies. Prolonged bleeding denies.     Genitourinary:  Frequent urination denies. Pain in lower back denies. Painful  urination denies.     Musculoskeletal:  See HPI for details    Skin:  Rash denies.    Neurologic:  Dizziness denies. Gait abnormalities denies. Seizures denies. Tingling/Numbess denies.    Psychiatric:  Anxiety denies. Depressed mood denies.     Objective:   Vital Signs: There were no vitals filed for this visit.     Physical Exam      General Examination:     Constitutional: The patient is alert and oriented to lace person and time. Mood is pleasant.     Head/Face: Normal facial features normal eyebrows    Eyes: Normal extraocular motion bilaterally    Lungs: Respirations are equal and unlabored    Gait is coordinated.    Cardiovascular: There are no swelling or varicosities present.    Lymphatic: Negative for adenopathy    Skin: Normal    Neurological: Level of consciousness normal. Oriented to place person and time and situation    Psychiatric: Oriented to time place person and situation    Walks with normal gait pattern cervical bilateral trapezius tenderness posterior paraspinous muscle tenderness no spasm Spurling's maneuver negative range of motion slightly limited in flexion and extension.  Reflexes symmetrical pedal pulses are intact per upper extremity pulses are intact no hyperreflexia noted motor exam grossly normal all major muscle groups.  Sensory exam subjective numbness all fingers both hands  strength normal  XRAY Report/ Interpretation:  Cervical MRI was personally reviewed some minimal right-sided foraminal stenosis C4-5 otherwise negative no significant disc herniation no myelomalacia no central stenosis      Assessment:       1. Facet arthritis of cervical region    2. Cervical radiculopathy    3. Neuropathy        Plan:       Jose Miguel was seen today for pain.    Diagnoses and all orders for this visit:    Facet arthritis of cervical region    Cervical radiculopathy  -     gabapentin (NEURONTIN) 300 MG capsule; Take 1 tablet every night x 7 days. Increase to twice a day x 7 days. Increase to  three times a day.  -     EMG W/ ULTRASOUND AND NERVE CONDUCTION TEST 2 Extremities; Future    Neuropathy  -     gabapentin (NEURONTIN) 300 MG capsule; Take 1 tablet every night x 7 days. Increase to twice a day x 7 days. Increase to three times a day.  -     EMG W/ ULTRASOUND AND NERVE CONDUCTION TEST 2 Extremities; Future         No follow-ups on file.    MRI findings do not fit the patient's numbness and pain pattern I am concerned he may have a peripheral neuropathy from diabetes or 9 entrapment neuropathy but has a normal physical examination I explained this to the patient.    He is scheduled to start physical therapy to the cervical spine today and I agree that he should do so.    EMG nerve conduction study both upper extremities to rule out entrapment neuropathy peripheral neuropathy or cervical radiculopathy.    Follow-up if the EMG  Treatment options were discussed with regards to the nature of the medical condition. Conservative pain intervention and surgical options were discussed in detail. The probability of success of each separate treatment option was discussed. The patient expressed a clear understanding of the treatment options. With regards to surgery, the procedure risk, benefits, complications, and outcomes were discussed. No guarantees were given with regards to surgical outcome.   The risk of complications, morbidity, and mortality of patient management decisions have been made at the time of this visit. These are associated with the patient's problems, diagnostic procedures and treatment options. This includes the possible management options selected and those considered but not selected by the patient after shared medical decision making we discussed with the patient.     This note was created using Dragon voice recognition software that occasionally misinterpreted phrases or words.

## 2022-02-14 NOTE — TELEPHONE ENCOUNTER
----- Message from Estephanie Fish sent at 2/14/2022 11:22 AM CST -----  Regarding: PT Referral  Patient showed up to [facility] and they do not have a referral for Dr Chu or Татьяна for PT, can we send the referral again?

## 2022-02-19 ENCOUNTER — HOSPITAL ENCOUNTER (EMERGENCY)
Facility: HOSPITAL | Age: 37
Discharge: HOME | End: 2022-02-19
Attending: EMERGENCY MEDICINE
Payer: MEDICAID

## 2022-02-19 VITALS
HEART RATE: 83 BPM | OXYGEN SATURATION: 98 % | BODY MASS INDEX: 29.84 KG/M2 | TEMPERATURE: 98 F | WEIGHT: 220 LBS | SYSTOLIC BLOOD PRESSURE: 123 MMHG | DIASTOLIC BLOOD PRESSURE: 68 MMHG | RESPIRATION RATE: 14 BRPM

## 2022-02-19 DIAGNOSIS — R10.9 BILATERAL FLANK PAIN: Primary | ICD-10-CM

## 2022-02-19 LAB
ALBUMIN SERPL BCP-MCNC: 4.1 G/DL (ref 3.5–5.2)
ALP SERPL-CCNC: 106 U/L (ref 55–135)
ALT SERPL W/O P-5'-P-CCNC: 27 U/L (ref 10–44)
ANION GAP SERPL CALC-SCNC: 12 MMOL/L (ref 8–16)
AST SERPL-CCNC: 16 U/L (ref 10–40)
BACTERIA #/AREA URNS HPF: NORMAL /HPF
BASOPHILS # BLD AUTO: 0.08 K/UL (ref 0–0.2)
BASOPHILS NFR BLD: 0.5 % (ref 0–1.9)
BILIRUB SERPL-MCNC: 0.9 MG/DL (ref 0.1–1)
BILIRUB UR QL STRIP: NEGATIVE
BUN SERPL-MCNC: 34 MG/DL (ref 6–20)
CALCIUM SERPL-MCNC: 9.8 MG/DL (ref 8.7–10.5)
CHLORIDE SERPL-SCNC: 106 MMOL/L (ref 95–110)
CLARITY UR: CLEAR
CO2 SERPL-SCNC: 22 MMOL/L (ref 23–29)
COLOR UR: YELLOW
CREAT SERPL-MCNC: 2.3 MG/DL (ref 0.5–1.4)
DIFFERENTIAL METHOD: ABNORMAL
EOSINOPHIL # BLD AUTO: 1.1 K/UL (ref 0–0.5)
EOSINOPHIL NFR BLD: 6.8 % (ref 0–8)
ERYTHROCYTE [DISTWIDTH] IN BLOOD BY AUTOMATED COUNT: 14.1 % (ref 11.5–14.5)
EST. GFR  (AFRICAN AMERICAN): 41 ML/MIN/1.73 M^2
EST. GFR  (NON AFRICAN AMERICAN): 35 ML/MIN/1.73 M^2
GLUCOSE SERPL-MCNC: 114 MG/DL (ref 70–110)
GLUCOSE UR QL STRIP: ABNORMAL
HCT VFR BLD AUTO: 50.9 % (ref 40–54)
HGB BLD-MCNC: 16.6 G/DL (ref 14–18)
HGB UR QL STRIP: NEGATIVE
HYALINE CASTS #/AREA URNS LPF: 0 /LPF
IMM GRANULOCYTES # BLD AUTO: 0.06 K/UL (ref 0–0.04)
IMM GRANULOCYTES NFR BLD AUTO: 0.4 % (ref 0–0.5)
KETONES UR QL STRIP: NEGATIVE
LEUKOCYTE ESTERASE UR QL STRIP: NEGATIVE
LIPASE SERPL-CCNC: 53 U/L (ref 4–60)
LYMPHOCYTES # BLD AUTO: 5.4 K/UL (ref 1–4.8)
LYMPHOCYTES NFR BLD: 33.9 % (ref 18–48)
MCH RBC QN AUTO: 28.5 PG (ref 27–31)
MCHC RBC AUTO-ENTMCNC: 32.6 G/DL (ref 32–36)
MCV RBC AUTO: 88 FL (ref 82–98)
MICROSCOPIC COMMENT: NORMAL
MONOCYTES # BLD AUTO: 0.8 K/UL (ref 0.3–1)
MONOCYTES NFR BLD: 4.8 % (ref 4–15)
NEUTROPHILS # BLD AUTO: 8.5 K/UL (ref 1.8–7.7)
NEUTROPHILS NFR BLD: 53.6 % (ref 38–73)
NITRITE UR QL STRIP: NEGATIVE
NRBC BLD-RTO: 0 /100 WBC
PH UR STRIP: 6 [PH] (ref 5–8)
PLATELET # BLD AUTO: 208 K/UL (ref 150–450)
PMV BLD AUTO: 10.4 FL (ref 9.2–12.9)
POTASSIUM SERPL-SCNC: 4.8 MMOL/L (ref 3.5–5.1)
PROT SERPL-MCNC: 7.5 G/DL (ref 6–8.4)
PROT UR QL STRIP: ABNORMAL
RBC # BLD AUTO: 5.82 M/UL (ref 4.6–6.2)
RBC #/AREA URNS HPF: 0 /HPF (ref 0–4)
SODIUM SERPL-SCNC: 140 MMOL/L (ref 136–145)
SP GR UR STRIP: 1.01 (ref 1–1.03)
URN SPEC COLLECT METH UR: ABNORMAL
UROBILINOGEN UR STRIP-ACNC: NEGATIVE EU/DL
WBC # BLD AUTO: 15.88 K/UL (ref 3.9–12.7)
WBC #/AREA URNS HPF: 0 /HPF (ref 0–5)
YEAST URNS QL MICRO: NORMAL

## 2022-02-19 PROCEDURE — 86803 HEPATITIS C AB TEST: CPT | Performed by: EMERGENCY MEDICINE

## 2022-02-19 PROCEDURE — 96374 THER/PROPH/DIAG INJ IV PUSH: CPT

## 2022-02-19 PROCEDURE — 85025 COMPLETE CBC W/AUTO DIFF WBC: CPT | Performed by: PHYSICIAN ASSISTANT

## 2022-02-19 PROCEDURE — 81000 URINALYSIS NONAUTO W/SCOPE: CPT | Performed by: PHYSICIAN ASSISTANT

## 2022-02-19 PROCEDURE — 63600175 PHARM REV CODE 636 W HCPCS: Performed by: PHYSICIAN ASSISTANT

## 2022-02-19 PROCEDURE — 87389 HIV-1 AG W/HIV-1&-2 AB AG IA: CPT | Performed by: EMERGENCY MEDICINE

## 2022-02-19 PROCEDURE — 83690 ASSAY OF LIPASE: CPT | Performed by: PHYSICIAN ASSISTANT

## 2022-02-19 PROCEDURE — 36415 COLL VENOUS BLD VENIPUNCTURE: CPT | Performed by: PHYSICIAN ASSISTANT

## 2022-02-19 PROCEDURE — 80053 COMPREHEN METABOLIC PANEL: CPT | Performed by: PHYSICIAN ASSISTANT

## 2022-02-19 PROCEDURE — 25000003 PHARM REV CODE 250: Performed by: PHYSICIAN ASSISTANT

## 2022-02-19 PROCEDURE — 99285 EMERGENCY DEPT VISIT HI MDM: CPT | Mod: 25

## 2022-02-19 PROCEDURE — 96361 HYDRATE IV INFUSION ADD-ON: CPT

## 2022-02-19 PROCEDURE — 96375 TX/PRO/DX INJ NEW DRUG ADDON: CPT

## 2022-02-19 RX ORDER — MORPHINE SULFATE 4 MG/ML
4 INJECTION, SOLUTION INTRAMUSCULAR; INTRAVENOUS
Status: COMPLETED | OUTPATIENT
Start: 2022-02-19 | End: 2022-02-19

## 2022-02-19 RX ORDER — ONDANSETRON 2 MG/ML
4 INJECTION INTRAMUSCULAR; INTRAVENOUS
Status: COMPLETED | OUTPATIENT
Start: 2022-02-19 | End: 2022-02-19

## 2022-02-19 RX ADMIN — MORPHINE SULFATE 4 MG: 4 INJECTION INTRAVENOUS at 04:02

## 2022-02-19 RX ADMIN — ONDANSETRON 4 MG: 2 INJECTION INTRAMUSCULAR; INTRAVENOUS at 04:02

## 2022-02-19 RX ADMIN — SODIUM CHLORIDE 1000 ML: 0.9 INJECTION, SOLUTION INTRAVENOUS at 05:02

## 2022-02-19 RX ADMIN — SODIUM CHLORIDE 1000 ML: 0.9 INJECTION, SOLUTION INTRAVENOUS at 04:02

## 2022-02-19 NOTE — ED NOTES
Pt to ed with flank pain, nausea.  He states he has had kd stones before and he has a history of kidney problems.      LOC: Patient name and date of birth verified. The patient is awake, alert and aware of environment with an appropriate affect, the patient is oriented x 3 and speaking appropriately.   APPEARANCE: Patient resting comfortably, patient is clean and well groomed, patient's clothing is properly fastened.  SKIN: The skin is warm and dry, color consistent with ethnicity, patient has normal skin turgor and moist mucus membranes, skin intact, no breakdown or bruising noted.  MUSCULOSKELETAL: Patient moving all extremities well, no obvious swelling or deformities noted.   RESPIRATORY: Respirations are spontaneous, patient has a normal effort and rate, no accessory muscle use noted.  CARDIAC: Patient has a normal rate and rhythm, no periphreal edema noted, capillary refill < 3 seconds.  ABDOMEN: Soft and non tender to palpation, no distention noted. Bowel sounds present in all four quadrants.  NEUROLOGIC: Eyes open spontaneously, behavior appropriate to situation, follows commands, facial expression symmetrical, bilateral hand grasp equal and even, purposeful motor response noted, normal sensation in all extremities when touched with a finger.

## 2022-02-19 NOTE — ED PROVIDER NOTES
"Encounter Date: 2/19/2022    SCRIBE #1 NOTE: Lior ALFONSO, am scribing for, and in the presence of, Bri Song PA-C.       History     Chief Complaint   Patient presents with    Flank Pain     Pain started yesterday    Diarrhea    Abdominal Pain     Time seen by provider: 3:42 PM on 02/19/2022    Jose Miguel Brennan is a 36 y.o. male who presents to the ED with hematuria and flank pain. The Pt states that he started urinating blood "a few days ago" and says pain started last night. He also c/o slight abdominal pain, nausea, and diarrhea. He has a PMHx of kidney stones, kidney disease, and DM. He says he checks his blood sugar every day and manages it well. The patient denies vomiting or any other symptoms at this time. PSHx of colonoscopy. He denied fever. He denied chest pain or shortness of breath.      The history is provided by the patient.     Review of patient's allergies indicates:   Allergen Reactions    Zithromax [azithromycin] Rash     Past Medical History:   Diagnosis Date    Anxiety     Asthma     Depression     Diabetes mellitus     diet controlled    Diabetes mellitus, type 2     GERD (gastroesophageal reflux disease)     Gout     Hyperlipidemia     Hypertension     IgA nephropathy     Insomnia      Past Surgical History:   Procedure Laterality Date    COLONOSCOPY N/A 5/8/2020    Procedure: COLONOSCOPY;  Surgeon: Hammad Castorena III, MD;  Location: [facility];  Service: Endoscopy;  Laterality: N/A;    ESOPHAGOGASTRODUODENOSCOPY N/A 5/8/2020    Procedure: EGD (ESOPHAGOGASTRODUODENOSCOPY);  Surgeon: Hammad Castorena III, MD;  Location: [facility];  Service: Endoscopy;  Laterality: N/A;    nose polyp removal       Family History   Problem Relation Age of Onset    Hypertension Maternal Grandmother     Cancer Maternal Grandfather     Diabetes Maternal Grandfather     Heart disease Maternal Grandfather     Heart disease Mother     Stroke Mother     Diabetes Mother "      Social History     Tobacco Use    Smoking status: Current Every Day Smoker     Packs/day: 0.25     Years: 5.00     Pack years: 1.25     Types: Cigars    Smokeless tobacco: Never Used    Tobacco comment: One cigar a day   Substance Use Topics    Alcohol use: Yes    Drug use: No     Review of Systems   Constitutional: Negative for activity change, appetite change, chills and fever.   HENT: Negative for congestion, rhinorrhea and sore throat.    Eyes: Negative for redness and visual disturbance.   Respiratory: Negative for cough, chest tightness and shortness of breath.    Cardiovascular: Negative for chest pain.   Gastrointestinal: Positive for abdominal pain, diarrhea and nausea. Negative for vomiting.   Genitourinary: Positive for flank pain and hematuria. Negative for dysuria and frequency.   Musculoskeletal: Negative for back pain, neck pain and neck stiffness.   Skin: Negative for rash.   Neurological: Negative for dizziness, syncope, numbness and headaches.       Physical Exam     Initial Vitals [02/19/22 1537]   BP Pulse Resp Temp SpO2   139/89 98 18 97.7 °F (36.5 °C) 98 %      MAP       --         Physical Exam    Nursing note and vitals reviewed.  Constitutional: He appears well-developed and well-nourished. He is cooperative.  Non-toxic appearance. He does not have a sickly appearance.   HENT:   Head: Normocephalic and atraumatic.   Right Ear: External ear normal.   Left Ear: External ear normal.   Nose: Nose normal.   Eyes: Conjunctivae and lids are normal.   Cardiovascular: Normal rate, regular rhythm and normal heart sounds. Exam reveals no gallop and no friction rub.    No murmur heard.  Pulmonary/Chest: Breath sounds normal. He has no wheezes. He has no rhonchi. He has no rales.   Abdominal: Abdomen is soft. There is no abdominal tenderness.   There is right CVA tenderness.  There is left CVA tenderness. There is no rebound and no guarding.     Neurological: He is alert.   Skin: Skin is warm,  dry and intact. No rash noted.         ED Course   Procedures  Labs Reviewed   CBC W/ AUTO DIFFERENTIAL - Abnormal; Notable for the following components:       Result Value    WBC 15.88 (*)     Gran # (ANC) 8.5 (*)     Immature Grans (Abs) 0.06 (*)     Lymph # 5.4 (*)     Eos # 1.1 (*)     All other components within normal limits    Narrative:     Release to patient->Immediate   COMPREHENSIVE METABOLIC PANEL - Abnormal; Notable for the following components:    CO2 22 (*)     Glucose 114 (*)     BUN 34 (*)     Creatinine 2.3 (*)     eGFR if  41 (*)     eGFR if non  35 (*)     All other components within normal limits    Narrative:     Release to patient->Immediate   URINALYSIS, REFLEX TO URINE CULTURE - Abnormal; Notable for the following components:    Protein, UA 1+ (*)     Glucose, UA 4+ (*)     All other components within normal limits    Narrative:     Specimen Source->Urine   LIPASE    Narrative:     Release to patient->Immediate   URINALYSIS MICROSCOPIC    Narrative:     Specimen Source->Urine   HIV 1 / 2 ANTIBODY   HEPATITIS C ANTIBODY          Imaging Results          CT Renal Stone Study ABD Pelvis WO (Final result)  Result time 02/20/22 07:38:30    Final result by Selma Curran MD (02/20/22 07:38:30)                 Impression:      No renal or ureteral calculi or obstruction.  No acute abnormality.      Electronically signed by: Selma Curran  Date:    02/20/2022  Time:    07:38             Narrative:    EXAMINATION:  CT RENAL STONE STUDY ABD PELVIS WO    CLINICAL HISTORY:  Flank pain, kidney stone suspected;    TECHNIQUE:  Low dose axial images, sagittal and coronal reformations were obtained from the lung bases to the pubic symphysis.  Contrast was not administered.    COMPARISON:  11/07/2021    FINDINGS:  Visualized portions of the lung bases are clear.    Liver prominent in size but unchanged.  Spleen, adrenal glands, pancreas, gallbladder grossly  normal.    There are no renal or ureteral calculi or obstruction.  Urinary bladder appears slightly thick walled, also noted on the previous exam.  No bowel obstruction or inflammation is noted.  There are few scattered colonic diverticula.  No free fluid or free air.  Aorta normal in caliber.                                 Medications   ondansetron injection 4 mg (4 mg Intravenous Given 2/19/22 1610)   sodium chloride 0.9% bolus 1,000 mL (0 mLs Intravenous Stopped 2/19/22 1709)   morphine injection 4 mg (4 mg Intravenous Given 2/19/22 1626)   sodium chloride 0.9% bolus 1,000 mL (1,000 mLs Intravenous New Bag 2/19/22 1731)     Medical Decision Making:   History:   Old Medical Records: I decided to obtain old medical records.  Clinical Tests:   Lab Tests: Ordered and Reviewed  Radiological Study: Ordered and Reviewed       APC / Resident Notes:   Urgent evaluation of a well-appearing 36-year-old male complains of hematuria and bilateral flank pain.  He reports history of previous stones and chronic kidney disease.  He denies fever.  He denies dysuria.  He denies frequency.  He has bilateral CVA tenderness right greater than left.  His abdomen is soft and nontender.  There is no rebound or guarding.  Urinalysis shows 4+ glucose but no sign of infection or hematuria.  Blood work is consistent with his chronic kidney disease.  Patient is given IV fluids, pain medicine and nausea medication with improvement.  CT scan shows no acute abnormalities.  Recommend close follow-up with Urology and Nephrology. Discussed results with patient. Return precautions given. Based on my clinical evaluation, I do not appreciate any immediate, emergent, or life threatening condition or etiology that warrants additional workup today and feel that the patient can be discharged with close follow up care.  Patient is to follow up with their primary care provider. Case was discussed with Dr. Maynard who is in agreement with the plan of care.  All questions answered.        Scribe Attestation:   Scribe #1: I performed the above scribed service and the documentation accurately describes the services I performed. I attest to the accuracy of the note.    Attending Attestation:     Physician Attestation Statement for NP/PA:   I discussed this assessment and plan of this patient with the NP/PA, but I did not personally examine the patient. The face to face encounter was performed by the NP/PA.    Other NP/PA Attestation Additions:    History of Present Illness: 36-year-old male presents with flank pain.    Medical Decision Making: Initial differential diagnosis included but not limited to nephrolithiasis, pyelonephritis, and musculoskeletal pain.  I am in agreement with the physician assistant's  assessment, treatment, and plan of care.                    I, Bri Song PA-C, personally performed the services described in this documentation. All medical record entries made by the scribe were at my direction and in my presence.  I have reviewed the chart and agree that the record reflects my personal performance and is accurate and complete. Bri Song PA-C.  9:31 AM 02/20/2022    Clinical Impression:   Final diagnoses:  [R10.9] Bilateral flank pain (Primary)          ED Disposition Condition    Discharge Stable        ED Prescriptions     None        Follow-up Information     Follow up With Specialties Details Why Contact Info    Jose Grant NP Family Medicine   [address] SERV[address] [facility] [address]  [phone]      [facility] Emergency Medicine   [address] [place] [id number] [phone]           Bri Song PA-C  02/20/22 0934       Fernie Maynard MD  02/20/22 3605

## 2022-02-20 NOTE — DISCHARGE INSTRUCTIONS
Stay well hydrated. Keep your blood sugar well controlled.  Follow up with your primary care provider.  For worsening symptoms, chest pain, shortness of breath, increased abdominal pain, high grade fever, stroke or stroke like symptoms, immediately go to the nearest Emergency Room or call 911 as soon as possible.

## 2022-02-22 LAB — HCV AB SERPL QL IA: NEGATIVE

## 2022-02-23 ENCOUNTER — TELEPHONE (OUTPATIENT)
Dept: FAMILY MEDICINE | Facility: CLINIC | Age: 37
End: 2022-02-23
Payer: MEDICAID

## 2022-02-23 LAB — HIV 1+2 AB+HIV1 P24 AG SERPL QL IA: NEGATIVE

## 2022-02-25 ENCOUNTER — OFFICE VISIT (OUTPATIENT)
Dept: PHYSICAL MEDICINE AND REHAB | Facility: CLINIC | Age: 37
End: 2022-02-25
Payer: MEDICAID

## 2022-02-25 ENCOUNTER — PATIENT MESSAGE (OUTPATIENT)
Dept: FAMILY MEDICINE | Facility: CLINIC | Age: 37
End: 2022-02-25
Payer: MEDICAID

## 2022-02-25 DIAGNOSIS — G62.9 NEUROPATHY: ICD-10-CM

## 2022-02-25 DIAGNOSIS — M54.12 CERVICAL RADICULOPATHY: ICD-10-CM

## 2022-02-25 PROCEDURE — 95886 MUSC TEST DONE W/N TEST COMP: CPT | Mod: 26,S$PBB,, | Performed by: PHYSICAL MEDICINE & REHABILITATION

## 2022-02-25 PROCEDURE — 4010F PR ACE/ARB THEARPY RXD/TAKEN: ICD-10-PCS | Mod: CPTII,,, | Performed by: PHYSICAL MEDICINE & REHABILITATION

## 2022-02-25 PROCEDURE — 95886 PR EMG COMPLETE, W/ NERVE CONDUCTION STUDIES, 5+ MUSCLES: ICD-10-PCS | Mod: 26,S$PBB,, | Performed by: PHYSICAL MEDICINE & REHABILITATION

## 2022-02-25 PROCEDURE — 4010F ACE/ARB THERAPY RXD/TAKEN: CPT | Mod: CPTII,,, | Performed by: PHYSICAL MEDICINE & REHABILITATION

## 2022-02-25 PROCEDURE — 3052F PR MOST RECENT HEMOGLOBIN A1C LEVEL 8.0 - < 9.0%: ICD-10-PCS | Mod: CPTII,,, | Performed by: PHYSICAL MEDICINE & REHABILITATION

## 2022-02-25 PROCEDURE — 95910 NRV CNDJ TEST 7-8 STUDIES: CPT | Mod: 26,S$PBB,, | Performed by: PHYSICAL MEDICINE & REHABILITATION

## 2022-02-25 PROCEDURE — 95910 PR NERVE CONDUCTION STUDY; 7-8 STUDIES: ICD-10-PCS | Mod: 26,S$PBB,, | Performed by: PHYSICAL MEDICINE & REHABILITATION

## 2022-02-25 PROCEDURE — 95910 NRV CNDJ TEST 7-8 STUDIES: CPT | Mod: PBBFAC,PN | Performed by: PHYSICAL MEDICINE & REHABILITATION

## 2022-02-25 PROCEDURE — 99499 UNLISTED E&M SERVICE: CPT | Mod: S$PBB,,, | Performed by: PHYSICAL MEDICINE & REHABILITATION

## 2022-02-25 PROCEDURE — 95886 MUSC TEST DONE W/N TEST COMP: CPT | Mod: PBBFAC,PN | Performed by: PHYSICAL MEDICINE & REHABILITATION

## 2022-02-25 PROCEDURE — 99499 NO LOS: ICD-10-PCS | Mod: S$PBB,,, | Performed by: PHYSICAL MEDICINE & REHABILITATION

## 2022-02-25 PROCEDURE — 3052F HG A1C>EQUAL 8.0%<EQUAL 9.0%: CPT | Mod: CPTII,,, | Performed by: PHYSICAL MEDICINE & REHABILITATION

## 2022-02-25 NOTE — PROGRESS NOTES
[facility]  Physical Medicine and Rehabilitation   [address] [place] [address], [address]  [place] [address]             Patient: Jose Miguel Brennan   Patient ID: [id number]   Sex:     Date of Birth:     Age:     Notes:     Last visit date: 2/25/2022         Visit date and time: 2/25/2022 09:33   Patient Age on Visit Date:     Referring Physician:     Diagnoses:       Numbness and arm pain      Sensory NCS      Nerve / Sites Rec. Site Onset Lat Peak Lat Ref. NP Amp Ref. PP Amp Ref. Segments Distance Velocity     ms ms ms µV µV µV µV  cm m/s   R Median - Digit II (Antidromic)      Wrist Dig II 3.59 4.48 ?3.40 11.9 ?15.0 22.7 ?20.0 Wrist - Dig II 13 36   L Median - Digit II (Antidromic)      Wrist Dig II 4.27 6.25 ?3.40 3.5 ?15.0 6.6 ?20.0 Wrist - Dig II 13 30   R Ulnar - Digit V (Antidromic)      Wrist Dig V 2.29 2.97 ?3.10 15.0 ?10.0 23.7 ?15.0 Wrist - Dig V 11 48   L Ulnar - Digit V (Antidromic)      Wrist Dig V 2.29 3.02 ?3.10 12.7 ?10.0 22.3 ?15.0 Wrist - Dig V 11 48       Motor NCS      Nerve / Sites Muscle Latency Ref. Amplitude Ref. Amp % Duration Segments Distance Lat Diff Velocity Ref.     ms ms mV mV % ms  cm ms m/s m/s   L Median - APB      Wrist APB 6.77 ?4.40 3.7 ?4.0 100 8.85 Wrist - APB 7         Elbow APB 11.72  5.5  151  Elbow - Wrist 23 4.95 46 ?49   R Median - APB      Wrist APB 4.48 ?4.40 4.3 ?4.0 100 7.97 Wrist - APB 7         Elbow APB 10.52  6.8  159 8.65 Elbow - Wrist 22 6.04 36 ?49   L Ulnar - ADM      Wrist ADM 3.28 ?3.60 6.4 ?5.0 100 6.51 Wrist - ADM 7         B.Elbow ADM 8.59  4.3  67.9 7.40 B.Elbow - Wrist 25 5.31 47 ?49      A.Elbow ADM 11.30  4.3  67.6 6.61 A.Elbow - B.Elbow 10 2.71 37 ?49   R Ulnar - ADM      Wrist ADM 3.70 ?3.60 8.7 ?5.0 100 6.30 Wrist - ADM 7         B.Elbow ADM 8.96  6.4  73.7 7.14 B.Elbow - Wrist 24 5.26 46 ?49      A.Elbow ADM 11.09  6.3  73.1 6.98 A.Elbow - B.Elbow 10 2.14 47 ?49       EMG Summary Table     Spontaneous MUAP Recruitment   Muscle IA Fib  PSW Fasc H.F. Amp Dur. PPP Pattern   L. Biceps brachii N None None None None N N N N   R. Biceps brachii N None None None None N N N N   L. Deltoid N None None None None N N N N   R. Deltoid N None None None None 1+ 1+ 1+ Reduced   L. Triceps brachii N None None None None N N N N   R. Triceps brachii N None None None None N N N N   L. Extensor carpi radialis brevis N None None None None N N N N   R. Extensor carpi radialis brevis N None None None None N N N N   L. Brachioradialis N None None None None 1+ 1+ 1+ Reduced   R. Brachioradialis N None None None None N N N N   L. Pronator teres N None None None None N N N N   R. Pronator teres N None None None None N N N N   L. First dorsal interosseous N None None None None 1+ 1+ 1+ Reduced   R. First dorsal interosseous N None None None None 1+ 1+ 1+ Reduced   R. Cervical paraspinals (mid) 1+ None None None None N N N N   R. Cervical paraspinals (low) 1+ None None None None N N N N       Summary    The motor conduction test had results outside of the specified normal range in all 4 of the tested nerves:   In the L Median - APB study  o the take off latency result was increased for Wrist stimulation  o the peak amplitude result was reduced for Wrist stimulation  o the take off velocity result was reduced for Elbow - Wrist segment   In the R Median - APB study  o the take off latency result was increased for Wrist stimulation  o the take off velocity result was reduced for Elbow - Wrist segment   In the L Ulnar - ADM study  o the take off velocity result was reduced for B.Elbow - Wrist segment  o the take off velocity result was reduced for A.Elbow - B.Elbow segment   In the R Ulnar - ADM study  o the take off latency result was increased for Wrist stimulation  o the take off velocity result was reduced for B.Elbow - Wrist segment  o the take off velocity result was reduced for A.Elbow - B.Elbow segment    The sensory conduction test was performed on 4 nerve(s). The  results were normal in 2 nerve(s): R Ulnar - Digit V (Antidromic), L Ulnar - Digit V (Antidromic). Results outside the specified normal range were found in 2 nerve(s), as follows:   In the R Median - Digit II (Antidromic) study  o the peak latency result was increased for Wrist stimulation  o the peak amplitude result was reduced for Wrist stimulation   In the L Median - Digit II (Antidromic) study  o the peak latency result was increased for Wrist stimulation  o the peak amplitude result was reduced for Wrist stimulation    The needle EMG examination was performed in 16 muscles. It was normal in 10 muscle(s): L. Biceps brachii, R. Biceps brachii, L. Deltoid, L. Triceps brachii, R. Triceps brachii, L. Extensor carpi radialis brevis, R. Extensor carpi radialis brevis, R. Brachioradialis, L. Pronator teres, R. Pronator teres. The study was abnormal in 6 muscle(s), with the following distribution:   Abnormal spontaneous/insertional activity was found in R. Cervical paraspinals (mid), R. Cervical paraspinals (low).   The MUP waveform abnormality was found in R. Deltoid, L. Brachioradialis, L. First dorsal interosseous, R. First dorsal interosseous.   Abnormal interference pattern was found in R. Deltoid, L. Brachioradialis, L. First dorsal interosseous, R. First dorsal interosseous.          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

## 2022-03-03 ENCOUNTER — OFFICE VISIT (OUTPATIENT)
Dept: FAMILY MEDICINE | Facility: CLINIC | Age: 37
End: 2022-03-03
Payer: MEDICAID

## 2022-03-03 VITALS
HEIGHT: 72 IN | SYSTOLIC BLOOD PRESSURE: 130 MMHG | TEMPERATURE: 99 F | HEART RATE: 95 BPM | DIASTOLIC BLOOD PRESSURE: 86 MMHG | BODY MASS INDEX: 28.99 KG/M2 | WEIGHT: 214 LBS | OXYGEN SATURATION: 97 %

## 2022-03-03 DIAGNOSIS — I10 ESSENTIAL HYPERTENSION: Primary | ICD-10-CM

## 2022-03-03 DIAGNOSIS — E11.9 TYPE 2 DIABETES MELLITUS WITHOUT COMPLICATION, WITH LONG-TERM CURRENT USE OF INSULIN: ICD-10-CM

## 2022-03-03 DIAGNOSIS — Z79.4 TYPE 2 DIABETES MELLITUS WITHOUT COMPLICATION, WITH LONG-TERM CURRENT USE OF INSULIN: ICD-10-CM

## 2022-03-03 DIAGNOSIS — E78.2 MIXED HYPERLIPIDEMIA: ICD-10-CM

## 2022-03-03 PROCEDURE — 3075F PR MOST RECENT SYSTOLIC BLOOD PRESS GE 130-139MM HG: ICD-10-PCS | Mod: S$GLB,,, | Performed by: NURSE PRACTITIONER

## 2022-03-03 PROCEDURE — 4010F PR ACE/ARB THEARPY RXD/TAKEN: ICD-10-PCS | Mod: S$GLB,,, | Performed by: NURSE PRACTITIONER

## 2022-03-03 PROCEDURE — 3008F BODY MASS INDEX DOCD: CPT | Mod: S$GLB,,, | Performed by: NURSE PRACTITIONER

## 2022-03-03 PROCEDURE — 1160F PR REVIEW ALL MEDS BY PRESCRIBER/CLIN PHARMACIST DOCUMENTED: ICD-10-PCS | Mod: S$GLB,,, | Performed by: NURSE PRACTITIONER

## 2022-03-03 PROCEDURE — 4010F ACE/ARB THERAPY RXD/TAKEN: CPT | Mod: S$GLB,,, | Performed by: NURSE PRACTITIONER

## 2022-03-03 PROCEDURE — 1160F RVW MEDS BY RX/DR IN RCRD: CPT | Mod: S$GLB,,, | Performed by: NURSE PRACTITIONER

## 2022-03-03 PROCEDURE — 3008F PR BODY MASS INDEX (BMI) DOCUMENTED: ICD-10-PCS | Mod: S$GLB,,, | Performed by: NURSE PRACTITIONER

## 2022-03-03 PROCEDURE — 3052F PR MOST RECENT HEMOGLOBIN A1C LEVEL 8.0 - < 9.0%: ICD-10-PCS | Mod: S$GLB,,, | Performed by: NURSE PRACTITIONER

## 2022-03-03 PROCEDURE — 3079F PR MOST RECENT DIASTOLIC BLOOD PRESSURE 80-89 MM HG: ICD-10-PCS | Mod: S$GLB,,, | Performed by: NURSE PRACTITIONER

## 2022-03-03 PROCEDURE — 99213 OFFICE O/P EST LOW 20 MIN: CPT | Mod: S$GLB,,, | Performed by: NURSE PRACTITIONER

## 2022-03-03 PROCEDURE — 3075F SYST BP GE 130 - 139MM HG: CPT | Mod: S$GLB,,, | Performed by: NURSE PRACTITIONER

## 2022-03-03 PROCEDURE — 3079F DIAST BP 80-89 MM HG: CPT | Mod: S$GLB,,, | Performed by: NURSE PRACTITIONER

## 2022-03-03 PROCEDURE — 99213 PR OFFICE/OUTPT VISIT, EST, LEVL III, 20-29 MIN: ICD-10-PCS | Mod: S$GLB,,, | Performed by: NURSE PRACTITIONER

## 2022-03-03 PROCEDURE — 3052F HG A1C>EQUAL 8.0%<EQUAL 9.0%: CPT | Mod: S$GLB,,, | Performed by: NURSE PRACTITIONER

## 2022-03-03 RX ORDER — AMLODIPINE BESYLATE 5 MG/1
5 TABLET ORAL DAILY
Qty: 90 TABLET | Refills: 1 | Status: SHIPPED | OUTPATIENT
Start: 2022-03-03 | End: 2022-08-09

## 2022-03-03 NOTE — PROGRESS NOTES
SUBJECTIVE:      Patient ID: Jose Miguel Brennan is a 36 y.o. male.    Chief Complaint: Hypertension    Patient is here today to f/u on dm and htn. He is taking 35 units of lantus daily, home glucose readings are 120-130. He was started on norvasc and restarted on farxiga at his previous ov. He is doing good, blood pressure is improved.     Diabetes  He presents for his follow-up diabetic visit. He has type 2 diabetes mellitus. The initial diagnosis of diabetes was made 10 years ago. His disease course has been improving. Pertinent negatives for hypoglycemia include no confusion, dizziness, headaches, nervousness/anxiousness, pallor, seizures or speech difficulty. Pertinent negatives for diabetes include no blurred vision, no chest pain, no foot paresthesias, no foot ulcerations, no polydipsia, no polyphagia, no polyuria, no visual change and no weakness. There are no hypoglycemic complications. Symptoms are stable. There are no diabetic complications. Risk factors for coronary artery disease include diabetes mellitus, male sex and hypertension. Current diabetic treatment includes insulin injections and oral agent (monotherapy). He is compliant with treatment most of the time. His weight is stable. He is following a generally unhealthy diet. When asked about meal planning, he reported none. He rarely participates in exercise. His breakfast blood glucose is taken between 7-8 am. His breakfast blood glucose range is generally 110-130 mg/dl. An ACE inhibitor/angiotensin II receptor blocker is being taken. He does not see a podiatrist.Eye exam is not current.   Hypertension  This is a chronic problem. The problem is unchanged. The problem is controlled. Pertinent negatives include no blurred vision, chest pain, headaches, neck pain, palpitations or shortness of breath. Risk factors for coronary artery disease include diabetes mellitus, dyslipidemia, male gender and smoking/tobacco exposure. Past treatments include  angiotensin blockers and calcium channel blockers. The current treatment provides moderate improvement.       Past Surgical History:   Procedure Laterality Date    COLONOSCOPY N/A 5/8/2020    Procedure: COLONOSCOPY;  Surgeon: Hammad Castorena III, MD;  Location: [facility];  Service: Endoscopy;  Laterality: N/A;    ESOPHAGOGASTRODUODENOSCOPY N/A 5/8/2020    Procedure: EGD (ESOPHAGOGASTRODUODENOSCOPY);  Surgeon: Hammad Castorena III, MD;  Location: [facility] ENDO;  Service: Endoscopy;  Laterality: N/A;    nose polyp removal       Family History   Problem Relation Age of Onset    Hypertension Maternal Grandmother     Cancer Maternal Grandfather     Diabetes Maternal Grandfather     Heart disease Maternal Grandfather     Heart disease Mother     Stroke Mother     Diabetes Mother       Social History     Socioeconomic History    Marital status: Single   Tobacco Use    Smoking status: Current Every Day Smoker     Packs/day: 0.25     Years: 5.00     Pack years: 1.25     Types: Cigars    Smokeless tobacco: Never Used    Tobacco comment: One cigar a day   Substance and Sexual Activity    Alcohol use: Yes    Drug use: No    Sexual activity: Yes     Partners: Female     Current Outpatient Medications   Medication Sig Dispense Refill    atorvastatin (LIPITOR) 10 MG tablet Take 1 tablet (10 mg total) by mouth every evening. 90 tablet 1    FARXIGA 5 mg Tab tablet Take 5 mg by mouth once daily.      gabapentin (NEURONTIN) 300 MG capsule Take 1 tablet every night x 7 days. Increase to twice a day x 7 days. Increase to three times a day. 90 capsule 2    insulin (LANTUS SOLOSTAR U-100 INSULIN) glargine 100 units/mL (3mL) SubQ pen Inject 30 Units into the skin once daily. 6 mL 1    losartan (COZAAR) 100 MG tablet Take 100 mg by mouth once daily.      omega 3-dha-epa-fish oil 1,000 mg (120 mg-180 mg) Cap Take 1 capsule by mouth 2 (two) times daily. 180 capsule 1    amLODIPine (NORVASC) 5 MG tablet Take 1 tablet  (5 mg total) by mouth once daily. 90 tablet 1     No current facility-administered medications for this visit.     Review of patient's allergies indicates:   Allergen Reactions    Zithromax [azithromycin] Rash      Past Medical History:   Diagnosis Date    Anxiety     Asthma     Depression     Diabetes mellitus     diet controlled    Diabetes mellitus, type 2     GERD (gastroesophageal reflux disease)     Gout     Hyperlipidemia     Hypertension     IgA nephropathy     Insomnia      Past Surgical History:   Procedure Laterality Date    COLONOSCOPY N/A 5/8/2020    Procedure: COLONOSCOPY;  Surgeon: Hammad Castorena III, MD;  Location: [facility];  Service: Endoscopy;  Laterality: N/A;    ESOPHAGOGASTRODUODENOSCOPY N/A 5/8/2020    Procedure: EGD (ESOPHAGOGASTRODUODENOSCOPY);  Surgeon: Hammad Castorena III, MD;  Location: [facility];  Service: Endoscopy;  Laterality: N/A;    nose polyp removal         Review of Systems   Constitutional: Negative for activity change, appetite change, chills, diaphoresis and unexpected weight change.   HENT: Negative for ear discharge, facial swelling, hearing loss, nosebleeds, rhinorrhea and trouble swallowing.    Eyes: Negative for blurred vision, photophobia, pain, discharge and visual disturbance.   Respiratory: Negative for apnea, choking, chest tightness, shortness of breath and wheezing.    Cardiovascular: Negative for chest pain and palpitations.   Gastrointestinal: Negative for abdominal pain, blood in stool, constipation, diarrhea and vomiting.   Endocrine: Negative for polydipsia, polyphagia and polyuria.   Genitourinary: Negative for difficulty urinating, hematuria and urgency.   Musculoskeletal: Negative for arthralgias, gait problem, joint swelling and neck pain.   Skin: Negative for pallor.   Neurological: Negative for dizziness, seizures, speech difficulty, weakness, light-headedness and headaches.   Hematological: Does not bruise/bleed easily.    Psychiatric/Behavioral: Negative for agitation, confusion, dysphoric mood, self-injury, sleep disturbance and suicidal ideas. The patient is not nervous/anxious.       OBJECTIVE:      Vitals:    03/03/22 0851   BP: 130/86   Pulse: 95   Temp: 99 °F (37.2 °C)   SpO2: 97%   Weight: 97.1 kg (214 lb)   Height: 6' (1.829 m)     Physical Exam  Vitals and nursing note reviewed.   Constitutional:       General: He is not in acute distress.     Appearance: He is well-developed.   HENT:      Head: Normocephalic and atraumatic.      Nose: Nose normal.      Mouth/Throat:      Pharynx: Uvula midline.   Eyes:      General: Lids are normal.      Conjunctiva/sclera: Conjunctivae normal.      Pupils: Pupils are equal, round, and reactive to light.      Right eye: Pupil is round and reactive.      Left eye: Pupil is round and reactive.   Neck:      Thyroid: No thyromegaly.      Vascular: No carotid bruit.   Cardiovascular:      Rate and Rhythm: Normal rate and regular rhythm.      Pulses: Normal pulses.      Heart sounds: Normal heart sounds. No murmur heard.  Pulmonary:      Effort: Pulmonary effort is normal.      Breath sounds: Normal breath sounds. No wheezing, rhonchi or rales.   Abdominal:      General: Bowel sounds are normal.      Palpations: Abdomen is soft. Abdomen is not rigid.      Tenderness: There is no abdominal tenderness.   Musculoskeletal:         General: Normal range of motion.      Cervical back: Normal range of motion and neck supple.      Right lower leg: No edema.      Left lower leg: No edema.   Lymphadenopathy:      Cervical: No cervical adenopathy.   Skin:     General: Skin is warm and dry.      Nails: There is no clubbing.   Neurological:      Mental Status: He is alert and oriented to person, place, and time.   Psychiatric:         Attention and Perception: Attention normal.         Mood and Affect: Mood normal.         Speech: Speech normal.         Behavior: Behavior normal. Behavior is cooperative.          Thought Content: Thought content normal.         Judgment: Judgment normal.        Assessment:       1. Essential hypertension    2. Type 2 diabetes mellitus without complication, with long-term current use of insulin    3. Mixed hyperlipidemia        Plan:       Essential hypertension  -     amLODIPine (NORVASC) 5 MG tablet; Take 1 tablet (5 mg total) by mouth once daily.  Dispense: 90 tablet; Refill: 1    Type 2 diabetes mellitus without complication, with long-term current use of insulin  Cont current meds  Will recheck an A1c with next set of labs from Dr Conte    Mixed hyperlipidemia  Due for repeat lipids  He has lab orders      Follow up in about 5 months (around 8/3/2022) for htn.      3/3/2022 Jose Grant, WU, FNP

## 2022-03-09 ENCOUNTER — OFFICE VISIT (OUTPATIENT)
Dept: ORTHOPEDICS | Facility: CLINIC | Age: 37
End: 2022-03-09
Payer: MEDICAID

## 2022-03-09 VITALS — BODY MASS INDEX: 28.99 KG/M2 | HEIGHT: 72 IN | WEIGHT: 214 LBS

## 2022-03-09 DIAGNOSIS — G56.02 CARPAL TUNNEL SYNDROME OF LEFT WRIST: ICD-10-CM

## 2022-03-09 DIAGNOSIS — G56.01 CARPAL TUNNEL SYNDROME OF RIGHT WRIST: Primary | ICD-10-CM

## 2022-03-09 DIAGNOSIS — G56.21 ULNAR NEUROPATHY AT ELBOW, RIGHT: ICD-10-CM

## 2022-03-09 DIAGNOSIS — G56.22 ULNAR NEUROPATHY AT ELBOW, LEFT: ICD-10-CM

## 2022-03-09 DIAGNOSIS — M47.812 FACET ARTHRITIS OF CERVICAL REGION: ICD-10-CM

## 2022-03-09 PROCEDURE — 99213 OFFICE O/P EST LOW 20 MIN: CPT | Mod: S$GLB,,, | Performed by: ORTHOPAEDIC SURGERY

## 2022-03-09 PROCEDURE — 1160F PR REVIEW ALL MEDS BY PRESCRIBER/CLIN PHARMACIST DOCUMENTED: ICD-10-PCS | Mod: S$GLB,,, | Performed by: ORTHOPAEDIC SURGERY

## 2022-03-09 PROCEDURE — 4010F PR ACE/ARB THEARPY RXD/TAKEN: ICD-10-PCS | Mod: S$GLB,,, | Performed by: ORTHOPAEDIC SURGERY

## 2022-03-09 PROCEDURE — 1160F RVW MEDS BY RX/DR IN RCRD: CPT | Mod: S$GLB,,, | Performed by: ORTHOPAEDIC SURGERY

## 2022-03-09 PROCEDURE — 3052F PR MOST RECENT HEMOGLOBIN A1C LEVEL 8.0 - < 9.0%: ICD-10-PCS | Mod: S$GLB,,, | Performed by: ORTHOPAEDIC SURGERY

## 2022-03-09 PROCEDURE — 3052F HG A1C>EQUAL 8.0%<EQUAL 9.0%: CPT | Mod: S$GLB,,, | Performed by: ORTHOPAEDIC SURGERY

## 2022-03-09 PROCEDURE — 4010F ACE/ARB THERAPY RXD/TAKEN: CPT | Mod: S$GLB,,, | Performed by: ORTHOPAEDIC SURGERY

## 2022-03-09 PROCEDURE — 3008F BODY MASS INDEX DOCD: CPT | Mod: S$GLB,,, | Performed by: ORTHOPAEDIC SURGERY

## 2022-03-09 PROCEDURE — 99213 PR OFFICE/OUTPT VISIT, EST, LEVL III, 20-29 MIN: ICD-10-PCS | Mod: S$GLB,,, | Performed by: ORTHOPAEDIC SURGERY

## 2022-03-09 PROCEDURE — 3008F PR BODY MASS INDEX (BMI) DOCUMENTED: ICD-10-PCS | Mod: S$GLB,,, | Performed by: ORTHOPAEDIC SURGERY

## 2022-03-09 NOTE — PROGRESS NOTES
Subjective:       Patient ID: Jose Miguel Brennan is a 36 y.o. male.    Chief Complaint: Pain of the Neck (B/l EMG/NCV 02/25/2022, patient is here for results, numbness in both hands.)      History of Present Illness    Prior to meeting with the patient I reviewed the medical chart in [facility]. This included reviewing the previous progress notes from our office, review of the patient's last appointment with their primary care provider, review of any visits to the emergency room, and review of any pain management appointments or procedures.   Patient is here for follow-up appointment.  When he was initially evaluated Dr. Vaz reviewed the cervical MRI and ordered an upper extremity EMG nerve conduction study.  This gentleman presents for 5 month history of  numbness and pain in the left arm down to his fingers and then more recently in both arms his symptoms are constant basis.    The chief complaint is left upper extremity numbness and dysesthesia into the hand and fingers that will wake him up at night.  He claims that the left hand numbness and dysesthesia radiates up toward the shoulders.  He has bilateral shoulder pain and some neck pain and occasional right hand numbness and dysesthesia.    Current Medications  Current Outpatient Medications   Medication Sig Dispense Refill    amLODIPine (NORVASC) 5 MG tablet Take 1 tablet (5 mg total) by mouth once daily. 90 tablet 1    atorvastatin (LIPITOR) 10 MG tablet Take 1 tablet (10 mg total) by mouth every evening. 90 tablet 1    FARXIGA 5 mg Tab tablet Take 5 mg by mouth once daily.      gabapentin (NEURONTIN) 300 MG capsule Take 1 tablet every night x 7 days. Increase to twice a day x 7 days. Increase to three times a day. 90 capsule 2    insulin (LANTUS SOLOSTAR U-100 INSULIN) glargine 100 units/mL (3mL) SubQ pen Inject 30 Units into the skin once daily. 6 mL 1    losartan (COZAAR) 100 MG tablet Take 100 mg by mouth once daily.      omega 3-dha-epa-fish oil  1,000 mg (120 mg-180 mg) Cap Take 1 capsule by mouth 2 (two) times daily. 180 capsule 1     No current facility-administered medications for this visit.       Allergies  Review of patient's allergies indicates:   Allergen Reactions    Zithromax [azithromycin] Rash       Past Medical History  Past Medical History:   Diagnosis Date    Anxiety     Asthma     Depression     Diabetes mellitus     diet controlled    Diabetes mellitus, type 2     GERD (gastroesophageal reflux disease)     Gout     Hyperlipidemia     Hypertension     IgA nephropathy     Insomnia        Surgical History  Past Surgical History:   Procedure Laterality Date    COLONOSCOPY N/A 5/8/2020    Procedure: COLONOSCOPY;  Surgeon: Hammad Castorena III, MD;  Location: [facility];  Service: Endoscopy;  Laterality: N/A;    ESOPHAGOGASTRODUODENOSCOPY N/A 5/8/2020    Procedure: EGD (ESOPHAGOGASTRODUODENOSCOPY);  Surgeon: Hammad Castorena III, MD;  Location: [facility];  Service: Endoscopy;  Laterality: N/A;    nose polyp removal         Family History:   Family History   Problem Relation Age of Onset    Hypertension Maternal Grandmother     Cancer Maternal Grandfather     Diabetes Maternal Grandfather     Heart disease Maternal Grandfather     Heart disease Mother     Stroke Mother     Diabetes Mother        Social History:   Social History     Socioeconomic History    Marital status: Single   Tobacco Use    Smoking status: Current Every Day Smoker     Packs/day: 0.25     Years: 5.00     Pack years: 1.25     Types: Cigars    Smokeless tobacco: Never Used    Tobacco comment: One cigar a day   Substance and Sexual Activity    Alcohol use: Yes    Drug use: No    Sexual activity: Yes     Partners: Female       Hospitalization/Major Diagnostic Procedure:     Review of Systems     General/Constitutional:  Chills denies. Fatigue denies. Fever denies. Weight gain denies. Weight loss denies.    Respiratory:  Shortness of breath  denies.    Cardiovascular:  Chest pain denies.    Gastrointestinal:  Constipation denies. Diarrhea denies. Nausea denies. Vomiting denies.     Hematology:  Easy bruising denies. Prolonged bleeding denies.     Genitourinary:  Frequent urination denies. Pain in lower back denies. Painful urination denies.     Musculoskeletal:  See HPI for details    Skin:  Rash denies.    Neurologic:  Dizziness denies. Gait abnormalities denies. Seizures denies. Tingling/Numbess denies.    Psychiatric:  Anxiety denies. Depressed mood denies.     Objective:   Vital Signs: There were no vitals filed for this visit.     Physical Exam      General Examination:     Constitutional: The patient is alert and oriented to lace person and time. Mood is pleasant.     Head/Face: Normal facial features normal eyebrows    Eyes: Normal extraocular motion bilaterally    Lungs: Respirations are equal and unlabored    Gait is coordinated.    Cardiovascular: There are no swelling or varicosities present.    Lymphatic: Negative for adenopathy    Skin: Normal    Neurological: Level of consciousness normal. Oriented to place person and time and situation    Psychiatric: Oriented to time place person and situation    Bilateral wrist exam is demonstrate positive Tinel's, positive carpal compression, and positive Phalen's indicative of bilateral carpal tunnel syndrome.  No significant Tinel's at either elbow.  Cervical exam demonstrates mildly diminished range of motion and some tenderness palpation of the bilateral upper trapezius muscles.  Bilateral upper extremities are distal neurovascular intact.    XRAY Report/ Interpretation:  Bilateral upper extremity EMG nerve conduction studies reviewed the patient the office today demonstrates severe left carpal tunnel syndrome, moderate right carpal tunnel syndrome, bilateral mild ulnar neurapraxia, in some subtle changes possibly indicative of a right C5 radiculopathy.      Assessment:       1. Carpal tunnel  "syndrome of right wrist    2. Carpal tunnel syndrome of left wrist    3. Facet arthritis of cervical region    4. Ulnar neuropathy at elbow, left    5. Ulnar neuropathy at elbow, right        Plan:       Jose Miguel was seen today for pain.    Diagnoses and all orders for this visit:    Carpal tunnel syndrome of right wrist    Carpal tunnel syndrome of left wrist    Facet arthritis of cervical region    Ulnar neuropathy at elbow, left    Ulnar neuropathy at elbow, right         Follow up for sched CTR.  Pablo Cardozo, physician's assistant served in the capacity as a "scribe" for this patient encounter  A "face to face" encounter occurred with Dr. Vaz on this date  The treatment plan and medical decision making is outlined below:  At this time we recommend proceeding with left carpal tunnel release since this is the most problematic symptom that he is dealing with.  I also recommend that he start his cervical physical therapy.  We will see him back about a month or so after his carpal tunnel release to see how he has responded to the physical therapy and how much of his symptoms have resolved with the carpal tunnel release.    Treatment options were discussed with regards to the nature of the medical condition. Conservative pain intervention and surgical options were discussed in detail. The probability of success of each separate treatment option was discussed. The patient expressed a clear understanding of the treatment options. With regards to surgery, the procedure risk, benefits, complications, and outcomes were discussed. No guarantees were given with regards to surgical outcome.   The risk of complications, morbidity, and mortality of patient management decisions have been made at the time of this visit. These are associated with the patient's problems, diagnostic procedures and treatment options. This includes the possible management options selected and those considered but not selected by the patient " after shared medical decision making we discussed with the patient.     This note was created using Dragon voice recognition software that occasionally misinterpreted phrases or words.

## 2022-04-05 ENCOUNTER — LAB VISIT (OUTPATIENT)
Dept: LAB | Facility: HOSPITAL | Age: 37
End: 2022-04-05
Attending: INTERNAL MEDICINE
Payer: MEDICAID

## 2022-04-05 ENCOUNTER — TELEPHONE (OUTPATIENT)
Dept: FAMILY MEDICINE | Facility: CLINIC | Age: 37
End: 2022-04-05
Payer: MEDICAID

## 2022-04-05 DIAGNOSIS — E78.2 MIXED HYPERLIPIDEMIA: ICD-10-CM

## 2022-04-05 DIAGNOSIS — Z79.4 TYPE 2 DIABETES MELLITUS WITHOUT COMPLICATION, WITH LONG-TERM CURRENT USE OF INSULIN: Primary | ICD-10-CM

## 2022-04-05 DIAGNOSIS — R80.9 PROTEINURIA: Primary | ICD-10-CM

## 2022-04-05 DIAGNOSIS — E11.9 TYPE 2 DIABETES MELLITUS WITHOUT COMPLICATION, WITH LONG-TERM CURRENT USE OF INSULIN: Primary | ICD-10-CM

## 2022-04-05 LAB
ALBUMIN SERPL BCP-MCNC: 4 G/DL (ref 3.5–5.2)
ANION GAP SERPL CALC-SCNC: 11 MMOL/L (ref 8–16)
BACTERIA #/AREA URNS HPF: NEGATIVE /HPF
BILIRUB UR QL STRIP: NEGATIVE
BUN SERPL-MCNC: 44 MG/DL (ref 6–20)
CALCIUM SERPL-MCNC: 9 MG/DL (ref 8.7–10.5)
CHLORIDE SERPL-SCNC: 105 MMOL/L (ref 95–110)
CLARITY UR: CLEAR
CO2 SERPL-SCNC: 24 MMOL/L (ref 23–29)
COLOR UR: YELLOW
CREAT SERPL-MCNC: 2.4 MG/DL (ref 0.5–1.4)
CREAT UR-MCNC: 45 MG/DL (ref 23–375)
EST. GFR  (AFRICAN AMERICAN): 38.7 ML/MIN/1.73 M^2
EST. GFR  (NON AFRICAN AMERICAN): 33.4 ML/MIN/1.73 M^2
GLUCOSE SERPL-MCNC: 107 MG/DL (ref 70–110)
GLUCOSE UR QL STRIP: ABNORMAL
HGB UR QL STRIP: NEGATIVE
HYALINE CASTS #/AREA URNS LPF: 0 /LPF
KETONES UR QL STRIP: NEGATIVE
LEUKOCYTE ESTERASE UR QL STRIP: NEGATIVE
MICROSCOPIC COMMENT: ABNORMAL
NITRITE UR QL STRIP: NEGATIVE
PH UR STRIP: 6 [PH] (ref 5–8)
PHOSPHATE SERPL-MCNC: 5.1 MG/DL (ref 2.7–4.5)
POTASSIUM SERPL-SCNC: 4.3 MMOL/L (ref 3.5–5.1)
PROT UR QL STRIP: ABNORMAL
PROT UR-MCNC: 43 MG/DL (ref 6–15)
PROT/CREAT UR: 0.96 MG/G{CREAT} (ref 0–0.2)
RBC #/AREA URNS HPF: 0 /HPF (ref 0–4)
SODIUM SERPL-SCNC: 140 MMOL/L (ref 136–145)
SP GR UR STRIP: 1.01 (ref 1–1.03)
SQUAMOUS #/AREA URNS HPF: 0 /HPF
URN SPEC COLLECT METH UR: ABNORMAL
UROBILINOGEN UR STRIP-ACNC: NEGATIVE EU/DL
WBC #/AREA URNS HPF: 0 /HPF (ref 0–5)
YEAST URNS QL MICRO: ABNORMAL

## 2022-04-05 PROCEDURE — 36415 COLL VENOUS BLD VENIPUNCTURE: CPT | Performed by: INTERNAL MEDICINE

## 2022-04-05 PROCEDURE — 80069 RENAL FUNCTION PANEL: CPT | Performed by: INTERNAL MEDICINE

## 2022-04-05 PROCEDURE — 81001 URINALYSIS AUTO W/SCOPE: CPT | Performed by: INTERNAL MEDICINE

## 2022-04-05 PROCEDURE — 84156 ASSAY OF PROTEIN URINE: CPT | Performed by: INTERNAL MEDICINE

## 2022-05-09 ENCOUNTER — HOSPITAL ENCOUNTER (EMERGENCY)
Facility: HOSPITAL | Age: 37
Discharge: HOME | End: 2022-05-09
Attending: EMERGENCY MEDICINE
Payer: MEDICAID

## 2022-05-09 VITALS
BODY MASS INDEX: 29.8 KG/M2 | OXYGEN SATURATION: 98 % | DIASTOLIC BLOOD PRESSURE: 89 MMHG | SYSTOLIC BLOOD PRESSURE: 139 MMHG | HEART RATE: 72 BPM | TEMPERATURE: 99 F | HEIGHT: 72 IN | WEIGHT: 220 LBS | RESPIRATION RATE: 18 BRPM

## 2022-05-09 DIAGNOSIS — E86.0 DEHYDRATION: Primary | ICD-10-CM

## 2022-05-09 DIAGNOSIS — J02.9 PHARYNGITIS, UNSPECIFIED ETIOLOGY: ICD-10-CM

## 2022-05-09 DIAGNOSIS — N18.9 CHRONIC KIDNEY DISEASE, UNSPECIFIED CKD STAGE: ICD-10-CM

## 2022-05-09 DIAGNOSIS — H66.001 NON-RECURRENT ACUTE SUPPURATIVE OTITIS MEDIA OF RIGHT EAR WITHOUT SPONTANEOUS RUPTURE OF TYMPANIC MEMBRANE: ICD-10-CM

## 2022-05-09 DIAGNOSIS — J01.90 ACUTE NON-RECURRENT SINUSITIS, UNSPECIFIED LOCATION: ICD-10-CM

## 2022-05-09 DIAGNOSIS — E87.5 HYPERKALEMIA: ICD-10-CM

## 2022-05-09 LAB
ANION GAP SERPL CALC-SCNC: 13 MMOL/L (ref 8–16)
ANION GAP SERPL CALC-SCNC: 7 MMOL/L (ref 8–16)
BASOPHILS # BLD AUTO: 0.06 K/UL (ref 0–0.2)
BASOPHILS NFR BLD: 0.4 % (ref 0–1.9)
BUN SERPL-MCNC: 55 MG/DL (ref 6–20)
BUN SERPL-MCNC: 58 MG/DL (ref 6–20)
CALCIUM SERPL-MCNC: 10.3 MG/DL (ref 8.7–10.5)
CALCIUM SERPL-MCNC: 9.2 MG/DL (ref 8.7–10.5)
CHLORIDE SERPL-SCNC: 106 MMOL/L (ref 95–110)
CHLORIDE SERPL-SCNC: 99 MMOL/L (ref 95–110)
CO2 SERPL-SCNC: 23 MMOL/L (ref 23–29)
CO2 SERPL-SCNC: 27 MMOL/L (ref 23–29)
CREAT SERPL-MCNC: 2.4 MG/DL (ref 0.5–1.4)
CREAT SERPL-MCNC: 2.6 MG/DL (ref 0.5–1.4)
DIFFERENTIAL METHOD: ABNORMAL
EOSINOPHIL # BLD AUTO: 0.8 K/UL (ref 0–0.5)
EOSINOPHIL NFR BLD: 5.4 % (ref 0–8)
ERYTHROCYTE [DISTWIDTH] IN BLOOD BY AUTOMATED COUNT: 13.6 % (ref 11.5–14.5)
EST. GFR  (AFRICAN AMERICAN): 35.1 ML/MIN/1.73 M^2
EST. GFR  (AFRICAN AMERICAN): 38.7 ML/MIN/1.73 M^2
EST. GFR  (NON AFRICAN AMERICAN): 30.4 ML/MIN/1.73 M^2
EST. GFR  (NON AFRICAN AMERICAN): 33.4 ML/MIN/1.73 M^2
GLUCOSE SERPL-MCNC: 170 MG/DL (ref 70–110)
GLUCOSE SERPL-MCNC: 288 MG/DL (ref 70–110)
GROUP A STREP, MOLECULAR: NEGATIVE
HCT VFR BLD AUTO: 54.7 % (ref 40–54)
HGB BLD-MCNC: 18.1 G/DL (ref 14–18)
IMM GRANULOCYTES # BLD AUTO: 0.15 K/UL (ref 0–0.04)
IMM GRANULOCYTES NFR BLD AUTO: 1.1 % (ref 0–0.5)
LYMPHOCYTES # BLD AUTO: 3.1 K/UL (ref 1–4.8)
LYMPHOCYTES NFR BLD: 22.4 % (ref 18–48)
MCH RBC QN AUTO: 28.4 PG (ref 27–31)
MCHC RBC AUTO-ENTMCNC: 33.1 G/DL (ref 32–36)
MCV RBC AUTO: 86 FL (ref 82–98)
MONOCYTES # BLD AUTO: 0.7 K/UL (ref 0.3–1)
MONOCYTES NFR BLD: 5 % (ref 4–15)
NEUTROPHILS # BLD AUTO: 9.1 K/UL (ref 1.8–7.7)
NEUTROPHILS NFR BLD: 65.7 % (ref 38–73)
NRBC BLD-RTO: 0 /100 WBC
PLATELET # BLD AUTO: 269 K/UL (ref 150–450)
PMV BLD AUTO: 10.4 FL (ref 9.2–12.9)
POTASSIUM SERPL-SCNC: 4.9 MMOL/L (ref 3.5–5.1)
POTASSIUM SERPL-SCNC: 6.3 MMOL/L (ref 3.5–5.1)
RBC # BLD AUTO: 6.38 M/UL (ref 4.6–6.2)
SARS-COV-2 RDRP RESP QL NAA+PROBE: NEGATIVE
SODIUM SERPL-SCNC: 135 MMOL/L (ref 136–145)
SODIUM SERPL-SCNC: 140 MMOL/L (ref 136–145)
WBC # BLD AUTO: 13.82 K/UL (ref 3.9–12.7)

## 2022-05-09 PROCEDURE — 85025 COMPLETE CBC W/AUTO DIFF WBC: CPT | Performed by: EMERGENCY MEDICINE

## 2022-05-09 PROCEDURE — 25000003 PHARM REV CODE 250: Performed by: EMERGENCY MEDICINE

## 2022-05-09 PROCEDURE — 93010 ELECTROCARDIOGRAM REPORT: CPT | Mod: ,,, | Performed by: GENERAL PRACTICE

## 2022-05-09 PROCEDURE — 93005 ELECTROCARDIOGRAM TRACING: CPT | Performed by: GENERAL PRACTICE

## 2022-05-09 PROCEDURE — 93010 EKG 12-LEAD: ICD-10-PCS | Mod: ,,, | Performed by: GENERAL PRACTICE

## 2022-05-09 PROCEDURE — 96361 HYDRATE IV INFUSION ADD-ON: CPT

## 2022-05-09 PROCEDURE — U0002 COVID-19 LAB TEST NON-CDC: HCPCS | Performed by: EMERGENCY MEDICINE

## 2022-05-09 PROCEDURE — 96360 HYDRATION IV INFUSION INIT: CPT

## 2022-05-09 PROCEDURE — 87651 STREP A DNA AMP PROBE: CPT | Performed by: EMERGENCY MEDICINE

## 2022-05-09 PROCEDURE — 99284 EMERGENCY DEPT VISIT MOD MDM: CPT | Mod: 25

## 2022-05-09 PROCEDURE — 80048 BASIC METABOLIC PNL TOTAL CA: CPT | Mod: 91 | Performed by: EMERGENCY MEDICINE

## 2022-05-09 RX ORDER — AMOXICILLIN AND CLAVULANATE POTASSIUM 875; 125 MG/1; MG/1
1 TABLET, FILM COATED ORAL 2 TIMES DAILY
Qty: 14 TABLET | Refills: 0 | Status: SHIPPED | OUTPATIENT
Start: 2022-05-09 | End: 2022-08-09

## 2022-05-09 RX ORDER — FLUTICASONE PROPIONATE 50 MCG
1 SPRAY, SUSPENSION (ML) NASAL 2 TIMES DAILY PRN
Qty: 15 G | Refills: 0 | Status: SHIPPED | OUTPATIENT
Start: 2022-05-09 | End: 2022-08-09

## 2022-05-09 RX ADMIN — SODIUM CHLORIDE 1000 ML: 0.9 INJECTION, SOLUTION INTRAVENOUS at 02:05

## 2022-05-09 RX ADMIN — SODIUM CHLORIDE 1000 ML: 0.9 INJECTION, SOLUTION INTRAVENOUS at 12:05

## 2022-05-09 NOTE — ED PROVIDER NOTES
Encounter Date: 5/9/2022       History     Chief Complaint   Patient presents with    Sore Throat     Sinus congestion and ear fullness since Thursday     This is a 36-year-old male with a past medical history of IgA nephropathy, diabetes, hypertension, hyperlipidemia, who presents emergency department with sore throat, earache, sinus pressure and congestion and feeling dehydrated.  He says that his symptoms have been present for the last 3-4 days.  He has been taking over-the-counter medications, DayQuil, NyQuil, Tylenol without any relief of symptoms.  He says that he is dehydrated.  He says that he knows he is and he can keep up with the amount of fluids he needs.  He says that he is drinking fluids and able to keep them down.  He denies any diarrhea or any vomiting or any other loss of fluid.  He describes pressure and his maxillary sinus region bilaterally causing mild headache.  He also has a sore throat pain with swallowing.  He denies any fever or any chills.  He is not have any chest pain or any shortness of breath.  He denies any abdominal pain.  No urinary symptoms.  No back pain.  No neck pain or stiffness.  No rash reported.        Review of patient's allergies indicates:   Allergen Reactions    Zithromax [azithromycin] Rash     Past Medical History:   Diagnosis Date    Anxiety     Asthma     Depression     Diabetes mellitus     diet controlled    Diabetes mellitus, type 2     GERD (gastroesophageal reflux disease)     Gout     Hyperlipidemia     Hypertension     IgA nephropathy     Insomnia      Past Surgical History:   Procedure Laterality Date    COLONOSCOPY N/A 5/8/2020    Procedure: COLONOSCOPY;  Surgeon: Hammad Castorena III, MD;  Location: [facility] [place], [place];  Service: Endoscopy;  Laterality: N/A;    ESOPHAGOGASTRODUODENOSCOPY N/A 5/8/2020    Procedure: EGD (ESOPHAGOGASTRODUODENOSCOPY);  Surgeon: Hammad Castorena III, MD;  Location: [facility] [place], [place];  Service: Endoscopy;  Laterality: N/A;     nose polyp removal       Family History   Problem Relation Age of Onset    Hypertension Maternal Grandmother     Cancer Maternal Grandfather     Diabetes Maternal Grandfather     Heart disease Maternal Grandfather     Heart disease Mother     Stroke Mother     Diabetes Mother      Social History     Tobacco Use    Smoking status: Current Every Day Smoker     Packs/day: 0.25     Years: 5.00     Pack years: 1.25     Types: Cigars    Smokeless tobacco: Never Used    Tobacco comment: One cigar a day   Substance Use Topics    Alcohol use: Yes    Drug use: No     Review of Systems   Constitutional: Negative for chills and fever.   HENT: Positive for congestion, ear pain, postnasal drip, sinus pressure, sinus pain and sore throat. Negative for trouble swallowing and voice change.    Respiratory: Negative for shortness of breath.    Cardiovascular: Negative for chest pain and palpitations.   Gastrointestinal: Negative for abdominal pain, nausea and vomiting.   Genitourinary: Negative for dysuria.   Musculoskeletal: Negative for back pain, neck pain and neck stiffness.   Skin: Negative for rash.   Neurological: Positive for headaches. Negative for weakness.   Hematological: Does not bruise/bleed easily.   All other systems reviewed and are negative.      Physical Exam     Initial Vitals [05/09/22 1113]   BP Pulse Resp Temp SpO2   (!) 159/104 104 20 98.4 °F (36.9 °C) 97 %      MAP       --         Physical Exam    Nursing note and vitals reviewed.  Constitutional: He appears well-developed and well-nourished. He is not diaphoretic. No distress.   HENT:   Head: Normocephalic and atraumatic.   Mouth/Throat: Oropharynx is clear and moist. No oropharyngeal exudate.   Posterior pharyngeal erythema, uvula midline.  Voice is normal.  Cobblestoning and postnasal drip noted.  Questionable exudate right tonsil.  Voice is normal.    Right tympanic membrane is dull with some fluid behind the membrane.  Erythema in the  canal and right tympanic membrane is erythematous.   Eyes: Conjunctivae and EOM are normal. Pupils are equal, round, and reactive to light.   Neck: Neck supple. No tracheal deviation present.   No meningismus.  No stiffness.   Normal range of motion.  Cardiovascular: Regular rhythm, normal heart sounds and intact distal pulses.   No murmur heard.  Borderline tachycardic   Pulmonary/Chest: Breath sounds normal. No stridor. No respiratory distress. He has no wheezes. He has no rhonchi. He has no rales.   Abdominal: Abdomen is soft. Bowel sounds are normal. He exhibits no distension. There is no abdominal tenderness. There is no rebound and no guarding.   Musculoskeletal:         General: No tenderness or edema. Normal range of motion.      Cervical back: Normal range of motion and neck supple.     Neurological: He is alert and oriented to person, place, and time. He has normal strength. No cranial nerve deficit or sensory deficit. GCS score is 15. GCS eye subscore is 4. GCS verbal subscore is 5. GCS motor subscore is 6.   Skin: Skin is warm and dry. Capillary refill takes less than 2 seconds. No rash noted. No erythema. No pallor.   Psychiatric: He has a normal mood and affect. His behavior is normal. Thought content normal.         ED Course   Procedures  Labs Reviewed   CBC W/ AUTO DIFFERENTIAL - Abnormal; Notable for the following components:       Result Value    WBC 13.82 (*)     RBC 6.38 (*)     Hemoglobin 18.1 (*)     Hematocrit 54.7 (*)     Immature Granulocytes 1.1 (*)     Gran # (ANC) 9.1 (*)     Immature Grans (Abs) 0.15 (*)     Eos # 0.8 (*)     All other components within normal limits   BASIC METABOLIC PANEL - Abnormal; Notable for the following components:    Sodium 135 (*)     Potassium 6.3 (*)     Glucose 288 (*)     BUN 58 (*)     Creatinine 2.6 (*)     eGFR if  35.1 (*)     eGFR if non  30.4 (*)     All other components within normal limits    Narrative:     potassium    critical result(s) called and verbal readback obtained   from jose daniel irene rn by CAF 05/09/2022 14:42   BASIC METABOLIC PANEL - Abnormal; Notable for the following components:    Glucose 170 (*)     BUN 55 (*)     Creatinine 2.4 (*)     Anion Gap 7 (*)     eGFR if  38.7 (*)     eGFR if non  33.4 (*)     All other components within normal limits   GROUP A STREP, MOLECULAR   SARS-COV-2 RNA AMPLIFICATION, QUAL           Results for orders placed or performed during the hospital encounter of 05/09/22   Group A Strep, Molecular    Specimen: Throat   Result Value Ref Range    Group A Strep, Molecular Negative Negative   COVID-19 Rapid Screening   Result Value Ref Range    SARS-CoV-2 RNA, Amplification, Qual Negative Negative   CBC auto differential   Result Value Ref Range    WBC 13.82 (H) 3.90 - 12.70 K/uL    RBC 6.38 (H) 4.60 - 6.20 M/uL    Hemoglobin 18.1 (H) 14.0 - 18.0 g/dL    Hematocrit 54.7 (H) 40.0 - 54.0 %    MCV 86 82 - 98 fL    MCH 28.4 27.0 - 31.0 pg    MCHC 33.1 32.0 - 36.0 g/dL    RDW 13.6 11.5 - 14.5 %    Platelets 269 150 - 450 K/uL    MPV 10.4 9.2 - 12.9 fL    Immature Granulocytes 1.1 (H) 0.0 - 0.5 %    Gran # (ANC) 9.1 (H) 1.8 - 7.7 K/uL    Immature Grans (Abs) 0.15 (H) 0.00 - 0.04 K/uL    Lymph # 3.1 1.0 - 4.8 K/uL    Mono # 0.7 0.3 - 1.0 K/uL    Eos # 0.8 (H) 0.0 - 0.5 K/uL    Baso # 0.06 0.00 - 0.20 K/uL    nRBC 0 0 /100 WBC    Gran % 65.7 38.0 - 73.0 %    Lymph % 22.4 18.0 - 48.0 %    Mono % 5.0 4.0 - 15.0 %    Eosinophil % 5.4 0.0 - 8.0 %    Basophil % 0.4 0.0 - 1.9 %    Differential Method Automated    Basic metabolic panel   Result Value Ref Range    Sodium 135 (L) 136 - 145 mmol/L    Potassium 6.3 (HH) 3.5 - 5.1 mmol/L    Chloride 99 95 - 110 mmol/L    CO2 23 23 - 29 mmol/L    Glucose 288 (H) 70 - 110 mg/dL    BUN 58 (H) 6 - 20 mg/dL    Creatinine 2.6 (H) 0.5 - 1.4 mg/dL    Calcium 10.3 8.7 - 10.5 mg/dL    Anion Gap 13 8 - 16 mmol/L    eGFR if African American  35.1 (A) >60 mL/min/1.73 m^2    eGFR if non African American 30.4 (A) >60 mL/min/1.73 m^2   Basic metabolic panel   Result Value Ref Range    Sodium 140 136 - 145 mmol/L    Potassium 4.9 3.5 - 5.1 mmol/L    Chloride 106 95 - 110 mmol/L    CO2 27 23 - 29 mmol/L    Glucose 170 (H) 70 - 110 mg/dL    BUN 55 (H) 6 - 20 mg/dL    Creatinine 2.4 (H) 0.5 - 1.4 mg/dL    Calcium 9.2 8.7 - 10.5 mg/dL    Anion Gap 7 (L) 8 - 16 mmol/L    eGFR if African American 38.7 (A) >60 mL/min/1.73 m^2    eGFR if non  33.4 (A) >60 mL/min/1.73 m^2     No orders to display       Imaging Results    None          Medications   sodium chloride 0.9% bolus 1,000 mL (0 mLs Intravenous Stopped 5/9/22 1300)   sodium chloride 0.9% bolus 1,000 mL (0 mLs Intravenous Stopped 5/9/22 1502)     Medical Decision Making:   ED Management:  This is a 36-year-old male presents emergency department with sinus type symptoms and feelings of dehydration.  On exam he has evidence of otitis media in his right ear.  He may also have a sinusitis.  He was convinced that he was dehydrated and with his IgA nephropathy I wanted to obtain baseline kidney labs and give him some fluids.  He did receive 2 L of fluids in the emergency department he certainly was heme concentrated suggestive of dehydration.  He has improved.  Initial BMP was hemolyzed and repeat is 4.9 potassium.  Kidney function at baseline.  Patient will be discharged home with treatment for otitis media with Flonase and with Augmentin.  Also recommended antihistamines like Zyrtec for his sinus symptoms.  He will be discharged home with follow-up with PCP.  If his symptoms change or worsen he should return to the emergency department.    I had a detailed discussion with the patient and/or guardian regarding: The historical points, exam findings, and diagnostic results supporting the discharge diagnosis, lab results, pertinent radiology results, and the need for outpatient follow-up, for  definitive care with a family practitioner and to return to the emergency department if symptoms worsen or persist or if there are any questions or concerns that arise at home. All questions have been answered in detail. Strict return to Emergency Department precautions have been provided.    A dictation software program was used for this note.  Please expect some simple typographical  errors in this note.              ED Course as of 05/09/22 1826   Mon May 09, 2022   1604 BUN creatinine around baseline.  Repeated BMP within normal potassium.  Likely was hemolyzed. [JR]   1606 EKG 3:51 p.m. normal sinus rhythm rate of 78, no STEMI.  EKG interpreted independently by me. [JR]      ED Course User Index  [JR] Red Lester DO             Clinical Impression:   Final diagnoses:  [E87.5] Hyperkalemia  [E86.0] Dehydration (Primary)  [J02.9] Pharyngitis, unspecified etiology  [H66.001] Non-recurrent acute suppurative otitis media of right ear without spontaneous rupture of tympanic membrane  [N18.9] Chronic kidney disease, unspecified CKD stage  [J01.90] Acute non-recurrent sinusitis, unspecified location          ED Disposition Condition    Discharge Stable        ED Prescriptions     Medication Sig Dispense Start Date End Date Auth. Provider    fluticasone propionate (FLONASE) 50 mcg/actuation nasal spray 1 spray (50 mcg total) by Each Nostril route 2 (two) times daily as needed for Rhinitis. 15 g 5/9/2022  Red Lester DO    amoxicillin-clavulanate 875-125mg (AUGMENTIN) 875-125 mg per tablet Take 1 tablet by mouth 2 (two) times daily. 14 tablet 5/9/2022  Red Lester DO        Follow-up Information     Follow up With Specialties Details Why Contact Info Additional Information    Jose Grant NP Family Medicine In 3 days  [address]  [facility] [address]  [phone]       [facility] - Emergency Dept Emergency Medicine  If symptoms worsen [address] [facility]  [id number]  [phone] 1st floor           Red Lester,   05/09/22 1826

## 2022-05-09 NOTE — ED NOTES
"SORE THROAT REPORTED. THROAT REDNESS. MASK IN PLACE.  PRIVATE ROOM. EVEN AND NON LABORED RESPIRATIONS.  AIRWAY CLEAR.  PULSES REGULAR.  < 3" CAPILLARY REFILL. SKIN WDI.  MAEW.  NON DISTENDED ABDOMEN. ALERT, ORIENTED AND AMBULATORY. NAD AT THIS TIME.  CALL LIGHT IN REACH.  "

## 2022-06-27 ENCOUNTER — HOSPITAL ENCOUNTER (EMERGENCY)
Facility: HOSPITAL | Age: 37
Discharge: HOME | End: 2022-06-27
Attending: EMERGENCY MEDICINE
Payer: MEDICAID

## 2022-06-27 VITALS
WEIGHT: 215 LBS | DIASTOLIC BLOOD PRESSURE: 79 MMHG | HEART RATE: 59 BPM | SYSTOLIC BLOOD PRESSURE: 130 MMHG | TEMPERATURE: 99 F | HEIGHT: 72 IN | BODY MASS INDEX: 29.12 KG/M2 | RESPIRATION RATE: 17 BRPM | OXYGEN SATURATION: 98 %

## 2022-06-27 DIAGNOSIS — R10.9 FLANK PAIN: ICD-10-CM

## 2022-06-27 DIAGNOSIS — E86.0 DEHYDRATION: ICD-10-CM

## 2022-06-27 DIAGNOSIS — N17.9 AKI (ACUTE KIDNEY INJURY): Primary | ICD-10-CM

## 2022-06-27 LAB
ALBUMIN SERPL BCP-MCNC: 4 G/DL (ref 3.5–5.2)
ALP SERPL-CCNC: 92 U/L (ref 55–135)
ALT SERPL W/O P-5'-P-CCNC: 21 U/L (ref 10–44)
ANION GAP SERPL CALC-SCNC: 9 MMOL/L (ref 8–16)
AST SERPL-CCNC: 16 U/L (ref 10–40)
BACTERIA #/AREA URNS HPF: NEGATIVE /HPF
BASOPHILS # BLD AUTO: 0.05 K/UL (ref 0–0.2)
BASOPHILS NFR BLD: 0.5 % (ref 0–1.9)
BILIRUB SERPL-MCNC: 0.7 MG/DL (ref 0.1–1)
BILIRUB UR QL STRIP: NEGATIVE
BUN SERPL-MCNC: 48 MG/DL (ref 6–20)
CALCIUM SERPL-MCNC: 9.5 MG/DL (ref 8.7–10.5)
CHLORIDE SERPL-SCNC: 101 MMOL/L (ref 95–110)
CK SERPL-CCNC: 66 U/L (ref 20–200)
CLARITY UR: CLEAR
CO2 SERPL-SCNC: 23 MMOL/L (ref 23–29)
COLOR UR: YELLOW
CREAT SERPL-MCNC: 2.3 MG/DL (ref 0.5–1.4)
CREAT SERPL-MCNC: 2.7 MG/DL (ref 0.5–1.4)
DIFFERENTIAL METHOD: ABNORMAL
EOSINOPHIL # BLD AUTO: 0.6 K/UL (ref 0–0.5)
EOSINOPHIL NFR BLD: 6 % (ref 0–8)
ERYTHROCYTE [DISTWIDTH] IN BLOOD BY AUTOMATED COUNT: 13.6 % (ref 11.5–14.5)
EST. GFR  (AFRICAN AMERICAN): 33.5 ML/MIN/1.73 M^2
EST. GFR  (NON AFRICAN AMERICAN): 29 ML/MIN/1.73 M^2
GLUCOSE SERPL-MCNC: 213 MG/DL (ref 70–110)
GLUCOSE UR QL STRIP: ABNORMAL
HCT VFR BLD AUTO: 51.4 % (ref 40–54)
HGB BLD-MCNC: 16.8 G/DL (ref 14–18)
HGB UR QL STRIP: NEGATIVE
HYALINE CASTS #/AREA URNS LPF: 0 /LPF
IMM GRANULOCYTES # BLD AUTO: 0.06 K/UL (ref 0–0.04)
IMM GRANULOCYTES NFR BLD AUTO: 0.6 % (ref 0–0.5)
KETONES UR QL STRIP: NEGATIVE
LEUKOCYTE ESTERASE UR QL STRIP: NEGATIVE
LYMPHOCYTES # BLD AUTO: 3 K/UL (ref 1–4.8)
LYMPHOCYTES NFR BLD: 28.3 % (ref 18–48)
MCH RBC QN AUTO: 28 PG (ref 27–31)
MCHC RBC AUTO-ENTMCNC: 32.7 G/DL (ref 32–36)
MCV RBC AUTO: 86 FL (ref 82–98)
MICROSCOPIC COMMENT: ABNORMAL
MONOCYTES # BLD AUTO: 0.5 K/UL (ref 0.3–1)
MONOCYTES NFR BLD: 4.6 % (ref 4–15)
NEUTROPHILS # BLD AUTO: 6.4 K/UL (ref 1.8–7.7)
NEUTROPHILS NFR BLD: 60 % (ref 38–73)
NITRITE UR QL STRIP: NEGATIVE
NRBC BLD-RTO: 0 /100 WBC
PH UR STRIP: 6 [PH] (ref 5–8)
PLATELET # BLD AUTO: 198 K/UL (ref 150–450)
PMV BLD AUTO: 10.4 FL (ref 9.2–12.9)
POTASSIUM SERPL-SCNC: 4.8 MMOL/L (ref 3.5–5.1)
POTASSIUM SERPL-SCNC: 5.3 MMOL/L (ref 3.5–5.1)
PROT SERPL-MCNC: 7.1 G/DL (ref 6–8.4)
PROT UR QL STRIP: ABNORMAL
RBC # BLD AUTO: 6.01 M/UL (ref 4.6–6.2)
RBC #/AREA URNS HPF: 0 /HPF (ref 0–4)
SAMPLE: ABNORMAL
SODIUM SERPL-SCNC: 133 MMOL/L (ref 136–145)
SP GR UR STRIP: 1.01 (ref 1–1.03)
SQUAMOUS #/AREA URNS HPF: 0 /HPF
URN SPEC COLLECT METH UR: ABNORMAL
UROBILINOGEN UR STRIP-ACNC: NEGATIVE EU/DL
WBC # BLD AUTO: 10.68 K/UL (ref 3.9–12.7)
WBC #/AREA URNS HPF: 0 /HPF (ref 0–5)
YEAST URNS QL MICRO: ABNORMAL

## 2022-06-27 PROCEDURE — 96360 HYDRATION IV INFUSION INIT: CPT

## 2022-06-27 PROCEDURE — 63600175 PHARM REV CODE 636 W HCPCS: Performed by: NURSE PRACTITIONER

## 2022-06-27 PROCEDURE — 99284 EMERGENCY DEPT VISIT MOD MDM: CPT | Mod: 25

## 2022-06-27 PROCEDURE — 25000003 PHARM REV CODE 250: Performed by: NURSE PRACTITIONER

## 2022-06-27 PROCEDURE — 80053 COMPREHEN METABOLIC PANEL: CPT | Performed by: NURSE PRACTITIONER

## 2022-06-27 PROCEDURE — 96361 HYDRATE IV INFUSION ADD-ON: CPT

## 2022-06-27 PROCEDURE — 85025 COMPLETE CBC W/AUTO DIFF WBC: CPT | Performed by: NURSE PRACTITIONER

## 2022-06-27 PROCEDURE — 84132 ASSAY OF SERUM POTASSIUM: CPT | Mod: 59 | Performed by: NURSE PRACTITIONER

## 2022-06-27 PROCEDURE — 96374 THER/PROPH/DIAG INJ IV PUSH: CPT | Mod: 59

## 2022-06-27 PROCEDURE — 82550 ASSAY OF CK (CPK): CPT | Performed by: NURSE PRACTITIONER

## 2022-06-27 PROCEDURE — 81001 URINALYSIS AUTO W/SCOPE: CPT | Performed by: NURSE PRACTITIONER

## 2022-06-27 RX ORDER — MORPHINE SULFATE 4 MG/ML
4 INJECTION, SOLUTION INTRAMUSCULAR; INTRAVENOUS
Status: COMPLETED | OUTPATIENT
Start: 2022-06-27 | End: 2022-06-27

## 2022-06-27 RX ADMIN — SODIUM CHLORIDE 1000 ML: 0.9 INJECTION, SOLUTION INTRAVENOUS at 04:06

## 2022-06-27 RX ADMIN — MORPHINE SULFATE 4 MG: 4 INJECTION, SOLUTION INTRAMUSCULAR; INTRAVENOUS at 01:06

## 2022-06-27 RX ADMIN — SODIUM CHLORIDE 1000 ML: 0.9 INJECTION, SOLUTION INTRAVENOUS at 01:06

## 2022-06-27 NOTE — ED NOTES
"Patient reports having decreased urination for "past couple of days" despite "drinking lots of water". Urine in clear pale yellow. Denies any abd pain, nausea or vomiting. 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
Patient able to tolerate water and apple juice without any nausea or vomiting.   
You were treated in the ED for asthma exacerbation. Your xray did not show any pneumonia (lung infection). Continue treatment at home with the prescribed Prednisone (start tomorrow. steroids) and the Albuterol. Follow up with your regular medical doctor.    Asthma Attack     Acute bronchospasm caused by asthma is also referred to as an asthma attack. Bronchospasm means that the air passages become narrowed or "tight," which limits the amount of oxygen that can get into the lungs. The narrowing is caused by inflammation and tightening of the muscles in the air tubes (bronchi) in the lungs. Excessive mucus is also produced, which narrows the airways more. This can cause trouble breathing, coughing, and loud breathing (wheezing).  What are the causes?  Possible triggers include:  Animal dander from the skin, hair, or feathers of animals.Dust mites contained in house dust.Cockroaches.Pollen from trees or grass.Mold.Cigarette or tobacco smoke.Air pollutants such as dust, household , hair sprays, aerosol sprays, paint fumes, strong chemicals, or strong odors.Cold air or weather changes. Cold air may trigger inflammation. Winds increase molds and pollens in the air.Strong emotions such as crying or laughing hard.Stress.Certain medicines, such as aspirin or beta-blockers.Sulfites in foods and drinks, such as dried fruits and wine.Infections or inflammatory conditions, such as a flu, a cold, pneumonia, or inflammation of the nasal membranes (rhinitis).Gastroesophageal reflux disease (GERD). GERD is a condition in which stomach acid backs up into your esophagus, which can irritate nearby airway structures.Exercise or activity that requires a lot of energy.What are the signs or symptoms?  Symptoms of this condition include:  Wheezing. This may sound like whistling while breathing. This may be more noticeable at night.Excessive coughing, particularly at night.Chest tightness or pain.Shortness of breath.Feeling like you cannot get enough air no matter how hard you try (air hunger).How is this diagnosed?  This condition may be diagnosed based on:  Your medical history.Your symptoms.A physical exam.Tests to check for other causes of your symptoms or other conditions that may have triggered your asthma attack. These tests may include:  Chest X-ray.Blood tests.Specialized tests to assess lung function, such as breathing into a device that measures how much air you inhale and exhale (spirometry).How is this treated?  The goal of treatment is to open the airways in your lungs and reduce inflammation. Most asthma attacks are treated with medicines that you inhale through a hand-held inhaler (metered dose inhaler, MDI) or a device that turns liquid medicine into a mist that you inhale (nebulizer). Medicines may include:  Quick relief or rescue medicines that relax the muscles of the bronchi. These medicines include bronchodilators, such as albuterol.Controller medicines, such as inhaled corticosteroids. These are long-acting medicines that are used for daily asthma maintenance.If you have a moderate or severe asthma attack, you may be treated with steroid medicines by mouth or through an IV injection at the hospital. Steroid medicines reduce inflammation in your lungs. Depending on the severity of your attack, you may need oxygen therapy to help you breathe.  If your asthma attack was caused by a bacterial infection, such as pneumonia, you will be given antibiotic medicines.  Follow these instructions at home:  Medicines     Take over-the-counter and prescription medicines only as told by your health care provider. Keep your medicines up-to-date and available.If you are more than 24 weeks pregnant and you are prescribed any new medicines, tell your obstetrician about those medicines.If you were prescribed an antibiotic medicine, take it as told by your health care provider. Do not stop taking the antibiotic even if you start to feel better.Avoiding triggers        Keep track of things that trigger your asthma attacks or cause you to have breathing problems, and avoid exposure to these triggers.Do not use any products that contain nicotine or tobacco, such as cigarettes and e-cigarettes. If you need help quitting, ask your health care provider.Avoid secondhand smoke.Avoid strong smells, such as perfumes, aerosols, and cleaning solvents.When pollen or air pollution is bad, keep windows closed and use an air conditioner or go to places with air conditioning.Asthma action plan     Work with your health care provider to make a written plan for managing and treating your asthma attacks (asthma action plan). This plan should include:  A list of your asthma triggers and how to avoid them.Information about when your medicines should be taken and when their dosage should be changed.Instructions about using a device called a peak flow meter to monitor your condition. A peak flow meter measures how well your lungs are working and measures how severe your asthma is at a given time. Your "personal best" is the highest peak flow rate you can reach when you feel good and have no asthma symptoms.General instructions     Avoid excessive exercise or activity until your asthma attack resolves. Ask your health care provider what activities are safe for you and when you can return to your normal activities.Stay up to date on all vaccinations recommended by your health care provider, such as flu and pneumonia vaccines.Drink enough fluid to keep your urine clear or pale yellow. Staying hydrated helps keep mucus in your lungs thin so it can be coughed up easily.If you drink caffeine, do so in moderation.Do not use alcohol until you have recovered.Keep all follow-up visits as told by your health care provider. This is important. Asthma requires careful medical care, and you and your health care provider can work together to reduce the likelihood of future attacks.Contact a health care provider if:  Your peak flow reading is still at 50–79% of your personal best after you have followed your action plan for 1 hour. This is in the yellow zone, which means "caution."You need to use a reliever medicine more than 2–3 times a week.Your medicines are causing side effects, such as:  Rash.Itching.Swelling.Trouble breathing.Your symptoms do not improve after 48 hours.You cough up mucus (sputum) that is thicker than usual.You have a fever.You need to use your medicines much more frequently than normal.Get help right away if:  Your peak flow reading is less than 50% of your personal best. This is in the red zone, which means "danger."You have severe trouble breathing.You develop chest pain or discomfort.Your medicines no longer seem to be helping.You vomit.You cannot eat or drink without vomiting.You are coughing up yellow, green, brown, or bloody mucus.You have a fever and your symptoms suddenly get worse.You have trouble swallowing.You feel very tired, and breathing becomes tiring.Summary  Acute bronchospasm caused by asthma is also referred to as an asthma attack. Bronchospasm is caused by narrowing or tightness in air passages, which causes shortness of breath, coughing, and loud breathing (wheezing).Many things can trigger an asthma attack, such as allergens, weather changes, exercise, smoke, and other fumes.Treatment for an asthma attack may include inhaled rescue medicines for immediate relief, as well as the use of maintenance therapy.Get help right away if you have worsening shortness of breath, chest pain, or fever, or if your home medicines are no longer helping with your symptoms.This information is not intended to replace advice given to you by your health care provider. Make sure you discuss any questions you have with your health care provider.

## 2022-06-28 NOTE — ED PROVIDER NOTES
Encounter Date: 6/27/2022       History     Chief Complaint   Patient presents with    Dehydration     Pt reports dehydration worsening over the last week. Pt reports kidney disease and decreased urine output. Typically has to come in and get IV fluids he reports.      Jose Miguel Brennan is a 36 year old male with pmh anxiety, asthma, depression, DM, GERD, gout, hyperlipiemia, HTN, IgA nephropathy presenting to the ED with c/o dehydration. He has had no vomiting or diarrhea but states that he works in the heat and often has to come to the ED for IV fluids. He has had some muscle cramping and generalized fatigue. He has had no fever.         Review of patient's allergies indicates:   Allergen Reactions    Zithromax [azithromycin] Rash     Past Medical History:   Diagnosis Date    Anxiety     Asthma     Depression     Diabetes mellitus     diet controlled    Diabetes mellitus, type 2     GERD (gastroesophageal reflux disease)     Gout     Hyperlipidemia     Hypertension     IgA nephropathy     Insomnia      Past Surgical History:   Procedure Laterality Date    COLONOSCOPY N/A 5/8/2020    Procedure: COLONOSCOPY;  Surgeon: Hammad Csatorena III, MD;  Location: [facility];  Service: Endoscopy;  Laterality: N/A;    ESOPHAGOGASTRODUODENOSCOPY N/A 5/8/2020    Procedure: EGD (ESOPHAGOGASTRODUODENOSCOPY);  Surgeon: Hammad Castorena III, MD;  Location: [facility];  Service: Endoscopy;  Laterality: N/A;    nose polyp removal       Family History   Problem Relation Age of Onset    Hypertension Maternal Grandmother     Cancer Maternal Grandfather     Diabetes Maternal Grandfather     Heart disease Maternal Grandfather     Heart disease Mother     Stroke Mother     Diabetes Mother      Social History     Tobacco Use    Smoking status: Current Every Day Smoker     Packs/day: 0.25     Years: 5.00     Pack years: 1.25     Types: Cigars    Smokeless tobacco: Never Used    Tobacco comment: One cigar a day    Substance Use Topics    Alcohol use: Yes    Drug use: No     Review of Systems   Constitutional: Positive for fatigue. Negative for fever.   HENT: Negative for sore throat.    Respiratory: Negative for shortness of breath.    Cardiovascular: Negative for chest pain.   Gastrointestinal: Negative for nausea.   Genitourinary: Negative for dysuria.   Musculoskeletal: Positive for myalgias. Negative for back pain.   Skin: Negative for rash.   Neurological: Negative for weakness.   Hematological: Does not bruise/bleed easily.       Physical Exam     Initial Vitals [06/27/22 1109]   BP Pulse Resp Temp SpO2   (!) 148/100 91 17 97.9 °F (36.6 °C) 98 %      MAP       --         Physical Exam    Nursing note and vitals reviewed.  Constitutional: He appears well-developed and well-nourished. He is not diaphoretic. No distress.   HENT:   Head: Normocephalic and atraumatic.   Mouth/Throat: Oropharynx is clear and moist.   Eyes: Conjunctivae are normal.   Neck: Neck supple.   Cardiovascular: Normal rate, regular rhythm, normal heart sounds and intact distal pulses. Exam reveals no gallop and no friction rub.    No murmur heard.  Pulmonary/Chest: Breath sounds normal. He has no wheezes. He has no rhonchi. He has no rales.   Abdominal: Abdomen is soft. He exhibits no distension. There is no abdominal tenderness.   Musculoskeletal:         General: Normal range of motion.      Cervical back: Neck supple.      Comments: No CVA tenderness to percussion     Neurological: He is alert and oriented to person, place, and time.   Skin: No rash noted. No erythema.         ED Course   Procedures  Labs Reviewed   CBC W/ AUTO DIFFERENTIAL - Abnormal; Notable for the following components:       Result Value    Immature Granulocytes 0.6 (*)     Immature Grans (Abs) 0.06 (*)     Eos # 0.6 (*)     All other components within normal limits   COMPREHENSIVE METABOLIC PANEL - Abnormal; Notable for the following components:    Sodium 133 (*)      Potassium 5.3 (*)     Glucose 213 (*)     BUN 48 (*)     Creatinine 2.7 (*)     eGFR if  33.5 (*)     eGFR if non  29.0 (*)     All other components within normal limits   URINALYSIS, REFLEX TO URINE CULTURE - Abnormal; Notable for the following components:    Protein, UA 1+ (*)     Glucose, UA 4+ (*)     All other components within normal limits    Narrative:     Specimen Source->Urine   URINALYSIS MICROSCOPIC - Abnormal; Notable for the following components:    Hyaline Casts, UA 0.00 (*)     All other components within normal limits    Narrative:     Specimen Source->Urine   ISTAT CREATININE - Abnormal; Notable for the following components:    POC Creatinine 2.3 (*)     All other components within normal limits   CK   POTASSIUM          Imaging Results    None          Medications   morphine injection 4 mg (4 mg Intravenous Given 6/27/22 1335)   sodium chloride 0.9% bolus 1,000 mL (0 mLs Intravenous Stopped 6/27/22 1440)   sodium chloride 0.9% bolus 1,000 mL (0 mLs Intravenous Stopped 6/27/22 1654)           APC / Resident Notes:   The patient was given two liters NS and stated he had improvement of subjective symptoms. He had no orthostatic hypotension. Initial potassium was elevated but it was repeated and found to be WNL. No other electrolyte derangements and creatinine is consistent with patient's prior lab values. He had no vomiting in the ED and was able to tolerate PO intake without difficulty prior to discharge. He appears well hydrated and nontoxic. He will contact his PCP and nephrologist for follow up. Strict ED return precautions discussed and he verbalized understanding. Based on my clinical evaluation, I do not appreciate any immediate, emergent, or life threatening condition or etiology that warrants additional workup today and feel that the patient can be discharged with close follow up care.                    Clinical Impression:   Final diagnoses:  [N17.9] TRACEY  (acute kidney injury) (Primary)  [E86.0] Dehydration  [R10.9] Flank pain          ED Disposition Condition    Discharge Stable        ED Prescriptions     None        Follow-up Information     Follow up With Specialties Details Why Contact Info Additional Information    [facility] - Emergency Dept Emergency Medicine  As needed, If symptoms worsen [address]  [facility] [id number] [phone] 1st floor    Jose Grant NP Family Medicine Schedule an appointment as soon as possible for a visit   [address] SERVICE [facility] [address]  [phone]              Fern Corbett NP  06/28/22 1002

## 2022-08-06 ENCOUNTER — LAB VISIT (OUTPATIENT)
Dept: LAB | Facility: HOSPITAL | Age: 37
End: 2022-08-06
Attending: INTERNAL MEDICINE
Payer: MEDICAID

## 2022-08-06 DIAGNOSIS — N18.30 CHRONIC KIDNEY DISEASE, STAGE III (MODERATE): ICD-10-CM

## 2022-08-06 DIAGNOSIS — R80.9 PROTEINURIA: Primary | ICD-10-CM

## 2022-08-06 DIAGNOSIS — N25.81 SECONDARY HYPERPARATHYROIDISM OF RENAL ORIGIN: ICD-10-CM

## 2022-08-06 DIAGNOSIS — E11.9 TYPE 2 DIABETES MELLITUS WITHOUT COMPLICATION, WITH LONG-TERM CURRENT USE OF INSULIN: ICD-10-CM

## 2022-08-06 DIAGNOSIS — M10.9 GOUT, UNSPECIFIED: ICD-10-CM

## 2022-08-06 DIAGNOSIS — Z79.4 TYPE 2 DIABETES MELLITUS WITHOUT COMPLICATION, WITH LONG-TERM CURRENT USE OF INSULIN: ICD-10-CM

## 2022-08-06 LAB
BACTERIA #/AREA URNS HPF: NEGATIVE /HPF
BILIRUB UR QL STRIP: NEGATIVE
CLARITY UR: CLEAR
COLOR UR: YELLOW
CREAT UR-MCNC: 61 MG/DL (ref 23–375)
GLUCOSE UR QL STRIP: ABNORMAL
HGB UR QL STRIP: NEGATIVE
HYALINE CASTS #/AREA URNS LPF: 0 /LPF
KETONES UR QL STRIP: NEGATIVE
LEUKOCYTE ESTERASE UR QL STRIP: NEGATIVE
MICROSCOPIC COMMENT: NORMAL
NITRITE UR QL STRIP: NEGATIVE
PH UR STRIP: 5 [PH] (ref 5–8)
PHOSPHATE SERPL-MCNC: 4.1 MG/DL (ref 2.7–4.5)
PROT UR QL STRIP: ABNORMAL
PROT UR-MCNC: 33 MG/DL (ref 6–15)
PROT/CREAT UR: 0.54 MG/G{CREAT} (ref 0–0.2)
PTH-INTACT SERPL-MCNC: 113.8 PG/ML (ref 9–77)
RBC #/AREA URNS HPF: 0 /HPF (ref 0–4)
SP GR UR STRIP: 1.01 (ref 1–1.03)
SQUAMOUS #/AREA URNS HPF: 0 /HPF
URATE SERPL-MCNC: 9.3 MG/DL (ref 3.4–7)
URN SPEC COLLECT METH UR: ABNORMAL
UROBILINOGEN UR STRIP-ACNC: NEGATIVE EU/DL
WBC #/AREA URNS HPF: 0 /HPF (ref 0–5)
YEAST URNS QL MICRO: NORMAL

## 2022-08-06 PROCEDURE — 84156 ASSAY OF PROTEIN URINE: CPT | Performed by: INTERNAL MEDICINE

## 2022-08-06 PROCEDURE — 81001 URINALYSIS AUTO W/SCOPE: CPT | Performed by: INTERNAL MEDICINE

## 2022-08-06 PROCEDURE — 84100 ASSAY OF PHOSPHORUS: CPT | Performed by: INTERNAL MEDICINE

## 2022-08-06 PROCEDURE — 83970 ASSAY OF PARATHORMONE: CPT | Performed by: INTERNAL MEDICINE

## 2022-08-06 PROCEDURE — 84550 ASSAY OF BLOOD/URIC ACID: CPT | Performed by: INTERNAL MEDICINE

## 2022-08-08 ENCOUNTER — TELEPHONE (OUTPATIENT)
Dept: FAMILY MEDICINE | Facility: CLINIC | Age: 37
End: 2022-08-08

## 2022-08-08 ENCOUNTER — PATIENT MESSAGE (OUTPATIENT)
Dept: FAMILY MEDICINE | Facility: CLINIC | Age: 37
End: 2022-08-08

## 2022-08-08 NOTE — TELEPHONE ENCOUNTER
----- Message from Jose Grant NP sent at 8/8/2022  7:03 AM CDT -----  Will review results at upcoming OV. Please send a copy to Dr Conte

## 2022-08-09 ENCOUNTER — OFFICE VISIT (OUTPATIENT)
Dept: FAMILY MEDICINE | Facility: CLINIC | Age: 37
End: 2022-08-09
Payer: MEDICAID

## 2022-08-09 VITALS
DIASTOLIC BLOOD PRESSURE: 82 MMHG | HEART RATE: 90 BPM | WEIGHT: 224 LBS | OXYGEN SATURATION: 98 % | TEMPERATURE: 98 F | HEIGHT: 72 IN | BODY MASS INDEX: 30.34 KG/M2 | SYSTOLIC BLOOD PRESSURE: 120 MMHG

## 2022-08-09 DIAGNOSIS — N02.B9 IGA NEPHROPATHY: ICD-10-CM

## 2022-08-09 DIAGNOSIS — E11.9 TYPE 2 DIABETES MELLITUS WITHOUT COMPLICATION, WITH LONG-TERM CURRENT USE OF INSULIN: Primary | ICD-10-CM

## 2022-08-09 DIAGNOSIS — Z79.4 TYPE 2 DIABETES MELLITUS WITHOUT COMPLICATION, WITH LONG-TERM CURRENT USE OF INSULIN: Primary | ICD-10-CM

## 2022-08-09 DIAGNOSIS — E78.2 MIXED HYPERLIPIDEMIA: ICD-10-CM

## 2022-08-09 DIAGNOSIS — I10 ESSENTIAL HYPERTENSION: ICD-10-CM

## 2022-08-09 PROCEDURE — 1159F MED LIST DOCD IN RCRD: CPT | Mod: CPTII,S$GLB,, | Performed by: NURSE PRACTITIONER

## 2022-08-09 PROCEDURE — 1159F PR MEDICATION LIST DOCUMENTED IN MEDICAL RECORD: ICD-10-PCS | Mod: CPTII,S$GLB,, | Performed by: NURSE PRACTITIONER

## 2022-08-09 PROCEDURE — 4010F ACE/ARB THERAPY RXD/TAKEN: CPT | Mod: CPTII,S$GLB,, | Performed by: NURSE PRACTITIONER

## 2022-08-09 PROCEDURE — 3074F PR MOST RECENT SYSTOLIC BLOOD PRESSURE < 130 MM HG: ICD-10-PCS | Mod: CPTII,S$GLB,, | Performed by: NURSE PRACTITIONER

## 2022-08-09 PROCEDURE — 1160F RVW MEDS BY RX/DR IN RCRD: CPT | Mod: CPTII,S$GLB,, | Performed by: NURSE PRACTITIONER

## 2022-08-09 PROCEDURE — 4010F PR ACE/ARB THEARPY RXD/TAKEN: ICD-10-PCS | Mod: CPTII,S$GLB,, | Performed by: NURSE PRACTITIONER

## 2022-08-09 PROCEDURE — 3074F SYST BP LT 130 MM HG: CPT | Mod: CPTII,S$GLB,, | Performed by: NURSE PRACTITIONER

## 2022-08-09 PROCEDURE — 99214 PR OFFICE/OUTPT VISIT, EST, LEVL IV, 30-39 MIN: ICD-10-PCS | Mod: S$GLB,,, | Performed by: NURSE PRACTITIONER

## 2022-08-09 PROCEDURE — 1160F PR REVIEW ALL MEDS BY PRESCRIBER/CLIN PHARMACIST DOCUMENTED: ICD-10-PCS | Mod: CPTII,S$GLB,, | Performed by: NURSE PRACTITIONER

## 2022-08-09 PROCEDURE — 99214 OFFICE O/P EST MOD 30 MIN: CPT | Mod: S$GLB,,, | Performed by: NURSE PRACTITIONER

## 2022-08-09 PROCEDURE — 3046F HEMOGLOBIN A1C LEVEL >9.0%: CPT | Mod: CPTII,S$GLB,, | Performed by: NURSE PRACTITIONER

## 2022-08-09 PROCEDURE — 3079F DIAST BP 80-89 MM HG: CPT | Mod: CPTII,S$GLB,, | Performed by: NURSE PRACTITIONER

## 2022-08-09 PROCEDURE — 3046F PR MOST RECENT HEMOGLOBIN A1C LEVEL > 9.0%: ICD-10-PCS | Mod: CPTII,S$GLB,, | Performed by: NURSE PRACTITIONER

## 2022-08-09 PROCEDURE — 3008F BODY MASS INDEX DOCD: CPT | Mod: CPTII,S$GLB,, | Performed by: NURSE PRACTITIONER

## 2022-08-09 PROCEDURE — 3008F PR BODY MASS INDEX (BMI) DOCUMENTED: ICD-10-PCS | Mod: CPTII,S$GLB,, | Performed by: NURSE PRACTITIONER

## 2022-08-09 PROCEDURE — 3079F PR MOST RECENT DIASTOLIC BLOOD PRESSURE 80-89 MM HG: ICD-10-PCS | Mod: CPTII,S$GLB,, | Performed by: NURSE PRACTITIONER

## 2022-08-09 RX ORDER — ATORVASTATIN CALCIUM 10 MG/1
10 TABLET, FILM COATED ORAL NIGHTLY
Qty: 90 TABLET | Refills: 1 | Status: SHIPPED | OUTPATIENT
Start: 2022-08-09 | End: 2022-12-13 | Stop reason: SDUPTHER

## 2022-08-09 RX ORDER — AMLODIPINE BESYLATE 5 MG/1
5 TABLET ORAL DAILY
Qty: 90 TABLET | Refills: 1 | Status: SHIPPED | OUTPATIENT
Start: 2022-08-09 | End: 2022-09-11 | Stop reason: SDUPTHER

## 2022-08-09 NOTE — PROGRESS NOTES
SUBJECTIVE:      Patient ID: Jose Miguel Brennan is a 36 y.o. male.    Chief Complaint: Hypertension    Patient is here today to f/u on dm and htn. He is taking 35 units of lantus daily but he has misses occasional doses. He is not routinely monitoring his home fasting glucose. He stopped the farxiga a few days ago because he ran out and feels it is dehydrating him. His creatinine is elevated. His A1c is elevated. He says he was on vacation a few weeks ago and has not been following a good diet for the past 3 -4 weeks.  He works outside in the heat and feels he dehydrates easily. He has a follow up with Dr Conte today.     Diabetes  He presents for his follow-up diabetic visit. He has type 2 diabetes mellitus. The initial diagnosis of diabetes was made 10 years ago. His disease course has been worsening. There are no hypoglycemic associated symptoms. Pertinent negatives for hypoglycemia include no confusion, dizziness, headaches, nervousness/anxiousness, pallor, seizures or speech difficulty. Pertinent negatives for diabetes include no blurred vision, no chest pain, no foot paresthesias, no foot ulcerations, no polydipsia, no polyphagia, no polyuria, no visual change and no weakness. There are no hypoglycemic complications. Symptoms are stable. There are no diabetic complications. Risk factors for coronary artery disease include diabetes mellitus, male sex and hypertension. Current diabetic treatment includes insulin injections and oral agent (monotherapy). He is compliant with treatment most of the time. His weight is stable. He is following a generally unhealthy diet. When asked about meal planning, he reported none. He rarely participates in exercise. An ACE inhibitor/angiotensin II receptor blocker is being taken. He does not see a podiatrist.Eye exam is not current.   Hypertension  This is a chronic problem. The problem is unchanged. The problem is controlled. Pertinent negatives include no blurred  vision, chest pain, headaches, neck pain, palpitations or shortness of breath. Risk factors for coronary artery disease include diabetes mellitus, dyslipidemia, male gender and smoking/tobacco exposure. Past treatments include angiotensin blockers and calcium channel blockers. The current treatment provides moderate improvement.       Past Surgical History:   Procedure Laterality Date    COLONOSCOPY N/A 2020    Procedure: COLONOSCOPY;  Surgeon: Hammad Castorena III, MD;  Location: [facility];  Service: Endoscopy;  Laterality: N/A;    ESOPHAGOGASTRODUODENOSCOPY N/A 2020    Procedure: EGD (ESOPHAGOGASTRODUODENOSCOPY);  Surgeon: Hammad Castorena III, MD;  Location: [facility];  Service: Endoscopy;  Laterality: N/A;    nose polyp removal       Family History   Problem Relation Age of Onset    Hypertension Maternal Grandmother     Cancer Maternal Grandfather     Diabetes Maternal Grandfather     Heart disease Maternal Grandfather     Heart disease Mother     Stroke Mother     Diabetes Mother       Social History     Socioeconomic History    Marital status: Single   Tobacco Use    Smoking status: Former Smoker     Packs/day: 0.25     Years: 5.00     Pack years: 1.25     Types: Cigars     Quit date: 2022     Years since quittin.2    Smokeless tobacco: Never Used    Tobacco comment: One cigar a day   Substance and Sexual Activity    Alcohol use: Yes    Drug use: No    Sexual activity: Yes     Partners: Female     Current Outpatient Medications   Medication Sig Dispense Refill    gabapentin (NEURONTIN) 300 MG capsule Take 1 tablet every night x 7 days. Increase to twice a day x 7 days. Increase to three times a day. 90 capsule 2    insulin (LANTUS SOLOSTAR U-100 INSULIN) glargine 100 units/mL (3mL) SubQ pen Inject 30 Units into the skin once daily. 6 mL 1    losartan (COZAAR) 100 MG tablet Take 100 mg by mouth once daily.      omega 3-dha-epa-fish oil 1,000 mg (120 mg-180 mg) Cap Take  1 capsule by mouth 2 (two) times daily. 180 capsule 1    amLODIPine (NORVASC) 5 MG tablet Take 1 tablet (5 mg total) by mouth once daily. 90 tablet 1    atorvastatin (LIPITOR) 10 MG tablet Take 1 tablet (10 mg total) by mouth every evening. 90 tablet 1     No current facility-administered medications for this visit.     Review of patient's allergies indicates:   Allergen Reactions    Zithromax [azithromycin] Rash      Past Medical History:   Diagnosis Date    Anxiety     Asthma     Depression     Diabetes mellitus     diet controlled    Diabetes mellitus, type 2     GERD (gastroesophageal reflux disease)     Gout     Hyperlipidemia     Hypertension     IgA nephropathy     Insomnia      Past Surgical History:   Procedure Laterality Date    COLONOSCOPY N/A 5/8/2020    Procedure: COLONOSCOPY;  Surgeon: Hammad Castorena III, MD;  Location: [facility];  Service: Endoscopy;  Laterality: N/A;    ESOPHAGOGASTRODUODENOSCOPY N/A 5/8/2020    Procedure: EGD (ESOPHAGOGASTRODUODENOSCOPY);  Surgeon: Hammad Castorena III, MD;  Location: [facility];  Service: Endoscopy;  Laterality: N/A;    nose polyp removal         Review of Systems   Constitutional: Negative for appetite change, chills, diaphoresis and unexpected weight change.   HENT: Negative for ear discharge, facial swelling, hearing loss, nosebleeds and trouble swallowing.    Eyes: Negative for blurred vision, photophobia, pain and visual disturbance.   Respiratory: Negative for apnea, choking, shortness of breath and wheezing.    Cardiovascular: Negative for chest pain and palpitations.   Gastrointestinal: Negative for abdominal pain, blood in stool and vomiting.   Endocrine: Negative for polydipsia, polyphagia and polyuria.   Genitourinary: Negative for difficulty urinating and hematuria.   Musculoskeletal: Negative for gait problem, joint swelling and neck pain.   Skin: Negative for pallor.   Neurological: Negative for dizziness, seizures, speech  difficulty, weakness and headaches.   Hematological: Does not bruise/bleed easily.   Psychiatric/Behavioral: Negative for agitation, confusion, dysphoric mood, self-injury, sleep disturbance and suicidal ideas. The patient is not nervous/anxious.       OBJECTIVE:      Vitals:    08/09/22 0751   BP: 120/82   Pulse: 90   Temp: 98.2 °F (36.8 °C)   SpO2: 98%   Weight: 101.6 kg (224 lb)   Height: 6' (1.829 m)     Physical Exam  Vitals and nursing note reviewed.   Constitutional:       General: He is not in acute distress.     Appearance: He is well-developed.   HENT:      Head: Normocephalic and atraumatic.      Nose: Nose normal.      Mouth/Throat:      Pharynx: Uvula midline.   Eyes:      General: Lids are normal.      Conjunctiva/sclera: Conjunctivae normal.      Pupils: Pupils are equal, round, and reactive to light.      Right eye: Pupil is round and reactive.      Left eye: Pupil is round and reactive.   Neck:      Thyroid: No thyromegaly.      Vascular: No carotid bruit.   Cardiovascular:      Rate and Rhythm: Normal rate and regular rhythm.      Pulses: Normal pulses.      Heart sounds: Normal heart sounds. No murmur heard.  Pulmonary:      Effort: Pulmonary effort is normal.      Breath sounds: Normal breath sounds.   Abdominal:      General: Bowel sounds are normal.      Palpations: Abdomen is soft. Abdomen is not rigid.      Tenderness: There is no abdominal tenderness.   Musculoskeletal:         General: Normal range of motion.      Cervical back: Normal range of motion and neck supple.   Lymphadenopathy:      Cervical: No cervical adenopathy.   Skin:     General: Skin is warm and dry.      Nails: There is no clubbing.   Neurological:      Mental Status: He is alert and oriented to person, place, and time.   Psychiatric:         Mood and Affect: Mood normal.         Speech: Speech normal.         Behavior: Behavior normal. Behavior is cooperative.         Thought Content: Thought content normal.          Judgment: Judgment normal.         Lab Visit on 08/06/2022   Component Date Value Ref Range Status    Sodium 08/06/2022 136  136 - 145 mmol/L Final    Potassium 08/06/2022 4.5  3.5 - 5.1 mmol/L Final    Chloride 08/06/2022 106  95 - 110 mmol/L Final    CO2 08/06/2022 21 (A) 23 - 29 mmol/L Final    Glucose 08/06/2022 200 (A) 70 - 110 mg/dL Final    BUN 08/06/2022 58 (A) 6 - 20 mg/dL Final    Creatinine 08/06/2022 2.8 (A) 0.5 - 1.4 mg/dL Final    Calcium 08/06/2022 9.1  8.7 - 10.5 mg/dL Final    Total Protein 08/06/2022 7.3  6.0 - 8.4 g/dL Final    Albumin 08/06/2022 4.1  3.5 - 5.2 g/dL Final    Total Bilirubin 08/06/2022 0.9  0.1 - 1.0 mg/dL Final    Comment: For infants and newborns, interpretation of results should be based  on gestational age, weight and in agreement with clinical  observations.    Premature Infant recommended reference ranges:  Up to 24 hours.............<8.0 mg/dL  Up to 48 hours............<12.0 mg/dL  3-5 days..................<15.0 mg/dL  6-29 days.................<15.0 mg/dL      Alkaline Phosphatase 08/06/2022 91  55 - 135 U/L Final    AST 08/06/2022 15  10 - 40 U/L Final    ALT 08/06/2022 22  10 - 44 U/L Final    Anion Gap 08/06/2022 9  8 - 16 mmol/L Final    eGFR 08/06/2022 29.1 (A) >60 mL/min/1.73 m^2 Final    WBC 08/06/2022 11.22  3.90 - 12.70 K/uL Final    RBC 08/06/2022 5.48  4.60 - 6.20 M/uL Final    Hemoglobin 08/06/2022 15.5  14.0 - 18.0 g/dL Final    Hematocrit 08/06/2022 47.5  40.0 - 54.0 % Final    MCV 08/06/2022 87  82 - 98 fL Final    MCH 08/06/2022 28.3  27.0 - 31.0 pg Final    MCHC 08/06/2022 32.6  32.0 - 36.0 g/dL Final    RDW 08/06/2022 14.4  11.5 - 14.5 % Final    Platelets 08/06/2022 182  150 - 450 K/uL Final    MPV 08/06/2022 10.4  9.2 - 12.9 fL Final    Immature Granulocytes 08/06/2022 0.4  0.0 - 0.5 % Final    Gran # (ANC) 08/06/2022 6.3  1.8 - 7.7 K/uL Final    Immature Grans (Abs) 08/06/2022 0.05 (A) 0.00 - 0.04 K/uL Final    Comment:  Mild elevation in immature granulocytes is non specific and   can be seen in a variety of conditions including stress response,   acute inflammation, trauma and pregnancy. Correlation with other   laboratory and clinical findings is essential.      Lymph # 08/06/2022 3.3  1.0 - 4.8 K/uL Final    Mono # 08/06/2022 0.8  0.3 - 1.0 K/uL Final    Eos # 08/06/2022 0.7 (A) 0.0 - 0.5 K/uL Final    Baso # 08/06/2022 0.05  0.00 - 0.20 K/uL Final    nRBC 08/06/2022 0  0 /100 WBC Final    Gran % 08/06/2022 56.3  38.0 - 73.0 % Final    Lymph % 08/06/2022 29.8  18.0 - 48.0 % Final    Mono % 08/06/2022 6.7  4.0 - 15.0 % Final    Eosinophil % 08/06/2022 6.4  0.0 - 8.0 % Final    Basophil % 08/06/2022 0.4  0.0 - 1.9 % Final    Differential Method 08/06/2022 Automated   Final    Hemoglobin A1C 08/06/2022 9.6 (A) 4.5 - 6.2 % Final    Comment: According to ADA guidelines, hemoglobin A1C <7.0% represents  optimal control in non-pregnant diabetic patients.  Different  metrics may apply to specific populations.   Standards of Medical Care in Diabetes - 2016.    For the purpose of screening for the presence of diabetes:  <5.7%     Consistent with the absence of diabetes  5.7-6.4%  Consistent with increasing risk for diabetes   (prediabetes)  >or=6.5%  Consistent with diabetes    Currently no consensus exists for use of hemoglobin A1C  for diagnosis of diabetes for children.      Estimated Avg Glucose 08/06/2022 229 (A) 68 - 131 mg/dL Final    Cholesterol 08/06/2022 185  120 - 199 mg/dL Final    Comment: The National Cholesterol Education Program (NCEP) has set the  following guidelines (reference ranges) for Cholesterol:  Optimal.....................<200 mg/dL  Borderline High.............200-239 mg/dL  High........................> or = 240 mg/dL      Triglycerides 08/06/2022 703 (A) 30 - 150 mg/dL Final    Comment: The National Cholesterol Education Program (NCEP) has set the  following guidelines (reference values) for  triglycerides:  Normal......................<150 mg/dL  Borderline High.............150-199 mg/dL  High........................200-499 mg/dL      HDL 08/06/2022 27 (A) 40 - 75 mg/dL Final    Comment: The National Cholesterol Education Program (NCEP) has set the  following guidelines (reference values) for HDL Cholesterol:  Low...............<40 mg/dL  Optimal...........>60 mg/dL      LDL Cholesterol 08/06/2022 Invalid, Trig>400.0  63.0 - 159.0 mg/dL Final    Comment: The National Cholesterol Education Program (NCEP) has set the  following guidelines (reference values) for LDL Cholesterol:  Optimal.......................<130 mg/dL  Borderline High...............130-159 mg/dL  High..........................160-189 mg/dL  Very High.....................>190 mg/dL      HDL/Cholesterol Ratio 08/06/2022 14.6 (A) 20.0 - 50.0 % Final    Total Cholesterol/HDL Ratio 08/06/2022 6.9 (A) 2.0 - 5.0 Final    Non-HDL Cholesterol 08/06/2022 158  mg/dL Final    Comment: Risk category and Non-HDL cholesterol goals:  Coronary heart disease (CHD)or equivalent (10-year risk of CHD >20%):  Non-HDL cholesterol goal     <130 mg/dL  Two or more CHD risk factors and 10-year risk of CHD <= 20%:  Non-HDL cholesterol goal     <160 mg/dL  0 to 1 CHD risk factor:  Non-HDL cholesterol goal     <190 mg/dL     Lab Visit on 08/06/2022   Component Date Value Ref Range Status    Protein, Urine Random 08/06/2022 33 (A) 6 - 15 mg/dL Final    Comment: The random urine reference ranges provided were established   for 24 hour urine collections.  No reference ranges exist for  random urine specimens.  Correlate clinically.      Creatinine, Urine 08/06/2022 61.0  23.0 - 375.0 mg/dL Final    Comment: The random urine reference ranges provided were established   for 24 hour urine collections.  No reference ranges exist for  random urine specimens.  Correlate clinically.      Prot/Creat Ratio, Urine 08/06/2022 0.54 (A) 0.00 - 0.20 Final    PTH, Intact  08/06/2022 113.8 (A) 9.0 - 77.0 pg/mL Final    Phosphorus 08/06/2022 4.1  2.7 - 4.5 mg/dL Final    Uric Acid 08/06/2022 9.3 (A) 3.4 - 7.0 mg/dL Final    Specimen UA 08/06/2022 Urine, Clean Catch   Final    Color, UA 08/06/2022 Yellow  Yellow, Straw, Kristina Final    Appearance, UA 08/06/2022 Clear  Clear Final    pH, UA 08/06/2022 5.0  5.0 - 8.0 Final    Specific Gravity, UA 08/06/2022 1.010  1.005 - 1.030 Final    Protein, UA 08/06/2022 1+ (A) Negative Final    Comment: Recommend a 24 hour urine protein or a urine   protein/creatinine ratio if globulin induced proteinuria is  clinically suspected.      Glucose, UA 08/06/2022 4+ (A) Negative Final    Ketones, UA 08/06/2022 Negative  Negative Final    Bilirubin (UA) 08/06/2022 Negative  Negative Final    Occult Blood UA 08/06/2022 Negative  Negative Final    Nitrite, UA 08/06/2022 Negative  Negative Final    Urobilinogen, UA 08/06/2022 Negative  Negative EU/dL Final    Leukocytes, UA 08/06/2022 Negative  Negative Final    RBC, UA 08/06/2022 0  0 - 4 /hpf Final    WBC, UA 08/06/2022 0  0 - 5 /hpf Final    Bacteria 08/06/2022 Negative  None-Occ /hpf Final    Yeast, UA 08/06/2022 None  None Final    Squam Epithel, UA 08/06/2022 0  /hpf Final    Hyaline Casts, UA 08/06/2022 0  0-1/lpf /lpf Final    Microscopic Comment 08/06/2022 SEE COMMENT   Final    Comment: Other formed elements not mentioned in the report are not   present in the microscopic examination.      ]  Assessment:       1. Type 2 diabetes mellitus without complication, with long-term current use of insulin    2. Mixed hyperlipidemia    3. Essential hypertension    4. IgA nephropathy        Plan:       Type 2 diabetes mellitus without complication, with long-term current use of insulin  Long discussion with patient regarding compliance with medication and diet. We reviewed increasing lantus 2 units every 2 days for average fasting glucose >150. Once fasting glucose is 100-130 hold at that  dose of lantus. I would like to add a GLP-1. I do not feel the farxiga is working well for him. He will discuss this with Dr Conte at his appt today    Mixed hyperlipidemia  -     atorvastatin (LIPITOR) 10 MG tablet; Take 1 tablet (10 mg total) by mouth every evening.  Dispense: 90 tablet; Refill: 1    Essential hypertension  -     amLODIPine (NORVASC) 5 MG tablet; Take 1 tablet (5 mg total) by mouth once daily.  Dispense: 90 tablet; Refill: 1    IgA nephropathy  F/u with Dr Conte today as scheduled    Follow up in about 3 months (around 11/9/2022) for A1c.      8/9/2022 WU Tucker, MAGNOLIAP

## 2022-09-11 ENCOUNTER — PATIENT MESSAGE (OUTPATIENT)
Dept: FAMILY MEDICINE | Facility: CLINIC | Age: 37
End: 2022-09-11

## 2022-09-12 RX ORDER — LOSARTAN POTASSIUM 100 MG/1
100 TABLET ORAL DAILY
Qty: 90 TABLET | Refills: 0 | Status: SHIPPED | OUTPATIENT
Start: 2022-09-12 | End: 2022-12-13 | Stop reason: SDUPTHER

## 2022-09-20 ENCOUNTER — HOSPITAL ENCOUNTER (EMERGENCY)
Facility: HOSPITAL | Age: 37
Discharge: HOME | End: 2022-09-20
Attending: EMERGENCY MEDICINE
Payer: MEDICAID

## 2022-09-20 VITALS
HEART RATE: 64 BPM | SYSTOLIC BLOOD PRESSURE: 142 MMHG | WEIGHT: 220 LBS | RESPIRATION RATE: 18 BRPM | HEIGHT: 72 IN | DIASTOLIC BLOOD PRESSURE: 91 MMHG | OXYGEN SATURATION: 100 % | BODY MASS INDEX: 29.8 KG/M2 | TEMPERATURE: 98 F

## 2022-09-20 DIAGNOSIS — N17.9 AKI (ACUTE KIDNEY INJURY): Primary | ICD-10-CM

## 2022-09-20 LAB
ALBUMIN SERPL BCP-MCNC: 4 G/DL (ref 3.5–5.2)
ALP SERPL-CCNC: 94 U/L (ref 55–135)
ALT SERPL W/O P-5'-P-CCNC: 24 U/L (ref 10–44)
ANION GAP SERPL CALC-SCNC: 9 MMOL/L (ref 8–16)
AST SERPL-CCNC: 14 U/L (ref 10–40)
BASOPHILS # BLD AUTO: 0.06 K/UL (ref 0–0.2)
BASOPHILS NFR BLD: 0.6 % (ref 0–1.9)
BILIRUB SERPL-MCNC: 1 MG/DL (ref 0.1–1)
BUN SERPL-MCNC: 51 MG/DL (ref 6–20)
CALCIUM SERPL-MCNC: 9.6 MG/DL (ref 8.7–10.5)
CHLORIDE SERPL-SCNC: 103 MMOL/L (ref 95–110)
CK SERPL-CCNC: 114 U/L (ref 20–200)
CO2 SERPL-SCNC: 23 MMOL/L (ref 23–29)
CREAT SERPL-MCNC: 3 MG/DL (ref 0.5–1.4)
DIFFERENTIAL METHOD: ABNORMAL
EOSINOPHIL # BLD AUTO: 0.7 K/UL (ref 0–0.5)
EOSINOPHIL NFR BLD: 6.3 % (ref 0–8)
ERYTHROCYTE [DISTWIDTH] IN BLOOD BY AUTOMATED COUNT: 13.7 % (ref 11.5–14.5)
EST. GFR  (NO RACE VARIABLE): 26.8 ML/MIN/1.73 M^2
GLUCOSE SERPL-MCNC: 185 MG/DL (ref 70–110)
HCT VFR BLD AUTO: 49.6 % (ref 40–54)
HGB BLD-MCNC: 16 G/DL (ref 14–18)
IMM GRANULOCYTES # BLD AUTO: 0.04 K/UL (ref 0–0.04)
IMM GRANULOCYTES NFR BLD AUTO: 0.4 % (ref 0–0.5)
LYMPHOCYTES # BLD AUTO: 3.2 K/UL (ref 1–4.8)
LYMPHOCYTES NFR BLD: 30.1 % (ref 18–48)
MCH RBC QN AUTO: 28.6 PG (ref 27–31)
MCHC RBC AUTO-ENTMCNC: 32.3 G/DL (ref 32–36)
MCV RBC AUTO: 89 FL (ref 82–98)
MONOCYTES # BLD AUTO: 0.6 K/UL (ref 0.3–1)
MONOCYTES NFR BLD: 5.7 % (ref 4–15)
NEUTROPHILS # BLD AUTO: 6.2 K/UL (ref 1.8–7.7)
NEUTROPHILS NFR BLD: 56.9 % (ref 38–73)
NRBC BLD-RTO: 0 /100 WBC
PLATELET # BLD AUTO: 200 K/UL (ref 150–450)
PMV BLD AUTO: 10.3 FL (ref 9.2–12.9)
POTASSIUM SERPL-SCNC: 5.1 MMOL/L (ref 3.5–5.1)
PROT SERPL-MCNC: 7.2 G/DL (ref 6–8.4)
RBC # BLD AUTO: 5.6 M/UL (ref 4.6–6.2)
SODIUM SERPL-SCNC: 135 MMOL/L (ref 136–145)
WBC # BLD AUTO: 10.78 K/UL (ref 3.9–12.7)

## 2022-09-20 PROCEDURE — 80053 COMPREHEN METABOLIC PANEL: CPT | Performed by: EMERGENCY MEDICINE

## 2022-09-20 PROCEDURE — 25000003 PHARM REV CODE 250: Performed by: EMERGENCY MEDICINE

## 2022-09-20 PROCEDURE — 85025 COMPLETE CBC W/AUTO DIFF WBC: CPT | Performed by: EMERGENCY MEDICINE

## 2022-09-20 PROCEDURE — 96360 HYDRATION IV INFUSION INIT: CPT

## 2022-09-20 PROCEDURE — 82550 ASSAY OF CK (CPK): CPT | Performed by: EMERGENCY MEDICINE

## 2022-09-20 PROCEDURE — 36415 COLL VENOUS BLD VENIPUNCTURE: CPT | Performed by: EMERGENCY MEDICINE

## 2022-09-20 PROCEDURE — 99284 EMERGENCY DEPT VISIT MOD MDM: CPT

## 2022-09-20 RX ORDER — SODIUM CHLORIDE 9 MG/ML
1000 INJECTION, SOLUTION INTRAVENOUS
Status: COMPLETED | OUTPATIENT
Start: 2022-09-20 | End: 2022-09-20

## 2022-09-20 RX ADMIN — SODIUM CHLORIDE 1000 ML: 0.9 INJECTION, SOLUTION INTRAVENOUS at 01:09

## 2022-09-20 NOTE — ED PROVIDER NOTES
Encounter Date: 2022       History     Chief Complaint   Patient presents with    Dehydration     Patient presents complaining of muscle cramping and feeling dehydrated.  Patient has a history of chronic kidney disease and IgA nephropathy.  Patient has had multiple presentations for similar requiring IV fluids.  Patient states he thinks he needs fluids.  He is seen by  .  He denies fever or chills.  He denies cloudy urine or dark urine.  At the worst symptoms are mild.    Review of patient's allergies indicates:   Allergen Reactions    Zithromax [azithromycin] Rash     Past Medical History:   Diagnosis Date    Anxiety     Asthma     Depression     Diabetes mellitus     diet controlled    Diabetes mellitus, type 2     GERD (gastroesophageal reflux disease)     Gout     Hyperlipidemia     Hypertension     IgA nephropathy     Insomnia      Past Surgical History:   Procedure Laterality Date    COLONOSCOPY N/A 2020    Procedure: COLONOSCOPY;  Surgeon: Hammad Castorena III, MD;  Location: [facility];  Service: Endoscopy;  Laterality: N/A;    ESOPHAGOGASTRODUODENOSCOPY N/A 2020    Procedure: EGD (ESOPHAGOGASTRODUODENOSCOPY);  Surgeon: Hammad Castorena III, MD;  Location: [facility];  Service: Endoscopy;  Laterality: N/A;    nose polyp removal       Family History   Problem Relation Age of Onset    Hypertension Maternal Grandmother     Cancer Maternal Grandfather     Diabetes Maternal Grandfather     Heart disease Maternal Grandfather     Heart disease Mother     Stroke Mother     Diabetes Mother      Social History     Tobacco Use    Smoking status: Former     Packs/day: 0.25     Years: 5.00     Pack years: 1.25     Types: Cigars, Cigarettes     Quit date: 2022     Years since quittin.3    Smokeless tobacco: Never    Tobacco comments:     One cigar a day   Substance Use Topics    Alcohol use: Yes    Drug use: No     Review of Systems   All other systems reviewed and are  negative.    Physical Exam     Initial Vitals [09/20/22 1233]   BP Pulse Resp Temp SpO2   (!) 138/95 83 16 97.9 °F (36.6 °C) 99 %      MAP       --         Physical Exam    Nursing note and vitals reviewed.  Constitutional: He appears well-developed and well-nourished. He is not diaphoretic. No distress.   Pleasant, polite.   HENT:   Head: Normocephalic and atraumatic.   Eyes: EOM are normal.   Neck: Neck supple.   Normal range of motion.  Cardiovascular:  Normal rate, regular rhythm, normal heart sounds and intact distal pulses.           Pulmonary/Chest: Breath sounds normal. No respiratory distress.   Abdominal: Abdomen is soft.   Musculoskeletal:         General: Normal range of motion.      Cervical back: Normal range of motion and neck supple.     Neurological: He is alert and oriented to person, place, and time. He has normal strength.   Skin: Skin is warm and dry.   Psychiatric: He has a normal mood and affect. His behavior is normal. Judgment and thought content normal.       ED Course   Procedures  Labs Reviewed   CBC W/ AUTO DIFFERENTIAL - Abnormal; Notable for the following components:       Result Value    Eos # 0.7 (*)     All other components within normal limits   COMPREHENSIVE METABOLIC PANEL - Abnormal; Notable for the following components:    Sodium 135 (*)     Glucose 185 (*)     BUN 51 (*)     Creatinine 3.0 (*)     eGFR 26.8 (*)     All other components within normal limits   CK   CK   URINALYSIS, REFLEX TO URINE CULTURE          Imaging Results    None          Medications   0.9%  NaCl infusion (0 mLs Intravenous Stopped 9/20/22 1442)     Medical Decision Making:   Initial Assessment:   No apparent distress  Differential Diagnosis:   Considerations include but are not limited to acute kidney injury, dehydration, electrolyte abnormalities  Clinical Tests:   Lab Tests: Reviewed and Ordered  Medical Tests: Reviewed and Ordered  ED Management:  In the emergency department patient has a creatinine  of 3.0.  He was given 1 L normal saline in the emergency department.  He does feel improved.  He was advised of his current kidney function status.  Patient is near his baseline.  He was advised to follow-up with his nephrologist.  Patient be discharged in stable condition.  Detailed return precautions discussed                        Clinical Impression:   Final diagnoses:  [N17.9] TRACEY (acute kidney injury) (Primary)        ED Disposition Condition    Discharge Stable          ED Prescriptions    None       Follow-up Information       Follow up With Specialties Details Why Contact Info    Jose Grant NP Family Medicine In 1 week  [address] [facility] [address]  [phone]               Regan Lee MD  09/20/22 9727

## 2022-10-03 ENCOUNTER — LAB VISIT (OUTPATIENT)
Dept: LAB | Facility: HOSPITAL | Age: 37
End: 2022-10-03
Attending: INTERNAL MEDICINE
Payer: MEDICAID

## 2022-10-03 DIAGNOSIS — N18.30 CHRONIC KIDNEY DISEASE, STAGE III (MODERATE): ICD-10-CM

## 2022-10-03 DIAGNOSIS — R80.9 PROTEINURIA: Primary | ICD-10-CM

## 2022-10-03 DIAGNOSIS — M10.9 GOUT, UNSPECIFIED: ICD-10-CM

## 2022-10-03 LAB
ALBUMIN SERPL BCP-MCNC: 4.1 G/DL (ref 3.5–5.2)
ANION GAP SERPL CALC-SCNC: 9 MMOL/L (ref 8–16)
BACTERIA #/AREA URNS HPF: NEGATIVE /HPF
BILIRUB UR QL STRIP: NEGATIVE
BUN SERPL-MCNC: 46 MG/DL (ref 6–20)
CALCIUM SERPL-MCNC: 9.4 MG/DL (ref 8.7–10.5)
CHLORIDE SERPL-SCNC: 101 MMOL/L (ref 95–110)
CLARITY UR: CLEAR
CO2 SERPL-SCNC: 26 MMOL/L (ref 23–29)
COLOR UR: YELLOW
CREAT SERPL-MCNC: 3.1 MG/DL (ref 0.5–1.4)
EST. GFR  (NO RACE VARIABLE): 25.6 ML/MIN/1.73 M^2
GLUCOSE SERPL-MCNC: 209 MG/DL (ref 70–110)
GLUCOSE UR QL STRIP: ABNORMAL
HGB UR QL STRIP: NEGATIVE
HYALINE CASTS #/AREA URNS LPF: 1 /LPF
KETONES UR QL STRIP: NEGATIVE
LEUKOCYTE ESTERASE UR QL STRIP: NEGATIVE
MICROSCOPIC COMMENT: NORMAL
NITRITE UR QL STRIP: NEGATIVE
PH UR STRIP: 7 [PH] (ref 5–8)
PHOSPHATE SERPL-MCNC: 4.9 MG/DL (ref 2.7–4.5)
POTASSIUM SERPL-SCNC: 4.8 MMOL/L (ref 3.5–5.1)
PROT UR QL STRIP: ABNORMAL
RBC #/AREA URNS HPF: 0 /HPF (ref 0–4)
SODIUM SERPL-SCNC: 136 MMOL/L (ref 136–145)
SP GR UR STRIP: 1.01 (ref 1–1.03)
SQUAMOUS #/AREA URNS HPF: 0 /HPF
URATE SERPL-MCNC: 9.4 MG/DL (ref 3.4–7)
URN SPEC COLLECT METH UR: ABNORMAL
UROBILINOGEN UR STRIP-ACNC: NEGATIVE EU/DL
WBC #/AREA URNS HPF: 0 /HPF (ref 0–5)
YEAST URNS QL MICRO: NORMAL

## 2022-10-03 PROCEDURE — 36415 COLL VENOUS BLD VENIPUNCTURE: CPT | Performed by: INTERNAL MEDICINE

## 2022-10-03 PROCEDURE — 81001 URINALYSIS AUTO W/SCOPE: CPT | Performed by: INTERNAL MEDICINE

## 2022-10-03 PROCEDURE — 80069 RENAL FUNCTION PANEL: CPT | Performed by: INTERNAL MEDICINE

## 2022-10-03 PROCEDURE — 84550 ASSAY OF BLOOD/URIC ACID: CPT | Performed by: INTERNAL MEDICINE

## 2022-10-04 ENCOUNTER — PATIENT MESSAGE (OUTPATIENT)
Dept: FAMILY MEDICINE | Facility: CLINIC | Age: 37
End: 2022-10-04

## 2022-10-04 ENCOUNTER — LAB VISIT (OUTPATIENT)
Dept: LAB | Facility: HOSPITAL | Age: 37
End: 2022-10-04
Payer: MEDICAID

## 2022-10-04 DIAGNOSIS — R80.9 PROTEINURIA: Primary | ICD-10-CM

## 2022-10-04 LAB
CREAT UR-MCNC: 49 MG/DL (ref 23–375)
PROT UR-MCNC: 40 MG/DL (ref 6–15)
PROT/CREAT UR: 0.82 MG/G{CREAT} (ref 0–0.2)

## 2022-10-04 PROCEDURE — 84156 ASSAY OF PROTEIN URINE: CPT | Performed by: INTERNAL MEDICINE

## 2022-10-19 ENCOUNTER — PATIENT MESSAGE (OUTPATIENT)
Dept: FAMILY MEDICINE | Facility: CLINIC | Age: 37
End: 2022-10-19

## 2022-10-19 DIAGNOSIS — E11.9 TYPE 2 DIABETES MELLITUS WITHOUT COMPLICATION, WITH LONG-TERM CURRENT USE OF INSULIN: Primary | ICD-10-CM

## 2022-10-19 DIAGNOSIS — Z79.4 TYPE 2 DIABETES MELLITUS WITHOUT COMPLICATION, WITH LONG-TERM CURRENT USE OF INSULIN: Primary | ICD-10-CM

## 2022-10-19 DIAGNOSIS — I10 ESSENTIAL HYPERTENSION: ICD-10-CM

## 2022-10-19 DIAGNOSIS — E78.2 MIXED HYPERLIPIDEMIA: ICD-10-CM

## 2022-10-20 RX ORDER — LANCETS
EACH MISCELLANEOUS
Qty: 100 EACH | Refills: 3 | Status: SHIPPED | OUTPATIENT
Start: 2022-10-20

## 2022-10-20 RX ORDER — INSULIN PUMP SYRINGE, 3 ML
EACH MISCELLANEOUS
Qty: 1 EACH | Refills: 0 | Status: SHIPPED | OUTPATIENT
Start: 2022-10-20

## 2022-10-20 NOTE — TELEPHONE ENCOUNTER
Labs pended. I didn't see where I could pend the BG machine in his current med list for him to get a new one.

## 2022-10-30 ENCOUNTER — HOSPITAL ENCOUNTER (EMERGENCY)
Facility: HOSPITAL | Age: 37
Discharge: HOME | End: 2022-10-30
Attending: EMERGENCY MEDICINE
Payer: MEDICAID

## 2022-10-30 VITALS
WEIGHT: 220 LBS | DIASTOLIC BLOOD PRESSURE: 71 MMHG | HEART RATE: 81 BPM | HEIGHT: 72 IN | TEMPERATURE: 99 F | RESPIRATION RATE: 18 BRPM | OXYGEN SATURATION: 96 % | SYSTOLIC BLOOD PRESSURE: 121 MMHG | BODY MASS INDEX: 29.8 KG/M2

## 2022-10-30 DIAGNOSIS — R10.9 FLANK PAIN: Primary | ICD-10-CM

## 2022-10-30 LAB
ANION GAP SERPL CALC-SCNC: 8 MMOL/L (ref 8–16)
BACTERIA #/AREA URNS HPF: NEGATIVE /HPF
BASOPHILS # BLD AUTO: 0.06 K/UL (ref 0–0.2)
BASOPHILS NFR BLD: 0.5 % (ref 0–1.9)
BILIRUB UR QL STRIP: NEGATIVE
BUN SERPL-MCNC: 40 MG/DL (ref 6–20)
CALCIUM SERPL-MCNC: 8.9 MG/DL (ref 8.7–10.5)
CHLORIDE SERPL-SCNC: 103 MMOL/L (ref 95–110)
CK SERPL-CCNC: 77 U/L (ref 20–200)
CLARITY UR: CLEAR
CO2 SERPL-SCNC: 26 MMOL/L (ref 23–29)
COLOR UR: YELLOW
CREAT SERPL-MCNC: 2.8 MG/DL (ref 0.5–1.4)
DIFFERENTIAL METHOD: ABNORMAL
EOSINOPHIL # BLD AUTO: 0.6 K/UL (ref 0–0.5)
EOSINOPHIL NFR BLD: 5.8 % (ref 0–8)
ERYTHROCYTE [DISTWIDTH] IN BLOOD BY AUTOMATED COUNT: 13.1 % (ref 11.5–14.5)
EST. GFR  (NO RACE VARIABLE): 28.9 ML/MIN/1.73 M^2
GLUCOSE SERPL-MCNC: 298 MG/DL (ref 70–110)
GLUCOSE UR QL STRIP: ABNORMAL
HCT VFR BLD AUTO: 45.1 % (ref 40–54)
HGB BLD-MCNC: 15.2 G/DL (ref 14–18)
HGB UR QL STRIP: NEGATIVE
HYALINE CASTS #/AREA URNS LPF: 3 /LPF
IMM GRANULOCYTES # BLD AUTO: 0.03 K/UL (ref 0–0.04)
IMM GRANULOCYTES NFR BLD AUTO: 0.3 % (ref 0–0.5)
INFLUENZA A, MOLECULAR: NEGATIVE
INFLUENZA B, MOLECULAR: NEGATIVE
KETONES UR QL STRIP: NEGATIVE
LEUKOCYTE ESTERASE UR QL STRIP: NEGATIVE
LYMPHOCYTES # BLD AUTO: 3 K/UL (ref 1–4.8)
LYMPHOCYTES NFR BLD: 27.1 % (ref 18–48)
MCH RBC QN AUTO: 29.3 PG (ref 27–31)
MCHC RBC AUTO-ENTMCNC: 33.7 G/DL (ref 32–36)
MCV RBC AUTO: 87 FL (ref 82–98)
MICROSCOPIC COMMENT: ABNORMAL
MONOCYTES # BLD AUTO: 0.7 K/UL (ref 0.3–1)
MONOCYTES NFR BLD: 6.2 % (ref 4–15)
NEUTROPHILS # BLD AUTO: 6.6 K/UL (ref 1.8–7.7)
NEUTROPHILS NFR BLD: 60.1 % (ref 38–73)
NITRITE UR QL STRIP: NEGATIVE
NRBC BLD-RTO: 0 /100 WBC
PH UR STRIP: 6 [PH] (ref 5–8)
PLATELET # BLD AUTO: 193 K/UL (ref 150–450)
PMV BLD AUTO: 9.9 FL (ref 9.2–12.9)
POTASSIUM SERPL-SCNC: 4.6 MMOL/L (ref 3.5–5.1)
PROT UR QL STRIP: ABNORMAL
RBC # BLD AUTO: 5.18 M/UL (ref 4.6–6.2)
RBC #/AREA URNS HPF: 0 /HPF (ref 0–4)
SARS-COV-2 RDRP RESP QL NAA+PROBE: NEGATIVE
SODIUM SERPL-SCNC: 137 MMOL/L (ref 136–145)
SP GR UR STRIP: 1.01 (ref 1–1.03)
SPECIMEN SOURCE: NORMAL
SQUAMOUS #/AREA URNS HPF: 0 /HPF
URN SPEC COLLECT METH UR: ABNORMAL
UROBILINOGEN UR STRIP-ACNC: NEGATIVE EU/DL
WBC # BLD AUTO: 11 K/UL (ref 3.9–12.7)
WBC #/AREA URNS HPF: 0 /HPF (ref 0–5)
YEAST URNS QL MICRO: ABNORMAL

## 2022-10-30 PROCEDURE — U0002 COVID-19 LAB TEST NON-CDC: HCPCS | Performed by: STUDENT IN AN ORGANIZED HEALTH CARE EDUCATION/TRAINING PROGRAM

## 2022-10-30 PROCEDURE — 63600175 PHARM REV CODE 636 W HCPCS: Performed by: EMERGENCY MEDICINE

## 2022-10-30 PROCEDURE — 96375 TX/PRO/DX INJ NEW DRUG ADDON: CPT

## 2022-10-30 PROCEDURE — 85025 COMPLETE CBC W/AUTO DIFF WBC: CPT | Performed by: STUDENT IN AN ORGANIZED HEALTH CARE EDUCATION/TRAINING PROGRAM

## 2022-10-30 PROCEDURE — 96374 THER/PROPH/DIAG INJ IV PUSH: CPT

## 2022-10-30 PROCEDURE — 96361 HYDRATE IV INFUSION ADD-ON: CPT

## 2022-10-30 PROCEDURE — 36415 COLL VENOUS BLD VENIPUNCTURE: CPT | Performed by: STUDENT IN AN ORGANIZED HEALTH CARE EDUCATION/TRAINING PROGRAM

## 2022-10-30 PROCEDURE — 87502 INFLUENZA DNA AMP PROBE: CPT | Performed by: STUDENT IN AN ORGANIZED HEALTH CARE EDUCATION/TRAINING PROGRAM

## 2022-10-30 PROCEDURE — 80048 BASIC METABOLIC PNL TOTAL CA: CPT | Performed by: STUDENT IN AN ORGANIZED HEALTH CARE EDUCATION/TRAINING PROGRAM

## 2022-10-30 PROCEDURE — 99285 EMERGENCY DEPT VISIT HI MDM: CPT | Mod: 25

## 2022-10-30 PROCEDURE — 81001 URINALYSIS AUTO W/SCOPE: CPT | Performed by: STUDENT IN AN ORGANIZED HEALTH CARE EDUCATION/TRAINING PROGRAM

## 2022-10-30 PROCEDURE — 82550 ASSAY OF CK (CPK): CPT | Performed by: STUDENT IN AN ORGANIZED HEALTH CARE EDUCATION/TRAINING PROGRAM

## 2022-10-30 PROCEDURE — 25000003 PHARM REV CODE 250: Performed by: STUDENT IN AN ORGANIZED HEALTH CARE EDUCATION/TRAINING PROGRAM

## 2022-10-30 RX ORDER — HYDROMORPHONE HYDROCHLORIDE 1 MG/ML
0.5 INJECTION, SOLUTION INTRAMUSCULAR; INTRAVENOUS; SUBCUTANEOUS
Status: COMPLETED | OUTPATIENT
Start: 2022-10-30 | End: 2022-10-30

## 2022-10-30 RX ORDER — ONDANSETRON 2 MG/ML
4 INJECTION INTRAMUSCULAR; INTRAVENOUS
Status: COMPLETED | OUTPATIENT
Start: 2022-10-30 | End: 2022-10-30

## 2022-10-30 RX ORDER — SODIUM CHLORIDE 9 MG/ML
1000 INJECTION, SOLUTION INTRAVENOUS
Status: COMPLETED | OUTPATIENT
Start: 2022-10-30 | End: 2022-10-30

## 2022-10-30 RX ADMIN — HYDROMORPHONE HYDROCHLORIDE 0.5 MG: 0.5 INJECTION, SOLUTION INTRAMUSCULAR; INTRAVENOUS; SUBCUTANEOUS at 04:10

## 2022-10-30 RX ADMIN — SODIUM CHLORIDE 1000 ML: 0.9 INJECTION, SOLUTION INTRAVENOUS at 05:10

## 2022-10-30 RX ADMIN — ONDANSETRON HYDROCHLORIDE 4 MG: 2 INJECTION, SOLUTION INTRAMUSCULAR; INTRAVENOUS at 04:10

## 2022-10-30 NOTE — ED PROVIDER NOTES
"Encounter Date: 10/30/2022       History     Chief Complaint   Patient presents with    Flank Pain     BILAT X 1 WEEK    Nausea     37-year-old male past medical history significant for IgA nephropathy, insulin-dependent diabetes, hypertension, hyperlipidemia presents emergency room for 3 days of progressively worsening bilateral flank pain.  No urinary complaints.  No Penile or testicular pain, scrotal swelling, penile discharge.  No abdominal pain.  No nausea or vomiting.  Patient feels like he is "dehydrated. "    Review of patient's allergies indicates:   Allergen Reactions    Zithromax [azithromycin] Rash     Past Medical History:   Diagnosis Date    Anxiety     Asthma     Depression     Diabetes mellitus     diet controlled    Diabetes mellitus, type 2     GERD (gastroesophageal reflux disease)     Gout     Hyperlipidemia     Hypertension     IgA nephropathy     Insomnia      Past Surgical History:   Procedure Laterality Date    COLONOSCOPY N/A 2020    Procedure: COLONOSCOPY;  Surgeon: Hammad Castorena III, MD;  Location: [facility];  Service: Endoscopy;  Laterality: N/A;    ESOPHAGOGASTRODUODENOSCOPY N/A 2020    Procedure: EGD (ESOPHAGOGASTRODUODENOSCOPY);  Surgeon: Hammad Castorena III, MD;  Location: [facility];  Service: Endoscopy;  Laterality: N/A;    nose polyp removal       Family History   Problem Relation Age of Onset    Hypertension Maternal Grandmother     Cancer Maternal Grandfather     Diabetes Maternal Grandfather     Heart disease Maternal Grandfather     Heart disease Mother     Stroke Mother     Diabetes Mother      Social History     Tobacco Use    Smoking status: Former     Packs/day: 0.25     Years: 5.00     Pack years: 1.25     Types: Cigars, Cigarettes     Quit date: 2022     Years since quittin.4    Smokeless tobacco: Never    Tobacco comments:     One cigar a day   Substance Use Topics    Alcohol use: Yes    Drug use: No     Review of Systems   Constitutional:  " Negative for chills, fatigue and fever.   HENT:  Negative for congestion, hearing loss, sore throat and trouble swallowing.    Eyes:  Negative for visual disturbance.   Respiratory:  Negative for cough, chest tightness and shortness of breath.    Cardiovascular:  Negative for chest pain.   Gastrointestinal:  Negative for abdominal pain and nausea.   Endocrine: Negative for polyuria.   Genitourinary:  Positive for flank pain. Negative for difficulty urinating.   Musculoskeletal:  Negative for arthralgias and myalgias.   Skin:  Negative for rash.   Neurological:  Negative for dizziness and headaches.   Psychiatric/Behavioral:  The patient is not nervous/anxious.      Physical Exam     Initial Vitals [10/30/22 1516]   BP Pulse Resp Temp SpO2   (!) 142/86 93 18 99.1 °F (37.3 °C) 96 %      MAP       --         Physical Exam    Nursing note and vitals reviewed.  Constitutional: He appears well-developed and well-nourished.   HENT:   Head: Normocephalic and atraumatic.   Eyes: Conjunctivae and EOM are normal.   Neck: Neck supple.   Cardiovascular:  Intact distal pulses.           Pulmonary/Chest: No respiratory distress.   Abdominal:   Exam significant for bilateral CVA tenderness.    Otherwise abdomen is flat, soft and nontender in all other quadrants. No peritoneal signs. No suprapubic pain. No CVAT. No guarding, no palpable masses.  No hepatosplenomegaly. No overlying skin changes. Normoactive bowel sounds. No bruits. Distal pulses 2+ b/l. No inguinal lymphadenopathy.  Negative Elizabeth's sign. Nontender at McBurney's point. No rebound tenderness, negative psoas sign. No pain with heel tap.     Musculoskeletal:         General: Normal range of motion.      Cervical back: Neck supple.     Neurological: He is alert and oriented to person, place, and time. GCS score is 15. GCS eye subscore is 4. GCS verbal subscore is 5. GCS motor subscore is 6.   Skin: Skin is warm and dry. Capillary refill takes less than 2 seconds.    Psychiatric: He has a normal mood and affect. His behavior is normal. Thought content normal.       ED Course   Procedures  Labs Reviewed   CBC W/ AUTO DIFFERENTIAL - Abnormal; Notable for the following components:       Result Value    Eos # 0.6 (*)     All other components within normal limits   BASIC METABOLIC PANEL - Abnormal; Notable for the following components:    Glucose 298 (*)     BUN 40 (*)     Creatinine 2.8 (*)     eGFR 28.9 (*)     All other components within normal limits   URINALYSIS, REFLEX TO URINE CULTURE - Abnormal; Notable for the following components:    Protein, UA 1+ (*)     Glucose, UA 4+ (*)     All other components within normal limits    Narrative:     Specimen Source->Urine   URINALYSIS MICROSCOPIC - Abnormal; Notable for the following components:    Hyaline Casts, UA 3 (*)     All other components within normal limits    Narrative:     Specimen Source->Urine   INFLUENZA A AND B ANTIGEN    Narrative:     Specimen Source->Nasopharyngeal Swab   SARS-COV-2 RNA AMPLIFICATION, QUAL   CK   CK          Imaging Results              CT Abdomen Pelvis  Without Contrast (Edited Result - FINAL)  Result time 10/30/22 18:06:15      Edited Result - FINAL by Jaycob Haskins Jr., MD (10/30/22 18:06:15)                   Narrative:         ADDENDUM #1       Correction within the body report,    The examination should state the liver is enlarged measuring at least 25 cm in the long plane with low-density secondary to fibrofatty changes    The impression should state the followin. Hepatomegaly reaching 24 cm in the midclavicular plane.    Electronically signed by:  Jaycob Haskins MD  10/30/2022 6:06 PM CDT Workstation: [id number]     ORIGINAL REPORT       CT ABDOMEN PELVIS WITHOUT IV CONTRAST:  10/30/2022 4:44 PM CDT    HISTORY:  Document history flank pain. Site of interest not specified..    COMPARISON: None available.    TECHNIQUE: Noncontrast images of the abdomen and pelvis were obtained.  Dose lowering techniques were utilized which include adjusting the mA and/or kV to protocol and/or patient size.    ABDOMEN AND PELVIS:  LUNGS:  Visualized lung parenchyma is clear without mass or infiltrate.  LIVER:  The uterus is enlarged measuring at least 25 cm long plane with diffusely decreased low-attenuation changes secondary to fibrofatty changes. No evidence of mass or cyst.  Gallbladder: Gallbladder is well-distended without evidence of intraluminal calcifications or wall thickening.  SPLEEN:  Normal.  PANCREAS:  Normal.  ADRENAL GLANDS:  Normal.  KIDNEYS:  Both kidneys are normal size and orientation and position. Left ureter demonstrates evidence of calcified sedimentary debris in the distal left ureter with nonobstructing elongated 5 x 3 x 3 mm stone is seen in the distal ureter roughly 1 cm above the posterior trigone region. The right appears normal in course caliber and contour.  GI TRACT:  Is within normal limits. Scattered diverticular changes of the sigmoid colon.  AORTA / IVC:  The aorta and IVC are normal in caliber.  PROSTATE:  The prostate gland and seminal vesicles are unremarkable.  URINARY BLADDER:  The bladder is filled to capacity.  OTHER FINDINGS:  None.    LYMPH NODES:  Normal.    OSSEOUS STRUCTURES AND SOFT TISSUES:  Normal.    IMPRESSION:    1. Sedimentary debris within the distal left ureter with nonobstructing calcification distal left ureter measuring 5 x 3 mm in size. No hydronephrosis.  5. Two. Thyromegaly reaching 24 cm in the midclavicular plane.  3. Moderate diverticular changes of the sigmoid portion of colon.        Electronically signed by:  Jaycob Haskins MD  10/30/2022 5:30 PM CDT Workstation: [id number]                      Final result by Jaycob Haskins Jr., MD (10/30/22 17:30:17)                   Narrative:    CT ABDOMEN PELVIS WITHOUT IV CONTRAST:  10/30/2022 4:44 PM CDT    HISTORY:  Document history flank pain. Site of interest not specified..    COMPARISON:  None available.    TECHNIQUE: Noncontrast images of the abdomen and pelvis were obtained. Dose lowering techniques were utilized which include adjusting the mA and/or kV to protocol and/or patient size.    ABDOMEN AND PELVIS:  LUNGS:  Visualized lung parenchyma is clear without mass or infiltrate.  LIVER:  The uterus is enlarged measuring at least 25 cm long plane with diffusely decreased low-attenuation changes secondary to fibrofatty changes. No evidence of mass or cyst.  Gallbladder: Gallbladder is well-distended without evidence of intraluminal calcifications or wall thickening.  SPLEEN:  Normal.  PANCREAS:  Normal.  ADRENAL GLANDS:  Normal.  KIDNEYS:  Both kidneys are normal size and orientation and position. Left ureter demonstrates evidence of calcified sedimentary debris in the distal left ureter with nonobstructing elongated 5 x 3 x 3 mm stone is seen in the distal ureter roughly 1 cm above the posterior trigone region. The right appears normal in course caliber and contour.  GI TRACT:  Is within normal limits. Scattered diverticular changes of the sigmoid colon.  AORTA / IVC:  The aorta and IVC are normal in caliber.  PROSTATE:  The prostate gland and seminal vesicles are unremarkable.  URINARY BLADDER:  The bladder is filled to capacity.  OTHER FINDINGS:  None.    LYMPH NODES:  Normal.    OSSEOUS STRUCTURES AND SOFT TISSUES:  Normal.    IMPRESSION:    1. Sedimentary debris within the distal left ureter with nonobstructing calcification distal left ureter measuring 5 x 3 mm in size. No hydronephrosis.  5. Two. Thyromegaly reaching 24 cm in the midclavicular plane.  3. Moderate diverticular changes of the sigmoid portion of colon.        Electronically signed by:  Jaycob Haskins MD  10/30/2022 5:30 PM CDT Workstation: [id number]                                     Medications   HYDROmorphone injection 0.5 mg (0.5 mg Intravenous Given 10/30/22 9150)   ondansetron injection 4 mg (4 mg Intravenous Given  10/30/22 1851)   0.9%  NaCl infusion (1,000 mLs Intravenous New Bag 10/30/22 2832)     Medical Decision Making:   Initial Assessment:   Nontoxic, well-appearing and in no acute distress.  ED Management:  37-year-old male presents emergency room for evaluation of bilateral flank pain worsening over the last 3 days.  Patient has history of IgA nephropathy, insulin-dependent diabetes.  Labs remarkable only for elevated glucose of 298 but do not suspect DKA.  Elevated kidney function with creatinine of 2.8, BUN 40.  This appears at his baseline.  Normal CK.  CT abdomen pelvis shows sedentary debris in the distal left ureter is nonobstructing, also hepatomegaly.  This is an incidental finding.  Recommend he follow-up closely with his primary care provider for evaluation of this.  Patient was given pain control and fluids in the emergency room he had significant improvement in his symptoms.  He will be discharged home in a stable condition with recommendation for primary care follow-up portion return to the emergency room for any worsening symptoms.      Disposition:  Improved, discharged  Plan to discharge home with appropriate follow-up, including primary care manager.    I discussed the findings and plan of care with this patient.  All questions were answered to the patient's satisfaction.  Disposition plan as above.  Verbal and written discharge instructions provided to the patient on discharge.  Return precautions discussed prior to discharge.     I discuss this patient case with the cosigning physician, who agrees with diagnosis and plan of care. This note was written using the assistance of a dictation program and may contain grammatical errors.           Attending Attestation:     Physician Attestation Statement for NP/PA:       Other NP/PA Attestation Additions:    History of Present Illness: I was not called upon to see this patient but was available for consultation              ED Course as of 10/30/22 6929    Sun Oct 30, 2022   1806 CT Abdomen Pelvis  Without Contrast  CT shows nonobstructive calcification left distal ureter, hepatomegaly, some diverticulosis.  I did discuss the findings personally with the interpreting radiologist. [AN]      ED Course User Index  [AN] Good Dalton PA-C                 Clinical Impression:   Final diagnoses:  [R10.9] Flank pain (Primary)      ED Disposition Condition    Discharge Stable          ED Prescriptions    None       Follow-up Information       Follow up With Specialties Details Why Contact Info Additional Information    Jose Grant, [facility] Schedule an appointment as soon as possible for a visit in 1 week  [address]  [facility] [address]  [phone]       Critical access hospital - Emergency Dept Emergency Medicine Go to  As needed, If symptoms worsen [address] [facility] [address]  [phone] 1st floor             Good Dalton PA-C  10/30/22 1820       Regan Peguero MD  10/30/22 7998

## 2022-10-31 ENCOUNTER — PATIENT MESSAGE (OUTPATIENT)
Dept: FAMILY MEDICINE | Facility: CLINIC | Age: 37
End: 2022-10-31

## 2022-10-31 ENCOUNTER — TELEPHONE (OUTPATIENT)
Dept: FAMILY MEDICINE | Facility: CLINIC | Age: 37
End: 2022-10-31

## 2022-11-01 ENCOUNTER — OFFICE VISIT (OUTPATIENT)
Dept: FAMILY MEDICINE | Facility: CLINIC | Age: 37
End: 2022-11-01
Payer: MEDICAID

## 2022-11-01 ENCOUNTER — TELEPHONE (OUTPATIENT)
Dept: FAMILY MEDICINE | Facility: CLINIC | Age: 37
End: 2022-11-01

## 2022-11-01 VITALS
HEIGHT: 72 IN | HEART RATE: 83 BPM | SYSTOLIC BLOOD PRESSURE: 120 MMHG | OXYGEN SATURATION: 98 % | WEIGHT: 225 LBS | BODY MASS INDEX: 30.48 KG/M2 | DIASTOLIC BLOOD PRESSURE: 82 MMHG | TEMPERATURE: 98 F

## 2022-11-01 DIAGNOSIS — F41.9 ANXIETY: ICD-10-CM

## 2022-11-01 DIAGNOSIS — I10 ESSENTIAL HYPERTENSION: ICD-10-CM

## 2022-11-01 DIAGNOSIS — E11.9 TYPE 2 DIABETES MELLITUS WITHOUT COMPLICATION, WITH LONG-TERM CURRENT USE OF INSULIN: ICD-10-CM

## 2022-11-01 DIAGNOSIS — Z79.4 TYPE 2 DIABETES MELLITUS WITHOUT COMPLICATION, WITH LONG-TERM CURRENT USE OF INSULIN: ICD-10-CM

## 2022-11-01 DIAGNOSIS — N20.0 KIDNEY STONE: ICD-10-CM

## 2022-11-01 LAB — HBA1C MFR BLD: 9 %

## 2022-11-01 PROCEDURE — 1160F PR REVIEW ALL MEDS BY PRESCRIBER/CLIN PHARMACIST DOCUMENTED: ICD-10-PCS | Mod: CPTII,S$GLB,, | Performed by: NURSE PRACTITIONER

## 2022-11-01 PROCEDURE — 3079F DIAST BP 80-89 MM HG: CPT | Mod: CPTII,S$GLB,, | Performed by: NURSE PRACTITIONER

## 2022-11-01 PROCEDURE — 1159F MED LIST DOCD IN RCRD: CPT | Mod: CPTII,S$GLB,, | Performed by: NURSE PRACTITIONER

## 2022-11-01 PROCEDURE — 3052F HG A1C>EQUAL 8.0%<EQUAL 9.0%: CPT | Mod: CPTII,S$GLB,, | Performed by: NURSE PRACTITIONER

## 2022-11-01 PROCEDURE — 83036 POCT HEMOGLOBIN A1C: ICD-10-PCS | Mod: QW,,, | Performed by: NURSE PRACTITIONER

## 2022-11-01 PROCEDURE — 1159F PR MEDICATION LIST DOCUMENTED IN MEDICAL RECORD: ICD-10-PCS | Mod: CPTII,S$GLB,, | Performed by: NURSE PRACTITIONER

## 2022-11-01 PROCEDURE — 3008F PR BODY MASS INDEX (BMI) DOCUMENTED: ICD-10-PCS | Mod: CPTII,S$GLB,, | Performed by: NURSE PRACTITIONER

## 2022-11-01 PROCEDURE — 3074F SYST BP LT 130 MM HG: CPT | Mod: CPTII,S$GLB,, | Performed by: NURSE PRACTITIONER

## 2022-11-01 PROCEDURE — 83036 HEMOGLOBIN GLYCOSYLATED A1C: CPT | Mod: QW,,, | Performed by: NURSE PRACTITIONER

## 2022-11-01 PROCEDURE — 3052F PR MOST RECENT HEMOGLOBIN A1C LEVEL 8.0 - < 9.0%: ICD-10-PCS | Mod: CPTII,S$GLB,, | Performed by: NURSE PRACTITIONER

## 2022-11-01 PROCEDURE — 3008F BODY MASS INDEX DOCD: CPT | Mod: CPTII,S$GLB,, | Performed by: NURSE PRACTITIONER

## 2022-11-01 PROCEDURE — 99214 PR OFFICE/OUTPT VISIT, EST, LEVL IV, 30-39 MIN: ICD-10-PCS | Mod: S$GLB,,, | Performed by: NURSE PRACTITIONER

## 2022-11-01 PROCEDURE — 3079F PR MOST RECENT DIASTOLIC BLOOD PRESSURE 80-89 MM HG: ICD-10-PCS | Mod: CPTII,S$GLB,, | Performed by: NURSE PRACTITIONER

## 2022-11-01 PROCEDURE — 3074F PR MOST RECENT SYSTOLIC BLOOD PRESSURE < 130 MM HG: ICD-10-PCS | Mod: CPTII,S$GLB,, | Performed by: NURSE PRACTITIONER

## 2022-11-01 PROCEDURE — 99214 OFFICE O/P EST MOD 30 MIN: CPT | Mod: S$GLB,,, | Performed by: NURSE PRACTITIONER

## 2022-11-01 PROCEDURE — 1160F RVW MEDS BY RX/DR IN RCRD: CPT | Mod: CPTII,S$GLB,, | Performed by: NURSE PRACTITIONER

## 2022-11-01 PROCEDURE — 4010F PR ACE/ARB THEARPY RXD/TAKEN: ICD-10-PCS | Mod: CPTII,S$GLB,, | Performed by: NURSE PRACTITIONER

## 2022-11-01 PROCEDURE — 4010F ACE/ARB THERAPY RXD/TAKEN: CPT | Mod: CPTII,S$GLB,, | Performed by: NURSE PRACTITIONER

## 2022-11-01 RX ORDER — PEN NEEDLE, DIABETIC 32GX 5/32"
1 NEEDLE, DISPOSABLE MISCELLANEOUS DAILY
Qty: 100 EACH | Refills: 1 | Status: SHIPPED | OUTPATIENT
Start: 2022-11-01

## 2022-11-01 RX ORDER — BUSPIRONE HYDROCHLORIDE 5 MG/1
5 TABLET ORAL 2 TIMES DAILY
Qty: 60 TABLET | Refills: 1 | Status: SHIPPED | OUTPATIENT
Start: 2022-11-01 | End: 2022-12-13

## 2022-11-01 RX ORDER — ACETAMINOPHEN AND CODEINE PHOSPHATE 300; 30 MG/1; MG/1
1 TABLET ORAL EVERY 6 HOURS PRN
Qty: 10 TABLET | Refills: 0 | Status: SHIPPED | OUTPATIENT
Start: 2022-11-01 | End: 2022-12-13

## 2022-11-01 RX ORDER — FEBUXOSTAT 40 MG/1
40 TABLET, FILM COATED ORAL DAILY
Status: ON HOLD | COMMUNITY
Start: 2022-10-04 | End: 2023-08-21

## 2022-11-01 RX ORDER — TAMSULOSIN HYDROCHLORIDE 0.4 MG/1
0.4 CAPSULE ORAL DAILY
Qty: 14 CAPSULE | Refills: 0 | Status: SHIPPED | OUTPATIENT
Start: 2022-11-01 | End: 2022-12-13

## 2022-11-01 NOTE — PROGRESS NOTES
SUBJECTIVE:      Patient ID: Jose Miguel Brennan is a 37 y.o. male.    Chief Complaint: ER follow up (Kidney stones)    Patient is here today to f/u on dm and htn. He is taking 30 units of lantus daily and misses a few doses.he is following with Dr Conte. He was in the ER a few days ago for kidney stones. Continues to have flank pain. Needs referral to urology. He does have a history of kidney stones      Diabetes  He presents for his follow-up diabetic visit. He has type 2 diabetes mellitus. The initial diagnosis of diabetes was made 10 years ago. His disease course has been stable. Hypoglycemia symptoms include nervousness/anxiousness. Pertinent negatives for hypoglycemia include no confusion, dizziness, headaches, pallor, seizures or speech difficulty. Pertinent negatives for diabetes include no blurred vision, no chest pain, no foot paresthesias, no foot ulcerations, no polydipsia, no polyphagia, no polyuria, no visual change and no weakness. There are no hypoglycemic complications. Symptoms are stable. Diabetic complications include nephropathy. Risk factors for coronary artery disease include diabetes mellitus, male sex and hypertension. Current diabetic treatment includes insulin injections and oral agent (monotherapy). He is compliant with treatment most of the time. His weight is stable. He is following a generally unhealthy diet. When asked about meal planning, he reported none. He rarely participates in exercise. An ACE inhibitor/angiotensin II receptor blocker is being taken. He does not see a podiatrist.Eye exam is not current.   Hypertension  This is a chronic problem. The problem is unchanged. The problem is controlled. Associated symptoms include anxiety. Pertinent negatives include no blurred vision, chest pain, headaches, neck pain, palpitations or shortness of breath. Risk factors for coronary artery disease include diabetes mellitus, dyslipidemia, male gender and smoking/tobacco exposure.  Past treatments include angiotensin blockers and calcium channel blockers. The current treatment provides moderate improvement.   Anxiety  Presents for follow-up visit. Symptoms include excessive worry and nervous/anxious behavior. Patient reports no chest pain, confusion, dizziness, palpitations, shortness of breath or suicidal ideas. Symptoms occur most days. The severity of symptoms is mild. The quality of sleep is good. Nighttime awakenings: occasional.         Past Surgical History:   Procedure Laterality Date    COLONOSCOPY N/A 2020    Procedure: COLONOSCOPY;  Surgeon: Hammad Castorena III, MD;  Location: [facility];  Service: Endoscopy;  Laterality: N/A;    ESOPHAGOGASTRODUODENOSCOPY N/A 2020    Procedure: EGD (ESOPHAGOGASTRODUODENOSCOPY);  Surgeon: Hammad Castorena III, MD;  Location: [facility];  Service: Endoscopy;  Laterality: N/A;    nose polyp removal       Family History   Problem Relation Age of Onset    Hypertension Maternal Grandmother     Cancer Maternal Grandfather     Diabetes Maternal Grandfather     Heart disease Maternal Grandfather     Heart disease Mother     Stroke Mother     Diabetes Mother       Social History     Socioeconomic History    Marital status: Single   Tobacco Use    Smoking status: Former     Packs/day: 0.25     Years: 5.00     Pack years: 1.25     Types: Cigars, Cigarettes     Quit date: 2022     Years since quittin.4    Smokeless tobacco: Never    Tobacco comments:     One cigar a day   Substance and Sexual Activity    Alcohol use: Yes    Drug use: No    Sexual activity: Yes     Partners: Female     Current Outpatient Medications   Medication Sig Dispense Refill    amLODIPine (NORVASC) 5 MG tablet Take 1 tablet (5 mg total) by mouth once daily. 90 tablet 1    atorvastatin (LIPITOR) 10 MG tablet Take 1 tablet (10 mg total) by mouth every evening. 90 tablet 1    febuxostat (ULORIC) 40 mg Tab Take 40 mg by mouth once daily.      gabapentin (NEURONTIN) 300 MG  "capsule Take 1 capsule (300 mg total) by mouth 3 (three) times daily. Take 1 tablet every night x 7 days. Increase to twice a day x 7 days. Increase to three times a day. 90 capsule 0    LANTUS SOLOSTAR U-100 INSULIN glargine 100 units/mL SubQ pen INJECT 30 UNITS INTO THE SKIN ONCE DAILY. **MUST LAST 50 DAYS** 90 mL 1    losartan (COZAAR) 100 MG tablet Take 1 tablet (100 mg total) by mouth once daily. 90 tablet 0    omega 3-dha-epa-fish oil 1,000 mg (120 mg-180 mg) Cap Take 1 capsule by mouth 2 (two) times daily. 180 capsule 1    acetaminophen-codeine 300-30mg (TYLENOL #3) 300-30 mg Tab Take 1 tablet by mouth every 6 (six) hours as needed. 10 tablet 0    blood sugar diagnostic Strp To check BG one times daily, to use with insurance preferred meter 100 strip 3    blood-glucose meter kit To check BG one times daily, to use with insurance preferred meter 1 each 0    busPIRone (BUSPAR) 5 MG Tab Take 1 tablet (5 mg total) by mouth 2 (two) times daily. 60 tablet 1    lancets Misc To check BG one times daily, to use with insurance preferred meter 100 each 3    pen needle, diabetic 32 gauge x 5/16" Ndle 1 Units by Misc.(Non-Drug; Combo Route) route once daily. 100 each 1    tamsulosin (FLOMAX) 0.4 mg Cap Take 1 capsule (0.4 mg total) by mouth once daily. 14 capsule 0     No current facility-administered medications for this visit.     Review of patient's allergies indicates:   Allergen Reactions    Zithromax [azithromycin] Rash      Past Medical History:   Diagnosis Date    Anxiety     Asthma     Depression     Diabetes mellitus     diet controlled    Diabetes mellitus, type 2     GERD (gastroesophageal reflux disease)     Gout     Hyperlipidemia     Hypertension     IgA nephropathy     Insomnia      Past Surgical History:   Procedure Laterality Date    COLONOSCOPY N/A 5/8/2020    Procedure: COLONOSCOPY;  Surgeon: Hammad Castorena III, MD;  Location: [facility];  Service: Endoscopy;  Laterality: N/A;    " ESOPHAGOGASTRODUODENOSCOPY N/A 5/8/2020    Procedure: EGD (ESOPHAGOGASTRODUODENOSCOPY);  Surgeon: Hammad Castorena III, MD;  Location: [facility];  Service: Endoscopy;  Laterality: N/A;    nose polyp removal         Review of Systems   Constitutional:  Negative for appetite change, chills, diaphoresis and unexpected weight change.   HENT:  Negative for ear discharge, facial swelling, hearing loss, nosebleeds and trouble swallowing.    Eyes:  Negative for blurred vision, photophobia, pain and visual disturbance.   Respiratory:  Negative for apnea, choking and shortness of breath.    Cardiovascular:  Negative for chest pain and palpitations.   Gastrointestinal:  Negative for abdominal pain, blood in stool and vomiting.   Endocrine: Negative for polydipsia, polyphagia and polyuria.   Genitourinary:  Negative for difficulty urinating and hematuria.   Musculoskeletal:  Negative for gait problem, joint swelling and neck pain.   Skin:  Negative for pallor.   Neurological:  Negative for dizziness, seizures, speech difficulty, weakness and headaches.   Hematological:  Does not bruise/bleed easily.   Psychiatric/Behavioral:  Negative for agitation, confusion, dysphoric mood, self-injury, sleep disturbance and suicidal ideas. The patient is nervous/anxious.     OBJECTIVE:      Vitals:    11/01/22 1337   BP: 120/82   Pulse: 83   Temp: 98.2 °F (36.8 °C)   SpO2: 98%   Weight: 102.1 kg (225 lb)   Height: 6' (1.829 m)     Physical Exam  Vitals and nursing note reviewed.   Constitutional:       General: He is not in acute distress.     Appearance: He is well-developed.   HENT:      Head: Normocephalic and atraumatic.      Nose: Nose normal.      Mouth/Throat:      Pharynx: Uvula midline.   Eyes:      General: Lids are normal.      Conjunctiva/sclera: Conjunctivae normal.      Pupils: Pupils are equal, round, and reactive to light.      Right eye: Pupil is round and reactive.      Left eye: Pupil is round and reactive.   Neck:       Thyroid: No thyromegaly.      Vascular: No carotid bruit.   Cardiovascular:      Rate and Rhythm: Normal rate and regular rhythm.      Pulses: Normal pulses.      Heart sounds: Normal heart sounds.   Pulmonary:      Effort: Pulmonary effort is normal.      Breath sounds: Normal breath sounds. No wheezing, rhonchi or rales.   Abdominal:      General: Bowel sounds are normal.      Palpations: Abdomen is soft. Abdomen is not rigid.      Tenderness: There is no abdominal tenderness. There is left CVA tenderness.   Musculoskeletal:         General: Normal range of motion.      Cervical back: Normal range of motion and neck supple.      Right lower leg: No edema.      Left lower leg: No edema.   Lymphadenopathy:      Cervical: No cervical adenopathy.   Skin:     General: Skin is warm and dry.      Nails: There is no clubbing.   Neurological:      Mental Status: He is alert and oriented to person, place, and time.   Psychiatric:         Mood and Affect: Mood normal.         Speech: Speech normal.         Behavior: Behavior normal. Behavior is cooperative.         Thought Content: Thought content normal.         Judgment: Judgment normal.      Office Visit on 11/01/2022   Component Date Value Ref Range Status    Hemoglobin A1C 11/01/2022 9.0  % Final   Admission on 10/30/2022, Discharged on 10/30/2022   Component Date Value Ref Range Status    WBC 10/30/2022 11.00  3.90 - 12.70 K/uL Final    RBC 10/30/2022 5.18  4.60 - 6.20 M/uL Final    Hemoglobin 10/30/2022 15.2  14.0 - 18.0 g/dL Final    Hematocrit 10/30/2022 45.1  40.0 - 54.0 % Final    MCV 10/30/2022 87  82 - 98 fL Final    MCH 10/30/2022 29.3  27.0 - 31.0 pg Final    MCHC 10/30/2022 33.7  32.0 - 36.0 g/dL Final    RDW 10/30/2022 13.1  11.5 - 14.5 % Final    Platelets 10/30/2022 193  150 - 450 K/uL Final    MPV 10/30/2022 9.9  9.2 - 12.9 fL Final    Immature Granulocytes 10/30/2022 0.3  0.0 - 0.5 % Final    Gran # (ANC) 10/30/2022 6.6  1.8 - 7.7 K/uL Final    Immature  Grans (Abs) 10/30/2022 0.03  0.00 - 0.04 K/uL Final    Comment: Mild elevation in immature granulocytes is non specific and   can be seen in a variety of conditions including stress response,   acute inflammation, trauma and pregnancy. Correlation with other   laboratory and clinical findings is essential.      Lymph # 10/30/2022 3.0  1.0 - 4.8 K/uL Final    Mono # 10/30/2022 0.7  0.3 - 1.0 K/uL Final    Eos # 10/30/2022 0.6 (H)  0.0 - 0.5 K/uL Final    Baso # 10/30/2022 0.06  0.00 - 0.20 K/uL Final    nRBC 10/30/2022 0  0 /100 WBC Final    Gran % 10/30/2022 60.1  38.0 - 73.0 % Final    Lymph % 10/30/2022 27.1  18.0 - 48.0 % Final    Mono % 10/30/2022 6.2  4.0 - 15.0 % Final    Eosinophil % 10/30/2022 5.8  0.0 - 8.0 % Final    Basophil % 10/30/2022 0.5  0.0 - 1.9 % Final    Differential Method 10/30/2022 Automated   Final    Sodium 10/30/2022 137  136 - 145 mmol/L Final    Potassium 10/30/2022 4.6  3.5 - 5.1 mmol/L Final    Chloride 10/30/2022 103  95 - 110 mmol/L Final    CO2 10/30/2022 26  23 - 29 mmol/L Final    Glucose 10/30/2022 298 (H)  70 - 110 mg/dL Final    BUN 10/30/2022 40 (H)  6 - 20 mg/dL Final    Creatinine 10/30/2022 2.8 (H)  0.5 - 1.4 mg/dL Final    Calcium 10/30/2022 8.9  8.7 - 10.5 mg/dL Final    Anion Gap 10/30/2022 8  8 - 16 mmol/L Final    eGFR 10/30/2022 28.9 (A)  >60 mL/min/1.73 m^2 Final    Specimen UA 10/30/2022 Urine, Clean Catch   Final    Color, UA 10/30/2022 Yellow  Yellow, Straw, Kristina Final    Appearance, UA 10/30/2022 Clear  Clear Final    pH, UA 10/30/2022 6.0  5.0 - 8.0 Final    Specific Gravity, UA 10/30/2022 1.010  1.005 - 1.030 Final    Protein, UA 10/30/2022 1+ (A)  Negative Final    Comment: Recommend a 24 hour urine protein or a urine   protein/creatinine ratio if globulin induced proteinuria is  clinically suspected.      Glucose, UA 10/30/2022 4+ (A)  Negative Final    Ketones, UA 10/30/2022 Negative  Negative Final    Bilirubin (UA) 10/30/2022 Negative  Negative Final     Occult Blood UA 10/30/2022 Negative  Negative Final    Nitrite, UA 10/30/2022 Negative  Negative Final    Urobilinogen, UA 10/30/2022 Negative  Negative EU/dL Final    Leukocytes, UA 10/30/2022 Negative  Negative Final    RBC, UA 10/30/2022 0  0 - 4 /hpf Final    WBC, UA 10/30/2022 0  0 - 5 /hpf Final    Bacteria 10/30/2022 Negative  None-Occ /hpf Final    Yeast, UA 10/30/2022 None  None Final    Squam Epithel, UA 10/30/2022 0  /hpf Final    Hyaline Casts, UA 10/30/2022 3 (A)  0-1/lpf /lpf Final    Microscopic Comment 10/30/2022 SEE COMMENT   Final    Comment: Other formed elements not mentioned in the report are not   present in the microscopic examination.       Influenza A, Molecular 10/30/2022 Negative  Negative Final    Influenza B, Molecular 10/30/2022 Negative  Negative Corrected    Comment: CORRECTED RESULT; previously reported as Invalid on 10/30/2022 at   17:02.      Flu A & B Source 10/30/2022 NP   Final    SARS-CoV-2 RNA, Amplification, Qual 10/30/2022 Negative  Negative Final    Comment: This test utilizes isothermal nucleic acid amplification technology   to   detect the SARS-CoV-2 RdRp nucleic acid segment. The analytical   sensitivity   (limit of detection) is 500 copies/swab.     A POSITIVE result is indicative of the presence of SARS-CoV-2 RNA;   clinical   correlation with patient history and other diagnostic information is   necessary to determine patient infection status.    A NEGATIVE result means that SARS-CoV-2 nucleic acids are not present   above   the limit of detection. A NEGATIVE result should be treated as   presumptive.   It does not rule out the possibility of COVID-19 and should not be   the sole   basis for treatment decisions. If COVID-19 is strongly suspected   based on   clinical and exposure history, re-testing using an alternate   molecular assay   should be considered.     This test is only for use under the Food and Drug Administration s   Emergency   Use Authorization (EUA).  "    Commercial kits are provided by [employer]. Performanc                           e   characteristics of the EUA have been independently verified by   [facility] Department of Pathology and Laboratory Medicine.   _________________________________________________________________   The authorized Fact Sheet for Healthcare Providers and the authorized   Fact   Sheet for Patients of the ID NOW COVID-19 are available on the FDA   website:   https://www.fda.gov/media/161981/download   https://www.fda.gov/media/092867/download      CPK 10/30/2022 77  20 - 200 U/L Final   ]  Assessment:       1. Type 2 diabetes mellitus without complication, with long-term current use of insulin    2. Essential hypertension    3. Kidney stone    4. Anxiety        Plan:       Type 2 diabetes mellitus without complication, with long-term current use of insulin  -     POCT HEMOGLOBIN A1C  -     Ambulatory referral/consult to Endocrinology; Future; Expected date: 11/08/2022  -     pen needle, diabetic 32 gauge x 5/16" Ndle; 1 Units by Misc.(Non-Drug; Combo Route) route once daily.  Dispense: 100 each; Refill: 1  Once again discussed with patient adjusting his insulin 2 units every 2 days for fasting glucose >150. Encouraged strict diet and f/u with endo    Essential hypertension  Stable on current meds    Kidney stone  -     Ambulatory referral/consult to Urology; Future; Expected date: 11/08/2022  -   start  tamsulosin (FLOMAX) 0.4 mg Cap; Take 1 capsule (0.4 mg total) by mouth once daily.  Dispense: 14 capsule; Refill: 0  -   start  acetaminophen-codeine 300-30mg (TYLENOL #3) 300-30 mg Tab; Take 1 tablet by mouth every 6 (six) hours as needed.  Dispense: 10 tablet; Refill: 0  Hydrate well, if pain worsens report to ER    Anxiety  -   start  busPIRone (BUSPAR) 5 MG Tab; Take 1 tablet (5 mg total) by mouth 2 (two) times daily.  Dispense: 60 tablet; Refill: 1      Follow up for has f/u .      11/1/2022 WU Tucker, " FNP

## 2022-11-01 NOTE — LETTER
November 1, 2022      [facility] - [address] [facility] / Internal Medicine  [address]  [place] [facility] [address]  Phone: [phone]  Fax: [phone]       Patient: Jose Miguel Brennan   YOB: 1985  Date of Visit: 11/01/2022    To Whom It May Concern:    Devin Brennan  was at [facility] on 11/01/2022. The patient may return to work/school on 1103/2022/ with no restrictions. If you have any questions or concerns, or if I can be of further assistance, please do not hesitate to contact me.    Sincerely,

## 2022-11-02 ENCOUNTER — TELEPHONE (OUTPATIENT)
Dept: ENDOCRINOLOGY | Facility: CLINIC | Age: 37
End: 2022-11-02
Payer: MEDICAID

## 2022-11-02 ENCOUNTER — PATIENT OUTREACH (OUTPATIENT)
Dept: EMERGENCY MEDICINE | Facility: HOSPITAL | Age: 37
End: 2022-11-02

## 2022-11-02 NOTE — TELEPHONE ENCOUNTER
Endo referral scheduled with patient: 3/2023 Bel Zacarias NP [facility].

## 2022-11-03 ENCOUNTER — TELEPHONE (OUTPATIENT)
Dept: FAMILY MEDICINE | Facility: CLINIC | Age: 37
End: 2022-11-03

## 2022-11-03 ENCOUNTER — PATIENT MESSAGE (OUTPATIENT)
Dept: FAMILY MEDICINE | Facility: CLINIC | Age: 37
End: 2022-11-03

## 2022-11-03 NOTE — LETTER
November 3, 2022      [facility] - [address] [facility] / Internal Medicine  [address]  [place] [address]  Phone: [phone]  Fax: [phone]       Patient: Jose Miguel Brennan   YOB: 1985  Date of Visit: 11/03/2022    To Whom It May Concern:    Devin Brennan  was at [facility] on 11/03/2022. The patient may return to work/school on 11/07/2022 with no restrictions. If you have any questions or concerns, or if I can be of further assistance, please do not hesitate to contact me.    Sincerely,

## 2022-11-08 ENCOUNTER — PATIENT MESSAGE (OUTPATIENT)
Dept: FAMILY MEDICINE | Facility: CLINIC | Age: 37
End: 2022-11-08

## 2022-11-08 ENCOUNTER — CLINICAL SUPPORT (OUTPATIENT)
Dept: FAMILY MEDICINE | Facility: CLINIC | Age: 37
End: 2022-11-08
Payer: MEDICAID

## 2022-11-08 DIAGNOSIS — Z23 NEED FOR INFLUENZA VACCINATION: Primary | ICD-10-CM

## 2022-11-08 PROCEDURE — 90686 IIV4 VACC NO PRSV 0.5 ML IM: CPT | Mod: S$GLB,,, | Performed by: NURSE PRACTITIONER

## 2022-11-08 PROCEDURE — 90686 FLU VACCINE (QUAD) GREATER THAN OR EQUAL TO 3YO PRESERVATIVE FREE IM: ICD-10-PCS | Mod: S$GLB,,, | Performed by: NURSE PRACTITIONER

## 2022-11-08 PROCEDURE — 90471 FLU VACCINE (QUAD) GREATER THAN OR EQUAL TO 3YO PRESERVATIVE FREE IM: ICD-10-PCS | Mod: S$GLB,,, | Performed by: NURSE PRACTITIONER

## 2022-11-08 PROCEDURE — 90471 IMMUNIZATION ADMIN: CPT | Mod: S$GLB,,, | Performed by: NURSE PRACTITIONER

## 2022-11-15 ENCOUNTER — OFFICE VISIT (OUTPATIENT)
Dept: ORTHOPEDICS | Facility: CLINIC | Age: 37
End: 2022-11-15
Payer: MEDICAID

## 2022-11-15 VITALS — HEIGHT: 72 IN | BODY MASS INDEX: 29.8 KG/M2 | WEIGHT: 220 LBS

## 2022-11-15 DIAGNOSIS — M75.42 IMPINGEMENT SYNDROME OF SHOULDER, LEFT: Primary | ICD-10-CM

## 2022-11-15 PROCEDURE — 4010F PR ACE/ARB THEARPY RXD/TAKEN: ICD-10-PCS | Mod: CPTII,S$GLB,, | Performed by: ORTHOPAEDIC SURGERY

## 2022-11-15 PROCEDURE — 99213 OFFICE O/P EST LOW 20 MIN: CPT | Mod: S$GLB,,, | Performed by: ORTHOPAEDIC SURGERY

## 2022-11-15 PROCEDURE — 3052F HG A1C>EQUAL 8.0%<EQUAL 9.0%: CPT | Mod: CPTII,S$GLB,, | Performed by: ORTHOPAEDIC SURGERY

## 2022-11-15 PROCEDURE — 1160F PR REVIEW ALL MEDS BY PRESCRIBER/CLIN PHARMACIST DOCUMENTED: ICD-10-PCS | Mod: CPTII,S$GLB,, | Performed by: ORTHOPAEDIC SURGERY

## 2022-11-15 PROCEDURE — 1160F RVW MEDS BY RX/DR IN RCRD: CPT | Mod: CPTII,S$GLB,, | Performed by: ORTHOPAEDIC SURGERY

## 2022-11-15 PROCEDURE — 3008F PR BODY MASS INDEX (BMI) DOCUMENTED: ICD-10-PCS | Mod: CPTII,S$GLB,, | Performed by: ORTHOPAEDIC SURGERY

## 2022-11-15 PROCEDURE — 4010F ACE/ARB THERAPY RXD/TAKEN: CPT | Mod: CPTII,S$GLB,, | Performed by: ORTHOPAEDIC SURGERY

## 2022-11-15 PROCEDURE — 99213 PR OFFICE/OUTPT VISIT, EST, LEVL III, 20-29 MIN: ICD-10-PCS | Mod: S$GLB,,, | Performed by: ORTHOPAEDIC SURGERY

## 2022-11-15 PROCEDURE — 1159F PR MEDICATION LIST DOCUMENTED IN MEDICAL RECORD: ICD-10-PCS | Mod: CPTII,S$GLB,, | Performed by: ORTHOPAEDIC SURGERY

## 2022-11-15 PROCEDURE — 3052F PR MOST RECENT HEMOGLOBIN A1C LEVEL 8.0 - < 9.0%: ICD-10-PCS | Mod: CPTII,S$GLB,, | Performed by: ORTHOPAEDIC SURGERY

## 2022-11-15 PROCEDURE — 1159F MED LIST DOCD IN RCRD: CPT | Mod: CPTII,S$GLB,, | Performed by: ORTHOPAEDIC SURGERY

## 2022-11-15 PROCEDURE — 3008F BODY MASS INDEX DOCD: CPT | Mod: CPTII,S$GLB,, | Performed by: ORTHOPAEDIC SURGERY

## 2022-11-15 RX ORDER — MELOXICAM 7.5 MG/1
7.5 TABLET ORAL DAILY
Qty: 30 TABLET | Refills: 1 | Status: SHIPPED | OUTPATIENT
Start: 2022-11-15 | End: 2022-12-13

## 2022-11-15 RX ORDER — PEN NEEDLE, DIABETIC 32GX 5/32"
NEEDLE, DISPOSABLE MISCELLANEOUS DAILY
COMMUNITY
Start: 2022-11-01

## 2022-11-15 NOTE — PROGRESS NOTES
"[facility]    Subjective:     Chief Complaint:   Chief Complaint   Patient presents with    Left Shoulder - Pain     He is here for left shoulder today, last seen 1/27/22 with Dr Chu, ordered PT, he did not do any therapy at all, pain is severe and constant, limited and painful ROM       Past Medical History:   Diagnosis Date    Anxiety     Asthma     Depression     Diabetes mellitus     diet controlled    Diabetes mellitus, type 2     GERD (gastroesophageal reflux disease)     Gout     Hyperlipidemia     Hypertension     IgA nephropathy     Insomnia        Past Surgical History:   Procedure Laterality Date    COLONOSCOPY N/A 5/8/2020    Procedure: COLONOSCOPY;  Surgeon: Hammad Castorena III, MD;  Location: [facility];  Service: Endoscopy;  Laterality: N/A;    ESOPHAGOGASTRODUODENOSCOPY N/A 5/8/2020    Procedure: EGD (ESOPHAGOGASTRODUODENOSCOPY);  Surgeon: Hammad Castorena III, MD;  Location: [facility];  Service: Endoscopy;  Laterality: N/A;    nose polyp removal         Current Outpatient Medications   Medication Sig    amLODIPine (NORVASC) 5 MG tablet Take 1 tablet (5 mg total) by mouth once daily.    atorvastatin (LIPITOR) 10 MG tablet Take 1 tablet (10 mg total) by mouth every evening.    BD CONOR 2ND GEN PEN NEEDLE 32 gauge x 5/32" Ndle once daily. Use    blood sugar diagnostic Strp To check BG one times daily, to use with insurance preferred meter    blood-glucose meter kit To check BG one times daily, to use with insurance preferred meter    busPIRone (BUSPAR) 5 MG Tab Take 1 tablet (5 mg total) by mouth 2 (two) times daily.    gabapentin (NEURONTIN) 300 MG capsule Take 1 capsule (300 mg total) by mouth 3 (three) times daily. Take 1 tablet every night x 7 days. Increase to twice a day x 7 days. Increase to three times a day.    lancets Misc To check BG one times daily, to use with insurance preferred meter    LANTUS SOLOSTAR U-100 INSULIN glargine 100 units/mL SubQ pen INJECT 30 UNITS INTO " "THE SKIN ONCE DAILY. **MUST LAST 50 DAYS**    losartan (COZAAR) 100 MG tablet Take 1 tablet (100 mg total) by mouth once daily.    omega 3-dha-epa-fish oil 1,000 mg (120 mg-180 mg) Cap Take 1 capsule by mouth 2 (two) times daily.    pen needle, diabetic 32 gauge x 5/16" Ndle 1 Units by Misc.(Non-Drug; Combo Route) route once daily.    tamsulosin (FLOMAX) 0.4 mg Cap Take 1 capsule (0.4 mg total) by mouth once daily.    acetaminophen-codeine 300-30mg (TYLENOL #3) 300-30 mg Tab Take 1 tablet by mouth every 6 (six) hours as needed. (Patient not taking: Reported on 11/15/2022)    febuxostat (ULORIC) 40 mg Tab Take 40 mg by mouth once daily.     No current facility-administered medications for this visit.       Review of patient's allergies indicates:   Allergen Reactions    Zithromax [azithromycin] Rash       Family History   Problem Relation Age of Onset    Hypertension Maternal Grandmother     Cancer Maternal Grandfather     Diabetes Maternal Grandfather     Heart disease Maternal Grandfather     Heart disease Mother     Stroke Mother     Diabetes Mother        Social History     Socioeconomic History    Marital status: Single   Tobacco Use    Smoking status: Former     Packs/day: 0.25     Years: 5.00     Pack years: 1.25     Types: Cigars, Cigarettes     Quit date: 2022     Years since quittin.5    Smokeless tobacco: Never    Tobacco comments:     One cigar a day   Substance and Sexual Activity    Alcohol use: Yes    Drug use: No    Sexual activity: Yes     Partners: Female       History of present illness:  Patient comes in today for the left shoulder.  He has had left shoulder pain for well over a year.  Unfortunately it just has not gotten better.  He can not sleep at night and this makes it very difficult for him to work.  He is miserable.      Review of Systems:    Constitution: Negative for chills, fever, and sweats.  Negative for unexplained weight loss.    HENT:  Negative for headaches and blurry " vision.    Cardiovascular:Negative for chest pain or irregular heart beat. Negative for hypertension.    Respiratory:  Negative for cough and shortness of breath.    Gastrointestinal: Negative for abdominal pain, heartburn, melena, nausea, and vomitting.    Genitourinary:  Negative bladder incontinence and dysuria.    Musculoskeletal:  See HPI for details.     Neurological: Negative for numbness.    Psychiatric/Behavioral: Negative for depression.  The patient is not nervous/anxious.      Endocrine: Negative for polyuria    Hematologic/Lymphatic: Negative for bleeding problem.  Does not bruise/bleed easily.    Skin: Negative for poor would healing and rash    Objective:      Physical Examination:    Vital Signs:  There were no vitals filed for this visit.    Body mass index is 29.84 kg/m².    This a well-developed, well nourished patient in no acute distress.  They are alert and oriented and cooperative to examination.        Patient has full passive range of motion of the left shoulder.  Very positive impingement.  His rotator cuff is intact but extremely tender Spurling sign is negative very tender over the AC joint very positive crossover.  Neurovascular intact to motor and sensory testing at the hands.  Pertinent New Results:    XRAY Report / Interpretation:  Two views of the left shoulder demonstrates a type 2 acromion no fractures or subluxations moderate osteoarthritis of the acromioclavicular joint    Assessment/Plan:      Impingement syndrome.  Acromioclavicular impingement.  I injected the subacromial space with Kenalog and lidocaine I started him on a strengthening program.  Started him on anti-inflammatories.  He will follow-up in 6 weeks      This note was created using Dragon voice recognition software that occasionally misinterpreted phrases or words.

## 2022-11-17 ENCOUNTER — PATIENT MESSAGE (OUTPATIENT)
Dept: ORTHOPEDICS | Facility: CLINIC | Age: 37
End: 2022-11-17

## 2022-11-17 ENCOUNTER — PATIENT MESSAGE (OUTPATIENT)
Dept: FAMILY MEDICINE | Facility: CLINIC | Age: 37
End: 2022-11-17

## 2022-11-17 ENCOUNTER — HOSPITAL ENCOUNTER (EMERGENCY)
Facility: HOSPITAL | Age: 37
Discharge: HOME | End: 2022-11-17
Attending: EMERGENCY MEDICINE
Payer: MEDICAID

## 2022-11-17 VITALS
DIASTOLIC BLOOD PRESSURE: 84 MMHG | BODY MASS INDEX: 29.12 KG/M2 | HEART RATE: 84 BPM | TEMPERATURE: 98 F | WEIGHT: 215 LBS | RESPIRATION RATE: 16 BRPM | HEIGHT: 72 IN | OXYGEN SATURATION: 95 % | SYSTOLIC BLOOD PRESSURE: 146 MMHG

## 2022-11-17 DIAGNOSIS — E11.9 TYPE 2 DIABETES MELLITUS WITHOUT COMPLICATION, WITH LONG-TERM CURRENT USE OF INSULIN: ICD-10-CM

## 2022-11-17 DIAGNOSIS — E10.65 UNCONTROLLED TYPE 1 DIABETES MELLITUS WITH HYPERGLYCEMIA: Primary | ICD-10-CM

## 2022-11-17 DIAGNOSIS — Z79.4 TYPE 2 DIABETES MELLITUS WITHOUT COMPLICATION, WITH LONG-TERM CURRENT USE OF INSULIN: ICD-10-CM

## 2022-11-17 DIAGNOSIS — Z87.448 HISTORY OF PRIMARY IGA NEPHROPATHY: ICD-10-CM

## 2022-11-17 DIAGNOSIS — R73.9 HYPERGLYCEMIA: ICD-10-CM

## 2022-11-17 DIAGNOSIS — N18.9 CHRONIC KIDNEY DISEASE, UNSPECIFIED CKD STAGE: ICD-10-CM

## 2022-11-17 LAB
ALBUMIN SERPL BCP-MCNC: 4.1 G/DL (ref 3.5–5.2)
ALLENS TEST: ABNORMAL
ALP SERPL-CCNC: 115 U/L (ref 55–135)
ALT SERPL W/O P-5'-P-CCNC: 28 U/L (ref 10–44)
ANION GAP SERPL CALC-SCNC: 12 MMOL/L (ref 8–16)
AST SERPL-CCNC: 23 U/L (ref 10–40)
B-OH-BUTYR BLD STRIP-SCNC: 0.1 MMOL/L (ref 0–0.5)
BACTERIA #/AREA URNS HPF: NEGATIVE /HPF
BASOPHILS # BLD AUTO: 0.06 K/UL (ref 0–0.2)
BASOPHILS NFR BLD: 0.4 % (ref 0–1.9)
BILIRUB SERPL-MCNC: 1 MG/DL (ref 0.1–1)
BILIRUB UR QL STRIP: NEGATIVE
BUN SERPL-MCNC: 56 MG/DL (ref 6–20)
CALCIUM SERPL-MCNC: 8.8 MG/DL (ref 8.7–10.5)
CHLORIDE SERPL-SCNC: 95 MMOL/L (ref 95–110)
CLARITY UR: CLEAR
CO2 SERPL-SCNC: 20 MMOL/L (ref 23–29)
COLOR UR: COLORLESS
CREAT SERPL-MCNC: 3.1 MG/DL (ref 0.5–1.4)
DELSYS: ABNORMAL
DIFFERENTIAL METHOD: ABNORMAL
EOSINOPHIL # BLD AUTO: 0.1 K/UL (ref 0–0.5)
EOSINOPHIL NFR BLD: 0.7 % (ref 0–8)
ERYTHROCYTE [DISTWIDTH] IN BLOOD BY AUTOMATED COUNT: 13.1 % (ref 11.5–14.5)
EST. GFR  (NO RACE VARIABLE): 25.6 ML/MIN/1.73 M^2
GLUCOSE SERPL-MCNC: 224 MG/DL (ref 70–110)
GLUCOSE SERPL-MCNC: 292 MG/DL (ref 70–110)
GLUCOSE SERPL-MCNC: 305 MG/DL (ref 70–110)
GLUCOSE SERPL-MCNC: 358 MG/DL (ref 70–110)
GLUCOSE SERPL-MCNC: 401 MG/DL (ref 70–110)
GLUCOSE SERPL-MCNC: 534 MG/DL (ref 70–110)
GLUCOSE SERPL-MCNC: 535 MG/DL (ref 70–110)
GLUCOSE UR QL STRIP: ABNORMAL
GROUP A STREP, MOLECULAR: NEGATIVE
HCO3 UR-SCNC: 23.1 MMOL/L (ref 24–28)
HCT VFR BLD AUTO: 47.1 % (ref 40–54)
HGB BLD-MCNC: 15.6 G/DL (ref 14–18)
HGB UR QL STRIP: NEGATIVE
HYALINE CASTS #/AREA URNS LPF: 0 /LPF
IMM GRANULOCYTES # BLD AUTO: 0.17 K/UL (ref 0–0.04)
IMM GRANULOCYTES NFR BLD AUTO: 1.1 % (ref 0–0.5)
INFLUENZA A, MOLECULAR: NEGATIVE
INFLUENZA B, MOLECULAR: NEGATIVE
KETONES UR QL STRIP: NEGATIVE
LEUKOCYTE ESTERASE UR QL STRIP: NEGATIVE
LYMPHOCYTES # BLD AUTO: 3.3 K/UL (ref 1–4.8)
LYMPHOCYTES NFR BLD: 21.1 % (ref 18–48)
MCH RBC QN AUTO: 29.2 PG (ref 27–31)
MCHC RBC AUTO-ENTMCNC: 33.1 G/DL (ref 32–36)
MCV RBC AUTO: 88 FL (ref 82–98)
MICROSCOPIC COMMENT: ABNORMAL
MONOCYTES # BLD AUTO: 1 K/UL (ref 0.3–1)
MONOCYTES NFR BLD: 6.3 % (ref 4–15)
NEUTROPHILS # BLD AUTO: 11 K/UL (ref 1.8–7.7)
NEUTROPHILS NFR BLD: 70.4 % (ref 38–73)
NITRITE UR QL STRIP: NEGATIVE
NRBC BLD-RTO: 0 /100 WBC
PCO2 BLDA: 45.1 MMHG (ref 35–45)
PH SMN: 7.32 [PH] (ref 7.35–7.45)
PH UR STRIP: 7 [PH] (ref 5–8)
PLATELET # BLD AUTO: 254 K/UL (ref 150–450)
PMV BLD AUTO: 10.7 FL (ref 9.2–12.9)
PO2 BLDA: 62 MMHG (ref 40–60)
POC BE: -3 MMOL/L
POC SATURATED O2: 89 % (ref 95–100)
POC TCO2: 24 MMOL/L (ref 24–29)
POTASSIUM SERPL-SCNC: 4.7 MMOL/L (ref 3.5–5.1)
PROT SERPL-MCNC: 7.1 G/DL (ref 6–8.4)
PROT UR QL STRIP: ABNORMAL
RBC # BLD AUTO: 5.35 M/UL (ref 4.6–6.2)
RBC #/AREA URNS HPF: 0 /HPF (ref 0–4)
SAMPLE: ABNORMAL
SARS-COV-2 RDRP RESP QL NAA+PROBE: NEGATIVE
SITE: ABNORMAL
SODIUM SERPL-SCNC: 127 MMOL/L (ref 136–145)
SP GR UR STRIP: 1.01 (ref 1–1.03)
SPECIMEN SOURCE: NORMAL
SQUAMOUS #/AREA URNS HPF: 0 /HPF
URN SPEC COLLECT METH UR: ABNORMAL
UROBILINOGEN UR STRIP-ACNC: NEGATIVE EU/DL
WBC # BLD AUTO: 15.67 K/UL (ref 3.9–12.7)
WBC #/AREA URNS HPF: 0 /HPF (ref 0–5)
YEAST URNS QL MICRO: ABNORMAL

## 2022-11-17 PROCEDURE — 82803 BLOOD GASES ANY COMBINATION: CPT

## 2022-11-17 PROCEDURE — 99285 EMERGENCY DEPT VISIT HI MDM: CPT | Mod: 25

## 2022-11-17 PROCEDURE — 25000003 PHARM REV CODE 250: Performed by: EMERGENCY MEDICINE

## 2022-11-17 PROCEDURE — U0002 COVID-19 LAB TEST NON-CDC: HCPCS | Performed by: EMERGENCY MEDICINE

## 2022-11-17 PROCEDURE — 96376 TX/PRO/DX INJ SAME DRUG ADON: CPT

## 2022-11-17 PROCEDURE — 87502 INFLUENZA DNA AMP PROBE: CPT | Performed by: EMERGENCY MEDICINE

## 2022-11-17 PROCEDURE — 81001 URINALYSIS AUTO W/SCOPE: CPT | Performed by: EMERGENCY MEDICINE

## 2022-11-17 PROCEDURE — 96374 THER/PROPH/DIAG INJ IV PUSH: CPT

## 2022-11-17 PROCEDURE — 85025 COMPLETE CBC W/AUTO DIFF WBC: CPT | Performed by: EMERGENCY MEDICINE

## 2022-11-17 PROCEDURE — 87651 STREP A DNA AMP PROBE: CPT | Performed by: EMERGENCY MEDICINE

## 2022-11-17 PROCEDURE — 93010 ELECTROCARDIOGRAM REPORT: CPT | Mod: ,,, | Performed by: INTERNAL MEDICINE

## 2022-11-17 PROCEDURE — 96375 TX/PRO/DX INJ NEW DRUG ADDON: CPT

## 2022-11-17 PROCEDURE — 93010 EKG 12-LEAD: ICD-10-PCS | Mod: ,,, | Performed by: INTERNAL MEDICINE

## 2022-11-17 PROCEDURE — 93005 ELECTROCARDIOGRAM TRACING: CPT | Performed by: INTERNAL MEDICINE

## 2022-11-17 PROCEDURE — 99900035 HC TECH TIME PER 15 MIN (STAT)

## 2022-11-17 PROCEDURE — 63600175 PHARM REV CODE 636 W HCPCS: Performed by: EMERGENCY MEDICINE

## 2022-11-17 PROCEDURE — 82010 KETONE BODYS QUAN: CPT | Performed by: EMERGENCY MEDICINE

## 2022-11-17 PROCEDURE — 80053 COMPREHEN METABOLIC PANEL: CPT | Performed by: EMERGENCY MEDICINE

## 2022-11-17 PROCEDURE — 82962 GLUCOSE BLOOD TEST: CPT

## 2022-11-17 PROCEDURE — 96361 HYDRATE IV INFUSION ADD-ON: CPT

## 2022-11-17 RX ORDER — AMLODIPINE BESYLATE 5 MG/1
5 TABLET ORAL
Status: COMPLETED | OUTPATIENT
Start: 2022-11-17 | End: 2022-11-17

## 2022-11-17 RX ORDER — HYDROMORPHONE HYDROCHLORIDE 1 MG/ML
0.5 INJECTION, SOLUTION INTRAMUSCULAR; INTRAVENOUS; SUBCUTANEOUS
Status: COMPLETED | OUTPATIENT
Start: 2022-11-17 | End: 2022-11-17

## 2022-11-17 RX ORDER — LOSARTAN POTASSIUM 25 MG/1
100 TABLET ORAL
Status: COMPLETED | OUTPATIENT
Start: 2022-11-17 | End: 2022-11-17

## 2022-11-17 RX ORDER — INSULIN GLARGINE 100 [IU]/ML
30 INJECTION, SOLUTION SUBCUTANEOUS DAILY
Qty: 9 ML | Refills: 1 | Status: SHIPPED | OUTPATIENT
Start: 2022-11-17 | End: 2023-06-12

## 2022-11-17 RX ORDER — MORPHINE SULFATE 4 MG/ML
4 INJECTION, SOLUTION INTRAMUSCULAR; INTRAVENOUS
Status: COMPLETED | OUTPATIENT
Start: 2022-11-17 | End: 2022-11-17

## 2022-11-17 RX ADMIN — INSULIN HUMAN 5 UNITS: 100 INJECTION, SOLUTION PARENTERAL at 07:11

## 2022-11-17 RX ADMIN — SODIUM CHLORIDE 1000 ML: 0.9 INJECTION, SOLUTION INTRAVENOUS at 05:11

## 2022-11-17 RX ADMIN — INSULIN HUMAN 5 UNITS: 100 INJECTION, SOLUTION PARENTERAL at 09:11

## 2022-11-17 RX ADMIN — AMLODIPINE BESYLATE 5 MG: 5 TABLET ORAL at 05:11

## 2022-11-17 RX ADMIN — HYDROMORPHONE HYDROCHLORIDE 0.5 MG: 0.5 INJECTION, SOLUTION INTRAMUSCULAR; INTRAVENOUS; SUBCUTANEOUS at 07:11

## 2022-11-17 RX ADMIN — INSULIN HUMAN 10 UNITS: 100 INJECTION, SOLUTION PARENTERAL at 04:11

## 2022-11-17 RX ADMIN — SODIUM CHLORIDE 1000 ML: 0.9 INJECTION, SOLUTION INTRAVENOUS at 08:11

## 2022-11-17 RX ADMIN — SODIUM CHLORIDE 1000 ML: 0.9 INJECTION, SOLUTION INTRAVENOUS at 03:11

## 2022-11-17 RX ADMIN — MORPHINE SULFATE 4 MG: 4 INJECTION, SOLUTION INTRAMUSCULAR; INTRAVENOUS at 04:11

## 2022-11-17 RX ADMIN — LOSARTAN POTASSIUM 100 MG: 25 TABLET, FILM COATED ORAL at 05:11

## 2022-11-17 NOTE — ED PROVIDER NOTES
Encounter Date: 11/17/2022       History     Chief Complaint   Patient presents with    Hyperglycemia     501 BS @ home     Emergent evaluation of a 37-year-old male with history of insulin-dependent diabetes uses long-acting insulin at night, history of hypertension, hyperlipidemia, GERD, IgA nephropathy, history of kidney stones, asthma and insomnia.  Patient presents to the ER due to hyperglycemia.  He reports he woke at approximately 1:00 a.m. with nausea and vomiting as well as flank pain.  He reports that this is how he feels when his glucose is elevated.  Yesterday he reports glucoses in the 200s but when he took his glucose at home was 501 and 505.  Patient taken 40 units of insulin at 8:00 p.m..  Reports he does not take short-acting insulin.  Was taken off of his oral diabetes medications approximately 2 months ago.  He reports he did see an orthopedist on Tuesday and had a steroid injection to the left shoulder.  And he has been having a upper respiratory tract infection for 1 week that he believes is improving.     Review of patient's allergies indicates:   Allergen Reactions    Zithromax [azithromycin] Rash     Past Medical History:   Diagnosis Date    Anxiety     Asthma     Depression     Diabetes mellitus     diet controlled    Diabetes mellitus, type 2     GERD (gastroesophageal reflux disease)     Gout     Hyperlipidemia     Hypertension     IgA nephropathy     Insomnia      Past Surgical History:   Procedure Laterality Date    COLONOSCOPY N/A 5/8/2020    Procedure: COLONOSCOPY;  Surgeon: Hammad Castorena III, MD;  Location: [facility];  Service: Endoscopy;  Laterality: N/A;    ESOPHAGOGASTRODUODENOSCOPY N/A 5/8/2020    Procedure: EGD (ESOPHAGOGASTRODUODENOSCOPY);  Surgeon: Hammad Castorena III, MD;  Location: [facility];  Service: Endoscopy;  Laterality: N/A;    nose polyp removal       Family History   Problem Relation Age of Onset    Hypertension Maternal Grandmother     Cancer Maternal  Grandfather     Diabetes Maternal Grandfather     Heart disease Maternal Grandfather     Heart disease Mother     Stroke Mother     Diabetes Mother      Social History     Tobacco Use    Smoking status: Former     Packs/day: 0.25     Years: 5.00     Pack years: 1.25     Types: Cigars, Cigarettes     Quit date: 2022     Years since quittin.5    Smokeless tobacco: Never    Tobacco comments:     One cigar a day   Substance Use Topics    Alcohol use: Yes    Drug use: No     Review of Systems   Constitutional:  Negative for activity change, appetite change, chills, diaphoresis, fatigue and fever.   HENT:  Negative for congestion, postnasal drip and rhinorrhea.    Respiratory:  Negative for cough, chest tightness, shortness of breath and wheezing.    Cardiovascular:  Negative for chest pain and palpitations.   Gastrointestinal:  Positive for nausea and vomiting. Negative for abdominal pain, constipation and diarrhea.   Genitourinary:  Positive for flank pain. Negative for dysuria, frequency and urgency.   Musculoskeletal:  Positive for myalgias. Negative for neck pain and neck stiffness.   Neurological:  Negative for dizziness, weakness, light-headedness, numbness and headaches.   Psychiatric/Behavioral:  Negative for agitation and confusion.    All other systems reviewed and are negative.    Physical Exam     Initial Vitals [22 0352]   BP Pulse Resp Temp SpO2   (!) 154/103 100 20 97.7 °F (36.5 °C) 95 %      MAP       --         Physical Exam    Nursing note and vitals reviewed.  Constitutional: He appears well-developed and well-nourished. He is not diaphoretic. No distress.   HENT:   Head: Normocephalic and atraumatic.   Right Ear: External ear normal.   Left Ear: External ear normal.   Nose: Nose normal.   Mouth/Throat: Oropharynx is clear and moist.   Eyes: Conjunctivae and EOM are normal. Pupils are equal, round, and reactive to light.   Neck: Neck supple. No tracheal deviation present.   Normal range  of motion.  Cardiovascular:  Normal rate, regular rhythm, normal heart sounds and intact distal pulses.     Exam reveals no gallop and no friction rub.       No murmur heard.  Blood pressure 154/103 pulse 100   Pulmonary/Chest: Breath sounds normal. No stridor. No respiratory distress. He has no wheezes. He has no rhonchi. He has no rales. He exhibits no tenderness.   Respiratory rate 24 sats 95%   Abdominal: Abdomen is soft. Bowel sounds are normal. He exhibits no distension and no mass. There is no abdominal tenderness. There is no rebound and no guarding.   Musculoskeletal:         General: No edema. Normal range of motion.      Cervical back: Normal range of motion and neck supple.     Neurological: He is alert and oriented to person, place, and time. He has normal strength. No cranial nerve deficit or sensory deficit.   Skin: Skin is warm and dry. No rash noted. No erythema. No pallor.   Psychiatric: He has a normal mood and affect. His behavior is normal. Judgment and thought content normal.       ED Course   Procedures  Labs Reviewed   CBC W/ AUTO DIFFERENTIAL - Abnormal; Notable for the following components:       Result Value    WBC 15.67 (*)     Immature Granulocytes 1.1 (*)     Gran # (ANC) 11.0 (*)     Immature Grans (Abs) 0.17 (*)     All other components within normal limits   COMPREHENSIVE METABOLIC PANEL - Abnormal; Notable for the following components:    Sodium 127 (*)     CO2 20 (*)     Glucose 534 (*)     BUN 56 (*)     Creatinine 3.1 (*)     eGFR 25.6 (*)     All other components within normal limits    Narrative:        Glucose critical result(s) called and verbal readback obtained   from Yohana Booth RN ER  by MS1 11/17/2022 04:53   URINALYSIS, REFLEX TO URINE CULTURE - Abnormal; Notable for the following components:    Color, UA Colorless (*)     Protein, UA 1+ (*)     Glucose, UA 4+ (*)     All other components within normal limits    Narrative:     Specimen Source->Urine   URINALYSIS  MICROSCOPIC - Abnormal; Notable for the following components:    Hyaline Casts, UA 0.00 (*)     All other components within normal limits    Narrative:     Specimen Source->Urine   POCT GLUCOSE - Abnormal; Notable for the following components:    POC Glucose 535 (*)     All other components within normal limits   ISTAT PROCEDURE - Abnormal; Notable for the following components:    POC PH 7.317 (*)     POC PCO2 45.1 (*)     POC PO2 62 (*)     POC HCO3 23.1 (*)     POC SATURATED O2 89 (*)     All other components within normal limits   POCT GLUCOSE - Abnormal; Notable for the following components:    POC Glucose 401 (*)     All other components within normal limits   POCT GLUCOSE - Abnormal; Notable for the following components:    POC Glucose 358 (*)     All other components within normal limits   POCT GLUCOSE - Abnormal; Notable for the following components:    POC Glucose 305 (*)     All other components within normal limits   POCT GLUCOSE - Abnormal; Notable for the following components:    POC Glucose 292 (*)     All other components within normal limits   POCT GLUCOSE - Abnormal; Notable for the following components:    POC Glucose 224 (*)     All other components within normal limits   GROUP A STREP, MOLECULAR   BETA - HYDROXYBUTYRATE, SERUM   INFLUENZA A AND B ANTIGEN    Narrative:     Specimen Source->Nasopharyngeal Swab   SARS-COV-2 RNA AMPLIFICATION, QUAL   POCT GLUCOSE MONITORING CONTINUOUS   POCT GLUCOSE MONITORING CONTINUOUS   POCT GLUCOSE MONITORING CONTINUOUS   POCT GLUCOSE MONITORING CONTINUOUS     EKG Readings: (Independently Interpreted)   Initial Reading: No STEMI. Rhythm: Normal Sinus Rhythm. Heart Rate: 81. Ectopy: No Ectopy. Conduction: Normal.   ECG Results              EKG 12-lead (In process)  Result time 11/17/22 06:53:50      In process by Interface, Lab In [facility] (11/17/22 06:53:50)                   Narrative:    Test Reason : R73.9,    Vent. Rate : 081 BPM     Atrial Rate : 081 BPM      P-R Int : 154 ms          QRS Dur : 086 ms      QT Int : 384 ms       P-R-T Axes : 046 -38 042 degrees     QTc Int : 446 ms    Normal sinus rhythm  Left axis deviation  Pulmonary disease pattern  Abnormal ECG  When compared with ECG of 09-MAY-2022 15:51,  No significant change was found    Referred By: [email or web address]   SELF           Confirmed By:                       In process by Interface, Lab In [facility] (11/17/22 05:21:06)                   Narrative:    Test Reason : R73.9,    Vent. Rate : 081 BPM     Atrial Rate : 081 BPM     P-R Int : 154 ms          QRS Dur : 086 ms      QT Int : 384 ms       P-R-T Axes : 046 -38 042 degrees     QTc Int : 446 ms    Normal sinus rhythm  Left axis deviation  Pulmonary disease pattern  Abnormal ECG  When compared with ECG of 09-MAY-2022 15:51,  No significant change was found    Referred By: [email or web address]   SELF           Confirmed By:                                   Imaging Results              CT Renal Stone Study ABD Pelvis WO (Final result)  Result time 11/17/22 08:30:51      Final result by Randolph Garrett MD (11/17/22 08:30:51)                   Narrative:    CMS MANDATED QUALITY DATA - CT RADIATION  436    All CT scans at this facility utilize dose modulation, iterative reconstruction, and/or weight based dosing when appropriate to reduce radiation dose to as low as reasonably achievable.    CT ABDOMEN PELVIS WITHOUT IV CONTRAST    CLINICAL HISTORY:  37 years Male Flank pain, kidney stone suspected    COMPARISON: CT abdomen pelvis October 30, 2022    FINDINGS: Lung bases are clear. Bone window images show no acute or aggressive osseous abnormality.    Hepatomegaly. Borderline hepatic steatosis. No focal hepatic lesion. Gallbladder and biliary tree are unremarkable. Spleen appears normal. Left upper quadrant splenule. Pancreas is unremarkable. No adrenal lesion. No renal calculi or hydronephrosis. Ureters are normal in caliber. Urinary bladder shows no focal  lesion.    Stomach is unremarkable. No evidence of small bowel obstruction. No evidence of appendicitis. Small appendicoliths within the distal appendiceal lumen. No findings of colitis. Distal colonic diverticula noted.    No free fluid or free air within the abdomen or pelvis. No pathologically enlarged abdominal or pelvic lymph nodes.    IMPRESSION:    No CT evidence of acute pathology involving the abdomen or pelvis.  Specifically, negative for urinary tract calculi or obstructive uropathy.    Hepatomegaly and borderline hepatic steatosis.    Distal colonic diverticula.    Electronically signed by:  Randolph Garrett MD  11/17/2022 8:30 AM CST Workstation: [id number]                                     X-Ray Chest AP Portable (Final result)  Result time 11/17/22 06:36:31      Final result by Jose Miguel Gilman MD (11/17/22 06:36:31)                   Narrative:    Reason: hyperglycemia    FINDINGS:    Portable chest with comparison chest x-ray January 8, 2020 show normal cardiomediastinal silhouette.  Lungs are clear. Pulmonary vasculature is normal. No acute osseous abnormality.    IMPRESSION:    No acute cardiopulmonary abnormality.    Electronically signed by:  Jose Miguel Gilman DO  11/17/2022 6:36 AM CST Workstation: [id number]                                  X-Rays:   Independently Interpreted Readings:   Chest X-Ray: Normal heart size.  No infiltrates.  No acute abnormalities.   Medications   sodium chloride 0.9% bolus 1,000 mL (0 mLs Intravenous Stopped 11/17/22 0504)   insulin regular injection 10 Units 0.1 mL (10 Units Intravenous Given 11/17/22 0421)   morphine injection 4 mg (4 mg Intravenous Given 11/17/22 0420)   sodium chloride 0.9% bolus 1,000 mL (0 mLs Intravenous Stopped 11/17/22 0702)   amLODIPine tablet 5 mg (5 mg Oral Given 11/17/22 0541)   losartan tablet 100 mg (100 mg Oral Given 11/17/22 0540)   insulin regular injection 5 Units 0.05 mL (5 Units Intravenous Given 11/17/22 0703)   HYDROmorphone  injection 0.5 mg (0.5 mg Intravenous Given 11/17/22 0702)   insulin regular injection 5 Units 0.05 mL (5 Units Intravenous Given 11/17/22 0921)   sodium chloride 0.9% bolus 1,000 mL (0 mLs Intravenous Stopped 11/17/22 1111)     Medical Decision Making:   Independently Interpreted Test(s):   I have ordered and independently interpreted X-rays - see prior notes.  I have ordered and independently interpreted EKG Reading(s) - see prior notes  Clinical Tests:   Lab Tests: Ordered and Reviewed       <> Summary of Lab: Accu-Chek at triage 535  VBG pH 7.31 pCO2 45.1 PO2 62 bicarb 23.1  White count 15.67 ANC 11  Beta hydroxybutyric acid 0.1  Radiological Study: Ordered and Reviewed  Medical Tests: Ordered and Reviewed  ED Management:  Emergent evaluation of a 37-year-old male with history of insulin-dependent diabetes uses long-acting insulin at night, history of hypertension, hyperlipidemia, GERD, IgA nephropathy, history of kidney stones, asthma and insomnia.  Patient presents to the ER due to hyperglycemia.  He reports he woke at approximately 1:00 a.m. with nausea and vomiting as well as flank pain.  He reports that this is how he feels when his glucose is elevated.  Yesterday he reports glucoses in the 200s but when he took his glucose at home was 501 and 505.  Patient taken 40 units of insulin at 8:00 p.m..  Reports he does not take short-acting insulin.  Was taken off of his oral diabetes medications approximately 2 months ago.  He reports he did see an orthopedist on Tuesday and had a steroid injection to the left shoulder.  And he has been having a upper respiratory tract infection for 1 week that he believes is improving.   On physical exam blood pressure mildly elevated pulse 91 respirations 24 sats 96% on room air temp 97.7°.  Glucose at triage was 535.  Patient appears uncomfortable due to his flank pain.  Mild flank tenderness.  Soft nontender abdomen.  Normal cardiac and lung exam.  Patient does not smell of  ketones.  No vomiting here he took Zofran prior to arrival.  VBG was performed pH was 7.3 with bicarb of 23.  Patient is not appear to be in DKA he receiving 1 L of IV fluids.  Four morphine for his flank pain.  CBC revealed a white count of 15.67 ANC of 11 other labs are pending.  Will recheck glucose after 10 units of IV insulin his 1 L of IV fluids.  Chest x-ray revealed no signs of pneumonia.  COVID in flu are still pending    Patient received a Kenalog injection subacromial in the left shoulder region on Tuesday which may be increasing the patient's white count and glucose but it may also be due to his upper respiratory tract infection.  Vianca Fong M.D.  4:38 AM 11/17/2022     Despite 2 L of IV fluids and 15 units of insulin glucose is still 358.  Patient is having ongoing bilateral flank pain.  With the nausea vomiting and flank pain we will do a CT scan renal stone study for evaluation of possible causes of his pain as well as emesis.  Will continue to check glucoses every hour as patient received IV fluids.  Care will be turned over to Dr. Didier sosa the end of my shift.   .       Attending Attestation:             Attending ED Notes:   This 37-year-old male who is insulin-dependent diabetic was managed initially by Dr. Fong in whose care assumed at 7:30 a.m. 37-year-old male who is an insulin-dependent diabetic common whose care was managed by Dr. Fong initially, and whose care assumed at 7:30 a.m., did have an elevated blood sugar of 534 upon admission.  Patient received total of 3 L of saline as well as IV insulin he was monitored throughout the visit and had improvement in his 4.  The patient had no obvious signs of infection.  He had an elevated BUN and creatinine of 56 and 3.1 which is in a similar range it had been previously.  CO2 is 20 initially.  Of note the patient did receive steroid injections couple days prior to the onset of his current hyperglycemia.  He is advised to  follow-up with his primary care provider.  With complaints of flank pain a CT scan of his abdomen and pelvis without contrast was unremarkable.  He is advised now follow-up with his primary care provider.                 Clinical Impression:   Final diagnoses:  [R73.9] Hyperglycemia  [E10.65] Uncontrolled type 1 diabetes mellitus with hyperglycemia (Primary)  [N18.9] Chronic kidney disease, unspecified CKD stage  [Z87.441] History of primary IgA nephropathy        ED Disposition Condition    Discharge Stable          ED Prescriptions    None       Follow-up Information    None          Austin Velásquez Jr., MD  11/17/22 1124

## 2022-11-17 NOTE — TELEPHONE ENCOUNTER
Pt's wife left message and they think his insulin went bad while they were on vacation. He took a dose, but his BG was still high. Would like to know if a new one can be sent to his pharmacy.

## 2022-11-22 ENCOUNTER — CLINICAL SUPPORT (OUTPATIENT)
Dept: REHABILITATION | Facility: HOSPITAL | Age: 37
End: 2022-11-22
Attending: ORTHOPAEDIC SURGERY
Payer: MEDICAID

## 2022-11-22 DIAGNOSIS — M75.42 IMPINGEMENT SYNDROME OF SHOULDER, LEFT: ICD-10-CM

## 2022-11-22 PROCEDURE — 97165 OT EVAL LOW COMPLEX 30 MIN: CPT | Mod: PN

## 2022-11-22 NOTE — PROGRESS NOTES
See initial evaluation in plan of Care  [facility]  Initial Evaluation   Date: 11/22/2022  Name: Jose Miguel Brennan  Clinic Number: [id number]  Therapy Diagnosis:   Encounter Diagnosis   Name Primary?    Impingement syndrome of shoulder, left    Physician: Bo Chu MD  Physician Orders: 2x week for 6 weeks   Medical Diagnosis: M75.42 (ICD-10-CM) - Impingement syndrome of shoulder, left   Surgical Procedure and Date: N/A  Evaluation Date: 11/22/2022  Insurance Authorization Period Expiration: 12/31/22  Plan of Care Certification Period 2/15/2022  Date of Return to MD: 1/17/23  Visit # / Visits authorized: Adrián 1/1  Time In:5:32  Time Out: 6:18  Total Billable Time: 0 minutes    Precautions:  Standard diabetes and has kidney issues    Subjective   Patient states:Got steroid injection helped for a little while but shot sugar up. Hurts if roll on it, aching wakes me up. If try to grab something will lose control of it.   Involved Side: left  Dominant Side: Right  Date of Onset: exacerbation a few months ago  History of Current Condition/Mechanism of Injury: first hurt it at ~ 18 y.o when had a fall, then fell a few months ago off [address]  lost footing hitting ground  Imaging: XRAY Report / Interpretation:  Two views of the left shoulder demonstrates a type 2 acromion no fractures or subluxations moderate osteoarthritis of the acromioclavicular joint    Previous Therapy: none    Past Medical History/Physical Systems Review:    has a past medical history of Anxiety, Asthma, Depression, Diabetes mellitus, Diabetes mellitus, type 2, GERD (gastroesophageal reflux disease), Gout, Hyperlipidemia, Hypertension, IgA nephropathy, and Insomnia.   has a past surgical history that includes nose polyp removal; Esophagogastroduodenoscopy (N/A, 5/8/2020); and Colonoscopy (N/A, 5/8/2020).  has a current medication list which includes the following prescription(s): acetaminophen-codeine  300-30mg, amlodipine, atorvastatin, bd ludwig 2nd gen pen needle, blood sugar diagnostic, blood-glucose meter, buspirone, febuxostat, gabapentin, insulin, lancets, losartan, meloxicam, omega 3-dha-epa-fish oil, pen needle, diabetic, and tamsulosin.    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  Current Functional Status   Home/Living environment : lives with his family      ADL Assessment  ADL    Dressing Pain in shoulder with don/doffing clothing   Eating independent   Toileting independent   Cooking Reaching down and overhead painful    Bathing/Grooming Reaching behind back to bathe worse than to the other shoulder   Household tasks Doing all with pain   By patient report             FOTO score: to be given next session  Objective   Observation: very rounded shoulders and forward head  Cervical ROM WNL and with no change in shoulder pain  Palpation: entire shoulder girdle with tenderness worst at anterior shoulder and into pectoralis     Shoulder - Range Of Motion   11/22/2022 11/22/2022   Motion AROM Right A/PROM Left   Flexion 160 125/130   Abduction 170  85/95   Adduction  40 25   Internal Rotation NT 35   External Rotation 70 @0 65;  @ 90  90       end feels empty with pain    Shoulder - Muscle Testing   11/22/2022   Measurement  Left   Trapezius Lower NT   Trapezius Middle NT    Extensors 3/5   Shoulder Medial Rotators 4/5   Shoulder Lateral Rotators 3/5   Supraspinatus 3+/5   Deltoid Middle 3-/5   Deltoid Posterior 4-/5   Deltoid Anterior 3-/5   Bicep 4/5      Home Exercise Program/Education:  Issued HEP of bilateral scapula retraction and educated on modality use for pain management . Exercises were reviewed and he was able to demonstrate them prior to the end of the session. Patient given red theraband. He demonstrated good  understanding of the education provided.  Pt was advised to perform these exercises free of pain  Patient Education: role of OT, goals for OT, scheduling/cancellations - pt verbalized understanding. Discussed insurance limitations with patient.    Assessment     Patient is a 37 y.o. right handed male presenting to skilled OT with diagnosis of left shoulder impingement. Patient with pain of 4/10 to 10/10 described as intense dull ache.  He is tender to palpation throughout shoulder girdle with pectoralis the worst. His ROM is moderate limited in all planes except external rotation. His strength is  3-/5 to 5/5 .  He reports independence with self care and daily activities, however, with great amount of compensation and pain.   A brief history was obtained from the patient and MD note.  During the evaluation, the patient required no  modification or assistance.  The following anticipated barriers/co-morbid conditions/personal factors may affect the plan of care: diabetes, chronic, scheduling conflicts.   Patient will benefit from OT to address problems hindering functional use of UE. The following goals were discussed with the patient and patient is in agreement with them as to be addressed in the treatment plan. The patient's rehab potential is Fair. Patient's educational, spiritual, cultural needs were considered.       Goals:   Short Term Goals  Independent in home program of ROM and strengthening in 3 visits  Patient reports ability to don lower extremity  clothing with less pain in 3 weeks  Patient with a decrease in worst pain to 6/10 and increase in ROM of abduction to 120 degrees in 3 weeks  Patient able to perform lower level reaching into cabinets and out to side (no greater than shoulder height) with decrease in pain in 3 weeks  Long Term Goals  Patient with increase in ROM and decrease in pain allowing for greater ease with all dressing and bathing in 4 weeks  Patient able to perform household tasks with low levels of pain in 5 weeks  Patient able to reach overhead with up to 10# in 6 weeks  Patient able to perform job tasks with no greater than 4/10 pain in 8 weeks  Plan   Outpatient Occupational Therapy 1-2 times (as scheduling allows due to working) weekly for 8 weeks (will reassess periodically and adjust as needed) to include the following interventions: . Home Exercise and Stretching, Patient Education, Therapeutic Exercise (69462) - Improve muscle strength, ROM, flexibility and muscle function, Manual Stretching (94166) - Passive or Active stretching to improve muscle length and function, Soft Tissue Mobs (91301) - Increase ROM tissue length, joint mechanics and modulate pain, Cryotherapy (89479) - Application of cold to decrease local swelling and decrease pain , Heat (50029) -  Application of heat to increase local circulation and decrease pain, Ultrasound (71442) - Increase local circulation, improve tissue healing time, and modulate pain,  Therapeutic activities (16858) use of dynamic activities to improve functional performance, Kinesio taping.      JAMAAL Herrera

## 2022-11-25 PROBLEM — M25.512 LEFT SHOULDER PAIN: Status: ACTIVE | Noted: 2022-11-25

## 2022-12-13 ENCOUNTER — LAB VISIT (OUTPATIENT)
Dept: LAB | Facility: HOSPITAL | Age: 37
End: 2022-12-13
Attending: INTERNAL MEDICINE
Payer: MEDICAID

## 2022-12-13 ENCOUNTER — OFFICE VISIT (OUTPATIENT)
Dept: FAMILY MEDICINE | Facility: CLINIC | Age: 37
End: 2022-12-13
Payer: MEDICAID

## 2022-12-13 VITALS
WEIGHT: 215 LBS | TEMPERATURE: 98 F | DIASTOLIC BLOOD PRESSURE: 82 MMHG | OXYGEN SATURATION: 98 % | HEIGHT: 72 IN | HEART RATE: 94 BPM | BODY MASS INDEX: 29.12 KG/M2 | SYSTOLIC BLOOD PRESSURE: 114 MMHG

## 2022-12-13 DIAGNOSIS — N18.30 CHRONIC KIDNEY DISEASE, STAGE III (MODERATE): ICD-10-CM

## 2022-12-13 DIAGNOSIS — Z79.4 TYPE 2 DIABETES MELLITUS WITHOUT COMPLICATION, WITH LONG-TERM CURRENT USE OF INSULIN: Primary | ICD-10-CM

## 2022-12-13 DIAGNOSIS — E11.9 TYPE 2 DIABETES MELLITUS WITHOUT COMPLICATION, WITH LONG-TERM CURRENT USE OF INSULIN: Primary | ICD-10-CM

## 2022-12-13 DIAGNOSIS — M10.9 GOUT, UNSPECIFIED: ICD-10-CM

## 2022-12-13 DIAGNOSIS — I10 ESSENTIAL HYPERTENSION: ICD-10-CM

## 2022-12-13 DIAGNOSIS — R80.9 PROTEINURIA: Primary | ICD-10-CM

## 2022-12-13 DIAGNOSIS — E78.2 MIXED HYPERLIPIDEMIA: ICD-10-CM

## 2022-12-13 LAB
ALBUMIN SERPL BCP-MCNC: 3.9 G/DL (ref 3.5–5.2)
ANION GAP SERPL CALC-SCNC: 8 MMOL/L (ref 8–16)
BACTERIA #/AREA URNS HPF: NEGATIVE /HPF
BUN SERPL-MCNC: 41 MG/DL (ref 6–20)
CALCIUM SERPL-MCNC: 9 MG/DL (ref 8.7–10.5)
CHLORIDE SERPL-SCNC: 96 MMOL/L (ref 95–110)
CO2 SERPL-SCNC: 28 MMOL/L (ref 23–29)
CREAT SERPL-MCNC: 2.5 MG/DL (ref 0.5–1.4)
CREAT UR-MCNC: 84 MG/DL (ref 23–375)
EST. GFR  (NO RACE VARIABLE): 33.1 ML/MIN/1.73 M^2
GLUCOSE SERPL-MCNC: 333 MG/DL (ref 70–110)
HYALINE CASTS #/AREA URNS LPF: 0 /LPF
MICROSCOPIC COMMENT: ABNORMAL
PHOSPHATE SERPL-MCNC: 3.9 MG/DL (ref 2.7–4.5)
POTASSIUM SERPL-SCNC: 5 MMOL/L (ref 3.5–5.1)
PROT UR-MCNC: 99 MG/DL (ref 6–15)
PROT/CREAT UR: 1.18 MG/G{CREAT} (ref 0–0.2)
RBC #/AREA URNS HPF: 0 /HPF (ref 0–4)
SODIUM SERPL-SCNC: 132 MMOL/L (ref 136–145)
SQUAMOUS #/AREA URNS HPF: 0 /HPF
URATE SERPL-MCNC: 6.9 MG/DL (ref 3.4–7)
WBC #/AREA URNS HPF: 0 /HPF (ref 0–5)

## 2022-12-13 PROCEDURE — 3008F BODY MASS INDEX DOCD: CPT | Mod: CPTII,S$GLB,, | Performed by: NURSE PRACTITIONER

## 2022-12-13 PROCEDURE — 3008F PR BODY MASS INDEX (BMI) DOCUMENTED: ICD-10-PCS | Mod: CPTII,S$GLB,, | Performed by: NURSE PRACTITIONER

## 2022-12-13 PROCEDURE — 1159F PR MEDICATION LIST DOCUMENTED IN MEDICAL RECORD: ICD-10-PCS | Mod: CPTII,S$GLB,, | Performed by: NURSE PRACTITIONER

## 2022-12-13 PROCEDURE — 36415 COLL VENOUS BLD VENIPUNCTURE: CPT | Performed by: INTERNAL MEDICINE

## 2022-12-13 PROCEDURE — 84156 ASSAY OF PROTEIN URINE: CPT | Performed by: INTERNAL MEDICINE

## 2022-12-13 PROCEDURE — 81001 URINALYSIS AUTO W/SCOPE: CPT | Performed by: INTERNAL MEDICINE

## 2022-12-13 PROCEDURE — 99214 PR OFFICE/OUTPT VISIT, EST, LEVL IV, 30-39 MIN: ICD-10-PCS | Mod: S$GLB,,, | Performed by: NURSE PRACTITIONER

## 2022-12-13 PROCEDURE — 3079F DIAST BP 80-89 MM HG: CPT | Mod: CPTII,S$GLB,, | Performed by: NURSE PRACTITIONER

## 2022-12-13 PROCEDURE — 3079F PR MOST RECENT DIASTOLIC BLOOD PRESSURE 80-89 MM HG: ICD-10-PCS | Mod: CPTII,S$GLB,, | Performed by: NURSE PRACTITIONER

## 2022-12-13 PROCEDURE — 1160F RVW MEDS BY RX/DR IN RCRD: CPT | Mod: CPTII,S$GLB,, | Performed by: NURSE PRACTITIONER

## 2022-12-13 PROCEDURE — 3074F SYST BP LT 130 MM HG: CPT | Mod: CPTII,S$GLB,, | Performed by: NURSE PRACTITIONER

## 2022-12-13 PROCEDURE — 3052F PR MOST RECENT HEMOGLOBIN A1C LEVEL 8.0 - < 9.0%: ICD-10-PCS | Mod: CPTII,S$GLB,, | Performed by: NURSE PRACTITIONER

## 2022-12-13 PROCEDURE — 4010F PR ACE/ARB THEARPY RXD/TAKEN: ICD-10-PCS | Mod: CPTII,S$GLB,, | Performed by: NURSE PRACTITIONER

## 2022-12-13 PROCEDURE — 3074F PR MOST RECENT SYSTOLIC BLOOD PRESSURE < 130 MM HG: ICD-10-PCS | Mod: CPTII,S$GLB,, | Performed by: NURSE PRACTITIONER

## 2022-12-13 PROCEDURE — 99214 OFFICE O/P EST MOD 30 MIN: CPT | Mod: S$GLB,,, | Performed by: NURSE PRACTITIONER

## 2022-12-13 PROCEDURE — 3052F HG A1C>EQUAL 8.0%<EQUAL 9.0%: CPT | Mod: CPTII,S$GLB,, | Performed by: NURSE PRACTITIONER

## 2022-12-13 PROCEDURE — 80069 RENAL FUNCTION PANEL: CPT | Performed by: INTERNAL MEDICINE

## 2022-12-13 PROCEDURE — 84550 ASSAY OF BLOOD/URIC ACID: CPT | Performed by: INTERNAL MEDICINE

## 2022-12-13 PROCEDURE — 1160F PR REVIEW ALL MEDS BY PRESCRIBER/CLIN PHARMACIST DOCUMENTED: ICD-10-PCS | Mod: CPTII,S$GLB,, | Performed by: NURSE PRACTITIONER

## 2022-12-13 PROCEDURE — 4010F ACE/ARB THERAPY RXD/TAKEN: CPT | Mod: CPTII,S$GLB,, | Performed by: NURSE PRACTITIONER

## 2022-12-13 PROCEDURE — 1159F MED LIST DOCD IN RCRD: CPT | Mod: CPTII,S$GLB,, | Performed by: NURSE PRACTITIONER

## 2022-12-13 RX ORDER — LOSARTAN POTASSIUM 100 MG/1
100 TABLET ORAL DAILY
Qty: 90 TABLET | Refills: 1 | Status: SHIPPED | OUTPATIENT
Start: 2022-12-13 | End: 2023-03-14 | Stop reason: SDUPTHER

## 2022-12-13 RX ORDER — ATORVASTATIN CALCIUM 10 MG/1
10 TABLET, FILM COATED ORAL NIGHTLY
Qty: 90 TABLET | Refills: 1 | Status: SHIPPED | OUTPATIENT
Start: 2022-12-13 | End: 2023-03-14 | Stop reason: SDUPTHER

## 2022-12-13 RX ORDER — AMLODIPINE BESYLATE 5 MG/1
5 TABLET ORAL DAILY
Qty: 90 TABLET | Refills: 1 | Status: ON HOLD | OUTPATIENT
Start: 2022-12-13 | End: 2023-01-28 | Stop reason: HOSPADM

## 2022-12-13 RX ORDER — DULAGLUTIDE 0.75 MG/.5ML
0.75 INJECTION, SOLUTION SUBCUTANEOUS
Qty: 4 PEN | Refills: 0 | Status: SHIPPED | OUTPATIENT
Start: 2022-12-13 | End: 2023-01-04

## 2022-12-13 NOTE — PROGRESS NOTES
SUBJECTIVE:      Patient ID: Jose Miguel Brennan is a 37 y.o. male.    Chief Complaint: Diabetes    Patient is here today to f/u on dm and htn. He is taking 42 units of lantus daily. He did schedule with endo for March. He is following with Dr Conte for ckd. Fasting am glucose 150    Diabetes  He presents for his follow-up diabetic visit. He has type 2 diabetes mellitus. The initial diagnosis of diabetes was made 10 years ago. His disease course has been stable. Hypoglycemia symptoms include nervousness/anxiousness. Pertinent negatives for hypoglycemia include no confusion, dizziness, headaches, pallor, seizures or speech difficulty. Pertinent negatives for diabetes include no blurred vision, no chest pain, no foot paresthesias, no foot ulcerations, no polydipsia, no polyphagia, no polyuria, no visual change and no weakness. There are no hypoglycemic complications. Symptoms are stable. Diabetic complications include nephropathy. Risk factors for coronary artery disease include diabetes mellitus, male sex and hypertension. Current diabetic treatment includes insulin injections and oral agent (monotherapy). He is compliant with treatment most of the time. His weight is stable. He is following a generally unhealthy diet. When asked about meal planning, he reported none. He rarely participates in exercise. An ACE inhibitor/angiotensin II receptor blocker is being taken. He does not see a podiatrist.Eye exam is not current.   Hypertension  This is a chronic problem. The problem is unchanged. The problem is controlled. Associated symptoms include anxiety. Pertinent negatives include no blurred vision, chest pain, headaches, neck pain, palpitations or shortness of breath. Risk factors for coronary artery disease include diabetes mellitus, dyslipidemia, male gender and smoking/tobacco exposure. Past treatments include angiotensin blockers and calcium channel blockers. The current treatment provides moderate  improvement.   Anxiety  Presents for follow-up visit. Symptoms include excessive worry and nervous/anxious behavior. Patient reports no chest pain, confusion, dizziness, palpitations, shortness of breath or suicidal ideas. Symptoms occur most days. The severity of symptoms is mild. The quality of sleep is good. Nighttime awakenings: occasional.     Compliance with medications is %.     Past Surgical History:   Procedure Laterality Date    COLONOSCOPY N/A 2020    Procedure: COLONOSCOPY;  Surgeon: Hammad Castorena III, MD;  Location: [facility];  Service: Endoscopy;  Laterality: N/A;    ESOPHAGOGASTRODUODENOSCOPY N/A 2020    Procedure: EGD (ESOPHAGOGASTRODUODENOSCOPY);  Surgeon: Hammad Castorena III, MD;  Location: [facility];  Service: Endoscopy;  Laterality: N/A;    nose polyp removal       Family History   Problem Relation Age of Onset    Hypertension Maternal Grandmother     Cancer Maternal Grandfather     Diabetes Maternal Grandfather     Heart disease Maternal Grandfather     Heart disease Mother     Stroke Mother     Diabetes Mother       Social History     Socioeconomic History    Marital status: Single   Tobacco Use    Smoking status: Former     Packs/day: 0.25     Years: 5.00     Pack years: 1.25     Types: Cigars, Cigarettes     Quit date: 2022     Years since quittin.5    Smokeless tobacco: Never    Tobacco comments:     One cigar a day   Substance and Sexual Activity    Alcohol use: Yes    Drug use: No    Sexual activity: Yes     Partners: Female     Current Outpatient Medications   Medication Sig Dispense Refill    febuxostat (ULORIC) 40 mg Tab Take 40 mg by mouth once daily.      gabapentin (NEURONTIN) 300 MG capsule Take 1 capsule (300 mg total) by mouth 3 (three) times daily. Take 1 tablet every night x 7 days. Increase to twice a day x 7 days. Increase to three times a day. 90 capsule 0    insulin (LANTUS SOLOSTAR U-100 INSULIN) glargine 100 units/mL SubQ pen Inject 30 Units  "into the skin once daily. 9 mL 1    omega 3-dha-epa-fish oil 1,000 mg (120 mg-180 mg) Cap Take 1 capsule by mouth 2 (two) times daily. 180 capsule 1    amLODIPine (NORVASC) 5 MG tablet Take 1 tablet (5 mg total) by mouth once daily. 90 tablet 1    atorvastatin (LIPITOR) 10 MG tablet Take 1 tablet (10 mg total) by mouth every evening. 90 tablet 1    BD CONOR 2ND GEN PEN NEEDLE 32 gauge x 5/32" Ndle once daily. Use      blood sugar diagnostic Strp To check BG one times daily, to use with insurance preferred meter 100 strip 3    blood-glucose meter kit To check BG one times daily, to use with insurance preferred meter 1 each 0    dulaglutide (TRULICITY) 0.75 mg/0.5 mL pen injector Inject 0.75 mg into the skin every 7 days. 4 pen 0    lancets Misc To check BG one times daily, to use with insurance preferred meter 100 each 3    losartan (COZAAR) 100 MG tablet Take 1 tablet (100 mg total) by mouth once daily. 90 tablet 1    pen needle, diabetic 32 gauge x 5/16" Ndle 1 Units by Misc.(Non-Drug; Combo Route) route once daily. 100 each 1    pen needle, diabetic, safety 29 gauge x 1/2" Ndle 1 Units by Misc.(Non-Drug; Combo Route) route once daily. 100 each 1     No current facility-administered medications for this visit.     Review of patient's allergies indicates:   Allergen Reactions    Zithromax [azithromycin] Rash      Past Medical History:   Diagnosis Date    Anxiety     Asthma     Depression     Diabetes mellitus     diet controlled    Diabetes mellitus, type 2     GERD (gastroesophageal reflux disease)     Gout     Hyperlipidemia     Hypertension     IgA nephropathy     Insomnia      Past Surgical History:   Procedure Laterality Date    COLONOSCOPY N/A 5/8/2020    Procedure: COLONOSCOPY;  Surgeon: Hammad Castorena III, MD;  Location: [facility];  Service: Endoscopy;  Laterality: N/A;    ESOPHAGOGASTRODUODENOSCOPY N/A 5/8/2020    Procedure: EGD (ESOPHAGOGASTRODUODENOSCOPY);  Surgeon: Hammad Castorena III, MD;  " Location: [facility];  Service: Endoscopy;  Laterality: N/A;    nose polyp removal         Review of Systems   Constitutional:  Negative for appetite change, chills, diaphoresis and unexpected weight change.   HENT:  Negative for ear discharge, facial swelling, hearing loss, nosebleeds and trouble swallowing.    Eyes:  Negative for blurred vision, photophobia, pain and visual disturbance.   Respiratory:  Negative for apnea, choking, shortness of breath and wheezing.    Cardiovascular:  Negative for chest pain and palpitations.   Gastrointestinal:  Negative for abdominal pain, blood in stool and vomiting.   Endocrine: Negative for polydipsia, polyphagia and polyuria.   Genitourinary:  Negative for difficulty urinating and hematuria.   Musculoskeletal:  Negative for gait problem, joint swelling and neck pain.   Skin:  Negative for pallor.   Neurological:  Negative for dizziness, seizures, speech difficulty, weakness and headaches.   Hematological:  Does not bruise/bleed easily.   Psychiatric/Behavioral:  Negative for agitation, confusion, dysphoric mood, self-injury, sleep disturbance and suicidal ideas. The patient is nervous/anxious.     OBJECTIVE:      Vitals:    12/13/22 1256 12/13/22 1317   BP: (!) 120/90 114/82   Pulse: 94    Temp: 98.4 °F (36.9 °C)    SpO2: 98%    Weight: 97.5 kg (215 lb)    Height: 6' (1.829 m)      Physical Exam  Vitals and nursing note reviewed.   Constitutional:       General: He is not in acute distress.     Appearance: He is well-developed.   HENT:      Head: Normocephalic and atraumatic.      Nose: Nose normal.      Mouth/Throat:      Pharynx: Uvula midline.   Eyes:      General: Lids are normal.      Conjunctiva/sclera: Conjunctivae normal.      Pupils: Pupils are equal, round, and reactive to light.      Right eye: Pupil is round and reactive.      Left eye: Pupil is round and reactive.   Neck:      Thyroid: No thyromegaly.      Vascular: No carotid bruit.   Cardiovascular:      Rate  and Rhythm: Normal rate and regular rhythm.      Pulses: Normal pulses.      Heart sounds: Normal heart sounds. No murmur heard.  Pulmonary:      Effort: Pulmonary effort is normal.      Breath sounds: Normal breath sounds. No wheezing, rhonchi or rales.   Abdominal:      General: Bowel sounds are normal.      Palpations: Abdomen is soft. Abdomen is not rigid.      Tenderness: There is no abdominal tenderness.   Musculoskeletal:         General: Normal range of motion.      Cervical back: Normal range of motion and neck supple.      Right lower leg: No edema.      Left lower leg: No edema.   Lymphadenopathy:      Cervical: No cervical adenopathy.   Skin:     General: Skin is warm and dry.      Nails: There is no clubbing.   Neurological:      Mental Status: He is alert and oriented to person, place, and time.   Psychiatric:         Mood and Affect: Mood normal.         Speech: Speech normal.         Behavior: Behavior normal. Behavior is cooperative.         Thought Content: Thought content normal.         Judgment: Judgment normal.      Lab Visit on 12/13/2022   Component Date Value Ref Range Status    Protein, Urine Random 12/13/2022 99 (H)  6 - 15 mg/dL Final    Comment: The random urine reference ranges provided were established   for 24 hour urine collections.  No reference ranges exist for  random urine specimens.  Correlate clinically.      Creatinine, Urine 12/13/2022 84.0  23.0 - 375.0 mg/dL Final    Comment: The random urine reference ranges provided were established   for 24 hour urine collections.  No reference ranges exist for  random urine specimens.  Correlate clinically.      Prot/Creat Ratio, Urine 12/13/2022 1.18 (H)  0.00 - 0.20 Final    Glucose 12/13/2022 333 (H)  70 - 110 mg/dL Final    Sodium 12/13/2022 132 (L)  136 - 145 mmol/L Final    Potassium 12/13/2022 5.0  3.5 - 5.1 mmol/L Final    Chloride 12/13/2022 96  95 - 110 mmol/L Final    CO2 12/13/2022 28  23 - 29 mmol/L Final    BUN 12/13/2022  41 (H)  6 - 20 mg/dL Final    Calcium 12/13/2022 9.0  8.7 - 10.5 mg/dL Final    Creatinine 12/13/2022 2.5 (H)  0.5 - 1.4 mg/dL Final    Albumin 12/13/2022 3.9  3.5 - 5.2 g/dL Final    Phosphorus 12/13/2022 3.9  2.7 - 4.5 mg/dL Final    eGFR 12/13/2022 33.1 (A)  >60 mL/min/1.73 m^2 Final    Anion Gap 12/13/2022 8  8 - 16 mmol/L Final    Uric Acid 12/13/2022 6.9  3.4 - 7.0 mg/dL Final    RBC, UA 12/13/2022 0  0 - 4 /hpf Final    WBC, UA 12/13/2022 0  0 - 5 /hpf Final    Bacteria 12/13/2022 Negative  None-Occ /hpf Final    Squam Epithel, UA 12/13/2022 0  /hpf Final    Hyaline Casts, UA 12/13/2022 0.00 (A)  0-1/lpf /lpf Final    Microscopic Comment 12/13/2022 SEE COMMENT   Final    Comment: Other formed elements not mentioned in the report are not   present in the microscopic examination.      Admission on 11/17/2022, Discharged on 11/17/2022   Component Date Value Ref Range Status    WBC 11/17/2022 15.67 (H)  3.90 - 12.70 K/uL Final    RBC 11/17/2022 5.35  4.60 - 6.20 M/uL Final    Hemoglobin 11/17/2022 15.6  14.0 - 18.0 g/dL Final    Hematocrit 11/17/2022 47.1  40.0 - 54.0 % Final    MCV 11/17/2022 88  82 - 98 fL Final    MCH 11/17/2022 29.2  27.0 - 31.0 pg Final    MCHC 11/17/2022 33.1  32.0 - 36.0 g/dL Final    RDW 11/17/2022 13.1  11.5 - 14.5 % Final    Platelets 11/17/2022 254  150 - 450 K/uL Final    MPV 11/17/2022 10.7  9.2 - 12.9 fL Final    Immature Granulocytes 11/17/2022 1.1 (H)  0.0 - 0.5 % Final    Gran # (ANC) 11/17/2022 11.0 (H)  1.8 - 7.7 K/uL Final    Immature Grans (Abs) 11/17/2022 0.17 (H)  0.00 - 0.04 K/uL Final    Comment: Mild elevation in immature granulocytes is non specific and   can be seen in a variety of conditions including stress response,   acute inflammation, trauma and pregnancy. Correlation with other   laboratory and clinical findings is essential.      Lymph # 11/17/2022 3.3  1.0 - 4.8 K/uL Final    Mono # 11/17/2022 1.0  0.3 - 1.0 K/uL Final    Eos # 11/17/2022 0.1  0.0 - 0.5 K/uL  Final    Baso # 11/17/2022 0.06  0.00 - 0.20 K/uL Final    nRBC 11/17/2022 0  0 /100 WBC Final    Gran % 11/17/2022 70.4  38.0 - 73.0 % Final    Lymph % 11/17/2022 21.1  18.0 - 48.0 % Final    Mono % 11/17/2022 6.3  4.0 - 15.0 % Final    Eosinophil % 11/17/2022 0.7  0.0 - 8.0 % Final    Basophil % 11/17/2022 0.4  0.0 - 1.9 % Final    Differential Method 11/17/2022 Automated   Final    Sodium 11/17/2022 127 (L)  136 - 145 mmol/L Final    Potassium 11/17/2022 4.7  3.5 - 5.1 mmol/L Final    Chloride 11/17/2022 95  95 - 110 mmol/L Final    CO2 11/17/2022 20 (L)  23 - 29 mmol/L Final    Glucose 11/17/2022 534 (HH)  70 - 110 mg/dL Final    Comment: Glucose critical result(s) called and verbal readback obtained   from Yohana Booth RN ER  by MS1 11/17/2022 04:53      BUN 11/17/2022 56 (H)  6 - 20 mg/dL Final    Creatinine 11/17/2022 3.1 (H)  0.5 - 1.4 mg/dL Final    Calcium 11/17/2022 8.8  8.7 - 10.5 mg/dL Final    Total Protein 11/17/2022 7.1  6.0 - 8.4 g/dL Final    Albumin 11/17/2022 4.1  3.5 - 5.2 g/dL Final    Total Bilirubin 11/17/2022 1.0  0.1 - 1.0 mg/dL Final    Comment: For infants and newborns, interpretation of results should be based  on gestational age, weight and in agreement with clinical  observations.    Premature Infant recommended reference ranges:  Up to 24 hours.............<8.0 mg/dL  Up to 48 hours............<12.0 mg/dL  3-5 days..................<15.0 mg/dL  6-29 days.................<15.0 mg/dL      Alkaline Phosphatase 11/17/2022 115  55 - 135 U/L Final    AST 11/17/2022 23  10 - 40 U/L Final    ALT 11/17/2022 28  10 - 44 U/L Final    Anion Gap 11/17/2022 12  8 - 16 mmol/L Final    eGFR 11/17/2022 25.6 (A)  >60 mL/min/1.73 m^2 Final    Beta-Hydroxybutyrate 11/17/2022 0.1  0.0 - 0.5 mmol/L Final    Specimen UA 11/17/2022 Urine, Clean Catch   Final    Color, UA 11/17/2022 Colorless (A)  Yellow, Straw, Kristina Final    Appearance, UA 11/17/2022 Clear  Clear Final    pH, UA 11/17/2022 7.0  5.0 - 8.0  Final    Specific [place], UA 11/17/2022 1.010  1.005 - 1.030 Final    Protein, UA 11/17/2022 1+ (A)  Negative Final    Comment: Recommend a 24 hour urine protein or a urine   protein/creatinine ratio if globulin induced proteinuria is  clinically suspected.      Glucose, UA 11/17/2022 4+ (A)  Negative Final    Ketones, UA 11/17/2022 Negative  Negative Final    Bilirubin (UA) 11/17/2022 Negative  Negative Final    Occult Blood UA 11/17/2022 Negative  Negative Final    Nitrite, UA 11/17/2022 Negative  Negative Final    Urobilinogen, UA 11/17/2022 Negative  Negative EU/dL Final    Leukocytes, UA 11/17/2022 Negative  Negative Final    POC Glucose 11/17/2022 535 (HH)  70 - 110 Final    Influenza A, Molecular 11/17/2022 Negative  Negative Final    Influenza B, Molecular 11/17/2022 Negative  Negative Final    Flu A & B Source 11/17/2022 NP   Final    SARS-CoV-2 RNA, Amplification, Qual 11/17/2022 Negative  Negative Final    Comment: This test utilizes isothermal nucleic acid amplification technology   to   detect the SARS-CoV-2 RdRp nucleic acid segment. The analytical   sensitivity   (limit of detection) is 500 copies/swab.     A POSITIVE result is indicative of the presence of SARS-CoV-2 RNA;   clinical   correlation with patient history and other diagnostic information is   necessary to determine patient infection status.    A NEGATIVE result means that SARS-CoV-2 nucleic acids are not present   above   the limit of detection. A NEGATIVE result should be treated as   presumptive.   It does not rule out the possibility of COVID-19 and should not be   the sole   basis for treatment decisions. If COVID-19 is strongly suspected   based on   clinical and exposure history, re-testing using an alternate   molecular assay   should be considered.     This test is only for use under the Food and Drug Administration s   Emergency   Use Authorization (EUA).     Commercial kits are provided by [employer]. [employer]                            e   characteristics of the EUA have been independently verified by   [facility] Department of Pathology and Laboratory Medicine.   _________________________________________________________________   The authorized Fact Sheet for Healthcare Providers and the authorized   Fact   Sheet for Patients of the ID NOW COVID-19 are available on the FDA   website:   https://www.fda.gov/media/155251/download   https://www.fda.gov/media/215904/download      Group A Strep, Molecular 11/17/2022 Negative  Negative Final    Comment: Arcanobacterium haemolyticum and Beta Streptococcus group C   and G will not be detected by this test method.  Please order   Throat Culture ([id number]) if suspected.      POC PH 11/17/2022 7.317 (L)  7.35 - 7.45 Final    POC PCO2 11/17/2022 45.1 (H)  35 - 45 mmHg Final    POC PO2 11/17/2022 62 (HH)  40 - 60 mmHg Final    POC HCO3 11/17/2022 23.1 (L)  24 - 28 mmol/L Final    POC BE 11/17/2022 -3  -2 to 2 mmol/L Final    POC SATURATED O2 11/17/2022 89 (L)  95 - 100 % Final    POC TCO2 11/17/2022 24  24 - 29 mmol/L Final    Sample 11/17/2022 VENOUS   Final    Site 11/17/2022 Hank/UAC   Final    Allens Test 11/17/2022 N/A   Final    DelSys 11/17/2022 Room Air   Final    RBC, UA 11/17/2022 0  0 - 4 /hpf Final    WBC, UA 11/17/2022 0  0 - 5 /hpf Final    Bacteria 11/17/2022 Negative  None-Occ /hpf Final    Yeast, UA 11/17/2022 None  None Final    Squam Epithel, UA 11/17/2022 0  /hpf Final    Hyaline Casts, UA 11/17/2022 0.00 (A)  0-1/lpf /lpf Final    Microscopic Comment 11/17/2022 SEE COMMENT   Final    Comment: Other formed elements not mentioned in the report are not   present in the microscopic examination.       POC Glucose 11/17/2022 401 (H)  70 - 110 Final    POC Glucose 11/17/2022 358 (H)  70 - 110 Final    POC Glucose 11/17/2022 305 (H)  70 - 110 Final    POC Glucose 11/17/2022 292 (H)  70 - 110 Final    POC Glucose 11/17/2022 224 (H)  70 - 110 Final   ]  Assessment:      "  1. Type 2 diabetes mellitus without complication, with long-term current use of insulin    2. Essential hypertension    3. Mixed hyperlipidemia        Plan:       Type 2 diabetes mellitus without complication, with long-term current use of insulin  -     pen needle, diabetic, safety 29 gauge x 1/2" Ndle; 1 Units by Misc.(Non-Drug; Combo Route) route once daily.  Dispense: 100 each; Refill: 1  -   start  dulaglutide (TRULICITY) 0.75 mg/0.5 mL pen injector; Inject 0.75 mg into the skin every 7 days.  Dispense: 4 pen; Refill: 0  Cont to titrate lantus  F/u with endo as scheduled  Call in 2 weeks with glucose readings    Essential hypertension  -     amLODIPine (NORVASC) 5 MG tablet; Take 1 tablet (5 mg total) by mouth once daily.  Dispense: 90 tablet; Refill: 1  -     losartan (COZAAR) 100 MG tablet; Take 1 tablet (100 mg total) by mouth once daily.  Dispense: 90 tablet; Refill: 1    Mixed hyperlipidemia  -     atorvastatin (LIPITOR) 10 MG tablet; Take 1 tablet (10 mg total) by mouth every evening.  Dispense: 90 tablet; Refill: 1        Follow up in about 9 weeks (around 2/14/2023) for DM.      12/13/2022 WU Tucker, MAGNOLIAP        "

## 2023-01-03 ENCOUNTER — PATIENT MESSAGE (OUTPATIENT)
Dept: FAMILY MEDICINE | Facility: CLINIC | Age: 38
End: 2023-01-03

## 2023-01-03 DIAGNOSIS — E11.9 TYPE 2 DIABETES MELLITUS WITHOUT COMPLICATION, WITH LONG-TERM CURRENT USE OF INSULIN: Primary | ICD-10-CM

## 2023-01-03 DIAGNOSIS — Z79.4 TYPE 2 DIABETES MELLITUS WITHOUT COMPLICATION, WITH LONG-TERM CURRENT USE OF INSULIN: Primary | ICD-10-CM

## 2023-01-04 ENCOUNTER — PATIENT MESSAGE (OUTPATIENT)
Dept: FAMILY MEDICINE | Facility: CLINIC | Age: 38
End: 2023-01-04

## 2023-01-04 RX ORDER — DULAGLUTIDE 1.5 MG/.5ML
1.5 INJECTION, SOLUTION SUBCUTANEOUS
Qty: 4 PEN | Refills: 2 | Status: SHIPPED | OUTPATIENT
Start: 2023-01-04 | End: 2023-04-10 | Stop reason: SDUPTHER

## 2023-01-08 ENCOUNTER — HOSPITAL ENCOUNTER (EMERGENCY)
Facility: HOSPITAL | Age: 38
Discharge: HOME | End: 2023-01-08
Attending: EMERGENCY MEDICINE
Payer: MEDICAID

## 2023-01-08 VITALS
TEMPERATURE: 99 F | RESPIRATION RATE: 20 BRPM | WEIGHT: 220 LBS | BODY MASS INDEX: 29.8 KG/M2 | OXYGEN SATURATION: 96 % | DIASTOLIC BLOOD PRESSURE: 74 MMHG | HEART RATE: 72 BPM | SYSTOLIC BLOOD PRESSURE: 110 MMHG | HEIGHT: 72 IN

## 2023-01-08 DIAGNOSIS — R53.83 FATIGUE, UNSPECIFIED TYPE: ICD-10-CM

## 2023-01-08 DIAGNOSIS — R52 GENERALIZED BODY ACHES: ICD-10-CM

## 2023-01-08 DIAGNOSIS — J11.1 INFLUENZA: Primary | ICD-10-CM

## 2023-01-08 LAB
ALBUMIN SERPL BCP-MCNC: 3.8 G/DL (ref 3.5–5.2)
ALP SERPL-CCNC: 120 U/L (ref 55–135)
ALT SERPL W/O P-5'-P-CCNC: 32 U/L (ref 10–44)
ANION GAP SERPL CALC-SCNC: 9 MMOL/L (ref 8–16)
AST SERPL-CCNC: 15 U/L (ref 10–40)
BASOPHILS # BLD AUTO: 0.05 K/UL (ref 0–0.2)
BASOPHILS NFR BLD: 0.4 % (ref 0–1.9)
BILIRUB SERPL-MCNC: 0.8 MG/DL (ref 0.1–1)
BUN SERPL-MCNC: 28 MG/DL (ref 6–20)
CALCIUM SERPL-MCNC: 9.8 MG/DL (ref 8.7–10.5)
CHLORIDE SERPL-SCNC: 107 MMOL/L (ref 95–110)
CO2 SERPL-SCNC: 24 MMOL/L (ref 23–29)
CREAT SERPL-MCNC: 2.3 MG/DL (ref 0.5–1.4)
DIFFERENTIAL METHOD: ABNORMAL
EOSINOPHIL # BLD AUTO: 0.4 K/UL (ref 0–0.5)
EOSINOPHIL NFR BLD: 3.5 % (ref 0–8)
ERYTHROCYTE [DISTWIDTH] IN BLOOD BY AUTOMATED COUNT: 13.3 % (ref 11.5–14.5)
EST. GFR  (NO RACE VARIABLE): 37 ML/MIN/1.73 M^2
GLUCOSE SERPL-MCNC: 141 MG/DL (ref 70–110)
HCT VFR BLD AUTO: 47.9 % (ref 40–54)
HGB BLD-MCNC: 15.3 G/DL (ref 14–18)
IMM GRANULOCYTES # BLD AUTO: 0.08 K/UL (ref 0–0.04)
IMM GRANULOCYTES NFR BLD AUTO: 0.7 % (ref 0–0.5)
LYMPHOCYTES # BLD AUTO: 3.1 K/UL (ref 1–4.8)
LYMPHOCYTES NFR BLD: 26.8 % (ref 18–48)
MCH RBC QN AUTO: 28.3 PG (ref 27–31)
MCHC RBC AUTO-ENTMCNC: 31.9 G/DL (ref 32–36)
MCV RBC AUTO: 89 FL (ref 82–98)
MONOCYTES # BLD AUTO: 0.7 K/UL (ref 0.3–1)
MONOCYTES NFR BLD: 6 % (ref 4–15)
NEUTROPHILS # BLD AUTO: 7.1 K/UL (ref 1.8–7.7)
NEUTROPHILS NFR BLD: 62.6 % (ref 38–73)
NRBC BLD-RTO: 0 /100 WBC
PLATELET # BLD AUTO: 207 K/UL (ref 150–450)
PMV BLD AUTO: 10.5 FL (ref 9.2–12.9)
POTASSIUM SERPL-SCNC: 4.7 MMOL/L (ref 3.5–5.1)
PROT SERPL-MCNC: 6.9 G/DL (ref 6–8.4)
RBC # BLD AUTO: 5.41 M/UL (ref 4.6–6.2)
SODIUM SERPL-SCNC: 140 MMOL/L (ref 136–145)
WBC # BLD AUTO: 11.42 K/UL (ref 3.9–12.7)

## 2023-01-08 PROCEDURE — 80053 COMPREHEN METABOLIC PANEL: CPT | Performed by: PHYSICIAN ASSISTANT

## 2023-01-08 PROCEDURE — 99284 EMERGENCY DEPT VISIT MOD MDM: CPT | Mod: 25

## 2023-01-08 PROCEDURE — 63600175 PHARM REV CODE 636 W HCPCS: Performed by: PHYSICIAN ASSISTANT

## 2023-01-08 PROCEDURE — 36415 COLL VENOUS BLD VENIPUNCTURE: CPT | Performed by: PHYSICIAN ASSISTANT

## 2023-01-08 PROCEDURE — 96360 HYDRATION IV INFUSION INIT: CPT

## 2023-01-08 PROCEDURE — 25000003 PHARM REV CODE 250: Performed by: PHYSICIAN ASSISTANT

## 2023-01-08 PROCEDURE — 85025 COMPLETE CBC W/AUTO DIFF WBC: CPT | Performed by: PHYSICIAN ASSISTANT

## 2023-01-08 PROCEDURE — 96361 HYDRATE IV INFUSION ADD-ON: CPT

## 2023-01-08 RX ORDER — BENZONATATE 100 MG/1
100 CAPSULE ORAL 3 TIMES DAILY PRN
Qty: 20 CAPSULE | Refills: 0 | Status: SHIPPED | OUTPATIENT
Start: 2023-01-08 | End: 2023-01-31

## 2023-01-08 RX ORDER — CETIRIZINE HYDROCHLORIDE 10 MG/1
10 TABLET ORAL
Status: COMPLETED | OUTPATIENT
Start: 2023-01-08 | End: 2023-01-08

## 2023-01-08 RX ORDER — BENZONATATE 100 MG/1
200 CAPSULE ORAL ONCE
Status: COMPLETED | OUTPATIENT
Start: 2023-01-08 | End: 2023-01-08

## 2023-01-08 RX ORDER — ACETAMINOPHEN 500 MG
1000 TABLET ORAL
Status: COMPLETED | OUTPATIENT
Start: 2023-01-08 | End: 2023-01-08

## 2023-01-08 RX ORDER — CODEINE PHOSPHATE AND GUAIFENESIN 10; 100 MG/5ML; MG/5ML
5 SOLUTION ORAL EVERY 6 HOURS PRN
Qty: 118 ML | Refills: 0 | Status: SHIPPED | OUTPATIENT
Start: 2023-01-08 | End: 2023-01-18

## 2023-01-08 RX ORDER — KETOROLAC TROMETHAMINE 30 MG/ML
15 INJECTION, SOLUTION INTRAMUSCULAR; INTRAVENOUS
Status: DISCONTINUED | OUTPATIENT
Start: 2023-01-08 | End: 2023-01-08

## 2023-01-08 RX ORDER — CETIRIZINE HYDROCHLORIDE 10 MG/1
10 TABLET ORAL DAILY
Qty: 30 TABLET | Refills: 0 | Status: ON HOLD | OUTPATIENT
Start: 2023-01-08 | End: 2023-08-21

## 2023-01-08 RX ADMIN — CETIRIZINE HYDROCHLORIDE 10 MG: 10 TABLET, FILM COATED ORAL at 06:01

## 2023-01-08 RX ADMIN — SODIUM CHLORIDE 1000 ML: 0.9 INJECTION, SOLUTION INTRAVENOUS at 05:01

## 2023-01-08 RX ADMIN — BENZONATATE 200 MG: 100 CAPSULE ORAL at 06:01

## 2023-01-08 RX ADMIN — SODIUM CHLORIDE, SODIUM LACTATE, POTASSIUM CHLORIDE, AND CALCIUM CHLORIDE 500 ML: .6; .31; .03; .02 INJECTION, SOLUTION INTRAVENOUS at 06:01

## 2023-01-08 RX ADMIN — ACETAMINOPHEN 1000 MG: 500 TABLET ORAL at 06:01

## 2023-01-08 NOTE — ED PROVIDER NOTES
Encounter Date: 1/8/2023    SCRIBE #1 NOTE: IKaylyn, am scribing for, and in the presence of,  Ashley Hoffman PA-C.     History     Chief Complaint   Patient presents with    Dehydration     Diagnosed with the flu on Tuesday, taking Tamiflu.      Time seen by provider: 5:36 PM on 01/08/2023    Jose Miguel Brennan is a 37 y.o. male who presents to the ED with fatigue, body aches, and cough. Patient was diagnosed with Flu 5 days ago and is taking Tamiflu and Tylenol with some relief. The patient denies abdominal pain, nausea, vomiting or any other symptoms at this time. He has a PMHx of HTN, HLD, diabetes, and asthma. He has no pertinent PSHx.    The history is provided by the patient.   Review of patient's allergies indicates:   Allergen Reactions    Zithromax [azithromycin] Rash     Past Medical History:   Diagnosis Date    Anxiety     Asthma     Depression     Diabetes mellitus     diet controlled    Diabetes mellitus, type 2     GERD (gastroesophageal reflux disease)     Gout     Hyperlipidemia     Hypertension     IgA nephropathy     Insomnia      Past Surgical History:   Procedure Laterality Date    COLONOSCOPY N/A 5/8/2020    Procedure: COLONOSCOPY;  Surgeon: Hammad Castorena III, MD;  Location: [facility];  Service: Endoscopy;  Laterality: N/A;    ESOPHAGOGASTRODUODENOSCOPY N/A 5/8/2020    Procedure: EGD (ESOPHAGOGASTRODUODENOSCOPY);  Surgeon: Hammad Castorena III, MD;  Location: [facility];  Service: Endoscopy;  Laterality: N/A;    nose polyp removal       Family History   Problem Relation Age of Onset    Hypertension Maternal Grandmother     Cancer Maternal Grandfather     Diabetes Maternal Grandfather     Heart disease Maternal Grandfather     Heart disease Mother     Stroke Mother     Diabetes Mother      Social History     Tobacco Use    Smoking status: Former     Packs/day: 0.25     Years: 5.00     Pack years: 1.25     Types: Cigars, Cigarettes     Quit date: 5/9/2022     Years since  quittin.6    Smokeless tobacco: Never    Tobacco comments:     One cigar a day   Substance Use Topics    Alcohol use: Yes    Drug use: No     Review of Systems   Constitutional:  Positive for fatigue. Negative for activity change, appetite change and fever.   HENT:  Negative for congestion, rhinorrhea and sore throat.    Eyes:  Negative for visual disturbance.   Respiratory:  Positive for cough. Negative for chest tightness, shortness of breath and wheezing.    Cardiovascular:  Negative for chest pain and palpitations.   Gastrointestinal:  Negative for abdominal pain, diarrhea, nausea and vomiting.   Musculoskeletal:  Positive for myalgias. Negative for arthralgias.   Skin:  Negative for rash.   Neurological:  Negative for weakness, light-headedness and headaches.     Physical Exam     Initial Vitals [23 1619]   BP Pulse Resp Temp SpO2   (!) 139/91 93 20 98.5 °F (36.9 °C) 99 %      MAP       --         Physical Exam    Nursing note and vitals reviewed.  Constitutional: Vital signs are normal. He appears well-developed and well-nourished. He is not diaphoretic. He is cooperative.  Non-toxic appearance. He does not have a sickly appearance. No distress.   HENT:   Head: Normocephalic and atraumatic.   Mouth/Throat: Uvula is midline.   Nasal congestion and rhinorrhea noted.  Mild erythema noted to posterior oropharynx without edema or exudate.     Eyes: Conjunctivae and lids are normal.   Neck: Neck supple.   Normal range of motion.   Full passive range of motion without pain.     Cardiovascular:  Normal rate, regular rhythm, normal heart sounds and intact distal pulses.     Exam reveals no gallop and no friction rub.       No murmur heard.  Pulmonary/Chest: Breath sounds normal. He has no wheezes. He has no rhonchi. He has no rales.   Equal, bilateral breath sounds noted without wheezing.     Abdominal: Abdomen is soft. He exhibits no distension and no mass. There is no abdominal tenderness.    Musculoskeletal:         General: No tenderness or edema. Normal range of motion.      Cervical back: Full passive range of motion without pain, normal range of motion and neck supple.     Neurological: He is alert. He has normal strength. No sensory deficit.   Skin: Skin is warm, dry and intact. No rash noted.   Psychiatric: He has a normal mood and affect.       ED Course   Procedures  Labs Reviewed   CBC W/ AUTO DIFFERENTIAL - Abnormal; Notable for the following components:       Result Value    MCHC 31.9 (*)     Immature Granulocytes 0.7 (*)     Immature Grans (Abs) 0.08 (*)     All other components within normal limits   COMPREHENSIVE METABOLIC PANEL - Abnormal; Notable for the following components:    Glucose 141 (*)     BUN 28 (*)     Creatinine 2.3 (*)     eGFR 37 (*)     All other components within normal limits          Imaging Results              X-Ray Chest 1 View (In process)                      Medications   sodium chloride 0.9% bolus 1,000 mL 1,000 mL (0 mLs Intravenous Stopped 1/8/23 1800)   benzonatate capsule 200 mg (200 mg Oral Given 1/8/23 1806)   cetirizine tablet 10 mg (10 mg Oral Given 1/8/23 1806)   acetaminophen tablet 1,000 mg (1,000 mg Oral Given 1/8/23 1806)   lactated ringers bolus 500 mL (0 mLs Intravenous Stopped 1/8/23 2010)     Medical Decision Making:   History:   Old Medical Records: I decided to obtain old medical records.  Old Records Summarized: records from clinic visits and records from previous admission(s).  Differential Diagnosis:   Influenza  Pneumonia  Strep pharyngitis  Meningitis  Viral syndrome  Dehydration   TRACEY     Independently Interpreted Test(s):   I have ordered and independently interpreted X-rays - see prior notes.  Clinical Tests:   Lab Tests: Ordered and Reviewed  Radiological Study: Ordered and Reviewed     APC / Resident Notes:   Pt is well appearing an afebrile here in the ED.  Labs stable without elevated WBCs.  No evidence for pneumonia on CXR.   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  He was given a dose of Tylenol and IV fluids here in the ED and was requesting a dose of Morphine, which we don't feel is appropriate medication for the treatment of myalgias related to influenza.  He will be discharged home with a prescription for Tessalon, Zyrtec and Cheratussin.  He voices understanding and is agreeable to the plan.  He is given specific return precautions.       Scribe Attestation:   Scribe #1: I performed the above scribed service and the documentation accurately describes the services I performed. I attest to the accuracy of the note.                 I, Ashley Hoffman PA-C, personally performed the services described in this documentation. All medical record entries made by the scribe were at my direction and in my presence.  I have reviewed the chart and agree that the record reflects my personal performance and is accurate and complete. Ashley Hoffman PA-C.  8:35 PM 01/08/2023    Clinical Impression:   Final diagnoses:  [J11.1] Influenza (Primary)  [R52] Generalized body aches  [R53.83] Fatigue, unspecified type        ED Disposition Condition    Discharge Stable          ED Prescriptions       Medication Sig Dispense Start Date End Date Auth. Provider    guaiFENesin-codeine 100-10 mg/5 ml (TUSSI-ORGANIDIN NR)  mg/5 mL syrup Take 5 mLs by mouth every 6 (six) hours as needed for Cough or Congestion. 118 mL 1/8/2023 1/18/2023 Ashley Hoffman PA-C    benzonatate (TESSALON) 100 MG capsule Take 1 capsule (100 mg total) by mouth 3 (three) times daily as needed for Cough. 20 capsule 1/8/2023 -- Ashley Hoffman PA-C    cetirizine (ZYRTEC) 10 MG tablet Take 1 tablet (10 mg total) by mouth once daily. 30 tablet 1/8/2023 1/8/2024 Ashley Hoffman PA-C          Follow-up Information       Follow up With Specialties Details Why Contact Info    [facility] Emergency Medicine  As needed, If symptoms  [address] [address]  [phone]    Jose Grant NP Family Medicine  As needed [address] SERVICE [facility] [address]  [phone]               Ashley Hoffman PA-C  01/08/23 2036

## 2023-01-17 NOTE — PROGRESS NOTES
ED Navigator attempted to contact patient on 3 or more separate occasions, patient is unable to reach. ED Navigator to close encounter at this time.     Juanita Brandt  ED Navigator

## 2023-01-26 ENCOUNTER — HOSPITAL ENCOUNTER (INPATIENT)
Facility: HOSPITAL | Age: 38
LOS: 2 days | Discharge: HOME | DRG: 392 | End: 2023-01-28
Attending: EMERGENCY MEDICINE | Admitting: STUDENT IN AN ORGANIZED HEALTH CARE EDUCATION/TRAINING PROGRAM
Payer: MEDICAID

## 2023-01-26 DIAGNOSIS — R19.7 DIARRHEA OF PRESUMED INFECTIOUS ORIGIN: ICD-10-CM

## 2023-01-26 DIAGNOSIS — N17.9 ACUTE KIDNEY INJURY: ICD-10-CM

## 2023-01-26 DIAGNOSIS — N40.0 BENIGN PROSTATIC HYPERPLASIA, UNSPECIFIED WHETHER LOWER URINARY TRACT SYMPTOMS PRESENT: ICD-10-CM

## 2023-01-26 DIAGNOSIS — K52.9 GASTROENTERITIS: Primary | ICD-10-CM

## 2023-01-26 DIAGNOSIS — R33.9 URINARY RETENTION: ICD-10-CM

## 2023-01-26 DIAGNOSIS — R11.2 NAUSEA AND VOMITING, UNSPECIFIED VOMITING TYPE: ICD-10-CM

## 2023-01-26 DIAGNOSIS — E87.5 HYPERKALEMIA: ICD-10-CM

## 2023-01-26 LAB
ALBUMIN SERPL BCP-MCNC: 4.6 G/DL (ref 3.5–5.2)
ALP SERPL-CCNC: 120 U/L (ref 55–135)
ALT SERPL W/O P-5'-P-CCNC: 30 U/L (ref 10–44)
ANION GAP SERPL CALC-SCNC: 6 MMOL/L (ref 8–16)
ANION GAP SERPL CALC-SCNC: 9 MMOL/L (ref 8–16)
AST SERPL-CCNC: 15 U/L (ref 10–40)
BACTERIA #/AREA URNS HPF: NEGATIVE /HPF
BASOPHILS # BLD AUTO: 0.04 K/UL (ref 0–0.2)
BASOPHILS # BLD AUTO: 0.06 K/UL (ref 0–0.2)
BASOPHILS NFR BLD: 0.3 % (ref 0–1.9)
BASOPHILS NFR BLD: 0.3 % (ref 0–1.9)
BILIRUB SERPL-MCNC: 1 MG/DL (ref 0.1–1)
BILIRUB UR QL STRIP: ABNORMAL
BUN SERPL-MCNC: 44 MG/DL (ref 6–20)
BUN SERPL-MCNC: 49 MG/DL (ref 6–20)
CALCIUM SERPL-MCNC: 10.2 MG/DL (ref 8.7–10.5)
CALCIUM SERPL-MCNC: 9.2 MG/DL (ref 8.7–10.5)
CHLORIDE SERPL-SCNC: 106 MMOL/L (ref 95–110)
CHLORIDE SERPL-SCNC: 106 MMOL/L (ref 95–110)
CLARITY UR: CLEAR
CO2 SERPL-SCNC: 21 MMOL/L (ref 23–29)
CO2 SERPL-SCNC: 24 MMOL/L (ref 23–29)
COLOR UR: YELLOW
CREAT SERPL-MCNC: 3.1 MG/DL (ref 0.5–1.4)
CREAT SERPL-MCNC: 3.5 MG/DL (ref 0.5–1.4)
DIFFERENTIAL METHOD: ABNORMAL
DIFFERENTIAL METHOD: ABNORMAL
EOSINOPHIL # BLD AUTO: 0.4 K/UL (ref 0–0.5)
EOSINOPHIL # BLD AUTO: 0.4 K/UL (ref 0–0.5)
EOSINOPHIL NFR BLD: 2.1 % (ref 0–8)
EOSINOPHIL NFR BLD: 2.4 % (ref 0–8)
ERYTHROCYTE [DISTWIDTH] IN BLOOD BY AUTOMATED COUNT: 13.5 % (ref 11.5–14.5)
ERYTHROCYTE [DISTWIDTH] IN BLOOD BY AUTOMATED COUNT: 13.5 % (ref 11.5–14.5)
EST. GFR  (NO RACE VARIABLE): 22.1 ML/MIN/1.73 M^2
EST. GFR  (NO RACE VARIABLE): 25.6 ML/MIN/1.73 M^2
GLUCOSE SERPL-MCNC: 165 MG/DL (ref 70–110)
GLUCOSE SERPL-MCNC: 175 MG/DL (ref 70–110)
GLUCOSE UR QL STRIP: NEGATIVE
HCT VFR BLD AUTO: 48.3 % (ref 40–54)
HCT VFR BLD AUTO: 52.1 % (ref 40–54)
HGB BLD-MCNC: 15.5 G/DL (ref 14–18)
HGB BLD-MCNC: 16.4 G/DL (ref 14–18)
HGB UR QL STRIP: ABNORMAL
HYALINE CASTS #/AREA URNS LPF: 13 /LPF
IMM GRANULOCYTES # BLD AUTO: 0.06 K/UL (ref 0–0.04)
IMM GRANULOCYTES # BLD AUTO: 0.07 K/UL (ref 0–0.04)
IMM GRANULOCYTES NFR BLD AUTO: 0.3 % (ref 0–0.5)
IMM GRANULOCYTES NFR BLD AUTO: 0.4 % (ref 0–0.5)
KETONES UR QL STRIP: NEGATIVE
LEUKOCYTE ESTERASE UR QL STRIP: NEGATIVE
LIPASE SERPL-CCNC: 62 U/L (ref 4–60)
LYMPHOCYTES # BLD AUTO: 1.7 K/UL (ref 1–4.8)
LYMPHOCYTES # BLD AUTO: 2.3 K/UL (ref 1–4.8)
LYMPHOCYTES NFR BLD: 10.4 % (ref 18–48)
LYMPHOCYTES NFR BLD: 13.3 % (ref 18–48)
MCH RBC QN AUTO: 28.3 PG (ref 27–31)
MCH RBC QN AUTO: 28.7 PG (ref 27–31)
MCHC RBC AUTO-ENTMCNC: 31.5 G/DL (ref 32–36)
MCHC RBC AUTO-ENTMCNC: 32.1 G/DL (ref 32–36)
MCV RBC AUTO: 89 FL (ref 82–98)
MCV RBC AUTO: 90 FL (ref 82–98)
MICROSCOPIC COMMENT: ABNORMAL
MONOCYTES # BLD AUTO: 1.2 K/UL (ref 0.3–1)
MONOCYTES # BLD AUTO: 1.5 K/UL (ref 0.3–1)
MONOCYTES NFR BLD: 7.5 % (ref 4–15)
MONOCYTES NFR BLD: 8.5 % (ref 4–15)
NEUTROPHILS # BLD AUTO: 12.6 K/UL (ref 1.8–7.7)
NEUTROPHILS # BLD AUTO: 13.2 K/UL (ref 1.8–7.7)
NEUTROPHILS NFR BLD: 75.5 % (ref 38–73)
NEUTROPHILS NFR BLD: 79 % (ref 38–73)
NITRITE UR QL STRIP: NEGATIVE
NRBC BLD-RTO: 0 /100 WBC
NRBC BLD-RTO: 0 /100 WBC
PH UR STRIP: 6 [PH] (ref 5–8)
PLATELET # BLD AUTO: 270 K/UL (ref 150–450)
PLATELET # BLD AUTO: 306 K/UL (ref 150–450)
PMV BLD AUTO: 10.1 FL (ref 9.2–12.9)
PMV BLD AUTO: 10.3 FL (ref 9.2–12.9)
POTASSIUM SERPL-SCNC: 4.8 MMOL/L (ref 3.5–5.1)
POTASSIUM SERPL-SCNC: 5.5 MMOL/L (ref 3.5–5.1)
PROT SERPL-MCNC: 8.2 G/DL (ref 6–8.4)
PROT UR QL STRIP: ABNORMAL
RBC # BLD AUTO: 5.41 M/UL (ref 4.6–6.2)
RBC # BLD AUTO: 5.8 M/UL (ref 4.6–6.2)
RBC #/AREA URNS HPF: 3 /HPF (ref 0–4)
SODIUM SERPL-SCNC: 136 MMOL/L (ref 136–145)
SODIUM SERPL-SCNC: 136 MMOL/L (ref 136–145)
SP GR UR STRIP: 1.02 (ref 1–1.03)
SQUAMOUS #/AREA URNS HPF: 2 /HPF
URN SPEC COLLECT METH UR: ABNORMAL
UROBILINOGEN UR STRIP-ACNC: NEGATIVE EU/DL
WBC # BLD AUTO: 15.93 K/UL (ref 3.9–12.7)
WBC # BLD AUTO: 17.56 K/UL (ref 3.9–12.7)
WBC #/AREA URNS HPF: 2 /HPF (ref 0–5)

## 2023-01-26 PROCEDURE — 12000002 HC ACUTE/MED SURGE SEMI-PRIVATE ROOM

## 2023-01-26 PROCEDURE — 63600175 PHARM REV CODE 636 W HCPCS: Performed by: EMERGENCY MEDICINE

## 2023-01-26 PROCEDURE — 63600175 PHARM REV CODE 636 W HCPCS: Performed by: NURSE PRACTITIONER

## 2023-01-26 PROCEDURE — 81001 URINALYSIS AUTO W/SCOPE: CPT | Performed by: EMERGENCY MEDICINE

## 2023-01-26 PROCEDURE — 80053 COMPREHEN METABOLIC PANEL: CPT | Performed by: EMERGENCY MEDICINE

## 2023-01-26 PROCEDURE — 85025 COMPLETE CBC W/AUTO DIFF WBC: CPT | Performed by: EMERGENCY MEDICINE

## 2023-01-26 PROCEDURE — 99285 EMERGENCY DEPT VISIT HI MDM: CPT | Mod: 25

## 2023-01-26 PROCEDURE — 63600175 PHARM REV CODE 636 W HCPCS: Performed by: STUDENT IN AN ORGANIZED HEALTH CARE EDUCATION/TRAINING PROGRAM

## 2023-01-26 PROCEDURE — 96375 TX/PRO/DX INJ NEW DRUG ADDON: CPT

## 2023-01-26 PROCEDURE — 96365 THER/PROPH/DIAG IV INF INIT: CPT

## 2023-01-26 PROCEDURE — 80048 BASIC METABOLIC PNL TOTAL CA: CPT | Performed by: EMERGENCY MEDICINE

## 2023-01-26 PROCEDURE — 85025 COMPLETE CBC W/AUTO DIFF WBC: CPT | Mod: 91 | Performed by: EMERGENCY MEDICINE

## 2023-01-26 PROCEDURE — 83690 ASSAY OF LIPASE: CPT | Performed by: EMERGENCY MEDICINE

## 2023-01-26 RX ORDER — HYDROMORPHONE HYDROCHLORIDE 1 MG/ML
1 INJECTION, SOLUTION INTRAMUSCULAR; INTRAVENOUS; SUBCUTANEOUS
Status: COMPLETED | OUTPATIENT
Start: 2023-01-26 | End: 2023-01-26

## 2023-01-26 RX ORDER — PANTOPRAZOLE SODIUM 40 MG/10ML
40 INJECTION, POWDER, LYOPHILIZED, FOR SOLUTION INTRAVENOUS DAILY
Status: DISCONTINUED | OUTPATIENT
Start: 2023-01-27 | End: 2023-01-28 | Stop reason: HOSPADM

## 2023-01-26 RX ORDER — ONDANSETRON 2 MG/ML
4 INJECTION INTRAMUSCULAR; INTRAVENOUS
Status: COMPLETED | OUTPATIENT
Start: 2023-01-26 | End: 2023-01-26

## 2023-01-26 RX ADMIN — HYDROMORPHONE HYDROCHLORIDE 1 MG: 1 INJECTION, SOLUTION INTRAMUSCULAR; INTRAVENOUS; SUBCUTANEOUS at 10:01

## 2023-01-26 RX ADMIN — ONDANSETRON 4 MG: 2 INJECTION INTRAMUSCULAR; INTRAVENOUS at 08:01

## 2023-01-26 RX ADMIN — SODIUM CHLORIDE, SODIUM LACTATE, POTASSIUM CHLORIDE, AND CALCIUM CHLORIDE 1000 ML: .6; .31; .03; .02 INJECTION, SOLUTION INTRAVENOUS at 10:01

## 2023-01-26 RX ADMIN — CEFTRIAXONE 2 G: 2 INJECTION, SOLUTION INTRAVENOUS at 10:01

## 2023-01-27 PROBLEM — E87.5 HYPERKALEMIA: Status: ACTIVE | Noted: 2023-01-27

## 2023-01-27 LAB
ANION GAP SERPL CALC-SCNC: 7 MMOL/L (ref 8–16)
ANION GAP SERPL CALC-SCNC: 8 MMOL/L (ref 8–16)
BUN SERPL-MCNC: 40 MG/DL (ref 6–20)
BUN SERPL-MCNC: 46 MG/DL (ref 6–20)
CALCIUM SERPL-MCNC: 8.5 MG/DL (ref 8.7–10.5)
CALCIUM SERPL-MCNC: 9.2 MG/DL (ref 8.7–10.5)
CHLORIDE SERPL-SCNC: 106 MMOL/L (ref 95–110)
CHLORIDE SERPL-SCNC: 108 MMOL/L (ref 95–110)
CO2 SERPL-SCNC: 23 MMOL/L (ref 23–29)
CO2 SERPL-SCNC: 23 MMOL/L (ref 23–29)
COMPLEXED PSA SERPL-MCNC: 0.49 NG/ML (ref 0–4)
CREAT SERPL-MCNC: 2.7 MG/DL (ref 0.5–1.4)
CREAT SERPL-MCNC: 3 MG/DL (ref 0.5–1.4)
ERYTHROCYTE [DISTWIDTH] IN BLOOD BY AUTOMATED COUNT: 13.5 % (ref 11.5–14.5)
EST. GFR  (NO RACE VARIABLE): 26.6 ML/MIN/1.73 M^2
EST. GFR  (NO RACE VARIABLE): 30.2 ML/MIN/1.73 M^2
ESTIMATED AVG GLUCOSE: 217 MG/DL (ref 68–131)
GLUCOSE SERPL-MCNC: 106 MG/DL (ref 70–110)
GLUCOSE SERPL-MCNC: 139 MG/DL (ref 70–110)
GLUCOSE SERPL-MCNC: 173 MG/DL (ref 70–110)
HBA1C MFR BLD: 9.2 % (ref 4.5–6.2)
HCT VFR BLD AUTO: 44.4 % (ref 40–54)
HGB BLD-MCNC: 14.4 G/DL (ref 14–18)
MAGNESIUM SERPL-MCNC: 1.9 MG/DL (ref 1.6–2.6)
MCH RBC QN AUTO: 28.9 PG (ref 27–31)
MCHC RBC AUTO-ENTMCNC: 32.4 G/DL (ref 32–36)
MCV RBC AUTO: 89 FL (ref 82–98)
PLATELET # BLD AUTO: 211 K/UL (ref 150–450)
PMV BLD AUTO: 10.1 FL (ref 9.2–12.9)
POTASSIUM SERPL-SCNC: 4.2 MMOL/L (ref 3.5–5.1)
POTASSIUM SERPL-SCNC: 6 MMOL/L (ref 3.5–5.1)
RBC # BLD AUTO: 4.98 M/UL (ref 4.6–6.2)
SODIUM SERPL-SCNC: 136 MMOL/L (ref 136–145)
SODIUM SERPL-SCNC: 139 MMOL/L (ref 136–145)
WBC # BLD AUTO: 8.6 K/UL (ref 3.9–12.7)

## 2023-01-27 PROCEDURE — C9113 INJ PANTOPRAZOLE SODIUM, VIA: HCPCS | Performed by: STUDENT IN AN ORGANIZED HEALTH CARE EDUCATION/TRAINING PROGRAM

## 2023-01-27 PROCEDURE — 99900031 HC PATIENT EDUCATION (STAT)

## 2023-01-27 PROCEDURE — 94799 UNLISTED PULMONARY SVC/PX: CPT

## 2023-01-27 PROCEDURE — S0030 INJECTION, METRONIDAZOLE: HCPCS | Performed by: STUDENT IN AN ORGANIZED HEALTH CARE EDUCATION/TRAINING PROGRAM

## 2023-01-27 PROCEDURE — 93005 ELECTROCARDIOGRAM TRACING: CPT | Performed by: INTERNAL MEDICINE

## 2023-01-27 PROCEDURE — 63600175 PHARM REV CODE 636 W HCPCS: Performed by: STUDENT IN AN ORGANIZED HEALTH CARE EDUCATION/TRAINING PROGRAM

## 2023-01-27 PROCEDURE — 80048 BASIC METABOLIC PNL TOTAL CA: CPT | Mod: 91 | Performed by: STUDENT IN AN ORGANIZED HEALTH CARE EDUCATION/TRAINING PROGRAM

## 2023-01-27 PROCEDURE — 96365 THER/PROPH/DIAG IV INF INIT: CPT

## 2023-01-27 PROCEDURE — 36415 COLL VENOUS BLD VENIPUNCTURE: CPT | Performed by: STUDENT IN AN ORGANIZED HEALTH CARE EDUCATION/TRAINING PROGRAM

## 2023-01-27 PROCEDURE — 25000003 PHARM REV CODE 250: Performed by: EMERGENCY MEDICINE

## 2023-01-27 PROCEDURE — 36415 COLL VENOUS BLD VENIPUNCTURE: CPT | Performed by: FAMILY MEDICINE

## 2023-01-27 PROCEDURE — 96375 TX/PRO/DX INJ NEW DRUG ADDON: CPT

## 2023-01-27 PROCEDURE — 85027 COMPLETE CBC AUTOMATED: CPT | Performed by: STUDENT IN AN ORGANIZED HEALTH CARE EDUCATION/TRAINING PROGRAM

## 2023-01-27 PROCEDURE — 25000003 PHARM REV CODE 250: Performed by: STUDENT IN AN ORGANIZED HEALTH CARE EDUCATION/TRAINING PROGRAM

## 2023-01-27 PROCEDURE — 84153 ASSAY OF PSA TOTAL: CPT | Performed by: FAMILY MEDICINE

## 2023-01-27 PROCEDURE — 99900035 HC TECH TIME PER 15 MIN (STAT)

## 2023-01-27 PROCEDURE — 83036 HEMOGLOBIN GLYCOSYLATED A1C: CPT | Performed by: STUDENT IN AN ORGANIZED HEALTH CARE EDUCATION/TRAINING PROGRAM

## 2023-01-27 PROCEDURE — 93010 ELECTROCARDIOGRAM REPORT: CPT | Mod: ,,, | Performed by: INTERNAL MEDICINE

## 2023-01-27 PROCEDURE — 93010 EKG 12-LEAD: ICD-10-PCS | Mod: ,,, | Performed by: INTERNAL MEDICINE

## 2023-01-27 PROCEDURE — 83735 ASSAY OF MAGNESIUM: CPT | Performed by: STUDENT IN AN ORGANIZED HEALTH CARE EDUCATION/TRAINING PROGRAM

## 2023-01-27 PROCEDURE — 80048 BASIC METABOLIC PNL TOTAL CA: CPT | Performed by: EMERGENCY MEDICINE

## 2023-01-27 PROCEDURE — 12000002 HC ACUTE/MED SURGE SEMI-PRIVATE ROOM

## 2023-01-27 RX ORDER — TALC
6 POWDER (GRAM) TOPICAL NIGHTLY PRN
Status: DISCONTINUED | OUTPATIENT
Start: 2023-01-27 | End: 2023-01-28 | Stop reason: HOSPADM

## 2023-01-27 RX ORDER — GLUCAGON 1 MG
1 KIT INJECTION
Status: DISCONTINUED | OUTPATIENT
Start: 2023-01-27 | End: 2023-01-28 | Stop reason: HOSPADM

## 2023-01-27 RX ORDER — CETIRIZINE HYDROCHLORIDE 10 MG/1
10 TABLET ORAL DAILY
Status: DISCONTINUED | OUTPATIENT
Start: 2023-01-27 | End: 2023-01-28 | Stop reason: HOSPADM

## 2023-01-27 RX ORDER — SODIUM CHLORIDE 9 MG/ML
INJECTION, SOLUTION INTRAVENOUS CONTINUOUS
Status: ACTIVE | OUTPATIENT
Start: 2023-01-27 | End: 2023-01-28

## 2023-01-27 RX ORDER — ACETAMINOPHEN 325 MG/1
650 TABLET ORAL EVERY 8 HOURS PRN
Status: DISCONTINUED | OUTPATIENT
Start: 2023-01-27 | End: 2023-01-28 | Stop reason: HOSPADM

## 2023-01-27 RX ORDER — GABAPENTIN 300 MG/1
300 CAPSULE ORAL 3 TIMES DAILY
Status: DISCONTINUED | OUTPATIENT
Start: 2023-01-27 | End: 2023-01-28 | Stop reason: HOSPADM

## 2023-01-27 RX ORDER — TRAMADOL HYDROCHLORIDE 50 MG/1
50 TABLET ORAL EVERY 6 HOURS PRN
Status: DISCONTINUED | OUTPATIENT
Start: 2023-01-27 | End: 2023-01-28 | Stop reason: HOSPADM

## 2023-01-27 RX ORDER — IBUPROFEN 200 MG
1 TABLET ORAL DAILY
Status: DISCONTINUED | OUTPATIENT
Start: 2023-01-27 | End: 2023-01-28 | Stop reason: HOSPADM

## 2023-01-27 RX ORDER — SODIUM BICARBONATE 1 MEQ/ML
50 SYRINGE (ML) INTRAVENOUS
Status: COMPLETED | OUTPATIENT
Start: 2023-01-27 | End: 2023-01-27

## 2023-01-27 RX ORDER — IBUPROFEN 200 MG
16 TABLET ORAL
Status: DISCONTINUED | OUTPATIENT
Start: 2023-01-27 | End: 2023-01-28 | Stop reason: HOSPADM

## 2023-01-27 RX ORDER — SODIUM CHLORIDE 9 MG/ML
1000 INJECTION, SOLUTION INTRAVENOUS
Status: COMPLETED | OUTPATIENT
Start: 2023-01-27 | End: 2023-01-27

## 2023-01-27 RX ORDER — ATORVASTATIN CALCIUM 10 MG/1
10 TABLET, FILM COATED ORAL NIGHTLY
Status: DISCONTINUED | OUTPATIENT
Start: 2023-01-27 | End: 2023-01-28 | Stop reason: HOSPADM

## 2023-01-27 RX ORDER — AMLODIPINE BESYLATE 5 MG/1
5 TABLET ORAL DAILY
Status: DISCONTINUED | OUTPATIENT
Start: 2023-01-27 | End: 2023-01-27

## 2023-01-27 RX ORDER — ONDANSETRON 2 MG/ML
4 INJECTION INTRAMUSCULAR; INTRAVENOUS EVERY 8 HOURS PRN
Status: DISCONTINUED | OUTPATIENT
Start: 2023-01-27 | End: 2023-01-28 | Stop reason: HOSPADM

## 2023-01-27 RX ORDER — INSULIN ASPART 100 [IU]/ML
0-5 INJECTION, SOLUTION INTRAVENOUS; SUBCUTANEOUS
Status: DISCONTINUED | OUTPATIENT
Start: 2023-01-27 | End: 2023-01-28 | Stop reason: HOSPADM

## 2023-01-27 RX ORDER — IBUPROFEN 200 MG
24 TABLET ORAL
Status: DISCONTINUED | OUTPATIENT
Start: 2023-01-27 | End: 2023-01-28 | Stop reason: HOSPADM

## 2023-01-27 RX ORDER — METRONIDAZOLE 500 MG/100ML
500 INJECTION, SOLUTION INTRAVENOUS
Status: DISCONTINUED | OUTPATIENT
Start: 2023-01-27 | End: 2023-01-28 | Stop reason: HOSPADM

## 2023-01-27 RX ORDER — SODIUM CHLORIDE 0.9 % (FLUSH) 0.9 %
3 SYRINGE (ML) INJECTION EVERY 6 HOURS PRN
Status: DISCONTINUED | OUTPATIENT
Start: 2023-01-27 | End: 2023-01-28 | Stop reason: HOSPADM

## 2023-01-27 RX ORDER — HYDROCODONE BITARTRATE AND ACETAMINOPHEN 5; 325 MG/1; MG/1
1 TABLET ORAL EVERY 6 HOURS PRN
Status: DISCONTINUED | OUTPATIENT
Start: 2023-01-27 | End: 2023-01-28 | Stop reason: HOSPADM

## 2023-01-27 RX ADMIN — GABAPENTIN 300 MG: 300 CAPSULE ORAL at 03:01

## 2023-01-27 RX ADMIN — ONDANSETRON 4 MG: 2 INJECTION INTRAMUSCULAR; INTRAVENOUS at 05:01

## 2023-01-27 RX ADMIN — METRONIDAZOLE 500 MG: 5 INJECTION, SOLUTION INTRAVENOUS at 08:01

## 2023-01-27 RX ADMIN — METRONIDAZOLE 500 MG: 5 INJECTION, SOLUTION INTRAVENOUS at 05:01

## 2023-01-27 RX ADMIN — SODIUM CHLORIDE: 0.9 INJECTION, SOLUTION INTRAVENOUS at 07:01

## 2023-01-27 RX ADMIN — SODIUM ZIRCONIUM CYCLOSILICATE 10 G: 5 POWDER, FOR SUSPENSION ORAL at 01:01

## 2023-01-27 RX ADMIN — TRAMADOL HYDROCHLORIDE 50 MG: 50 TABLET, COATED ORAL at 08:01

## 2023-01-27 RX ADMIN — GABAPENTIN 300 MG: 300 CAPSULE ORAL at 08:01

## 2023-01-27 RX ADMIN — SODIUM CHLORIDE 1000 ML: 0.9 INJECTION, SOLUTION INTRAVENOUS at 01:01

## 2023-01-27 RX ADMIN — ONDANSETRON 4 MG: 2 INJECTION INTRAMUSCULAR; INTRAVENOUS at 09:01

## 2023-01-27 RX ADMIN — HYDROCODONE BITARTRATE AND ACETAMINOPHEN 1 TABLET: 5; 325 TABLET ORAL at 10:01

## 2023-01-27 RX ADMIN — METRONIDAZOLE 500 MG: 5 INJECTION, SOLUTION INTRAVENOUS at 03:01

## 2023-01-27 RX ADMIN — CETIRIZINE HYDROCHLORIDE 10 MG: 10 TABLET, FILM COATED ORAL at 08:01

## 2023-01-27 RX ADMIN — CEFTRIAXONE 2 G: 2 INJECTION, SOLUTION INTRAVENOUS at 11:01

## 2023-01-27 RX ADMIN — ACETAMINOPHEN 650 MG: 325 TABLET ORAL at 08:01

## 2023-01-27 RX ADMIN — AMLODIPINE BESYLATE 5 MG: 5 TABLET ORAL at 08:01

## 2023-01-27 RX ADMIN — TRAMADOL HYDROCHLORIDE 50 MG: 50 TABLET, COATED ORAL at 11:01

## 2023-01-27 RX ADMIN — PANTOPRAZOLE SODIUM 40 MG: 40 INJECTION, POWDER, FOR SOLUTION INTRAVENOUS at 08:01

## 2023-01-27 RX ADMIN — ATORVASTATIN CALCIUM 10 MG: 10 TABLET, FILM COATED ORAL at 08:01

## 2023-01-27 RX ADMIN — SODIUM BICARBONATE 50 MEQ: 84 INJECTION, SOLUTION INTRAVENOUS at 01:01

## 2023-01-27 RX ADMIN — TRAMADOL HYDROCHLORIDE 50 MG: 50 TABLET, COATED ORAL at 05:01

## 2023-01-27 NOTE — ED NOTES
No n/v since zofran given 2000.  Pt states pain left after dilaudid.  Fluids complete.  Awaiting further orders.  Pt resting in NAD in cc02 with blanket, watching videos on his phone.

## 2023-01-27 NOTE — SUBJECTIVE & OBJECTIVE
"Past Medical History:   Diagnosis Date    Anxiety     Asthma     Depression     Diabetes mellitus     diet controlled    Diabetes mellitus, type 2     GERD (gastroesophageal reflux disease)     Gout     Hyperlipidemia     Hypertension     IgA nephropathy     Insomnia        Past Surgical History:   Procedure Laterality Date    COLONOSCOPY N/A 5/8/2020    Procedure: COLONOSCOPY;  Surgeon: Hammad Castorena III, MD;  Location: [facility];  Service: Endoscopy;  Laterality: N/A;    ESOPHAGOGASTRODUODENOSCOPY N/A 5/8/2020    Procedure: EGD (ESOPHAGOGASTRODUODENOSCOPY);  Surgeon: Hammad Castorena III, MD;  Location: [facility];  Service: Endoscopy;  Laterality: N/A;    nose polyp removal         Review of patient's allergies indicates:   Allergen Reactions    Zithromax [azithromycin] Rash       No current facility-administered medications on file prior to encounter.     Current Outpatient Medications on File Prior to Encounter   Medication Sig    amLODIPine (NORVASC) 5 MG tablet Take 1 tablet (5 mg total) by mouth once daily.    atorvastatin (LIPITOR) 10 MG tablet Take 1 tablet (10 mg total) by mouth every evening.    BD CONOR 2ND GEN PEN NEEDLE 32 gauge x 5/32" Ndle once daily. Use    benzonatate (TESSALON) 100 MG capsule Take 1 capsule (100 mg total) by mouth 3 (three) times daily as needed for Cough.    blood sugar diagnostic Strp To check BG one times daily, to use with insurance preferred meter    blood-glucose meter kit To check BG one times daily, to use with insurance preferred meter    cetirizine (ZYRTEC) 10 MG tablet Take 1 tablet (10 mg total) by mouth once daily.    dulaglutide (TRULICITY) 1.5 mg/0.5 mL pen injector Inject 1.5 mg into the skin every 7 days.    febuxostat (ULORIC) 40 mg Tab Take 40 mg by mouth once daily.    gabapentin (NEURONTIN) 300 MG capsule Take 1 capsule (300 mg total) by mouth 3 (three) times daily. Take 1 tablet every night x 7 days. Increase to twice a day x 7 days. Increase to three " "times a day.    insulin (LANTUS SOLOSTAR U-100 INSULIN) glargine 100 units/mL SubQ pen Inject 30 Units into the skin once daily.    lancets Misc To check BG one times daily, to use with insurance preferred meter    losartan (COZAAR) 100 MG tablet Take 1 tablet (100 mg total) by mouth once daily.    omega 3-dha-epa-fish oil 1,000 mg (120 mg-180 mg) Cap Take 1 capsule by mouth 2 (two) times daily.    pen needle, diabetic 32 gauge x 5/16" Ndle 1 Units by Misc.(Non-Drug; Combo Route) route once daily.    pen needle, diabetic, safety 29 gauge x 1/2" Ndle 1 Units by Misc.(Non-Drug; Combo Route) route once daily.     Family History       Problem Relation (Age of Onset)    Cancer Maternal Grandfather    Diabetes Maternal Grandfather, Mother    Heart disease Maternal Grandfather, Mother    Hypertension Maternal Grandmother    Stroke Mother          Tobacco Use    Smoking status: Former     Packs/day: 0.25     Years: 5.00     Pack years: 1.25     Types: Cigars, Cigarettes     Quit date: 2022     Years since quittin.7    Smokeless tobacco: Never    Tobacco comments:     One cigar a day   Substance and Sexual Activity    Alcohol use: Yes    Drug use: No    Sexual activity: Yes     Partners: Female     Review of Systems   Constitutional:  Negative for chills and fever.   HENT:  Negative for hearing loss and sore throat.    Respiratory:  Negative for cough and shortness of breath.    Cardiovascular:  Negative for chest pain and palpitations.   Gastrointestinal:  Positive for abdominal pain, nausea and vomiting.   Genitourinary:  Negative for dysuria and flank pain.   Musculoskeletal:  Negative for back pain and gait problem.   Skin:  Negative for rash and wound.   Neurological:  Negative for dizziness and headaches.   Psychiatric/Behavioral:  Negative for confusion and hallucinations.    Objective:     Vital Signs (Most Recent):  Temp: 97.6 °F (36.4 °C) (23 014)  Pulse: 73 (23)  Resp: 15 (23 " 0140)  BP: 110/60 (01/27/23 0140)  SpO2: 100 % (01/27/23 0140) Vital Signs (24h Range):  Temp:  [97.6 °F (36.4 °C)-98 °F (36.7 °C)] 97.6 °F (36.4 °C)  Pulse:  [] 73  Resp:  [15-18] 15  SpO2:  [99 %-100 %] 100 %  BP: ()/(55-80) 110/60     Weight: 97.5 kg (215 lb)  Body mass index is 29.16 kg/m².    Physical Exam  Vitals reviewed.   Constitutional:       General: He is not in acute distress.     Appearance: Normal appearance. He is not toxic-appearing.   HENT:      Head: Normocephalic and atraumatic.   Eyes:      Extraocular Movements: Extraocular movements intact.      Conjunctiva/sclera: Conjunctivae normal.   Cardiovascular:      Rate and Rhythm: Normal rate and regular rhythm.   Pulmonary:      Effort: No respiratory distress.      Breath sounds: Normal breath sounds. No wheezing.   Abdominal:      General: There is no distension.      Palpations: Abdomen is soft.      Tenderness: There is abdominal tenderness.   Musculoskeletal:      Cervical back: Neck supple. No tenderness.      Right lower leg: No edema.      Left lower leg: No edema.   Skin:     General: Skin is warm and dry.   Neurological:      General: No focal deficit present.      Mental Status: He is alert and oriented to person, place, and time.   Psychiatric:         Mood and Affect: Mood normal.         Judgment: Judgment normal.           Significant Labs: All pertinent labs within the past 24 hours have been reviewed.    Significant Imaging: I have reviewed all pertinent imaging results/findings within the past 24 hours.

## 2023-01-27 NOTE — ED PROVIDER NOTES
Encounter Date: 2023       History     Chief Complaint   Patient presents with    Abdominal Pain    Nausea    Vomiting    Diarrhea     37-year-old male presented emergency department with 2 days of abdominal pain nausea vomiting and diarrhea.  Patient has history of diabetes and kidney problems in the past.  Denies fever or chills.  Denies chest pain or shortness of breath or dysuria or hematuria.  Denies weakness or numbness.  Patient rates pain as crampy and moderate    Review of patient's allergies indicates:   Allergen Reactions    Zithromax [azithromycin] Rash     Past Medical History:   Diagnosis Date    Anxiety     Asthma     Depression     Diabetes mellitus     diet controlled    Diabetes mellitus, type 2     GERD (gastroesophageal reflux disease)     Gout     Hyperlipidemia     Hypertension     IgA nephropathy     Insomnia      Past Surgical History:   Procedure Laterality Date    COLONOSCOPY N/A 2020    Procedure: COLONOSCOPY;  Surgeon: Hammad Castorena III, MD;  Location: [facility];  Service: Endoscopy;  Laterality: N/A;    ESOPHAGOGASTRODUODENOSCOPY N/A 2020    Procedure: EGD (ESOPHAGOGASTRODUODENOSCOPY);  Surgeon: Hammad Castorena III, MD;  Location: [facility];  Service: Endoscopy;  Laterality: N/A;    nose polyp removal       Family History   Problem Relation Age of Onset    Hypertension Maternal Grandmother     Cancer Maternal Grandfather     Diabetes Maternal Grandfather     Heart disease Maternal Grandfather     Heart disease Mother     Stroke Mother     Diabetes Mother      Social History     Tobacco Use    Smoking status: Former     Packs/day: 0.25     Years: 5.00     Pack years: 1.25     Types: Cigars, Cigarettes     Quit date: 2022     Years since quittin.7    Smokeless tobacco: Never    Tobacco comments:     One cigar a day   Substance Use Topics    Alcohol use: Yes    Drug use: No     Review of Systems   Constitutional: Negative.    HENT: Negative.     Eyes:  Negative.    Respiratory: Negative.     Cardiovascular: Negative.    Gastrointestinal:  Positive for abdominal pain, diarrhea, nausea and vomiting. Negative for blood in stool, constipation and rectal pain.   Endocrine: Negative.    Genitourinary: Negative.    Musculoskeletal: Negative.    Skin: Negative.    Allergic/Immunologic: Negative.    Neurological: Negative.    Hematological: Negative.    Psychiatric/Behavioral:  Negative for agitation.    All other systems reviewed and are negative.    Physical Exam     Initial Vitals [01/26/23 1903]   BP Pulse Resp Temp SpO2   110/80 (!) 116 16 97.8 °F (36.6 °C) 100 %      MAP       --         Physical Exam    Nursing note and vitals reviewed.  Constitutional: He appears well-developed and well-nourished.   HENT:   Head: Normocephalic and atraumatic.   Nose: Nose normal.   Eyes: Conjunctivae and EOM are normal.   Neck: Neck supple. No tracheal deviation present.   Normal range of motion.  Cardiovascular:  Normal rate, regular rhythm, normal heart sounds and intact distal pulses.     Exam reveals no friction rub.       No murmur heard.  Pulmonary/Chest: Breath sounds normal. No respiratory distress. He has no wheezes. He has no rales. He exhibits no tenderness.   Abdominal: Abdomen is soft. He exhibits no distension. There is abdominal tenderness.   Diffuse abdominal tenderness   Musculoskeletal:         General: Normal range of motion.      Cervical back: Normal range of motion and neck supple.     Neurological: He is alert and oriented to person, place, and time. He has normal strength. No sensory deficit. GCS score is 15. GCS eye subscore is 4. GCS verbal subscore is 5. GCS motor subscore is 6.   Skin: Skin is warm and dry. Capillary refill takes less than 2 seconds.   Psychiatric: He has a normal mood and affect. Thought content normal.       ED Course   Critical Care    Date/Time: 1/27/2023 1:26 AM  Performed by: Chris Aj MD  Authorized by: Chris Aj MD   Direct  patient critical care time: 20 minutes  Ordering / reviewing critical care time: 5 minutes  Documentation critical care time: 5 minutes  Total critical care time (exclusive of procedural time) : 30 minutes      Labs Reviewed   CBC W/ AUTO DIFFERENTIAL - Abnormal; Notable for the following components:       Result Value    WBC 17.56 (*)     MCHC 31.5 (*)     Gran # (ANC) 13.2 (*)     Immature Grans (Abs) 0.06 (*)     Mono # 1.5 (*)     Gran % 75.5 (*)     Lymph % 13.3 (*)     All other components within normal limits   COMPREHENSIVE METABOLIC PANEL - Abnormal; Notable for the following components:    Glucose 175 (*)     BUN 44 (*)     Creatinine 3.5 (*)     Anion Gap 6 (*)     eGFR 22.1 (*)     All other components within normal limits   LIPASE - Abnormal; Notable for the following components:    Lipase 62 (*)     All other components within normal limits   URINALYSIS, REFLEX TO URINE CULTURE - Abnormal; Notable for the following components:    Protein, UA 2+ (*)     Bilirubin (UA) 1+ (*)     Occult Blood UA Trace (*)     All other components within normal limits    Narrative:     Specimen Source->Urine   URINALYSIS MICROSCOPIC - Abnormal; Notable for the following components:    Hyaline Casts, UA 13 (*)     All other components within normal limits    Narrative:     Specimen Source->Urine   BASIC METABOLIC PANEL - Abnormal; Notable for the following components:    Potassium 5.5 (*)     CO2 21 (*)     Glucose 165 (*)     BUN 49 (*)     Creatinine 3.1 (*)     eGFR 25.6 (*)     All other components within normal limits   CBC W/ AUTO DIFFERENTIAL - Abnormal; Notable for the following components:    WBC 15.93 (*)     Gran # (ANC) 12.6 (*)     Immature Grans (Abs) 0.07 (*)     Mono # 1.2 (*)     Gran % 79.0 (*)     Lymph % 10.4 (*)     All other components within normal limits   BASIC METABOLIC PANEL - Abnormal; Notable for the following components:    Potassium 6.0 (*)     Glucose 173 (*)     BUN 46 (*)     Creatinine  3.0 (*)     Calcium 8.5 (*)     Anion Gap 7 (*)     eGFR 26.6 (*)     All other components within normal limits     EKG Readings: (Independently Interpreted)   Initial Reading: No STEMI. Rhythm: Normal Sinus Rhythm. Ectopy: No Ectopy. Conduction: Normal.     Imaging Results              CT Abdomen Pelvis  Without Contrast (Final result)  Result time 01/26/23 21:43:05   Procedure changed from CT Abdomen Pelvis With Contrast     Final result by Jeb Nazario MD (01/26/23 21:43:05)                   Narrative:    CT ABDOMEN PELVIS WITHOUT IV CONTRAST    Exam date: 1/26/2023 9:00 PM CST    Comparison: CT abdomen pelvis November 17, 2022    Indication: Nausea/vomiting    Technique:  Multiple helical axial images were obtained through the abdomen and pelvis without intravenous contrast. Sagittal and coronal reformatted images are reviewed as well.    All CT scans at this facility use dose modulation, iterative reconstruction, and/or weight-based dosing when appropriate to reduce radiation dose to as low as reasonably achievable.    Findings:    Lung bases: Coronary artery calcifications are present.    Liver: There is low-attenuation suggesting fatty changes. Liver appears large.    Gallbladder/biliary: Gallbladder appears unremarkable. No calcified gallstones. No evidence of biliary ductal dilatation.    Pancreas: Unremarkable.    Spleen: Unremarkable.    Adrenals: There is a 1 cm right adrenal myelolipoma.    Kidneys and ureters: No evidence of renal or ureteral stones. No hydronephrosis. There is mild bilateral renal parenchymal thinning.    Bladder: Bladder wall appears mildly thickened.    Pelvic organs: Unremarkable.    Bowel: Colonic diverticula are present. No evidence of bowel obstruction. No bowel wall thickening. Appendix appears unremarkable.    Peritoneum: No free air. No significant free fluid.    Lymph nodes: Unremarkable.    Vasculature: Unremarkable.    Soft tissues: Unremarkable.    Bones:  Unremarkable.    Impression:    1. Mildly thickened appearance of the bladder which may be related to contraction, correlate clinically for cystitis.  2. Hepatomegaly and hepatic steatosis.    Electronically signed by:  Jeb Nazario MD  1/26/2023 9:43 PM [facility] Workstation: [id number]                                     Medications   pantoprazole injection 40 mg (has no administration in time range)   cefTRIAXone (ROCEPHIN) 2 g/50 mL D5W IVPB (0 g Intravenous Stopped 1/26/23 2333)   0.9%  NaCl infusion (has no administration in time range)   sodium zirconium cyclosilicate packet 10 g (has no administration in time range)   sodium bicarbonate 8.4 % (1 mEq/mL) injection 50 mEq (has no administration in time range)   ondansetron injection 4 mg (4 mg Intravenous Given 1/26/23 2002)   lactated ringers bolus 1,000 mL (0 mLs Intravenous Stopped 1/26/23 2348)   HYDROmorphone injection 1 mg (1 mg Intravenous Given 1/26/23 2233)   lactated ringers bolus 1,000 mL (0 mLs Intravenous Stopped 1/26/23 2348)     Medical Decision Making:   Differential Diagnosis:   37-year-old male presented emergency department with abdominal pain, nausea vomiting and diarrhea.  Patient's presentation consistent with gastroenteritis.  Given patient's symptoms as burn persistent abdominal pain CT done which did not show any acute findings.  Patient noted to have acute kidney injury and IV fluids given and labs repeated and patient's renal function did improve however patient started getting hyperkalemic and so labs repeated again and patient more hyperkalemic at this time.  So hyperkalemia treated and as patient still feeling ill with nausea and generalized malaise and given history of renal insufficiency and worsening symptoms and worsening hyperkalemia, Hospital Medicine consulted for further management and evaluation.  Hyperkalemia treated.  Clinical Tests:   Lab Tests: Reviewed  Radiological Study: Reviewed  Medical Tests: Reviewed  Sepsis  "Perfusion Assessment: "I attest a sepsis perfusion exam was performed within 6 hours of sepsis, severe sepsis, or septic shock presentation, following fluid resuscitation."  ED Management:  Symptoms likely secondary to viral infection.  No indication for antibiotics at this time.                        Clinical Impression:   Final diagnoses:  [E87.5] Hyperkalemia  [K52.9] Gastroenteritis (Primary)  [R11.2] Nausea and vomiting, unspecified vomiting type  [R19.7] Diarrhea of presumed infectious origin  [N17.9] Acute kidney injury        ED Disposition Condition    Admit Stable                Chris Aj MD  01/27/23 0135    "

## 2023-01-27 NOTE — PLAN OF CARE
01/27/23 0745   Patient Assessment/Suction   Level of Consciousness (AVPU) alert   Respiratory Effort Unlabored;Normal   Expansion/Accessory Muscles/Retractions no use of accessory muscles   Rhythm/Pattern, Respiratory pattern regular;depth regular   PRE-TX-O2   Device (Oxygen Therapy) room air   SpO2 100 %   Oximetry Probe Site Applied   Pulse 78   Resp 14   Education   $ Education 15 min;DME Oxygen   Respiratory Evaluation   $ Care Plan Tech Time 15 min   $ Eval/Re-eval Charges Evaluation   Evaluation For New Orders

## 2023-01-27 NOTE — PLAN OF CARE
[facility]  Initial Discharge Assessment       Primary Care Provider: Jose Grant NP    Admission Diagnosis: Gastroenteritis [K52.9]    Admission Date: 1/26/2023  Expected Discharge Date: 1/27/2023    Discharge Barriers Identified: None    CM met a bedside to complete discharge assessment. Info verified on facesheet as correct. Pt independent in all ADLs and denies HH, HD, and coumadin usage. Pt plan is to return home upon discharge with family providing transport. No needs identified at this time. CM to follow.       Payor: MEDICAID / Plan: [facility] COMMUNITY PLAN [facility] [place] [employer] [place] MED[place] / Product Type: Managed Medicaid /     Extended Emergency Contact Information  Primary Emergency Contact: Nancy Cornejo   [place]  Mobile Phone: [phone]  Relation: Significant other    Discharge Plan A: Home with family  Discharge Plan B: Home with family      [facility] - [place] - [address] [facility]  [address] [place] [address]  Phone: [phone] Fax: [phone]      Initial Assessment (most recent)       Adult Discharge Assessment - 01/27/23 1325          Discharge Assessment    Assessment Type Discharge Planning Assessment     Confirmed/corrected address, phone number and insurance Yes     Confirmed Demographics Correct on Facesheet     Source of Information patient     Communicated STEPHANIE with patient/caregiver Date not available/Unable to determine     Reason For Admission Gastroenteritis     People in Home spouse     Facility Arrived From: home     Do you expect to return to your current living situation? Yes     Do you have help at home or someone to help you manage your care at home? No     Prior to hospitilization cognitive status: Alert/Oriented     Current cognitive status: Alert/Oriented     Equipment Currently Used at Home glucometer     Readmission within 30 days? No     Patient currently being followed by outpatient case management? No     Do you  currently have service(s) that help you manage your care at home? No     Do you take prescription medications? Yes     Do you have prescription coverage? Yes     Coverage Medicaid     Do you have any problems affording any of your prescribed medications? No     Is the patient taking medications as prescribed? yes     Who is going to help you get home at discharge? Nancy Cornejo     How do you get to doctors appointments? family or friend will provide     Are you on dialysis? No     Do you take coumadin? No     Discharge Plan A Home with family     Discharge Plan B Home with family     DME Needed Upon Discharge  none     Discharge Plan discussed with: Patient     Discharge Barriers Identified None        Physical Activity    On average, how many days per week do you engage in moderate to strenuous exercise (like a brisk walk)? 0 days     On average, how many minutes do you engage in exercise at this level? 0 min        Financial Resource Strain    How hard is it for you to pay for the very basics like food, housing, medical care, and heating? Not hard at all        Housing Stability    In the last 12 months, was there a time when you were not able to pay the mortgage or rent on time? No     In the last 12 months, how many places have you lived? 1     In the last 12 months, was there a time when you did not have a steady place to sleep or slept in a shelter (including now)? No        Transportation Needs    In the past 12 months, has lack of transportation kept you from medical appointments or from getting medications? No     In the past 12 months, has lack of transportation kept you from meetings, work, or from getting things needed for daily living? No        Food Insecurity    Within the past 12 months, you worried that your food would run out before you got the money to buy more. Never true     Within the past 12 months, the food you bought just didn't last and you didn't have money to get more. Never true         Stress    Do you feel stress - tense, restless, nervous, or anxious, or unable to sleep at night because your mind is troubled all the time - these days? To some extent        Social Connections    In a typical week, how many times do you talk on the phone with family, friends, or neighbors? More than three times a week     How often do you get together with friends or relatives? Once a week     How often do you attend Pentecostalism or Methodist services? Never     Do you belong to any clubs or organizations such as Pentecostalism groups, unions, fraternal or athletic groups, or school groups? No     How often do you attend meetings of the clubs or organizations you belong to? Never     Are you , , , , never , or living with a partner?         Alcohol Use    Q1: How often do you have a drink containing alcohol? Never     Q2: How many drinks containing alcohol do you have on a typical day when you are drinking? Patient does not drink     Q3: How often do you have six or more drinks on one occasion? Never        OTHER    Name(s) of People in Home Nancy Cornejo

## 2023-01-27 NOTE — HPI
36 yo M with PMH including DM, CKD III with IgA nephropathy, HTN, mood disorder who presents for abdominal pain with nausea/vomiting. Patient states he has been having diarrhea for past 2 days which had progressed to severe abdominal pain and nausea/vomiting. This is what caused him to present to the ED. He does not recall eating anything out of the ordinary and denies fevers, chills, sob, cough, chest pain, palpitations. In the ED, patient had borderline hypotensive BP and tachycardia, leukocytosis, TRACEY on CKD, hyperkalemia. Patient's vtials improved with fluids. No obvious abdominal infectious process was see on CT. Patient's repeat labs showed worsening hyperkalemia.  He received ceftriaxone for abdominal source of infection.. Patient was feeling slightly better but still with abdominal pain and slight nausea. Hospitalist requested for admission.

## 2023-01-27 NOTE — FIRST PROVIDER EVALUATION
Medical screening examination initiated.  I have conducted a focused provider triage encounter, findings are as follows:    Brief history of present illness:  Patient presents to the ED for lower abdominal pain.  Patient reports the pain started yesterday morning.  Patient denies any fever.  Patient reports he is had diarrhea x2 days.  Patient reports he just started vomiting tonight.  Patient's past medical history of diabetes and kidney disease.    Vitals:    01/26/23 1903   BP: 110/80   BP Location: Left arm   Patient Position: Sitting   Pulse: (!) 116   Resp: 16   Temp: 97.8 °F (36.6 °C)   TempSrc: Oral   SpO2: 100%   Weight: 97.5 kg (215 lb)       Pertinent physical exam:  Patient is awake and alert in moderate amount of pain.    Brief workup plan:  Lab work, urine    Preliminary workup initiated; this workup will be continued and followed by the physician or advanced practice provider that is assigned to the patient when roomed.

## 2023-01-27 NOTE — ED NOTES
"Pt placed on vitals monitor prior to dilaudid.  Vss.  States pain is "no more" at this time  " Terbinafine Counseling: Patient counseling regarding adverse effects of terbinafine including but not limited to headache, diarrhea, rash, upset stomach, liver function test abnormalities, itching, taste/smell disturbance, nausea, abdominal pain, and flatulence.  There is a rare possibility of liver failure that can occur when taking terbinafine.  The patient understands that a baseline LFT and kidney function test may be required. The patient verbalized understanding of the proper use and possible adverse effects of terbinafine.  All of the patient's questions and concerns were addressed.

## 2023-01-27 NOTE — H&P
[facility] - Emergency Dept  Hospital Medicine  History & Physical    Patient Name: Jose Miguel Brennan  MRN: [id number]  Patient Class: IP- Inpatient  Admission Date: 1/26/2023  Attending Physician: Tami العلي MD   Primary Care Provider: Jose Grant NP         Patient information was obtained from patient, past medical records and ER records.     Subjective:     Principal Problem:Gastroenteritis    Chief Complaint:   Chief Complaint   Patient presents with    Abdominal Pain    Nausea    Vomiting    Diarrhea        HPI: 38 yo M with PMH including DM, CKD III with IgA nephropathy, HTN, mood disorder who presents for abdominal pain with nausea/vomiting. Patient states he has been having diarrhea for past 2 days which had progressed to severe abdominal pain and nausea/vomiting. This is what caused him to present to the ED. He does not recall eating anything out of the ordinary and denies fevers, chills, sob, cough, chest pain, palpitations. In the ED, patient had borderline hypotensive BP and tachycardia, leukocytosis, TRACEY on CKD, hyperkalemia. Patient's vtials improved with fluids. No obvious abdominal infectious process was see on CT. Patient's repeat labs showed worsening hyperkalemia.  He received ceftriaxone for abdominal source of infection.. Patient was feeling slightly better but still with abdominal pain and slight nausea. Hospitalist requested for admission.       Past Medical History:   Diagnosis Date    Anxiety     Asthma     Depression     Diabetes mellitus     diet controlled    Diabetes mellitus, type 2     GERD (gastroesophageal reflux disease)     Gout     Hyperlipidemia     Hypertension     IgA nephropathy     Insomnia        Past Surgical History:   Procedure Laterality Date    COLONOSCOPY N/A 5/8/2020    Procedure: COLONOSCOPY;  Surgeon: Hammad Castorena III, MD;  Location: [facility];  Service: Endoscopy;  Laterality: N/A;    ESOPHAGOGASTRODUODENOSCOPY  "N/A 5/8/2020    Procedure: EGD (ESOPHAGOGASTRODUODENOSCOPY);  Surgeon: Hammad Castorena III, MD;  Location: [facility];  Service: Endoscopy;  Laterality: N/A;    nose polyp removal         Review of patient's allergies indicates:   Allergen Reactions    Zithromax [azithromycin] Rash       No current facility-administered medications on file prior to encounter.     Current Outpatient Medications on File Prior to Encounter   Medication Sig    amLODIPine (NORVASC) 5 MG tablet Take 1 tablet (5 mg total) by mouth once daily.    atorvastatin (LIPITOR) 10 MG tablet Take 1 tablet (10 mg total) by mouth every evening.    BD CONOR 2ND GEN PEN NEEDLE 32 gauge x 5/32" Ndle once daily. Use    benzonatate (TESSALON) 100 MG capsule Take 1 capsule (100 mg total) by mouth 3 (three) times daily as needed for Cough.    blood sugar diagnostic Strp To check BG one times daily, to use with insurance preferred meter    blood-glucose meter kit To check BG one times daily, to use with insurance preferred meter    cetirizine (ZYRTEC) 10 MG tablet Take 1 tablet (10 mg total) by mouth once daily.    dulaglutide (TRULICITY) 1.5 mg/0.5 mL pen injector Inject 1.5 mg into the skin every 7 days.    febuxostat (ULORIC) 40 mg Tab Take 40 mg by mouth once daily.    gabapentin (NEURONTIN) 300 MG capsule Take 1 capsule (300 mg total) by mouth 3 (three) times daily. Take 1 tablet every night x 7 days. Increase to twice a day x 7 days. Increase to three times a day.    insulin (LANTUS SOLOSTAR U-100 INSULIN) glargine 100 units/mL SubQ pen Inject 30 Units into the skin once daily.    lancets Misc To check BG one times daily, to use with insurance preferred meter    losartan (COZAAR) 100 MG tablet Take 1 tablet (100 mg total) by mouth once daily.    omega 3-dha-epa-fish oil 1,000 mg (120 mg-180 mg) Cap Take 1 capsule by mouth 2 (two) times daily.    pen needle, diabetic 32 gauge x 5/16" Ndle 1 Units by Misc.(Non-Drug; Combo Route) route " "once daily.    pen needle, diabetic, safety 29 gauge x 1/2" Ndle 1 Units by Misc.(Non-Drug; Combo Route) route once daily.     Family History       Problem Relation (Age of Onset)    Cancer Maternal Grandfather    Diabetes Maternal Grandfather, Mother    Heart disease Maternal Grandfather, Mother    Hypertension Maternal Grandmother    Stroke Mother          Tobacco Use    Smoking status: Former     Packs/day: 0.25     Years: 5.00     Pack years: 1.25     Types: Cigars, Cigarettes     Quit date: 2022     Years since quittin.7    Smokeless tobacco: Never    Tobacco comments:     One cigar a day   Substance and Sexual Activity    Alcohol use: Yes    Drug use: No    Sexual activity: Yes     Partners: Female     Review of Systems   Constitutional:  Negative for chills and fever.   HENT:  Negative for hearing loss and sore throat.    Respiratory:  Negative for cough and shortness of breath.    Cardiovascular:  Negative for chest pain and palpitations.   Gastrointestinal:  Positive for abdominal pain, nausea and vomiting.   Genitourinary:  Negative for dysuria and flank pain.   Musculoskeletal:  Negative for back pain and gait problem.   Skin:  Negative for rash and wound.   Neurological:  Negative for dizziness and headaches.   Psychiatric/Behavioral:  Negative for confusion and hallucinations.    Objective:     Vital Signs (Most Recent):  Temp: 97.6 °F (36.4 °C) (23 014)  Pulse: 73 (23 014)  Resp: 15 (23)  BP: 110/60 (23)  SpO2: 100 % (23) Vital Signs (24h Range):  Temp:  [97.6 °F (36.4 °C)-98 °F (36.7 °C)] 97.6 °F (36.4 °C)  Pulse:  [] 73  Resp:  [15-18] 15  SpO2:  [99 %-100 %] 100 %  BP: ()/(55-80) 110/60     Weight: 97.5 kg (215 lb)  Body mass index is 29.16 kg/m².    Physical Exam  Vitals reviewed.   Constitutional:       General: He is not in acute distress.     Appearance: Normal appearance. He is not toxic-appearing.   HENT:      Head: " Normocephalic and atraumatic.   Eyes:      Extraocular Movements: Extraocular movements intact.      Conjunctiva/sclera: Conjunctivae normal.   Cardiovascular:      Rate and Rhythm: Normal rate and regular rhythm.   Pulmonary:      Effort: No respiratory distress.      Breath sounds: Normal breath sounds. No wheezing.   Abdominal:      General: There is no distension.      Palpations: Abdomen is soft.      Tenderness: There is abdominal tenderness.   Musculoskeletal:      Cervical back: Neck supple. No tenderness.      Right lower leg: No edema.      Left lower leg: No edema.   Skin:     General: Skin is warm and dry.   Neurological:      General: No focal deficit present.      Mental Status: He is alert and oriented to person, place, and time.   Psychiatric:         Mood and Affect: Mood normal.         Judgment: Judgment normal.           Significant Labs: All pertinent labs within the past 24 hours have been reviewed.    Significant Imaging: I have reviewed all pertinent imaging results/findings within the past 24 hours.    Assessment/Plan:      TRACEY on CKD III likely prerenal   Hyperkalemia likely due to above  Prerenal volume depletion due to vomiting, diarrhea  Likely gastroenteritis  HTN  DM  Tobacco use    -Resume home meds, holding losartan for due to TRACEY  -Continue fluids  -Patient on ceftriaxone 2g in ED  -I will continue it and also metronidazole for abdominal infection coverage gold due to leukocytosis, can deescalate as appropriate  -S/p hyperkalemia cocktail including lokelma  -PRN pain control and nausea control  -Trend labs  -Telemetry  -ISS accucehcks  -nicotine patch     FULL CODE  DVT ppx defer chemoppx for now, can add if prolonged course    Tami العلي MD  Department of Hospital Medicine   [facility] - Emergency Dept

## 2023-01-28 VITALS
OXYGEN SATURATION: 97 % | HEIGHT: 72 IN | DIASTOLIC BLOOD PRESSURE: 83 MMHG | SYSTOLIC BLOOD PRESSURE: 128 MMHG | TEMPERATURE: 98 F | WEIGHT: 215 LBS | BODY MASS INDEX: 29.12 KG/M2 | RESPIRATION RATE: 18 BRPM | HEART RATE: 82 BPM

## 2023-01-28 PROBLEM — K52.9 GASTROENTERITIS: Status: RESOLVED | Noted: 2017-09-08 | Resolved: 2023-01-28

## 2023-01-28 PROBLEM — E87.5 HYPERKALEMIA: Status: RESOLVED | Noted: 2023-01-27 | Resolved: 2023-01-28

## 2023-01-28 PROBLEM — N17.9 AKI (ACUTE KIDNEY INJURY): Status: RESOLVED | Noted: 2017-09-08 | Resolved: 2023-01-28

## 2023-01-28 LAB
ANION GAP SERPL CALC-SCNC: 6 MMOL/L (ref 8–16)
BASOPHILS # BLD AUTO: 0.03 K/UL (ref 0–0.2)
BASOPHILS NFR BLD: 0.4 % (ref 0–1.9)
BUN SERPL-MCNC: 31 MG/DL (ref 6–20)
CALCIUM SERPL-MCNC: 8.5 MG/DL (ref 8.7–10.5)
CHLORIDE SERPL-SCNC: 106 MMOL/L (ref 95–110)
CO2 SERPL-SCNC: 23 MMOL/L (ref 23–29)
CREAT SERPL-MCNC: 2.4 MG/DL (ref 0.5–1.4)
DIFFERENTIAL METHOD: ABNORMAL
EOSINOPHIL # BLD AUTO: 0.5 K/UL (ref 0–0.5)
EOSINOPHIL NFR BLD: 6.7 % (ref 0–8)
ERYTHROCYTE [DISTWIDTH] IN BLOOD BY AUTOMATED COUNT: 13.5 % (ref 11.5–14.5)
EST. GFR  (NO RACE VARIABLE): 34.8 ML/MIN/1.73 M^2
GLUCOSE SERPL-MCNC: 124 MG/DL (ref 70–110)
GLUCOSE SERPL-MCNC: 187 MG/DL (ref 70–110)
GLUCOSE SERPL-MCNC: 219 MG/DL (ref 70–110)
HCT VFR BLD AUTO: 39.7 % (ref 40–54)
HGB BLD-MCNC: 12.7 G/DL (ref 14–18)
IMM GRANULOCYTES # BLD AUTO: 0.02 K/UL (ref 0–0.04)
IMM GRANULOCYTES NFR BLD AUTO: 0.3 % (ref 0–0.5)
LYMPHOCYTES # BLD AUTO: 2.5 K/UL (ref 1–4.8)
LYMPHOCYTES NFR BLD: 31.3 % (ref 18–48)
MAGNESIUM SERPL-MCNC: 1.7 MG/DL (ref 1.6–2.6)
MCH RBC QN AUTO: 28.9 PG (ref 27–31)
MCHC RBC AUTO-ENTMCNC: 32 G/DL (ref 32–36)
MCV RBC AUTO: 90 FL (ref 82–98)
MONOCYTES # BLD AUTO: 0.5 K/UL (ref 0.3–1)
MONOCYTES NFR BLD: 6.8 % (ref 4–15)
NEUTROPHILS # BLD AUTO: 4.3 K/UL (ref 1.8–7.7)
NEUTROPHILS NFR BLD: 54.5 % (ref 38–73)
NRBC BLD-RTO: 0 /100 WBC
PLATELET # BLD AUTO: 175 K/UL (ref 150–450)
PMV BLD AUTO: 9.9 FL (ref 9.2–12.9)
POTASSIUM SERPL-SCNC: 4.1 MMOL/L (ref 3.5–5.1)
RBC # BLD AUTO: 4.39 M/UL (ref 4.6–6.2)
SODIUM SERPL-SCNC: 135 MMOL/L (ref 136–145)
WBC # BLD AUTO: 7.9 K/UL (ref 3.9–12.7)

## 2023-01-28 PROCEDURE — 25000003 PHARM REV CODE 250: Performed by: STUDENT IN AN ORGANIZED HEALTH CARE EDUCATION/TRAINING PROGRAM

## 2023-01-28 PROCEDURE — C9113 INJ PANTOPRAZOLE SODIUM, VIA: HCPCS | Performed by: STUDENT IN AN ORGANIZED HEALTH CARE EDUCATION/TRAINING PROGRAM

## 2023-01-28 PROCEDURE — 36415 COLL VENOUS BLD VENIPUNCTURE: CPT | Performed by: FAMILY MEDICINE

## 2023-01-28 PROCEDURE — 83735 ASSAY OF MAGNESIUM: CPT | Performed by: FAMILY MEDICINE

## 2023-01-28 PROCEDURE — 85025 COMPLETE CBC W/AUTO DIFF WBC: CPT | Performed by: FAMILY MEDICINE

## 2023-01-28 PROCEDURE — S0030 INJECTION, METRONIDAZOLE: HCPCS | Performed by: STUDENT IN AN ORGANIZED HEALTH CARE EDUCATION/TRAINING PROGRAM

## 2023-01-28 PROCEDURE — 80048 BASIC METABOLIC PNL TOTAL CA: CPT | Performed by: FAMILY MEDICINE

## 2023-01-28 PROCEDURE — 25000003 PHARM REV CODE 250: Performed by: FAMILY MEDICINE

## 2023-01-28 PROCEDURE — 63600175 PHARM REV CODE 636 W HCPCS: Performed by: STUDENT IN AN ORGANIZED HEALTH CARE EDUCATION/TRAINING PROGRAM

## 2023-01-28 RX ORDER — METRONIDAZOLE 500 MG/1
500 TABLET ORAL EVERY 8 HOURS
Qty: 9 TABLET | Refills: 0 | Status: SHIPPED | OUTPATIENT
Start: 2023-01-28 | End: 2023-01-31

## 2023-01-28 RX ORDER — SODIUM CHLORIDE 9 MG/ML
INJECTION, SOLUTION INTRAVENOUS CONTINUOUS
Status: DISCONTINUED | OUTPATIENT
Start: 2023-01-28 | End: 2023-01-28 | Stop reason: HOSPADM

## 2023-01-28 RX ORDER — ONDANSETRON 4 MG/1
4 TABLET, ORALLY DISINTEGRATING ORAL EVERY 8 HOURS PRN
Qty: 30 TABLET | Refills: 0 | Status: SHIPPED | OUTPATIENT
Start: 2023-01-28 | End: 2023-09-11

## 2023-01-28 RX ORDER — CEPHALEXIN 500 MG/1
500 CAPSULE ORAL EVERY 12 HOURS
Qty: 28 CAPSULE | Refills: 0 | Status: SHIPPED | OUTPATIENT
Start: 2023-01-28 | End: 2023-02-11

## 2023-01-28 RX ADMIN — PANTOPRAZOLE SODIUM 40 MG: 40 INJECTION, POWDER, FOR SOLUTION INTRAVENOUS at 08:01

## 2023-01-28 RX ADMIN — HYDROCODONE BITARTRATE AND ACETAMINOPHEN 1 TABLET: 5; 325 TABLET ORAL at 05:01

## 2023-01-28 RX ADMIN — METRONIDAZOLE 500 MG: 5 INJECTION, SOLUTION INTRAVENOUS at 04:01

## 2023-01-28 RX ADMIN — TRAMADOL HYDROCHLORIDE 50 MG: 50 TABLET, COATED ORAL at 09:01

## 2023-01-28 RX ADMIN — CETIRIZINE HYDROCHLORIDE 10 MG: 10 TABLET, FILM COATED ORAL at 08:01

## 2023-01-28 RX ADMIN — SODIUM CHLORIDE: 0.9 INJECTION, SOLUTION INTRAVENOUS at 12:01

## 2023-01-28 RX ADMIN — ONDANSETRON 4 MG: 2 INJECTION INTRAMUSCULAR; INTRAVENOUS at 09:01

## 2023-01-28 RX ADMIN — GABAPENTIN 300 MG: 300 CAPSULE ORAL at 08:01

## 2023-01-28 NOTE — PLAN OF CARE
Patient cleared for discharge from case management standpoint.    Chart and discharge orders reviewed.  Patient discharged home with no further case management needs.       01/28/23 1332   Final Note   Assessment Type Final Discharge Note   Anticipated Discharge Disposition Home   What phone number can be called within the next 1-3 days to see how you are doing after discharge? [phone]   Post-Acute Status   Discharge Delays None known at this time

## 2023-01-28 NOTE — DISCHARGE SUMMARY
Critical access hospital Medicine  Discharge Summary    Patient Name: Jose Miguel Brennan  MRN: [id number]  [facility]: [id number]  Patient Class: IP- Inpatient  Admission Date: 1/26/2023  Discharge Date and Time: 1/28/2023  2:13 PM  Discharging Provider: Theresa Mendez MD  Primary Care Provider: Jose Grant NP    Primary Care Team: Networked reference to record PCT     HPI:   38 yo M with PMH including DM, CKD III with IgA nephropathy, HTN, mood disorder who presents for abdominal pain with nausea/vomiting. Patient states he has been having diarrhea for past 2 days which had progressed to severe abdominal pain and nausea/vomiting. This is what caused him to present to the ED. He does not recall eating anything out of the ordinary and denies fevers, chills, sob, cough, chest pain, palpitations. In the ED, patient had borderline hypotensive BP and tachycardia, leukocytosis, TRACEY on CKD, hyperkalemia. Patient's vtials improved with fluids. No obvious abdominal infectious process was see on CT. Patient's repeat labs showed worsening hyperkalemia.  He received ceftriaxone for abdominal source of infection.. Patient was feeling slightly better but still with abdominal pain and slight nausea. Hospitalist requested for admission.       * No surgery found *      Hospital Course:     Hyperkalemia resolved.  TRACEY resolved.  Continue ceftin x 14 days for complicated UTI.  Complete 3 more days flagyl.  Urology referral for chronic urinary retention.  Follow-up with PCP and nephrology as previously scheduled.    Pt improved during inpt stay.  Pt received maximum benefit to inpt stay.  Patient was seen and examined on the date of discharge and determined to be suitable for discharge.    /83   Pulse 82   Temp 98 °F (36.7 °C) (Oral)   Resp 18   Ht 6' (1.829 m)   Wt 97.5 kg (215 lb)   SpO2 97%   BMI 29.16 kg/m²     Gen: alert, responsive  HEENT:  Eyes - no pallor  External ears with no lesions  Nares patent  Mouth,  Throat:  trachea midline   CV: RRR  Lungs: CTA B/L  Abd: +BS, soft, NT, ND  Ext: no atrophy or edema  Skin: warm, dry  Neuro: grossly intact  Psych: pleasant    Final Active Diagnoses:      Problems Resolved During this Admission:    Diagnosis Date Noted Date Resolved POA    PRINCIPAL PROBLEM:  Gastroenteritis [K52.9] 09/08/2017 01/28/2023 Yes    Hyperkalemia [E87.5] 01/27/2023 01/28/2023 Yes    TRACEY (acute kidney injury) [N17.9] 09/08/2017 01/28/2023 Yes       Discharged Condition: stable    Disposition: Home or Self Care    Follow Up:   Follow-up Information       Tawanna Monique MD. Schedule an appointment as soon as possible for a visit.    Specialty: Urology  Why: FOR FOLLOW-UP OF DIFFICULTY URINATING  Contact information:  [address] [facility]  [address]  [facility] [address]  [phone]               Jose Grant NP. Schedule an appointment as soon as possible for a visit.    Specialty: Family Medicine  Why: FOR FOLLOW-UP OF URINARY TRACT INFECTION  Contact information:  [address]  [facility] [address]  [phone]                           Patient Instructions:      Ambulatory referral/consult to Urology   Standing Status: Future   Referral Priority: Routine Referral Type: Consultation   Referral Reason: Specialty Services Required   Referred to Provider: TAWANNA MONIQUE Requested Specialty: Urology   Number of Visits Requested: 1     Pending Diagnostic Studies:       None           Medications:  Reconciled Home Medications:      Medication List        START taking these medications      cephALEXin 500 MG capsule  Commonly known as: KEFLEX  Take 1 capsule (500 mg total) by mouth every 12 (twelve) hours. for 14 days     metroNIDAZOLE 500 MG tablet  Commonly known as: FLAGYL  Take 1 tablet (500 mg total) by mouth every 8 (eight) hours. for 3 days     ondansetron 4 MG Tbdl  Commonly known as: ZOFRAN-ODT  Take 1 tablet (4 mg total) by mouth every 8 (eight) hours as needed (FOR NAUSEA; DISSOLVE UNDER  "THE TONGUE).            CONTINUE taking these medications      atorvastatin 10 MG tablet  Commonly known as: LIPITOR  Take 1 tablet (10 mg total) by mouth every evening.     benzonatate 100 MG capsule  Commonly known as: TESSALON  Take 1 capsule (100 mg total) by mouth 3 (three) times daily as needed for Cough.     blood sugar diagnostic Strp  To check BG one times daily, to use with insurance preferred meter     blood-glucose meter kit  To check BG one times daily, to use with insurance preferred meter     cetirizine 10 MG tablet  Commonly known as: ZYRTEC  Take 1 tablet (10 mg total) by mouth once daily.     febuxostat 40 mg Tab  Commonly known as: ULORIC  Take 40 mg by mouth once daily.     gabapentin 300 MG capsule  Commonly known as: NEURONTIN  Take 1 capsule (300 mg total) by mouth 3 (three) times daily. Take 1 tablet every night x 7 days. Increase to twice a day x 7 days. Increase to three times a day.     insulin glargine 100 units/mL SubQ pen  Commonly known as: LANTUS SOLOSTAR U-100 INSULIN  Inject 30 Units into the skin once daily.     lancets Misc  To check BG one times daily, to use with insurance preferred meter     losartan 100 MG tablet  Commonly known as: COZAAR  Take 1 tablet (100 mg total) by mouth once daily.     omega 3-dha-epa-fish oil 1,000 mg (120 mg-180 mg) Cap  Take 1 capsule by mouth 2 (two) times daily.     * pen needle, diabetic 32 gauge x 5/16" Ndle  1 Units by Misc.(Non-Drug; Combo Route) route once daily.     * BD CONOR 2ND GEN PEN NEEDLE 32 gauge x 5/32" Ndle  Generic drug: pen needle, diabetic  once daily. Use     pen needle, diabetic, safety 29 gauge x 1/2" Ndle  1 Units by Misc.(Non-Drug; Combo Route) route once daily.     TRULICITY 1.5 mg/0.5 mL pen injector  Generic drug: dulaglutide  Inject 1.5 mg into the skin every 7 days.           * This list has 2 medication(s) that are the same as other medications prescribed for you. Read the directions carefully, and ask your doctor or " other care provider to review them with you.                STOP taking these medications      amLODIPine 5 MG tablet  Commonly known as: NORVASC              Indwelling Lines/Drains at time of discharge:   Lines/Drains/Airways       None                 Time spent on the discharge of patient: 32 minutes    Theresa Mendez MD  Department of Hospital Medicine  [facility]

## 2023-01-31 ENCOUNTER — HOSPITAL ENCOUNTER (EMERGENCY)
Facility: HOSPITAL | Age: 38
Discharge: HOME | End: 2023-01-31
Attending: EMERGENCY MEDICINE
Payer: MEDICAID

## 2023-01-31 ENCOUNTER — OFFICE VISIT (OUTPATIENT)
Dept: FAMILY MEDICINE | Facility: CLINIC | Age: 38
End: 2023-01-31
Payer: MEDICAID

## 2023-01-31 VITALS
HEIGHT: 72 IN | WEIGHT: 217 LBS | HEART RATE: 103 BPM | DIASTOLIC BLOOD PRESSURE: 86 MMHG | OXYGEN SATURATION: 98 % | BODY MASS INDEX: 29.39 KG/M2 | TEMPERATURE: 98 F | SYSTOLIC BLOOD PRESSURE: 134 MMHG

## 2023-01-31 VITALS
DIASTOLIC BLOOD PRESSURE: 81 MMHG | HEART RATE: 70 BPM | OXYGEN SATURATION: 97 % | WEIGHT: 215 LBS | TEMPERATURE: 96 F | RESPIRATION RATE: 18 BRPM | SYSTOLIC BLOOD PRESSURE: 123 MMHG | HEIGHT: 72 IN | BODY MASS INDEX: 29.12 KG/M2

## 2023-01-31 DIAGNOSIS — R33.9 URINARY RETENTION: ICD-10-CM

## 2023-01-31 DIAGNOSIS — R31.9 HEMATURIA, UNSPECIFIED TYPE: ICD-10-CM

## 2023-01-31 DIAGNOSIS — R10.9 ABDOMINAL PAIN, UNSPECIFIED ABDOMINAL LOCATION: Primary | ICD-10-CM

## 2023-01-31 DIAGNOSIS — Z09 HOSPITAL DISCHARGE FOLLOW-UP: ICD-10-CM

## 2023-01-31 DIAGNOSIS — N39.0 COMPLICATED UTI (URINARY TRACT INFECTION): Primary | ICD-10-CM

## 2023-01-31 LAB
ALBUMIN SERPL BCP-MCNC: 4 G/DL (ref 3.5–5.2)
ALP SERPL-CCNC: 97 U/L (ref 55–135)
ALT SERPL W/O P-5'-P-CCNC: 63 U/L (ref 10–44)
ANION GAP SERPL CALC-SCNC: 8 MMOL/L (ref 8–16)
AST SERPL-CCNC: 30 U/L (ref 10–40)
BACTERIA #/AREA URNS HPF: NEGATIVE /HPF
BASOPHILS # BLD AUTO: 0.05 K/UL (ref 0–0.2)
BASOPHILS NFR BLD: 0.5 % (ref 0–1.9)
BILIRUB SERPL-MCNC: 0.7 MG/DL (ref 0.1–1)
BILIRUB UR QL STRIP: NEGATIVE
BUN SERPL-MCNC: 35 MG/DL (ref 6–20)
CALCIUM SERPL-MCNC: 9.9 MG/DL (ref 8.7–10.5)
CHLORIDE SERPL-SCNC: 103 MMOL/L (ref 95–110)
CLARITY UR: CLEAR
CO2 SERPL-SCNC: 28 MMOL/L (ref 23–29)
COLOR UR: YELLOW
CREAT SERPL-MCNC: 2.5 MG/DL (ref 0.5–1.4)
DIFFERENTIAL METHOD: ABNORMAL
EOSINOPHIL # BLD AUTO: 0.7 K/UL (ref 0–0.5)
EOSINOPHIL NFR BLD: 6.5 % (ref 0–8)
ERYTHROCYTE [DISTWIDTH] IN BLOOD BY AUTOMATED COUNT: 13.6 % (ref 11.5–14.5)
EST. GFR  (NO RACE VARIABLE): 33.1 ML/MIN/1.73 M^2
GLUCOSE SERPL-MCNC: 204 MG/DL (ref 70–110)
GLUCOSE UR QL STRIP: ABNORMAL
HCT VFR BLD AUTO: 47.1 % (ref 40–54)
HGB BLD-MCNC: 15.4 G/DL (ref 14–18)
HGB UR QL STRIP: NEGATIVE
HYALINE CASTS #/AREA URNS LPF: 1 /LPF
IMM GRANULOCYTES # BLD AUTO: 0.05 K/UL (ref 0–0.04)
IMM GRANULOCYTES NFR BLD AUTO: 0.5 % (ref 0–0.5)
KETONES UR QL STRIP: NEGATIVE
LEUKOCYTE ESTERASE UR QL STRIP: NEGATIVE
LIPASE SERPL-CCNC: 47 U/L (ref 4–60)
LYMPHOCYTES # BLD AUTO: 2.7 K/UL (ref 1–4.8)
LYMPHOCYTES NFR BLD: 27.2 % (ref 18–48)
MCH RBC QN AUTO: 28.9 PG (ref 27–31)
MCHC RBC AUTO-ENTMCNC: 32.7 G/DL (ref 32–36)
MCV RBC AUTO: 88 FL (ref 82–98)
MICROSCOPIC COMMENT: NORMAL
MONOCYTES # BLD AUTO: 0.7 K/UL (ref 0.3–1)
MONOCYTES NFR BLD: 6.7 % (ref 4–15)
NEUTROPHILS # BLD AUTO: 5.9 K/UL (ref 1.8–7.7)
NEUTROPHILS NFR BLD: 58.6 % (ref 38–73)
NITRITE UR QL STRIP: NEGATIVE
NRBC BLD-RTO: 0 /100 WBC
PH UR STRIP: 6 [PH] (ref 5–8)
PLATELET # BLD AUTO: 241 K/UL (ref 150–450)
PMV BLD AUTO: 10.4 FL (ref 9.2–12.9)
POTASSIUM SERPL-SCNC: 4.6 MMOL/L (ref 3.5–5.1)
PROT SERPL-MCNC: 7.3 G/DL (ref 6–8.4)
PROT UR QL STRIP: ABNORMAL
RBC # BLD AUTO: 5.33 M/UL (ref 4.6–6.2)
RBC #/AREA URNS HPF: 1 /HPF (ref 0–4)
SODIUM SERPL-SCNC: 139 MMOL/L (ref 136–145)
SP GR UR STRIP: 1.01 (ref 1–1.03)
SQUAMOUS #/AREA URNS HPF: 0 /HPF
URN SPEC COLLECT METH UR: ABNORMAL
UROBILINOGEN UR STRIP-ACNC: NEGATIVE EU/DL
WBC # BLD AUTO: 9.99 K/UL (ref 3.9–12.7)
WBC #/AREA URNS HPF: 1 /HPF (ref 0–5)
YEAST URNS QL MICRO: NORMAL

## 2023-01-31 PROCEDURE — 3008F BODY MASS INDEX DOCD: CPT | Mod: CPTII,S$GLB,, | Performed by: NURSE PRACTITIONER

## 2023-01-31 PROCEDURE — 3075F PR MOST RECENT SYSTOLIC BLOOD PRESS GE 130-139MM HG: ICD-10-PCS | Mod: CPTII,S$GLB,, | Performed by: NURSE PRACTITIONER

## 2023-01-31 PROCEDURE — 80053 COMPREHEN METABOLIC PANEL: CPT

## 2023-01-31 PROCEDURE — 3008F PR BODY MASS INDEX (BMI) DOCUMENTED: ICD-10-PCS | Mod: CPTII,S$GLB,, | Performed by: NURSE PRACTITIONER

## 2023-01-31 PROCEDURE — 1160F RVW MEDS BY RX/DR IN RCRD: CPT | Mod: CPTII,S$GLB,, | Performed by: NURSE PRACTITIONER

## 2023-01-31 PROCEDURE — 83690 ASSAY OF LIPASE: CPT

## 2023-01-31 PROCEDURE — 96360 HYDRATION IV INFUSION INIT: CPT

## 2023-01-31 PROCEDURE — 99214 OFFICE O/P EST MOD 30 MIN: CPT | Mod: S$GLB,,, | Performed by: NURSE PRACTITIONER

## 2023-01-31 PROCEDURE — 85025 COMPLETE CBC W/AUTO DIFF WBC: CPT

## 2023-01-31 PROCEDURE — 1159F PR MEDICATION LIST DOCUMENTED IN MEDICAL RECORD: ICD-10-PCS | Mod: CPTII,S$GLB,, | Performed by: NURSE PRACTITIONER

## 2023-01-31 PROCEDURE — 1159F MED LIST DOCD IN RCRD: CPT | Mod: CPTII,S$GLB,, | Performed by: NURSE PRACTITIONER

## 2023-01-31 PROCEDURE — 99214 PR OFFICE/OUTPT VISIT, EST, LEVL IV, 30-39 MIN: ICD-10-PCS | Mod: S$GLB,,, | Performed by: NURSE PRACTITIONER

## 2023-01-31 PROCEDURE — 3075F SYST BP GE 130 - 139MM HG: CPT | Mod: CPTII,S$GLB,, | Performed by: NURSE PRACTITIONER

## 2023-01-31 PROCEDURE — 63600175 PHARM REV CODE 636 W HCPCS: Performed by: STUDENT IN AN ORGANIZED HEALTH CARE EDUCATION/TRAINING PROGRAM

## 2023-01-31 PROCEDURE — 3046F PR MOST RECENT HEMOGLOBIN A1C LEVEL > 9.0%: ICD-10-PCS | Mod: CPTII,S$GLB,, | Performed by: NURSE PRACTITIONER

## 2023-01-31 PROCEDURE — 25000003 PHARM REV CODE 250: Performed by: STUDENT IN AN ORGANIZED HEALTH CARE EDUCATION/TRAINING PROGRAM

## 2023-01-31 PROCEDURE — 1111F DSCHRG MED/CURRENT MED MERGE: CPT | Mod: CPTII,S$GLB,, | Performed by: NURSE PRACTITIONER

## 2023-01-31 PROCEDURE — 3079F DIAST BP 80-89 MM HG: CPT | Mod: CPTII,S$GLB,, | Performed by: NURSE PRACTITIONER

## 2023-01-31 PROCEDURE — 1111F PR DISCHARGE MEDS RECONCILED W/ CURRENT OUTPATIENT MED LIST: ICD-10-PCS | Mod: CPTII,S$GLB,, | Performed by: NURSE PRACTITIONER

## 2023-01-31 PROCEDURE — 3079F PR MOST RECENT DIASTOLIC BLOOD PRESSURE 80-89 MM HG: ICD-10-PCS | Mod: CPTII,S$GLB,, | Performed by: NURSE PRACTITIONER

## 2023-01-31 PROCEDURE — 3046F HEMOGLOBIN A1C LEVEL >9.0%: CPT | Mod: CPTII,S$GLB,, | Performed by: NURSE PRACTITIONER

## 2023-01-31 PROCEDURE — 1160F PR REVIEW ALL MEDS BY PRESCRIBER/CLIN PHARMACIST DOCUMENTED: ICD-10-PCS | Mod: CPTII,S$GLB,, | Performed by: NURSE PRACTITIONER

## 2023-01-31 PROCEDURE — 81001 URINALYSIS AUTO W/SCOPE: CPT

## 2023-01-31 PROCEDURE — 99284 EMERGENCY DEPT VISIT MOD MDM: CPT | Mod: 25

## 2023-01-31 RX ORDER — ACETAMINOPHEN 500 MG
1000 TABLET ORAL
Status: COMPLETED | OUTPATIENT
Start: 2023-01-31 | End: 2023-01-31

## 2023-01-31 RX ORDER — ONDANSETRON 4 MG/1
4 TABLET, ORALLY DISINTEGRATING ORAL
Status: COMPLETED | OUTPATIENT
Start: 2023-01-31 | End: 2023-01-31

## 2023-01-31 RX ORDER — KETOROLAC TROMETHAMINE 30 MG/ML
15 INJECTION, SOLUTION INTRAMUSCULAR; INTRAVENOUS
Status: DISCONTINUED | OUTPATIENT
Start: 2023-01-31 | End: 2023-01-31

## 2023-01-31 RX ORDER — DICYCLOMINE HYDROCHLORIDE 10 MG/1
20 CAPSULE ORAL
Status: COMPLETED | OUTPATIENT
Start: 2023-01-31 | End: 2023-01-31

## 2023-01-31 RX ADMIN — ACETAMINOPHEN 1000 MG: 500 TABLET, FILM COATED ORAL at 06:01

## 2023-01-31 RX ADMIN — SODIUM CHLORIDE, SODIUM LACTATE, POTASSIUM CHLORIDE, AND CALCIUM CHLORIDE 1000 ML: .6; .31; .03; .02 INJECTION, SOLUTION INTRAVENOUS at 06:01

## 2023-01-31 RX ADMIN — DICYCLOMINE HYDROCHLORIDE 20 MG: 10 CAPSULE ORAL at 06:01

## 2023-01-31 RX ADMIN — ONDANSETRON 4 MG: 4 TABLET, ORALLY DISINTEGRATING ORAL at 06:01

## 2023-01-31 NOTE — PROGRESS NOTES
SUBJECTIVE:      Patient ID: Jose Miguel Brennan is a 37 y.o. male.    Chief Complaint: Hospital Follow Up    Patient is here today as a hospital follow up for a UTI, gastroenteritis, TRACEY and hyperkalemia. He was discharged on ceftin and flagyl. He was to f/u with urology but has not made the appt. He continues to have lower abdominal pain and urinary retention. He is not having diarrhea, no fever/chills. He says his home fasting glucose is running 120-140      Past Surgical History:   Procedure Laterality Date    COLONOSCOPY N/A 2020    Procedure: COLONOSCOPY;  Surgeon: Hammad Castorena III, MD;  Location: [facility];  Service: Endoscopy;  Laterality: N/A;    ESOPHAGOGASTRODUODENOSCOPY N/A 2020    Procedure: EGD (ESOPHAGOGASTRODUODENOSCOPY);  Surgeon: Hammad Castorena III, MD;  Location: [facility];  Service: Endoscopy;  Laterality: N/A;    nose polyp removal       Family History   Problem Relation Age of Onset    Hypertension Maternal Grandmother     Cancer Maternal Grandfather     Diabetes Maternal Grandfather     Heart disease Maternal Grandfather     Heart disease Mother     Stroke Mother     Diabetes Mother       Social History     Socioeconomic History    Marital status: Single   Tobacco Use    Smoking status: Former     Packs/day: 0.25     Years: 5.00     Pack years: 1.25     Types: Cigars, Cigarettes     Quit date: 2022     Years since quittin.7    Smokeless tobacco: Never    Tobacco comments:     One cigar a day   Substance and Sexual Activity    Alcohol use: Yes    Drug use: No    Sexual activity: Yes     Partners: Female     Social Determinants of Health     Financial Resource Strain: Low Risk     Difficulty of Paying Living Expenses: Not hard at all   Food Insecurity: No Food Insecurity    Worried About Running Out of Food in the Last Year: Never true    Ran Out of Food in the Last Year: Never true   Transportation Needs: No Transportation Needs    Lack of Transportation  (Medical): No    Lack of Transportation (Non-Medical): No   Physical Activity: Inactive    Days of Exercise per Week: 0 days    Minutes of Exercise per Session: 0 min   Stress: Stress Concern Present    Feeling of Stress : To some extent   Social Connections: Moderately Isolated    Frequency of Communication with Friends and Family: More than three times a week    Frequency of Social Gatherings with Friends and Family: Once a week    Attends Mandaeism Services: Never    Active Member of Clubs or Organizations: No    Attends Club or Organization Meetings: Never    Marital Status:    Housing Stability: Low Risk     Unable to Pay for Housing in the Last Year: No    Number of Places Lived in the Last Year: 1    Unstable Housing in the Last Year: No     No current facility-administered medications for this visit.     Current Outpatient Medications   Medication Sig Dispense Refill    atorvastatin (LIPITOR) 10 MG tablet Take 1 tablet (10 mg total) by mouth every evening. 90 tablet 1    cephALEXin (KEFLEX) 500 MG capsule Take 1 capsule (500 mg total) by mouth every 12 (twelve) hours. for 14 days 28 capsule 0    cetirizine (ZYRTEC) 10 MG tablet Take 1 tablet (10 mg total) by mouth once daily. 30 tablet 0    dulaglutide (TRULICITY) 1.5 mg/0.5 mL pen injector Inject 1.5 mg into the skin every 7 days. 4 pen 2    febuxostat (ULORIC) 40 mg Tab Take 40 mg by mouth once daily.      gabapentin (NEURONTIN) 300 MG capsule Take 1 capsule (300 mg total) by mouth 3 (three) times daily. Take 1 tablet every night x 7 days. Increase to twice a day x 7 days. Increase to three times a day. 90 capsule 0    insulin (LANTUS SOLOSTAR U-100 INSULIN) glargine 100 units/mL SubQ pen Inject 30 Units into the skin once daily. 9 mL 1    losartan (COZAAR) 100 MG tablet Take 1 tablet (100 mg total) by mouth once daily. 90 tablet 1    metroNIDAZOLE (FLAGYL) 500 MG tablet Take 1 tablet (500 mg total) by mouth every 8 (eight) hours. for 3 days 9  "tablet 0    omega 3-dha-epa-fish oil 1,000 mg (120 mg-180 mg) Cap Take 1 capsule by mouth 2 (two) times daily. 180 capsule 1    ondansetron (ZOFRAN-ODT) 4 MG TbDL Take 1 tablet (4 mg total) by mouth every 8 (eight) hours as needed (FOR NAUSEA; DISSOLVE UNDER THE TONGUE). 30 tablet 0    BD CONOR 2ND GEN PEN NEEDLE 32 gauge x 5/32" Ndle once daily. Use      blood sugar diagnostic Strp To check BG one times daily, to use with insurance preferred meter 100 strip 3    blood-glucose meter kit To check BG one times daily, to use with insurance preferred meter 1 each 0    lancets Misc To check BG one times daily, to use with insurance preferred meter 100 each 3    pen needle, diabetic 32 gauge x 5/16" Ndle 1 Units by Misc.(Non-Drug; Combo Route) route once daily. 100 each 1    pen needle, diabetic, safety 29 gauge x 1/2" Ndle 1 Units by Misc.(Non-Drug; Combo Route) route once daily. 100 each 1     Review of patient's allergies indicates:   Allergen Reactions    Zithromax [azithromycin] Rash      Past Medical History:   Diagnosis Date    Anxiety     Asthma     Depression     Diabetes mellitus     diet controlled    Diabetes mellitus, type 2     GERD (gastroesophageal reflux disease)     Gout     Hyperlipidemia     Hypertension     IgA nephropathy     Insomnia      Past Surgical History:   Procedure Laterality Date    COLONOSCOPY N/A 5/8/2020    Procedure: COLONOSCOPY;  Surgeon: Hammad Castorena III, MD;  Location: [facility];  Service: Endoscopy;  Laterality: N/A;    ESOPHAGOGASTRODUODENOSCOPY N/A 5/8/2020    Procedure: EGD (ESOPHAGOGASTRODUODENOSCOPY);  Surgeon: Hammad Castorena III, MD;  Location: [facility];  Service: Endoscopy;  Laterality: N/A;    nose polyp removal         Review of Systems   Constitutional:  Negative for appetite change, chills, diaphoresis and unexpected weight change.   HENT:  Negative for ear discharge, facial swelling, hearing loss, nosebleeds and trouble swallowing.    Eyes:  Negative for " photophobia, pain and visual disturbance.   Respiratory:  Negative for apnea, choking, shortness of breath and wheezing.    Cardiovascular:  Negative for chest pain and palpitations.   Gastrointestinal:  Positive for abdominal pain. Negative for blood in stool and vomiting.   Endocrine: Negative for polyphagia.   Genitourinary:  Positive for dysuria. Negative for difficulty urinating, frequency, hematuria and urgency.   Musculoskeletal:  Negative for gait problem and joint swelling.   Skin:  Negative for pallor.   Neurological:  Negative for dizziness, seizures, speech difficulty and weakness.   Hematological:  Does not bruise/bleed easily.   Psychiatric/Behavioral:  Negative for agitation, confusion, dysphoric mood, self-injury, sleep disturbance and suicidal ideas. The patient is not nervous/anxious.     OBJECTIVE:      Vitals:    01/31/23 1410   BP: 134/86   Pulse: 103   Temp: 98.2 °F (36.8 °C)   SpO2: 98%   Weight: 98.4 kg (217 lb)   Height: 6' (1.829 m)     Physical Exam  Vitals and nursing note reviewed.   Constitutional:       General: He is not in acute distress.     Appearance: He is well-developed.   HENT:      Head: Normocephalic and atraumatic.      Nose: Nose normal.      Mouth/Throat:      Pharynx: Uvula midline.   Eyes:      General: Lids are normal.      Conjunctiva/sclera: Conjunctivae normal.      Pupils: Pupils are equal, round, and reactive to light.      Right eye: Pupil is round and reactive.      Left eye: Pupil is round and reactive.   Neck:      Thyroid: No thyromegaly.      Vascular: No carotid bruit.   Cardiovascular:      Rate and Rhythm: Normal rate and regular rhythm.      Pulses: Normal pulses.      Heart sounds: Normal heart sounds.   Pulmonary:      Effort: Pulmonary effort is normal.      Breath sounds: Normal breath sounds. No wheezing, rhonchi or rales.   Abdominal:      General: Bowel sounds are normal. There is no distension.      Palpations: Abdomen is soft. Abdomen is not  rigid.      Tenderness: There is abdominal tenderness in the suprapubic area. There is no right CVA tenderness, left CVA tenderness, guarding or rebound.   Musculoskeletal:         General: Normal range of motion.      Cervical back: Normal range of motion and neck supple.   Lymphadenopathy:      Cervical: No cervical adenopathy.   Skin:     General: Skin is warm and dry.      Nails: There is no clubbing.   Neurological:      Mental Status: He is alert and oriented to person, place, and time.   Psychiatric:         Attention and Perception: Attention normal.         Mood and Affect: Mood normal.         Speech: Speech normal.         Behavior: Behavior is cooperative.      Assessment:       1. Complicated UTI (urinary tract infection)    2. Urinary retention    3. Hospital discharge follow-up        Plan:       Complicated UTI (urinary tract infection)  -     Ambulatory referral/consult to Urology; Future; Expected date: 02/07/2023  -     US Bladder Post Void Residual; Future; Expected date: 01/31/2023    Urinary retention  -     US Bladder Post Void Residual; Future; Expected date: 01/31/2023    Hospital discharge follow-up  Hospital records reviewed  Medications reconciled      Follow up for has f/u.      1/31/2023 WU Tucker, FNP

## 2023-01-31 NOTE — ED PROVIDER NOTES
Encounter Date: 1/31/2023       History     Chief Complaint   Patient presents with    Abdominal Pain     Reports was discharged from hospital on Sat for UTI      HPI    38 yo M with PMH of CKD 3, IgA nephropathy, DM, HTN, HLD presenting with abdominal pain and hematuria. Patient was seen on Thursday and admitted until Saturday for gastroenteritis and UTI. He was discharged on ceftin and flagyl. He has been taking his medications and follow-up with his PCP today who referred him to urology for concern for urinary retention. Denies fever, nausea, vomiting, diarrhea, dysuria.     On chart review patient's UA showed proteinuria and hematuria without bacteria, WBC. CT showed bladder wall thickening concerning for cystitis. UA showed left renal atrophy. Urine culture not performed.     Review of patient's allergies indicates:   Allergen Reactions    Zithromax [azithromycin] Rash     Past Medical History:   Diagnosis Date    Anxiety     Asthma     Depression     Diabetes mellitus     diet controlled    Diabetes mellitus, type 2     GERD (gastroesophageal reflux disease)     Gout     Hyperlipidemia     Hypertension     IgA nephropathy     Insomnia      Past Surgical History:   Procedure Laterality Date    COLONOSCOPY N/A 5/8/2020    Procedure: COLONOSCOPY;  Surgeon: Hammad Castorena III, MD;  Location: [facility];  Service: Endoscopy;  Laterality: N/A;    ESOPHAGOGASTRODUODENOSCOPY N/A 5/8/2020    Procedure: EGD (ESOPHAGOGASTRODUODENOSCOPY);  Surgeon: Hammad Castorena III, MD;  Location: [facility];  Service: Endoscopy;  Laterality: N/A;    nose polyp removal       Family History   Problem Relation Age of Onset    Hypertension Maternal Grandmother     Cancer Maternal Grandfather     Diabetes Maternal Grandfather     Heart disease Maternal Grandfather     Heart disease Mother     Stroke Mother     Diabetes Mother      Social History     Tobacco Use    Smoking status: Former     Packs/day: 0.25     Years: 5.00     Pack  years: 1.25     Types: Cigars, Cigarettes     Quit date: 2022     Years since quittin.7    Smokeless tobacco: Never    Tobacco comments:     One cigar a day   Substance Use Topics    Alcohol use: Yes    Drug use: No     Review of Systems   All other systems reviewed and are negative.    Physical Exam     Initial Vitals [23 1531]   BP Pulse Resp Temp SpO2   132/76 101 18 96.1 °F (35.6 °C) 98 %      MAP       --         Physical Exam    Nursing note and vitals reviewed.  Constitutional: He appears well-developed and well-nourished. No distress.   Eyes: Conjunctivae are normal.   Neck: Neck supple.   Cardiovascular:  Normal rate, regular rhythm and intact distal pulses.           Pulmonary/Chest: Breath sounds normal. No respiratory distress.   Abdominal: Abdomen is soft. He exhibits no distension. There is abdominal tenderness.   Suprapubic tenderness  There is no guarding.   Musculoskeletal:         General: No tenderness or edema.      Cervical back: Neck supple.     Neurological: He is alert and oriented to person, place, and time.   Skin: Skin is warm and dry. Capillary refill takes less than 2 seconds.       ED Course   Procedures  Labs Reviewed   CBC W/ AUTO DIFFERENTIAL - Abnormal; Notable for the following components:       Result Value    Immature Grans (Abs) 0.05 (*)     Eos # 0.7 (*)     All other components within normal limits   COMPREHENSIVE METABOLIC PANEL - Abnormal; Notable for the following components:    Glucose 204 (*)     BUN 35 (*)     Creatinine 2.5 (*)     ALT 63 (*)     eGFR 33.1 (*)     All other components within normal limits   URINALYSIS, REFLEX TO URINE CULTURE - Abnormal; Notable for the following components:    Protein, UA 2+ (*)     Glucose, UA 3+ (*)     All other components within normal limits    Narrative:     Specimen Source->Urine   LIPASE   URINALYSIS MICROSCOPIC    Narrative:     Specimen Source->Urine          Imaging Results    None          Medications    acetaminophen tablet 1,000 mg (1,000 mg Oral Given 1/31/23 1827)   dicyclomine capsule 20 mg (20 mg Oral Given 1/31/23 1826)   lactated ringers bolus 1,000 mL (0 mLs Intravenous Stopped 1/31/23 1925)   ondansetron disintegrating tablet 4 mg (4 mg Oral Given 1/31/23 1841)                     HO-IV MDM:    37 y.o. male presenting with complaint of   Chief Complaint   Patient presents with    Abdominal Pain     Reports was discharged from hospital on Sat for UTI         Significant exam findings of   Vitals:    01/31/23 1531   BP: 132/76   Pulse: 101   Resp: 18   Temp: 96.1 °F (35.6 °C)        Differential diagnosis includes UTI with failed antibiotic treatment, non-infectious cystitis, STI, diverticulitis/diverticulosis     Plan to obtain CBC, CMP, UA, Post void residual, lipase    Given 1L LR, tylenol, bentyl.     Will continue to monitor with dispo pending completion of work-up and reassessment.       June Melton MD   [facility]- Emergency Medicine, PGY-4        Update:  CBC without leukocytosis or anemia. CMP at baseline for patient. UA without signs of UTI or significant abnormality. Lipase normal. Post-void residual of 44mL. Patient to be discharged with instructions to follow up with urology- referral placed by PCP and return precautions.     June Melton MD   [facility]- Emergency Medicine, PGY-4         Clinical Impression:   Final diagnoses:  [R10.9] Abdominal pain, unspecified abdominal location (Primary)  [R31.9] Hematuria, unspecified type        ED Disposition Condition    Discharge Stable          ED Prescriptions    None       Follow-up Information       Follow up With Specialties Details Why Contact Info Additional Information    Jose Grant NP Family Medicine  As needed [address]  [facility] [address]  [phone]       [facility] - Emergency Dept Emergency Medicine  As needed, If symptoms worsen [address] [facility] [address] [phone] 1st floor    [facility] Schedule an appointment as soon as possible for a visit  [facility] [address] [facility] [address]              June Melton MD  Resident  01/31/23 2032

## 2023-01-31 NOTE — FIRST PROVIDER EVALUATION
Medical screening examination initiated.  I have conducted a focused provider triage encounter, findings are as follows:    Brief history of present illness:  Patient presents to the ED for concern for abdominal pain.  Patient reports he was just inpatient here Thursday to Saturday and was discharged with antibiotics for UTI.  Patient reports he is still having pain around his kidneys and into his abdomen.  Patient denies any vomiting diarrhea or fever.    Vitals:    01/31/23 1531   BP: 132/76   BP Location: Left arm   Patient Position: Sitting   Pulse: (!) 1   Resp: 18   Temp: (!) 32.2 °F (0.1 °C)   TempSrc: Oral   SpO2: 98%   Weight: 97.5 kg (215 lb)   Height: 6' (1.829 m)       Pertinent physical exam:  Patient is awake alert in no acute distress.    Brief workup plan:  Lab work, urine    Preliminary workup initiated; this workup will be continued and followed by the physician or advanced practice provider that is assigned to the patient when roomed.

## 2023-02-01 ENCOUNTER — PATIENT MESSAGE (OUTPATIENT)
Dept: FAMILY MEDICINE | Facility: CLINIC | Age: 38
End: 2023-02-01

## 2023-02-01 NOTE — DISCHARGE INSTRUCTIONS
Please follow up with urology for further testing. Return to the emergency department for worsening or concerning symptoms.

## 2023-03-08 ENCOUNTER — PATIENT MESSAGE (OUTPATIENT)
Dept: FAMILY MEDICINE | Facility: CLINIC | Age: 38
End: 2023-03-08

## 2023-03-08 DIAGNOSIS — M25.512 LEFT SHOULDER PAIN, UNSPECIFIED CHRONICITY: Primary | ICD-10-CM

## 2023-03-10 ENCOUNTER — LAB VISIT (OUTPATIENT)
Dept: LAB | Facility: HOSPITAL | Age: 38
End: 2023-03-10
Attending: INTERNAL MEDICINE
Payer: MEDICAID

## 2023-03-10 DIAGNOSIS — M10.9 GOUT, UNSPECIFIED: ICD-10-CM

## 2023-03-10 DIAGNOSIS — N18.30 CHRONIC KIDNEY DISEASE, STAGE III (MODERATE): Primary | ICD-10-CM

## 2023-03-10 DIAGNOSIS — R80.9 PROTEINURIA: ICD-10-CM

## 2023-03-10 LAB
ALBUMIN SERPL BCP-MCNC: 4.5 G/DL (ref 3.5–5.2)
ANION GAP SERPL CALC-SCNC: 9 MMOL/L (ref 8–16)
BACTERIA #/AREA URNS HPF: NEGATIVE /HPF
BUN SERPL-MCNC: 32 MG/DL (ref 6–20)
CALCIUM SERPL-MCNC: 9.6 MG/DL (ref 8.7–10.5)
CHLORIDE SERPL-SCNC: 104 MMOL/L (ref 95–110)
CO2 SERPL-SCNC: 27 MMOL/L (ref 23–29)
CREAT SERPL-MCNC: 2.9 MG/DL (ref 0.5–1.4)
CREAT UR-MCNC: 117 MG/DL (ref 23–375)
EST. GFR  (NO RACE VARIABLE): 27.7 ML/MIN/1.73 M^2
GLUCOSE SERPL-MCNC: 248 MG/DL (ref 70–110)
HYALINE CASTS #/AREA URNS LPF: 3 /LPF
MICROSCOPIC COMMENT: ABNORMAL
PHOSPHATE SERPL-MCNC: 4 MG/DL (ref 2.7–4.5)
POTASSIUM SERPL-SCNC: 4.8 MMOL/L (ref 3.5–5.1)
PROT UR-MCNC: 75 MG/DL (ref 6–15)
PROT/CREAT UR: 0.64 MG/G{CREAT} (ref 0–0.2)
RBC #/AREA URNS HPF: 0 /HPF (ref 0–4)
SODIUM SERPL-SCNC: 140 MMOL/L (ref 136–145)
SQUAMOUS #/AREA URNS HPF: 1 /HPF
URATE SERPL-MCNC: 8.1 MG/DL (ref 3.4–7)
WBC #/AREA URNS HPF: 1 /HPF (ref 0–5)

## 2023-03-10 PROCEDURE — 36415 COLL VENOUS BLD VENIPUNCTURE: CPT | Performed by: INTERNAL MEDICINE

## 2023-03-10 PROCEDURE — 84550 ASSAY OF BLOOD/URIC ACID: CPT | Performed by: INTERNAL MEDICINE

## 2023-03-10 PROCEDURE — 84156 ASSAY OF PROTEIN URINE: CPT | Performed by: INTERNAL MEDICINE

## 2023-03-10 PROCEDURE — 80069 RENAL FUNCTION PANEL: CPT | Performed by: INTERNAL MEDICINE

## 2023-03-10 PROCEDURE — 81001 URINALYSIS AUTO W/SCOPE: CPT | Performed by: INTERNAL MEDICINE

## 2023-03-13 NOTE — PROGRESS NOTES
SUBJECTIVE:      Patient ID: Jose Miguel Brennan is a 37 y.o. male.    Chief Complaint: Diabetes    Patient is here today to f/u on dm, anxiety and htn. He is tolerating the trulicity welll. He says he stopped the lantus a few days because his glucose was in the 90's.A1c is improved at 8.0. He is following with Dr Conte for ckd, has an appt today    Diabetes  He presents for his follow-up diabetic visit. He has type 2 diabetes mellitus. The initial diagnosis of diabetes was made 10 years ago. His disease course has been improving. Pertinent negatives for hypoglycemia include no confusion, dizziness, headaches, nervousness/anxiousness, pallor, seizures or speech difficulty. Pertinent negatives for diabetes include no chest pain, no polyphagia and no weakness. There are no hypoglycemic complications. Symptoms are stable. Risk factors for coronary artery disease include diabetes mellitus, male sex and hypertension. Current diabetic treatment includes insulin injections and oral agent (monotherapy). He is compliant with treatment most of the time. His weight is stable. He is following a generally unhealthy diet. When asked about meal planning, he reported none. He rarely participates in exercise. An ACE inhibitor/angiotensin II receptor blocker is being taken. He does not see a podiatrist.Eye exam is not current.   Hypertension  This is a chronic problem. The problem is unchanged. The problem is controlled. Pertinent negatives include no chest pain, headaches, palpitations or shortness of breath. Risk factors for coronary artery disease include diabetes mellitus, dyslipidemia, male gender and smoking/tobacco exposure. Past treatments include angiotensin blockers and calcium channel blockers. The current treatment provides moderate improvement.     Past Surgical History:   Procedure Laterality Date    COLONOSCOPY N/A 5/8/2020    Procedure: COLONOSCOPY;  Surgeon: Hammad Castorena III, MD;  Location: [facility];   Service: Endoscopy;  Laterality: N/A;    ESOPHAGOGASTRODUODENOSCOPY N/A 2020    Procedure: EGD (ESOPHAGOGASTRODUODENOSCOPY);  Surgeon: Hammad Castorena III, MD;  Location: [facility];  Service: Endoscopy;  Laterality: N/A;    nose polyp removal       Family History   Problem Relation Age of Onset    Hypertension Maternal Grandmother     Cancer Maternal Grandfather     Diabetes Maternal Grandfather     Heart disease Maternal Grandfather     Heart disease Mother     Stroke Mother     Diabetes Mother       Social History     Socioeconomic History    Marital status: Single   Tobacco Use    Smoking status: Former     Packs/day: 0.25     Years: 5.00     Pack years: 1.25     Types: Cigars, Cigarettes     Quit date: 2022     Years since quittin.8     Passive exposure: Past    Smokeless tobacco: Never    Tobacco comments:     One cigar a day   Substance and Sexual Activity    Alcohol use: Yes    Drug use: No    Sexual activity: Yes     Partners: Female     Social Determinants of Health     Financial Resource Strain: Low Risk     Difficulty of Paying Living Expenses: Not hard at all   Food Insecurity: No Food Insecurity    Worried About Running Out of Food in the Last Year: Never true    Ran Out of Food in the Last Year: Never true   Transportation Needs: No Transportation Needs    Lack of Transportation (Medical): No    Lack of Transportation (Non-Medical): No   Physical Activity: Inactive    Days of Exercise per Week: 0 days    Minutes of Exercise per Session: 0 min   Stress: Stress Concern Present    Feeling of Stress : To some extent   Social Connections: Moderately Isolated    Frequency of Communication with Friends and Family: More than three times a week    Frequency of Social Gatherings with Friends and Family: Once a week    Attends [employer] Services: Never    Active Member of Clubs or Organizations: No    Attends Club or Organization Meetings: Never    Marital Status:    Housing Stability: Low  "Risk     Unable to Pay for Housing in the Last Year: No    Number of Places Lived in the Last Year: 1    Unstable Housing in the Last Year: No     Current Outpatient Medications   Medication Sig Dispense Refill    cetirizine (ZYRTEC) 10 MG tablet Take 1 tablet (10 mg total) by mouth once daily. 30 tablet 0    dulaglutide (TRULICITY) 1.5 mg/0.5 mL pen injector Inject 1.5 mg into the skin every 7 days. 4 pen 2    febuxostat (ULORIC) 40 mg Tab Take 40 mg by mouth once daily.      gabapentin (NEURONTIN) 300 MG capsule Take 1 capsule (300 mg total) by mouth 3 (three) times daily. Take 1 tablet every night x 7 days. Increase to twice a day x 7 days. Increase to three times a day. 90 capsule 0    insulin (LANTUS SOLOSTAR U-100 INSULIN) glargine 100 units/mL SubQ pen Inject 30 Units into the skin once daily. 9 mL 1    omega 3-dha-epa-fish oil 1,000 mg (120 mg-180 mg) Cap Take 1 capsule by mouth 2 (two) times daily. 180 capsule 1    ondansetron (ZOFRAN-ODT) 4 MG TbDL Take 1 tablet (4 mg total) by mouth every 8 (eight) hours as needed (FOR NAUSEA; DISSOLVE UNDER THE TONGUE). 30 tablet 0    amLODIPine (NORVASC) 5 MG tablet Take 1 tablet (5 mg total) by mouth once daily. 90 tablet 1    atorvastatin (LIPITOR) 10 MG tablet Take 1 tablet (10 mg total) by mouth every evening. 90 tablet 1    BD CONOR 2ND GEN PEN NEEDLE 32 gauge x 5/32" Ndle once daily. Use      blood sugar diagnostic Strp To check BG one times daily, to use with insurance preferred meter 100 strip 3    blood-glucose meter kit To check BG one times daily, to use with insurance preferred meter 1 each 0    lancets Misc To check BG one times daily, to use with insurance preferred meter 100 each 3    losartan (COZAAR) 100 MG tablet Take 1 tablet (100 mg total) by mouth once daily. 90 tablet 1    pen needle, diabetic 32 gauge x 5/16" Ndle 1 Units by Misc.(Non-Drug; Combo Route) route once daily. 100 each 1     No current facility-administered medications for this visit. "     Review of patient's allergies indicates:   Allergen Reactions    Zithromax [azithromycin] Rash      Past Medical History:   Diagnosis Date    Anxiety     Asthma     Depression     Diabetes mellitus     diet controlled    Diabetes mellitus, type 2     GERD (gastroesophageal reflux disease)     Gout     Hyperlipidemia     Hypertension     IgA nephropathy     Insomnia      Past Surgical History:   Procedure Laterality Date    COLONOSCOPY N/A 5/8/2020    Procedure: COLONOSCOPY;  Surgeon: Hammad Castorena III, MD;  Location: [facility];  Service: Endoscopy;  Laterality: N/A;    ESOPHAGOGASTRODUODENOSCOPY N/A 5/8/2020    Procedure: EGD (ESOPHAGOGASTRODUODENOSCOPY);  Surgeon: Hammad Castorena III, MD;  Location: [facility];  Service: Endoscopy;  Laterality: N/A;    nose polyp removal         Review of Systems   Constitutional:  Negative for appetite change, chills, diaphoresis and unexpected weight change.   HENT:  Negative for ear discharge, facial swelling, hearing loss, nosebleeds and trouble swallowing.    Eyes:  Negative for photophobia, pain and visual disturbance.   Respiratory:  Negative for apnea, choking, shortness of breath and wheezing.    Cardiovascular:  Negative for chest pain and palpitations.   Gastrointestinal:  Negative for abdominal pain, blood in stool and vomiting.   Endocrine: Negative for polyphagia.   Genitourinary:  Negative for difficulty urinating and hematuria.   Musculoskeletal:  Negative for gait problem and joint swelling.   Skin:  Negative for pallor.   Neurological:  Negative for dizziness, seizures, speech difficulty, weakness and headaches.   Hematological:  Does not bruise/bleed easily.   Psychiatric/Behavioral:  Negative for agitation, confusion, dysphoric mood, self-injury, sleep disturbance and suicidal ideas. The patient is not nervous/anxious.     OBJECTIVE:      Vitals:    03/14/23 0741   BP: (!) 110/90   Pulse: (!) 115   Temp: 98.4 °F (36.9 °C)   SpO2: 98%   Weight: 95.3  kg (210 lb)   Height: 6' (1.829 m)     Physical Exam  Vitals and nursing note reviewed.   Constitutional:       General: He is not in acute distress.     Appearance: He is well-developed.   HENT:      Head: Normocephalic and atraumatic.      Nose: Nose normal.      Mouth/Throat:      Pharynx: Uvula midline.   Eyes:      General: Lids are normal.      Conjunctiva/sclera: Conjunctivae normal.      Pupils: Pupils are equal, round, and reactive to light.      Right eye: Pupil is round and reactive.      Left eye: Pupil is round and reactive.   Neck:      Thyroid: No thyromegaly.      Vascular: No carotid bruit.   Cardiovascular:      Rate and Rhythm: Regular rhythm.      Pulses: Normal pulses.      Heart sounds: Normal heart sounds. No murmur heard.  Pulmonary:      Effort: Pulmonary effort is normal.      Breath sounds: Normal breath sounds. No wheezing, rhonchi or rales.   Abdominal:      General: Bowel sounds are normal.      Palpations: Abdomen is soft. Abdomen is not rigid.      Tenderness: There is no abdominal tenderness.   Musculoskeletal:         General: Normal range of motion.      Cervical back: Normal range of motion and neck supple.   Lymphadenopathy:      Cervical: No cervical adenopathy.   Skin:     General: Skin is warm and dry.      Nails: There is no clubbing.   Neurological:      Mental Status: He is alert and oriented to person, place, and time.   Psychiatric:         Mood and Affect: Mood normal.         Speech: Speech normal.         Behavior: Behavior normal. Behavior is cooperative.         Thought Content: Thought content normal.      Office Visit on 03/14/2023   Component Date Value Ref Range Status    Hemoglobin A1C, POC 03/14/2023 8.0 (A)  % Final   ]  Assessment:       1. Type 2 diabetes mellitus without complication, with long-term current use of insulin    2. Essential hypertension    3. Mixed hyperlipidemia        Plan:       Type 2 diabetes mellitus without complication, with long-term  current use of insulin  -     POCT HEMOGLOBIN A1C                                8.0  A1c is improving. We discussed not stopping the lantus and instead decreasing it. Voiced understanding    Essential hypertension  -     amLODIPine (NORVASC) 5 MG tablet; Take 1 tablet (5 mg total) by mouth once daily.  Dispense: 90 tablet; Refill: 1  -     losartan (COZAAR) 100 MG tablet; Take 1 tablet (100 mg total) by mouth once daily.  Dispense: 90 tablet; Refill: 1    Mixed hyperlipidemia  -     atorvastatin (LIPITOR) 10 MG tablet; Take 1 tablet (10 mg total) by mouth every evening.  Dispense: 90 tablet; Refill: 1        Follow up in about 4 months (around 7/14/2023) for DM.      3/14/2023 WU Tucker, MAGNOLIAP

## 2023-03-14 ENCOUNTER — OFFICE VISIT (OUTPATIENT)
Dept: ORTHOPEDICS | Facility: CLINIC | Age: 38
End: 2023-03-14
Payer: MEDICAID

## 2023-03-14 ENCOUNTER — OFFICE VISIT (OUTPATIENT)
Dept: FAMILY MEDICINE | Facility: CLINIC | Age: 38
End: 2023-03-14
Payer: MEDICAID

## 2023-03-14 VITALS
SYSTOLIC BLOOD PRESSURE: 120 MMHG | BODY MASS INDEX: 28.44 KG/M2 | HEIGHT: 72 IN | TEMPERATURE: 98 F | DIASTOLIC BLOOD PRESSURE: 88 MMHG | OXYGEN SATURATION: 98 % | HEART RATE: 115 BPM | WEIGHT: 210 LBS

## 2023-03-14 VITALS — BODY MASS INDEX: 28.44 KG/M2 | HEIGHT: 72 IN | WEIGHT: 210 LBS

## 2023-03-14 DIAGNOSIS — I10 ESSENTIAL HYPERTENSION: ICD-10-CM

## 2023-03-14 DIAGNOSIS — E78.2 MIXED HYPERLIPIDEMIA: ICD-10-CM

## 2023-03-14 DIAGNOSIS — E11.9 TYPE 2 DIABETES MELLITUS WITHOUT COMPLICATION, WITH LONG-TERM CURRENT USE OF INSULIN: Primary | ICD-10-CM

## 2023-03-14 DIAGNOSIS — Z79.4 TYPE 2 DIABETES MELLITUS WITHOUT COMPLICATION, WITH LONG-TERM CURRENT USE OF INSULIN: Primary | ICD-10-CM

## 2023-03-14 DIAGNOSIS — N20.0 NEPHROLITHIASIS: Primary | ICD-10-CM

## 2023-03-14 DIAGNOSIS — M75.42 SUBACROMIAL IMPINGEMENT OF LEFT SHOULDER: ICD-10-CM

## 2023-03-14 LAB — HBA1C MFR BLD: 8 %

## 2023-03-14 PROCEDURE — 3008F BODY MASS INDEX DOCD: CPT | Mod: CPTII,S$GLB,, | Performed by: NURSE PRACTITIONER

## 2023-03-14 PROCEDURE — 99214 OFFICE O/P EST MOD 30 MIN: CPT | Mod: S$GLB,,, | Performed by: NURSE PRACTITIONER

## 2023-03-14 PROCEDURE — 99214 PR OFFICE/OUTPT VISIT, EST, LEVL IV, 30-39 MIN: ICD-10-PCS | Mod: S$GLB,,, | Performed by: NURSE PRACTITIONER

## 2023-03-14 PROCEDURE — 3052F HG A1C>EQUAL 8.0%<EQUAL 9.0%: CPT | Mod: CPTII,S$GLB,, | Performed by: ORTHOPAEDIC SURGERY

## 2023-03-14 PROCEDURE — 1159F MED LIST DOCD IN RCRD: CPT | Mod: CPTII,S$GLB,, | Performed by: ORTHOPAEDIC SURGERY

## 2023-03-14 PROCEDURE — 3052F HG A1C>EQUAL 8.0%<EQUAL 9.0%: CPT | Mod: CPTII,S$GLB,, | Performed by: NURSE PRACTITIONER

## 2023-03-14 PROCEDURE — 99213 OFFICE O/P EST LOW 20 MIN: CPT | Mod: S$GLB,,, | Performed by: ORTHOPAEDIC SURGERY

## 2023-03-14 PROCEDURE — 1160F RVW MEDS BY RX/DR IN RCRD: CPT | Mod: CPTII,S$GLB,, | Performed by: ORTHOPAEDIC SURGERY

## 2023-03-14 PROCEDURE — 3074F SYST BP LT 130 MM HG: CPT | Mod: CPTII,S$GLB,, | Performed by: NURSE PRACTITIONER

## 2023-03-14 PROCEDURE — 1160F RVW MEDS BY RX/DR IN RCRD: CPT | Mod: CPTII,S$GLB,, | Performed by: NURSE PRACTITIONER

## 2023-03-14 PROCEDURE — 1159F PR MEDICATION LIST DOCUMENTED IN MEDICAL RECORD: ICD-10-PCS | Mod: CPTII,S$GLB,, | Performed by: ORTHOPAEDIC SURGERY

## 2023-03-14 PROCEDURE — 83036 POCT HEMOGLOBIN A1C: ICD-10-PCS | Mod: QW,,, | Performed by: NURSE PRACTITIONER

## 2023-03-14 PROCEDURE — 3008F BODY MASS INDEX DOCD: CPT | Mod: CPTII,S$GLB,, | Performed by: ORTHOPAEDIC SURGERY

## 2023-03-14 PROCEDURE — 3079F DIAST BP 80-89 MM HG: CPT | Mod: CPTII,S$GLB,, | Performed by: NURSE PRACTITIONER

## 2023-03-14 PROCEDURE — 83036 HEMOGLOBIN GLYCOSYLATED A1C: CPT | Mod: QW,,, | Performed by: NURSE PRACTITIONER

## 2023-03-14 PROCEDURE — 3008F PR BODY MASS INDEX (BMI) DOCUMENTED: ICD-10-PCS | Mod: CPTII,S$GLB,, | Performed by: NURSE PRACTITIONER

## 2023-03-14 PROCEDURE — 3074F PR MOST RECENT SYSTOLIC BLOOD PRESSURE < 130 MM HG: ICD-10-PCS | Mod: CPTII,S$GLB,, | Performed by: NURSE PRACTITIONER

## 2023-03-14 PROCEDURE — 4010F ACE/ARB THERAPY RXD/TAKEN: CPT | Mod: CPTII,S$GLB,, | Performed by: ORTHOPAEDIC SURGERY

## 2023-03-14 PROCEDURE — 4010F PR ACE/ARB THEARPY RXD/TAKEN: ICD-10-PCS | Mod: CPTII,S$GLB,, | Performed by: NURSE PRACTITIONER

## 2023-03-14 PROCEDURE — 1160F PR REVIEW ALL MEDS BY PRESCRIBER/CLIN PHARMACIST DOCUMENTED: ICD-10-PCS | Mod: CPTII,S$GLB,, | Performed by: ORTHOPAEDIC SURGERY

## 2023-03-14 PROCEDURE — 4010F PR ACE/ARB THEARPY RXD/TAKEN: ICD-10-PCS | Mod: CPTII,S$GLB,, | Performed by: ORTHOPAEDIC SURGERY

## 2023-03-14 PROCEDURE — 1159F MED LIST DOCD IN RCRD: CPT | Mod: CPTII,S$GLB,, | Performed by: NURSE PRACTITIONER

## 2023-03-14 PROCEDURE — 1160F PR REVIEW ALL MEDS BY PRESCRIBER/CLIN PHARMACIST DOCUMENTED: ICD-10-PCS | Mod: CPTII,S$GLB,, | Performed by: NURSE PRACTITIONER

## 2023-03-14 PROCEDURE — 1159F PR MEDICATION LIST DOCUMENTED IN MEDICAL RECORD: ICD-10-PCS | Mod: CPTII,S$GLB,, | Performed by: NURSE PRACTITIONER

## 2023-03-14 PROCEDURE — 3052F PR MOST RECENT HEMOGLOBIN A1C LEVEL 8.0 - < 9.0%: ICD-10-PCS | Mod: CPTII,S$GLB,, | Performed by: NURSE PRACTITIONER

## 2023-03-14 PROCEDURE — 3052F PR MOST RECENT HEMOGLOBIN A1C LEVEL 8.0 - < 9.0%: ICD-10-PCS | Mod: CPTII,S$GLB,, | Performed by: ORTHOPAEDIC SURGERY

## 2023-03-14 PROCEDURE — 99213 PR OFFICE/OUTPT VISIT, EST, LEVL III, 20-29 MIN: ICD-10-PCS | Mod: S$GLB,,, | Performed by: ORTHOPAEDIC SURGERY

## 2023-03-14 PROCEDURE — 3008F PR BODY MASS INDEX (BMI) DOCUMENTED: ICD-10-PCS | Mod: CPTII,S$GLB,, | Performed by: ORTHOPAEDIC SURGERY

## 2023-03-14 PROCEDURE — 4010F ACE/ARB THERAPY RXD/TAKEN: CPT | Mod: CPTII,S$GLB,, | Performed by: NURSE PRACTITIONER

## 2023-03-14 PROCEDURE — 3079F PR MOST RECENT DIASTOLIC BLOOD PRESSURE 80-89 MM HG: ICD-10-PCS | Mod: CPTII,S$GLB,, | Performed by: NURSE PRACTITIONER

## 2023-03-14 RX ORDER — LOSARTAN POTASSIUM 100 MG/1
100 TABLET ORAL DAILY
Qty: 90 TABLET | Refills: 1 | Status: ON HOLD | OUTPATIENT
Start: 2023-03-14 | End: 2023-03-30 | Stop reason: HOSPADM

## 2023-03-14 RX ORDER — AMLODIPINE BESYLATE 5 MG/1
5 TABLET ORAL DAILY
Qty: 90 TABLET | Refills: 1 | Status: SHIPPED | OUTPATIENT
Start: 2023-03-14 | End: 2023-09-11 | Stop reason: SDUPTHER

## 2023-03-14 RX ORDER — ATORVASTATIN CALCIUM 10 MG/1
10 TABLET, FILM COATED ORAL NIGHTLY
Qty: 90 TABLET | Refills: 1 | Status: SHIPPED | OUTPATIENT
Start: 2023-03-14 | End: 2023-09-11 | Stop reason: SDUPTHER

## 2023-03-14 NOTE — PROGRESS NOTES
"[facility]    Subjective:     Chief Complaint:   Chief Complaint   Patient presents with    Left Shoulder - Pain     Left shoulder pain follow up. Receive inj 11/15/22 which offered great relief. Has not been able to complete PT due to work commitments. States that the pain is still keeping him awake at night       Past Medical History:   Diagnosis Date    Anxiety     Asthma     Depression     Diabetes mellitus     diet controlled    Diabetes mellitus, type 2     GERD (gastroesophageal reflux disease)     Gout     Hyperlipidemia     Hypertension     IgA nephropathy     Insomnia        Past Surgical History:   Procedure Laterality Date    COLONOSCOPY N/A 5/8/2020    Procedure: COLONOSCOPY;  Surgeon: Hammad Castorena III, MD;  Location: [facility];  Service: Endoscopy;  Laterality: N/A;    ESOPHAGOGASTRODUODENOSCOPY N/A 5/8/2020    Procedure: EGD (ESOPHAGOGASTRODUODENOSCOPY);  Surgeon: Hammad Castorena III, MD;  Location: [facility];  Service: Endoscopy;  Laterality: N/A;    nose polyp removal         Current Outpatient Medications   Medication Sig    amLODIPine (NORVASC) 5 MG tablet Take 1 tablet (5 mg total) by mouth once daily.    atorvastatin (LIPITOR) 10 MG tablet Take 1 tablet (10 mg total) by mouth every evening.    BD CONOR 2ND GEN PEN NEEDLE 32 gauge x 5/32" Ndle once daily. Use    blood sugar diagnostic Strp To check BG one times daily, to use with insurance preferred meter    blood-glucose meter kit To check BG one times daily, to use with insurance preferred meter    cetirizine (ZYRTEC) 10 MG tablet Take 1 tablet (10 mg total) by mouth once daily.    dulaglutide (TRULICITY) 1.5 mg/0.5 mL pen injector Inject 1.5 mg into the skin every 7 days.    febuxostat (ULORIC) 40 mg Tab Take 40 mg by mouth once daily.    gabapentin (NEURONTIN) 300 MG capsule Take 1 capsule (300 mg total) by mouth 3 (three) times daily. Take 1 tablet every night x 7 days. Increase to twice a day x 7 days. Increase to " "three times a day.    insulin (LANTUS SOLOSTAR U-100 INSULIN) glargine 100 units/mL SubQ pen Inject 30 Units into the skin once daily.    lancets Misc To check BG one times daily, to use with insurance preferred meter    losartan (COZAAR) 100 MG tablet Take 1 tablet (100 mg total) by mouth once daily.    omega 3-dha-epa-fish oil 1,000 mg (120 mg-180 mg) Cap Take 1 capsule by mouth 2 (two) times daily.    ondansetron (ZOFRAN-ODT) 4 MG TbDL Take 1 tablet (4 mg total) by mouth every 8 (eight) hours as needed (FOR NAUSEA; DISSOLVE UNDER THE TONGUE).    pen needle, diabetic 32 gauge x 5/16" Ndle 1 Units by Misc.(Non-Drug; Combo Route) route once daily.     No current facility-administered medications for this visit.       Review of patient's allergies indicates:   Allergen Reactions    Zithromax [azithromycin] Rash       Family History   Problem Relation Age of Onset    Hypertension Maternal Grandmother     Cancer Maternal Grandfather     Diabetes Maternal Grandfather     Heart disease Maternal Grandfather     Heart disease Mother     Stroke Mother     Diabetes Mother        Social History     Socioeconomic History    Marital status: Single   Tobacco Use    Smoking status: Former     Packs/day: 0.25     Years: 5.00     Pack years: 1.25     Types: Cigars, Cigarettes     Quit date: 2022     Years since quittin.8     Passive exposure: Past    Smokeless tobacco: Never    Tobacco comments:     One cigar a day   Substance and Sexual Activity    Alcohol use: Yes    Drug use: No    Sexual activity: Yes     Partners: Female     Social Determinants of Health     Financial Resource Strain: Low Risk     Difficulty of Paying Living Expenses: Not hard at all   Food Insecurity: No Food Insecurity    Worried About Running Out of Food in the Last Year: Never true    Ran Out of Food in the Last Year: Never true   Transportation Needs: No Transportation Needs    Lack of Transportation (Medical): No    Lack of Transportation " (Non-Medical): No   Physical Activity: Inactive    Days of Exercise per Week: 0 days    Minutes of Exercise per Session: 0 min   Stress: Stress Concern Present    Feeling of Stress : To some extent   Social Connections: Moderately Isolated    Frequency of Communication with Friends and Family: More than three times a week    Frequency of Social Gatherings with Friends and Family: Once a week    Attends Taoism Services: Never    Active Member of Clubs or Organizations: No    Attends Club or Organization Meetings: Never    Marital Status:    Housing Stability: Low Risk     Unable to Pay for Housing in the Last Year: No    Number of Places Lived in the Last Year: 1    Unstable Housing in the Last Year: No       History of present illness:  Patient comes in today for the left shoulder.  Continues complain of significant shoulder pain.  He has only completed 1 week of therapy.  He has had a difficult time because of his work schedule.  He is having difficulty sleeping at night.  That actually his biggest complaint      Review of Systems:    Constitution: Negative for chills, fever, and sweats.  Negative for unexplained weight loss.    HENT:  Negative for headaches and blurry vision.    Cardiovascular:Negative for chest pain or irregular heart beat. Negative for hypertension.    Respiratory:  Negative for cough and shortness of breath.    Gastrointestinal: Negative for abdominal pain, heartburn, melena, nausea, and vomitting.    Genitourinary:  Negative bladder incontinence and dysuria.    Musculoskeletal:  See HPI for details.     Neurological: Negative for numbness.    Psychiatric/Behavioral: Negative for depression.  The patient is not nervous/anxious.      Endocrine: Negative for polyuria    Hematologic/Lymphatic: Negative for bleeding problem.  Does not bruise/bleed easily.    Skin: Negative for poor would healing and rash    Objective:      Physical Examination:    Vital Signs:  There were no vitals filed  for this visit.    Body mass index is 28.48 kg/m².    This a well-developed, well nourished patient in no acute distress.  They are alert and oriented and cooperative to examination.        Patient has full range of motion of the left shoulder very positive impingement.  Very positive crossover.  His rotator cuff on the left side is intact against gravity but certainly much weaker than the right side Spurling sign is negative.  Pertinent New Results:    XRAY Report / Interpretation:       Assessment/Plan:      Left shoulder impingement syndrome.  Left shoulder subacromial bursitis.  Left shoulder acromioclavicular arthrosis.  Left shoulder rotator cuff tendinitis.  He will continue his therapy.  I explained to him the port sites of the completing at least 1 full round of therapy.  No more steroid injections.  The last 1 did not help and it has raised his blood sugars significantly.  He will follow-up in 6 weeks.  If he is still symptomatic we will obtain an MRI  This note was created using Dragon voice recognition software that occasionally misinterpreted phrases or words.

## 2023-03-17 NOTE — PROGRESS NOTES
CC: This 37 y.o. male presents for management of diabetes  and chronic conditions pending review including      HPI: He was diagnosed with T2DM at age 12. Has been hospitalized r/t DM at diagnosis and a few months ago after a steroid injection   Family hx of DM: dad, sister, mother, PGF, MGF      Arrives new to endocrine   hypoglycemia at home- none recently  monitoring BG at home:  Fastins  Dinner: 120-130s    Diet: Eats 2 Meals a day, snacks-  night- Baked Lay's  Has been working on his diet   Skips breakfast  Exercise: none  CURRENT DM MEDS: lantus 30u qhs, Trulicity 1.5 mg weekly (Friday)   Vial/pen:  Uses pen  Glucometer type:    Standards of Care:  Eye exam: > 1 year         Following w Dr Flores in nephrology     ROS:   Gen: Appetite good,   Eyes: Denies visual disturbances  Resp: no SOB or MATHEWS, no cough  Cardiac: No palpitations, chest pain, no edema   GI: No nausea or vomiting, diarrhea, constipation, or abdominal pain.  /GYN: 2-3  nocturia, no burning or pain.   MS/Neuro: Denies numbness/ tingling in BLE; Gait steady, speech clear  Psych: Denies drug/ETOH abuse, no hx of depression.  Other systems: negative.    PE:  GENERAL: Well developed, well nourished.  PSYCH: AAOx3, appropriate mood and affect, pleasant expression, conversant, appears relaxed, well groomed.   EYES: Conjunctiva, corneas clear  NECK: Supple, trachea midline  ABDOMEN: Soft, non-tender, non-distended   VASCULAR: DP pulses +2/4 bilaterally, no edema. Varicose veins  NEURO: Gait steady  SKIN:   no acanthosis nigracans.  FOOT EXAMINATION: 3/20/2023   + callus formation on bilateral great toed, right great toe with brown spots noted under callus Onychomycosis,  no interspace maceration or ulceration noted.  Decreased hair growth present over toes/feet.   Protective sensation intact with 10 gram monofilament.  +2 dorsalis pedis and posterior pulses noted.     Personally reviewed Past Medical, Surgical, Social  History.    /86 (BP Location: Left arm, Patient Position: Sitting, BP Method: Large (Manual))   Pulse 86   Ht 6' (1.829 m)   Wt 96.3 kg (212 lb 4.9 oz)   SpO2 100%   BMI 28.79 kg/m²      Personally reviewed the below labs:      Chemistry        Component Value Date/Time     03/10/2023 1603    K 4.8 03/10/2023 1603     03/10/2023 1603    CO2 27 03/10/2023 1603    BUN 32 (H) 03/10/2023 1603    CREATININE 2.9 (H) 03/10/2023 1603     (H) 03/10/2023 1603        Component Value Date/Time    CALCIUM 9.6 03/10/2023 1603    ALKPHOS 97 01/31/2023 1820    AST 30 01/31/2023 1820    ALT 63 (H) 01/31/2023 1820    BILITOT 0.7 01/31/2023 1820    ESTGFRAFRICA 33.5 (A) 06/27/2022 1223    EGFRNONAA 29.0 (A) 06/27/2022 1223            Lab Results   Component Value Date    TSH 1.780 09/16/2021       Recent Labs   Lab 08/06/22  0800   LDL Cholesterol Invalid, Trig>400.0   HDL 27 L   Cholesterol 185        Results for orders placed or performed in visit on 12/23/20   Vitamin D   Result Value Ref Range    Vit D, 25-Hydroxy 21 (L) 30 - 96 ng/mL     Results for orders placed or performed in visit on 07/01/10   Calcitriol   Result Value Ref Range    Vit D, 1,25-Dihydroxy 35 18 - 64 pg/mL       No results found for: MICALBCREAT    Hemoglobin A1C   Date Value Ref Range Status   01/27/2023 9.2 (H) 4.5 - 6.2 % Final     Comment:     According to ADA guidelines, hemoglobin A1C <7.0% represents  optimal control in non-pregnant diabetic patients.  Different  metrics may apply to specific populations.   Standards of Medical Care in Diabetes - 2016.    For the purpose of screening for the presence of diabetes:  <5.7%     Consistent with the absence of diabetes  5.7-6.4%  Consistent with increasing risk for diabetes   (prediabetes)  >or=6.5%  Consistent with diabetes    Currently no consensus exists for use of hemoglobin A1C  for diagnosis of diabetes for children.     08/06/2022 9.6 (H) 4.5 - 6.2 % Final     Comment:      According to ADA guidelines, hemoglobin A1C <7.0% represents  optimal control in non-pregnant diabetic patients.  Different  metrics may apply to specific populations.   Standards of Medical Care in Diabetes - 2016.    For the purpose of screening for the presence of diabetes:  <5.7%     Consistent with the absence of diabetes  5.7-6.4%  Consistent with increasing risk for diabetes   (prediabetes)  >or=6.5%  Consistent with diabetes    Currently no consensus exists for use of hemoglobin A1C  for diagnosis of diabetes for children.     10/19/2021 9.9 (H) 4.5 - 6.2 % Final     Comment:     According to ADA guidelines, hemoglobin A1C <7.0% represents  optimal control in non-pregnant diabetic patients.  Different  metrics may apply to specific populations.   Standards of Medical Care in Diabetes - 2016.    For the purpose of screening for the presence of diabetes:  <5.7%     Consistent with the absence of diabetes  5.7-6.4%  Consistent with increasing risk for diabetes   (prediabetes)  >or=6.5%  Consistent with diabetes    Currently no consensus exists for use of hemoglobin A1C  for diagnosis of diabetes for children.          ASSESSMENT and PLAN:      1. T2DM with hyperglycemia, CKD 3b-    No med changes yet  CGM Pro- changes based on findings  Podiatry- right great toe ulcer?  Schedule eye exam   Check c peptide and RAMAN w RTC  Continue to check bg bid    2. HTN - controlled, continue meds as previously prescribed and monitor.     3. CKD 3b- Need to optimize bg control ASAP to prevent worsening kidney fx       Follow-up: in 3 months with lab prior

## 2023-03-20 ENCOUNTER — HOSPITAL ENCOUNTER (OUTPATIENT)
Dept: RADIOLOGY | Facility: HOSPITAL | Age: 38
Discharge: HOME | End: 2023-03-20
Attending: INTERNAL MEDICINE
Payer: MEDICAID

## 2023-03-20 ENCOUNTER — OFFICE VISIT (OUTPATIENT)
Dept: ENDOCRINOLOGY | Facility: CLINIC | Age: 38
End: 2023-03-20
Payer: MEDICAID

## 2023-03-20 VITALS
DIASTOLIC BLOOD PRESSURE: 86 MMHG | HEART RATE: 86 BPM | BODY MASS INDEX: 28.76 KG/M2 | HEIGHT: 72 IN | WEIGHT: 212.31 LBS | OXYGEN SATURATION: 100 % | SYSTOLIC BLOOD PRESSURE: 122 MMHG

## 2023-03-20 DIAGNOSIS — E11.9 TYPE 2 DIABETES MELLITUS WITHOUT COMPLICATION, WITH LONG-TERM CURRENT USE OF INSULIN: ICD-10-CM

## 2023-03-20 DIAGNOSIS — N20.0 NEPHROLITHIASIS: ICD-10-CM

## 2023-03-20 DIAGNOSIS — E11.22 TYPE 2 DIABETES MELLITUS WITH STAGE 3B CHRONIC KIDNEY DISEASE, WITHOUT LONG-TERM CURRENT USE OF INSULIN: Primary | ICD-10-CM

## 2023-03-20 DIAGNOSIS — N18.32 TYPE 2 DIABETES MELLITUS WITH STAGE 3B CHRONIC KIDNEY DISEASE, WITHOUT LONG-TERM CURRENT USE OF INSULIN: Primary | ICD-10-CM

## 2023-03-20 DIAGNOSIS — Z79.4 TYPE 2 DIABETES MELLITUS WITHOUT COMPLICATION, WITH LONG-TERM CURRENT USE OF INSULIN: ICD-10-CM

## 2023-03-20 DIAGNOSIS — E11.59 HYPERTENSION ASSOCIATED WITH DIABETES: ICD-10-CM

## 2023-03-20 DIAGNOSIS — I15.2 HYPERTENSION ASSOCIATED WITH DIABETES: ICD-10-CM

## 2023-03-20 PROCEDURE — 76770 US EXAM ABDO BACK WALL COMP: CPT | Mod: TC

## 2023-03-20 PROCEDURE — 99204 OFFICE O/P NEW MOD 45 MIN: CPT | Mod: S$PBB,,, | Performed by: NURSE PRACTITIONER

## 2023-03-20 PROCEDURE — 3074F SYST BP LT 130 MM HG: CPT | Mod: CPTII,,, | Performed by: NURSE PRACTITIONER

## 2023-03-20 PROCEDURE — 3079F DIAST BP 80-89 MM HG: CPT | Mod: CPTII,,, | Performed by: NURSE PRACTITIONER

## 2023-03-20 PROCEDURE — 99214 OFFICE O/P EST MOD 30 MIN: CPT | Mod: PBBFAC,PO | Performed by: NURSE PRACTITIONER

## 2023-03-20 PROCEDURE — 3074F PR MOST RECENT SYSTOLIC BLOOD PRESSURE < 130 MM HG: ICD-10-PCS | Mod: CPTII,,, | Performed by: NURSE PRACTITIONER

## 2023-03-20 PROCEDURE — 1159F MED LIST DOCD IN RCRD: CPT | Mod: CPTII,,, | Performed by: NURSE PRACTITIONER

## 2023-03-20 PROCEDURE — 99204 PR OFFICE/OUTPT VISIT, NEW, LEVL IV, 45-59 MIN: ICD-10-PCS | Mod: S$PBB,,, | Performed by: NURSE PRACTITIONER

## 2023-03-20 PROCEDURE — 99999 PR PBB SHADOW E&M-EST. PATIENT-LVL IV: ICD-10-PCS | Mod: PBBFAC,,, | Performed by: NURSE PRACTITIONER

## 2023-03-20 PROCEDURE — 3079F PR MOST RECENT DIASTOLIC BLOOD PRESSURE 80-89 MM HG: ICD-10-PCS | Mod: CPTII,,, | Performed by: NURSE PRACTITIONER

## 2023-03-20 PROCEDURE — 3052F PR MOST RECENT HEMOGLOBIN A1C LEVEL 8.0 - < 9.0%: ICD-10-PCS | Mod: CPTII,,, | Performed by: NURSE PRACTITIONER

## 2023-03-20 PROCEDURE — 4010F PR ACE/ARB THEARPY RXD/TAKEN: ICD-10-PCS | Mod: CPTII,,, | Performed by: NURSE PRACTITIONER

## 2023-03-20 PROCEDURE — 3008F BODY MASS INDEX DOCD: CPT | Mod: CPTII,,, | Performed by: NURSE PRACTITIONER

## 2023-03-20 PROCEDURE — 4010F ACE/ARB THERAPY RXD/TAKEN: CPT | Mod: CPTII,,, | Performed by: NURSE PRACTITIONER

## 2023-03-20 PROCEDURE — 99999 PR PBB SHADOW E&M-EST. PATIENT-LVL IV: CPT | Mod: PBBFAC,,, | Performed by: NURSE PRACTITIONER

## 2023-03-20 PROCEDURE — 3052F HG A1C>EQUAL 8.0%<EQUAL 9.0%: CPT | Mod: CPTII,,, | Performed by: NURSE PRACTITIONER

## 2023-03-20 PROCEDURE — 1159F PR MEDICATION LIST DOCUMENTED IN MEDICAL RECORD: ICD-10-PCS | Mod: CPTII,,, | Performed by: NURSE PRACTITIONER

## 2023-03-20 PROCEDURE — 3008F PR BODY MASS INDEX (BMI) DOCUMENTED: ICD-10-PCS | Mod: CPTII,,, | Performed by: NURSE PRACTITIONER

## 2023-03-29 ENCOUNTER — HOSPITAL ENCOUNTER (INPATIENT)
Facility: HOSPITAL | Age: 38
LOS: 1 days | Discharge: HOME | DRG: 391 | End: 2023-03-30
Attending: EMERGENCY MEDICINE | Admitting: HOSPITALIST
Payer: MEDICAID

## 2023-03-29 DIAGNOSIS — N17.9 AKI (ACUTE KIDNEY INJURY): ICD-10-CM

## 2023-03-29 DIAGNOSIS — R10.9 FLANK PAIN: ICD-10-CM

## 2023-03-29 DIAGNOSIS — D72.829 LEUKOCYTOSIS, UNSPECIFIED TYPE: ICD-10-CM

## 2023-03-29 DIAGNOSIS — N17.9 ACUTE KIDNEY INJURY: ICD-10-CM

## 2023-03-29 DIAGNOSIS — K52.9 GASTROENTERITIS: ICD-10-CM

## 2023-03-29 DIAGNOSIS — R11.2 NAUSEA AND VOMITING, UNSPECIFIED VOMITING TYPE: Primary | ICD-10-CM

## 2023-03-29 DIAGNOSIS — N30.90 CYSTITIS: ICD-10-CM

## 2023-03-29 LAB
ALBUMIN SERPL BCP-MCNC: 4.6 G/DL (ref 3.5–5.2)
ALP SERPL-CCNC: 118 U/L (ref 55–135)
ALT SERPL W/O P-5'-P-CCNC: 28 U/L (ref 10–44)
AMPHET+METHAMPHET UR QL: NEGATIVE
AMPHET+METHAMPHET UR QL: NEGATIVE
ANION GAP SERPL CALC-SCNC: 13 MMOL/L (ref 8–16)
ANION GAP SERPL CALC-SCNC: 13 MMOL/L (ref 8–16)
AST SERPL-CCNC: 20 U/L (ref 10–40)
BACTERIA #/AREA URNS HPF: NEGATIVE /HPF
BARBITURATES UR QL SCN>200 NG/ML: NEGATIVE
BARBITURATES UR QL SCN>200 NG/ML: NEGATIVE
BASOPHILS # BLD AUTO: 0.04 K/UL (ref 0–0.2)
BASOPHILS NFR BLD: 0.2 % (ref 0–1.9)
BENZODIAZ UR QL SCN>200 NG/ML: NEGATIVE
BENZODIAZ UR QL SCN>200 NG/ML: NEGATIVE
BILIRUB SERPL-MCNC: 1.3 MG/DL (ref 0.1–1)
BILIRUB UR QL STRIP: NEGATIVE
BUN SERPL-MCNC: 53 MG/DL (ref 6–20)
BUN SERPL-MCNC: 53 MG/DL (ref 6–20)
BZE UR QL SCN: NEGATIVE
BZE UR QL SCN: NEGATIVE
CALCIUM SERPL-MCNC: 9.7 MG/DL (ref 8.7–10.5)
CALCIUM SERPL-MCNC: 9.9 MG/DL (ref 8.7–10.5)
CANNABINOIDS UR QL SCN: NEGATIVE
CANNABINOIDS UR QL SCN: NEGATIVE
CHLORIDE SERPL-SCNC: 103 MMOL/L (ref 95–110)
CHLORIDE SERPL-SCNC: 104 MMOL/L (ref 95–110)
CLARITY UR: CLEAR
CO2 SERPL-SCNC: 21 MMOL/L (ref 23–29)
CO2 SERPL-SCNC: 22 MMOL/L (ref 23–29)
COLOR UR: YELLOW
CREAT SERPL-MCNC: 3.7 MG/DL (ref 0.5–1.4)
CREAT SERPL-MCNC: 3.8 MG/DL (ref 0.5–1.4)
CREAT UR-MCNC: 102 MG/DL (ref 23–375)
CREAT UR-MCNC: 102 MG/DL (ref 23–375)
DIFFERENTIAL METHOD: ABNORMAL
EOSINOPHIL # BLD AUTO: 0.4 K/UL (ref 0–0.5)
EOSINOPHIL NFR BLD: 1.9 % (ref 0–8)
ERYTHROCYTE [DISTWIDTH] IN BLOOD BY AUTOMATED COUNT: 13.2 % (ref 11.5–14.5)
EST. GFR  (NO RACE VARIABLE): 20 ML/MIN/1.73 M^2
EST. GFR  (NO RACE VARIABLE): 20.7 ML/MIN/1.73 M^2
GLUCOSE SERPL-MCNC: 114 MG/DL (ref 70–110)
GLUCOSE SERPL-MCNC: 119 MG/DL (ref 70–110)
GLUCOSE SERPL-MCNC: 141 MG/DL (ref 70–110)
GLUCOSE SERPL-MCNC: 163 MG/DL (ref 70–110)
GLUCOSE SERPL-MCNC: 230 MG/DL (ref 70–110)
GLUCOSE SERPL-MCNC: 237 MG/DL (ref 70–110)
GLUCOSE UR QL STRIP: ABNORMAL
HCT VFR BLD AUTO: 50.8 % (ref 40–54)
HGB BLD-MCNC: 16.3 G/DL (ref 14–18)
HGB UR QL STRIP: NEGATIVE
HYALINE CASTS #/AREA URNS LPF: 1 /LPF
IMM GRANULOCYTES # BLD AUTO: 0.11 K/UL (ref 0–0.04)
IMM GRANULOCYTES NFR BLD AUTO: 0.6 % (ref 0–0.5)
KETONES UR QL STRIP: NEGATIVE
LEUKOCYTE ESTERASE UR QL STRIP: NEGATIVE
LIPASE SERPL-CCNC: 95 U/L (ref 4–60)
LYMPHOCYTES # BLD AUTO: 2.3 K/UL (ref 1–4.8)
LYMPHOCYTES NFR BLD: 12.1 % (ref 18–48)
MCH RBC QN AUTO: 28.3 PG (ref 27–31)
MCHC RBC AUTO-ENTMCNC: 32.1 G/DL (ref 32–36)
MCV RBC AUTO: 88 FL (ref 82–98)
MICROSCOPIC COMMENT: NORMAL
MONOCYTES # BLD AUTO: 1.3 K/UL (ref 0.3–1)
MONOCYTES NFR BLD: 7 % (ref 4–15)
NEUTROPHILS # BLD AUTO: 14.7 K/UL (ref 1.8–7.7)
NEUTROPHILS NFR BLD: 78.2 % (ref 38–73)
NITRITE UR QL STRIP: NEGATIVE
NRBC BLD-RTO: 0 /100 WBC
OPIATES UR QL SCN: ABNORMAL
OPIATES UR QL SCN: ABNORMAL
PCP UR QL SCN>25 NG/ML: NEGATIVE
PCP UR QL SCN>25 NG/ML: NEGATIVE
PH UR STRIP: 6 [PH] (ref 5–8)
PLATELET # BLD AUTO: 291 K/UL (ref 150–450)
PMV BLD AUTO: 10.4 FL (ref 9.2–12.9)
POTASSIUM SERPL-SCNC: 5.1 MMOL/L (ref 3.5–5.1)
POTASSIUM SERPL-SCNC: 5.2 MMOL/L (ref 3.5–5.1)
PROT SERPL-MCNC: 8 G/DL (ref 6–8.4)
PROT UR QL STRIP: ABNORMAL
RBC # BLD AUTO: 5.75 M/UL (ref 4.6–6.2)
RBC #/AREA URNS HPF: 0 /HPF (ref 0–4)
SODIUM SERPL-SCNC: 138 MMOL/L (ref 136–145)
SODIUM SERPL-SCNC: 138 MMOL/L (ref 136–145)
SODIUM UR-SCNC: 18 MMOL/L (ref 20–250)
SP GR UR STRIP: 1.01 (ref 1–1.03)
SQUAMOUS #/AREA URNS HPF: 0 /HPF
TOXICOLOGY INFORMATION: ABNORMAL
TOXICOLOGY INFORMATION: ABNORMAL
URATE SERPL-MCNC: 9.9 MG/DL (ref 3.4–7)
URN SPEC COLLECT METH UR: ABNORMAL
UROBILINOGEN UR STRIP-ACNC: NEGATIVE EU/DL
WBC # BLD AUTO: 18.83 K/UL (ref 3.9–12.7)
WBC #/AREA URNS HPF: 0 /HPF (ref 0–5)

## 2023-03-29 PROCEDURE — 25000003 PHARM REV CODE 250: Performed by: HOSPITALIST

## 2023-03-29 PROCEDURE — 25000003 PHARM REV CODE 250: Performed by: INTERNAL MEDICINE

## 2023-03-29 PROCEDURE — 99285 EMERGENCY DEPT VISIT HI MDM: CPT | Mod: 25

## 2023-03-29 PROCEDURE — 83690 ASSAY OF LIPASE: CPT | Performed by: EMERGENCY MEDICINE

## 2023-03-29 PROCEDURE — 63600175 PHARM REV CODE 636 W HCPCS: Performed by: HOSPITALIST

## 2023-03-29 PROCEDURE — 80048 BASIC METABOLIC PNL TOTAL CA: CPT | Performed by: EMERGENCY MEDICINE

## 2023-03-29 PROCEDURE — 80053 COMPREHEN METABOLIC PANEL: CPT | Performed by: EMERGENCY MEDICINE

## 2023-03-29 PROCEDURE — 25000003 PHARM REV CODE 250: Performed by: EMERGENCY MEDICINE

## 2023-03-29 PROCEDURE — C9113 INJ PANTOPRAZOLE SODIUM, VIA: HCPCS | Performed by: EMERGENCY MEDICINE

## 2023-03-29 PROCEDURE — 96361 HYDRATE IV INFUSION ADD-ON: CPT

## 2023-03-29 PROCEDURE — 84550 ASSAY OF BLOOD/URIC ACID: CPT | Performed by: EMERGENCY MEDICINE

## 2023-03-29 PROCEDURE — 85025 COMPLETE CBC W/AUTO DIFF WBC: CPT | Performed by: EMERGENCY MEDICINE

## 2023-03-29 PROCEDURE — 80307 DRUG TEST PRSMV CHEM ANLYZR: CPT | Performed by: EMERGENCY MEDICINE

## 2023-03-29 PROCEDURE — 63600175 PHARM REV CODE 636 W HCPCS: Performed by: INTERNAL MEDICINE

## 2023-03-29 PROCEDURE — 84300 ASSAY OF URINE SODIUM: CPT | Performed by: INTERNAL MEDICINE

## 2023-03-29 PROCEDURE — 36415 COLL VENOUS BLD VENIPUNCTURE: CPT | Performed by: EMERGENCY MEDICINE

## 2023-03-29 PROCEDURE — 63600175 PHARM REV CODE 636 W HCPCS: Performed by: EMERGENCY MEDICINE

## 2023-03-29 PROCEDURE — 96374 THER/PROPH/DIAG INJ IV PUSH: CPT

## 2023-03-29 PROCEDURE — 12000002 HC ACUTE/MED SURGE SEMI-PRIVATE ROOM

## 2023-03-29 PROCEDURE — 96375 TX/PRO/DX INJ NEW DRUG ADDON: CPT

## 2023-03-29 PROCEDURE — 81001 URINALYSIS AUTO W/SCOPE: CPT | Performed by: EMERGENCY MEDICINE

## 2023-03-29 RX ORDER — SODIUM CHLORIDE 9 MG/ML
INJECTION, SOLUTION INTRAVENOUS CONTINUOUS
Status: DISCONTINUED | OUTPATIENT
Start: 2023-03-29 | End: 2023-03-29

## 2023-03-29 RX ORDER — ONDANSETRON 2 MG/ML
4 INJECTION INTRAMUSCULAR; INTRAVENOUS
Status: COMPLETED | OUTPATIENT
Start: 2023-03-29 | End: 2023-03-29

## 2023-03-29 RX ORDER — ATORVASTATIN CALCIUM 10 MG/1
10 TABLET, FILM COATED ORAL NIGHTLY
Status: DISCONTINUED | OUTPATIENT
Start: 2023-03-29 | End: 2023-03-30 | Stop reason: HOSPADM

## 2023-03-29 RX ORDER — AMLODIPINE BESYLATE 5 MG/1
5 TABLET ORAL DAILY
Status: DISCONTINUED | OUTPATIENT
Start: 2023-03-29 | End: 2023-03-30 | Stop reason: HOSPADM

## 2023-03-29 RX ORDER — ACETAMINOPHEN 325 MG/1
650 TABLET ORAL EVERY 4 HOURS PRN
Status: DISCONTINUED | OUTPATIENT
Start: 2023-03-29 | End: 2023-03-30 | Stop reason: HOSPADM

## 2023-03-29 RX ORDER — MORPHINE SULFATE 2 MG/ML
6 INJECTION, SOLUTION INTRAMUSCULAR; INTRAVENOUS
Status: COMPLETED | OUTPATIENT
Start: 2023-03-29 | End: 2023-03-29

## 2023-03-29 RX ORDER — L. ACIDOPHILUS/L.BULGARICUS 100MM CELL
1 GRANULES IN PACKET (EA) ORAL 2 TIMES DAILY
Status: DISCONTINUED | OUTPATIENT
Start: 2023-03-29 | End: 2023-03-30 | Stop reason: HOSPADM

## 2023-03-29 RX ORDER — INSULIN ASPART 100 [IU]/ML
0-5 INJECTION, SOLUTION INTRAVENOUS; SUBCUTANEOUS
Status: DISCONTINUED | OUTPATIENT
Start: 2023-03-29 | End: 2023-03-30 | Stop reason: HOSPADM

## 2023-03-29 RX ORDER — CETIRIZINE HYDROCHLORIDE 5 MG/1
10 TABLET ORAL DAILY
Status: DISCONTINUED | OUTPATIENT
Start: 2023-03-29 | End: 2023-03-30 | Stop reason: HOSPADM

## 2023-03-29 RX ORDER — ENOXAPARIN SODIUM 100 MG/ML
40 INJECTION SUBCUTANEOUS EVERY 24 HOURS
Status: DISCONTINUED | OUTPATIENT
Start: 2023-03-29 | End: 2023-03-30 | Stop reason: HOSPADM

## 2023-03-29 RX ORDER — PROCHLORPERAZINE EDISYLATE 5 MG/ML
5 INJECTION INTRAMUSCULAR; INTRAVENOUS EVERY 6 HOURS PRN
Status: DISCONTINUED | OUTPATIENT
Start: 2023-03-29 | End: 2023-03-30 | Stop reason: HOSPADM

## 2023-03-29 RX ORDER — SODIUM CHLORIDE 0.9 % (FLUSH) 0.9 %
10 SYRINGE (ML) INJECTION
Status: DISCONTINUED | OUTPATIENT
Start: 2023-03-29 | End: 2023-03-30 | Stop reason: HOSPADM

## 2023-03-29 RX ORDER — IBUPROFEN 200 MG
16 TABLET ORAL
Status: DISCONTINUED | OUTPATIENT
Start: 2023-03-29 | End: 2023-03-30 | Stop reason: HOSPADM

## 2023-03-29 RX ORDER — IBUPROFEN 200 MG
24 TABLET ORAL
Status: DISCONTINUED | OUTPATIENT
Start: 2023-03-29 | End: 2023-03-30 | Stop reason: HOSPADM

## 2023-03-29 RX ORDER — ONDANSETRON 2 MG/ML
4 INJECTION INTRAMUSCULAR; INTRAVENOUS EVERY 8 HOURS PRN
Status: DISCONTINUED | OUTPATIENT
Start: 2023-03-29 | End: 2023-03-30 | Stop reason: HOSPADM

## 2023-03-29 RX ORDER — PANTOPRAZOLE SODIUM 40 MG/10ML
40 INJECTION, POWDER, LYOPHILIZED, FOR SOLUTION INTRAVENOUS DAILY
Status: DISCONTINUED | OUTPATIENT
Start: 2023-03-29 | End: 2023-03-30 | Stop reason: HOSPADM

## 2023-03-29 RX ORDER — SODIUM CHLORIDE, SODIUM LACTATE, POTASSIUM CHLORIDE, CALCIUM CHLORIDE 600; 310; 30; 20 MG/100ML; MG/100ML; MG/100ML; MG/100ML
INJECTION, SOLUTION INTRAVENOUS CONTINUOUS
Status: DISCONTINUED | OUTPATIENT
Start: 2023-03-29 | End: 2023-03-30 | Stop reason: HOSPADM

## 2023-03-29 RX ORDER — GLUCAGON 1 MG
1 KIT INJECTION
Status: DISCONTINUED | OUTPATIENT
Start: 2023-03-29 | End: 2023-03-30 | Stop reason: HOSPADM

## 2023-03-29 RX ORDER — HYDROCODONE BITARTRATE AND ACETAMINOPHEN 5; 325 MG/1; MG/1
1 TABLET ORAL EVERY 6 HOURS PRN
Status: DISCONTINUED | OUTPATIENT
Start: 2023-03-29 | End: 2023-03-30 | Stop reason: HOSPADM

## 2023-03-29 RX ADMIN — CEFTRIAXONE SODIUM 2 G: 1 INJECTION, POWDER, FOR SOLUTION INTRAMUSCULAR; INTRAVENOUS at 06:03

## 2023-03-29 RX ADMIN — CETIRIZINE HYDROCHLORIDE 10 MG: 5 TABLET ORAL at 08:03

## 2023-03-29 RX ADMIN — PROMETHAZINE HYDROCHLORIDE 12.5 MG: 25 INJECTION INTRAMUSCULAR; INTRAVENOUS at 06:03

## 2023-03-29 RX ADMIN — SODIUM CHLORIDE, SODIUM LACTATE, POTASSIUM CHLORIDE, AND CALCIUM CHLORIDE 1000 ML: .6; .31; .03; .02 INJECTION, SOLUTION INTRAVENOUS at 04:03

## 2023-03-29 RX ADMIN — ATORVASTATIN CALCIUM 10 MG: 10 TABLET, FILM COATED ORAL at 08:03

## 2023-03-29 RX ADMIN — PROCHLORPERAZINE EDISYLATE 5 MG: 5 INJECTION INTRAMUSCULAR; INTRAVENOUS at 04:03

## 2023-03-29 RX ADMIN — PANTOPRAZOLE SODIUM 40 MG: 40 INJECTION, POWDER, FOR SOLUTION INTRAVENOUS at 08:03

## 2023-03-29 RX ADMIN — MORPHINE SULFATE 6 MG: 2 INJECTION, SOLUTION INTRAMUSCULAR; INTRAVENOUS at 04:03

## 2023-03-29 RX ADMIN — HYDROCODONE BITARTRATE AND ACETAMINOPHEN 1 TABLET: 5; 325 TABLET ORAL at 02:03

## 2023-03-29 RX ADMIN — SODIUM ZIRCONIUM CYCLOSILICATE 10 G: 10 POWDER, FOR SUSPENSION ORAL at 04:03

## 2023-03-29 RX ADMIN — AMLODIPINE BESYLATE 5 MG: 5 TABLET ORAL at 08:03

## 2023-03-29 RX ADMIN — LACTOBACILLUS ACIDOPHILUS / LACTOBACILLUS BULGARICUS 1 EACH: 100 MILLION CFU STRENGTH GRANULES at 09:03

## 2023-03-29 RX ADMIN — SODIUM CHLORIDE 1000 ML: 0.9 INJECTION, SOLUTION INTRAVENOUS at 06:03

## 2023-03-29 RX ADMIN — SODIUM CHLORIDE, SODIUM LACTATE, POTASSIUM CHLORIDE, AND CALCIUM CHLORIDE: .6; .31; .03; .02 INJECTION, SOLUTION INTRAVENOUS at 04:03

## 2023-03-29 RX ADMIN — ENOXAPARIN SODIUM 40 MG: 100 INJECTION SUBCUTANEOUS at 04:03

## 2023-03-29 RX ADMIN — ONDANSETRON 4 MG: 2 INJECTION INTRAMUSCULAR; INTRAVENOUS at 04:03

## 2023-03-29 RX ADMIN — ONDANSETRON HYDROCHLORIDE 4 MG: 2 INJECTION, SOLUTION INTRAMUSCULAR; INTRAVENOUS at 08:03

## 2023-03-29 RX ADMIN — SODIUM CHLORIDE: 0.9 INJECTION, SOLUTION INTRAVENOUS at 07:03

## 2023-03-29 NOTE — CONSULTS
Consult Note  Nephrology    Consult Requested By: Jose Miguel Tim MD    Reason for Consult:  TRACEY on CKD 3    SUBJECTIVE:     History of Present Illness:  36 y/o male pt w/CKD 3 2/2 IGA nephropathy, follows up with Dr. Conte in out patient clinic.  States he last saw her about 2 weeks ago.  He presented to ER this am with c/o n/v/d, back pain, decreased UOP.  ER workup shows Scr 3.8, nephrology is consulted for TRACEY on CKD 3.    3/29  VSS, c/o fatigue, no nausea at present time. UA done, min casts.  CT abd/pelvis w/out contrast, no hydronephrosis.    Assessment/plan:    TRACEY  CKD 3 w/hx IGA nephropathy  HTN  DM2    --UA done and noted, 1+ protein.  CT abd/pelvis noted as above.  Hx gout, will get uric acid level.  Got fluid boluses, 1L LR and 1L NS.  Will repeat bmp at noon, order additional fluids if needed.  F/u renal panel in am.  --Avoid nephrotoxic agents.  No NSAIDs, COXibs, or aminoglycoside antibiotics.  Dose all medications for level of renal function.  --pressures stable, continue current Rx  --Blood glucose control per primary team.      Past Medical History:   Diagnosis Date    Anxiety     Asthma     Depression     Diabetes mellitus     diet controlled    Diabetes mellitus, type 2     GERD (gastroesophageal reflux disease)     Gout     Hyperlipidemia     Hypertension     IgA nephropathy     Insomnia      Past Surgical History:   Procedure Laterality Date    COLONOSCOPY N/A 5/8/2020    Procedure: COLONOSCOPY;  Surgeon: Hammad Castorena III, MD;  Location: [facility];  Service: Endoscopy;  Laterality: N/A;    ESOPHAGOGASTRODUODENOSCOPY N/A 5/8/2020    Procedure: EGD (ESOPHAGOGASTRODUODENOSCOPY);  Surgeon: Hammad Castorena III, MD;  Location: [facility];  Service: Endoscopy;  Laterality: N/A;    nose polyp removal       Family History   Problem Relation Age of Onset    Hypertension Maternal Grandmother     Cancer Maternal Grandfather     Diabetes Maternal Grandfather     Heart disease Maternal  Grandfather     Heart disease Mother     Stroke Mother     Diabetes Mother      Social History     Tobacco Use    Smoking status: Former     Packs/day: 0.25     Years: 5.00     Pack years: 1.25     Types: Cigars, Cigarettes     Quit date: 2022     Years since quittin.8     Passive exposure: Past    Smokeless tobacco: Never    Tobacco comments:     One cigar a day   Substance Use Topics    Alcohol use: Yes    Drug use: No       Review of patient's allergies indicates:   Allergen Reactions    Zithromax [azithromycin] Rash        Review of Systems:  Negative except as above noted    OBJECTIVE:     Vital Signs Range (Last 24H):  Temp:  [97.3 °F (36.3 °C)-97.8 °F (36.6 °C)]   Pulse:  []   Resp:  [18]   BP: (108-109)/(71-86)   SpO2:  [96 %-99 %]     Physical Exam:  General- NAD noted  HEENT- WNL  Neck- supple  CV- Regular rate and rhythm  Resp- No increased WOB  GI- Non tender/non-distended  Extrem- No cyanosis, clubbing, edema.  Derm- skin w/d  Neuro-  No flap.     Body mass index is 28.48 kg/m².    Laboratory:  CBC:   Recent Labs   Lab 23  0427   WBC 18.83*   RBC 5.75   HGB 16.3   HCT 50.8      MCV 88   MCH 28.3   MCHC 32.1     CMP:   Recent Labs   Lab 23  0427   *   CALCIUM 9.9   ALBUMIN 4.6   PROT 8.0      K 5.1   CO2 21*      BUN 53*   CREATININE 3.8*   ALKPHOS 118   ALT 28   AST 20   BILITOT 1.3*     Recent Labs   Lab 23  0951   COLORU Yellow   SPECGRAV 1.010   PHUR 6.0   PROTEINUA 1+*   BACTERIA Negative   NITRITE Negative   LEUKOCYTESUR Negative   UROBILINOGEN Negative   HYALINECASTS 1       Diagnostic Results:  CT: Reviewed      ASSESSMENT/PLAN:     Active Hospital Problems    Diagnosis  POA    *TRACEY (acute kidney injury) [N17.9]  Unknown    Type 2 diabetes mellitus with stage 3 chronic kidney disease, without long-term current use of insulin [E11.22, N18.30]  Yes    Hypertension associated with diabetes [E11.59, I15.2]  Yes    IgA nephropathy [N02.8]  Yes       Resolved Hospital Problems   No resolved problems to display.         Thank you for allowing us to participate in the care of your patient. We will follow the patient and provide recommendations as needed.      Time spent seeing patient( greater than 1/2 spent in direct contact) :     Diana Candelaria NP

## 2023-03-29 NOTE — LETTER
March 30, 2023         [address]  [facility] [address]  Phone: [phone]  Fax: [phone]       Patient: Jose Miguel Brennan   YOB: 1985  Date of Visit: 03/30/2023    To Whom It May Concern:    Devin Brennan  was hospitalized under my care at [facility] on 3/29/2023 to 03/30/2023. The patient may return to work/school without restriction on Monday 4/3/2023. If you have any questions or concerns, or if I can be of further assistance, please do not hesitate to contact me.    Sincerely,    Jose Miguel Tim MD

## 2023-03-29 NOTE — H&P
[facility] - Emergency Dept  Hospital Medicine  History & Physical    Patient Name: Jose Miguel Brennan  MRN: [id number]  Patient Class: IP- Inpatient  Admission Date: 3/29/2023  Attending Physician: Delores Das MD  Primary Care Provider: Jose Grant NP         Patient information was obtained from patient and ER records.     Subjective:     Principal Problem:TRACEY (acute kidney injury)    Chief Complaint:   Chief Complaint   Patient presents with    Diarrhea    Vomiting    Abdominal Pain    Flank Pain     bilat        HPI: 37-year-old man with diabetes, CKD related to IgA nephropathy, hypertension, presenting with nausea vomiting and diarrhea.  Patient reports onset of watery diarrhea yesterday afternoon.  He had some lower abdominal discomfort associated.  He was able to eat dinner, however around 1:00 a.m. he developed intractable nausea and vomiting.  Reports his diarrhea is watery and nonbloody.  He endorses bilateral back pain.  States he has been urinating less than usual.  He denies fever, chills, sick contacts, chest pain, shortness at breath, regular tobacco use, any alcohol use.      Past Medical History:   Diagnosis Date    Anxiety     Asthma     Depression     Diabetes mellitus     diet controlled    Diabetes mellitus, type 2     GERD (gastroesophageal reflux disease)     Gout     Hyperlipidemia     Hypertension     IgA nephropathy     Insomnia        Past Surgical History:   Procedure Laterality Date    COLONOSCOPY N/A 5/8/2020    Procedure: COLONOSCOPY;  Surgeon: Hammad Castorena III, MD;  Location: [facility];  Service: Endoscopy;  Laterality: N/A;    ESOPHAGOGASTRODUODENOSCOPY N/A 5/8/2020    Procedure: EGD (ESOPHAGOGASTRODUODENOSCOPY);  Surgeon: Hammad Castorena III, MD;  Location: [facility];  Service: Endoscopy;  Laterality: N/A;    nose polyp removal         Review of patient's allergies indicates:   Allergen Reactions    Zithromax [azithromycin] Rash  "      No current facility-administered medications on file prior to encounter.     Current Outpatient Medications on File Prior to Encounter   Medication Sig    amLODIPine (NORVASC) 5 MG tablet Take 1 tablet (5 mg total) by mouth once daily.    atorvastatin (LIPITOR) 10 MG tablet Take 1 tablet (10 mg total) by mouth every evening.    BD CONOR 2ND GEN PEN NEEDLE 32 gauge x 5/32" Ndle once daily. Use    blood sugar diagnostic Strp To check BG one times daily, to use with insurance preferred meter    blood-glucose meter kit To check BG one times daily, to use with insurance preferred meter    cetirizine (ZYRTEC) 10 MG tablet Take 1 tablet (10 mg total) by mouth once daily.    dulaglutide (TRULICITY) 1.5 mg/0.5 mL pen injector Inject 1.5 mg into the skin every 7 days.    febuxostat (ULORIC) 40 mg Tab Take 40 mg by mouth once daily.    gabapentin (NEURONTIN) 300 MG capsule Take 1 capsule (300 mg total) by mouth 3 (three) times daily.    insulin (LANTUS SOLOSTAR U-100 INSULIN) glargine 100 units/mL SubQ pen Inject 30 Units into the skin once daily.    lancets Misc To check BG one times daily, to use with insurance preferred meter    losartan (COZAAR) 100 MG tablet Take 1 tablet (100 mg total) by mouth once daily.    meloxicam (MOBIC) 7.5 MG tablet Take 1 tablet (7.5 mg total) by mouth once daily.    omega 3-dha-epa-fish oil 1,000 mg (120 mg-180 mg) Cap Take 1 capsule by mouth 2 (two) times daily.    ondansetron (ZOFRAN-ODT) 4 MG TbDL Take 1 tablet (4 mg total) by mouth every 8 (eight) hours as needed (FOR NAUSEA; DISSOLVE UNDER THE TONGUE).    pen needle, diabetic 32 gauge x 5/16" Ndle 1 Units by Misc.(Non-Drug; Combo Route) route once daily.     Family History       Problem Relation (Age of Onset)    Cancer Maternal Grandfather    Diabetes Maternal Grandfather, Mother    Heart disease Maternal Grandfather, Mother    Hypertension Maternal Grandmother    Stroke Mother          Tobacco Use    Smoking status: " Former     Packs/day: 0.25     Years: 5.00     Pack years: 1.25     Types: Cigars, Cigarettes     Quit date: 2022     Years since quittin.8     Passive exposure: Past    Smokeless tobacco: Never    Tobacco comments:     One cigar a day   Substance and Sexual Activity    Alcohol use: Yes    Drug use: No    Sexual activity: Yes     Partners: Female     Review of Systems complete ROS performed and negative other than stated in HPI  Objective:     Vital Signs (Most Recent):  Temp: 97.8 °F (36.6 °C) (23 041)  Pulse: 110 (23)  Resp: 18 (23)  BP: 109/71 (23)  SpO2: 99 % (23) Vital Signs (24h Range):  Temp:  [97.8 °F (36.6 °C)] 97.8 °F (36.6 °C)  Pulse:  [110] 110  Resp:  [18] 18  SpO2:  [99 %] 99 %  BP: (109)/(71) 109/71     Weight: 95.3 kg (210 lb)  Body mass index is 28.48 kg/m².    Physical Exam  GENERAL:  No apparent distress. Alert and oriented, nontoxic-appearing  HEENT:  EOMI. Conjunctivae intact. Posterior pharynx clear  NECK:  Supple   LUNGS:  No respiratory distress. Clear to auscultation bilaterally with good air movement  CARDIAC:  Tachycardic and regular without murmur, rub or gallop  ABDOMEN:  Positive bowel sounds.  Soft, tenderness to palpation suprapubic area.  No rebound or guarding.  Endorses bilateral CVA pain with palpation  EXTREMITIES:  Peripheral pulses are 2+. Hands and feet are warm. Good capillary refill in fingers (< 2 seconds). No clubbing, cyanosis or edema  SKIN:  Skin color, texture and turgor normal. No rashes, ulcerations or nodules noted          Significant Labs: All pertinent labs within the past 24 hours have been reviewed.  BMP:   Recent Labs   Lab 23   *      K 5.1      CO2 21*   BUN 53*   CREATININE 3.8*   CALCIUM 9.9     CBC:   Recent Labs   Lab 23   WBC 18.83*   HGB 16.3   HCT 50.8        CMP:   Recent Labs   Lab 23      K 5.1      CO2 21*   GLU  230*   BUN 53*   CREATININE 3.8*   CALCIUM 9.9   PROT 8.0   ALBUMIN 4.6   BILITOT 1.3*   ALKPHOS 118   AST 20   ALT 28   ANIONGAP 13     Cardiac Markers: No results for input(s): CKMB, MYOGLOBIN, BNP, TROPISTAT in the last 48 hours.  Coagulation: No results for input(s): PT, INR, APTT in the last 48 hours.  Lipase:   Recent Labs   Lab 03/29/23  0427   LIPASE 95*     Urine Studies: No results for input(s): COLORU, APPEARANCEUA, PHUR, SPECGRAV, PROTEINUA, GLUCUA, KETONESU, BILIRUBINUA, OCCULTUA, NITRITE, UROBILINOGEN, LEUKOCYTESUR, RBCUA, WBCUA, BACTERIA, SQUAMEPITHEL, HYALINECASTS in the last 48 hours.    Invalid input(s): PETRONA    Significant Imaging: I have reviewed all pertinent imaging results/findings within the past 24 hours.  CT: I have reviewed all pertinent results/findings within the past 24 hours and my personal findings are:  Bladder wall thickening    Assessment/Plan:     * TRACEY (acute kidney injury)  TRACEY on CKD stage III.  Most likely prerenal from nausea vomiting and diarrhea.  IV fluids, hold ARB, continue Rocephin for antibiotic coverage pending urinalysis/culture.  IV antiemetics as needed and supportive care.  Has bladder wall thickening on CT but this is a chronic problem for him.  We will consult his nephrologist and monitor urine output and BMP      Type 2 diabetes mellitus with stage 3 chronic kidney disease, without long-term current use of insulin  Last A1c 8.  Hold home Trulicity and long-acting insulin while not eating due to nausea and vomiting.  Cover with sliding scale insulin and monitor Accu-Chek    Hypertension associated with diabetes  Normotensive.  Resume home amlodipine with hold parameters, hold home ARB for TRACEY    IgA nephropathy  Baseline creatinine around 2.5, now 3.8      VTE Risk Mitigation (From admission, onward)         Ordered     Place sequential compression device  Until discontinued         03/29/23 0550     IP VTE LOW RISK PATIENT  Once         03/29/23 0550                            Delores Das MD  Department of Hospital Medicine  [facility]

## 2023-03-29 NOTE — ASSESSMENT & PLAN NOTE
TRACEY on CKD stage III.  Most likely prerenal from nausea vomiting and diarrhea.  IV fluids, hold ARB, continue Rocephin for antibiotic coverage pending urinalysis/culture.  IV antiemetics as needed and supportive care.  Has bladder wall thickening on CT but this is a chronic problem for him.  We will consult his nephrologist and monitor urine output and BMP

## 2023-03-29 NOTE — SUBJECTIVE & OBJECTIVE
"Past Medical History:   Diagnosis Date    Anxiety     Asthma     Depression     Diabetes mellitus     diet controlled    Diabetes mellitus, type 2     GERD (gastroesophageal reflux disease)     Gout     Hyperlipidemia     Hypertension     IgA nephropathy     Insomnia        Past Surgical History:   Procedure Laterality Date    COLONOSCOPY N/A 5/8/2020    Procedure: COLONOSCOPY;  Surgeon: Hammad Castorena III, MD;  Location: [facility];  Service: Endoscopy;  Laterality: N/A;    ESOPHAGOGASTRODUODENOSCOPY N/A 5/8/2020    Procedure: EGD (ESOPHAGOGASTRODUODENOSCOPY);  Surgeon: Hammad Castorena III, MD;  Location: [facility];  Service: Endoscopy;  Laterality: N/A;    nose polyp removal         Review of patient's allergies indicates:   Allergen Reactions    Zithromax [azithromycin] Rash       No current facility-administered medications on file prior to encounter.     Current Outpatient Medications on File Prior to Encounter   Medication Sig    amLODIPine (NORVASC) 5 MG tablet Take 1 tablet (5 mg total) by mouth once daily.    atorvastatin (LIPITOR) 10 MG tablet Take 1 tablet (10 mg total) by mouth every evening.    BD CONOR 2ND GEN PEN NEEDLE 32 gauge x 5/32" Ndle once daily. Use    blood sugar diagnostic Strp To check BG one times daily, to use with insurance preferred meter    blood-glucose meter kit To check BG one times daily, to use with insurance preferred meter    cetirizine (ZYRTEC) 10 MG tablet Take 1 tablet (10 mg total) by mouth once daily.    dulaglutide (TRULICITY) 1.5 mg/0.5 mL pen injector Inject 1.5 mg into the skin every 7 days.    febuxostat (ULORIC) 40 mg Tab Take 40 mg by mouth once daily.    gabapentin (NEURONTIN) 300 MG capsule Take 1 capsule (300 mg total) by mouth 3 (three) times daily.    insulin (LANTUS SOLOSTAR U-100 INSULIN) glargine 100 units/mL SubQ pen Inject 30 Units into the skin once daily.    lancets Misc To check BG one times daily, to use with insurance preferred meter    losartan " "(COZAAR) 100 MG tablet Take 1 tablet (100 mg total) by mouth once daily.    meloxicam (MOBIC) 7.5 MG tablet Take 1 tablet (7.5 mg total) by mouth once daily.    omega 3-dha-epa-fish oil 1,000 mg (120 mg-180 mg) Cap Take 1 capsule by mouth 2 (two) times daily.    ondansetron (ZOFRAN-ODT) 4 MG TbDL Take 1 tablet (4 mg total) by mouth every 8 (eight) hours as needed (FOR NAUSEA; DISSOLVE UNDER THE TONGUE).    pen needle, diabetic 32 gauge x 5/16" Ndle 1 Units by Misc.(Non-Drug; Combo Route) route once daily.     Family History       Problem Relation (Age of Onset)    Cancer Maternal Grandfather    Diabetes Maternal Grandfather, Mother    Heart disease Maternal Grandfather, Mother    Hypertension Maternal Grandmother    Stroke Mother          Tobacco Use    Smoking status: Former     Packs/day: 0.25     Years: 5.00     Pack years: 1.25     Types: Cigars, Cigarettes     Quit date: 2022     Years since quittin.8     Passive exposure: Past    Smokeless tobacco: Never    Tobacco comments:     One cigar a day   Substance and Sexual Activity    Alcohol use: Yes    Drug use: No    Sexual activity: Yes     Partners: Female     Review of Systems complete ROS performed and negative other than stated in HPI  Objective:     Vital Signs (Most Recent):  Temp: 97.8 °F (36.6 °C) (23 0415)  Pulse: 110 (23 0415)  Resp: 18 (23 0432)  BP: 109/71 (23 0415)  SpO2: 99 % (23 0415) Vital Signs (24h Range):  Temp:  [97.8 °F (36.6 °C)] 97.8 °F (36.6 °C)  Pulse:  [110] 110  Resp:  [18] 18  SpO2:  [99 %] 99 %  BP: (109)/(71) 109/71     Weight: 95.3 kg (210 lb)  Body mass index is 28.48 kg/m².    Physical Exam  GENERAL:  No apparent distress. Alert and oriented, nontoxic-appearing  HEENT:  EOMI. Conjunctivae intact. Posterior pharynx clear  NECK:  Supple   LUNGS:  No respiratory distress. Clear to auscultation bilaterally with good air movement  CARDIAC:  Tachycardic and regular without murmur, rub or " gallop  ABDOMEN:  Positive bowel sounds.  Soft, tenderness to palpation suprapubic area.  No rebound or guarding.  Endorses bilateral CVA pain with palpation  EXTREMITIES:  Peripheral pulses are 2+. Hands and feet are warm. Good capillary refill in fingers (< 2 seconds). No clubbing, cyanosis or edema  SKIN:  Skin color, texture and turgor normal. No rashes, ulcerations or nodules noted          Significant Labs: All pertinent labs within the past 24 hours have been reviewed.  BMP:   Recent Labs   Lab 03/29/23 0427   *      K 5.1      CO2 21*   BUN 53*   CREATININE 3.8*   CALCIUM 9.9     CBC:   Recent Labs   Lab 03/29/23 0427   WBC 18.83*   HGB 16.3   HCT 50.8        CMP:   Recent Labs   Lab 03/29/23 0427      K 5.1      CO2 21*   *   BUN 53*   CREATININE 3.8*   CALCIUM 9.9   PROT 8.0   ALBUMIN 4.6   BILITOT 1.3*   ALKPHOS 118   AST 20   ALT 28   ANIONGAP 13     Cardiac Markers: No results for input(s): CKMB, MYOGLOBIN, BNP, TROPISTAT in the last 48 hours.  Coagulation: No results for input(s): PT, INR, APTT in the last 48 hours.  Lipase:   Recent Labs   Lab 03/29/23 0427   LIPASE 95*     Urine Studies: No results for input(s): COLORU, APPEARANCEUA, PHUR, SPECGRAV, PROTEINUA, GLUCUA, KETONESU, BILIRUBINUA, OCCULTUA, NITRITE, UROBILINOGEN, LEUKOCYTESUR, RBCUA, WBCUA, BACTERIA, SQUAMEPITHEL, HYALINECASTS in the last 48 hours.    Invalid input(s): WRIGHTSUR    Significant Imaging: I have reviewed all pertinent imaging results/findings within the past 24 hours.  CT: I have reviewed all pertinent results/findings within the past 24 hours and my personal findings are:  Bladder wall thickening

## 2023-03-29 NOTE — ED PROVIDER NOTES
Encounter Date: 3/29/2023       History     Chief Complaint   Patient presents with    Diarrhea    Vomiting    Abdominal Pain    Flank Pain     bilat     37-year-old male presented emergency department with 1 day of abdominal pain and flank pain and nausea vomiting and diarrhea.  Patient said he vomited about 3-4 times.  Denies fever or chills.  Denies chest pain or shortness of breath or dysuria or hematuria or any focal weakness or numbness however has general malaise    Review of patient's allergies indicates:   Allergen Reactions    Zithromax [azithromycin] Rash     Past Medical History:   Diagnosis Date    Anxiety     Asthma     Depression     Diabetes mellitus     diet controlled    Diabetes mellitus, type 2     GERD (gastroesophageal reflux disease)     Gout     Hyperlipidemia     Hypertension     IgA nephropathy     Insomnia      Past Surgical History:   Procedure Laterality Date    COLONOSCOPY N/A 2020    Procedure: COLONOSCOPY;  Surgeon: Hammad Castorena III, MD;  Location: [facility];  Service: Endoscopy;  Laterality: N/A;    ESOPHAGOGASTRODUODENOSCOPY N/A 2020    Procedure: EGD (ESOPHAGOGASTRODUODENOSCOPY);  Surgeon: Hammad Castorena III, MD;  Location: [facility];  Service: Endoscopy;  Laterality: N/A;    nose polyp removal       Family History   Problem Relation Age of Onset    Hypertension Maternal Grandmother     Cancer Maternal Grandfather     Diabetes Maternal Grandfather     Heart disease Maternal Grandfather     Heart disease Mother     Stroke Mother     Diabetes Mother      Social History     Tobacco Use    Smoking status: Former     Packs/day: 0.25     Years: 5.00     Pack years: 1.25     Types: Cigars, Cigarettes     Quit date: 2022     Years since quittin.8     Passive exposure: Past    Smokeless tobacco: Never    Tobacco comments:     One cigar a day   Substance Use Topics    Alcohol use: Yes    Drug use: No     Review of Systems   Constitutional:  Positive for fatigue.    HENT: Negative.     Eyes: Negative.    Respiratory: Negative.     Cardiovascular: Negative.    Gastrointestinal:  Positive for abdominal pain, diarrhea, nausea and vomiting. Negative for blood in stool, constipation and rectal pain.   Endocrine: Negative.    Genitourinary:  Positive for flank pain.   Skin: Negative.    Allergic/Immunologic: Negative.    Neurological: Negative.    Hematological: Negative.    Psychiatric/Behavioral:  Negative for agitation.    All other systems reviewed and are negative.    Physical Exam     Initial Vitals [03/29/23 0415]   BP Pulse Resp Temp SpO2   109/71 110 18 97.8 °F (36.6 °C) 99 %      MAP       --         Physical Exam    Nursing note and vitals reviewed.  Constitutional: He appears well-developed and well-nourished.   HENT:   Head: Normocephalic and atraumatic.   Nose: Nose normal.   Eyes: Conjunctivae and EOM are normal.   Neck: No tracheal deviation present.   Normal range of motion.  Cardiovascular:  Regular rhythm, normal heart sounds and intact distal pulses.     Exam reveals no friction rub.       No murmur heard.  Pulmonary/Chest: Breath sounds normal. No respiratory distress. He has no wheezes. He has no rales.   Abdominal: Abdomen is soft. He exhibits no distension. There is abdominal tenderness.   Diffuse mild abdominal tenderness and flank tenderness bilaterally and has a crampy feeling and sensation.   Musculoskeletal:         General: Normal range of motion.      Cervical back: Normal range of motion.     Neurological: He is alert and oriented to person, place, and time. He has normal strength.   Skin: Skin is warm and dry. Capillary refill takes less than 2 seconds.   Psychiatric: He has a normal mood and affect. Thought content normal.       ED Course   Procedures  Labs Reviewed   CBC W/ AUTO DIFFERENTIAL - Abnormal; Notable for the following components:       Result Value    WBC 18.83 (*)     Immature Granulocytes 0.6 (*)     Gran # (ANC) 14.7 (*)     Immature  Grans (Abs) 0.11 (*)     Mono # 1.3 (*)     Gran % 78.2 (*)     Lymph % 12.1 (*)     All other components within normal limits   COMPREHENSIVE METABOLIC PANEL - Abnormal; Notable for the following components:    CO2 21 (*)     Glucose 230 (*)     BUN 53 (*)     Creatinine 3.8 (*)     Total Bilirubin 1.3 (*)     eGFR 20.0 (*)     All other components within normal limits   LIPASE - Abnormal; Notable for the following components:    Lipase 95 (*)     All other components within normal limits   URINALYSIS, REFLEX TO URINE CULTURE   DRUG SCREEN PANEL, URINE EMERGENCY   URINALYSIS          Imaging Results              CT Abdomen Pelvis  Without Contrast (Final result)  Result time 03/29/23 05:02:38      Final result by Ashvin Du MD (03/29/23 05:02:38)                   Narrative:    EXAM: CT Abdomen and Pelvis without contrast    INDICATION: Flank pain, kidney stone suspected; Abdominal abscess/infection suspected    TECHNIQUE: Helical CT of the abdomen and pelvis was obtained from the diaphragm through the ischial tuberosities without contrast. 5 mm axial images were created as were coronal and sagittal reformats.    Dose reduction techniques were used including automated exposure control and/or adjustment of the mA and/or kV according to patient size.    COMPARISON: CT from 1/26/2023    The lack of IV contrast significantly decreases the sensitivity of this study for the evaluation of solid abdominal organs, hollow viscera and vascular structures.    FINDINGS:  LUNG BASES: No significant abnormality.    STOMACH: No significant finding.    LIVER: Mild hepatic steatosis. Stable hepatomegaly.    BILIARY: No significant abnormality.    PANCREAS: No significant abnormality.    SPLEEN: No significant abnormality.    ADRENAL GLANDS: Unchanged 10 mm right adrenal myelolipoma. Left adrenal gland is unremarkable.    KIDNEYS/BLADDER:  Bladder wall thickening appears similar. No hydronephrosis or  nephrolithiasis.    VESSELS: Nonaneurysmal abdominal aorta.    LYMPH NODES: No enlarged abdominal or pelvic lymph nodes.    OTHER PELVIC: No free pelvic fluid.    GI TRACT: No significant abnormality. Normal appendix.    ABDOMINAL WALL: No significant abnormality.    PERITONEUM: No ascites or pneumoperitoneum.    BONES/SPINE: No acute abnormality.      IMPRESSION:    No hydronephrosis or nephrolithiasis.    Stable appearance of bladder wall thickening which may be secondary to underdistention. Correlation for cystitis may be beneficial.    No other acute finding in the abdomen or pelvis.    INCIDENTAL FINDINGS:    Hepatic steatosis and hepatomegaly.    Electronically signed by:  Ashvin Du MD  3/29/2023 5:02 AM CDT Workstation: [id number]                                     Medications   sodium chloride 0.9% bolus 1,000 mL 1,000 mL (has no administration in time range)   pantoprazole injection 40 mg (has no administration in time range)   lactated ringers bolus 1,000 mL (1,000 mLs Intravenous New Bag 3/29/23 0430)   cefTRIAXone (ROCEPHIN) 2 g in dextrose 5 % (D5W) 100 mL IVPB (has no administration in time range)   lactated ringers bolus 1,000 mL (has no administration in time range)   promethazine (PHENERGAN) 12.5 mg in dextrose 5 % (D5W) 50 mL IVPB (has no administration in time range)   morphine injection 6 mg (6 mg Intravenous Given 3/29/23 0432)   ondansetron injection 4 mg (4 mg Intravenous Given 3/29/23 0431)     Medical Decision Making:   Differential Diagnosis:   37-year-old male with generalized malaise and weakness.  Patient has diffuse abdominal pain and flank pain.  Patient has evidence of cystitis.  Patient has leukocytosis and has acute on chronic kidney injury and patient has persistent tachycardia which is regular secondary to dehydration.  Patient has intractable nausea and still symptomatic and feels ill.  Leukocytosis and elevated lipase noted.  Patient started on broad-spectrum antibiotics  "and will continue IV fluids.  Given persistent symptoms and tachycardia and acute kidney injury Hospital Medicine consulted for further evaluation and further hydration.  Treated for possible urinary infection given leukocytosis and evidence of cystitis on CT scan.  Independently Interpreted Test(s):   I have ordered and independently interpreted X-rays - see prior notes.  Clinical Tests:   Lab Tests: Reviewed  Radiological Study: Reviewed  Sepsis Perfusion Assessment: "I attest a sepsis perfusion exam was performed within 6 hours of sepsis, severe sepsis, or septic shock presentation, following fluid resuscitation."  Other:   I have discussed this case with another health care provider.                        Clinical Impression:   Final diagnoses:  [R11.2] Nausea and vomiting, unspecified vomiting type (Primary)  [R10.9] Flank pain  [K52.9] Gastroenteritis  [N17.9] Acute kidney injury  [N30.90] Cystitis  [D72.829] Leukocytosis, unspecified type        ED Disposition Condition    Admit Stable                Chris Aj MD  03/29/23 0521    "

## 2023-03-29 NOTE — PLAN OF CARE
[facility]  Initial Discharge Assessment       Primary Care Provider: Jose Grant NP    Admission Diagnosis: TRACEY (acute kidney injury) [N17.9]    Admission Date: 3/29/2023  Expected Discharge Date:  to be determined    Met with pt at bedside to complete discharge assessment, verified PCP, pharmacy and information on facesheet.  Significant other will provide transportation home. No HH, DME or dialysis.  No needs identified at this time.    Discharge Barriers Identified: None    Payor: MEDICAID / Plan: [facility] COMMUNITY PLAN [facility] ([place] MEDICAID) / Product Type: Managed Medicaid /     Extended Emergency Contact Information  Primary Emergency Contact: Nancy Cornejo  Address: [address] [place] [address] [facility]  Home Phone: [phone]  Mobile Phone: [phone]  Relation: Significant other  Preferred language: English   needed? No    Discharge Plan A: Home  Discharge Plan B: Home      [facility] - [place], [place] - [address]  [address] [address]  Phone: [phone] Fax: [phone]      Initial Assessment (most recent)       Adult Discharge Assessment - 03/29/23 1013          Discharge Assessment    Assessment Type Discharge Planning Assessment     Confirmed/corrected address, phone number and insurance Yes     Confirmed Demographics Correct on Facesheet     Source of Information patient     Communicated STEPHANIE with patient/caregiver No     People in Home child(chantel), dependent;significant other     Do you expect to return to your current living situation? Yes     Prior to hospitilization cognitive status: Alert/Oriented     Current cognitive status: Alert/Oriented     Patient currently being followed by outpatient case management? No     Do you currently have service(s) that help you manage your care at home? No     Do you take prescription medications? Yes     Do you have prescription coverage? Yes     Coverage  Medicaid     Do you have any problems affording any of your prescribed medications? No     Is the patient taking medications as prescribed? yes     Who is going to help you get home at discharge? significant other     How do you get to doctors appointments? car, drives self     Are you on dialysis? No     Do you take coumadin? No     Discharge Plan A Home     Discharge Plan B Home     DME Needed Upon Discharge  none     Discharge Plan discussed with: Patient     Discharge Barriers Identified None        Physical Activity    On average, how many days per week do you engage in moderate to strenuous exercise (like a brisk walk)? 0 days     On average, how many minutes do you engage in exercise at this level? 0 min        Financial Resource Strain    How hard is it for you to pay for the very basics like food, housing, medical care, and heating? Not hard at all        Housing Stability    In the last 12 months, was there a time when you were not able to pay the mortgage or rent on time? No     In the last 12 months, was there a time when you did not have a steady place to sleep or slept in a shelter (including now)? No        Transportation Needs    In the past 12 months, has lack of transportation kept you from medical appointments or from getting medications? No     In the past 12 months, has lack of transportation kept you from meetings, work, or from getting things needed for daily living? No        Food Insecurity    Within the past 12 months, you worried that your food would run out before you got the money to buy more. Never true     Within the past 12 months, the food you bought just didn't last and you didn't have money to get more. Never true        Social Connections    In a typical week, how many times do you talk on the phone with family, friends, or neighbors? More than three times a week     How often do you get together with friends or relatives? More than three times a week     How often do you attend  Zoroastrian or Restoration services? Never     Do you belong to any clubs or organizations such as Zoroastrian groups, unions, fraternal or athletic groups, or school groups? No     How often do you attend meetings of the clubs or organizations you belong to? Never     Are you , , , , never , or living with a partner? Never         Alcohol Use    Q1: How often do you have a drink containing alcohol? Never     Q2: How many drinks containing alcohol do you have on a typical day when you are drinking? Patient does not drink     Q3: How often do you have six or more drinks on one occasion? Never

## 2023-03-29 NOTE — ASSESSMENT & PLAN NOTE
Last A1c 8.  Hold home Trulicity and long-acting insulin while not eating due to nausea and vomiting.  Cover with sliding scale insulin and monitor Accu-Chek

## 2023-03-29 NOTE — HPI
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  Patient reports onset of watery diarrhea yesterday afternoon.  He had some lower abdominal discomfort associated.  He was able to eat dinner, however around 1:00 a.m. he developed intractable nausea and vomiting.  Reports his diarrhea is watery and nonbloody.  He endorses bilateral back pain.  States he has been urinating less than usual.  He denies fever, chills, sick contacts, chest pain, shortness at breath, regular tobacco use, any alcohol use.

## 2023-03-29 NOTE — PROGRESS NOTES
37-year-old gentleman admitted to the hospital medicine service early this morning due to intractable nausea and vomiting with secondary dehydration and acute kidney injury , hyperkalemia.  The patient had elevated lipase consistent with acute pancreatitis.  He also has diarrhea and abdominal pain consistent with possible gastroenterocolitis,  possible viral source.  The patient was seen and examined today.  Chart reviewed.   Heappears uncomfortable but nontoxic.  Abdomen is soft and minimally distended with tenderness to palpation diffusely but no guarding or rebound.  No jaundice.  Dry mucous membranes.  Alert and oriented.  Comfortable work of breathing.   The patient was admitted for workup and treatment including IV fluids and bowel rest.  Nephrology consultation.  Lokelma x1. Avoiding nephrotoxins, Monitoring urine output and fluid status.  Patient received empiric IV antibiotics although urinalysis was unremarkable and antibiotics have been stopped.  Will continue supportive care measures including pain control and antiemetics as needed.  Serial labs.  Glucose monitoring with SSI coverage.  Continue PPI.  Continue prophylactic dose Lovenox.  Will check stool studies and C diff.   Likely needs another 24-48 hours of hospitalization, will follow.      Jose Miguel Tim MD

## 2023-03-30 VITALS
TEMPERATURE: 99 F | DIASTOLIC BLOOD PRESSURE: 86 MMHG | OXYGEN SATURATION: 99 % | BODY MASS INDEX: 28.44 KG/M2 | HEIGHT: 72 IN | HEART RATE: 94 BPM | RESPIRATION RATE: 16 BRPM | WEIGHT: 210 LBS | SYSTOLIC BLOOD PRESSURE: 133 MMHG

## 2023-03-30 LAB
ALBUMIN SERPL BCP-MCNC: 3.3 G/DL (ref 3.5–5.2)
ALBUMIN SERPL BCP-MCNC: 3.3 G/DL (ref 3.5–5.2)
ANION GAP SERPL CALC-SCNC: 7 MMOL/L (ref 8–16)
BUN SERPL-MCNC: 42 MG/DL (ref 6–20)
CALCIUM SERPL-MCNC: 8.6 MG/DL (ref 8.7–10.5)
CHLORIDE SERPL-SCNC: 106 MMOL/L (ref 95–110)
CO2 SERPL-SCNC: 23 MMOL/L (ref 23–29)
CREAT SERPL-MCNC: 2.8 MG/DL (ref 0.5–1.4)
EST. GFR  (NO RACE VARIABLE): 28.9 ML/MIN/1.73 M^2
GLUCOSE SERPL-MCNC: 120 MG/DL (ref 70–110)
GLUCOSE SERPL-MCNC: 130 MG/DL (ref 70–110)
GLUCOSE SERPL-MCNC: 155 MG/DL (ref 70–110)
LIPASE SERPL-CCNC: 44 U/L (ref 4–60)
PHOSPHATE SERPL-MCNC: 2.8 MG/DL (ref 2.7–4.5)
PHOSPHATE SERPL-MCNC: 2.8 MG/DL (ref 2.7–4.5)
POTASSIUM SERPL-SCNC: 4.3 MMOL/L (ref 3.5–5.1)
SODIUM SERPL-SCNC: 136 MMOL/L (ref 136–145)

## 2023-03-30 PROCEDURE — C9113 INJ PANTOPRAZOLE SODIUM, VIA: HCPCS | Performed by: EMERGENCY MEDICINE

## 2023-03-30 PROCEDURE — 83690 ASSAY OF LIPASE: CPT | Performed by: INTERNAL MEDICINE

## 2023-03-30 PROCEDURE — 63600175 PHARM REV CODE 636 W HCPCS: Performed by: EMERGENCY MEDICINE

## 2023-03-30 PROCEDURE — 80069 RENAL FUNCTION PANEL: CPT | Performed by: NURSE PRACTITIONER

## 2023-03-30 PROCEDURE — 25000003 PHARM REV CODE 250: Performed by: INTERNAL MEDICINE

## 2023-03-30 PROCEDURE — 25000003 PHARM REV CODE 250: Performed by: HOSPITALIST

## 2023-03-30 PROCEDURE — 36415 COLL VENOUS BLD VENIPUNCTURE: CPT | Performed by: NURSE PRACTITIONER

## 2023-03-30 RX ORDER — METRONIDAZOLE 500 MG/1
500 TABLET ORAL EVERY 8 HOURS
Qty: 21 TABLET | Refills: 0 | Status: SHIPPED | OUTPATIENT
Start: 2023-03-30 | End: 2023-04-06

## 2023-03-30 RX ADMIN — CETIRIZINE HYDROCHLORIDE 10 MG: 5 TABLET ORAL at 10:03

## 2023-03-30 RX ADMIN — LACTOBACILLUS ACIDOPHILUS / LACTOBACILLUS BULGARICUS 1 EACH: 100 MILLION CFU STRENGTH GRANULES at 10:03

## 2023-03-30 RX ADMIN — AMLODIPINE BESYLATE 5 MG: 5 TABLET ORAL at 10:03

## 2023-03-30 RX ADMIN — PANTOPRAZOLE SODIUM 40 MG: 40 INJECTION, POWDER, FOR SOLUTION INTRAVENOUS at 10:03

## 2023-03-30 NOTE — PROGRESS NOTES
Progress Note  Nephrology    Consult Requested By: Jose Miguel Tim MD    Reason for Consult:  TRACEY on CKD 3    SUBJECTIVE:     History of Present Illness:  36 y/o male pt w/CKD 3 2/2 IGA nephropathy, follows up with Dr. Conte in out patient clinic.  States he last saw her about 2 weeks ago.  He presented to ER this am with c/o n/v/d, back pain, decreased UOP.  ER workup shows Scr 3.8, nephrology is consulted for TRACEY on CKD 3.    3/29  VSS, c/o fatigue, no nausea at present time. UA done, min casts.  CT abd/pelvis w/out contrast, no hydronephrosis.  3/30  K+ improved after lokelma, renal function improved.  Uric acid 9.9.  NAD noted.  UOP 1.6L.    Assessment/plan:    TRACEY  CKD 3 w/hx IGA nephropathy  HTN  DM2    --UA done and noted, 1+ protein.  CT abd/pelvis noted as above.  Hx gout, will get uric acid level-done, 9.9.  Got fluid boluses, 1L LR and 1L NS.  Agree w/IVF.  F/u renal panel in am.  --Avoid nephrotoxic agents.  No NSAIDs, COXibs, or aminoglycoside antibiotics.  Dose all medications for level of renal function.  --pressures stable, continue current Rx  --Blood glucose control per primary team.      Past Medical History:   Diagnosis Date    Anxiety     Asthma     Depression     Diabetes mellitus     diet controlled    Diabetes mellitus, type 2     GERD (gastroesophageal reflux disease)     Gout     Hyperlipidemia     Hypertension     IgA nephropathy     Insomnia      Past Surgical History:   Procedure Laterality Date    COLONOSCOPY N/A 5/8/2020    Procedure: COLONOSCOPY;  Surgeon: Hammad Castorena III, MD;  Location: [facility];  Service: Endoscopy;  Laterality: N/A;    ESOPHAGOGASTRODUODENOSCOPY N/A 5/8/2020    Procedure: EGD (ESOPHAGOGASTRODUODENOSCOPY);  Surgeon: Hammad Castorena III, MD;  Location: [facility];  Service: Endoscopy;  Laterality: N/A;    nose polyp removal       Family History   Problem Relation Age of Onset    Hypertension Maternal Grandmother     Cancer Maternal Grandfather      Diabetes Maternal Grandfather     Heart disease Maternal Grandfather     Heart disease Mother     Stroke Mother     Diabetes Mother      Social History     Tobacco Use    Smoking status: Former     Packs/day: 0.25     Years: 5.00     Pack years: 1.25     Types: Cigars, Cigarettes     Quit date: 2022     Years since quittin.8     Passive exposure: Past    Smokeless tobacco: Never    Tobacco comments:     One cigar a day   Substance Use Topics    Alcohol use: Yes    Drug use: No       Review of patient's allergies indicates:   Allergen Reactions    Zithromax [azithromycin] Rash        Review of Systems:  Negative except as above noted    OBJECTIVE:     Vital Signs Range (Last 24H):  Temp:  [97.6 °F (36.4 °C)-98.3 °F (36.8 °C)]   Pulse:  [75-88]   Resp:  [16-18]   BP: (106-130)/(76-87)   SpO2:  [94 %-100 %]     Physical Exam:  General- NAD noted  HEENT- WNL  Neck- supple  CV- Regular rate and rhythm  Resp- No increased WOB  GI- Non tender/non-distended  Extrem- No cyanosis, clubbing, edema.  Derm- skin w/d  Neuro-  No flap.     Body mass index is 28.48 kg/m².    Laboratory:  CBC:   Recent Labs   Lab 23  0427   WBC 18.83*   RBC 5.75   HGB 16.3   HCT 50.8      MCV 88   MCH 28.3   MCHC 32.1       CMP:   Recent Labs   Lab 23  0427 23  0433   *  230* 120*  120*  120*   CALCIUM 9.7  9.9 8.6*  8.6*  8.6*   ALBUMIN 4.6 3.3*  3.3*   PROT 8.0  --      138 136  136  136   K 5.2*  5.1 4.3  4.3  4.3   CO2 22*  21* 23  23  23     104 106  106  106   BUN 53*  53* 42*  42*  42*   CREATININE 3.7*  3.8* 2.8*  2.8*  2.8*   ALKPHOS 118  --    ALT 28  --    AST 20  --    BILITOT 1.3*  --        Recent Labs   Lab 23  0951   COLORU Yellow   SPECGRAV 1.010   PHUR 6.0   PROTEINUA 1+*   BACTERIA Negative   NITRITE Negative   LEUKOCYTESUR Negative   UROBILINOGEN Negative   HYALINECASTS 1         Diagnostic Results:  CT: Reviewed      ASSESSMENT/PLAN:     Active  Hospital Problems    Diagnosis  POA    *TRACEY (acute kidney injury) [N17.9]  Unknown    Type 2 diabetes mellitus with stage 3 chronic kidney disease, without long-term current use of insulin [E11.22, N18.30]  Yes    Hypertension associated with diabetes [E11.59, I15.2]  Yes    IgA nephropathy [N02.8]  Yes      Resolved Hospital Problems   No resolved problems to display.         Thank you for allowing us to participate in the care of your patient. We will follow the patient and provide recommendations as needed.      Time spent seeing patient( greater than 1/2 spent in direct contact) :     Diana Candelaria NP

## 2023-03-30 NOTE — DISCHARGE INSTRUCTIONS
-Keep all Follow up appointments   -Take all medications as directed  - Follow all discharge instructions

## 2023-03-30 NOTE — DISCHARGE SUMMARY
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  Patient reports onset of watery diarrhea yesterday afternoon.  He had some lower abdominal discomfort associated.  He was able to eat dinner, however around 1:00 a.m. he developed intractable nausea and vomiting.  Reports his diarrhea is watery and nonbloody.  He endorses bilateral back pain.  States he has been urinating less than usual.  He denies fever, chills, sick contacts, chest pain, shortness at breath, regular tobacco use, any alcohol use.      Hospital Course:   37-year-old gentleman admitted to the hospital medicine service due to intractable nausea and vomiting with secondary dehydration and acute kidney injury, hyperkalemia.  The patient had elevated lipase consistent with acute pancreatitis.  He also has diarrhea and abdominal pain consistent with possible gastroenterocolitis,  possible viral source.    The patient was admitted for workup and treatment including IV fluids and bowel rest.  Nephrology consultation obtained and appreciated.  The patient received treatment for hyperkalemia with Lokelma.  Patient received empiric IV antibiotics although urinalysis was unremarkable.  Subsequently the patient was transitioned to oral antibiotic to complete short course at discharge for possible enterocolitis.  The patient's diarrhea stopped shortly after admission.  Subsequently the patient's symptoms significantly improved  with treatment.  The patient is now tolerating diet without nausea or vomiting.  Renal function has improved.  The patient is stable for discharge with outpatient follow-up including Nephrology.  The patient is in agreement for discharge plan today.    On examination at time of discharge the patient is comfortable appearing, nontoxic and in good spirits.  Moist mucous membranes.  Comfortable work of breathing with clear lungs bilaterally.  Abdomen is soft nontender, nondistended.  Alert and oriented, follows commands appropriately.    Goals of Care Treatment Preferences:  Code Status: Full Code      Consults:   Consults (From admission, onward)          Status Ordering Provider     Inpatient consult to Nephrology  Once        Provider:  Marciano Talavera MD    Completed KAM DAS     Inpatient consult to Hospitalist  Once        Provider:  Kam Das MD    Acknowledged VERONICA AYALA          Discharge diagnoses:  Acute kidney injury due to dehydration secondary to intractable nausea and vomiting  Hyperkalemia secondary to acute kidney injury   Intractable nausea and vomiting likely secondary to acute pancreatitis  Possible viral gastroenterocolitis    Final Active Diagnoses:    Diagnosis Date Noted POA    PRINCIPAL PROBLEM:  TRACEY (acute kidney injury) [N17.9] 03/29/2023 Yes    Type 2 diabetes mellitus with stage 3 chronic kidney disease, without long-term current use of insulin [E11.22, N18.30] 04/10/2014 Yes    Hypertension associated with diabetes [E11.59, I15.2] 03/12/2014 Yes    IgA nephropathy [N02.8] 03/12/2014 Yes      Problems Resolved During this Admission:       Discharged Condition: stable    Disposition: Home or Self Care    Follow Up:   Follow-up Information       Jose Grant NP Follow up in 1 week(s).    Specialty: Family Medicine  Contact information:  [address]   [place] [address]  [phone]               Marciano Talavera MD Follow up in 2 week(s).    Specialty: Nephrology  Contact  information:  [address] [facility]  [place] [address]  [phone]                           Patient Instructions:      Diet Cardiac     Diet diabetic     Notify your health care provider if you experience any of the following:  temperature >100.4     Notify your health care provider if you experience any of the following:  persistent nausea and vomiting or diarrhea     Notify your health care provider if you experience any of the following:  severe uncontrolled pain     Activity as tolerated       Pending Diagnostic Studies:       Procedure Component Value Units Date/Time    CBC Auto Differential [[id number]] Collected: 03/30/23 0433    Order Status: Sent Lab Status: In process Updated: 03/30/23 0506    Specimen: Blood     CBC auto differential [[id number]] Collected: 03/30/23 0433    Order Status: Sent Lab Status: In process Updated: 03/30/23 0506    Specimen: Blood            Medications:  Reconciled Home Medications:      Medication List        START taking these medications      metroNIDAZOLE 500 MG tablet  Commonly known as: FLAGYL  Take 1 tablet (500 mg total) by mouth every 8 (eight) hours. for 7 days            CONTINUE taking these medications      amLODIPine 5 MG tablet  Commonly known as: NORVASC  Take 1 tablet (5 mg total) by mouth once daily.     atorvastatin 10 MG tablet  Commonly known as: LIPITOR  Take 1 tablet (10 mg total) by mouth every evening.     blood sugar diagnostic Strp  To check BG one times daily, to use with insurance preferred meter     blood-glucose meter kit  To check BG one times daily, to use with insurance preferred meter     cetirizine 10 MG tablet  Commonly known as: ZYRTEC  Take 1 tablet (10 mg total) by mouth once daily.     febuxostat 40 mg Tab  Commonly known as: ULORIC  Take 40 mg by mouth once daily.     gabapentin 300 MG capsule  Commonly known as: NEURONTIN  Take 1 capsule (300 mg total) by mouth 3 (three) times daily.     insulin glargine 100  "units/mL SubQ pen  Commonly known as: LANTUS SOLOSTAR U-100 INSULIN  Inject 30 Units into the skin once daily.     lancets Misc  To check BG one times daily, to use with insurance preferred meter     omega 3-dha-epa-fish oil 1,000 mg (120 mg-180 mg) Cap  Take 1 capsule by mouth 2 (two) times daily.     ondansetron 4 MG Tbdl  Commonly known as: ZOFRAN-ODT  Take 1 tablet (4 mg total) by mouth every 8 (eight) hours as needed (FOR NAUSEA; DISSOLVE UNDER THE TONGUE).     * pen needle, diabetic 32 gauge x 5/16" Ndle  1 Units by Misc.(Non-Drug; Combo Route) route once daily.     * BD CONOR 2ND GEN PEN NEEDLE 32 gauge x 5/32" Ndle  Generic drug: pen needle, diabetic  once daily. Use     TRULICITY 1.5 mg/0.5 mL pen injector  Generic drug: dulaglutide  Inject 1.5 mg into the skin every 7 days.           * This list has 2 medication(s) that are the same as other medications prescribed for you. Read the directions carefully, and ask your doctor or other care provider to review them with you.                STOP taking these medications      losartan 100 MG tablet  Commonly known as: COZAAR     meloxicam 7.5 MG tablet  Commonly known as: MOBIC              Indwelling Lines/Drains at time of discharge:   Lines/Drains/Airways       None                   Time spent on the discharge of patient: 32 minutes         Jose Miguel Tim MD  Department of Hospital Medicine  [facility]  "

## 2023-03-30 NOTE — PLAN OF CARE
Pt cleared for DC by CM.    03/30/23 0954   Final Note   Assessment Type Final Discharge Note   Anticipated Discharge Disposition Home

## 2023-03-30 NOTE — PLAN OF CARE
Problem: Adult Inpatient Plan of Care  Goal: Absence of Hospital-Acquired Illness or Injury  Outcome: Ongoing, Progressing  Goal: Optimal Comfort and Wellbeing  Outcome: Ongoing, Progressing     Problem: Fluid and Electrolyte Imbalance (Acute Kidney Injury/Impairment)  Goal: Fluid and Electrolyte Balance  Outcome: Ongoing, Progressing     Problem: Oral Intake Inadequate (Acute Kidney Injury/Impairment)  Goal: Optimal Nutrition Intake  Outcome: Ongoing, Progressing     Problem: Renal Function Impairment (Acute Kidney Injury/Impairment)  Goal: Effective Renal Function  Outcome: Ongoing, Progressing     Problem: Diabetes Comorbidity  Goal: Blood Glucose Level Within Targeted Range  Outcome: Ongoing, Progressing     Problem: Infection  Goal: Absence of Infection Signs and Symptoms  Outcome: Ongoing, Progressing

## 2023-03-30 NOTE — PROGRESS NOTES
Discharge education and instructions provided, all questions answered and understanding voiced; PIV(s) removed; NO TELE in place; Pt escorted off unit for discharge with all belongings and safety intact

## 2023-03-30 NOTE — PLAN OF CARE
Problem: Adult Inpatient Plan of Care  Goal: Plan of Care Review  Outcome: Ongoing, Progressing  Goal: Readiness for Transition of Care  Outcome: Ongoing, Progressing     Problem: Oral Intake Inadequate (Acute Kidney Injury/Impairment)  Goal: Optimal Nutrition Intake  Outcome: Ongoing, Progressing     Problem: Renal Function Impairment (Acute Kidney Injury/Impairment)  Goal: Effective Renal Function  Outcome: Ongoing, Progressing     Problem: Diabetes Comorbidity  Goal: Blood Glucose Level Within Targeted Range  Outcome: Ongoing, Progressing

## 2023-04-10 ENCOUNTER — PROCEDURE VISIT (OUTPATIENT)
Dept: DIABETES | Facility: CLINIC | Age: 38
End: 2023-04-10
Payer: MEDICAID

## 2023-04-10 ENCOUNTER — PATIENT MESSAGE (OUTPATIENT)
Dept: DIABETES | Facility: CLINIC | Age: 38
End: 2023-04-10

## 2023-04-10 DIAGNOSIS — E11.22 TYPE 2 DIABETES MELLITUS WITH STAGE 3 CHRONIC KIDNEY DISEASE, WITHOUT LONG-TERM CURRENT USE OF INSULIN, UNSPECIFIED WHETHER STAGE 3A OR 3B CKD: Primary | ICD-10-CM

## 2023-04-10 DIAGNOSIS — N18.30 TYPE 2 DIABETES MELLITUS WITH STAGE 3 CHRONIC KIDNEY DISEASE, WITHOUT LONG-TERM CURRENT USE OF INSULIN, UNSPECIFIED WHETHER STAGE 3A OR 3B CKD: Primary | ICD-10-CM

## 2023-04-10 NOTE — PROGRESS NOTES
"DIABETES EDUCATOR NOTE   PLACEMENT OF DEXCOM G6 PRO SENSOR  CONTINOUS GLUCOSE MONITORING SYSTEM (CGMS)    Patient is here in clinic today for placement of continuous glucose monitoring sensor.                 Patient verified that they were here for CGMS procedure ordered by their provider and that they have a working glucose meter and supplies at home.     Patient provided with a Dexcom G6 Pro Sensor and Transmitter and a copy of the Continuous Glucose Monitoring Patient Food Log to fill out during the study.  A detailed explanation of Continuous Glucose Monitoring was provided. Patient informed that this is a blind procedure and that they will not actually see the blood sugar tracing in real time.  Reviewed with patient the  patient education handout called "Dexcom Blinded CGM Patient Handout" to review self-care during the study to avoid sensor loosening or removal ie... bathing, swimming, dressing, and exercising.    Instructed patient to check blood sugar using home glucometer and to record the following on provided patient log sheets: Blood sugar taken at home, meals and snacks, activity, and diabetes medications taken and dosage.    Patient was brought to a private location.  Abdomen area for insertion was selected and prepared and allowed to dry. Single use Dexcom G6 sensor placed in patient's right abdomen.  Dexcom Sensor Lot # [id number] with expiration of 09-.  Dexcom G6 Transmitter Serial Number [id number] was inserted into sensor and paired with Dexcom G6 Pro Taylor.  Patient reminded of time/date of CGM off appointment.                  The following forms were given and reviewed in detail with patient and all questions answered:  Dexcom Blinded CGM Patient Handout    Dexcom Daily Log Sheet         Time: 15 minutes      "

## 2023-04-12 ENCOUNTER — OCCUPATIONAL HEALTH (OUTPATIENT)
Dept: URGENT CARE | Facility: CLINIC | Age: 38
End: 2023-04-12

## 2023-04-12 PROCEDURE — 80305 DRUG TEST PRSMV DIR OPT OBS: CPT | Mod: S$GLB,,, | Performed by: EMERGENCY MEDICINE

## 2023-04-12 PROCEDURE — 80305 PR COLLECTION ONLY DRUG SCREEN: ICD-10-PCS | Mod: S$GLB,,, | Performed by: EMERGENCY MEDICINE

## 2023-04-17 ENCOUNTER — TELEPHONE (OUTPATIENT)
Dept: DIABETES | Facility: CLINIC | Age: 38
End: 2023-04-17

## 2023-04-17 ENCOUNTER — NUTRITION (OUTPATIENT)
Dept: DIABETES | Facility: CLINIC | Age: 38
End: 2023-04-17
Payer: MEDICAID

## 2023-04-17 DIAGNOSIS — N18.32 TYPE 2 DIABETES MELLITUS WITH STAGE 3B CHRONIC KIDNEY DISEASE, WITHOUT LONG-TERM CURRENT USE OF INSULIN: ICD-10-CM

## 2023-04-17 DIAGNOSIS — E11.22 TYPE 2 DIABETES MELLITUS WITH STAGE 3B CHRONIC KIDNEY DISEASE, WITHOUT LONG-TERM CURRENT USE OF INSULIN: ICD-10-CM

## 2023-04-17 PROCEDURE — 95250 CONT GLUC MNTR PHYS/QHP EQP: CPT | Mod: PBBFAC,PO | Performed by: DIETITIAN, REGISTERED

## 2023-04-17 NOTE — PROGRESS NOTES
DIABETES EDUCATOR NOTE   Return of the Dexcom G6 Pro Sensor and Patient Daily Log.     Patient returned to clinic today to return Dexcom continuous glucose sensor/transmitter and remove device from abdomen.       The CGMS Sensor will be scanned and downloaded. All reports will be imported into the patient's electronic medical record.     Endocrine provider will complete data interpretation and make recommendations; will forward recommendations to the ordering provider for follow up with patient.      Patient is taking diabetes medications as prescribed but he did not return the food log as requested.

## 2023-04-20 ENCOUNTER — PATIENT MESSAGE (OUTPATIENT)
Dept: ENDOCRINOLOGY | Facility: CLINIC | Age: 38
End: 2023-04-20
Payer: MEDICAID

## 2023-05-30 ENCOUNTER — LAB VISIT (OUTPATIENT)
Dept: LAB | Facility: HOSPITAL | Age: 38
End: 2023-05-30
Attending: INTERNAL MEDICINE
Payer: MEDICAID

## 2023-05-30 DIAGNOSIS — N18.30 CHRONIC KIDNEY DISEASE, STAGE III (MODERATE): ICD-10-CM

## 2023-05-30 DIAGNOSIS — R80.9 PROTEINURIA: Primary | ICD-10-CM

## 2023-05-30 LAB
ALBUMIN SERPL BCP-MCNC: 3.9 G/DL (ref 3.5–5.2)
ANION GAP SERPL CALC-SCNC: 8 MMOL/L (ref 8–16)
BACTERIA #/AREA URNS HPF: NEGATIVE /HPF
BILIRUB UR QL STRIP: NEGATIVE
BUN SERPL-MCNC: 39 MG/DL (ref 6–20)
CALCIUM SERPL-MCNC: 9 MG/DL (ref 8.7–10.5)
CHLORIDE SERPL-SCNC: 107 MMOL/L (ref 95–110)
CLARITY UR: CLEAR
CO2 SERPL-SCNC: 23 MMOL/L (ref 23–29)
COLOR UR: YELLOW
CREAT SERPL-MCNC: 2.6 MG/DL (ref 0.5–1.4)
CREAT UR-MCNC: 82 MG/DL (ref 23–375)
EST. GFR  (NO RACE VARIABLE): 31.6 ML/MIN/1.73 M^2
GLUCOSE SERPL-MCNC: 160 MG/DL (ref 70–110)
GLUCOSE UR QL STRIP: ABNORMAL
HGB UR QL STRIP: NEGATIVE
HYALINE CASTS #/AREA URNS LPF: 0 /LPF
KETONES UR QL STRIP: NEGATIVE
LEUKOCYTE ESTERASE UR QL STRIP: NEGATIVE
MICROSCOPIC COMMENT: NORMAL
NITRITE UR QL STRIP: NEGATIVE
PH UR STRIP: 6 [PH] (ref 5–8)
PHOSPHATE SERPL-MCNC: 3.9 MG/DL (ref 2.7–4.5)
POTASSIUM SERPL-SCNC: 4.3 MMOL/L (ref 3.5–5.1)
PROT UR QL STRIP: ABNORMAL
PROT UR-MCNC: 64 MG/DL (ref 6–15)
RBC #/AREA URNS HPF: 0 /HPF (ref 0–4)
SODIUM SERPL-SCNC: 138 MMOL/L (ref 136–145)
SP GR UR STRIP: 1.01 (ref 1–1.03)
SQUAMOUS #/AREA URNS HPF: 0 /HPF
URN SPEC COLLECT METH UR: ABNORMAL
UROBILINOGEN UR STRIP-ACNC: NEGATIVE EU/DL
WBC #/AREA URNS HPF: 0 /HPF (ref 0–5)

## 2023-05-30 PROCEDURE — 81001 URINALYSIS AUTO W/SCOPE: CPT | Performed by: INTERNAL MEDICINE

## 2023-05-30 PROCEDURE — 80069 RENAL FUNCTION PANEL: CPT | Performed by: INTERNAL MEDICINE

## 2023-05-30 PROCEDURE — 82570 ASSAY OF URINE CREATININE: CPT | Performed by: INTERNAL MEDICINE

## 2023-05-30 PROCEDURE — 36415 COLL VENOUS BLD VENIPUNCTURE: CPT | Performed by: INTERNAL MEDICINE

## 2023-05-30 PROCEDURE — 84156 ASSAY OF PROTEIN URINE: CPT | Performed by: INTERNAL MEDICINE

## 2023-06-06 NOTE — ED NOTES
Medication(s) Requested:    lisdexamfetamine (Vyvanse) 40 MG capsule 30 capsule 0 5/6/2023     Sig - Route: Take 1 capsule by mouth every morning. Do not start before May 6, 2023. Must last 30 days. - Oral    Sent to pharmacy as: Lisdexamfetamine Dimesylate 40 MG Oral Capsule (Vyvanse)    Class: Eprescribe      Last appointment: 4/16/2023  Appointment: 8/16/2023  Last refill: 5/6/2023 per PDMP  Is the patient due for refill of this medication(s): Yes  PDMP review: Criteria met.Forwarded to Physician/KRISTYN for further review and decision on medication(s) requested.   [facility]     PHARMACY NEEDS THIS BY THIS AFTERNOON PLEASE TO FILL BUBBLE PACK FOR PT FOR AM DOSE TOMORRROW     Patient states blood sugar high, seen here yesterday for the same, did not get prescription filled, also c/o bilateral flank kidney pain for several days

## 2023-06-09 NOTE — PROGRESS NOTES
CC: This 37 y.o. male presents for management of diabetes  and chronic conditions pending review including      HPI: He was diagnosed with T2DM at age 12. Has been hospitalized r/t DM at diagnosis and a few months ago after a steroid injection   Family hx of DM: dad, sister, mother, PGF, MGF      No acute events sicne his last visit  Seeing Dr Flores tomorrow   hypoglycemia at home-  none  monitoring BG at home:         Diet: Eats 2 Meals a day, snacks-  night- Baked Lay's  Has been working on his diet   Skips breakfast  Exercise: none  CURRENT DM MEDS: lantus 30u qhs, Trulicity 1.5 mg weekly (Friday)   Vial/pen:  Uses pen  Glucometer type: One Touch Verio    Standards of Care:  Eye exam: > 1 year -  Needs to schedule          Following w Dr Flores in nephrology     ROS:   Gen: Appetite good,   Eyes: Denies visual disturbances  Resp: no SOB or MATHEWS, no cough  Cardiac: No palpitations, chest pain, no edema   GI: No nausea or vomiting, diarrhea, constipation   /GYN: 1+ nocturia, no burning or pain.   MS/Neuro: Denies numbness/ tingling in BLE; Gait steady, speech clear  Psych: Denies drug/ETOH abuse, no hx of depression.  Other systems: negative.    PE:  GENERAL: Well developed, well nourished.  PSYCH: AAOx3, appropriate mood and affect, pleasant expression, conversant, appears relaxed, well groomed.   EYES: Conjunctiva, corneas clear  NECK: Supple, trachea midline  ABDOMEN: Soft, non-tender, non-distended   NEURO: Gait steady  SKIN:   no acanthosis nigracans.  FOOT EXAMINATION: 3/20/2023   + callus formation on bilateral great toed, right great toe with brown spots noted under callus Onychomycosis,  no interspace maceration or ulceration noted.  Decreased hair growth present over toes/feet.   Protective sensation intact with 10 gram monofilament.  +2 dorsalis pedis and posterior pulses noted. Varicose veins     Personally reviewed Past Medical, Surgical, Social History.    /84 (BP Location: Left  arm, Patient Position: Sitting, BP Method: Large (Manual))   Pulse 94   Ht 6' (1.829 m)   Wt 95 kg (209 lb 7 oz)   SpO2 99%   BMI 28.40 kg/m²      Personally reviewed the below labs:      Chemistry        Component Value Date/Time     05/30/2023 0843    K 4.3 05/30/2023 0843     05/30/2023 0843    CO2 23 05/30/2023 0843    BUN 39 (H) 05/30/2023 0843    CREATININE 2.6 (H) 05/30/2023 0843     (H) 05/30/2023 0843        Component Value Date/Time    CALCIUM 9.0 05/30/2023 0843    ALKPHOS 118 03/29/2023 0427    AST 20 03/29/2023 0427    ALT 28 03/29/2023 0427    BILITOT 1.3 (H) 03/29/2023 0427    ESTGFRAFRICA 33.5 (A) 06/27/2022 1223    EGFRNONAA 29.0 (A) 06/27/2022 1223            Lab Results   Component Value Date    TSH 1.780 09/16/2021       Recent Labs   Lab 08/06/22  0800   LDL Cholesterol Invalid, Trig>400.0   HDL 27 L   Cholesterol 185        Results for orders placed or performed in visit on 12/23/20   Vitamin D   Result Value Ref Range    Vit D, 25-Hydroxy 21 (L) 30 - 96 ng/mL     Results for orders placed or performed in visit on 07/01/10   Calcitriol   Result Value Ref Range    Vit D, 1,25-Dihydroxy 35 18 - 64 pg/mL       No results found for: MICALBCREAT    Hemoglobin A1C   Date Value Ref Range Status   01/27/2023 9.2 (H) 4.5 - 6.2 % Final     Comment:     According to ADA guidelines, hemoglobin A1C <7.0% represents  optimal control in non-pregnant diabetic patients.  Different  metrics may apply to specific populations.   Standards of Medical Care in Diabetes - 2016.    For the purpose of screening for the presence of diabetes:  <5.7%     Consistent with the absence of diabetes  5.7-6.4%  Consistent with increasing risk for diabetes   (prediabetes)  >or=6.5%  Consistent with diabetes    Currently no consensus exists for use of hemoglobin A1C  for diagnosis of diabetes for children.     08/06/2022 9.6 (H) 4.5 - 6.2 % Final     Comment:     According to ADA guidelines, hemoglobin A1C  <7.0% represents  optimal control in non-pregnant diabetic patients.  Different  metrics may apply to specific populations.   Standards of Medical Care in Diabetes - 2016.    For the purpose of screening for the presence of diabetes:  <5.7%     Consistent with the absence of diabetes  5.7-6.4%  Consistent with increasing risk for diabetes   (prediabetes)  >or=6.5%  Consistent with diabetes    Currently no consensus exists for use of hemoglobin A1C  for diagnosis of diabetes for children.     10/19/2021 9.9 (H) 4.5 - 6.2 % Final     Comment:     According to ADA guidelines, hemoglobin A1C <7.0% represents  optimal control in non-pregnant diabetic patients.  Different  metrics may apply to specific populations.   Standards of Medical Care in Diabetes - 2016.    For the purpose of screening for the presence of diabetes:  <5.7%     Consistent with the absence of diabetes  5.7-6.4%  Consistent with increasing risk for diabetes   (prediabetes)  >or=6.5%  Consistent with diabetes    Currently no consensus exists for use of hemoglobin A1C  for diagnosis of diabetes for children.          ASSESSMENT and PLAN:      1. T2DM with hyperglycemia, CKD 3b-    Per [facility] pro done last month, he is having pp excursions post meals  Increase Lantus to 35 u qhs, Increase Trulicity 3 mg weekly     Check bg bid- log in 2 weeks    2. HTN - controlled, continue meds as previously prescribed and monitor.     3. CKD 3b- Need to optimize bg control to prevent worsening kidney fx       Follow-up: in 3 months with lab prior

## 2023-06-12 ENCOUNTER — LAB VISIT (OUTPATIENT)
Dept: LAB | Facility: HOSPITAL | Age: 38
End: 2023-06-12
Attending: NURSE PRACTITIONER
Payer: MEDICAID

## 2023-06-12 ENCOUNTER — OFFICE VISIT (OUTPATIENT)
Dept: ENDOCRINOLOGY | Facility: CLINIC | Age: 38
End: 2023-06-12
Payer: MEDICAID

## 2023-06-12 VITALS
HEART RATE: 94 BPM | WEIGHT: 209.44 LBS | BODY MASS INDEX: 28.37 KG/M2 | DIASTOLIC BLOOD PRESSURE: 84 MMHG | OXYGEN SATURATION: 99 % | SYSTOLIC BLOOD PRESSURE: 128 MMHG | HEIGHT: 72 IN

## 2023-06-12 DIAGNOSIS — E11.59 HYPERTENSION ASSOCIATED WITH DIABETES: ICD-10-CM

## 2023-06-12 DIAGNOSIS — N18.30 TYPE 2 DIABETES MELLITUS WITH STAGE 3 CHRONIC KIDNEY DISEASE, WITHOUT LONG-TERM CURRENT USE OF INSULIN, UNSPECIFIED WHETHER STAGE 3A OR 3B CKD: Primary | ICD-10-CM

## 2023-06-12 DIAGNOSIS — E11.22 TYPE 2 DIABETES MELLITUS WITH STAGE 3B CHRONIC KIDNEY DISEASE, WITHOUT LONG-TERM CURRENT USE OF INSULIN: ICD-10-CM

## 2023-06-12 DIAGNOSIS — Z79.4 TYPE 2 DIABETES MELLITUS WITHOUT COMPLICATION, WITH LONG-TERM CURRENT USE OF INSULIN: ICD-10-CM

## 2023-06-12 DIAGNOSIS — E11.22 TYPE 2 DIABETES MELLITUS WITH STAGE 3 CHRONIC KIDNEY DISEASE, WITHOUT LONG-TERM CURRENT USE OF INSULIN, UNSPECIFIED WHETHER STAGE 3A OR 3B CKD: Primary | ICD-10-CM

## 2023-06-12 DIAGNOSIS — E11.9 TYPE 2 DIABETES MELLITUS WITHOUT COMPLICATION, WITH LONG-TERM CURRENT USE OF INSULIN: ICD-10-CM

## 2023-06-12 DIAGNOSIS — N18.32 TYPE 2 DIABETES MELLITUS WITH STAGE 3B CHRONIC KIDNEY DISEASE, WITHOUT LONG-TERM CURRENT USE OF INSULIN: ICD-10-CM

## 2023-06-12 DIAGNOSIS — I15.2 HYPERTENSION ASSOCIATED WITH DIABETES: ICD-10-CM

## 2023-06-12 LAB
ESTIMATED AVG GLUCOSE: 171 MG/DL (ref 68–131)
HBA1C MFR BLD: 7.6 % (ref 4–5.6)

## 2023-06-12 PROCEDURE — 4010F ACE/ARB THERAPY RXD/TAKEN: CPT | Mod: CPTII,,, | Performed by: NURSE PRACTITIONER

## 2023-06-12 PROCEDURE — 3074F PR MOST RECENT SYSTOLIC BLOOD PRESSURE < 130 MM HG: ICD-10-PCS | Mod: CPTII,,, | Performed by: NURSE PRACTITIONER

## 2023-06-12 PROCEDURE — 3008F PR BODY MASS INDEX (BMI) DOCUMENTED: ICD-10-PCS | Mod: CPTII,,, | Performed by: NURSE PRACTITIONER

## 2023-06-12 PROCEDURE — 3079F PR MOST RECENT DIASTOLIC BLOOD PRESSURE 80-89 MM HG: ICD-10-PCS | Mod: CPTII,,, | Performed by: NURSE PRACTITIONER

## 2023-06-12 PROCEDURE — 4010F PR ACE/ARB THEARPY RXD/TAKEN: ICD-10-PCS | Mod: CPTII,,, | Performed by: NURSE PRACTITIONER

## 2023-06-12 PROCEDURE — 83036 HEMOGLOBIN GLYCOSYLATED A1C: CPT | Performed by: NURSE PRACTITIONER

## 2023-06-12 PROCEDURE — 3008F BODY MASS INDEX DOCD: CPT | Mod: CPTII,,, | Performed by: NURSE PRACTITIONER

## 2023-06-12 PROCEDURE — 99213 OFFICE O/P EST LOW 20 MIN: CPT | Mod: PBBFAC,PO | Performed by: NURSE PRACTITIONER

## 2023-06-12 PROCEDURE — 3052F PR MOST RECENT HEMOGLOBIN A1C LEVEL 8.0 - < 9.0%: ICD-10-PCS | Mod: CPTII,,, | Performed by: NURSE PRACTITIONER

## 2023-06-12 PROCEDURE — 3079F DIAST BP 80-89 MM HG: CPT | Mod: CPTII,,, | Performed by: NURSE PRACTITIONER

## 2023-06-12 PROCEDURE — 1159F PR MEDICATION LIST DOCUMENTED IN MEDICAL RECORD: ICD-10-PCS | Mod: CPTII,,, | Performed by: NURSE PRACTITIONER

## 2023-06-12 PROCEDURE — 3074F SYST BP LT 130 MM HG: CPT | Mod: CPTII,,, | Performed by: NURSE PRACTITIONER

## 2023-06-12 PROCEDURE — 3052F HG A1C>EQUAL 8.0%<EQUAL 9.0%: CPT | Mod: CPTII,,, | Performed by: NURSE PRACTITIONER

## 2023-06-12 PROCEDURE — 99214 PR OFFICE/OUTPT VISIT, EST, LEVL IV, 30-39 MIN: ICD-10-PCS | Mod: S$PBB,,, | Performed by: NURSE PRACTITIONER

## 2023-06-12 PROCEDURE — 99214 OFFICE O/P EST MOD 30 MIN: CPT | Mod: S$PBB,,, | Performed by: NURSE PRACTITIONER

## 2023-06-12 PROCEDURE — 36415 COLL VENOUS BLD VENIPUNCTURE: CPT | Mod: PO | Performed by: NURSE PRACTITIONER

## 2023-06-12 PROCEDURE — 99999 PR PBB SHADOW E&M-EST. PATIENT-LVL III: ICD-10-PCS | Mod: PBBFAC,,, | Performed by: NURSE PRACTITIONER

## 2023-06-12 PROCEDURE — 1159F MED LIST DOCD IN RCRD: CPT | Mod: CPTII,,, | Performed by: NURSE PRACTITIONER

## 2023-06-12 PROCEDURE — 99999 PR PBB SHADOW E&M-EST. PATIENT-LVL III: CPT | Mod: PBBFAC,,, | Performed by: NURSE PRACTITIONER

## 2023-06-12 RX ORDER — TAMSULOSIN HYDROCHLORIDE 0.4 MG/1
1 CAPSULE ORAL NIGHTLY
Status: ON HOLD | COMMUNITY
Start: 2023-04-10 | End: 2023-08-22 | Stop reason: HOSPADM

## 2023-06-12 RX ORDER — INSULIN GLARGINE 100 [IU]/ML
35 INJECTION, SOLUTION SUBCUTANEOUS DAILY
Qty: 15 ML | Refills: 6 | Status: ON HOLD | OUTPATIENT
Start: 2023-06-12 | End: 2023-08-22 | Stop reason: HOSPADM

## 2023-06-12 RX ORDER — LOSARTAN POTASSIUM 100 MG/1
100 TABLET ORAL DAILY
COMMUNITY
Start: 2023-05-03

## 2023-07-03 PROBLEM — N17.9 AKI (ACUTE KIDNEY INJURY): Status: RESOLVED | Noted: 2023-03-29 | Resolved: 2023-07-03

## 2023-07-10 ENCOUNTER — PATIENT OUTREACH (OUTPATIENT)
Dept: ADMINISTRATIVE | Facility: HOSPITAL | Age: 38
End: 2023-07-10
Payer: COMMERCIAL

## 2023-07-10 ENCOUNTER — PATIENT MESSAGE (OUTPATIENT)
Dept: ADMINISTRATIVE | Facility: HOSPITAL | Age: 38
End: 2023-07-10
Payer: COMMERCIAL

## 2023-07-10 NOTE — PROGRESS NOTES
Population Health Chart Review & Patient Outreach Details:     Reason for Outreach Encounter:     [x]  Non-Compliant Report   []  Payor Report ([employer], PHN, BCBS, MSSP, MCIP, C, etc.)   []  Pre-Visit Chart Review     Updates Requested / Reviewed:     [x]  Care Everywhere    [x]     []  External Sources ([facility], [facility], DIS, etc.)   []  Care Team Updated    Patient Outreach Method:    [x]  Telephone Outreach Completed   [x] Successful   [] Left Voicemail   [] Unable to Contact (wrong number, no voicemail)  []  [email or web address] Outreach Sent  []  Letter Outreach Mailed  []  Fax Sent for External Records  []  External Records Upload    Health Maintenance Topics Addressed and Outreach Outcomes / Actions Taken:        []      Breast Cancer Screening []  Mammo Scheduled      []  External Records Requested     []  Added Reminder to Complete to Upcoming Primary Care Appt Notes     []  Patient Declined     []  Patient Will Call Back to Schedule     []  Patient Will Schedule with External Provider / Order Routed if Applicable             []       Cervical Cancer Screening []  Pap Scheduled      []  External Records Requested     []  Added Reminder to Complete to Upcoming Primary Care Appt Notes     []  Patient Declined     []  Patient Will Call Back to Schedule     []  Patient Will Schedule with External Provider               []          Colorectal Cancer Screening []  Colonoscopy Case Request or Referral Placed     []  External Records Requested     []  Added Reminder to Complete to Upcoming Primary Care Appt Notes     []  Patient Declined     []  Patient Will Call Back to Schedule     []  Patient Will Schedule with External Provider     []  Fit Kit Mailed (add the SmartPhrase under additional notes)     []  Reminded Patient to Complete Home Test             [x]      Diabetic Eye Exam []  Eye Camera Scheduled or Optometry Referral Placed     []  External Records Requested     []  Added Reminder to Complete to  Upcoming Primary Care Appt Notes     []  Patient Declined     []  Patient Will Call Back to Schedule     [x]  Patient Will Schedule with External Provider             []      Blood Pressure Control []  Primary Care Follow Up Visit Scheduled     []  Remote Blood Pressure Reading Captured     []  Added Reminder to Complete to Upcoming Primary Care Appt Notes     []  Patient Declined     []  Patient Will Call Back / Patient Will Send Portal Message with Reading     []  Patient Will Call Back to Schedule Provider Visit             []       HbA1c & Other Labs []  Lab Appt Scheduled for Due Labs     []  Primary Care Follow Up Visit Scheduled      []  Reminded Patient to Complete Home Test     []  Added Reminder to Complete to Upcoming Primary Care Appt Notes     []  Patient Declined     []  Patient Will Call Back to Schedule     []  Patient Will Schedule with External Provider / Order Routed if Applicable           []    Schedule Primary Care Appt []  Primary Care Appt Scheduled     []  Patient Declined     []  Patient Will Call Back to Schedule     []  Pt Established with External Provider & Updated Care Team             []      Medication Adherence []  Primary Care Appointment Scheduled     []  Added Reminder to Upcoming Primary Care Appt Notes     []  Patient Reminded to  Prescription     []  Patient Declined, Provider Notified if Needed     []  Sent Provider Message to Review and/or Add Exclusion to Problem List             []      Osteoporosis Screening []  DXA Appointment Scheduled     []  External Records Requested     []  Added Reminder to Complete to Upcoming Primary Care Appt Notes     []  Patient Declined     []  Patient Will Call Back to Schedule     []  Patient Will Schedule with External Provider / Order Routed if Applicable     Additional Care Coordinator Notes:    Pt will schedule his appointment and let the office know the date .     Further Action Needed If Patient Returns Outreach:

## 2023-07-26 ENCOUNTER — TELEPHONE (OUTPATIENT)
Dept: ENDOCRINOLOGY | Facility: CLINIC | Age: 38
End: 2023-07-26
Payer: COMMERCIAL

## 2023-07-26 ENCOUNTER — PATIENT MESSAGE (OUTPATIENT)
Dept: FAMILY MEDICINE | Facility: CLINIC | Age: 38
End: 2023-07-26

## 2023-07-26 DIAGNOSIS — M54.12 CERVICAL RADICULOPATHY: ICD-10-CM

## 2023-07-26 DIAGNOSIS — G62.9 NEUROPATHY: ICD-10-CM

## 2023-07-26 DIAGNOSIS — N18.30 TYPE 2 DIABETES MELLITUS WITH STAGE 3 CHRONIC KIDNEY DISEASE, WITHOUT LONG-TERM CURRENT USE OF INSULIN, UNSPECIFIED WHETHER STAGE 3A OR 3B CKD: Primary | ICD-10-CM

## 2023-07-26 DIAGNOSIS — E11.22 TYPE 2 DIABETES MELLITUS WITH STAGE 3 CHRONIC KIDNEY DISEASE, WITHOUT LONG-TERM CURRENT USE OF INSULIN, UNSPECIFIED WHETHER STAGE 3A OR 3B CKD: Primary | ICD-10-CM

## 2023-07-26 RX ORDER — GABAPENTIN 300 MG/1
300 CAPSULE ORAL 3 TIMES DAILY
Qty: 90 CAPSULE | Refills: 0 | Status: SHIPPED | OUTPATIENT
Start: 2023-07-26 | End: 2023-09-11

## 2023-07-26 RX ORDER — DULAGLUTIDE 1.5 MG/.5ML
INJECTION, SOLUTION SUBCUTANEOUS
OUTPATIENT
Start: 2023-07-26

## 2023-07-26 RX ORDER — DULAGLUTIDE 1.5 MG/.5ML
INJECTION, SOLUTION SUBCUTANEOUS
COMMUNITY
Start: 2023-07-01 | End: 2023-07-26

## 2023-07-26 RX ORDER — DULAGLUTIDE 3 MG/.5ML
3 INJECTION, SOLUTION SUBCUTANEOUS
Qty: 4 PEN | Refills: 11 | Status: SHIPPED | OUTPATIENT
Start: 2023-07-26 | End: 2024-07-25

## 2023-08-21 ENCOUNTER — HOSPITAL ENCOUNTER (OUTPATIENT)
Facility: HOSPITAL | Age: 38
Discharge: HOME | End: 2023-08-22
Attending: EMERGENCY MEDICINE | Admitting: INTERNAL MEDICINE
Payer: COMMERCIAL

## 2023-08-21 DIAGNOSIS — N17.9 ACUTE ON CHRONIC KIDNEY FAILURE: ICD-10-CM

## 2023-08-21 DIAGNOSIS — R07.9 CHEST PAIN: ICD-10-CM

## 2023-08-21 DIAGNOSIS — N18.9 ACUTE ON CHRONIC KIDNEY FAILURE: ICD-10-CM

## 2023-08-21 PROBLEM — R11.2 NAUSEA AND VOMITING: Status: ACTIVE | Noted: 2023-08-21

## 2023-08-21 LAB
ALBUMIN SERPL BCP-MCNC: 4.4 G/DL (ref 3.5–5.2)
ALP SERPL-CCNC: 115 U/L (ref 55–135)
ALT SERPL W/O P-5'-P-CCNC: 26 U/L (ref 10–44)
ANION GAP SERPL CALC-SCNC: 11 MMOL/L (ref 8–16)
AST SERPL-CCNC: 18 U/L (ref 10–40)
BACTERIA #/AREA URNS HPF: NEGATIVE /HPF
BASOPHILS # BLD AUTO: 0.05 K/UL (ref 0–0.2)
BASOPHILS NFR BLD: 0.3 % (ref 0–1.9)
BILIRUB SERPL-MCNC: 1.5 MG/DL (ref 0.1–1)
BILIRUB UR QL STRIP: NEGATIVE
BUN SERPL-MCNC: 48 MG/DL (ref 6–20)
CALCIUM SERPL-MCNC: 9.2 MG/DL (ref 8.7–10.5)
CHLORIDE SERPL-SCNC: 103 MMOL/L (ref 95–110)
CLARITY UR: CLEAR
CO2 SERPL-SCNC: 25 MMOL/L (ref 23–29)
COLOR UR: YELLOW
CREAT SERPL-MCNC: 3.9 MG/DL (ref 0.5–1.4)
CREAT UR-MCNC: 79 MG/DL (ref 23–375)
DIFFERENTIAL METHOD: ABNORMAL
EOSINOPHIL # BLD AUTO: 0.4 K/UL (ref 0–0.5)
EOSINOPHIL NFR BLD: 2.3 % (ref 0–8)
ERYTHROCYTE [DISTWIDTH] IN BLOOD BY AUTOMATED COUNT: 13.9 % (ref 11.5–14.5)
EST. GFR  (NO RACE VARIABLE): 19.4 ML/MIN/1.73 M^2
GLUCOSE SERPL-MCNC: 134 MG/DL (ref 70–110)
GLUCOSE SERPL-MCNC: 143 MG/DL (ref 70–110)
GLUCOSE SERPL-MCNC: 193 MG/DL (ref 70–110)
GLUCOSE SERPL-MCNC: 91 MG/DL (ref 70–110)
GLUCOSE UR QL STRIP: ABNORMAL
HCT VFR BLD AUTO: 48.1 % (ref 40–54)
HGB BLD-MCNC: 15.8 G/DL (ref 14–18)
HGB UR QL STRIP: ABNORMAL
HYALINE CASTS #/AREA URNS LPF: 2 /LPF
IMM GRANULOCYTES # BLD AUTO: 0.08 K/UL (ref 0–0.04)
IMM GRANULOCYTES NFR BLD AUTO: 0.5 % (ref 0–0.5)
KETONES UR QL STRIP: NEGATIVE
LEUKOCYTE ESTERASE UR QL STRIP: NEGATIVE
LIPASE SERPL-CCNC: 105 U/L (ref 4–60)
LYMPHOCYTES # BLD AUTO: 2.5 K/UL (ref 1–4.8)
LYMPHOCYTES NFR BLD: 14.1 % (ref 18–48)
MCH RBC QN AUTO: 28.6 PG (ref 27–31)
MCHC RBC AUTO-ENTMCNC: 32.8 G/DL (ref 32–36)
MCV RBC AUTO: 87 FL (ref 82–98)
MICROSCOPIC COMMENT: ABNORMAL
MONOCYTES # BLD AUTO: 1 K/UL (ref 0.3–1)
MONOCYTES NFR BLD: 5.6 % (ref 4–15)
NEUTROPHILS # BLD AUTO: 13.5 K/UL (ref 1.8–7.7)
NEUTROPHILS NFR BLD: 77.2 % (ref 38–73)
NITRITE UR QL STRIP: NEGATIVE
NRBC BLD-RTO: 0 /100 WBC
PH UR STRIP: 6 [PH] (ref 5–8)
PLATELET # BLD AUTO: 216 K/UL (ref 150–450)
PMV BLD AUTO: 9.9 FL (ref 9.2–12.9)
POTASSIUM SERPL-SCNC: 4.4 MMOL/L (ref 3.5–5.1)
PROT SERPL-MCNC: 7.9 G/DL (ref 6–8.4)
PROT UR QL STRIP: ABNORMAL
PROT UR-MCNC: 47 MG/DL (ref 6–15)
RBC # BLD AUTO: 5.53 M/UL (ref 4.6–6.2)
RBC #/AREA URNS HPF: 0 /HPF (ref 0–4)
SODIUM SERPL-SCNC: 139 MMOL/L (ref 136–145)
SODIUM UR-SCNC: 48 MMOL/L (ref 20–250)
SP GR UR STRIP: 1.01 (ref 1–1.03)
SQUAMOUS #/AREA URNS HPF: 0 /HPF
URN SPEC COLLECT METH UR: ABNORMAL
UROBILINOGEN UR STRIP-ACNC: NEGATIVE EU/DL
UUN UR-MCNC: 533 MG/DL (ref 140–1050)
WBC # BLD AUTO: 17.42 K/UL (ref 3.9–12.7)
WBC #/AREA URNS HPF: 0 /HPF (ref 0–5)

## 2023-08-21 PROCEDURE — 25000003 PHARM REV CODE 250: Performed by: EMERGENCY MEDICINE

## 2023-08-21 PROCEDURE — 63600175 PHARM REV CODE 636 W HCPCS: Performed by: EMERGENCY MEDICINE

## 2023-08-21 PROCEDURE — 83690 ASSAY OF LIPASE: CPT | Performed by: EMERGENCY MEDICINE

## 2023-08-21 PROCEDURE — 25000003 PHARM REV CODE 250: Performed by: INTERNAL MEDICINE

## 2023-08-21 PROCEDURE — G0378 HOSPITAL OBSERVATION PER HR: HCPCS

## 2023-08-21 PROCEDURE — 80053 COMPREHEN METABOLIC PANEL: CPT | Performed by: EMERGENCY MEDICINE

## 2023-08-21 PROCEDURE — 63600175 PHARM REV CODE 636 W HCPCS: Performed by: INTERNAL MEDICINE

## 2023-08-21 PROCEDURE — 96376 TX/PRO/DX INJ SAME DRUG ADON: CPT

## 2023-08-21 PROCEDURE — 96374 THER/PROPH/DIAG INJ IV PUSH: CPT

## 2023-08-21 PROCEDURE — 96375 TX/PRO/DX INJ NEW DRUG ADDON: CPT

## 2023-08-21 PROCEDURE — 84300 ASSAY OF URINE SODIUM: CPT | Performed by: INTERNAL MEDICINE

## 2023-08-21 PROCEDURE — 81001 URINALYSIS AUTO W/SCOPE: CPT | Performed by: EMERGENCY MEDICINE

## 2023-08-21 PROCEDURE — 84156 ASSAY OF PROTEIN URINE: CPT | Performed by: INTERNAL MEDICINE

## 2023-08-21 PROCEDURE — C9113 INJ PANTOPRAZOLE SODIUM, VIA: HCPCS | Performed by: INTERNAL MEDICINE

## 2023-08-21 PROCEDURE — 84540 ASSAY OF URINE/UREA-N: CPT | Performed by: INTERNAL MEDICINE

## 2023-08-21 PROCEDURE — 99285 EMERGENCY DEPT VISIT HI MDM: CPT | Mod: 25

## 2023-08-21 PROCEDURE — 85025 COMPLETE CBC W/AUTO DIFF WBC: CPT | Performed by: EMERGENCY MEDICINE

## 2023-08-21 PROCEDURE — 96372 THER/PROPH/DIAG INJ SC/IM: CPT | Performed by: INTERNAL MEDICINE

## 2023-08-21 PROCEDURE — 96361 HYDRATE IV INFUSION ADD-ON: CPT

## 2023-08-21 PROCEDURE — 82570 ASSAY OF URINE CREATININE: CPT | Performed by: INTERNAL MEDICINE

## 2023-08-21 RX ORDER — ENOXAPARIN SODIUM 100 MG/ML
30 INJECTION SUBCUTANEOUS EVERY 24 HOURS
Status: DISCONTINUED | OUTPATIENT
Start: 2023-08-21 | End: 2023-08-22 | Stop reason: HOSPADM

## 2023-08-21 RX ORDER — SODIUM CHLORIDE 9 MG/ML
1000 INJECTION, SOLUTION INTRAVENOUS
Status: DISCONTINUED | OUTPATIENT
Start: 2023-08-21 | End: 2023-08-21

## 2023-08-21 RX ORDER — FAMOTIDINE 20 MG/1
20 TABLET, FILM COATED ORAL DAILY
Status: DISCONTINUED | OUTPATIENT
Start: 2023-08-21 | End: 2023-08-22 | Stop reason: HOSPADM

## 2023-08-21 RX ORDER — HYDROMORPHONE HYDROCHLORIDE 1 MG/ML
0.5 INJECTION, SOLUTION INTRAMUSCULAR; INTRAVENOUS; SUBCUTANEOUS EVERY 6 HOURS PRN
Status: DISCONTINUED | OUTPATIENT
Start: 2023-08-21 | End: 2023-08-22

## 2023-08-21 RX ORDER — IBUPROFEN 200 MG
24 TABLET ORAL
Status: DISCONTINUED | OUTPATIENT
Start: 2023-08-21 | End: 2023-08-22 | Stop reason: HOSPADM

## 2023-08-21 RX ORDER — HYDROCODONE BITARTRATE AND ACETAMINOPHEN 5; 325 MG/1; MG/1
1 TABLET ORAL EVERY 6 HOURS PRN
Status: DISCONTINUED | OUTPATIENT
Start: 2023-08-21 | End: 2023-08-22 | Stop reason: HOSPADM

## 2023-08-21 RX ORDER — SODIUM CHLORIDE 9 MG/ML
INJECTION, SOLUTION INTRAVENOUS CONTINUOUS
Status: DISCONTINUED | OUTPATIENT
Start: 2023-08-21 | End: 2023-08-22 | Stop reason: HOSPADM

## 2023-08-21 RX ORDER — PANTOPRAZOLE SODIUM 40 MG/10ML
40 INJECTION, POWDER, LYOPHILIZED, FOR SOLUTION INTRAVENOUS DAILY
Status: DISCONTINUED | OUTPATIENT
Start: 2023-08-21 | End: 2023-08-21

## 2023-08-21 RX ORDER — GLUCAGON 1 MG
1 KIT INJECTION
Status: DISCONTINUED | OUTPATIENT
Start: 2023-08-21 | End: 2023-08-22 | Stop reason: HOSPADM

## 2023-08-21 RX ORDER — ONDANSETRON 2 MG/ML
4 INJECTION INTRAMUSCULAR; INTRAVENOUS EVERY 8 HOURS PRN
Status: DISCONTINUED | OUTPATIENT
Start: 2023-08-21 | End: 2023-08-22 | Stop reason: HOSPADM

## 2023-08-21 RX ORDER — ONDANSETRON 2 MG/ML
4 INJECTION INTRAMUSCULAR; INTRAVENOUS
Status: COMPLETED | OUTPATIENT
Start: 2023-08-21 | End: 2023-08-21

## 2023-08-21 RX ORDER — INSULIN ASPART 100 [IU]/ML
0-5 INJECTION, SOLUTION INTRAVENOUS; SUBCUTANEOUS
Status: DISCONTINUED | OUTPATIENT
Start: 2023-08-21 | End: 2023-08-22 | Stop reason: HOSPADM

## 2023-08-21 RX ORDER — SODIUM CHLORIDE 0.9 % (FLUSH) 0.9 %
10 SYRINGE (ML) INJECTION EVERY 12 HOURS PRN
Status: DISCONTINUED | OUTPATIENT
Start: 2023-08-21 | End: 2023-08-22 | Stop reason: HOSPADM

## 2023-08-21 RX ORDER — NALOXONE HCL 0.4 MG/ML
0.02 VIAL (ML) INJECTION
Status: DISCONTINUED | OUTPATIENT
Start: 2023-08-21 | End: 2023-08-22 | Stop reason: HOSPADM

## 2023-08-21 RX ORDER — IBUPROFEN 200 MG
16 TABLET ORAL
Status: DISCONTINUED | OUTPATIENT
Start: 2023-08-21 | End: 2023-08-22 | Stop reason: HOSPADM

## 2023-08-21 RX ADMIN — ONDANSETRON 4 MG: 2 INJECTION INTRAMUSCULAR; INTRAVENOUS at 05:08

## 2023-08-21 RX ADMIN — FAMOTIDINE 20 MG: 20 TABLET ORAL at 10:08

## 2023-08-21 RX ADMIN — ONDANSETRON 4 MG: 2 INJECTION INTRAMUSCULAR; INTRAVENOUS at 03:08

## 2023-08-21 RX ADMIN — SODIUM CHLORIDE: 0.9 INJECTION, SOLUTION INTRAVENOUS at 05:08

## 2023-08-21 RX ADMIN — PANTOPRAZOLE SODIUM 40 MG: 40 INJECTION, POWDER, LYOPHILIZED, FOR SOLUTION INTRAVENOUS at 08:08

## 2023-08-21 RX ADMIN — HYDROCODONE BITARTRATE AND ACETAMINOPHEN 1 TABLET: 5; 325 TABLET ORAL at 05:08

## 2023-08-21 RX ADMIN — HYDROMORPHONE HYDROCHLORIDE 0.5 MG: 0.5 INJECTION, SOLUTION INTRAMUSCULAR; INTRAVENOUS; SUBCUTANEOUS at 08:08

## 2023-08-21 RX ADMIN — ENOXAPARIN SODIUM 30 MG: 30 INJECTION SUBCUTANEOUS at 05:08

## 2023-08-21 RX ADMIN — HYDROMORPHONE HYDROCHLORIDE 0.5 MG: 0.5 INJECTION, SOLUTION INTRAMUSCULAR; INTRAVENOUS; SUBCUTANEOUS at 02:08

## 2023-08-21 RX ADMIN — SODIUM CHLORIDE 1000 ML: 0.9 INJECTION, SOLUTION INTRAVENOUS at 04:08

## 2023-08-21 RX ADMIN — SODIUM CHLORIDE: 0.9 INJECTION, SOLUTION INTRAVENOUS at 02:08

## 2023-08-21 RX ADMIN — ONDANSETRON 4 MG: 2 INJECTION INTRAMUSCULAR; INTRAVENOUS at 08:08

## 2023-08-21 RX ADMIN — SODIUM CHLORIDE 1000 ML: 0.9 INJECTION, SOLUTION INTRAVENOUS at 03:08

## 2023-08-21 NOTE — CONSULTS
INPATIENT NEPHROLOGY CONSULT   [facility] NEPHROLOGY    Jose Miguel Brennan  08/21/2023    Reason for consultation:    denilson    Chief Complaint:   Chief Complaint   Patient presents with    Dehydration     Since 10PM. States he is hot and cold, +N/V/D. Took Zofran before coming. States he is probably dehydrated, as he has been working outside for some time, as well as having a kidney disease.           History of Present Illness:    Per H and P   Patient 37 years old male with a past medical history diabetes, anxiety, hypertension, IgA nephropathy, chronic kidney disease baseline 2.6 who presents to [facility] for nausea vomiting and generalized weakness.  The patient reports symptom of nausea vomiting started early yesterday.  And he felt weak so he comes to the hospital for further evaluation.  He knows that he gets dehydration and worsening kidney disease.  Patient denied chest pain, shortness bed, fever, chills, diarrhea.           Plan of Care:       Assessment:    Acute kidney injury secondary to hemodynamically mediated renal injury secondary to intravascular volume depletion  --iv fluids  --strict I/Os  --hold losartan  --urine electrolytes  --pvr bladder scan  --Avoid NSAIDS, Grant II inhibitors, and other non-essential nephrotoxic agents      Nausea/vomiting  --iv fluids  --antiemetics    CKD III  --long term bp goal 130/80  --check pth and vit d level  --Avoid NSAIDS, Grant II inhibitors, and other non-essential nephrotoxic agents    Proteinuria  --resume losartan when renal function stabilizes     Diabetes  --long term HgA1c goal less than 7.0  --consider addition of sodium-glucose cotransporter 2 inhibitor when renal issues stabilize     IGA nephropathy  --resume ARB when stable  --trend proteinuria as outpatient and evaluate for initiation of Budesonide      Thank you for allowing us to participate in this patient's care. We will continue to follow.    Vital Signs:  Temp Readings from Last 3 Encounters:    23 98.1 °F (36.7 °C) (Oral)   23 98.5 °F (36.9 °C) (Oral)   23 98.4 °F (36.9 °C)       Pulse Readings from Last 3 Encounters:   23 96   23 94   23 94       BP Readings from Last 3 Encounters:   23 120/86   23 128/84   23 133/86       Weight:  Wt Readings from Last 3 Encounters:   23 92.9 kg (204 lb 11.2 oz)   23 95 kg (209 lb 7 oz)   23 95.3 kg (210 lb)       Past Medical & Surgical History:  Past Medical History:   Diagnosis Date    Anxiety     Asthma     Depression     Diabetes mellitus     diet controlled    Diabetes mellitus, type 2     GERD (gastroesophageal reflux disease)     Gout     Hyperlipidemia     Hypertension     IgA nephropathy     Insomnia        Past Surgical History:   Procedure Laterality Date    COLONOSCOPY N/A 2020    Procedure: COLONOSCOPY;  Surgeon: Hammad Castorena III, MD;  Location: [facility];  Service: Endoscopy;  Laterality: N/A;    ESOPHAGOGASTRODUODENOSCOPY N/A 2020    Procedure: EGD (ESOPHAGOGASTRODUODENOSCOPY);  Surgeon: Hammad Castorena III, MD;  Location: [facility];  Service: Endoscopy;  Laterality: N/A;    nose polyp removal         Past Social History:  Social History     Socioeconomic History    Marital status: Single   Tobacco Use    Smoking status: Former     Current packs/day: 0.00     Average packs/day: 0.3 packs/day for 5.0 years (1.3 ttl pk-yrs)     Types: Cigars, Cigarettes     Start date: 2017     Quit date: 2022     Years since quittin.2     Passive exposure: Past    Smokeless tobacco: Never    Tobacco comments:     One cigar a day   Substance and Sexual Activity    Alcohol use: Yes    Drug use: No    Sexual activity: Yes     Partners: Female     Social Determinants of Health     Financial Resource Strain: Low Risk  (3/29/2023)    Overall Financial Resource Strain (CARDIA)     Difficulty of Paying Living Expenses: Not hard at all   Food Insecurity: No Food Insecurity  "(3/29/2023)    Hunger Vital Sign     Worried About Running Out of Food in the Last Year: Never true     Ran Out of Food in the Last Year: Never true   Transportation Needs: No Transportation Needs (3/29/2023)    PRAPARE - Transportation     Lack of Transportation (Medical): No     Lack of Transportation (Non-Medical): No   Physical Activity: Inactive (3/29/2023)    Exercise Vital Sign     Days of Exercise per Week: 0 days     Minutes of Exercise per Session: 0 min   Stress: Stress Concern Present (1/27/2023)    [employer] [employer] - Occupational Stress Questionnaire     Feeling of Stress : To some extent   Social Connections: Socially Isolated (3/29/2023)    Social Connection and Isolation Panel [NHANES]     Frequency of Communication with Friends and Family: More than three times a week     Frequency of Social Gatherings with Friends and Family: More than three times a week     Attends Scientology Services: Never     Active Member of Clubs or Organizations: No     Attends Club or Organization Meetings: Never     Marital Status: Never    Housing Stability: Low Risk  (3/29/2023)    Housing Stability Vital Sign     Unable to Pay for Housing in the Last Year: No     Number of Places Lived in the Last Year: 1     Unstable Housing in the Last Year: No       Medications:  No current facility-administered medications on file prior to encounter.     Current Outpatient Medications on File Prior to Encounter   Medication Sig Dispense Refill    amLODIPine (NORVASC) 5 MG tablet Take 1 tablet (5 mg total) by mouth once daily. 90 tablet 1    atorvastatin (LIPITOR) 10 MG tablet Take 1 tablet (10 mg total) by mouth every evening. 90 tablet 1    BD CONOR 2ND GEN PEN NEEDLE 32 gauge x 5/32" Ndle once daily. Use      blood sugar diagnostic Strp To check BG one times daily, to use with insurance preferred meter 100 strip 3    blood-glucose meter kit To check BG one times daily, to use with insurance preferred " "meter 1 each 0    cetirizine (ZYRTEC) 10 MG tablet Take 1 tablet (10 mg total) by mouth once daily. 30 tablet 0    dulaglutide (TRULICITY) 3 mg/0.5 mL pen injector Inject 3 mg into the skin every 7 days. 4 pen 11    febuxostat (ULORIC) 40 mg Tab Take 40 mg by mouth once daily.      gabapentin (NEURONTIN) 300 MG capsule Take 1 capsule (300 mg total) by mouth 3 (three) times daily. 90 capsule 0    insulin (LANTUS SOLOSTAR U-100 INSULIN) glargine 100 units/mL SubQ pen Inject 35 Units into the skin once daily. 15 mL 6    lancets Misc To check BG one times daily, to use with insurance preferred meter 100 each 3    losartan (COZAAR) 100 MG tablet Take 100 mg by mouth.      omega 3-dha-epa-fish oil 1,000 mg (120 mg-180 mg) Cap Take 1 capsule by mouth 2 (two) times daily. 180 capsule 1    ondansetron (ZOFRAN-ODT) 4 MG TbDL Take 1 tablet (4 mg total) by mouth every 8 (eight) hours as needed (FOR NAUSEA; DISSOLVE UNDER THE TONGUE). 30 tablet 0    pen needle, diabetic 32 gauge x 5/16" Ndle 1 Units by Misc.(Non-Drug; Combo Route) route once daily. 100 each 1    tamsulosin (FLOMAX) 0.4 mg Cap Take 1 capsule by mouth every evening.       Scheduled Meds:   sodium chloride 0.9%  1,000 mL Intravenous ED 1 Time    enoxparin  30 mg Subcutaneous Q24H (prophylaxis, 1700)    famotidine  20 mg Oral Daily     Continuous Infusions:   sodium chloride 0.9% 75 mL/hr at 08/21/23 0544     PRN Meds:.dextrose 50%, dextrose 50%, glucagon (human recombinant), glucose, glucose, HYDROcodone-acetaminophen, HYDROmorphone, insulin aspart U-100, naloxone, ondansetron, sodium chloride 0.9%    Allergies:  Zithromax [azithromycin]    Past Family History:  Reviewed; refer to Hospitalist Admission Note    Review of Systems:  Review of Systems - All 14 systems reviewed and negative, except as noted in HPI    Physical Exam:    /86 (BP Location: Left arm, Patient Position: Sitting)   Pulse 96   Temp 98.1 °F (36.7 °C) (Oral)   Resp 16   Ht 6' (1.829 m)  "  Wt 92.9 kg (204 lb 11.2 oz)   SpO2 99%   BMI 27.76 kg/m²     General Appearance:    Alert, cooperative, no distress, appears stated age   Head:    Normocephalic, without obvious abnormality, atraumatic   Eyes:    PER, conjunctiva/corneas clear, EOM's intact in both eyes        Throat:   Lips, mucosa, and tongue normal; teeth and gums normal   Back:     Symmetric, no curvature, ROM normal, no CVA tenderness   Lungs:     Clear to auscultation bilaterally, respirations unlabored   Chest wall:    No tenderness or deformity   Heart:    Regular rate and rhythm, S1 and S2 normal, no murmur, rub   or gallop   Abdomen:     Soft, non-tender, bowel sounds active all four quadrants,     no masses, no organomegaly   Extremities:   Extremities normal, atraumatic, no cyanosis or edema   Pulses:   2+ and symmetric all extremities   MSK:   No joint or muscle swelling, tenderness or deformity   Skin:   Skin color, texture, turgor normal, no rashes or lesions   Neurologic:   CNII-XII intact, normal strength and sensation       Throughout.  No flap     Results:  Lab Results   Component Value Date     08/21/2023    K 4.4 08/21/2023     08/21/2023    CO2 25 08/21/2023    BUN 48 (H) 08/21/2023    CREATININE 3.9 (H) 08/21/2023    CALCIUM 9.2 08/21/2023    ANIONGAP 11 08/21/2023    ESTGFRAFRICA 33.5 (A) 06/27/2022    EGFRNONAA 29.0 (A) 06/27/2022       Lab Results   Component Value Date    CALCIUM 9.2 08/21/2023    PHOS 3.9 05/30/2023       Recent Labs   Lab 08/21/23  0329   WBC 17.42*   RBC 5.53   HGB 15.8   HCT 48.1      MCV 87   MCH 28.6   MCHC 32.8          I have personally reviewed pertinent radiological imaging and reports.    Patient care was time spent personally by me on the following activities:   Obtaining a history  Examination of patient.  Providing medical care at the patients bedside.  Developing a treatment plan with patient or surrogate and bedside caregivers  Ordering and reviewing laboratory  studies, radiographic studies, pulse oximetry.  Ordering and performing treatments and interventions.  Evaluation of patient's response to treatment.  Discussions with consultants while on the unit and immediately available to the patient.  Re-evaluation of the patient's condition.  Documentation in the medical record.     Marciano Talavera MD  Nephrology  [facility] [place]  [phone]

## 2023-08-21 NOTE — PATIENT CARE CONFERENCE
Thank you for your consult, however after review of the records, it is noted that Mr. Brennan has been followed by Dr. Conte.  Continuity of care is important to us, therefore the consult has been reassigned to Dr. Conte.      Thanks,   JANES Kelley

## 2023-08-21 NOTE — H&P
[facility] - Emergency Dept    History & Physical      Patient Name: Jose Miguel Brennan  MRN: [id number]  Admission Date: 8/21/2023  Attending Physician: Tank Galvan MD   Primary Care Provider: Jose Grant NP         Patient information was obtained from patient and ER records.     Subjective:     Principal Problem:Acute on chronic kidney failure    Chief Complaint:   Chief Complaint   Patient presents with    Dehydration     Since 10PM. States he is hot and cold, +N/V/D. Took Zofran before coming. States he is probably dehydrated, as he has been working outside for some time, as well as having a kidney disease.         HPI:  Patient 37 years old male with a past medical history diabetes, anxiety, hypertension, IgA nephropathy, chronic kidney disease baseline 2.6 who presents to [facility] for nausea vomiting and generalized weakness.  The patient reports symptom of nausea vomiting started early yesterday.  And he felt weak so he comes to the hospital for further evaluation.  He knows that he gets dehydration and worsening kidney disease.  Patient denied chest pain, shortness bed, fever, chills, diarrhea.    Past Medical History:   Diagnosis Date    Anxiety     Asthma     Depression     Diabetes mellitus     diet controlled    Diabetes mellitus, type 2     GERD (gastroesophageal reflux disease)     Gout     Hyperlipidemia     Hypertension     IgA nephropathy     Insomnia        Past Surgical History:   Procedure Laterality Date    COLONOSCOPY N/A 5/8/2020    Procedure: COLONOSCOPY;  Surgeon: Hammad Castorena III, MD;  Location: [facility];  Service: Endoscopy;  Laterality: N/A;    ESOPHAGOGASTRODUODENOSCOPY N/A 5/8/2020    Procedure: EGD (ESOPHAGOGASTRODUODENOSCOPY);  Surgeon: Hammad Castorena III, MD;  Location: [facility];  Service: Endoscopy;  Laterality: N/A;    nose polyp removal         Review of patient's allergies indicates:   Allergen Reactions    Zithromax [azithromycin] Rash       No  "current facility-administered medications on file prior to encounter.     Current Outpatient Medications on File Prior to Encounter   Medication Sig    amLODIPine (NORVASC) 5 MG tablet Take 1 tablet (5 mg total) by mouth once daily.    atorvastatin (LIPITOR) 10 MG tablet Take 1 tablet (10 mg total) by mouth every evening.    BD CONOR 2ND GEN PEN NEEDLE 32 gauge x 5/32" Ndle once daily. Use    blood sugar diagnostic Strp To check BG one times daily, to use with insurance preferred meter    blood-glucose meter kit To check BG one times daily, to use with insurance preferred meter    cetirizine (ZYRTEC) 10 MG tablet Take 1 tablet (10 mg total) by mouth once daily.    dulaglutide (TRULICITY) 3 mg/0.5 mL pen injector Inject 3 mg into the skin every 7 days.    febuxostat (ULORIC) 40 mg Tab Take 40 mg by mouth once daily.    gabapentin (NEURONTIN) 300 MG capsule Take 1 capsule (300 mg total) by mouth 3 (three) times daily.    insulin (LANTUS SOLOSTAR U-100 INSULIN) glargine 100 units/mL SubQ pen Inject 35 Units into the skin once daily.    lancets Misc To check BG one times daily, to use with insurance preferred meter    losartan (COZAAR) 100 MG tablet Take 100 mg by mouth.    omega 3-dha-epa-fish oil 1,000 mg (120 mg-180 mg) Cap Take 1 capsule by mouth 2 (two) times daily.    ondansetron (ZOFRAN-ODT) 4 MG TbDL Take 1 tablet (4 mg total) by mouth every 8 (eight) hours as needed (FOR NAUSEA; DISSOLVE UNDER THE TONGUE).    pen needle, diabetic 32 gauge x 5/16" Ndle 1 Units by Misc.(Non-Drug; Combo Route) route once daily.    tamsulosin (FLOMAX) 0.4 mg Cap Take 1 capsule by mouth every evening.     Family History       Problem Relation (Age of Onset)    Cancer Maternal Grandfather    Diabetes Maternal Grandfather, Mother    Heart disease Maternal Grandfather, Mother    Hypertension Maternal Grandmother    Stroke Mother          Tobacco Use    Smoking status: Former     Current packs/day: 0.00     Average packs/day: 0.3 " packs/day for 5.0 years (1.3 ttl pk-yrs)     Types: Cigars, Cigarettes     Start date: 2017     Quit date: 2022     Years since quittin.2     Passive exposure: Past    Smokeless tobacco: Never    Tobacco comments:     One cigar a day   Substance and Sexual Activity    Alcohol use: Yes    Drug use: No    Sexual activity: Yes     Partners: Female     Review of Systems   Constitutional:  Positive for fatigue. Negative for chills and fever.   HENT:  Negative for hearing loss and tinnitus.    Respiratory:  Negative for cough, shortness of breath and wheezing.    Cardiovascular:  Negative for chest pain and palpitations.   Gastrointestinal:  Positive for nausea and vomiting.   Musculoskeletal:  Negative for back pain and neck pain.   Skin:  Negative for rash.   Neurological:  Positive for weakness. Negative for dizziness and headaches.   Psychiatric/Behavioral:  Negative for hallucinations. The patient is not nervous/anxious.    All other systems reviewed and are negative.    Objective:     Vital Signs (Most Recent):  Temp: 97.7 °F (36.5 °C) (23 025)  Pulse: 103 (23)  Resp: 19 (23 025)  BP: (!) 141/94 (23 025)  SpO2: 100 % (23) Vital Signs (24h Range):  Temp:  [97.7 °F (36.5 °C)] 97.7 °F (36.5 °C)  Pulse:  [103] 103  Resp:  [19] 19  SpO2:  [100 %] 100 %  BP: (141)/(94) 141/94     Weight: 93 kg (205 lb)  Body mass index is 27.8 kg/m².    Physical Exam  Constitutional:       General: He is not in acute distress.  HENT:      Head: Normocephalic and atraumatic.      Nose: No congestion.   Eyes:      General: No scleral icterus.        Right eye: No discharge.         Left eye: No discharge.      Extraocular Movements: Extraocular movements intact.   Cardiovascular:      Rate and Rhythm: Normal rate and regular rhythm.   Pulmonary:      Effort: Pulmonary effort is normal.      Breath sounds: Normal breath sounds.   Abdominal:      General: Abdomen is flat. Bowel sounds are  normal.   Musculoskeletal:         General: No swelling or tenderness.      Cervical back: Normal range of motion and neck supple. No rigidity.   Skin:     General: Skin is warm.   Neurological:      General: No focal deficit present.      Mental Status: He is alert and oriented to person, place, and time.   Psychiatric:         Mood and Affect: Mood normal.            Significant Labs: All pertinent labs within the past 24 hours have been reviewed.  CBC:   Recent Labs   Lab 08/21/23  0329   WBC 17.42*   HGB 15.8   HCT 48.1        CMP:   Recent Labs   Lab 08/21/23  0329      K 4.4      CO2 25   *   BUN 48*   CREATININE 3.9*   CALCIUM 9.2   PROT 7.9   ALBUMIN 4.4   BILITOT 1.5*   ALKPHOS 115   AST 18   ALT 26   ANIONGAP 11       Significant Imaging: I have reviewed all pertinent imaging results/findings within the past 24 hours.    Assessment/Plan:     Active Diagnoses:    Diagnosis Date Noted POA    PRINCIPAL PROBLEM:  Acute on chronic kidney failure [N17.9, N18.9] 08/21/2023 Yes    Nausea and vomiting [R11.2] 08/21/2023 Yes    Type 2 diabetes mellitus with stage 3 chronic kidney disease, without long-term current use of insulin [E11.22, N18.30] 04/10/2014 Yes    IgA nephropathy [N02.8] 03/12/2014 Yes    Hypertension associated with diabetes [E11.59, I15.2] 03/12/2014 Yes      Plan  We will start the patient on IV fluids.  Check Chem 7 and consult Nephrology.    His baseline for creatinine is 2.6 patient had history of IgA  Nephropathy.  We will give nausea vomiting medication  Consult pharmacy for to reconcile his home medication  DVT prophylactic GI prophylactic     VTE Risk Mitigation (From admission, onward)      None              Tank Galvan MD  Department of Hospital Medicine   [facility] - Emergency Dept

## 2023-08-21 NOTE — LETTER
August 22, 2023         [address]  [facility] [address]  Phone: [phone]  Fax: [phone]       Patient: Jose Miguel Brennan   YOB: 1985  Date of Visit: 08/22/2023    To Whom It May Concern:    Devin Brennan  was at [facility] on 08/21/2023-08/22/2023. The patient may return to work/school on 08/23/2023  with no restrictions. If you have any questions or concerns, or if I can be of further assistance, please do not hesitate to contact me.    Sincerely,    Kamari Workman RN

## 2023-08-21 NOTE — ED PROVIDER NOTES
Encounter Date: 8/21/2023       History     Chief Complaint   Patient presents with    Dehydration     Since 10PM. States he is hot and cold, +N/V/D. Took Zofran before coming. States he is probably dehydrated, as he has been working outside for some time, as well as having a kidney disease.      Patient with history of IgA nephropathy hypertension diabetes wakes up tonight feeling ill vomited once and is now here for evaluation.  He thinks he might be dehydrated.  He has been in the hot sun this last weekend .  His blood sugar was fine tonight but he was also scheduled for blood work earlier this month that he missed.  He is also has no complaints otherwise        Review of patient's allergies indicates:   Allergen Reactions    Zithromax [azithromycin] Rash     Past Medical History:   Diagnosis Date    Anxiety     Asthma     Depression     Diabetes mellitus     diet controlled    Diabetes mellitus, type 2     GERD (gastroesophageal reflux disease)     Gout     Hyperlipidemia     Hypertension     IgA nephropathy     Insomnia      Past Surgical History:   Procedure Laterality Date    COLONOSCOPY N/A 5/8/2020    Procedure: COLONOSCOPY;  Surgeon: Hammad Castorena III, MD;  Location: [facility];  Service: Endoscopy;  Laterality: N/A;    ESOPHAGOGASTRODUODENOSCOPY N/A 5/8/2020    Procedure: EGD (ESOPHAGOGASTRODUODENOSCOPY);  Surgeon: Hammad Catsorena III, MD;  Location: [facility];  Service: Endoscopy;  Laterality: N/A;    nose polyp removal       Family History   Problem Relation Age of Onset    Hypertension Maternal Grandmother     Cancer Maternal Grandfather     Diabetes Maternal Grandfather     Heart disease Maternal Grandfather     Heart disease Mother     Stroke Mother     Diabetes Mother      Social History     Tobacco Use    Smoking status: Former     Current packs/day: 0.00     Average packs/day: 0.3 packs/day for 5.0 years (1.3 ttl pk-yrs)     Types: Cigars, Cigarettes     Start date: 5/9/2017     Quit  date: 2022     Years since quittin.2     Passive exposure: Past    Smokeless tobacco: Never    Tobacco comments:     One cigar a day   Substance Use Topics    Alcohol use: Yes    Drug use: No     Review of Systems   Constitutional:  Negative for chills and fever.   HENT:  Negative for ear pain, rhinorrhea and sore throat.    Eyes:  Negative for pain and visual disturbance.   Respiratory:  Negative for cough and shortness of breath.    Cardiovascular:  Negative for chest pain and palpitations.   Gastrointestinal:  Positive for nausea and vomiting. Negative for abdominal pain, constipation and diarrhea.   Genitourinary:  Negative for dysuria, frequency, hematuria and urgency.   Musculoskeletal:  Negative for back pain, joint swelling and myalgias.   Skin:  Negative for rash.   Neurological:  Negative for dizziness, seizures, weakness and headaches.   Psychiatric/Behavioral:  Negative for dysphoric mood. The patient is not nervous/anxious.        Physical Exam     Initial Vitals [23 0259]   BP Pulse Resp Temp SpO2   (!) 141/94 103 19 97.7 °F (36.5 °C) 100 %      MAP       --         Physical Exam    Nursing note and vitals reviewed.  Constitutional: He appears well-developed and well-nourished.   HENT:   Head: Normocephalic and atraumatic.   Eyes: Conjunctivae, EOM and lids are normal. Pupils are equal, round, and reactive to light.   Neck: Trachea normal. Neck supple. No thyroid mass present.   Cardiovascular:  Normal rate, regular rhythm and normal heart sounds.           Pulmonary/Chest: Breath sounds normal. No respiratory distress.   Abdominal: Abdomen is soft. He exhibits no distension. There is no rebound and no guarding.   Musculoskeletal:         General: Normal range of motion.      Cervical back: Neck supple.     Neurological: He is alert and oriented to person, place, and time. He has normal strength and normal reflexes. No cranial nerve deficit or sensory deficit.   Skin: Skin is warm and  dry.   Psychiatric: He has a normal mood and affect. His speech is normal and behavior is normal. Judgment and thought content normal.         ED Course   Procedures  Labs Reviewed   CBC W/ AUTO DIFFERENTIAL - Abnormal; Notable for the following components:       Result Value    WBC 17.42 (*)     Gran # (ANC) 13.5 (*)     Immature Grans (Abs) 0.08 (*)     Gran % 77.2 (*)     Lymph % 14.1 (*)     All other components within normal limits   COMPREHENSIVE METABOLIC PANEL - Abnormal; Notable for the following components:    Glucose 193 (*)     BUN 48 (*)     Creatinine 3.9 (*)     Total Bilirubin 1.5 (*)     eGFR 19.4 (*)     All other components within normal limits   LIPASE - Abnormal; Notable for the following components:    Lipase 105 (*)     All other components within normal limits   URINALYSIS, REFLEX TO URINE CULTURE - Abnormal; Notable for the following components:    Protein, UA 2+ (*)     Glucose, UA 2+ (*)     Occult Blood UA Trace (*)     All other components within normal limits    Narrative:     Specimen Source->Urine   URINALYSIS MICROSCOPIC - Abnormal; Notable for the following components:    Hyaline Casts, UA 2 (*)     All other components within normal limits    Narrative:     Specimen Source->Urine          Imaging Results    None          Medications   sodium chloride 0.9% bolus 1,000 mL 1,000 mL (1,000 mLs Intravenous New Bag 8/21/23 0446)   0.9%  NaCl infusion (has no administration in time range)   enoxaparin injection 30 mg (has no administration in time range)   pantoprazole injection 40 mg (has no administration in time range)   sodium chloride 0.9% bolus 1,000 mL 1,000 mL (0 mLs Intravenous Stopped 8/21/23 0444)   ondansetron injection 4 mg (4 mg Intravenous Given 8/21/23 5214)     Medical Decision Making  Chief complaint is weakness along with an episode of nausea vomiting.  Similar episode the past with dehydration.  He states he has been out in the sun.  Differential diagnosis includes URI  viral syndrome dehydration TRACEY among others.  This case the patient has an TRACEY will be admitted to the hospital.    Amount and/or Complexity of Data Reviewed  Labs: ordered.    Risk  Prescription drug management.                               Clinical Impression:   Final diagnoses:  [N17.9, N18.9] Acute on chronic kidney failure        ED Disposition Condition    Observation                 Helga Hernandez MD  08/21/23 0594

## 2023-08-21 NOTE — PHARMACY MED REC
"Admission Medication History     The home medication history was taken by Ian Olsen.    You may go to "Admission" then "Reconcile Home Medications" tabs to review and/or act upon these items.     The home medication list has been updated by the Pharmacy department.   Please read ALL comments highlighted in yellow.   Please address this information as you see fit.    Feel free to contact us if you have any questions or require assistance.      The medications listed below were removed from the home medication list. Please reorder if appropriate:  Patient reports no longer taking the following medication(s):  Cetirizine 10 mg   Uloric 40 mg      Medications listed below were obtained from: Patient/family and Analytic software- [employer]  No current facility-administered medications on file prior to encounter.     Current Outpatient Medications on File Prior to Encounter   Medication Sig Dispense Refill    amLODIPine (NORVASC) 5 MG tablet Take 1 tablet (5 mg total) by mouth once daily. 90 tablet 1    atorvastatin (LIPITOR) 10 MG tablet Take 1 tablet (10 mg total) by mouth every evening. 90 tablet 1    dulaglutide (TRULICITY) 3 mg/0.5 mL pen injector Inject 3 mg into the skin every 7 days. 4 pen 11    gabapentin (NEURONTIN) 300 MG capsule Take 1 capsule (300 mg total) by mouth 3 (three) times daily. 90 capsule 0    losartan (COZAAR) 100 MG tablet Take 100 mg by mouth once daily.      ondansetron (ZOFRAN-ODT) 4 MG TbDL Take 1 tablet (4 mg total) by mouth every 8 (eight) hours as needed (FOR NAUSEA; DISSOLVE UNDER THE TONGUE). 30 tablet 0    BD CONOR 2ND GEN PEN NEEDLE 32 gauge x 5/32" Ndle once daily. Use      blood sugar diagnostic Strp To check BG one times daily, to use with insurance preferred meter 100 strip 3    blood-glucose meter kit To check BG one times daily, to use with insurance preferred meter 1 each 0    insulin (LANTUS SOLOSTAR U-100 INSULIN) glargine 100 units/mL SubQ pen Inject 35 Units into the skin " "once daily. 15 mL 6    lancets Misc To check BG one times daily, to use with insurance preferred meter 100 each 3    pen needle, diabetic 32 gauge x 5/16" Ndle 1 Units by Misc.(Non-Drug; Combo Route) route once daily. 100 each 1    tamsulosin (FLOMAX) 0.4 mg Cap Take 1 capsule by mouth every evening.      [DISCONTINUED] cetirizine (ZYRTEC) 10 MG tablet Take 1 tablet (10 mg total) by mouth once daily. 30 tablet 0    [DISCONTINUED] febuxostat (ULORIC) 40 mg Tab Take 40 mg by mouth once daily.      [DISCONTINUED] omega 3-dha-epa-fish oil 1,000 mg (120 mg-180 mg) Cap Take 1 capsule by mouth 2 (two) times daily. 180 capsule 1         Ian Olsen  EXT 1924                 .          "

## 2023-08-21 NOTE — CONSULTS
INPATIENT NEPHROLOGY CONSULT   [facility] NEPHROLOGY    Jose Miguel Brennan  08/21/2023    Reason for consultation:    denilson    Chief Complaint:   Chief Complaint   Patient presents with    Dehydration     Since 10PM. States he is hot and cold, +N/V/D. Took Zofran before coming. States he is probably dehydrated, as he has been working outside for some time, as well as having a kidney disease.           History of Present Illness:    Per H and P   Patient 37 years old male with a past medical history diabetes, anxiety, hypertension, IgA nephropathy, chronic kidney disease baseline 2.6 who presents to [facility] for nausea vomiting and generalized weakness.  The patient reports symptom of nausea vomiting started early yesterday.  And he felt weak so he comes to the hospital for further evaluation.  He knows that he gets dehydration and worsening kidney disease.  Patient denied chest pain, shortness bed, fever, chills, diarrhea.     8/21  still nauseated.  No chest pain or sob.  States urine output is low      Plan of Care:       Assessment:    Acute kidney injury secondary to hemodynamically mediated renal injury secondary to intravascular volume depletion  --iv fluids  --strict I/Os  --hold losartan  --urine electrolytes  --pvr bladder scan  --Avoid NSAIDS, Grant II inhibitors, and other non-essential nephrotoxic agents      Nausea/vomiting  --iv fluids  --antiemetics    CKD III  --long term bp goal 130/80  --check pth and vit d level  --Avoid NSAIDS, Grant II inhibitors, and other non-essential nephrotoxic agents    Proteinuria  --resume losartan when renal function stabilizes     Diabetes  --long term HgA1c goal less than 7.0  --consider addition of sodium-glucose cotransporter 2 inhibitor when renal issues stabilize     IGA nephropathy  --resume ARB when stable  --trend proteinuria as outpatient and evaluate for initiation of Budesonide      Thank you for allowing us to participate in this patient's care. We will continue to  follow.    Vital Signs:  Temp Readings from Last 3 Encounters:   23 98.1 °F (36.7 °C) (Oral)   23 98.5 °F (36.9 °C) (Oral)   23 98.4 °F (36.9 °C)       Pulse Readings from Last 3 Encounters:   23 96   23 94   23 94       BP Readings from Last 3 Encounters:   23 120/86   23 128/84   23 133/86       Weight:  Wt Readings from Last 3 Encounters:   23 92.9 kg (204 lb 11.2 oz)   23 95 kg (209 lb 7 oz)   23 95.3 kg (210 lb)       Past Medical & Surgical History:  Past Medical History:   Diagnosis Date    Anxiety     Asthma     Depression     Diabetes mellitus     diet controlled    Diabetes mellitus, type 2     GERD (gastroesophageal reflux disease)     Gout     Hyperlipidemia     Hypertension     IgA nephropathy     Insomnia        Past Surgical History:   Procedure Laterality Date    COLONOSCOPY N/A 2020    Procedure: COLONOSCOPY;  Surgeon: Hammad Castorena III, MD;  Location: [facility];  Service: Endoscopy;  Laterality: N/A;    ESOPHAGOGASTRODUODENOSCOPY N/A 2020    Procedure: EGD (ESOPHAGOGASTRODUODENOSCOPY);  Surgeon: Hammad Castorena III, MD;  Location: [facility];  Service: Endoscopy;  Laterality: N/A;    nose polyp removal         Past Social History:  Social History     Socioeconomic History    Marital status: Single   Tobacco Use    Smoking status: Former     Current packs/day: 0.00     Average packs/day: 0.3 packs/day for 5.0 years (1.3 ttl pk-yrs)     Types: Cigars, Cigarettes     Start date: 2017     Quit date: 2022     Years since quittin.2     Passive exposure: Past    Smokeless tobacco: Never    Tobacco comments:     One cigar a day   Substance and Sexual Activity    Alcohol use: Yes    Drug use: No    Sexual activity: Yes     Partners: Female     Social Determinants of Health     Financial Resource Strain: Low Risk  (3/29/2023)    Overall Financial Resource Strain (CARDIA)     Difficulty of Paying Living  "Expenses: Not hard at all   Food Insecurity: No Food Insecurity (3/29/2023)    Hunger Vital Sign     Worried About Running Out of Food in the Last Year: Never true     Ran Out of Food in the Last Year: Never true   Transportation Needs: No Transportation Needs (3/29/2023)    PRAPARE - Transportation     Lack of Transportation (Medical): No     Lack of Transportation (Non-Medical): No   Physical Activity: Inactive (3/29/2023)    Exercise Vital Sign     Days of Exercise per Week: 0 days     Minutes of Exercise per Session: 0 min   Stress: Stress Concern Present (1/27/2023)    [employer] - Occupational Stress Questionnaire     Feeling of Stress : To some extent   Social Connections: Socially Isolated (3/29/2023)    Social Connection and Isolation Panel [NHANES]     Frequency of Communication with Friends and Family: More than three times a week     Frequency of Social Gatherings with Friends and Family: More than three times a week     Attends Adventism Services: Never     Active Member of Clubs or Organizations: No     Attends Club or Organization Meetings: Never     Marital Status: Never    Housing Stability: Low Risk  (3/29/2023)    Housing Stability Vital Sign     Unable to Pay for Housing in the Last Year: No     Number of Places Lived in the Last Year: 1     Unstable Housing in the Last Year: No       Medications:  No current facility-administered medications on file prior to encounter.     Current Outpatient Medications on File Prior to Encounter   Medication Sig Dispense Refill    amLODIPine (NORVASC) 5 MG tablet Take 1 tablet (5 mg total) by mouth once daily. 90 tablet 1    atorvastatin (LIPITOR) 10 MG tablet Take 1 tablet (10 mg total) by mouth every evening. 90 tablet 1    BD CONOR 2ND GEN PEN NEEDLE 32 gauge x 5/32" Ndle once daily. Use      blood sugar diagnostic Strp To check BG one times daily, to use with insurance preferred meter 100 strip 3    blood-glucose meter " "kit To check BG one times daily, to use with insurance preferred meter 1 each 0    cetirizine (ZYRTEC) 10 MG tablet Take 1 tablet (10 mg total) by mouth once daily. 30 tablet 0    dulaglutide (TRULICITY) 3 mg/0.5 mL pen injector Inject 3 mg into the skin every 7 days. 4 pen 11    febuxostat (ULORIC) 40 mg Tab Take 40 mg by mouth once daily.      gabapentin (NEURONTIN) 300 MG capsule Take 1 capsule (300 mg total) by mouth 3 (three) times daily. 90 capsule 0    insulin (LANTUS SOLOSTAR U-100 INSULIN) glargine 100 units/mL SubQ pen Inject 35 Units into the skin once daily. 15 mL 6    lancets Misc To check BG one times daily, to use with insurance preferred meter 100 each 3    losartan (COZAAR) 100 MG tablet Take 100 mg by mouth.      omega 3-dha-epa-fish oil 1,000 mg (120 mg-180 mg) Cap Take 1 capsule by mouth 2 (two) times daily. 180 capsule 1    ondansetron (ZOFRAN-ODT) 4 MG TbDL Take 1 tablet (4 mg total) by mouth every 8 (eight) hours as needed (FOR NAUSEA; DISSOLVE UNDER THE TONGUE). 30 tablet 0    pen needle, diabetic 32 gauge x 5/16" Ndle 1 Units by Misc.(Non-Drug; Combo Route) route once daily. 100 each 1    tamsulosin (FLOMAX) 0.4 mg Cap Take 1 capsule by mouth every evening.       Scheduled Meds:   sodium chloride 0.9%  1,000 mL Intravenous ED 1 Time    enoxparin  30 mg Subcutaneous Q24H (prophylaxis, 1700)    famotidine  20 mg Oral Daily     Continuous Infusions:   sodium chloride 0.9% 75 mL/hr at 08/21/23 0544     PRN Meds:.dextrose 50%, dextrose 50%, glucagon (human recombinant), glucose, glucose, HYDROcodone-acetaminophen, HYDROmorphone, insulin aspart U-100, naloxone, ondansetron, sodium chloride 0.9%    Allergies:  Zithromax [azithromycin]    Past Family History:  Reviewed; refer to Hospitalist Admission Note    Review of Systems:  Review of Systems - All 14 systems reviewed and negative, except as noted in HPI    Physical Exam:    /86 (BP Location: Left arm, Patient Position: Sitting)   Pulse 96 "   Temp 98.1 °F (36.7 °C) (Oral)   Resp 16   Ht 6' (1.829 m)   Wt 92.9 kg (204 lb 11.2 oz)   SpO2 99%   BMI 27.76 kg/m²     General Appearance:    Alert, cooperative, no distress, appears stated age   Head:    Normocephalic, without obvious abnormality, atraumatic   Eyes:    PER, conjunctiva/corneas clear, EOM's intact in both eyes        Throat:   Lips, mucosa, and tongue normal; teeth and gums normal   Back:     Symmetric, no curvature, ROM normal, no CVA tenderness   Lungs:     Clear to auscultation bilaterally, respirations unlabored   Chest wall:    No tenderness or deformity   Heart:    Regular rate and rhythm, S1 and S2 normal, no murmur, rub   or gallop   Abdomen:     Soft, non-tender, bowel sounds active all four quadrants,     no masses, no organomegaly   Extremities:   Extremities normal, atraumatic, no cyanosis or edema   Pulses:   2+ and symmetric all extremities   MSK:   No joint or muscle swelling, tenderness or deformity   Skin:   Skin color, texture, turgor normal, no rashes or lesions   Neurologic:   CNII-XII intact, normal strength and sensation       Throughout.  No flap     Results:  Lab Results   Component Value Date     08/21/2023    K 4.4 08/21/2023     08/21/2023    CO2 25 08/21/2023    BUN 48 (H) 08/21/2023    CREATININE 3.9 (H) 08/21/2023    CALCIUM 9.2 08/21/2023    ANIONGAP 11 08/21/2023    ESTGFRAFRICA 33.5 (A) 06/27/2022    EGFRNONAA 29.0 (A) 06/27/2022       Lab Results   Component Value Date    CALCIUM 9.2 08/21/2023    PHOS 3.9 05/30/2023       Recent Labs   Lab 08/21/23  0329   WBC 17.42*   RBC 5.53   HGB 15.8   HCT 48.1      MCV 87   MCH 28.6   MCHC 32.8            I have personally reviewed pertinent radiological imaging and reports.    Patient care was time spent personally by me on the following activities:   Obtaining a history  Examination of patient.  Providing medical care at the patients bedside.  Developing a treatment plan with patient or  surrogate and bedside caregivers  Ordering and reviewing laboratory studies, radiographic studies, pulse oximetry.  Ordering and performing treatments and interventions.  Evaluation of patient's response to treatment.  Discussions with consultants while on the unit and immediately available to the patient.  Re-evaluation of the patient's condition.  Documentation in the medical record.     Marciano Talavera MD  Nephrology  [facility] [place]  [phone]

## 2023-08-21 NOTE — PLAN OF CARE
[facility]  Initial Discharge Assessment    Patient independent with ADL's, no discharge planning needs anticipated or identified at this time.     Primary Care Provider: Jose Grant NP    Admission Diagnosis: Acute on chronic kidney failure [N17.9, N18.9]    Admission Date: 8/21/2023  Expected Discharge Date:     Transition of Care Barriers: None    Payor: CIGNA / Plan: CIGNA POS / Product Type: PPO /     Extended Emergency Contact Information  Primary Emergency Contact: Nancy Cornejo  Address: [address] [place] [address] [id number] [place] [address]  Home Phone: [phone]  Mobile Phone: [phone]  Relation: Significant other  Preferred language: English   needed? No    Discharge Plan A: Home with family  Discharge Plan B: Home with family      [facility] - [facility] - [address]  [address] [place] [address]  Phone: [phone] Fax: [phone]      Initial Assessment (most recent)       Adult Discharge Assessment - 08/21/23 1042          Discharge Assessment    Assessment Type Discharge Planning Assessment     People in Home significant other;child(chantel), dependent     Facility Arrived From: Home     Do you expect to return to your current living situation? Yes     Prior to hospitilization cognitive status: Alert/Oriented;No Deficits     Current cognitive status: Alert/Oriented;No Deficits     Readmission within 30 days? No     Patient currently being followed by outpatient case management? No     Do you currently have service(s) that help you manage your care at home? No     Do you have prescription coverage? Yes     Coverage [employer]     How do you get to doctors appointments? car, drives self     Are you on dialysis? No     Do you take coumadin? No     DME Needed Upon Discharge  none     Transition of Care Barriers None     Discharge Plan A Home with family     Discharge Plan B Home with family

## 2023-08-22 ENCOUNTER — TELEPHONE (OUTPATIENT)
Dept: FAMILY MEDICINE | Facility: CLINIC | Age: 38
End: 2023-08-22

## 2023-08-22 VITALS
OXYGEN SATURATION: 95 % | HEIGHT: 72 IN | TEMPERATURE: 99 F | WEIGHT: 204.69 LBS | HEART RATE: 84 BPM | BODY MASS INDEX: 27.73 KG/M2 | RESPIRATION RATE: 16 BRPM | SYSTOLIC BLOOD PRESSURE: 134 MMHG | DIASTOLIC BLOOD PRESSURE: 86 MMHG

## 2023-08-22 PROBLEM — N18.9 ACUTE ON CHRONIC KIDNEY FAILURE: Status: RESOLVED | Noted: 2023-08-21 | Resolved: 2023-08-22

## 2023-08-22 PROBLEM — R11.2 NAUSEA AND VOMITING: Status: RESOLVED | Noted: 2023-08-21 | Resolved: 2023-08-22

## 2023-08-22 PROBLEM — N17.9 ACUTE ON CHRONIC KIDNEY FAILURE: Status: RESOLVED | Noted: 2023-08-21 | Resolved: 2023-08-22

## 2023-08-22 LAB
25(OH)D3+25(OH)D2 SERPL-MCNC: 15 NG/ML (ref 30–96)
ALBUMIN SERPL BCP-MCNC: 3.2 G/DL (ref 3.5–5.2)
ALBUMIN SERPL BCP-MCNC: 3.2 G/DL (ref 3.5–5.2)
ALP SERPL-CCNC: 84 U/L (ref 55–135)
ALT SERPL W/O P-5'-P-CCNC: 15 U/L (ref 10–44)
ANION GAP SERPL CALC-SCNC: 5 MMOL/L (ref 8–16)
ANION GAP SERPL CALC-SCNC: 5 MMOL/L (ref 8–16)
AST SERPL-CCNC: 12 U/L (ref 10–40)
BASOPHILS # BLD AUTO: 0.03 K/UL (ref 0–0.2)
BASOPHILS # BLD AUTO: 0.03 K/UL (ref 0–0.2)
BASOPHILS NFR BLD: 0.4 % (ref 0–1.9)
BASOPHILS NFR BLD: 0.4 % (ref 0–1.9)
BILIRUB SERPL-MCNC: 0.9 MG/DL (ref 0.1–1)
BUN SERPL-MCNC: 32 MG/DL (ref 6–20)
BUN SERPL-MCNC: 32 MG/DL (ref 6–20)
CALCIUM SERPL-MCNC: 8.2 MG/DL (ref 8.7–10.5)
CALCIUM SERPL-MCNC: 8.2 MG/DL (ref 8.7–10.5)
CHLORIDE SERPL-SCNC: 109 MMOL/L (ref 95–110)
CHLORIDE SERPL-SCNC: 109 MMOL/L (ref 95–110)
CO2 SERPL-SCNC: 25 MMOL/L (ref 23–29)
CO2 SERPL-SCNC: 25 MMOL/L (ref 23–29)
CREAT SERPL-MCNC: 2.5 MG/DL (ref 0.5–1.4)
CREAT SERPL-MCNC: 2.5 MG/DL (ref 0.5–1.4)
DIFFERENTIAL METHOD: ABNORMAL
DIFFERENTIAL METHOD: ABNORMAL
EOSINOPHIL # BLD AUTO: 0.5 K/UL (ref 0–0.5)
EOSINOPHIL # BLD AUTO: 0.5 K/UL (ref 0–0.5)
EOSINOPHIL NFR BLD: 7.1 % (ref 0–8)
EOSINOPHIL NFR BLD: 7.1 % (ref 0–8)
ERYTHROCYTE [DISTWIDTH] IN BLOOD BY AUTOMATED COUNT: 13.9 % (ref 11.5–14.5)
ERYTHROCYTE [DISTWIDTH] IN BLOOD BY AUTOMATED COUNT: 13.9 % (ref 11.5–14.5)
EST. GFR  (NO RACE VARIABLE): 33.1 ML/MIN/1.73 M^2
EST. GFR  (NO RACE VARIABLE): 33.1 ML/MIN/1.73 M^2
GLUCOSE SERPL-MCNC: 102 MG/DL (ref 70–110)
GLUCOSE SERPL-MCNC: 94 MG/DL (ref 70–110)
GLUCOSE SERPL-MCNC: 94 MG/DL (ref 70–110)
HCT VFR BLD AUTO: 39.8 % (ref 40–54)
HCT VFR BLD AUTO: 39.8 % (ref 40–54)
HGB BLD-MCNC: 12.8 G/DL (ref 14–18)
HGB BLD-MCNC: 12.8 G/DL (ref 14–18)
IMM GRANULOCYTES # BLD AUTO: 0.01 K/UL (ref 0–0.04)
IMM GRANULOCYTES # BLD AUTO: 0.01 K/UL (ref 0–0.04)
IMM GRANULOCYTES NFR BLD AUTO: 0.1 % (ref 0–0.5)
IMM GRANULOCYTES NFR BLD AUTO: 0.1 % (ref 0–0.5)
LYMPHOCYTES # BLD AUTO: 3.5 K/UL (ref 1–4.8)
LYMPHOCYTES # BLD AUTO: 3.5 K/UL (ref 1–4.8)
LYMPHOCYTES NFR BLD: 48.6 % (ref 18–48)
LYMPHOCYTES NFR BLD: 48.6 % (ref 18–48)
MAGNESIUM SERPL-MCNC: 2.1 MG/DL (ref 1.6–2.6)
MAGNESIUM SERPL-MCNC: 2.1 MG/DL (ref 1.6–2.6)
MCH RBC QN AUTO: 28.4 PG (ref 27–31)
MCH RBC QN AUTO: 28.4 PG (ref 27–31)
MCHC RBC AUTO-ENTMCNC: 32.2 G/DL (ref 32–36)
MCHC RBC AUTO-ENTMCNC: 32.2 G/DL (ref 32–36)
MCV RBC AUTO: 88 FL (ref 82–98)
MCV RBC AUTO: 88 FL (ref 82–98)
MONOCYTES # BLD AUTO: 0.6 K/UL (ref 0.3–1)
MONOCYTES # BLD AUTO: 0.6 K/UL (ref 0.3–1)
MONOCYTES NFR BLD: 7.5 % (ref 4–15)
MONOCYTES NFR BLD: 7.5 % (ref 4–15)
NEUTROPHILS # BLD AUTO: 2.6 K/UL (ref 1.8–7.7)
NEUTROPHILS # BLD AUTO: 2.6 K/UL (ref 1.8–7.7)
NEUTROPHILS NFR BLD: 36.3 % (ref 38–73)
NEUTROPHILS NFR BLD: 36.3 % (ref 38–73)
NRBC BLD-RTO: 0 /100 WBC
NRBC BLD-RTO: 0 /100 WBC
PHOSPHATE SERPL-MCNC: 3.4 MG/DL (ref 2.7–4.5)
PHOSPHATE SERPL-MCNC: 3.4 MG/DL (ref 2.7–4.5)
PLATELET # BLD AUTO: 170 K/UL (ref 150–450)
PLATELET # BLD AUTO: 170 K/UL (ref 150–450)
PMV BLD AUTO: 10.3 FL (ref 9.2–12.9)
PMV BLD AUTO: 10.3 FL (ref 9.2–12.9)
POTASSIUM SERPL-SCNC: 4.5 MMOL/L (ref 3.5–5.1)
POTASSIUM SERPL-SCNC: 4.5 MMOL/L (ref 3.5–5.1)
PROT SERPL-MCNC: 5.7 G/DL (ref 6–8.4)
PTH-INTACT SERPL-MCNC: 190.9 PG/ML (ref 9–77)
RBC # BLD AUTO: 4.51 M/UL (ref 4.6–6.2)
RBC # BLD AUTO: 4.51 M/UL (ref 4.6–6.2)
SODIUM SERPL-SCNC: 139 MMOL/L (ref 136–145)
SODIUM SERPL-SCNC: 139 MMOL/L (ref 136–145)
URATE SERPL-MCNC: 7.7 MG/DL (ref 3.4–7)
WBC # BLD AUTO: 7.29 K/UL (ref 3.9–12.7)
WBC # BLD AUTO: 7.29 K/UL (ref 3.9–12.7)

## 2023-08-22 PROCEDURE — 36415 COLL VENOUS BLD VENIPUNCTURE: CPT | Performed by: INTERNAL MEDICINE

## 2023-08-22 PROCEDURE — 85025 COMPLETE CBC W/AUTO DIFF WBC: CPT | Performed by: INTERNAL MEDICINE

## 2023-08-22 PROCEDURE — 63600175 PHARM REV CODE 636 W HCPCS: Performed by: INTERNAL MEDICINE

## 2023-08-22 PROCEDURE — 84550 ASSAY OF BLOOD/URIC ACID: CPT | Performed by: INTERNAL MEDICINE

## 2023-08-22 PROCEDURE — 96376 TX/PRO/DX INJ SAME DRUG ADON: CPT

## 2023-08-22 PROCEDURE — 84100 ASSAY OF PHOSPHORUS: CPT | Performed by: INTERNAL MEDICINE

## 2023-08-22 PROCEDURE — G0378 HOSPITAL OBSERVATION PER HR: HCPCS

## 2023-08-22 PROCEDURE — 25000003 PHARM REV CODE 250: Performed by: INTERNAL MEDICINE

## 2023-08-22 PROCEDURE — 83735 ASSAY OF MAGNESIUM: CPT | Performed by: INTERNAL MEDICINE

## 2023-08-22 PROCEDURE — 82306 VITAMIN D 25 HYDROXY: CPT | Performed by: INTERNAL MEDICINE

## 2023-08-22 PROCEDURE — 80053 COMPREHEN METABOLIC PANEL: CPT | Performed by: INTERNAL MEDICINE

## 2023-08-22 PROCEDURE — 83970 ASSAY OF PARATHORMONE: CPT | Performed by: INTERNAL MEDICINE

## 2023-08-22 RX ORDER — HYDROMORPHONE HYDROCHLORIDE 1 MG/ML
0.5 INJECTION, SOLUTION INTRAMUSCULAR; INTRAVENOUS; SUBCUTANEOUS EVERY 6 HOURS PRN
Status: DISCONTINUED | OUTPATIENT
Start: 2023-08-22 | End: 2023-08-22 | Stop reason: HOSPADM

## 2023-08-22 RX ADMIN — HYDROMORPHONE HYDROCHLORIDE 0.5 MG: 1 INJECTION, SOLUTION INTRAMUSCULAR; INTRAVENOUS; SUBCUTANEOUS at 10:08

## 2023-08-22 RX ADMIN — HYDROCODONE BITARTRATE AND ACETAMINOPHEN 1 TABLET: 5; 325 TABLET ORAL at 05:08

## 2023-08-22 RX ADMIN — FAMOTIDINE 20 MG: 20 TABLET ORAL at 08:08

## 2023-08-22 RX ADMIN — ONDANSETRON 4 MG: 2 INJECTION INTRAMUSCULAR; INTRAVENOUS at 05:08

## 2023-08-22 RX ADMIN — SODIUM CHLORIDE: 0.9 INJECTION, SOLUTION INTRAVENOUS at 05:08

## 2023-08-22 NOTE — PLAN OF CARE
Patient to keep upcoming PCP appt.  Discharge orders and chart reviewed with no further post-acute discharge needs identified at this time.  At this time, patient is cleared for discharge from Case Management standpoint.        08/22/23 1119   Final Note   Assessment Type Final Discharge Note   Anticipated Discharge Disposition Home   Post-Acute Status   Post-Acute Authorization Other   Other Status No Post-Acute Service Needs   Discharge Delays None known at this time

## 2023-08-22 NOTE — PLAN OF CARE
Problem: Adult Inpatient Plan of Care  Goal: Plan of Care Review  Outcome: Ongoing, Progressing  Goal: Absence of Hospital-Acquired Illness or Injury  Outcome: Ongoing, Progressing     Problem: Fluid and Electrolyte Imbalance (Acute Kidney Injury/Impairment)  Goal: Fluid and Electrolyte Balance  Outcome: Ongoing, Progressing     Problem: Oral Intake Inadequate (Acute Kidney Injury/Impairment)  Goal: Optimal Nutrition Intake  Outcome: Ongoing, Progressing     Problem: Renal Function Impairment (Acute Kidney Injury/Impairment)  Goal: Effective Renal Function  Outcome: Ongoing, Progressing

## 2023-08-22 NOTE — NURSING
Patient D/C via MD order. Telemetry and PIV removed. Discharge instructions reviewed with patient. Patient ambulated to vehicle via declined W/C.

## 2023-08-22 NOTE — CONSULTS
INPATIENT NEPHROLOGY CONSULT   [facility] NEPHROLOGY    Jose Miguel Brennan  08/22/2023    Reason for consultation:    denilson    Chief Complaint:   Chief Complaint   Patient presents with    Dehydration     Since 10PM. States he is hot and cold, +N/V/D. Took Zofran before coming. States he is probably dehydrated, as he has been working outside for some time, as well as having a kidney disease.           History of Present Illness:    Per H and P   Patient 37 years old male with a past medical history diabetes, anxiety, hypertension, IgA nephropathy, chronic kidney disease baseline 2.6 who presents to [facility] for nausea vomiting and generalized weakness.  The patient reports symptom of nausea vomiting started early yesterday.  And he felt weak so he comes to the hospital for further evaluation.  He knows that he gets dehydration and worsening kidney disease.  Patient denied chest pain, shortness bed, fever, chills, diarrhea.     8/21  still nauseated.  No chest pain or sob.  States urine output is low  8/22  AFVSS.  3425 cc uop    Plan of Care:       Assessment:    Acute kidney injury secondary to hemodynamically mediated renal injury secondary to intravascular volume depletion  --iv fluids  --strict I/Os  --can resume losartan at discharge  --urine electrolytes  --pvr bladder scan not done due to pt not telling the nurse when he voided   --Avoid NSAIDS, Grant II inhibitors, and other non-essential nephrotoxic agents      Nausea/vomiting  --iv fluids  --antiemetics    CKD III  --long term bp goal 130/80  --check pth and vit d level  --Avoid NSAIDS, Grant II inhibitors, and other non-essential nephrotoxic agents    Proteinuria  --resume losartan    Diabetes  --long term HgA1c goal less than 7.0  --consider addition of sodium-glucose cotransporter 2 inhibitor when renal issues stabilize     IGA nephropathy  --resume ARB when stable  --trend proteinuria as outpatient and evaluate for initiation of Budesonide      Thank you for  allowing us to participate in this patient's care. We will continue to follow.    Vital Signs:  Temp Readings from Last 3 Encounters:   23 98.9 °F (37.2 °C) (Oral)   23 98.5 °F (36.9 °C) (Oral)   23 98.4 °F (36.9 °C)       Pulse Readings from Last 3 Encounters:   23 84   23 94   23 94       BP Readings from Last 3 Encounters:   23 134/86   23 128/84   23 133/86       Weight:  Wt Readings from Last 3 Encounters:   23 92.9 kg (204 lb 11.2 oz)   23 95 kg (209 lb 7 oz)   23 95.3 kg (210 lb)       Past Medical & Surgical History:  Past Medical History:   Diagnosis Date    Anxiety     Asthma     Depression     Diabetes mellitus     diet controlled    Diabetes mellitus, type 2     GERD (gastroesophageal reflux disease)     Gout     Hyperlipidemia     Hypertension     IgA nephropathy     Insomnia        Past Surgical History:   Procedure Laterality Date    COLONOSCOPY N/A 2020    Procedure: COLONOSCOPY;  Surgeon: Hammad Castorena III, MD;  [facility]: [facility];  Service: Endoscopy;  Laterality: N/A;    ESOPHAGOGASTRODUODENOSCOPY N/A 2020    Procedure: EGD (ESOPHAGOGASTRODUODENOSCOPY);  Surgeon: Hammad Castorena III, MD;  [facility];  Service: Endoscopy;  Laterality: N/A;    nose polyp removal         Past Social History:  Social History     Socioeconomic History    Marital status: Single   Tobacco Use    Smoking status: Former     Current packs/day: 0.00     Average packs/day: 0.3 packs/day for 5.0 years (1.3 ttl pk-yrs)     Types: Cigars, Cigarettes     Start date: 2017     Quit date: 2022     Years since quittin.2     Passive exposure: Past    Smokeless tobacco: Never    Tobacco comments:     One cigar a day   Substance and Sexual Activity    Alcohol use: Yes    Drug use: No    Sexual activity: Yes     Partners: Female     Social Determinants of Health     Financial Resource Strain: Low Risk  (3/29/2023)    Overall  Financial Resource Strain (CARDIA)     Difficulty of Paying Living Expenses: Not hard at all   Food Insecurity: No Food Insecurity (3/29/2023)    Hunger Vital Sign     Worried About Running Out of Food in the Last Year: Never true     Ran Out of Food in the Last Year: Never true   Transportation Needs: No Transportation Needs (3/29/2023)    PRAPARE - Transportation     Lack of Transportation (Medical): No     Lack of Transportation (Non-Medical): No   Physical Activity: Inactive (3/29/2023)    Exercise Vital Sign     Days of Exercise per Week: 0 days     Minutes of Exercise per Session: 0 min   Stress: Stress Concern Present (1/27/2023)    [employer] - Occupational Stress Questionnaire     Feeling of Stress : To some extent   Social Connections: Socially Isolated (3/29/2023)    Social Connection and Isolation Panel [NHANES]     Frequency of Communication with Friends and Family: More than three times a week     Frequency of Social Gatherings with Friends and Family: More than three times a week     Attends Church Services: Never     Active Member of Clubs or Organizations: No     Attends Club or Organization Meetings: Never     Marital Status: Never    Housing Stability: Low Risk  (3/29/2023)    Housing Stability Vital Sign     Unable to Pay for Housing in the Last Year: No     Number of Places Lived in the Last Year: 1     Unstable Housing in the Last Year: No       Medications:  No current facility-administered medications on file prior to encounter.     Current Outpatient Medications on File Prior to Encounter   Medication Sig Dispense Refill    amLODIPine (NORVASC) 5 MG tablet Take 1 tablet (5 mg total) by mouth once daily. 90 tablet 1    atorvastatin (LIPITOR) 10 MG tablet Take 1 tablet (10 mg total) by mouth every evening. 90 tablet 1    dulaglutide (TRULICITY) 3 mg/0.5 mL pen injector Inject 3 mg into the skin every 7 days. 4 pen 11    gabapentin (NEURONTIN) 300 MG  "capsule Take 1 capsule (300 mg total) by mouth 3 (three) times daily. 90 capsule 0    losartan (COZAAR) 100 MG tablet Take 100 mg by mouth once daily.      ondansetron (ZOFRAN-ODT) 4 MG TbDL Take 1 tablet (4 mg total) by mouth every 8 (eight) hours as needed (FOR NAUSEA; DISSOLVE UNDER THE TONGUE). 30 tablet 0    BD CONOR 2ND GEN PEN NEEDLE 32 gauge x 5/32" Ndle once daily. Use      blood sugar diagnostic Strp To check BG one times daily, to use with insurance preferred meter 100 strip 3    blood-glucose meter kit To check BG one times daily, to use with insurance preferred meter 1 each 0    lancets Misc To check BG one times daily, to use with insurance preferred meter 100 each 3    pen needle, diabetic 32 gauge x 5/16" Ndle 1 Units by Misc.(Non-Drug; Combo Route) route once daily. 100 each 1    [DISCONTINUED] insulin (LANTUS SOLOSTAR U-100 INSULIN) glargine 100 units/mL SubQ pen Inject 35 Units into the skin once daily. 15 mL 6    [DISCONTINUED] tamsulosin (FLOMAX) 0.4 mg Cap Take 1 capsule by mouth every evening.       Scheduled Meds:   enoxparin  30 mg Subcutaneous Q24H (prophylaxis, 1700)    famotidine  20 mg Oral Daily     Continuous Infusions:   sodium chloride 0.9% Stopped (08/22/23 1057)     PRN Meds:.dextrose 50%, dextrose 50%, glucagon (human recombinant), glucose, glucose, HYDROcodone-acetaminophen, HYDROmorphone, insulin aspart U-100, naloxone, ondansetron, sodium chloride 0.9%    Allergies:  Zithromax [azithromycin]    Past Family History:  Reviewed; refer to Hospitalist Admission Note    Review of Systems:  Review of Systems - All 14 systems reviewed and negative, except as noted in HPI    Physical Exam:    /86 (BP Location: Left arm, Patient Position: Lying)   Pulse 84   Temp 98.9 °F (37.2 °C) (Oral)   Resp 16   Ht 6' (1.829 m)   Wt 92.9 kg (204 lb 11.2 oz)   SpO2 95%   BMI 27.76 kg/m²     General Appearance:    Alert, cooperative, no distress, appears stated age   Head:    Normocephalic, " without obvious abnormality, atraumatic   Eyes:    PER, conjunctiva/corneas clear, EOM's intact in both eyes        Throat:   Lips, mucosa, and tongue normal; teeth and gums normal   Back:     Symmetric, no curvature, ROM normal, no CVA tenderness   Lungs:     Clear to auscultation bilaterally, respirations unlabored   Chest wall:    No tenderness or deformity   Heart:    Regular rate and rhythm, S1 and S2 normal, no murmur, rub   or gallop   Abdomen:     Soft, non-tender, bowel sounds active all four quadrants,     no masses, no organomegaly   Extremities:   Extremities normal, atraumatic, no cyanosis or edema   Pulses:   2+ and symmetric all extremities   MSK:   No joint or muscle swelling, tenderness or deformity   Skin:   Skin color, texture, turgor normal, no rashes or lesions   Neurologic:   CNII-XII intact, normal strength and sensation       Throughout.  No flap     Results:  Lab Results   Component Value Date     08/22/2023     08/22/2023    K 4.5 08/22/2023    K 4.5 08/22/2023     08/22/2023     08/22/2023    CO2 25 08/22/2023    CO2 25 08/22/2023    BUN 32 (H) 08/22/2023    BUN 32 (H) 08/22/2023    CREATININE 2.5 (H) 08/22/2023    CREATININE 2.5 (H) 08/22/2023    CALCIUM 8.2 (L) 08/22/2023    CALCIUM 8.2 (L) 08/22/2023    ANIONGAP 5 (L) 08/22/2023    ANIONGAP 5 (L) 08/22/2023    ESTGFRAFRICA 33.5 (A) 06/27/2022    EGFRNONAA 29.0 (A) 06/27/2022       Lab Results   Component Value Date    CALCIUM 8.2 (L) 08/22/2023    CALCIUM 8.2 (L) 08/22/2023    PHOS 3.4 08/22/2023    PHOS 3.4 08/22/2023       Recent Labs   Lab 08/22/23  0304   WBC 7.29  7.29   RBC 4.51*  4.51*   HGB 12.8*  12.8*   HCT 39.8*  39.8*     170   MCV 88  88   MCH 28.4  28.4   MCHC 32.2  32.2            I have personally reviewed pertinent radiological imaging and reports.    Patient care was time spent personally by me on the following activities:   Obtaining a history  Examination of patient.  Providing  medical care at the patients bedside.  Developing a treatment plan with patient or surrogate and bedside caregivers  Ordering and reviewing laboratory studies, radiographic studies, pulse oximetry.  Ordering and performing treatments and interventions.  Evaluation of patient's response to treatment.  Discussions with consultants while on the unit and immediately available to the patient.  Re-evaluation of the patient's condition.  Documentation in the medical record.     Marciano Talavera MD  Nephrology  [facility] [place]  [phone]

## 2023-08-23 NOTE — DISCHARGE SUMMARY
[facility] Medicine  Discharge Summary      Patient Name: Jose Miguel Brennan  MRN: [id number]  RICHARD: [id number]  Patient Class: OP- Observation  Admission Date: 8/21/2023  Hospital Length of Stay: 0 days  Discharge Date and Time:  08/22/2023 8:39 PM  Attending Physician: No att. providers found   Discharging Provider: Mohit Reed MD  Primary Care Provider: Jose Grant NP    Primary Care Team: Networked reference to record PCT     HPI:   No notes on file    * No surgery found *      Hospital Course:   Pt with h/o IgA nephropathy /CKD got admitted with TRACEY on CKD/dehydration  Pt was treated with iv fluids and other measures and was evaluated by Nephro MD  Pts condition got better and was later DC ed to home        Goals of Care Treatment Preferences:  Code Status: Full Code      Consults:   Consults (From admission, onward)        Status Ordering Provider     Inpatient consult to Nephrology  Once        Provider:  Marciano Talavera MD    Completed RACHNA BRIDGES          No new Assessment & Plan notes have been filed under this hospital service since the last note was generated.  Service: Hospital Medicine    Final Active Diagnoses:    Diagnosis Date Noted POA    Type 2 diabetes mellitus with stage 3 chronic kidney disease, without long-term current use of insulin [E11.22, N18.30] 04/10/2014 Yes    IgA nephropathy [N02.8] 03/12/2014 Yes    Hypertension associated with diabetes [E11.59, I15.2] 03/12/2014 Yes      Problems Resolved During this Admission:    Diagnosis Date Noted Date Resolved POA    PRINCIPAL PROBLEM:  Acute on chronic kidney failure [N17.9, N18.9] 08/21/2023 08/22/2023 Yes    Nausea and vomiting [R11.2] 08/21/2023 08/22/2023 Yes       Discharged Condition: good    Disposition: Home or Self Care    Follow Up:   Follow-up Information     Marciano Talavera MD Follow up in 1 week(s).    Specialty: Nephrology  Contact information:  [facility]  "[place] [id number]  [phone]                       Patient Instructions:   No discharge procedures on file.    Significant Diagnostic Studies: Labs:   CMP   Recent Labs   Lab 08/21/23  0329 08/22/23  0304    139  139   K 4.4 4.5  4.5    109  109   CO2 25 25  25   * 94  94   BUN 48* 32*  32*   CREATININE 3.9* 2.5*  2.5*   CALCIUM 9.2 8.2*  8.2*   PROT 7.9 5.7*   ALBUMIN 4.4 3.2*  3.2*   BILITOT 1.5* 0.9   ALKPHOS 115 84   AST 18 12   ALT 26 15   ANIONGAP 11 5*  5*    and CBC   Recent Labs   Lab 08/21/23  0329 08/22/23  0304   WBC 17.42* 7.29  7.29   HGB 15.8 12.8*  12.8*   HCT 48.1 39.8*  39.8*    170  170       Pending Diagnostic Studies:     None         Medications:  Reconciled Home Medications:      Medication List      CONTINUE taking these medications    amLODIPine 5 MG tablet  Commonly known as: NORVASC  Take 1 tablet (5 mg total) by mouth once daily.     atorvastatin 10 MG tablet  Commonly known as: LIPITOR  Take 1 tablet (10 mg total) by mouth every evening.     blood sugar diagnostic Strp  To check BG one times daily, to use with insurance preferred meter     blood-glucose meter kit  To check BG one times daily, to use with insurance preferred meter     gabapentin 300 MG capsule  Commonly known as: NEURONTIN  Take 1 capsule (300 mg total) by mouth 3 (three) times daily.     lancets [facility]  To check BG one times daily, to use with insurance preferred meter     losartan 100 MG tablet  Commonly known as: COZAAR  Take 100 mg by mouth once daily.     ondansetron 4 MG Tbdl  Commonly known as: ZOFRAN-ODT  Take 1 tablet (4 mg total) by mouth every 8 (eight) hours as needed (FOR NAUSEA; DISSOLVE UNDER THE TONGUE).     * pen needle, diabetic 32 gauge x 5/16" Ndle  1 Units by Misc.(Non-Drug; Combo Route) route once daily.     * BD CONOR 2ND GEN PEN NEEDLE 32 gauge x 5/32" Ndle  Generic drug: pen needle, diabetic  once daily. Use     TRULICITY 3 mg/0.5 mL pen " injector  Generic drug: dulaglutide  Inject 3 mg into the skin every 7 days.         * This list has 2 medication(s) that are the same as other medications prescribed for you. Read the directions carefully, and ask your doctor or other care provider to review them with you.            STOP taking these medications    insulin glargine 100 units/mL SubQ pen  Commonly known as: LANTUS SOLOSTAR U-100 INSULIN     tamsulosin 0.4 mg Cap  Commonly known as: FLOMAX            Indwelling Lines/Drains at time of discharge:   Lines/Drains/Airways     None               Physical Exam   Constitutional: He is oriented to person, place, and time.   Cardiovascular: Normal rate.   Neurological: He is alert and oriented to person, place, and time.     Time spent on the discharge of patient: 23 minutes         Mohit Reed MD  Department of Hospital Medicine  [facility]

## 2023-08-23 NOTE — HOSPITAL COURSE
Pt with h/o IgA nephropathy /CKD got admitted with TRACEY on CKD/dehydration  Pt was treated with iv fluids and other measures and was evaluated by Nephro MD  Pts condition got better and was later DC ed to home

## 2023-09-09 ENCOUNTER — LAB VISIT (OUTPATIENT)
Dept: LAB | Facility: HOSPITAL | Age: 38
End: 2023-09-09
Attending: NURSE PRACTITIONER
Payer: MEDICAID

## 2023-09-09 DIAGNOSIS — N18.30 TYPE 2 DIABETES MELLITUS WITH STAGE 3 CHRONIC KIDNEY DISEASE, WITHOUT LONG-TERM CURRENT USE OF INSULIN, UNSPECIFIED WHETHER STAGE 3A OR 3B CKD: ICD-10-CM

## 2023-09-09 DIAGNOSIS — E11.22 TYPE 2 DIABETES MELLITUS WITH STAGE 3 CHRONIC KIDNEY DISEASE, WITHOUT LONG-TERM CURRENT USE OF INSULIN, UNSPECIFIED WHETHER STAGE 3A OR 3B CKD: ICD-10-CM

## 2023-09-09 DIAGNOSIS — E11.22 TYPE 2 DIABETES MELLITUS WITH STAGE 3B CHRONIC KIDNEY DISEASE, WITHOUT LONG-TERM CURRENT USE OF INSULIN: ICD-10-CM

## 2023-09-09 DIAGNOSIS — N18.32 TYPE 2 DIABETES MELLITUS WITH STAGE 3B CHRONIC KIDNEY DISEASE, WITHOUT LONG-TERM CURRENT USE OF INSULIN: ICD-10-CM

## 2023-09-09 LAB
ALBUMIN SERPL BCP-MCNC: 4.1 G/DL (ref 3.5–5.2)
ALP SERPL-CCNC: 96 U/L (ref 55–135)
ALT SERPL W/O P-5'-P-CCNC: 21 U/L (ref 10–44)
ANION GAP SERPL CALC-SCNC: 7 MMOL/L (ref 8–16)
AST SERPL-CCNC: 14 U/L (ref 10–40)
BILIRUB SERPL-MCNC: 0.7 MG/DL (ref 0.1–1)
BUN SERPL-MCNC: 47 MG/DL (ref 6–20)
CALCIUM SERPL-MCNC: 9.3 MG/DL (ref 8.7–10.5)
CHLORIDE SERPL-SCNC: 106 MMOL/L (ref 95–110)
CHOLEST SERPL-MCNC: 170 MG/DL (ref 120–199)
CHOLEST/HDLC SERPL: 6.5 {RATIO} (ref 2–5)
CO2 SERPL-SCNC: 25 MMOL/L (ref 23–29)
CREAT SERPL-MCNC: 2.8 MG/DL (ref 0.5–1.4)
EST. GFR  (NO RACE VARIABLE): 28.9 ML/MIN/1.73 M^2
ESTIMATED AVG GLUCOSE: 169 MG/DL (ref 68–131)
GLUCOSE SERPL-MCNC: 172 MG/DL (ref 70–110)
HBA1C MFR BLD: 7.5 % (ref 4.5–6.2)
HDLC SERPL-MCNC: 26 MG/DL (ref 40–75)
HDLC SERPL: 15.3 % (ref 20–50)
LDLC SERPL CALC-MCNC: ABNORMAL MG/DL (ref 63–159)
NONHDLC SERPL-MCNC: 144 MG/DL
POTASSIUM SERPL-SCNC: 4.4 MMOL/L (ref 3.5–5.1)
PROT SERPL-MCNC: 7.3 G/DL (ref 6–8.4)
SODIUM SERPL-SCNC: 138 MMOL/L (ref 136–145)
TRIGL SERPL-MCNC: 514 MG/DL (ref 30–150)

## 2023-09-09 PROCEDURE — 80061 LIPID PANEL: CPT | Performed by: NURSE PRACTITIONER

## 2023-09-09 PROCEDURE — 83036 HEMOGLOBIN GLYCOSYLATED A1C: CPT | Performed by: NURSE PRACTITIONER

## 2023-09-09 PROCEDURE — 84681 ASSAY OF C-PEPTIDE: CPT | Performed by: NURSE PRACTITIONER

## 2023-09-09 PROCEDURE — 80053 COMPREHEN METABOLIC PANEL: CPT | Performed by: NURSE PRACTITIONER

## 2023-09-10 NOTE — PROGRESS NOTES
SUBJECTIVE:      Patient ID: Jose Miguel Brennan is a 37 y.o. male.    Chief Complaint: Hospital Follow Up    Patient is here today as a hospital follow up. He was admitted with CKD and hydrated.  He is following with Dr Conte for ckd, has an appt 9/18. He also has an appt with endo 9/18. He says he is doing much better. He is trying to stay hydrated and taking his meds as prescribed. His A1c is improved    Diabetes  He presents for his follow-up diabetic visit. He has type 2 diabetes mellitus. The initial diagnosis of diabetes was made 10 years ago. His disease course has been improving. There are no hypoglycemic associated symptoms. Pertinent negatives for hypoglycemia include no confusion, dizziness, headaches, nervousness/anxiousness, pallor, seizures or speech difficulty. Pertinent negatives for diabetes include no chest pain, no polyphagia and no weakness. There are no hypoglycemic complications. Symptoms are stable. Risk factors for coronary artery disease include diabetes mellitus, male sex and hypertension. Current diabetic treatment includes insulin injections and oral agent (monotherapy). He is compliant with treatment most of the time. His weight is stable. He is following a generally unhealthy diet. When asked about meal planning, he reported none. He rarely participates in exercise. An ACE inhibitor/angiotensin II receptor blocker is being taken. He does not see a podiatrist.Eye exam is not current.   Hypertension  This is a chronic problem. The problem is unchanged. The problem is controlled. Pertinent negatives include no chest pain, headaches, palpitations or shortness of breath. Risk factors for coronary artery disease include diabetes mellitus, dyslipidemia, male gender and smoking/tobacco exposure. Past treatments include angiotensin blockers and calcium channel blockers. The current treatment provides moderate improvement.       Past Surgical History:   Procedure Laterality Date     COLONOSCOPY N/A 2020    Procedure: COLONOSCOPY;  Surgeon: Hammad Castorena III, MD;  Location: [facility] ENDO;  Service: Endoscopy;  Laterality: N/A;    ESOPHAGOGASTRODUODENOSCOPY N/A 2020    Procedure: EGD (ESOPHAGOGASTRODUODENOSCOPY);  Surgeon: Hammad Castorena III, MD;  Location: [facility] ENDO;  Service: Endoscopy;  Laterality: N/A;    nose polyp removal       Family History   Problem Relation Age of Onset    Hypertension Maternal Grandmother     Cancer Maternal Grandfather     Diabetes Maternal Grandfather     Heart disease Maternal Grandfather     Heart disease Mother     Stroke Mother     Diabetes Mother       Social History     Socioeconomic History    Marital status: Single   Tobacco Use    Smoking status: Former     Current packs/day: 0.00     Average packs/day: 0.3 packs/day for 5.0 years (1.3 ttl pk-yrs)     Types: Cigars, Cigarettes     Start date: 2017     Quit date: 2022     Years since quittin.3     Passive exposure: Past    Smokeless tobacco: Never    Tobacco comments:     One cigar a day   Substance and Sexual Activity    Alcohol use: Yes    Drug use: No    Sexual activity: Yes     Partners: Female     Social Determinants of Health     Financial Resource Strain: Low Risk  (3/29/2023)    Overall Financial Resource Strain (CARDIA)     Difficulty of Paying Living Expenses: Not hard at all   Food Insecurity: No Food Insecurity (3/29/2023)    Hunger Vital Sign     Worried About Running Out of Food in the Last Year: Never true     Ran Out of Food in the Last Year: Never true   Transportation Needs: No Transportation Needs (3/29/2023)    PRAPARE - Transportation     Lack of Transportation (Medical): No     Lack of Transportation (Non-Medical): No   Physical Activity: Inactive (3/29/2023)    Exercise Vital Sign     Days of Exercise per Week: 0 days     Minutes of Exercise per Session: 0 min   Stress: Stress Concern Present (2023)    [employer] -  "Occupational Stress Questionnaire     Feeling of Stress : To some extent   Social Connections: Socially Isolated (3/29/2023)    Social Connection and Isolation Panel [NHANES]     Frequency of Communication with Friends and Family: More than three times a week     Frequency of Social Gatherings with Friends and Family: More than three times a week     Attends [employer] Services: Never     Active Member of Clubs or Organizations: No     Attends Club or Organization Meetings: Never     Marital Status: Never    Housing Stability: Low Risk  (3/29/2023)    Housing Stability Vital Sign     Unable to Pay for Housing in the Last Year: No     Number of Places Lived in the Last Year: 1     Unstable Housing in the Last Year: No     Current Outpatient Medications   Medication Sig Dispense Refill    dulaglutide (TRULICITY) 3 mg/0.5 mL pen injector Inject 3 mg into the skin every 7 days. 4 pen 11    gabapentin (NEURONTIN) 300 MG capsule Take 1 capsule (300 mg total) by mouth 3 (three) times daily. 90 capsule 0    losartan (COZAAR) 100 MG tablet Take 100 mg by mouth once daily.      amLODIPine (NORVASC) 5 MG tablet Take 1 tablet (5 mg total) by mouth once daily. 90 tablet 1    atorvastatin (LIPITOR) 10 MG tablet Take 1 tablet (10 mg total) by mouth every evening. 90 tablet 1    BD CONOR 2ND GEN PEN NEEDLE 32 gauge x 5/32" Ndle once daily. Use      blood sugar diagnostic Strp To check BG one times daily, to use with insurance preferred meter 100 strip 3    blood-glucose meter kit To check BG one times daily, to use with insurance preferred meter 1 each 0    lancets Misc To check BG one times daily, to use with insurance preferred meter 100 each 3    pen needle, diabetic 32 gauge x 5/16" Ndle 1 Units by Misc.(Non-Drug; Combo Route) route once daily. 100 each 1     No current facility-administered medications for this visit.     Review of patient's allergies indicates:   Allergen Reactions    Zithromax [azithromycin] Rash    "   Past Medical History:   Diagnosis Date    Anxiety     Asthma     Depression     Diabetes mellitus     diet controlled    Diabetes mellitus, type 2     GERD (gastroesophageal reflux disease)     Gout     Hyperlipidemia     Hypertension     IgA nephropathy     Insomnia      Past Surgical History:   Procedure Laterality Date    COLONOSCOPY N/A 5/8/2020    Procedure: COLONOSCOPY;  Surgeon: Hammad Castorena III, MD;  Location: [facility];  Service: Endoscopy;  Laterality: N/A;    ESOPHAGOGASTRODUODENOSCOPY N/A 5/8/2020    Procedure: EGD (ESOPHAGOGASTRODUODENOSCOPY);  Surgeon: Hammad Castorena III, MD;  Location: [facility];  Service: Endoscopy;  Laterality: N/A;    nose polyp removal         Review of Systems   Constitutional:  Negative for appetite change, chills, diaphoresis and unexpected weight change.   HENT:  Negative for ear discharge, facial swelling, hearing loss, nosebleeds and trouble swallowing.    Eyes:  Negative for photophobia, pain and visual disturbance.   Respiratory:  Negative for apnea, choking, shortness of breath and wheezing.    Cardiovascular:  Negative for chest pain and palpitations.   Gastrointestinal:  Negative for abdominal pain, blood in stool and vomiting.   Endocrine: Negative for polyphagia.   Genitourinary:  Negative for difficulty urinating and hematuria.   Musculoskeletal:  Negative for gait problem and joint swelling.   Skin:  Negative for pallor.   Neurological:  Negative for dizziness, seizures, speech difficulty, weakness and headaches.   Hematological:  Does not bruise/bleed easily.   Psychiatric/Behavioral:  Negative for agitation, confusion, dysphoric mood, self-injury, sleep disturbance and suicidal ideas. The patient is not nervous/anxious.       OBJECTIVE:      Vitals:    09/11/23 1320 09/11/23 1339   BP: (!) 110/90 110/84   Pulse: 107    Temp: 98.8 °F (37.1 °C)    SpO2: 98%    Weight: 96.6 kg (213 lb)    Height: 6' (1.829 m)      Physical Exam  Vitals and nursing note  reviewed.   Constitutional:       General: He is not in acute distress.     Appearance: He is well-developed.   HENT:      Head: Normocephalic and atraumatic.      Nose: Nose normal.      Mouth/Throat:      Pharynx: Uvula midline.   Eyes:      General: Lids are normal.      Conjunctiva/sclera: Conjunctivae normal.      Pupils: Pupils are equal, round, and reactive to light.      Right eye: Pupil is round and reactive.      Left eye: Pupil is round and reactive.   Neck:      Thyroid: No thyromegaly.      Vascular: No carotid bruit.   Cardiovascular:      Rate and Rhythm: Normal rate and regular rhythm.      Pulses: Normal pulses.      Heart sounds: Normal heart sounds. No murmur heard.  Pulmonary:      Effort: Pulmonary effort is normal.      Breath sounds: Normal breath sounds. No wheezing, rhonchi or rales.   Abdominal:      General: Bowel sounds are normal.      Palpations: Abdomen is soft. Abdomen is not rigid.      Tenderness: There is no abdominal tenderness.   Musculoskeletal:         General: Normal range of motion.      Cervical back: Normal range of motion and neck supple.      Right lower leg: No edema.      Left lower leg: No edema.   Lymphadenopathy:      Cervical: No cervical adenopathy.   Skin:     General: Skin is warm and dry.      Nails: There is no clubbing.   Neurological:      Mental Status: He is alert and oriented to person, place, and time.   Psychiatric:         Mood and Affect: Mood normal.         Speech: Speech normal.         Behavior: Behavior normal. Behavior is cooperative.         Thought Content: Thought content normal.         Judgment: Judgment normal.        Component      Latest Ref Rng 9/9/2023   Sodium      136 - 145 mmol/L 138    Potassium      3.5 - 5.1 mmol/L 4.4    Chloride      95 - 110 mmol/L 106    CO2      23 - 29 mmol/L 25    Glucose      70 - 110 mg/dL 172 (H)    BUN      6 - 20 mg/dL 47 (H)    Creatinine      0.5 - 1.4 mg/dL 2.8 (H)    Calcium      8.7 - 10.5 mg/dL  9.3    PROTEIN TOTAL      6.0 - 8.4 g/dL 7.3    Albumin      3.5 - 5.2 g/dL 4.1    BILIRUBIN TOTAL      0.1 - 1.0 mg/dL 0.7    Alkaline Phosphatase      55 - 135 U/L 96    AST      10 - 40 U/L 14    ALT      10 - 44 U/L 21    eGFR      >60 mL/min/1.73 m^2 28.9 !    Anion Gap      8 - 16 mmol/L 7 (L)    Cholesterol      120 - 199 mg/dL 170    Triglycerides      30 - 150 mg/dL 514 (H)    HDL      40 - 75 mg/dL 26 (L)    LDL Cholesterol External      63.0 - 159.0 mg/dL Invalid, Trig>400.0    HDL/Cholesterol Ratio      20.0 - 50.0 % 15.3 (L)    Total Cholesterol/HDL Ratio      2.0 - 5.0  6.5 (H)    Non-HDL Cholesterol      mg/dL 144    Hemoglobin A1C External      4.5 - 6.2 % 7.5 (H)    Estimated Avg Glucose      68 - 131 mg/dL 169 (H)    C-Peptide      1.1 - 4.4 ng/mL 11.2 (H)    TSH      0.340 - 5.600 uIU/mL 1.180       Legend:  (H) High  ! Abnormal  (L) Low    Last visit note, most recent available labs, and health maintenance reviewed    Assessment:       1. Type 2 diabetes mellitus without complication, with long-term current use of insulin    2. Essential hypertension    3. Mixed hyperlipidemia    4. Hospital discharge follow-up        Plan:       Type 2 diabetes mellitus without complication, with long-term current use of insulin  -     Ambulatory referral/consult to Ophthalmology; Future; Expected date: 09/18/2023    Essential hypertension  -     amLODIPine (NORVASC) 5 MG tablet; Take 1 tablet (5 mg total) by mouth once daily.  Dispense: 90 tablet; Refill: 1    Mixed hyperlipidemia  -     atorvastatin (LIPITOR) 10 MG tablet; Take 1 tablet (10 mg total) by mouth every evening.  Dispense: 90 tablet; Refill: 1    Hospital discharge follow-up  Hospital records reviewed  Medications reconciled   Patient has f/u appointments scheduled    Follow up in about 6 months (around 3/11/2024) for htn.      9/11/2023 WU Tucker, FNP

## 2023-09-11 ENCOUNTER — OFFICE VISIT (OUTPATIENT)
Dept: FAMILY MEDICINE | Facility: CLINIC | Age: 38
End: 2023-09-11
Payer: COMMERCIAL

## 2023-09-11 ENCOUNTER — PATIENT MESSAGE (OUTPATIENT)
Dept: FAMILY MEDICINE | Facility: CLINIC | Age: 38
End: 2023-09-11

## 2023-09-11 VITALS
WEIGHT: 213 LBS | HEIGHT: 72 IN | BODY MASS INDEX: 28.85 KG/M2 | SYSTOLIC BLOOD PRESSURE: 110 MMHG | TEMPERATURE: 99 F | DIASTOLIC BLOOD PRESSURE: 84 MMHG | HEART RATE: 107 BPM | OXYGEN SATURATION: 98 %

## 2023-09-11 DIAGNOSIS — I10 ESSENTIAL HYPERTENSION: ICD-10-CM

## 2023-09-11 DIAGNOSIS — E11.9 TYPE 2 DIABETES MELLITUS WITHOUT COMPLICATION, WITH LONG-TERM CURRENT USE OF INSULIN: Primary | ICD-10-CM

## 2023-09-11 DIAGNOSIS — Z09 HOSPITAL DISCHARGE FOLLOW-UP: ICD-10-CM

## 2023-09-11 DIAGNOSIS — E78.2 MIXED HYPERLIPIDEMIA: ICD-10-CM

## 2023-09-11 DIAGNOSIS — Z79.4 TYPE 2 DIABETES MELLITUS WITHOUT COMPLICATION, WITH LONG-TERM CURRENT USE OF INSULIN: Primary | ICD-10-CM

## 2023-09-11 LAB — C PEPTIDE SERPL-MCNC: 11.2 NG/ML (ref 1.1–4.4)

## 2023-09-11 PROCEDURE — 99214 PR OFFICE/OUTPT VISIT, EST, LEVL IV, 30-39 MIN: ICD-10-PCS | Mod: S$GLB,,, | Performed by: NURSE PRACTITIONER

## 2023-09-11 PROCEDURE — 3074F SYST BP LT 130 MM HG: CPT | Mod: CPTII,S$GLB,, | Performed by: NURSE PRACTITIONER

## 2023-09-11 PROCEDURE — 1159F MED LIST DOCD IN RCRD: CPT | Mod: CPTII,S$GLB,, | Performed by: NURSE PRACTITIONER

## 2023-09-11 PROCEDURE — 3079F DIAST BP 80-89 MM HG: CPT | Mod: CPTII,S$GLB,, | Performed by: NURSE PRACTITIONER

## 2023-09-11 PROCEDURE — 1160F RVW MEDS BY RX/DR IN RCRD: CPT | Mod: CPTII,S$GLB,, | Performed by: NURSE PRACTITIONER

## 2023-09-11 PROCEDURE — 3051F HG A1C>EQUAL 7.0%<8.0%: CPT | Mod: CPTII,S$GLB,, | Performed by: NURSE PRACTITIONER

## 2023-09-11 PROCEDURE — 3079F PR MOST RECENT DIASTOLIC BLOOD PRESSURE 80-89 MM HG: ICD-10-PCS | Mod: CPTII,S$GLB,, | Performed by: NURSE PRACTITIONER

## 2023-09-11 PROCEDURE — 99214 OFFICE O/P EST MOD 30 MIN: CPT | Mod: S$GLB,,, | Performed by: NURSE PRACTITIONER

## 2023-09-11 PROCEDURE — 3074F PR MOST RECENT SYSTOLIC BLOOD PRESSURE < 130 MM HG: ICD-10-PCS | Mod: CPTII,S$GLB,, | Performed by: NURSE PRACTITIONER

## 2023-09-11 PROCEDURE — 3051F PR MOST RECENT HEMOGLOBIN A1C LEVEL 7.0 - < 8.0%: ICD-10-PCS | Mod: CPTII,S$GLB,, | Performed by: NURSE PRACTITIONER

## 2023-09-11 PROCEDURE — 1159F PR MEDICATION LIST DOCUMENTED IN MEDICAL RECORD: ICD-10-PCS | Mod: CPTII,S$GLB,, | Performed by: NURSE PRACTITIONER

## 2023-09-11 PROCEDURE — 3008F BODY MASS INDEX DOCD: CPT | Mod: CPTII,S$GLB,, | Performed by: NURSE PRACTITIONER

## 2023-09-11 PROCEDURE — 3008F PR BODY MASS INDEX (BMI) DOCUMENTED: ICD-10-PCS | Mod: CPTII,S$GLB,, | Performed by: NURSE PRACTITIONER

## 2023-09-11 PROCEDURE — 4010F PR ACE/ARB THEARPY RXD/TAKEN: ICD-10-PCS | Mod: CPTII,S$GLB,, | Performed by: NURSE PRACTITIONER

## 2023-09-11 PROCEDURE — 4010F ACE/ARB THERAPY RXD/TAKEN: CPT | Mod: CPTII,S$GLB,, | Performed by: NURSE PRACTITIONER

## 2023-09-11 PROCEDURE — 1160F PR REVIEW ALL MEDS BY PRESCRIBER/CLIN PHARMACIST DOCUMENTED: ICD-10-PCS | Mod: CPTII,S$GLB,, | Performed by: NURSE PRACTITIONER

## 2023-09-11 RX ORDER — AMLODIPINE BESYLATE 5 MG/1
5 TABLET ORAL DAILY
Qty: 90 TABLET | Refills: 1 | Status: SHIPPED | OUTPATIENT
Start: 2023-09-11

## 2023-09-11 RX ORDER — ATORVASTATIN CALCIUM 10 MG/1
10 TABLET, FILM COATED ORAL NIGHTLY
Qty: 90 TABLET | Refills: 1 | Status: SHIPPED | OUTPATIENT
Start: 2023-09-11 | End: 2023-12-04

## 2023-09-18 ENCOUNTER — OFFICE VISIT (OUTPATIENT)
Dept: ENDOCRINOLOGY | Facility: CLINIC | Age: 38
End: 2023-09-18
Payer: COMMERCIAL

## 2023-09-18 VITALS
OXYGEN SATURATION: 100 % | HEART RATE: 101 BPM | WEIGHT: 214.5 LBS | BODY MASS INDEX: 29.05 KG/M2 | HEIGHT: 72 IN | DIASTOLIC BLOOD PRESSURE: 82 MMHG | SYSTOLIC BLOOD PRESSURE: 130 MMHG

## 2023-09-18 DIAGNOSIS — E11.59 HYPERTENSION ASSOCIATED WITH DIABETES: ICD-10-CM

## 2023-09-18 DIAGNOSIS — E11.22 TYPE 2 DIABETES MELLITUS WITH STAGE 3 CHRONIC KIDNEY DISEASE, WITHOUT LONG-TERM CURRENT USE OF INSULIN, UNSPECIFIED WHETHER STAGE 3A OR 3B CKD: Primary | ICD-10-CM

## 2023-09-18 DIAGNOSIS — N18.30 TYPE 2 DIABETES MELLITUS WITH STAGE 3 CHRONIC KIDNEY DISEASE, WITHOUT LONG-TERM CURRENT USE OF INSULIN, UNSPECIFIED WHETHER STAGE 3A OR 3B CKD: Primary | ICD-10-CM

## 2023-09-18 DIAGNOSIS — E78.1 HYPERTRIGLYCERIDEMIA: ICD-10-CM

## 2023-09-18 DIAGNOSIS — I15.2 HYPERTENSION ASSOCIATED WITH DIABETES: ICD-10-CM

## 2023-09-18 LAB — GLUCOSE SERPL-MCNC: 217 MG/DL (ref 70–110)

## 2023-09-18 PROCEDURE — 82962 GLUCOSE BLOOD TEST: CPT | Mod: PBBFAC,PO | Performed by: NURSE PRACTITIONER

## 2023-09-18 PROCEDURE — 99213 OFFICE O/P EST LOW 20 MIN: CPT | Mod: PBBFAC,PO | Performed by: NURSE PRACTITIONER

## 2023-09-18 PROCEDURE — 99999 PR PBB SHADOW E&M-EST. PATIENT-LVL III: ICD-10-PCS | Mod: PBBFAC,,, | Performed by: NURSE PRACTITIONER

## 2023-09-18 PROCEDURE — 99999 PR PBB SHADOW E&M-EST. PATIENT-LVL III: CPT | Mod: PBBFAC,,, | Performed by: NURSE PRACTITIONER

## 2023-09-18 RX ORDER — OMEGA-3-ACID ETHYL ESTERS 1 G/1
1 CAPSULE, LIQUID FILLED ORAL 2 TIMES DAILY
Qty: 180 CAPSULE | Refills: 3 | Status: SHIPPED | OUTPATIENT
Start: 2023-09-18 | End: 2024-09-17

## 2023-09-18 NOTE — PROGRESS NOTES
CC: This 37 y.o. male presents for management of diabetes  and chronic conditions pending review including  CKD 3, HTN, hypertriglyceridemia     HPI: He was diagnosed with T2DM at age 12. Has been hospitalized r/t DM at diagnosis and a few months ago after a steroid injection   Family hx of DM: dad, sister, mother, PGF, MGF      Since his last visit, admitted to [facility] with TRACEY and dehydration  He stopped lantus r/t gi side effects     hypoglycemia at home-  none  monitoring BG at home:   Fastins  Pre-supper 130-140s  Had honey bun and coffee ~ 10am, bg in office is 217    Diet: Eats 2 Meals a day, snacks-  night- Baked Lay's  Skips breakfast  Exercise: walking around the truck stop !/4 mile)  CURRENT DM MEDS:  Trulicity 3 mg weekly (Friday)   Vial/pen:  Uses pen  Glucometer type: One Touch Verio    Standards of Care:  Eye exam: > 1 year -  Needs to schedule          Following w Dr Flores in nephrology     ROS:   Gen: Appetite good,   Eyes: Denies visual disturbances  Resp: no SOB or MATHEWS   Cardiac: No palpitations, chest pain   GI: No nausea or vomiting, diarrhea, constipation   /GYN: 1-2+ nocturia, no burning or pain.   MS/Neuro: Denies numbness/ tingling in BLE; Gait steady, speech clear  Psych: Denies drug/ETOH abuse, no hx of depression.  Other systems: negative.    PE:  GENERAL: Well developed, well nourished.  PSYCH: AAOx3, appropriate mood and affect, pleasant expression, conversant, appears relaxed, well groomed.   EYES: Conjunctiva, corneas clear  NECK: Supple, trachea midline  ABDOMEN: Soft, non-tender, non-distended   NEURO: Gait steady  SKIN:   no acanthosis nigracans.  FOOT EXAMINATION: 3/20/2023   + callus formation on bilateral great toed, right great toe with brown spots noted under callus Onychomycosis,  no interspace maceration or ulceration noted.  Decreased hair growth present over toes/feet.   Protective sensation intact with 10 gram monofilament.  +2 dorsalis pedis and  "posterior pulses noted. Varicose veins     Personally reviewed Past Medical, Surgical, Social History.    /82 (BP Method: Large (Manual))   Pulse 101   Ht 6' (1.829 m)   Wt 97.3 kg (214 lb 8.1 oz)   SpO2 100%   BMI 29.09 kg/m²      Personally reviewed the below labs:      Chemistry        Component Value Date/Time     09/09/2023 1042    K 4.4 09/09/2023 1042     09/09/2023 1042    CO2 25 09/09/2023 1042    BUN 47 (H) 09/09/2023 1042    CREATININE 2.8 (H) 09/09/2023 1042     (H) 09/09/2023 1042        Component Value Date/Time    CALCIUM 9.3 09/09/2023 1042    ALKPHOS 96 09/09/2023 1042    AST 14 09/09/2023 1042    ALT 21 09/09/2023 1042    BILITOT 0.7 09/09/2023 1042    ESTGFRAFRICA 33.5 (A) 06/27/2022 1223    EGFRNONAA 29.0 (A) 06/27/2022 1223            Lab Results   Component Value Date    TSH 1.180 09/09/2023       Recent Labs   Lab 09/09/23  1042   LDL Cholesterol Invalid, Trig>400.0   HDL 26 L   Cholesterol 170        Results for orders placed or performed during the hospital encounter of 08/21/23   Vitamin D   Result Value Ref Range    Vit D, 25-Hydroxy 15 (L) 30 - 96 ng/mL     Results for orders placed or performed in visit on 07/01/10   Calcitriol   Result Value Ref Range    Vit D, 1,25-Dihydroxy 35 18 - 64 pg/mL       No results found for: "MICALBCREAT"    Hemoglobin A1C   Date Value Ref Range Status   09/09/2023 7.5 (H) 4.5 - 6.2 % Final     Comment:     According to ADA guidelines, hemoglobin A1C <7.0% represents  optimal control in non-pregnant diabetic patients.  Different  metrics may apply to specific populations.   Standards of Medical Care in Diabetes - 2016.    For the purpose of screening for the presence of diabetes:  <5.7%     Consistent with the absence of diabetes  5.7-6.4%  Consistent with increasing risk for diabetes   (prediabetes)  >or=6.5%  Consistent with diabetes    Currently no consensus exists for use of hemoglobin A1C  for diagnosis of diabetes for " children.     06/12/2023 7.6 (H) 4.0 - 5.6 % Final     Comment:     ADA Screening Guidelines:  5.7-6.4%  Consistent with prediabetes  >or=6.5%  Consistent with diabetes    High levels of fetal hemoglobin interfere with the HbA1C  assay. Heterozygous hemoglobin variants (HbS, HgC, etc)do  not significantly interfere with this assay.   However, presence of multiple variants may affect accuracy.     01/27/2023 9.2 (H) 4.5 - 6.2 % Final     Comment:     According to ADA guidelines, hemoglobin A1C <7.0% represents  optimal control in non-pregnant diabetic patients.  Different  metrics may apply to specific populations.   Standards of Medical Care in Diabetes - 2016.    For the purpose of screening for the presence of diabetes:  <5.7%     Consistent with the absence of diabetes  5.7-6.4%  Consistent with increasing risk for diabetes   (prediabetes)  >or=6.5%  Consistent with diabetes    Currently no consensus exists for use of hemoglobin A1C  for diagnosis of diabetes for children.          ASSESSMENT and PLAN:      1. T2DM with hyperglycemia, CKD 3b-     Continue Trulicity 3 mg weekly     Check bg bid- log in 2 weeks  Clean up diet!!     2. HTN - controlled, continue meds as previously prescribed and monitor.     3. Hypertriglyceridemia- on statin, Increase fish oil to bid- change to Rx strength (has been taking OTC fish oil daily)    4. CKD 3b- Need to optimize bg control to prevent worsening kidney fx, following alex Flores       Follow-up: in 3 months with lab prior

## 2023-09-18 NOTE — PATIENT INSTRUCTIONS
Low Carb Snacks  Snacks can be an important part of a balanced, healthy meal plan. They allow you to eat more frequently, feeling full and satisfied throughout the day. Also, they allow you to spread carbohydrates evenly, which may stabilize blood sugars.  Plus, snacks are enjoyable!     The amount of carbohydrate needed at snacks varies. Generally, about 15-30 grams of carbohydrate per snack is recommended.  Below you will find some tasty treats.       0-5 gm carb  Crystal Light  Vitamin Water Zero  Herbal tea, unsweetened  2 tsp peanut butter on celery  1./2 cup sugar-free jell-o  1 sugar-free popsicle  ¼ cup blueberries  8oz Blue Anat unsweetened almond milk  5 baby carrots & celery sticks, cucumbers, bell peppers dipped in ¼ cup salsa, 2Tbsp light ranch dressing or 2Tbsp plain Greek yogurt  10 Goldfish crackers  ½ oz low-fat cheese or string cheese  1 closed handful of nuts, unsalted  1 Tbsp of sunflower seeds, unsalted  1 cup Smart Pop popcorn  1 whole grain brown rice cake        15 gm carb  1 small piece of fruit or ½ banana or 1/2 cup lite canned fruit  3 michael cracker squares  3 cups Smart Pop popcorn, top spray butter, Taveras lite salt or cinnamon and Truvia  5 Vanilla Wafers  ½ cup low fat, no added sugar ice cream or frozen yogurt (Blue bell, Blue Bunny, Weight Watchers, Skinny Cow)  ½ turkey, ham, or chicken sandwich  ½ c fruit with ½ c Cottage cheese  4-6 unsalted wheat crackers with 1 oz low fat cheese or 1 tbsp peanut butter   30-45 goldfish crackers (depending on flavor)   7-8 Baptism mini brown rice cakes (caramel, apple cinnamon, chocolate)   12 Baptism mini brown rice cakes (cheddar, bbq, ranch)   1/3 cup hummus dip with raw veg  1/2 whole wheat garfield, 1Tbsp hummus  Mini Pizza (1/2 whole wheat English muffin, low-fat  cheese, tomato sauce)  100 calorie snack pack (Oreo, Chips Ahoy, Ritz Mix, Baked Cheetos)  4-6 oz. light or Greek Style yogurt (Chobani, Yoplait, Judy, [facility])  ½ cup  sugar-free pudding    6 in. wheat tortilla or garfield oven toasted chips (topped with spray butter flavoring, cinnamon, Truvia OR spray butter, garlic powder, chili powder)   18 BBQ Popchips (available at [employer], Whole Foods, Fresh Market)    Celery with peanut butter  Celery with tuna salad  Hard boiled eggs- no yolk  Dill pickles and 2% cheddar cheese (no kidding, it's a great combo)  Nuts (keep raw ones in the freezer if you think you'll overeat them)  Jerky (beef or turkey -- try to find low-sugar varieties)  2% Cheese sticks, such as string cheese  Sugar-free Jello, alone or with cottage cheese and a sprinkling of nuts. Make sugar-free lime Jello with part coconut milk -- For a large package, dissolve the powder in a cup of boiling water, add a can of coconut milk, and then add the rest of the water. Stir well.  2% Cheese with a few apple slices  Lettuce Roll-ups -- Roll luncheon meat, egg salad, tuna or other filling and veggies in lettuce leaves  Lunch Meat Roll-ups -- Roll cheese or veggies in lunch meat (read the labels for carbs on the lunch meat)  Product Review: Atkins Advantage Bars

## 2023-10-10 NOTE — LETTER
November 15, 2022      [facility]  [address]  [place] [address]  Phone: [phone]  Fax: [phone]       Patient: Jose Miguel Brennan   YOB: 1985  Date of Visit: 11/15/2022    To Whom It May Concern:    Devin Brennan was seen in our office 11/15/2022. Please excuse him from work today 11/15/22. He may return 11/16/22.      Sincerely,    Bo Chu MD      Last script sent I  9/29/23 does not have a pharmacy attached

## 2023-12-02 ENCOUNTER — OCCUPATIONAL HEALTH (OUTPATIENT)
Dept: URGENT CARE | Facility: CLINIC | Age: 38
End: 2023-12-02

## 2023-12-02 DIAGNOSIS — Z13.9 ENCOUNTER FOR SCREENING: Primary | ICD-10-CM

## 2023-12-02 LAB — COLLECTION ONLY: NORMAL

## 2023-12-02 PROCEDURE — 80305 DRUG TEST PRSMV DIR OPT OBS: CPT | Mod: S$GLB,,, | Performed by: NURSE PRACTITIONER

## 2023-12-02 PROCEDURE — 80305 OOH COLLECTION ONLY DRUG SCREEN: ICD-10-PCS | Mod: S$GLB,,, | Performed by: NURSE PRACTITIONER

## 2023-12-03 DIAGNOSIS — E78.2 MIXED HYPERLIPIDEMIA: ICD-10-CM

## 2023-12-04 RX ORDER — ATORVASTATIN CALCIUM 10 MG/1
10 TABLET, FILM COATED ORAL NIGHTLY
Qty: 90 TABLET | Refills: 1 | Status: SHIPPED | OUTPATIENT
Start: 2023-12-04

## 2024-02-14 NOTE — ASSESSMENT & PLAN NOTE
Likely viral gastroenteritis vs worsening IgA nephropathy  Check stool studies  Currently we will watch him without antibiotics and CT abdomen did not show any signs of diverticulitis  Aggressive IV hydration and electrolyte replacement  Clear liquids if tolerated     Pt had CBC drawn at 0413. Writer called lab to get status update. Lab stated they were down an analyzer due to generator test that occurred this AM at 0400. Lab stated it would be done asap

## 2024-02-19 ENCOUNTER — TELEPHONE (OUTPATIENT)
Dept: FAMILY MEDICINE | Facility: CLINIC | Age: 39
End: 2024-02-19

## 2024-02-19 ENCOUNTER — HOSPITAL ENCOUNTER (EMERGENCY)
Facility: HOSPITAL | Age: 39
Discharge: HOME | End: 2024-02-19
Attending: EMERGENCY MEDICINE

## 2024-02-19 VITALS
BODY MASS INDEX: 29.12 KG/M2 | OXYGEN SATURATION: 98 % | RESPIRATION RATE: 18 BRPM | HEART RATE: 65 BPM | TEMPERATURE: 98 F | WEIGHT: 215 LBS | DIASTOLIC BLOOD PRESSURE: 80 MMHG | HEIGHT: 72 IN | SYSTOLIC BLOOD PRESSURE: 118 MMHG

## 2024-02-19 DIAGNOSIS — E78.2 MIXED HYPERLIPIDEMIA: ICD-10-CM

## 2024-02-19 DIAGNOSIS — R10.9 FLANK PAIN: Primary | ICD-10-CM

## 2024-02-19 DIAGNOSIS — I10 HYPERTENSION: ICD-10-CM

## 2024-02-19 DIAGNOSIS — Z79.4 TYPE 2 DIABETES MELLITUS WITHOUT COMPLICATION, WITH LONG-TERM CURRENT USE OF INSULIN: Primary | ICD-10-CM

## 2024-02-19 DIAGNOSIS — E11.9 TYPE 2 DIABETES MELLITUS WITHOUT COMPLICATION, WITH LONG-TERM CURRENT USE OF INSULIN: Primary | ICD-10-CM

## 2024-02-19 LAB
ALBUMIN SERPL BCP-MCNC: 4.1 G/DL (ref 3.5–5.2)
ALP SERPL-CCNC: 97 U/L (ref 55–135)
ALT SERPL W/O P-5'-P-CCNC: 30 U/L (ref 10–44)
ANION GAP SERPL CALC-SCNC: 8 MMOL/L (ref 8–16)
AST SERPL-CCNC: 20 U/L (ref 10–40)
BACTERIA #/AREA URNS HPF: NEGATIVE /HPF
BASOPHILS # BLD AUTO: 0.03 K/UL (ref 0–0.2)
BASOPHILS NFR BLD: 0.4 % (ref 0–1.9)
BILIRUB SERPL-MCNC: 0.5 MG/DL (ref 0.1–1)
BILIRUB UR QL STRIP: NEGATIVE
BNP SERPL-MCNC: 36 PG/ML (ref 0–99)
BUN SERPL-MCNC: 42 MG/DL (ref 6–20)
CALCIUM SERPL-MCNC: 9.6 MG/DL (ref 8.7–10.5)
CHLORIDE SERPL-SCNC: 100 MMOL/L (ref 95–110)
CLARITY UR: CLEAR
CO2 SERPL-SCNC: 27 MMOL/L (ref 23–29)
COLOR UR: YELLOW
CREAT SERPL-MCNC: 2.5 MG/DL (ref 0.5–1.4)
DIFFERENTIAL METHOD BLD: ABNORMAL
EOSINOPHIL # BLD AUTO: 0.7 K/UL (ref 0–0.5)
EOSINOPHIL NFR BLD: 8.4 % (ref 0–8)
ERYTHROCYTE [DISTWIDTH] IN BLOOD BY AUTOMATED COUNT: 13.7 % (ref 11.5–14.5)
EST. GFR  (NO RACE VARIABLE): 32.9 ML/MIN/1.73 M^2
GLUCOSE SERPL-MCNC: 206 MG/DL (ref 70–110)
GLUCOSE UR QL STRIP: ABNORMAL
HCT VFR BLD AUTO: 47.4 % (ref 40–54)
HGB BLD-MCNC: 15.5 G/DL (ref 14–18)
HGB UR QL STRIP: ABNORMAL
HYALINE CASTS #/AREA URNS LPF: 1 /LPF
IMM GRANULOCYTES # BLD AUTO: 0.02 K/UL (ref 0–0.04)
IMM GRANULOCYTES NFR BLD AUTO: 0.2 % (ref 0–0.5)
INR PPP: 0.9 (ref 0.8–1.2)
KETONES UR QL STRIP: NEGATIVE
LEUKOCYTE ESTERASE UR QL STRIP: NEGATIVE
LYMPHOCYTES # BLD AUTO: 2.8 K/UL (ref 1–4.8)
LYMPHOCYTES NFR BLD: 34.6 % (ref 18–48)
MAGNESIUM SERPL-MCNC: 2.3 MG/DL (ref 1.6–2.6)
MCH RBC QN AUTO: 28.7 PG (ref 27–31)
MCHC RBC AUTO-ENTMCNC: 32.7 G/DL (ref 32–36)
MCV RBC AUTO: 88 FL (ref 82–98)
MICROSCOPIC COMMENT: NORMAL
MONOCYTES # BLD AUTO: 0.7 K/UL (ref 0.3–1)
MONOCYTES NFR BLD: 8.5 % (ref 4–15)
NEUTROPHILS # BLD AUTO: 3.9 K/UL (ref 1.8–7.7)
NEUTROPHILS NFR BLD: 47.9 % (ref 38–73)
NITRITE UR QL STRIP: NEGATIVE
NRBC BLD-RTO: 0 /100 WBC
PH UR STRIP: 6 [PH] (ref 5–8)
PLATELET # BLD AUTO: 195 K/UL (ref 150–450)
PMV BLD AUTO: 10.8 FL (ref 9.2–12.9)
POTASSIUM SERPL-SCNC: 4.1 MMOL/L (ref 3.5–5.1)
PROT SERPL-MCNC: 6.5 G/DL (ref 6–8.4)
PROT UR QL STRIP: ABNORMAL
PROTHROMBIN TIME: 10.1 SEC (ref 9–12.5)
RBC # BLD AUTO: 5.41 M/UL (ref 4.6–6.2)
RBC #/AREA URNS HPF: 1 /HPF (ref 0–4)
SODIUM SERPL-SCNC: 135 MMOL/L (ref 136–145)
SP GR UR STRIP: 1.01 (ref 1–1.03)
SQUAMOUS #/AREA URNS HPF: 0 /HPF
TROPONIN I SERPL HS-MCNC: 5.8 PG/ML (ref 0–14.9)
URN SPEC COLLECT METH UR: ABNORMAL
UROBILINOGEN UR STRIP-ACNC: NEGATIVE EU/DL
WBC # BLD AUTO: 8.14 K/UL (ref 3.9–12.7)
WBC #/AREA URNS HPF: 0 /HPF (ref 0–5)
YEAST URNS QL MICRO: NORMAL

## 2024-02-19 PROCEDURE — 93010 ELECTROCARDIOGRAM REPORT: CPT | Mod: ,,, | Performed by: INTERNAL MEDICINE

## 2024-02-19 PROCEDURE — 81001 URINALYSIS AUTO W/SCOPE: CPT | Performed by: STUDENT IN AN ORGANIZED HEALTH CARE EDUCATION/TRAINING PROGRAM

## 2024-02-19 PROCEDURE — 93005 ELECTROCARDIOGRAM TRACING: CPT | Performed by: INTERNAL MEDICINE

## 2024-02-19 PROCEDURE — 83880 ASSAY OF NATRIURETIC PEPTIDE: CPT | Performed by: STUDENT IN AN ORGANIZED HEALTH CARE EDUCATION/TRAINING PROGRAM

## 2024-02-19 PROCEDURE — 96360 HYDRATION IV INFUSION INIT: CPT

## 2024-02-19 PROCEDURE — 80053 COMPREHEN METABOLIC PANEL: CPT | Performed by: STUDENT IN AN ORGANIZED HEALTH CARE EDUCATION/TRAINING PROGRAM

## 2024-02-19 PROCEDURE — 85025 COMPLETE CBC W/AUTO DIFF WBC: CPT | Performed by: STUDENT IN AN ORGANIZED HEALTH CARE EDUCATION/TRAINING PROGRAM

## 2024-02-19 PROCEDURE — 83735 ASSAY OF MAGNESIUM: CPT | Performed by: STUDENT IN AN ORGANIZED HEALTH CARE EDUCATION/TRAINING PROGRAM

## 2024-02-19 PROCEDURE — 85610 PROTHROMBIN TIME: CPT | Performed by: STUDENT IN AN ORGANIZED HEALTH CARE EDUCATION/TRAINING PROGRAM

## 2024-02-19 PROCEDURE — 99285 EMERGENCY DEPT VISIT HI MDM: CPT | Mod: 25

## 2024-02-19 PROCEDURE — 25000003 PHARM REV CODE 250: Performed by: STUDENT IN AN ORGANIZED HEALTH CARE EDUCATION/TRAINING PROGRAM

## 2024-02-19 PROCEDURE — 84484 ASSAY OF TROPONIN QUANT: CPT | Performed by: STUDENT IN AN ORGANIZED HEALTH CARE EDUCATION/TRAINING PROGRAM

## 2024-02-19 RX ORDER — HYDROCODONE BITARTRATE AND ACETAMINOPHEN 5; 325 MG/1; MG/1
1 TABLET ORAL EVERY 12 HOURS PRN
Qty: 6 TABLET | Refills: 0 | Status: SHIPPED | OUTPATIENT
Start: 2024-02-19 | End: 2024-02-22

## 2024-02-19 RX ADMIN — SODIUM CHLORIDE 1000 ML: 9 INJECTION, SOLUTION INTRAVENOUS at 05:02

## 2024-02-19 NOTE — TELEPHONE ENCOUNTER
Labs for up coming ov.  No recent labs done. Pt goes to [facility] lab.  Pt needs to be called.

## 2024-02-20 NOTE — DISCHARGE INSTRUCTIONS
You were evaluated and treated in the emergency department today. You were found to have a diagnoses that can be managed well at home, however that requires your commitment to getting better.   Problem-Specific Instructions:  Follow-up primary care provider.  Follow-up with nephrologist.  Hold your Lipitor for a couple of days until flank pain improves.  Return to the emergency room for any new or worsening symptoms.      Ensure you follow up with your Primary Care Provider or any additional providers listed on this discharge sheet. While you may be healthy enough to go home today, I cannot predict the exact course of your diagnoses. As such, it is your responsibility to monitor symptoms, follow-up with another healthcare provider, or return to the emergency room for new or worsening concerns. Unless otherwise instructed, continue all home medications and any new medications prescribed to you in the Emergency Department.   General Maintenance: Ensure adequate hydration to prevent prolonged illness and recovery. Monitor your caloric intake with a goal of obtaining and maintaining a healthy weight to help prevent the development of chronic and life-threatening medical conditions. Start healthy fitness habits and aim for a goal of 30 minutes to an hour of exercise 3-5 times a week. Avoid the use of tobacco, alcohol, and illicit drugs as these may be detrimental to your health goals.

## 2024-02-29 LAB
OHS QRS DURATION: 88 MS
OHS QTC CALCULATION: 427 MS

## 2024-03-08 ENCOUNTER — OCCUPATIONAL HEALTH (OUTPATIENT)
Dept: URGENT CARE | Facility: CLINIC | Age: 39
End: 2024-03-08

## 2024-03-08 PROCEDURE — 80305 DRUG TEST PRSMV DIR OPT OBS: CPT | Mod: S$GLB,,,

## 2024-03-28 ENCOUNTER — PATIENT MESSAGE (OUTPATIENT)
Dept: ADMINISTRATIVE | Facility: HOSPITAL | Age: 39
End: 2024-03-28

## 2024-06-25 ENCOUNTER — HOSPITAL ENCOUNTER (EMERGENCY)
Facility: HOSPITAL | Age: 39
Discharge: HOME | End: 2024-06-25
Attending: STUDENT IN AN ORGANIZED HEALTH CARE EDUCATION/TRAINING PROGRAM

## 2024-06-25 VITALS
WEIGHT: 220 LBS | OXYGEN SATURATION: 96 % | DIASTOLIC BLOOD PRESSURE: 85 MMHG | SYSTOLIC BLOOD PRESSURE: 135 MMHG | HEIGHT: 72 IN | TEMPERATURE: 99 F | BODY MASS INDEX: 29.8 KG/M2 | RESPIRATION RATE: 17 BRPM | HEART RATE: 98 BPM

## 2024-06-25 DIAGNOSIS — R10.9 FLANK PAIN: ICD-10-CM

## 2024-06-25 DIAGNOSIS — M79.89 LEG SWELLING: ICD-10-CM

## 2024-06-25 DIAGNOSIS — N02.B9 IGA NEPHROPATHY: Primary | ICD-10-CM

## 2024-06-25 LAB
ALBUMIN SERPL BCP-MCNC: 4 G/DL (ref 3.5–5.2)
ALP SERPL-CCNC: 110 U/L (ref 55–135)
ALT SERPL W/O P-5'-P-CCNC: 13 U/L (ref 10–44)
ANION GAP SERPL CALC-SCNC: 9 MMOL/L (ref 8–16)
AST SERPL-CCNC: 10 U/L (ref 10–40)
BACTERIA #/AREA URNS HPF: NORMAL /HPF
BASOPHILS # BLD AUTO: 0.04 K/UL (ref 0–0.2)
BASOPHILS NFR BLD: 0.3 % (ref 0–1.9)
BILIRUB SERPL-MCNC: 1 MG/DL (ref 0.1–1)
BILIRUB UR QL STRIP: NEGATIVE
BNP SERPL-MCNC: 28 PG/ML (ref 0–99)
BUN SERPL-MCNC: 32 MG/DL (ref 6–20)
CALCIUM SERPL-MCNC: 9.3 MG/DL (ref 8.7–10.5)
CHLORIDE SERPL-SCNC: 99 MMOL/L (ref 95–110)
CLARITY UR: CLEAR
CO2 SERPL-SCNC: 26 MMOL/L (ref 23–29)
COLOR UR: YELLOW
CREAT SERPL-MCNC: 3 MG/DL (ref 0.5–1.4)
DIFFERENTIAL METHOD BLD: ABNORMAL
EOSINOPHIL # BLD AUTO: 0.8 K/UL (ref 0–0.5)
EOSINOPHIL NFR BLD: 6.2 % (ref 0–8)
ERYTHROCYTE [DISTWIDTH] IN BLOOD BY AUTOMATED COUNT: 13.6 % (ref 11.5–14.5)
EST. GFR  (NO RACE VARIABLE): 26.4 ML/MIN/1.73 M^2
GLUCOSE SERPL-MCNC: 210 MG/DL (ref 70–110)
GLUCOSE UR QL STRIP: ABNORMAL
HCT VFR BLD AUTO: 46.8 % (ref 40–54)
HGB BLD-MCNC: 15.4 G/DL (ref 14–18)
HGB UR QL STRIP: ABNORMAL
HYALINE CASTS #/AREA URNS LPF: 0 /LPF
IMM GRANULOCYTES # BLD AUTO: 0.05 K/UL (ref 0–0.04)
IMM GRANULOCYTES NFR BLD AUTO: 0.4 % (ref 0–0.5)
KETONES UR QL STRIP: ABNORMAL
LDH SERPL L TO P-CCNC: 1.04 MMOL/L (ref 0.5–2.2)
LEUKOCYTE ESTERASE UR QL STRIP: NEGATIVE
LIPASE SERPL-CCNC: 32 U/L (ref 4–60)
LYMPHOCYTES # BLD AUTO: 2.6 K/UL (ref 1–4.8)
LYMPHOCYTES NFR BLD: 19.6 % (ref 18–48)
MCH RBC QN AUTO: 28.4 PG (ref 27–31)
MCHC RBC AUTO-ENTMCNC: 32.9 G/DL (ref 32–36)
MCV RBC AUTO: 86 FL (ref 82–98)
MICROSCOPIC COMMENT: NORMAL
MONOCYTES # BLD AUTO: 1 K/UL (ref 0.3–1)
MONOCYTES NFR BLD: 7.8 % (ref 4–15)
NEUTROPHILS # BLD AUTO: 8.6 K/UL (ref 1.8–7.7)
NEUTROPHILS NFR BLD: 65.7 % (ref 38–73)
NITRITE UR QL STRIP: NEGATIVE
NRBC BLD-RTO: 0 /100 WBC
PH UR STRIP: 6 [PH] (ref 5–8)
PLATELET # BLD AUTO: 193 K/UL (ref 150–450)
PMV BLD AUTO: 10.1 FL (ref 9.2–12.9)
POTASSIUM SERPL-SCNC: 4.3 MMOL/L (ref 3.5–5.1)
PROT SERPL-MCNC: 7.2 G/DL (ref 6–8.4)
PROT UR QL STRIP: ABNORMAL
RBC # BLD AUTO: 5.42 M/UL (ref 4.6–6.2)
RBC #/AREA URNS HPF: 1 /HPF (ref 0–4)
SAMPLE: NORMAL
SODIUM SERPL-SCNC: 134 MMOL/L (ref 136–145)
SP GR UR STRIP: 1.01 (ref 1–1.03)
URN SPEC COLLECT METH UR: ABNORMAL
UROBILINOGEN UR STRIP-ACNC: NEGATIVE EU/DL
WBC # BLD AUTO: 13 K/UL (ref 3.9–12.7)
WBC #/AREA URNS HPF: 3 /HPF (ref 0–5)
YEAST URNS QL MICRO: NORMAL

## 2024-06-25 PROCEDURE — 83880 ASSAY OF NATRIURETIC PEPTIDE: CPT | Performed by: STUDENT IN AN ORGANIZED HEALTH CARE EDUCATION/TRAINING PROGRAM

## 2024-06-25 PROCEDURE — 99283 EMERGENCY DEPT VISIT LOW MDM: CPT

## 2024-06-25 PROCEDURE — 25000003 PHARM REV CODE 250: Performed by: STUDENT IN AN ORGANIZED HEALTH CARE EDUCATION/TRAINING PROGRAM

## 2024-06-25 PROCEDURE — 80053 COMPREHEN METABOLIC PANEL: CPT | Performed by: STUDENT IN AN ORGANIZED HEALTH CARE EDUCATION/TRAINING PROGRAM

## 2024-06-25 PROCEDURE — 81001 URINALYSIS AUTO W/SCOPE: CPT | Performed by: STUDENT IN AN ORGANIZED HEALTH CARE EDUCATION/TRAINING PROGRAM

## 2024-06-25 PROCEDURE — 83690 ASSAY OF LIPASE: CPT | Performed by: STUDENT IN AN ORGANIZED HEALTH CARE EDUCATION/TRAINING PROGRAM

## 2024-06-25 PROCEDURE — 85025 COMPLETE CBC W/AUTO DIFF WBC: CPT | Performed by: STUDENT IN AN ORGANIZED HEALTH CARE EDUCATION/TRAINING PROGRAM

## 2024-06-25 RX ORDER — HYDROCODONE BITARTRATE AND ACETAMINOPHEN 5; 325 MG/1; MG/1
1 TABLET ORAL
Status: COMPLETED | OUTPATIENT
Start: 2024-06-25 | End: 2024-06-25

## 2024-06-25 RX ADMIN — HYDROCODONE BITARTRATE AND ACETAMINOPHEN 1 TABLET: 5; 325 TABLET ORAL at 02:06

## 2024-06-25 NOTE — DISCHARGE INSTRUCTIONS

## 2024-06-25 NOTE — ED PROVIDER NOTES
Encounter Date: 6/25/2024       History     Chief Complaint   Patient presents with    Flank Pain     Patient c/o right sided flank pain. Patient states that he has a hx of IJ Nephropathy     Leg Swelling     Patient c/o pain and swelling to BLE     38-year-old male with history of diabetes, hypertension, IgA nephropathy, presents now for bilateral lower extremity edema, ongoing for the past few weeks along with right-sided flank pain.  He also reports change in color of his urine, initially darker and no clear again.  Denies shortness of breath, chest pain, nausea or vomiting.  He denies weight loss or weight gain.  He has not taken anything for the symptoms and came in for further evaluation.      Review of patient's allergies indicates:   Allergen Reactions    Zithromax [azithromycin] Rash     Past Medical History:   Diagnosis Date    Anxiety     Asthma     Depression     Diabetes mellitus     diet controlled    Diabetes mellitus, type 2     GERD (gastroesophageal reflux disease)     Gout     Hyperlipidemia     Hypertension     IgA nephropathy     Insomnia      Past Surgical History:   Procedure Laterality Date    COLONOSCOPY N/A 5/8/2020    Procedure: COLONOSCOPY;  Surgeon: Hammad Castorena III, MD;  Location: [facility];  Service: Endoscopy;  Laterality: N/A;    ESOPHAGOGASTRODUODENOSCOPY N/A 5/8/2020    Procedure: EGD (ESOPHAGOGASTRODUODENOSCOPY);  Surgeon: Hammad Castorena III, MD;  Location: [facility];  Service: Endoscopy;  Laterality: N/A;    nose polyp removal       Family History   Problem Relation Name Age of Onset    Hypertension Maternal Grandmother      Cancer Maternal Grandfather      Diabetes Maternal Grandfather      Heart disease Maternal Grandfather      Heart disease Mother      Stroke Mother      Diabetes Mother       Social History     Tobacco Use    Smoking status: Former     Current packs/day: 0.00     Average packs/day: 0.3 packs/day for 5.0 years (1.3 ttl pk-yrs)     Types: Cigars,  Cigarettes     Start date: 2017     Quit date: 2022     Years since quittin.1     Passive exposure: Past    Smokeless tobacco: Never    Tobacco comments:     One cigar a day   Substance Use Topics    Alcohol use: Yes    Drug use: No     Review of Systems   Constitutional:  Negative for activity change and appetite change.   HENT:  Negative for congestion and drooling.    Eyes:  Negative for discharge and itching.   Respiratory:  Negative for cough and chest tightness.    Cardiovascular:  Positive for leg swelling. Negative for chest pain.   Gastrointestinal:  Negative for abdominal distention and abdominal pain.   Genitourinary:  Positive for flank pain. Negative for difficulty urinating and dysuria.   Musculoskeletal:  Negative for arthralgias.   Skin:  Negative for color change and pallor.   Neurological:  Negative for dizziness and facial asymmetry.   Psychiatric/Behavioral:  Negative for agitation and behavioral problems.        Physical Exam     Initial Vitals [24 0121]   BP Pulse Resp Temp SpO2   (!) 153/104 107 20 99.1 °F (37.3 °C) 96 %      MAP       --         Physical Exam    Nursing note and vitals reviewed.  Constitutional: He appears well-developed and well-nourished.   HENT:   Head: Normocephalic and atraumatic.   Mouth/Throat: Oropharynx is clear and moist.   Eyes: Conjunctivae and EOM are normal. Pupils are equal, round, and reactive to light.   Neck: No thyromegaly present.   Normal range of motion.  Cardiovascular:  Normal rate, regular rhythm and intact distal pulses.           Pulmonary/Chest: Breath sounds normal. No respiratory distress. He has no wheezes.   Abdominal: Abdomen is soft. Bowel sounds are normal. He exhibits no distension. There is no abdominal tenderness.   Musculoskeletal:         General: Edema present. No tenderness. Normal range of motion.      Cervical back: Normal range of motion.      Comments: Trace edema of the bilateral lower extremities      Neurological: He is alert and oriented to person, place, and time. He has normal strength. No cranial nerve deficit.   Skin: Skin is warm and dry. No rash noted.   Psychiatric: He has a normal mood and affect. His behavior is normal. Thought content normal.         ED Course   Procedures  Labs Reviewed   CBC W/ AUTO DIFFERENTIAL - Abnormal; Notable for the following components:       Result Value    WBC 13.00 (*)     Gran # (ANC) 8.6 (*)     Immature Grans (Abs) 0.05 (*)     Eos # 0.8 (*)     All other components within normal limits   COMPREHENSIVE METABOLIC PANEL - Abnormal; Notable for the following components:    Sodium 134 (*)     Glucose 210 (*)     BUN 32 (*)     Creatinine 3.0 (*)     eGFR 26.4 (*)     All other components within normal limits   URINALYSIS, REFLEX TO URINE CULTURE - Abnormal; Notable for the following components:    Protein, UA 3+ (*)     Glucose, UA 3+ (*)     Ketones, UA Trace (*)     All other components within normal limits    Narrative:     Specimen Source->Urine   LIPASE   B-TYPE NATRIURETIC PEPTIDE   URINALYSIS MICROSCOPIC    Narrative:     Specimen Source->Urine   ISTAT LACTATE   POCT LACTATE          Imaging Results    None          Medications   HYDROcodone-acetaminophen 5-325 mg per tablet 1 tablet (1 tablet Oral Given 6/25/24 0248)     Medical Decision Making  38-year-old male with history of IgA nephropathy, hypertension, diabetes presents for bilateral lower extremity edema, right flank pain ongoing for the past few weeks.  Vitals here within normal limits.  On exam, patient does have trace pitting edema to the bilateral lower extremities.  Differential diagnosis includes nephrotic syndrome, kidney failure, liver failure, heart failure, hypothyroidism.  He has normal pulses in the bilateral feet and good cap refill, doubt aortic disease, peripheral arterial disease.  Workup here notable for creatinine of 3, consistent with the patient's baseline.  UA with mild proteinuria,  no other abnormality noted.  CBC within normal limits.  Patient is not volume overloaded, do not think he is in heart failure at this time.  Likely this is progression of his underlying IgA nephropathy.  I advised that he wear compression stockings, follow-up with his nephrologist to discuss other options for management.  Stable for discharge.    Amount and/or Complexity of Data Reviewed  Labs: ordered. Decision-making details documented in ED Course.    Risk  Prescription drug management.               ED Course as of 06/25/24 0337 Tue Jun 25, 2024   0335 Brain natriuretic peptide [BS]   0335 Lipase [BS]   0335 Urinalysis Microscopic [BS]   0336 Comp. Metabolic Panel(!) [BS]   0336 Urinalysis, Reflex to Urine Culture Urine, Clean Catch(!) [BS]      ED Course User Index  [BS] Sathya Morris MD                           Clinical Impression:  Final diagnoses:  [N02.B9] IgA nephropathy (Primary)  [M79.89] Leg swelling  [R10.9] Flank pain          ED Disposition Condition    Discharge Stable          ED Prescriptions    None       Follow-up Information       Follow up With Specialties Details Why Contact Info    Jose Grant NP Family Medicine Call in 1 day To set up a follow-up appointment, To recheck today's symptoms [address] [place] [id number]  [phone]               Sathya Morris MD  06/25/24 0336       Sathya Morris MD  06/25/24 0337

## 2024-06-30 ENCOUNTER — HOSPITAL ENCOUNTER (EMERGENCY)
Facility: HOSPITAL | Age: 39
Discharge: HOME | End: 2024-07-01
Attending: EMERGENCY MEDICINE
Payer: MEDICAID

## 2024-06-30 DIAGNOSIS — I82.90 BLOOD CLOT IN VEIN: ICD-10-CM

## 2024-06-30 DIAGNOSIS — R52 PAIN: ICD-10-CM

## 2024-06-30 LAB
ALBUMIN SERPL BCP-MCNC: 3.6 G/DL (ref 3.5–5.2)
ALP SERPL-CCNC: 80 U/L (ref 55–135)
ALT SERPL W/O P-5'-P-CCNC: 18 U/L (ref 10–44)
ANION GAP SERPL CALC-SCNC: 8 MMOL/L (ref 8–16)
AST SERPL-CCNC: 12 U/L (ref 10–40)
BASOPHILS # BLD AUTO: 0.07 K/UL (ref 0–0.2)
BASOPHILS NFR BLD: 0.6 % (ref 0–1.9)
BILIRUB SERPL-MCNC: 0.4 MG/DL (ref 0.1–1)
BUN SERPL-MCNC: 45 MG/DL (ref 6–20)
CALCIUM SERPL-MCNC: 8.8 MG/DL (ref 8.7–10.5)
CHLORIDE SERPL-SCNC: 103 MMOL/L (ref 95–110)
CO2 SERPL-SCNC: 24 MMOL/L (ref 23–29)
CREAT SERPL-MCNC: 3.1 MG/DL (ref 0.5–1.4)
DIFFERENTIAL METHOD BLD: ABNORMAL
EOSINOPHIL # BLD AUTO: 0.9 K/UL (ref 0–0.5)
EOSINOPHIL NFR BLD: 7.2 % (ref 0–8)
ERYTHROCYTE [DISTWIDTH] IN BLOOD BY AUTOMATED COUNT: 13.2 % (ref 11.5–14.5)
EST. GFR  (NO RACE VARIABLE): 25.4 ML/MIN/1.73 M^2
GLUCOSE SERPL-MCNC: 196 MG/DL (ref 70–110)
HCT VFR BLD AUTO: 41.3 % (ref 40–54)
HGB BLD-MCNC: 13.1 G/DL (ref 14–18)
IMM GRANULOCYTES # BLD AUTO: 0.06 K/UL (ref 0–0.04)
IMM GRANULOCYTES NFR BLD AUTO: 0.5 % (ref 0–0.5)
LYMPHOCYTES # BLD AUTO: 3.1 K/UL (ref 1–4.8)
LYMPHOCYTES NFR BLD: 25.1 % (ref 18–48)
MCH RBC QN AUTO: 27.4 PG (ref 27–31)
MCHC RBC AUTO-ENTMCNC: 31.7 G/DL (ref 32–36)
MCV RBC AUTO: 86 FL (ref 82–98)
MONOCYTES # BLD AUTO: 0.8 K/UL (ref 0.3–1)
MONOCYTES NFR BLD: 6.4 % (ref 4–15)
NEUTROPHILS # BLD AUTO: 7.5 K/UL (ref 1.8–7.7)
NEUTROPHILS NFR BLD: 60.2 % (ref 38–73)
NRBC BLD-RTO: 0 /100 WBC
PLATELET # BLD AUTO: 331 K/UL (ref 150–450)
PMV BLD AUTO: 9.6 FL (ref 9.2–12.9)
POTASSIUM SERPL-SCNC: 4.6 MMOL/L (ref 3.5–5.1)
PROT SERPL-MCNC: 6.8 G/DL (ref 6–8.4)
RBC # BLD AUTO: 4.78 M/UL (ref 4.6–6.2)
SODIUM SERPL-SCNC: 135 MMOL/L (ref 136–145)
WBC # BLD AUTO: 12.43 K/UL (ref 3.9–12.7)

## 2024-06-30 PROCEDURE — 99284 EMERGENCY DEPT VISIT MOD MDM: CPT | Mod: 25

## 2024-06-30 PROCEDURE — 80053 COMPREHEN METABOLIC PANEL: CPT | Performed by: EMERGENCY MEDICINE

## 2024-06-30 PROCEDURE — 25000003 PHARM REV CODE 250: Performed by: EMERGENCY MEDICINE

## 2024-06-30 PROCEDURE — 85025 COMPLETE CBC W/AUTO DIFF WBC: CPT | Performed by: EMERGENCY MEDICINE

## 2024-06-30 RX ORDER — HYDROCODONE BITARTRATE AND ACETAMINOPHEN 10; 325 MG/1; MG/1
1 TABLET ORAL
Status: COMPLETED | OUTPATIENT
Start: 2024-06-30 | End: 2024-06-30

## 2024-06-30 RX ADMIN — HYDROCODONE BITARTRATE AND ACETAMINOPHEN 1 TABLET: 10; 325 TABLET ORAL at 10:06

## 2024-07-01 VITALS
TEMPERATURE: 99 F | RESPIRATION RATE: 16 BRPM | WEIGHT: 210 LBS | DIASTOLIC BLOOD PRESSURE: 82 MMHG | BODY MASS INDEX: 28.48 KG/M2 | SYSTOLIC BLOOD PRESSURE: 136 MMHG | HEART RATE: 79 BPM | OXYGEN SATURATION: 95 %

## 2024-07-01 RX ORDER — HYDROCODONE BITARTRATE AND ACETAMINOPHEN 10; 325 MG/1; MG/1
1 TABLET ORAL EVERY 6 HOURS PRN
Qty: 12 TABLET | Refills: 0 | Status: SHIPPED | OUTPATIENT
Start: 2024-07-01 | End: 2024-07-04

## 2024-07-01 NOTE — ED PROVIDER NOTES
Encounter Date: 2024       History     Chief Complaint   Patient presents with    Leg Pain     Dx with blood clot to RLE @ [facility] 5days ago. Pain worse this evening.      Chief complaint is increasing pain to the right leg where 5 days ago he was diagnosed with a blood clot.  He is on Eliquis and is taking his medicines appropriately.  He states he has kidney disease as well.  He also states he is has diabetes.  Patient awake alert cooperative states his leg that truly much larger but has pain to the medial aspect of his left inner thigh left knee and upper left calf.  No skin discoloration.        Review of patient's allergies indicates:   Allergen Reactions    Zithromax [azithromycin] Rash     Past Medical History:   Diagnosis Date    Anxiety     Asthma     Depression     Diabetes mellitus     diet controlled    Diabetes mellitus, type 2     GERD (gastroesophageal reflux disease)     Gout     Hyperlipidemia     Hypertension     IgA nephropathy     Insomnia      Past Surgical History:   Procedure Laterality Date    COLONOSCOPY N/A 2020    Procedure: COLONOSCOPY;  Surgeon: Hammad Castorena III, MD;  Location: [facility];  Service: Endoscopy;  Laterality: N/A;    ESOPHAGOGASTRODUODENOSCOPY N/A 2020    Procedure: EGD (ESOPHAGOGASTRODUODENOSCOPY);  Surgeon: Hammad Castorena III, MD;  Location: [facility];  Service: Endoscopy;  Laterality: N/A;    nose polyp removal       Family History   Problem Relation Name Age of Onset    Hypertension Maternal Grandmother      Cancer Maternal Grandfather      Diabetes Maternal Grandfather      Heart disease Maternal Grandfather      Heart disease Mother      Stroke Mother      Diabetes Mother       Social History     Tobacco Use    Smoking status: Former     Current packs/day: 0.00     Average packs/day: 0.3 packs/day for 5.0 years (1.3 ttl pk-yrs)     Types: Cigars, Cigarettes     Start date: 2017     Quit date: 2022     Years since quittin.1      Passive exposure: Past    Smokeless tobacco: Never    Tobacco comments:     One cigar a day   Substance Use Topics    Alcohol use: Yes    Drug use: No     Review of Systems   Constitutional:  Negative for chills and fever.   HENT:  Negative for ear pain, rhinorrhea and sore throat.    Eyes:  Negative for pain and visual disturbance.   Respiratory:  Negative for cough and shortness of breath.    Cardiovascular:  Negative for chest pain and palpitations.   Gastrointestinal:  Negative for abdominal pain, constipation, diarrhea, nausea and vomiting.   Genitourinary:  Negative for dysuria, frequency, hematuria and urgency.   Musculoskeletal:  Negative for back pain, joint swelling and myalgias.        Leg pain   Skin:  Negative for rash.   Neurological:  Negative for dizziness, seizures, weakness and headaches.   Psychiatric/Behavioral:  Negative for dysphoric mood. The patient is not nervous/anxious.        Physical Exam     Initial Vitals [06/30/24 2210]   BP Pulse Resp Temp SpO2   (!) 161/98 102 (!) 21 98.8 °F (37.1 °C) 96 %      MAP       --         Physical Exam    Nursing note and vitals reviewed.  Constitutional: He appears well-developed and well-nourished.   HENT:   Head: Normocephalic and atraumatic.   Eyes: Conjunctivae, EOM and lids are normal. Pupils are equal, round, and reactive to light.   Neck: Trachea normal. Neck supple. No thyroid mass present.   Cardiovascular:  Normal rate, regular rhythm and normal heart sounds.           Pulmonary/Chest: Breath sounds normal. No respiratory distress.   Abdominal: Abdomen is soft. Bowel sounds are normal. There is no abdominal tenderness.   Musculoskeletal:         General: Normal range of motion.      Cervical back: Neck supple.     Neurological: He is alert and oriented to person, place, and time. He has normal strength and normal reflexes. No cranial nerve deficit or sensory deficit.   Skin: Skin is warm and dry.   Right lower extremity slightly swollen compared  to the left.  Patient has pain to the medial aspect of his left upper thigh left knee left upper lower leg.  No signs of bruising.  Good pulsation to the feet good dorsalis pedis pulse.  Full range of motion of the ankle.  Neurovascular function intact.   Psychiatric: He has a normal mood and affect. His speech is normal and behavior is normal. Judgment and thought content normal.         ED Course   Procedures  Labs Reviewed   CBC W/ AUTO DIFFERENTIAL - Abnormal; Notable for the following components:       Result Value    Hemoglobin 13.1 (*)     MCHC 31.7 (*)     Immature Grans (Abs) 0.06 (*)     Eos # 0.9 (*)     All other components within normal limits   COMPREHENSIVE METABOLIC PANEL - Abnormal; Notable for the following components:    Sodium 135 (*)     Glucose 196 (*)     BUN 45 (*)     Creatinine 3.1 (*)     eGFR 25.4 (*)     All other components within normal limits          Imaging Results               US Lower Extremity Veins Right (Final result)  Result time 06/30/24 23:49:18      Final result by Wilver Mittal MD (06/30/24 23:49:18)                   Impression:      Persistent intraluminal thrombus/DVT involving venous structures of the right lower extremity, as discussed above, including occlusive appearing intraluminal thrombus of the right femoral vein, popliteal vein and posterior tibial veins.  With additional intraluminal thrombus/DVT involving the right anterior tibial and peroneal veins.    This report was flagged in Epic as abnormal.      Electronically signed by: Wilver Mittal  Date:    06/30/2024  Time:    23:49               Narrative:    EXAMINATION:  US LOWER EXTREMITY VEINS RIGHT    CLINICAL HISTORY:  Pain, unspecified    TECHNIQUE:  Duplex and color flow Doppler evaluation and graded compression of the right lower extremity veins was performed.    COMPARISON:  Right lower extremity venous ultrasound June 25, 2024    FINDINGS:  Targeted sonographic evaluation of the venous  structures of the right lower extremity was performed.  As on the prior examination there is sonographic appearance consistent with intraluminal thrombus/DVT involving the right femoral vein, involving the proximal, mid and distal components of the right femoral vein, there is involvement of the right popliteal vein, as well as the paired posterior tibial veins.  The aforementioned venous structures demonstrate appearance of occlusive intraluminal thrombus/DVT.  Evaluation of the right anterior tibial and peroneal veins is limited however demonstrates evidence for intraluminal thrombus/DVT.    There is no sonographic evidence for intraluminal thrombus involving the right common femoral vein or greater saphenous vein on this examination.    The visualized left common femoral vein demonstrates no sonographic evidence for intraluminal thrombus/DVT.                                       Medications   HYDROcodone-acetaminophen  mg per tablet 1 tablet (1 tablet Oral Given 6/30/24 2225)     Medical Decision Making  The patient is here for pain after being diagnosed 5 days ago with a blood clot.  He has no tremendous swelling to the leg good pulsation to his lower extremity.  Repeat ultrasound shows no gross abnormalities of his ultrasound as compared to the previous.  It is similar to previous.  Patient will be instructed continue taking his medicines as directed.  He will be given 3 days of pain medicines and instructions to return as needed.Helga Hernandez MD  12:37 AM 07/01/2024          Amount and/or Complexity of Data Reviewed  Labs: ordered.    Risk  Prescription drug management.                                      Clinical Impression:  Final diagnoses:  [I82.90] Blood clot in vein  [R52] Pain          ED Disposition Condition    Discharge Stable          ED Prescriptions       Medication Sig Dispense Start Date End Date Auth. Provider    HYDROcodone-acetaminophen (NORCO)  mg per tablet Take 1  tablet by mouth every 6 (six) hours as needed for Pain. 12 tablet 7/1/2024 7/4/2024 Helga Hernandez MD          Follow-up Information    None          Helga Hernandez MD  07/01/24 0038

## 2024-07-02 ENCOUNTER — OFFICE VISIT (OUTPATIENT)
Dept: FAMILY MEDICINE | Facility: CLINIC | Age: 39
End: 2024-07-02
Payer: MEDICAID

## 2024-07-02 VITALS
HEIGHT: 72 IN | DIASTOLIC BLOOD PRESSURE: 90 MMHG | OXYGEN SATURATION: 97 % | HEART RATE: 88 BPM | BODY MASS INDEX: 27.36 KG/M2 | WEIGHT: 202 LBS | SYSTOLIC BLOOD PRESSURE: 130 MMHG

## 2024-07-02 DIAGNOSIS — I10 ESSENTIAL HYPERTENSION: ICD-10-CM

## 2024-07-02 DIAGNOSIS — I82.491 ACUTE DEEP VEIN THROMBOSIS (DVT) OF OTHER SPECIFIED VEIN OF RIGHT LOWER EXTREMITY: ICD-10-CM

## 2024-07-02 DIAGNOSIS — R11.0 NAUSEA: ICD-10-CM

## 2024-07-02 DIAGNOSIS — E11.21 TYPE 2 DIABETES MELLITUS WITH DIABETIC NEPHROPATHY, WITHOUT LONG-TERM CURRENT USE OF INSULIN: ICD-10-CM

## 2024-07-02 DIAGNOSIS — E78.2 MIXED HYPERLIPIDEMIA: Primary | ICD-10-CM

## 2024-07-02 LAB — HBA1C MFR BLD: 10.9 %

## 2024-07-02 PROCEDURE — 3080F DIAST BP >= 90 MM HG: CPT | Mod: CPTII,S$GLB,, | Performed by: NURSE PRACTITIONER

## 2024-07-02 PROCEDURE — 3008F BODY MASS INDEX DOCD: CPT | Mod: CPTII,S$GLB,, | Performed by: NURSE PRACTITIONER

## 2024-07-02 PROCEDURE — 99214 OFFICE O/P EST MOD 30 MIN: CPT | Mod: S$GLB,,, | Performed by: NURSE PRACTITIONER

## 2024-07-02 PROCEDURE — 1159F MED LIST DOCD IN RCRD: CPT | Mod: CPTII,S$GLB,, | Performed by: NURSE PRACTITIONER

## 2024-07-02 PROCEDURE — 83036 HEMOGLOBIN GLYCOSYLATED A1C: CPT | Mod: QW,,, | Performed by: NURSE PRACTITIONER

## 2024-07-02 PROCEDURE — 3075F SYST BP GE 130 - 139MM HG: CPT | Mod: CPTII,S$GLB,, | Performed by: NURSE PRACTITIONER

## 2024-07-02 PROCEDURE — 3046F HEMOGLOBIN A1C LEVEL >9.0%: CPT | Mod: CPTII,S$GLB,, | Performed by: NURSE PRACTITIONER

## 2024-07-02 PROCEDURE — 1160F RVW MEDS BY RX/DR IN RCRD: CPT | Mod: CPTII,S$GLB,, | Performed by: NURSE PRACTITIONER

## 2024-07-02 RX ORDER — APIXABAN 5 MG/1
TABLET, FILM COATED ORAL
COMMUNITY
Start: 2024-06-25

## 2024-07-02 RX ORDER — DULAGLUTIDE 1.5 MG/.5ML
1.5 INJECTION, SOLUTION SUBCUTANEOUS
Qty: 4 PEN | Refills: 0 | Status: SHIPPED | OUTPATIENT
Start: 2024-07-02

## 2024-07-02 RX ORDER — ATORVASTATIN CALCIUM 10 MG/1
10 TABLET, FILM COATED ORAL NIGHTLY
Qty: 90 TABLET | Refills: 1 | Status: SHIPPED | OUTPATIENT
Start: 2024-07-02

## 2024-07-02 RX ORDER — LOSARTAN POTASSIUM 100 MG/1
100 TABLET ORAL DAILY
Qty: 90 TABLET | Refills: 1 | Status: SHIPPED | OUTPATIENT
Start: 2024-07-02

## 2024-07-02 RX ORDER — ONDANSETRON 4 MG/1
4 TABLET, ORALLY DISINTEGRATING ORAL EVERY 8 HOURS PRN
Qty: 20 TABLET | Refills: 0 | Status: SHIPPED | OUTPATIENT
Start: 2024-07-02

## 2024-07-02 RX ORDER — AMLODIPINE BESYLATE 5 MG/1
5 TABLET ORAL DAILY
Qty: 90 TABLET | Refills: 1 | Status: SHIPPED | OUTPATIENT
Start: 2024-07-02

## 2024-07-02 NOTE — PROGRESS NOTES
SUBJECTIVE:      Patient ID: Jose Miguel Brennan is a 38 y.o. male.    Chief Complaint: Diabetes    Patient is here today as an ER follow up. He has not been here in over 6 months. Says he lost his insurance for a while. He was treated in the ER for a right extensive DVT.  He was placed on eliquis and has a f/u with hematology. He denies sob or chest pain. He does still have swelling in his right leg. He has not taken his bp meds as prescribed due to no insurance and needs refills today. He has been out of trulicity for a while says he is trying to eat right. He is due to see endo and nephrology.     Diabetes  He presents for his follow-up diabetic visit. He has type 2 diabetes mellitus. His disease course has been worsening. There are no hypoglycemic associated symptoms. Pertinent negatives for hypoglycemia include no confusion, dizziness, headaches, nervousness/anxiousness, pallor, seizures or speech difficulty. Pertinent negatives for diabetes include no chest pain, no polyphagia and no weakness. There are no hypoglycemic complications. Symptoms are stable. Diabetic complications include nephropathy and peripheral neuropathy. Risk factors for coronary artery disease include diabetes mellitus, male sex and hypertension. Current diabetic treatment includes oral agent (monotherapy) (glp1). He is compliant with treatment some of the time. His weight is stable. He is following a generally unhealthy diet. When asked about meal planning, he reported none. He rarely participates in exercise. An ACE inhibitor/angiotensin II receptor blocker is being taken. He does not see a podiatrist.Eye exam is not current.   Hypertension  This is a chronic problem. The problem is unchanged. The problem is controlled. Pertinent negatives include no chest pain, headaches, palpitations or shortness of breath. Risk factors for coronary artery disease include diabetes mellitus, dyslipidemia, male gender and smoking/tobacco exposure.  Past treatments include angiotensin blockers and calcium channel blockers. The current treatment provides moderate improvement.       Past Surgical History:   Procedure Laterality Date    COLONOSCOPY N/A 2020    Procedure: COLONOSCOPY;  Surgeon: Hammad Castorena III, MD;  Location: [facility];  Service: Endoscopy;  Laterality: N/A;    ESOPHAGOGASTRODUODENOSCOPY N/A 2020    Procedure: EGD (ESOPHAGOGASTRODUODENOSCOPY);  Surgeon: Hammad Castorena III, MD;  Location: [facility];  Service: Endoscopy;  Laterality: N/A;    nose polyp removal       Family History   Problem Relation Name Age of Onset    Hypertension Maternal Grandmother      Cancer Maternal Grandfather      Diabetes Maternal Grandfather      Heart disease Maternal Grandfather      Heart disease Mother      Stroke Mother      Diabetes Mother        Social History     Socioeconomic History    Marital status: Significant Other   Tobacco Use    Smoking status: Former     Current packs/day: 0.00     Average packs/day: 0.3 packs/day for 5.0 years (1.3 ttl pk-yrs)     Types: Cigars, Cigarettes     Start date: 2017     Quit date: 2022     Years since quittin.1     Passive exposure: Past    Smokeless tobacco: Never    Tobacco comments:     One cigar a day   Substance and Sexual Activity    Alcohol use: Yes    Drug use: No    Sexual activity: Yes     Partners: Female     Social Determinants of Health     Financial Resource Strain: Low Risk  (3/29/2023)    Overall Financial Resource Strain (CARDIA)     Difficulty of Paying Living Expenses: Not hard at all   Food Insecurity: No Food Insecurity (3/29/2023)    Hunger Vital Sign     Worried About Running Out of Food in the Last Year: Never true     Ran Out of Food in the Last Year: Never true   Transportation Needs: No Transportation Needs (3/29/2023)    PRAPARE - Transportation     Lack of Transportation (Medical): No     Lack of Transportation (Non-Medical): No   Physical Activity: Inactive  "(3/29/2023)    Exercise Vital Sign     Days of Exercise per Week: 0 days     Minutes of Exercise per Session: 0 min   Stress: Stress Concern Present (1/27/2023)    [employer] - Occupational Stress Questionnaire     Feeling of Stress : To some extent   Housing Stability: Unknown (3/29/2023)    Housing Stability Vital Sign     Unable to Pay for Housing in the Last Year: No     Unstable Housing in the Last Year: No     Current Outpatient Medications   Medication Sig Dispense Refill    ELIQUIS 5 mg Tab Take by mouth.      gabapentin (NEURONTIN) 300 MG capsule Take 1 capsule (300 mg total) by mouth 3 (three) times daily. 90 capsule 0    HYDROcodone-acetaminophen (NORCO)  mg per tablet Take 1 tablet by mouth every 6 (six) hours as needed for Pain. 12 tablet 0    omega-3 acid ethyl esters (LOVAZA) 1 gram capsule Take 1 capsule (1 g total) by mouth 2 (two) times daily. 180 capsule 3    amLODIPine (NORVASC) 5 MG tablet Take 1 tablet (5 mg total) by mouth once daily. 90 tablet 1    atorvastatin (LIPITOR) 10 MG tablet Take 1 tablet (10 mg total) by mouth every evening. 90 tablet 1    BD CONOR 2ND GEN PEN NEEDLE 32 gauge x 5/32" Ndle once daily. Use      blood sugar diagnostic Strp To check BG one times daily, to use with insurance preferred meter 100 strip 3    blood-glucose meter kit To check BG one times daily, to use with insurance preferred meter 1 each 0    dulaglutide (TRULICITY) 1.5 mg/0.5 mL pen injector Inject 1.5 mg into the skin every 7 days. 4 pen 0    empagliflozin (JARDIANCE) 10 mg tablet Take 1 tablet (10 mg total) by mouth once daily. 90 tablet 0    lancets Misc To check BG one times daily, to use with insurance preferred meter 100 each 3    losartan (COZAAR) 100 MG tablet Take 1 tablet (100 mg total) by mouth once daily. 90 tablet 1    ondansetron (ZOFRAN-ODT) 4 MG TbDL Take 1 tablet (4 mg total) by mouth every 8 (eight) hours as needed. 20 tablet 0    pen needle, diabetic 32 " "gauge x 5/16" Ndle 1 Units by Misc.(Non-Drug; Combo Route) route once daily. 100 each 1     No current facility-administered medications for this visit.     Review of patient's allergies indicates:   Allergen Reactions    Zithromax [azithromycin] Rash      Past Medical History:   Diagnosis Date    Anxiety     Asthma     Depression     Diabetes mellitus     diet controlled    Diabetes mellitus, type 2     GERD (gastroesophageal reflux disease)     Gout     Hyperlipidemia     Hypertension     IgA nephropathy     Insomnia      Past Surgical History:   Procedure Laterality Date    COLONOSCOPY N/A 5/8/2020    Procedure: COLONOSCOPY;  Surgeon: Hammad Castorena III, MD;  Location: [facility];  Service: Endoscopy;  Laterality: N/A;    ESOPHAGOGASTRODUODENOSCOPY N/A 5/8/2020    Procedure: EGD (ESOPHAGOGASTRODUODENOSCOPY);  Surgeon: Hammad Castorena III, MD;  Location: [facility];  Service: Endoscopy;  Laterality: N/A;    nose polyp removal         Review of Systems   Constitutional:  Negative for appetite change, chills, diaphoresis and unexpected weight change.   HENT:  Negative for ear discharge, facial swelling, hearing loss, nosebleeds and trouble swallowing.    Eyes:  Negative for photophobia, pain and visual disturbance.   Respiratory:  Negative for apnea, choking, shortness of breath and wheezing.    Cardiovascular:  Positive for leg swelling. Negative for chest pain and palpitations.   Gastrointestinal:  Positive for nausea. Negative for abdominal pain, blood in stool and vomiting.   Endocrine: Negative for polyphagia.   Genitourinary:  Negative for difficulty urinating and hematuria.   Musculoskeletal:  Positive for arthralgias. Negative for gait problem and joint swelling.   Skin:  Negative for pallor.   Neurological:  Negative for dizziness, seizures, speech difficulty, weakness and headaches.   Hematological:  Does not bruise/bleed easily.   Psychiatric/Behavioral:  Negative for agitation, confusion, " dysphoric mood, self-injury, sleep disturbance and suicidal ideas. The patient is not nervous/anxious.       OBJECTIVE:      Vitals:    07/02/24 1327 07/02/24 1353   BP: (!) (P) 130/100 (!) 130/90   Pulse: 88    SpO2: 97%    Weight: 91.6 kg (202 lb)    Height: 6' (1.829 m)      Physical Exam  Vitals and nursing note reviewed.   Constitutional:       General: He is not in acute distress.     Appearance: He is well-developed.   HENT:      Head: Normocephalic and atraumatic.      Nose: Nose normal.      Mouth/Throat:      Pharynx: Uvula midline.   Eyes:      General: Lids are normal.      Conjunctiva/sclera: Conjunctivae normal.      Pupils: Pupils are equal, round, and reactive to light.      Right eye: Pupil is round and reactive.      Left eye: Pupil is round and reactive.   Neck:      Thyroid: No thyromegaly.      Vascular: No carotid bruit.   Cardiovascular:      Rate and Rhythm: Normal rate and regular rhythm.      Pulses: Normal pulses.      Heart sounds: Normal heart sounds. No murmur heard.  Pulmonary:      Effort: Pulmonary effort is normal.      Breath sounds: Normal breath sounds. No wheezing, rhonchi or rales.   Abdominal:      General: Bowel sounds are normal.      Palpations: Abdomen is soft. Abdomen is not rigid.      Tenderness: There is no abdominal tenderness.   Musculoskeletal:         General: Normal range of motion.      Cervical back: Normal range of motion and neck supple.      Right lower leg: Edema present.   Lymphadenopathy:      Cervical: No cervical adenopathy.   Skin:     General: Skin is warm and dry.      Nails: There is no clubbing.      Comments: No erythema or warmth to right lower leg. There is swelling and it is tender to palpation    Neurological:      Mental Status: He is alert and oriented to person, place, and time.   Psychiatric:         Mood and Affect: Mood normal.         Speech: Speech normal.         Behavior: Behavior normal. Behavior is cooperative.         Thought  Content: Thought content normal.         Judgment: Judgment normal.        Last visit note, most recent available labs, and health maintenance reviewed    Admission on 06/30/2024, Discharged on 07/01/2024   Component Date Value Ref Range Status    WBC 06/30/2024 12.43  3.90 - 12.70 K/uL Final    RBC 06/30/2024 4.78  4.60 - 6.20 M/uL Final    Hemoglobin 06/30/2024 13.1 (L)  14.0 - 18.0 g/dL Final    Hematocrit 06/30/2024 41.3  40.0 - 54.0 % Final    MCV 06/30/2024 86  82 - 98 fL Final    MCH 06/30/2024 27.4  27.0 - 31.0 pg Final    MCHC 06/30/2024 31.7 (L)  32.0 - 36.0 g/dL Final    RDW 06/30/2024 13.2  11.5 - 14.5 % Final    Platelets 06/30/2024 331  150 - 450 K/uL Final    Reviewed by Technologist.    MPV 06/30/2024 9.6  9.2 - 12.9 fL Final    Immature Granulocytes 06/30/2024 0.5  0.0 - 0.5 % Final    Gran # (ANC) 06/30/2024 7.5  1.8 - 7.7 K/uL Final    Immature Grans (Abs) 06/30/2024 0.06 (H)  0.00 - 0.04 K/uL Final    Comment: Mild elevation in immature granulocytes is non specific and   can be seen in a variety of conditions including stress response,   acute inflammation, trauma and pregnancy. Correlation with other   laboratory and clinical findings is essential.      Lymph # 06/30/2024 3.1  1.0 - 4.8 K/uL Final    Mono # 06/30/2024 0.8  0.3 - 1.0 K/uL Final    Eos # 06/30/2024 0.9 (H)  0.0 - 0.5 K/uL Final    Baso # 06/30/2024 0.07  0.00 - 0.20 K/uL Final    nRBC 06/30/2024 0  0 /100 WBC Final    Gran % 06/30/2024 60.2  38.0 - 73.0 % Final    Lymph % 06/30/2024 25.1  18.0 - 48.0 % Final    Mono % 06/30/2024 6.4  4.0 - 15.0 % Final    Eosinophil % 06/30/2024 7.2  0.0 - 8.0 % Final    Basophil % 06/30/2024 0.6  0.0 - 1.9 % Final    Differential Method 06/30/2024 Automated   Final    Sodium 06/30/2024 135 (L)  136 - 145 mmol/L Final    Potassium 06/30/2024 4.6  3.5 - 5.1 mmol/L Final    Chloride 06/30/2024 103  95 - 110 mmol/L Final    CO2 06/30/2024 24  23 - 29 mmol/L Final    Glucose 06/30/2024 196 (H)  70 -  110 mg/dL Final    BUN 06/30/2024 45 (H)  6 - 20 mg/dL Final    Creatinine 06/30/2024 3.1 (H)  0.5 - 1.4 mg/dL Final    Calcium 06/30/2024 8.8  8.7 - 10.5 mg/dL Final    Total Protein 06/30/2024 6.8  6.0 - 8.4 g/dL Final    Albumin 06/30/2024 3.6  3.5 - 5.2 g/dL Final    Total Bilirubin 06/30/2024 0.4  0.1 - 1.0 mg/dL Final    Comment: For infants and newborns, interpretation of results should be based  on gestational age, weight and in agreement with clinical  observations.    Premature Infant recommended reference ranges:  Up to 24 hours.............<8.0 mg/dL  Up to 48 hours............<12.0 mg/dL  3-5 days..................<15.0 mg/dL  6-29 days.................<15.0 mg/dL      Alkaline Phosphatase 06/30/2024 80  55 - 135 U/L Final    AST 06/30/2024 12  10 - 40 U/L Final    ALT 06/30/2024 18  10 - 44 U/L Final    eGFR 06/30/2024 25.4 (A)  >60 mL/min/1.73 m^2 Final    Anion Gap 06/30/2024 8  8 - 16 mmol/L Final   Admission on 06/25/2024, Discharged on 06/25/2024   Component Date Value Ref Range Status    WBC 06/25/2024 11.85  3.90 - 12.70 K/uL Final    RBC 06/25/2024 5.68  4.60 - 6.20 M/uL Final    Hemoglobin 06/25/2024 15.7  14.0 - 18.0 g/dL Final    Hematocrit 06/25/2024 48.0  40.0 - 54.0 % Final    MCV 06/25/2024 85  82 - 98 fL Final    MCH 06/25/2024 27.6  27.0 - 31.0 pg Final    MCHC 06/25/2024 32.7  32.0 - 36.0 g/dL Final    RDW 06/25/2024 13.2  11.5 - 14.5 % Final    Platelets 06/25/2024 220  150 - 450 K/uL Final    MPV 06/25/2024 10.1  9.2 - 12.9 fL Final    Immature Granulocytes 06/25/2024 0.4  0.0 - 0.5 % Final    Gran # (ANC) 06/25/2024 7.2  1.8 - 7.7 K/uL Final    Immature Grans (Abs) 06/25/2024 0.05 (H)  0.00 - 0.04 K/uL Final    Comment: Mild elevation in immature granulocytes is non specific and   can be seen in a variety of conditions including stress response,   acute inflammation, trauma and pregnancy. Correlation with other   laboratory and clinical findings is essential.      Lymph #  06/25/2024 2.8  1.0 - 4.8 K/uL Final    Mono # 06/25/2024 0.9  0.3 - 1.0 K/uL Final    Eos # 06/25/2024 0.9 (H)  0.0 - 0.5 K/uL Final    Baso # 06/25/2024 0.06  0.00 - 0.20 K/uL Final    nRBC 06/25/2024 0  0 /100 WBC Final    Gran % 06/25/2024 60.5  38.0 - 73.0 % Final    Lymph % 06/25/2024 23.7  18.0 - 48.0 % Final    Mono % 06/25/2024 7.6  4.0 - 15.0 % Final    Eosinophil % 06/25/2024 7.3  0.0 - 8.0 % Final    Basophil % 06/25/2024 0.5  0.0 - 1.9 % Final    Differential Method 06/25/2024 Automated   Final    Sodium 06/25/2024 134 (L)  136 - 145 mmol/L Final    Potassium 06/25/2024 5.2 (H)  3.5 - 5.1 mmol/L Final    Comment: Anion Gap reference range revised on 4/28/2023  Specimen slightly hemolyzed      Chloride 06/25/2024 101  95 - 110 mmol/L Final    CO2 06/25/2024 23  22 - 31 mmol/L Final    Glucose 06/25/2024 296 (H)  70 - 110 mg/dL Final    Comment: The ADA recommends the following guidelines for fasting glucose:    Normal:       less than 100 mg/dL    Prediabetes:  100 mg/dL to 125 mg/dL    Diabetes:     126 mg/dL or higher      BUN 06/25/2024 40 (H)  9 - 21 mg/dL Final    Creatinine 06/25/2024 2.97 (H)  0.50 - 1.40 mg/dL Final    Calcium 06/25/2024 9.5  8.4 - 10.2 mg/dL Final    Total Protein 06/25/2024 7.9  6.0 - 8.4 g/dL Final    Albumin 06/25/2024 4.4  3.5 - 5.2 g/dL Final    Total Bilirubin 06/25/2024 0.9  0.2 - 1.3 mg/dL Final    Alkaline Phosphatase 06/25/2024 112  38 - 145 U/L Final    AST 06/25/2024 31  17 - 59 U/L Final    ALT 06/25/2024 23  0 - 50 U/L Final    Anion Gap 06/25/2024 10  5 - 12 mmol/L Final    Anion Gap reference range revised on 4/28/2023    eGFR 06/25/2024 27 (A)  >60 mL/min/1.73 m^2 Final    Specimen UA 06/25/2024 Urine, Clean Catch   Final    Color, UA 06/25/2024 Yellow  Yellow, Straw, Kristina Final    Appearance, UA 06/25/2024 Clear  Clear Final    pH, UA 06/25/2024 5.5  5.0 - 8.0 Final    Specific Gravity, UA 06/25/2024 1.015  1.005 - 1.030 Final    Protein, UA 06/25/2024 2+ (A)   Negative Final    Comment: Recommend a 24 hour urine protein or a urine   protein/creatinine ratio if globulin induced proteinuria is  clinically suspected.      Glucose, UA 06/25/2024 3+ (A)  Negative Final    Ketones, UA 06/25/2024 Negative  Negative Final    Bilirubin (UA) 06/25/2024 Negative  Negative Final    Occult Blood UA 06/25/2024 Trace (A)  Negative Final    Nitrite, UA 06/25/2024 Negative  Negative Final    Urobilinogen, UA 06/25/2024 0.2  <2.0 EU/dL Final    Leukocytes, UA 06/25/2024 Negative  Negative Final    RBC, UA 06/25/2024 1  0 - 4 /hpf Final    WBC, UA 06/25/2024 0  0 - 5 /hpf Final    Bacteria 06/25/2024 Negative  Negative /hpf Final    Squam Epithel, UA 06/25/2024 0  /hpf Final    Hyaline Casts, UA 06/25/2024 0  0 - 1 /lpf Final    Microscopic Comment 06/25/2024 SEE COMMENT   Final    Comment: Other formed elements not mentioned in the report are not   present in the microscopic examination.       QRS Duration 06/25/2024 86  ms Final    [facility] QTC Calculation 06/25/2024 437  ms Final   Admission on 06/25/2024, Discharged on 06/25/2024   Component Date Value Ref Range Status    WBC 06/25/2024 13.00 (H)  3.90 - 12.70 K/uL Final    RBC 06/25/2024 5.42  4.60 - 6.20 M/uL Final    Hemoglobin 06/25/2024 15.4  14.0 - 18.0 g/dL Final    Hematocrit 06/25/2024 46.8  40.0 - 54.0 % Final    MCV 06/25/2024 86  82 - 98 fL Final    MCH 06/25/2024 28.4  27.0 - 31.0 pg Final    MCHC 06/25/2024 32.9  32.0 - 36.0 g/dL Final    RDW 06/25/2024 13.6  11.5 - 14.5 % Final    Platelets 06/25/2024 193  150 - 450 K/uL Final    MPV 06/25/2024 10.1  9.2 - 12.9 fL Final    Immature Granulocytes 06/25/2024 0.4  0.0 - 0.5 % Final    Gran # (ANC) 06/25/2024 8.6 (H)  1.8 - 7.7 K/uL Final    Immature Grans (Abs) 06/25/2024 0.05 (H)  0.00 - 0.04 K/uL Final    Comment: Mild elevation in immature granulocytes is non specific and   can be seen in a variety of conditions including stress response,   acute inflammation, trauma and  pregnancy. Correlation with other   laboratory and clinical findings is essential.      Lymph # 06/25/2024 2.6  1.0 - 4.8 K/uL Final    Mono # 06/25/2024 1.0  0.3 - 1.0 K/uL Final    Eos # 06/25/2024 0.8 (H)  0.0 - 0.5 K/uL Final    Baso # 06/25/2024 0.04  0.00 - 0.20 K/uL Final    nRBC 06/25/2024 0  0 /100 WBC Final    Gran % 06/25/2024 65.7  38.0 - 73.0 % Final    Lymph % 06/25/2024 19.6  18.0 - 48.0 % Final    Mono % 06/25/2024 7.8  4.0 - 15.0 % Final    Eosinophil % 06/25/2024 6.2  0.0 - 8.0 % Final    Basophil % 06/25/2024 0.3  0.0 - 1.9 % Final    Differential Method 06/25/2024 Automated   Final    Sodium 06/25/2024 134 (L)  136 - 145 mmol/L Final    Potassium 06/25/2024 4.3  3.5 - 5.1 mmol/L Final    Chloride 06/25/2024 99  95 - 110 mmol/L Final    CO2 06/25/2024 26  23 - 29 mmol/L Final    Glucose 06/25/2024 210 (H)  70 - 110 mg/dL Final    BUN 06/25/2024 32 (H)  6 - 20 mg/dL Final    Creatinine 06/25/2024 3.0 (H)  0.5 - 1.4 mg/dL Final    Calcium 06/25/2024 9.3  8.7 - 10.5 mg/dL Final    Total Protein 06/25/2024 7.2  6.0 - 8.4 g/dL Final    Albumin 06/25/2024 4.0  3.5 - 5.2 g/dL Final    Total Bilirubin 06/25/2024 1.0  0.1 - 1.0 mg/dL Final    Comment: For infants and newborns, interpretation of results should be based  on gestational age, weight and in agreement with clinical  observations.    Premature Infant recommended reference ranges:  Up to 24 hours.............<8.0 mg/dL  Up to 48 hours............<12.0 mg/dL  3-5 days..................<15.0 mg/dL  6-29 days.................<15.0 mg/dL      Alkaline Phosphatase 06/25/2024 110  55 - 135 U/L Final    AST 06/25/2024 10  10 - 40 U/L Final    ALT 06/25/2024 13  10 - 44 U/L Final    eGFR 06/25/2024 26.4 (A)  >60 mL/min/1.73 m^2 Final    Anion Gap 06/25/2024 9  8 - 16 mmol/L Final    Lipase 06/25/2024 32  4 - 60 U/L Final    Specimen UA 06/25/2024 Urine, Clean Catch   Final    Color, UA 06/25/2024 Yellow  Yellow, Straw, Kristina Final    Appearance, UA  06/25/2024 Clear  Clear Final    pH, UA 06/25/2024 6.0  5.0 - 8.0 Final    Specific Gravity, UA 06/25/2024 1.010  1.005 - 1.030 Final    Protein, UA 06/25/2024 3+ (A)  Negative Final    Comment: Recommend a 24 hour urine protein or a urine   protein/creatinine ratio if globulin induced proteinuria is  clinically suspected.      Glucose, UA 06/25/2024 3+ (A)  Negative Final    Ketones, UA 06/25/2024 Trace (A)  Negative Final    Bilirubin (UA) 06/25/2024 Negative  Negative Final    Occult Blood UA 06/25/2024 TRACE  Negative Final    Nitrite, UA 06/25/2024 Negative  Negative Final    Urobilinogen, UA 06/25/2024 Negative  Negative EU/dL Final    Leukocytes, UA 06/25/2024 Negative  Negative Final    BNP 06/25/2024 28  0 - 99 pg/mL Final    Values of less than 100 pg/ml are consistent with non-CHF populations.    POC Lactate 06/25/2024 1.04  0.5 - 2.2 mmol/L Final    Sample 06/25/2024 VENOUS   Final    RBC, UA 06/25/2024 1  0 - 4 /hpf Final    WBC, UA 06/25/2024 3  0 - 5 /hpf Final    Bacteria 06/25/2024 None  None-Occ /hpf Final    Yeast, UA 06/25/2024 None  None Final    Hyaline Casts, UA 06/25/2024 0  0-1/lpf /lpf Final    Microscopic Comment 06/25/2024 SEE COMMENT   Final    Comment: Other formed elements not mentioned in the report are not   present in the microscopic examination.      ]  Assessment:       1. Mixed hyperlipidemia    2. Essential hypertension    3. Acute deep vein thrombosis (DVT) of other specified vein of right lower extremity    4. Type 2 diabetes mellitus with diabetic nephropathy, without long-term current use of insulin    5. Nausea        Plan:       Mixed hyperlipidemia  -     atorvastatin (LIPITOR) 10 MG tablet; Take 1 tablet (10 mg total) by mouth every evening.  Dispense: 90 tablet; Refill: 1    Essential hypertension  -     losartan (COZAAR) 100 MG tablet; Take 1 tablet (100 mg total) by mouth once daily.  Dispense: 90 tablet; Refill: 1  -     amLODIPine (NORVASC) 5 MG tablet; Take 1  tablet (5 mg total) by mouth once daily.  Dispense: 90 tablet; Refill: 1    Acute deep vein thrombosis (DVT) of other specified vein of right lower extremity  Keep f/u with hematology  Cont eliquis  Discussed if he has any sob or chest pain report to ER    Type 2 diabetes mellitus with diabetic nephropathy, without long-term current use of insulin  -     dulaglutide (TRULICITY) 1.5 mg/0.5 mL pen injector; Inject 1.5 mg into the skin every 7 days.  Dispense: 4 pen ; Refill: 0  -     empagliflozin (JARDIANCE) 10 mg tablet; Take 1 tablet (10 mg total) by mouth once daily.  Dispense: 90 tablet; Refill: 0    Nausea  -     ondansetron (ZOFRAN-ODT) 4 MG TbDL; Take 1 tablet (4 mg total) by mouth every 8 (eight) hours as needed.  Dispense: 20 tablet; Refill: 0        Follow up in about 2 months (around 9/2/2024) for htn .      7/2/2024 WU Tucker, FNP

## 2024-07-07 ENCOUNTER — HOSPITAL ENCOUNTER (OUTPATIENT)
Facility: HOSPITAL | Age: 39
Discharge: HOME | End: 2024-07-08
Attending: STUDENT IN AN ORGANIZED HEALTH CARE EDUCATION/TRAINING PROGRAM | Admitting: HOSPITALIST
Payer: MEDICAID

## 2024-07-07 DIAGNOSIS — E87.20 LACTIC ACIDOSIS: ICD-10-CM

## 2024-07-07 DIAGNOSIS — E11.21 TYPE 2 DIABETES MELLITUS WITH DIABETIC NEPHROPATHY, WITH LONG-TERM CURRENT USE OF INSULIN: ICD-10-CM

## 2024-07-07 DIAGNOSIS — R65.10 SIRS (SYSTEMIC INFLAMMATORY RESPONSE SYNDROME): ICD-10-CM

## 2024-07-07 DIAGNOSIS — R00.0 TACHYCARDIA: ICD-10-CM

## 2024-07-07 DIAGNOSIS — K31.84 GASTROPARESIS: Primary | ICD-10-CM

## 2024-07-07 DIAGNOSIS — Z79.4 TYPE 2 DIABETES MELLITUS WITH DIABETIC NEPHROPATHY, WITH LONG-TERM CURRENT USE OF INSULIN: ICD-10-CM

## 2024-07-07 PROBLEM — I82.409 ACUTE DVT (DEEP VENOUS THROMBOSIS): Chronic | Status: ACTIVE | Noted: 2024-07-07

## 2024-07-07 PROBLEM — E11.43 DIABETIC GASTROPARESIS: Status: ACTIVE | Noted: 2024-07-07

## 2024-07-07 LAB
ALBUMIN SERPL BCP-MCNC: 4.1 G/DL (ref 3.5–5.2)
ALP SERPL-CCNC: 103 U/L (ref 55–135)
ALT SERPL W/O P-5'-P-CCNC: 18 U/L (ref 10–44)
ANION GAP SERPL CALC-SCNC: 10 MMOL/L (ref 8–16)
AST SERPL-CCNC: 11 U/L (ref 10–40)
B-OH-BUTYR BLD STRIP-SCNC: 0 MMOL/L (ref 0–0.5)
BACTERIA #/AREA URNS HPF: ABNORMAL /HPF
BASOPHILS # BLD AUTO: 0.04 K/UL (ref 0–0.2)
BASOPHILS NFR BLD: 0.2 % (ref 0–1.9)
BILIRUB SERPL-MCNC: 0.6 MG/DL (ref 0.1–1)
BILIRUB UR QL STRIP: NEGATIVE
BUN SERPL-MCNC: 38 MG/DL (ref 6–20)
CALCIUM SERPL-MCNC: 9.6 MG/DL (ref 8.7–10.5)
CHLORIDE SERPL-SCNC: 102 MMOL/L (ref 95–110)
CLARITY UR: CLEAR
CO2 SERPL-SCNC: 24 MMOL/L (ref 23–29)
COLOR UR: YELLOW
CREAT SERPL-MCNC: 3.3 MG/DL (ref 0.5–1.4)
DIFFERENTIAL METHOD BLD: ABNORMAL
EOSINOPHIL # BLD AUTO: 0.7 K/UL (ref 0–0.5)
EOSINOPHIL NFR BLD: 4 % (ref 0–8)
ERYTHROCYTE [DISTWIDTH] IN BLOOD BY AUTOMATED COUNT: 13.2 % (ref 11.5–14.5)
EST. GFR  (NO RACE VARIABLE): 23.6 ML/MIN/1.73 M^2
GLUCOSE SERPL-MCNC: 133 MG/DL (ref 70–110)
GLUCOSE SERPL-MCNC: 148 MG/DL (ref 70–110)
GLUCOSE SERPL-MCNC: 258 MG/DL (ref 70–110)
GLUCOSE UR QL STRIP: ABNORMAL
HCT VFR BLD AUTO: 48.5 % (ref 40–54)
HGB BLD-MCNC: 15.4 G/DL (ref 14–18)
HGB UR QL STRIP: ABNORMAL
HYALINE CASTS #/AREA URNS LPF: 3 /LPF
IMM GRANULOCYTES # BLD AUTO: 0.08 K/UL (ref 0–0.04)
IMM GRANULOCYTES NFR BLD AUTO: 0.5 % (ref 0–0.5)
INFLUENZA A, MOLECULAR: NEGATIVE
INFLUENZA B, MOLECULAR: NEGATIVE
KETONES UR QL STRIP: NEGATIVE
LACTATE SERPL-SCNC: 1.7 MMOL/L (ref 0.5–1.9)
LDH SERPL L TO P-CCNC: 3.08 MMOL/L (ref 0.5–2.2)
LEUKOCYTE ESTERASE UR QL STRIP: NEGATIVE
LIPASE SERPL-CCNC: 26 U/L (ref 4–60)
LYMPHOCYTES # BLD AUTO: 1.7 K/UL (ref 1–4.8)
LYMPHOCYTES NFR BLD: 9.8 % (ref 18–48)
MCH RBC QN AUTO: 27.6 PG (ref 27–31)
MCHC RBC AUTO-ENTMCNC: 31.8 G/DL (ref 32–36)
MCV RBC AUTO: 87 FL (ref 82–98)
MICROSCOPIC COMMENT: ABNORMAL
MONOCYTES # BLD AUTO: 1 K/UL (ref 0.3–1)
MONOCYTES NFR BLD: 5.7 % (ref 4–15)
NEUTROPHILS # BLD AUTO: 13.6 K/UL (ref 1.8–7.7)
NEUTROPHILS NFR BLD: 79.8 % (ref 38–73)
NITRITE UR QL STRIP: NEGATIVE
NRBC BLD-RTO: 0 /100 WBC
OHS QRS DURATION: 86 MS
OHS QTC CALCULATION: 447 MS
PH UR STRIP: 6 [PH] (ref 5–8)
PLATELET # BLD AUTO: 418 K/UL (ref 150–450)
PMV BLD AUTO: 9.5 FL (ref 9.2–12.9)
POTASSIUM SERPL-SCNC: 4.4 MMOL/L (ref 3.5–5.1)
PROT SERPL-MCNC: 7.7 G/DL (ref 6–8.4)
PROT UR QL STRIP: ABNORMAL
RBC # BLD AUTO: 5.58 M/UL (ref 4.6–6.2)
RBC #/AREA URNS HPF: 0 /HPF (ref 0–4)
SAMPLE: ABNORMAL
SARS-COV-2 RDRP RESP QL NAA+PROBE: NEGATIVE
SODIUM SERPL-SCNC: 136 MMOL/L (ref 136–145)
SP GR UR STRIP: 1.01 (ref 1–1.03)
SPECIMEN SOURCE: NORMAL
URN SPEC COLLECT METH UR: ABNORMAL
UROBILINOGEN UR STRIP-ACNC: NEGATIVE EU/DL
WBC # BLD AUTO: 17.06 K/UL (ref 3.9–12.7)
WBC #/AREA URNS HPF: 1 /HPF (ref 0–5)
YEAST URNS QL MICRO: ABNORMAL

## 2024-07-07 PROCEDURE — 63600175 PHARM REV CODE 636 W HCPCS: Performed by: HOSPITALIST

## 2024-07-07 PROCEDURE — 63600175 PHARM REV CODE 636 W HCPCS: Performed by: NURSE PRACTITIONER

## 2024-07-07 PROCEDURE — 83605 ASSAY OF LACTIC ACID: CPT | Performed by: NURSE PRACTITIONER

## 2024-07-07 PROCEDURE — 96367 TX/PROPH/DG ADDL SEQ IV INF: CPT

## 2024-07-07 PROCEDURE — 25000003 PHARM REV CODE 250: Performed by: HOSPITALIST

## 2024-07-07 PROCEDURE — 81001 URINALYSIS AUTO W/SCOPE: CPT | Performed by: NURSE PRACTITIONER

## 2024-07-07 PROCEDURE — 96365 THER/PROPH/DIAG IV INF INIT: CPT

## 2024-07-07 PROCEDURE — 25000003 PHARM REV CODE 250: Performed by: NURSE PRACTITIONER

## 2024-07-07 PROCEDURE — 85025 COMPLETE CBC W/AUTO DIFF WBC: CPT | Performed by: NURSE PRACTITIONER

## 2024-07-07 PROCEDURE — G0378 HOSPITAL OBSERVATION PER HR: HCPCS

## 2024-07-07 PROCEDURE — 82010 KETONE BODYS QUAN: CPT | Performed by: NURSE PRACTITIONER

## 2024-07-07 PROCEDURE — 96375 TX/PRO/DX INJ NEW DRUG ADDON: CPT

## 2024-07-07 PROCEDURE — 87040 BLOOD CULTURE FOR BACTERIA: CPT | Performed by: NURSE PRACTITIONER

## 2024-07-07 PROCEDURE — 96376 TX/PRO/DX INJ SAME DRUG ADON: CPT

## 2024-07-07 PROCEDURE — 36415 COLL VENOUS BLD VENIPUNCTURE: CPT | Performed by: NURSE PRACTITIONER

## 2024-07-07 PROCEDURE — 96361 HYDRATE IV INFUSION ADD-ON: CPT

## 2024-07-07 PROCEDURE — 93010 ELECTROCARDIOGRAM REPORT: CPT | Mod: ,,, | Performed by: GENERAL PRACTICE

## 2024-07-07 PROCEDURE — 99285 EMERGENCY DEPT VISIT HI MDM: CPT | Mod: 25

## 2024-07-07 PROCEDURE — U0002 COVID-19 LAB TEST NON-CDC: HCPCS | Performed by: NURSE PRACTITIONER

## 2024-07-07 PROCEDURE — 87502 INFLUENZA DNA AMP PROBE: CPT | Performed by: NURSE PRACTITIONER

## 2024-07-07 PROCEDURE — 83690 ASSAY OF LIPASE: CPT | Performed by: NURSE PRACTITIONER

## 2024-07-07 PROCEDURE — 93005 ELECTROCARDIOGRAM TRACING: CPT | Performed by: GENERAL PRACTICE

## 2024-07-07 PROCEDURE — 80053 COMPREHEN METABOLIC PANEL: CPT | Performed by: NURSE PRACTITIONER

## 2024-07-07 RX ORDER — PROCHLORPERAZINE MALEATE 5 MG
10 TABLET ORAL EVERY 6 HOURS PRN
Status: DISCONTINUED | OUTPATIENT
Start: 2024-07-07 | End: 2024-07-08 | Stop reason: HOSPADM

## 2024-07-07 RX ORDER — SODIUM CHLORIDE 9 MG/ML
INJECTION, SOLUTION INTRAVENOUS CONTINUOUS
Status: DISCONTINUED | OUTPATIENT
Start: 2024-07-07 | End: 2024-07-08 | Stop reason: HOSPADM

## 2024-07-07 RX ORDER — METOCLOPRAMIDE HYDROCHLORIDE 5 MG/ML
5 INJECTION INTRAMUSCULAR; INTRAVENOUS EVERY 6 HOURS
Status: DISCONTINUED | OUTPATIENT
Start: 2024-07-07 | End: 2024-07-08 | Stop reason: HOSPADM

## 2024-07-07 RX ORDER — METOCLOPRAMIDE HYDROCHLORIDE 5 MG/ML
10 INJECTION INTRAMUSCULAR; INTRAVENOUS EVERY 6 HOURS
Status: DISCONTINUED | OUTPATIENT
Start: 2024-07-07 | End: 2024-07-07

## 2024-07-07 RX ORDER — DIPHENHYDRAMINE HYDROCHLORIDE 50 MG/ML
12.5 INJECTION INTRAMUSCULAR; INTRAVENOUS EVERY 8 HOURS PRN
Status: DISCONTINUED | OUTPATIENT
Start: 2024-07-07 | End: 2024-07-08 | Stop reason: HOSPADM

## 2024-07-07 RX ORDER — IBUPROFEN 200 MG
16 TABLET ORAL
Status: DISCONTINUED | OUTPATIENT
Start: 2024-07-07 | End: 2024-07-08 | Stop reason: HOSPADM

## 2024-07-07 RX ORDER — ONDANSETRON HYDROCHLORIDE 2 MG/ML
4 INJECTION, SOLUTION INTRAVENOUS
Status: DISCONTINUED | OUTPATIENT
Start: 2024-07-07 | End: 2024-07-07

## 2024-07-07 RX ORDER — AMLODIPINE BESYLATE 5 MG/1
5 TABLET ORAL DAILY
Status: DISCONTINUED | OUTPATIENT
Start: 2024-07-07 | End: 2024-07-08 | Stop reason: HOSPADM

## 2024-07-07 RX ORDER — SODIUM CHLORIDE 0.9 % (FLUSH) 0.9 %
3 SYRINGE (ML) INJECTION EVERY 12 HOURS PRN
Status: DISCONTINUED | OUTPATIENT
Start: 2024-07-07 | End: 2024-07-08 | Stop reason: HOSPADM

## 2024-07-07 RX ORDER — GLUCAGON 1 MG
1 KIT INJECTION
Status: DISCONTINUED | OUTPATIENT
Start: 2024-07-07 | End: 2024-07-08 | Stop reason: HOSPADM

## 2024-07-07 RX ORDER — INSULIN ASPART 100 [IU]/ML
0-5 INJECTION, SOLUTION INTRAVENOUS; SUBCUTANEOUS
Status: DISCONTINUED | OUTPATIENT
Start: 2024-07-07 | End: 2024-07-08 | Stop reason: HOSPADM

## 2024-07-07 RX ORDER — ACETAMINOPHEN 325 MG/1
650 TABLET ORAL EVERY 4 HOURS PRN
Status: DISCONTINUED | OUTPATIENT
Start: 2024-07-07 | End: 2024-07-08 | Stop reason: HOSPADM

## 2024-07-07 RX ORDER — HYDROCODONE BITARTRATE AND ACETAMINOPHEN 10; 325 MG/1; MG/1
1 TABLET ORAL EVERY 4 HOURS PRN
COMMUNITY

## 2024-07-07 RX ORDER — OXYCODONE AND ACETAMINOPHEN 7.5; 325 MG/1; MG/1
1 TABLET ORAL EVERY 6 HOURS PRN
COMMUNITY

## 2024-07-07 RX ORDER — METOCLOPRAMIDE HYDROCHLORIDE 5 MG/ML
5 INJECTION INTRAMUSCULAR; INTRAVENOUS
Status: COMPLETED | OUTPATIENT
Start: 2024-07-07 | End: 2024-07-07

## 2024-07-07 RX ORDER — IBUPROFEN 200 MG
24 TABLET ORAL
Status: DISCONTINUED | OUTPATIENT
Start: 2024-07-07 | End: 2024-07-08 | Stop reason: HOSPADM

## 2024-07-07 RX ORDER — MORPHINE SULFATE 4 MG/ML
4 INJECTION, SOLUTION INTRAMUSCULAR; INTRAVENOUS
Status: COMPLETED | OUTPATIENT
Start: 2024-07-07 | End: 2024-07-07

## 2024-07-07 RX ORDER — HYDROMORPHONE HYDROCHLORIDE 1 MG/ML
0.5 INJECTION, SOLUTION INTRAMUSCULAR; INTRAVENOUS; SUBCUTANEOUS EVERY 8 HOURS PRN
Status: DISCONTINUED | OUTPATIENT
Start: 2024-07-07 | End: 2024-07-08 | Stop reason: HOSPADM

## 2024-07-07 RX ORDER — SODIUM CHLORIDE 0.9 % (FLUSH) 0.9 %
10 SYRINGE (ML) INJECTION
Status: DISCONTINUED | OUTPATIENT
Start: 2024-07-07 | End: 2024-07-08 | Stop reason: HOSPADM

## 2024-07-07 RX ORDER — ATORVASTATIN CALCIUM 10 MG/1
10 TABLET, FILM COATED ORAL NIGHTLY
Status: DISCONTINUED | OUTPATIENT
Start: 2024-07-07 | End: 2024-07-08 | Stop reason: HOSPADM

## 2024-07-07 RX ORDER — ONDANSETRON HYDROCHLORIDE 2 MG/ML
4 INJECTION, SOLUTION INTRAVENOUS EVERY 6 HOURS PRN
Status: DISCONTINUED | OUTPATIENT
Start: 2024-07-07 | End: 2024-07-08 | Stop reason: HOSPADM

## 2024-07-07 RX ORDER — NALOXONE HCL 0.4 MG/ML
0.02 VIAL (ML) INJECTION
Status: DISCONTINUED | OUTPATIENT
Start: 2024-07-07 | End: 2024-07-08 | Stop reason: HOSPADM

## 2024-07-07 RX ADMIN — ATORVASTATIN CALCIUM 10 MG: 10 TABLET, FILM COATED ORAL at 08:07

## 2024-07-07 RX ADMIN — SODIUM CHLORIDE 1000 ML: 9 INJECTION, SOLUTION INTRAVENOUS at 10:07

## 2024-07-07 RX ADMIN — METOCLOPRAMIDE 5 MG: 5 INJECTION, SOLUTION INTRAMUSCULAR; INTRAVENOUS at 02:07

## 2024-07-07 RX ADMIN — SODIUM CHLORIDE: 9 INJECTION, SOLUTION INTRAVENOUS at 10:07

## 2024-07-07 RX ADMIN — PIPERACILLIN SODIUM AND TAZOBACTAM SODIUM 4.5 G: 4; .5 INJECTION, POWDER, LYOPHILIZED, FOR SOLUTION INTRAVENOUS at 01:07

## 2024-07-07 RX ADMIN — APIXABAN 5 MG: 5 TABLET, FILM COATED ORAL at 08:07

## 2024-07-07 RX ADMIN — PROMETHAZINE HYDROCHLORIDE 12.5 MG: 25 INJECTION INTRAMUSCULAR; INTRAVENOUS at 10:07

## 2024-07-07 RX ADMIN — SODIUM CHLORIDE: 9 INJECTION, SOLUTION INTRAVENOUS at 02:07

## 2024-07-07 RX ADMIN — METOCLOPRAMIDE 5 MG: 5 INJECTION, SOLUTION INTRAMUSCULAR; INTRAVENOUS at 08:07

## 2024-07-07 RX ADMIN — HYDROMORPHONE HYDROCHLORIDE 0.5 MG: 0.5 INJECTION, SOLUTION INTRAMUSCULAR; INTRAVENOUS; SUBCUTANEOUS at 08:07

## 2024-07-07 RX ADMIN — MORPHINE SULFATE 4 MG: 4 INJECTION, SOLUTION INTRAMUSCULAR; INTRAVENOUS at 01:07

## 2024-07-07 NOTE — ASSESSMENT & PLAN NOTE
6/30/24: US RLE:  occlusive appearing intraluminal thrombus of the right femoral vein, popliteal vein and posterior tibial veins. With additional intraluminal thrombus/DVT involving the right anterior tibial and peroneal veins.     Continue eliquis

## 2024-07-07 NOTE — NURSING
Contacted Radiology for the status of the STAT Gastric Emptying Test.  Per Katie, this test would be performed on Monday, July 8, 2024.  Patient is required to be NPO after midnight for testing.

## 2024-07-07 NOTE — ASSESSMENT & PLAN NOTE
Chronic, controlled. Latest blood pressure and vitals reviewed-     Temp:  [98.1 °F (36.7 °C)]   Pulse:  []   Resp:  [18]   BP: (120-142)/(81-94)   SpO2:  [95 %-97 %] .   Home meds for hypertension were reviewed and noted below.   Hypertension Medications               amLODIPine (NORVASC) 5 MG tablet Take 1 tablet (5 mg total) by mouth once daily.    losartan (COZAAR) 100 MG tablet Take 1 tablet (100 mg total) by mouth once daily.            While in the hospital, will manage blood pressure as follows; Continue home antihypertensive regimen hold Losartan for now due to CKD IV    Will utilize p.r.n. blood pressure medication only if patient's blood pressure greater than 180/110 and he develops symptoms such as worsening chest pain or shortness of breath.

## 2024-07-07 NOTE — ED PROVIDER NOTES
Encounter Date: 2024       History     Chief Complaint   Patient presents with    Vomiting     X 1 DAY    Diarrhea    Abdominal Pain     Jose Miguel Brennan is a 38 year old male with pmh anxiety, asthma, depression, DM, DVT, GERD, hyperlipidemia, HTN, IgA nephropathy presenting to the ED with c/o cough, abdominal pain, nausea, and vomiting. Symptoms began yesterday with chills but no measured fever. He has had no diarrhea but has been unable to tolerate PO intake today. Abdominal pain is diffuse.         Review of patient's allergies indicates:   Allergen Reactions    Zithromax [azithromycin] Rash     Past Medical History:   Diagnosis Date    Anxiety     Asthma     Depression     Diabetes mellitus     diet controlled    Diabetes mellitus, type 2     DVT (deep venous thrombosis)     GERD (gastroesophageal reflux disease)     Gout     Hyperlipidemia     Hypertension     IgA nephropathy     Insomnia      Past Surgical History:   Procedure Laterality Date    COLONOSCOPY N/A 2020    Procedure: COLONOSCOPY;  Surgeon: Hammad Castorena III, MD;  Location: [facility];  Service: Endoscopy;  Laterality: N/A;    ESOPHAGOGASTRODUODENOSCOPY N/A 2020    Procedure: EGD (ESOPHAGOGASTRODUODENOSCOPY);  Surgeon: Hammda Castorena III, MD;  Location: [facility];  Service: Endoscopy;  Laterality: N/A;    nose polyp removal       Family History   Problem Relation Name Age of Onset    Hypertension Maternal Grandmother      Cancer Maternal Grandfather      Diabetes Maternal Grandfather      Heart disease Maternal Grandfather      Heart disease Mother      Stroke Mother      Diabetes Mother       Social History     Tobacco Use    Smoking status: Former     Current packs/day: 0.00     Average packs/day: 0.3 packs/day for 5.0 years (1.3 ttl pk-yrs)     Types: Cigars, Cigarettes     Start date: 2017     Quit date: 2022     Years since quittin.1     Passive exposure: Past    Smokeless tobacco: Never    Tobacco  comments:     One cigar a day   Substance Use Topics    Alcohol use: Yes    Drug use: No     Review of Systems   Constitutional:  Positive for chills. Negative for fever.   HENT:  Negative for sore throat.    Respiratory:  Positive for cough. Negative for shortness of breath.    Cardiovascular:  Negative for chest pain.   Gastrointestinal:  Negative for nausea.   Genitourinary:  Negative for dysuria.   Musculoskeletal:  Negative for back pain.   Skin:  Negative for rash.   Neurological:  Negative for weakness.   Hematological:  Does not bruise/bleed easily.       Physical Exam     Initial Vitals [07/07/24 0918]   BP Pulse Resp Temp SpO2   (!) 120/94 (!) 113 18 98.1 °F (36.7 °C) 96 %      MAP       --         Physical Exam    Nursing note and vitals reviewed.  Constitutional: He appears well-developed and well-nourished. He is not diaphoretic. No distress.   HENT:   Head: Normocephalic and atraumatic.   Mouth/Throat: Oropharynx is clear and moist. Mucous membranes are dry.   Eyes: Conjunctivae are normal.   Neck: Neck supple.   Cardiovascular:  Normal rate, regular rhythm, normal heart sounds and intact distal pulses.     Exam reveals no gallop and no friction rub.       No murmur heard.  Pulmonary/Chest: Breath sounds normal. He has no wheezes. He has no rhonchi. He has no rales.   Abdominal: Abdomen is soft. He exhibits no distension. There is abdominal tenderness (diffuse). There is no guarding.   Musculoskeletal:         General: Normal range of motion.      Cervical back: Neck supple.     Neurological: He is alert and oriented to person, place, and time.   Skin: No rash noted. No erythema.         ED Course   Procedures  Labs Reviewed   CBC W/ AUTO DIFFERENTIAL - Abnormal; Notable for the following components:       Result Value    WBC 17.06 (*)     MCHC 31.8 (*)     Gran # (ANC) 13.6 (*)     Immature Grans (Abs) 0.08 (*)     Eos # 0.7 (*)     Gran % 79.8 (*)     Lymph % 9.8 (*)     All other components within  normal limits   COMPREHENSIVE METABOLIC PANEL - Abnormal; Notable for the following components:    Glucose 258 (*)     BUN 38 (*)     Creatinine 3.3 (*)     eGFR 23.6 (*)     All other components within normal limits   URINALYSIS, REFLEX TO URINE CULTURE - Abnormal; Notable for the following components:    Protein, UA 3+ (*)     Glucose, UA 3+ (*)     All other components within normal limits    Narrative:     In and Out Cath as needed it patient unable to void  Specimen Source->Urine   URINALYSIS MICROSCOPIC - Abnormal; Notable for the following components:    Hyaline Casts, UA 3 (*)     All other components within normal limits    Narrative:     In and Out Cath as needed it patient unable to void  Specimen Source->Urine   ISTAT LACTATE - Abnormal; Notable for the following components:    POC Lactate 3.08 (*)     All other components within normal limits   CULTURE, BLOOD    Narrative:     Collection has been rescheduled by DW at 07/07/2024 12:12 Reason:   Patient unavailable x-ray   Collection has been rescheduled by RE1 at 07/07/2024 12:16 Reason:   Patient unavailable  Collection has been rescheduled by DW at 07/07/2024 12:12 Reason:   Patient unavailable x-ray   Collection has been rescheduled by RE1 at 07/07/2024 12:16 Reason:   Patient unavailable   CULTURE, BLOOD    Narrative:     Collection has been rescheduled by DW at 07/07/2024 12:12 Reason:   Patient unavailable x-ray   Collection has been rescheduled by RE1 at 07/07/2024 12:16 Reason:   Patient unavailable  Collection has been rescheduled by DW at 07/07/2024 12:12 Reason:   Patient unavailable x-ray   Collection has been rescheduled by RE1 at 07/07/2024 12:16 Reason:   Patient unavailable   LIPASE   SARS-COV-2 RNA AMPLIFICATION, QUAL   INFLUENZA A AND B ANTIGEN    Narrative:     Specimen Source->Nasopharyngeal Swab   BETA - HYDROXYBUTYRATE, SERUM   BETA - HYDROXYBUTYRATE, SERUM   POCT LACTATE        ECG Results              EKG 12-lead (In process)         Collection Time Result Time QRS Duration OHS QTC Calculation    07/07/24 12:30:40 07/07/24 14:23:44 86 447                     In process by Interface, Lab In [facility] (07/07/24 14:23:47)                   Narrative:    Test Reason : R00.0,    Vent. Rate : 094 BPM     Atrial Rate : 094 BPM     P-R Int : 170 ms          QRS Dur : 086 ms      QT Int : 358 ms       P-R-T Axes : 054 -05 041 degrees     QTc Int : 447 ms    Normal sinus rhythm  Inferior infarct (cited on or before 19-FEB-2024)  Abnormal ECG  When compared with ECG of 25-JUN-2024 19:22,  Left anterior fascicular block is no longer Present  Borderline criteria for Lateral infarct are no longer Present    Referred By: [email or web address]   SELF           Confirmed By:                                   Imaging Results              X-Ray Chest AP Portable (Final result)  Result time 07/07/24 12:31:05      Final result by Jaycob Haskins Jr., MD (07/07/24 12:31:05)                   Impression:      No acute abnormality.      Electronically signed by: Jaycob Haskins MD  Date:    07/07/2024  Time:    12:31               Narrative:    EXAMINATION:  XR CHEST AP PORTABLE    CLINICAL HISTORY:  Sepsis;    TECHNIQUE:  Single frontal view of the chest was performed.    COMPARISON:  08/21/2023    FINDINGS:  The lungs are clear, with normal appearance of pulmonary vasculature and no pleural effusion or pneumothorax.    The cardiac silhouette is normal in size. The hilar and mediastinal contours are unremarkable.    Bones are intact.                                       CT Abdomen Pelvis  Without Contrast (Final result)  Result time 07/07/24 12:20:06      Final result by Jaycob Haskins Jr., MD (07/07/24 12:20:06)                   Impression:      1.  No CT evidence of acute intra-abdominal process.    2.  Spherical low-density focus arising from the lateral limb of the right RA gland favors the presence of a benign adrenal adenoma with no evidence appreciable change  in size.    3.  Dilated stomach without admixing of fluid in gas within the stomach lumen suggestive of factors of the gastroparesis.  Recommend correlation with nuclear medicine emptying study for further correlate.      Electronically signed by: Jaycob Haskins MD  Date:    07/07/2024  Time:    12:20               Narrative:    EXAMINATION:  CT ABDOMEN PELVIS WITHOUT CONTRAST    CLINICAL HISTORY:  Abdominal abscess/infection suspected;    TECHNIQUE:  Low dose axial images, sagittal and coronal reformations were obtained from the lung bases to the pubic symphysis.  No oral contrast was administered.  Lack of intravenous contrast imposed for examination limits diagnostic evaluation for evaluation of solid organ perfusion abnormalities and vascular anomalies that should be taken account upon review of this particular imaging    COMPARISON:  Correlation is made with the patient's prior CT scan of the abdomen pelvis without contrast 06/25/2024    FINDINGS:  Visualized portions of the lung bases are clear bilaterally.  No evidence of mass, infiltrate, or significant pleural effusion.  Lower heart is imaged appears unremarkable.    Liver maintains normal size with diffuse low attenuation of the hepatic architecture on the basis of fatty infiltration, without evidence of mass space occupying lesion, or cyst.  Slightly contracted gallbladder without pathologic findings.  No intraluminal stone formation.  Admixing of fluid in gas within the dilated stomach with each tapers abruptly at the level of the duodenum and pylorus region suggestive of manifestations of gastric outlet obstruction.  Correlation with nuclear medicine emptying scan would prove worthy.  Spherical fat containing focus along the lateral limb of the right adrenal gland appears unchanged favor adenoma.    Bowel loops appear normal in caliber without evidence of wall thickening.  The mesenteric fat is normal.  The vascular bundles of the central abdominal  cavity are unremarkable.    Both kidneys appear rather small in size without evidence of mass or hydronephrosis.  Both ureters appear normal in size and overall caliber.    Pelvic evaluation shows normal appearance of the urinary bladder.  The prostate and seminal vessels are unremarkable in CT appearance.    Osseous structures reveal no significant finding.                                       Medications   sodium chloride 0.9% flush 10 mL (has no administration in time range)   acetaminophen tablet 650 mg (has no administration in time range)   ondansetron injection 4 mg (has no administration in time range)   prochlorperazine tablet 10 mg (has no administration in time range)   sodium chloride 0.9% flush 3 mL (has no administration in time range)   naloxone 0.4 mg/mL injection 0.02 mg (has no administration in time range)   glucose chewable tablet 16 g (has no administration in time range)   glucose chewable tablet 24 g (has no administration in time range)   dextrose 50% injection 12.5 g (has no administration in time range)   dextrose 50% injection 25 g (has no administration in time range)   glucagon (human recombinant) injection 1 mg (has no administration in time range)   insulin aspart U-100 pen 0-5 Units (has no administration in time range)   0.9%  NaCl infusion ( Intravenous New Bag 7/7/24 1733)   metoclopramide injection 5 mg (has no administration in time range)   HYDROmorphone injection 0.5 mg (has no administration in time range)   diphenhydrAMINE injection 12.5 mg (has no administration in time range)   amLODIPine tablet 5 mg (has no administration in time range)   atorvastatin tablet 10 mg (has no administration in time range)   apixaban tablet 5 mg (has no administration in time range)   promethazine (PHENERGAN) 12.5 mg in 0.9% NaCl 50 mL IVPB (0 mg Intravenous Stopped 7/7/24 1045)   sodium chloride 0.9% bolus 1,000 mL 1,000 mL (0 mLs Intravenous Stopped 7/7/24 1125)   morphine injection 4 mg (4  mg Intravenous Given 24 1350)   piperacillin-tazobactam 4.5 g in dextrose 5 % 100 mL IVPB (ready to mix) (0 g Intravenous Stopped 24 1424)   metoclopramide injection 5 mg (5 mg Intravenous Given 24 1453)     Medical Decision Making  [facility]    Patient presents for emergent evaluation of acute nausea, vomiting, abdominal pain that poses a threat to life and/or bodily function.    In the ED patient found to have acute gastroparesis.    I ordered labs and personally reviewed them.  Labs significant for the followin24 10:17  WBC: 17.06 (H)  RBC: 5.58  Hemoglobin: 15.4  Hematocrit: 48.5  24 10:17  BUN: 38 (H)  Creatinine: 3.3 (H)  eGFR: 23.6 !  Glucose: 258 (H)  Calcium: 9.6  Lactic acid 3.08        I ordered CT scan and personally reviewed it and reviewed the radiologist interpretation.  CT significant for   No CT evidence of acute intra-abdominal process.    2.  Spherical low-density focus arising from the lateral limb of the right RA gland favors the presence of a benign adrenal adenoma with no evidence appreciable change in size.    3.  Dilated stomach without admixing of fluid in gas within the stomach lumen suggestive of factors of the gastroparesis.  Recommend correlation with nuclear medicine emptying study for further correlate.    Admit MDM  I discussed the patient presentation labs, CT findings with the consultant for hospital medicine (speciality).    Patient was managed in the ED with IV phenergan, IV fluids, reglan, zosyn, morphine   The patient remained stable and had no vomiting. Considered NG tube placement but he had no active vomiting in the ED. No identified source of infection for sepsis but he does meet SIRS criteria. Zosyn given for leukocytosis, lactic acidosis. He was not hypotensive and did not necessitate vasopressor support or IV boluses.   He will require admission for further management of symptoms.       Amount and/or Complexity of Data Reviewed  Labs: ordered.  Decision-making details documented in ED Course.  Radiology: ordered. Decision-making details documented in ED Course.    Risk  Prescription drug management.              Attending Attestation:     Physician Attestation Statement for NP/PA:   I personally made/approved the management plan and take responsibility for the patient management.    Other NP/PA Attestation Additions:    History of Present Illness: 38-year-old male presents with epigastric pain, nausea and vomiting   Physical Exam: Patient resting comfortably on my physical evaluation   Medical Decision Making: Workup initiated by nurse practitioner demonstrated gastroparesis, patient meeting SIRS criteria but no evidence of infectious etiology as of yet, administered broad-spectrum antibiotics and admitted to hospitalist team for further management and evaluation             ED Course as of 07/07/24 1545   Sun Jul 07, 2024   1246 Patient meeting multiple SIRS criteria but no source of infection.  We will treat empirically with broad-spectrum antibiotics and obtain blood cultures.  Roughly 12:40 p.m. [KB]   1247 EKG rate 94, QRS 86, , normal sinus rhythm, no STEMI EKG interpreted by myself johnny Arora DO   [KB]      ED Course User Index  [KB] Johnny Arora Jr.,                            Clinical Impression:  Final diagnoses:  [R00.0] Tachycardia  [K31.84] Gastroparesis (Primary)  [E87.20] Lactic acidosis  [E11.21, Z79.4] Type 2 diabetes mellitus with diabetic nephropathy, with long-term current use of insulin  [R65.10] SIRS (systemic inflammatory response syndrome)          ED Disposition Condition    Observation                 Fern Corbett NP  07/07/24 1456       Johnny Arora Jr., DO  07/07/24 1546     - Pt coagulopathic, INR 2.1

## 2024-07-07 NOTE — PROGRESS NOTES
Pharmacist Renal Dose Adjustment Note    Jose Miguel Brennan is a 38 y.o. male being treated with the medication Metoclopramide    Patient Data:    Vital Signs (Most Recent):  Temp: 98.1 °F (36.7 °C) (07/07/24 0918)  Pulse: 100 (07/07/24 1203)  Resp: 18 (07/07/24 0918)  BP: (!) 142/93 (07/07/24 1203)  SpO2: 97 % (07/07/24 1203) Vital Signs (72h Range):  Temp:  [98.1 °F (36.7 °C)]   Pulse:  []   Resp:  [18]   BP: (120-142)/(81-94)   SpO2:  [95 %-97 %]      Recent Labs   Lab 06/30/24  2309 07/07/24  1017   CREATININE 3.1* 3.3*     Serum creatinine: 3.3 mg/dL (H) 07/07/24 1017  Estimated creatinine clearance: 36.4 mL/min (A)    Medication:Metoclopramide dose: 10 mg frequency Q6H will be changed to medication:Metoclopramide dose:5 mg frequency:Q6H    Pharmacist's Name: Mónica Mejia  Pharmacist's Extension: [phone]

## 2024-07-07 NOTE — PLAN OF CARE
[facility]  Initial Discharge Assessment       Primary Care Provider: Jose Grant NP    Admission Diagnosis: Gastroparesis [K31.84]    Admission Date: 7/7/2024  Expected Discharge Date: TBD  Met with patient at bedside to complete assessment. Patient has no living will, POA or advance directive. Patient has no HH, is not on HD, uses no DME, and does not take Coumadin. Patient's significant other will bring him home when he is discharged. No needs have been identified at this time.    Transition of Care Barriers: None    Payor: MEDICAID / [facility] Product Type: Managed Medicaid /     Extended Emergency Contact Information  Primary Emergency Contact: Nancy Cornejo  Address: [address] [place] [address] [facility]  Home Phone: [phone]  Mobile Phone: [phone]  Relation: Significant other  Preferred language: English   needed? No    Discharge Plan A: Home with family  Discharge Plan B: Home with family      [id number] - [place] - [address]   [address] [facility] [address]  Phone: [phone] Fax: [phone]      Initial Assessment (most recent)       Adult Discharge Assessment - 07/07/24 1537          Discharge Assessment    Assessment Type Discharge Planning Assessment     Confirmed/corrected address, phone number and insurance Yes     Confirmed Demographics Correct on Facesheet     Source of Information patient     When was your last doctors appointment? --   one week ago    Communicated STEPHANIE with patient/caregiver Date not available/Unable to determine     Reason For Admission Diabetic gastroparesis     People in Home significant other     Facility Arrived From: home     Do you expect to return to your current living situation? Yes     Do you have help at home or someone to help you manage your care at home? Yes     Who are your caregiver(s) and their phone number(s)? Nancy Monteiro  Chantal/significant other [phone]     Prior to hospitilization cognitive status: Alert/Oriented;No Deficits     Current cognitive status: Alert/Oriented;No Deficits     Walking or Climbing Stairs Difficulty no     Dressing/Bathing Difficulty no     Equipment Currently Used at Home none     Readmission within 30 days? No     Patient currently being followed by outpatient case management? No     Do you currently have service(s) that help you manage your care at home? No     Do you take prescription medications? Yes     Do you have prescription coverage? Yes     Coverage [employer] Medicaid     Do you have any problems affording any of your prescribed medications? No     Who is going to help you get home at discharge? Nancy Cornejo/significant other [phone]     How do you get to doctors appointments? car, drives self     Are you on dialysis? No     Do you take coumadin? No     Discharge Plan A Home with family     Discharge Plan B Home with family     DME Needed Upon Discharge  none     Discharge Plan discussed with: Patient     Transition of Care Barriers None

## 2024-07-07 NOTE — HPI
38 year old male with a past medical history of hypertension, hyperlipidemia, asthma, DVT, uncontrolled diabetes, IgA nephropathy, chronic kidney disease whom presents to the emergency room with reports of generalized abdominal pain, vomiting x1 day.  Upon arrival to the ER, patient tachycardic at 113, lactic 3, WBC 17 glucose to 5 8, beta-hydroxybutyrate 0, fluid COVID negative, UA with 3+ protein and 3+ glucose.  Negative for infection.  CT abdomen showed suggestive of gastroparesis.  Chest x-ray nonacute.  Septic workup began in ER patient given IV fluids, Zosyn, morphine for abdominal pain, Reglan admit to hospital medicine gastroparesis,  We will keep patient NPO for now for bowel rest due to not tolerating p.o. intake give IV hydration, pain control

## 2024-07-07 NOTE — SUBJECTIVE & OBJECTIVE
Past Medical History:   Diagnosis Date    Anxiety     Asthma     Depression     Diabetes mellitus     diet controlled    Diabetes mellitus, type 2     DVT (deep venous thrombosis)     GERD (gastroesophageal reflux disease)     Gout     Hyperlipidemia     Hypertension     IgA nephropathy     Insomnia        Past Surgical History:   Procedure Laterality Date    COLONOSCOPY N/A 5/8/2020    Procedure: COLONOSCOPY;  Surgeon: Hammad Castorena III, MD;  Location: [facility];  Service: Endoscopy;  Laterality: N/A;    ESOPHAGOGASTRODUODENOSCOPY N/A 5/8/2020    Procedure: EGD (ESOPHAGOGASTRODUODENOSCOPY);  Surgeon: Hammad Castorena III, MD;  Location: [facility] END[facility];  Service: Endoscopy;  Laterality: N/A;    nose polyp removal         Review of patient's allergies indicates:   Allergen Reactions    Zithromax [azithromycin] Rash       No current facility-administered medications on file prior to encounter.     Current Outpatient Medications on File Prior to Encounter   Medication Sig    amLODIPine (NORVASC) 5 MG tablet Take 1 tablet (5 mg total) by mouth once daily.    atorvastatin (LIPITOR) 10 MG tablet Take 1 tablet (10 mg total) by mouth every evening.    dulaglutide (TRULICITY) 1.5 mg/0.5 mL pen injector Inject 1.5 mg into the skin every 7 days.    ELIQUIS 5 mg Tab Take 5 mg by mouth 2 (two) times daily.    empagliflozin (JARDIANCE) 10 mg tablet Take 1 tablet (10 mg total) by mouth once daily.    HYDROcodone-acetaminophen (NORCO)  mg per tablet Take 1 tablet by mouth every 4 (four) hours as needed for Pain.    losartan (COZAAR) 100 MG tablet Take 1 tablet (100 mg total) by mouth once daily.    omega-3 acid ethyl esters (LOVAZA) 1 gram capsule Take 1 capsule (1 g total) by mouth 2 (two) times daily.    ondansetron (ZOFRAN-ODT) 4 MG TbDL Take 1 tablet (4 mg total) by mouth every 8 (eight) hours as needed.    oxyCODONE-acetaminophen (PERCOCET) 7.5-325 mg per tablet Take 1 tablet by mouth every 6 (six) hours as needed  "for Pain.    BD CONOR 2ND GEN PEN NEEDLE 32 gauge x 5/32" Ndle once daily. Use    blood sugar diagnostic Strp To check BG one times daily, to use with insurance preferred meter    blood-glucose meter kit To check BG one times daily, to use with insurance preferred meter    gabapentin (NEURONTIN) 300 MG capsule Take 1 capsule (300 mg total) by mouth 3 (three) times daily.    lancets Misc To check BG one times daily, to use with insurance preferred meter    pen needle, diabetic 32 gauge x 5/16" Ndle 1 Units by Misc.(Non-Drug; Combo Route) route once daily.     Family History       Problem Relation (Age of Onset)    Cancer Maternal Grandfather    Diabetes Maternal Grandfather, Mother    Heart disease Maternal Grandfather, Mother    Hypertension Maternal Grandmother    Stroke Mother          Tobacco Use    Smoking status: Former     Current packs/day: 0.00     Average packs/day: 0.3 packs/day for 5.0 years (1.3 ttl pk-yrs)     Types: Cigars, Cigarettes     Start date: 2017     Quit date: 2022     Years since quittin.1     Passive exposure: Past    Smokeless tobacco: Never    Tobacco comments:     One cigar a day   Substance and Sexual Activity    Alcohol use: Yes    Drug use: No    Sexual activity: Yes     Partners: Female     Review of Systems   Constitutional:  Positive for activity change and appetite change.   Respiratory: Negative.     Cardiovascular: Negative.    Gastrointestinal:  Positive for abdominal pain, diarrhea, nausea and vomiting. Negative for constipation.   Genitourinary: Negative.    Neurological: Negative.      Objective:     Vital Signs (Most Recent):  Temp: 98.1 °F (36.7 °C) (24 0918)  Pulse: 100 (24 1203)  Resp: 18 (24 0918)  BP: (!) 142/93 (24 1203)  SpO2: 97 % (24 1203) Vital Signs (24h Range):  Temp:  [98.1 °F (36.7 °C)] 98.1 °F (36.7 °C)  Pulse:  [] 100  Resp:  [18] 18  SpO2:  [95 %-97 %] 97 %  BP: (120-142)/(81-94) 142/93     Weight: 95.3 kg (210 " lb)  Body mass index is 28.48 kg/m².     Physical Exam  Vitals reviewed.   Constitutional:       General: He is in acute distress.      Appearance: Normal appearance. He is not ill-appearing.   HENT:      Head: Normocephalic and atraumatic.      Mouth/Throat:      Mouth: Mucous membranes are dry.   Eyes:      Extraocular Movements: Extraocular movements intact.   Cardiovascular:      Rate and Rhythm: Normal rate.      Pulses: Normal pulses.      Heart sounds: No murmur heard.  Abdominal:      General: Bowel sounds are decreased.      Tenderness: There is generalized abdominal tenderness and tenderness in the epigastric area.   Musculoskeletal:         General: No swelling. Normal range of motion.      Cervical back: Neck supple.   Skin:     General: Skin is warm.   Neurological:      General: No focal deficit present.      Mental Status: He is alert. Mental status is at baseline.   Psychiatric:         Mood and Affect: Mood normal.                Significant Labs: All pertinent labs within the past 24 hours have been reviewed.  Recent Lab Results  (Last 5 results in the past 24 hours)        07/07/24  1217   07/07/24  1150   07/07/24  1035   07/07/24  1018   07/07/24  1017        Influenza A, Molecular     Negative           Influenza B, Molecular     Negative           Albumin         4.1       ALP         103       ALT         18       Anion Gap         10       Appearance, UA       Clear         AST         11       Bacteria, UA       Rare         Baso #         0.04       Basophil %         0.2       Beta-Hydroxybutyrate   0.0             Bilirubin (UA)       Negative         BILIRUBIN TOTAL         0.6  Comment: For infants and newborns, interpretation of results should be based  on gestational age, weight and in agreement with clinical  observations.    Premature Infant recommended reference ranges:  Up to 24 hours.............<8.0 mg/dL  Up to 48 hours............<12.0 mg/dL  3-5 days..................<15.0  mg/dL  6-29 days.................<15.0 mg/dL         BUN         38       Calcium         9.6       Chloride         102       CO2         24       Color, UA       Yellow         Creatinine         3.3       Differential Method         Automated       eGFR         23.6       Eos #         0.7       Eos %         4.0       Flu A & B Source     Nasal swab           Glucose         258       Glucose, UA       3+         Gran # (ANC)         13.6       Gran %         79.8       Hematocrit         48.5       Hemoglobin         15.4       Hyaline Casts, UA       3         Immature Grans (Abs)         0.08  Comment: Mild elevation in immature granulocytes is non specific and   can be seen in a variety of conditions including stress response,   acute inflammation, trauma and pregnancy. Correlation with other   laboratory and clinical findings is essential.         Immature Granulocytes         0.5       Ketones, UA       Negative         Leukocyte Esterase, UA       Negative         Lipase         26       Lymph #         1.7       Lymph %         9.8       MCH         27.6       MCHC         31.8       MCV         87       Microscopic Comment       SEE COMMENT  Comment: Other formed elements not mentioned in the report are not   present in the microscopic examination.            Mono #         1.0       Mono %         5.7       MPV         9.5       NITRITE UA       Negative         nRBC         0       Blood, UA       TRACE         pH, UA       6.0         Platelet Count         418       POC Lactate 3.08               Potassium         4.4       PROTEIN TOTAL         7.7       Protein, UA       3+  Comment: Recommend a 24 hour urine protein or a urine   protein/creatinine ratio if globulin induced proteinuria is  clinically suspected.           RBC         5.58       RBC, UA       0         RDW         13.2       Sample VENOUS               SARS-CoV-2 RNA, Amplification, Qual     Negative  Comment: This test utilizes  isothermal nucleic acid amplification technology   to   detect the SARS-CoV-2 RdRp nucleic acid segment. The analytical   sensitivity   (limit of detection) is 500 copies/swab.     A POSITIVE result is indicative of the presence of SARS-CoV-2 RNA;   clinical   correlation with patient history and other diagnostic information is   necessary to determine patient infection status.    A NEGATIVE result means that SARS-CoV-2 nucleic acids are not present   above   the limit of detection. A NEGATIVE result should be treated as   presumptive.   It does not rule out the possibility of COVID-19 and should not be   the sole   basis for treatment decisions. If COVID-19 is strongly suspected   based on   clinical and exposure history, re-testing using an alternate   molecular assay   should be considered.     This test is FDA approved.Performance characteristics of this test   has been   independently verified by [facility] in conjunction   with   [facility] Department of Pathology and Laboratory   Medicine.             Sodium         136       Spec Grav UA       1.015         Specimen UA       Urine, Clean Catch         UROBILINOGEN UA       Negative         WBC, UA       1         WBC         17.06       Yeast, UA       None                                Significant Imaging: I have reviewed all pertinent imaging results/findings within the past 24 hours.  Imaging Results              X-Ray Chest AP Portable (Final result)  Result time 07/07/24 12:31:05      Final result by Jaycob Haskins Jr., MD (07/07/24 12:31:05)                   Impression:      No acute abnormality.      Electronically signed by: Jaycob Haskins MD  Date:    07/07/2024  Time:    12:31               Narrative:    EXAMINATION:  XR CHEST AP PORTABLE    CLINICAL HISTORY:  Sepsis;    TECHNIQUE:  Single frontal view of the chest was performed.    COMPARISON:  08/21/2023    FINDINGS:  The lungs are clear, with normal appearance  of pulmonary vasculature and no pleural effusion or pneumothorax.    The cardiac silhouette is normal in size. The hilar and mediastinal contours are unremarkable.    Bones are intact.                                       CT Abdomen Pelvis  Without Contrast (Final result)  Result time 07/07/24 12:20:06      Final result by Jaycob Haskins Jr., MD (07/07/24 12:20:06)                   Impression:      1.  No CT evidence of acute intra-abdominal process.    2.  Spherical low-density focus arising from the lateral limb of the right RA gland favors the presence of a benign adrenal adenoma with no evidence appreciable change in size.    3.  Dilated stomach without admixing of fluid in gas within the stomach lumen suggestive of factors of the gastroparesis.  Recommend correlation with nuclear medicine emptying study for further correlate.      Electronically signed by: Jaycob Haskins MD  Date:    07/07/2024  Time:    12:20               Narrative:    EXAMINATION:  CT ABDOMEN PELVIS WITHOUT CONTRAST    CLINICAL HISTORY:  Abdominal abscess/infection suspected;    TECHNIQUE:  Low dose axial images, sagittal and coronal reformations were obtained from the lung bases to the pubic symphysis.  No oral contrast was administered.  Lack of intravenous contrast imposed for examination limits diagnostic evaluation for evaluation of solid organ perfusion abnormalities and vascular anomalies that should be taken account upon review of this particular imaging    COMPARISON:  Correlation is made with the patient's prior CT scan of the abdomen pelvis without contrast 06/25/2024    FINDINGS:  Visualized portions of the lung bases are clear bilaterally.  No evidence of mass, infiltrate, or significant pleural effusion.  Lower heart is imaged appears unremarkable.    Liver maintains normal size with diffuse low attenuation of the hepatic architecture on the basis of fatty infiltration, without evidence of mass space occupying lesion, or  cyst.  Slightly contracted gallbladder without pathologic findings.  No intraluminal stone formation.  Admixing of fluid in gas within the dilated stomach with each tapers abruptly at the level of the duodenum and pylorus region suggestive of manifestations of gastric outlet obstruction.  Correlation with nuclear medicine emptying scan would prove worthy.  Spherical fat containing focus along the lateral limb of the right adrenal gland appears unchanged favor adenoma.    Bowel loops appear normal in caliber without evidence of wall thickening.  The mesenteric fat is normal.  The vascular bundles of the central abdominal cavity are unremarkable.    Both kidneys appear rather small in size without evidence of mass or hydronephrosis.  Both ureters appear normal in size and overall caliber.    Pelvic evaluation shows normal appearance of the urinary bladder.  The prostate and seminal vessels are unremarkable in CT appearance.    Osseous structures reveal no significant finding.

## 2024-07-07 NOTE — NURSING
Nurses Note -- 4 Eyes      7/7/2024   6:23 PM      Skin assessed during: Admit      [x] No Altered Skin Integrity Present    [x]Prevention Measures Documented      [] Yes- Altered Skin Integrity Present or Discovered   [] LDA Added if Not in Epic (Describe Wound)   [] New Altered Skin Integrity was Present on Admit and Documented in LDA   [] Wound Image Taken    Wound Care Consulted? No    Attending Nurse:  Mayte Yousif RN/Staff Member:   Amy

## 2024-07-07 NOTE — H&P
[facility] - Emergency Dept  Hospital Medicine  History & Physical    Patient Name: Jose Miguel Brennan  MRN: [id number]  Patient Class: OP- Observation  Admission Date: 7/7/2024  Attending Physician: Lisa Chadwick MD   Primary Care Provider: Jose Grant NP         Patient information was obtained from patient and ER records.     Subjective:     Principal Problem:Diabetic gastroparesis    Chief Complaint:   Chief Complaint   Patient presents with    Vomiting     X 1 DAY    Diarrhea    Abdominal Pain        HPI: 38 year old male with a past medical history of hypertension, hyperlipidemia, asthma, DVT, uncontrolled diabetes, IgA nephropathy, chronic kidney disease whom presents to the emergency room with reports of generalized abdominal pain, vomiting x1 day.  Upon arrival to the ER, patient tachycardic at 113, lactic 3, WBC 17 glucose to 5 8, beta-hydroxybutyrate 0, fluid COVID negative, UA with 3+ protein and 3+ glucose.  Negative for infection.  CT abdomen showed suggestive of gastroparesis.  Chest x-ray nonacute.  Septic workup began in ER patient given IV fluids, Zosyn, morphine for abdominal pain, Reglan admit to hospital medicine gastroparesis,  We will keep patient NPO for now for bowel rest due to not tolerating p.o. intake give IV hydration, pain control    Past Medical History:   Diagnosis Date    Anxiety     Asthma     Depression     Diabetes mellitus     diet controlled    Diabetes mellitus, type 2     DVT (deep venous thrombosis)     GERD (gastroesophageal reflux disease)     Gout     Hyperlipidemia     Hypertension     IgA nephropathy     Insomnia        Past Surgical History:   Procedure Laterality Date    COLONOSCOPY N/A 5/8/2020    Procedure: COLONOSCOPY;  Surgeon: Hammad Castorena III, MD;  Location: [facility];  Service: Endoscopy;  Laterality: N/A;    ESOPHAGOGASTRODUODENOSCOPY N/A 5/8/2020    Procedure: EGD (ESOPHAGOGASTRODUODENOSCOPY);  Surgeon: Hammad Castorena III,  "MD;  Location: [facility];  Service: Endoscopy;  Laterality: N/A;    nose polyp removal         Review of patient's allergies indicates:   Allergen Reactions    Zithromax [azithromycin] Rash       No current facility-administered medications on file prior to encounter.     Current Outpatient Medications on File Prior to Encounter   Medication Sig    amLODIPine (NORVASC) 5 MG tablet Take 1 tablet (5 mg total) by mouth once daily.    atorvastatin (LIPITOR) 10 MG tablet Take 1 tablet (10 mg total) by mouth every evening.    dulaglutide (TRULICITY) 1.5 mg/0.5 mL pen injector Inject 1.5 mg into the skin every 7 days.    ELIQUIS 5 mg Tab Take 5 mg by mouth 2 (two) times daily.    empagliflozin (JARDIANCE) 10 mg tablet Take 1 tablet (10 mg total) by mouth once daily.    HYDROcodone-acetaminophen (NORCO)  mg per tablet Take 1 tablet by mouth every 4 (four) hours as needed for Pain.    losartan (COZAAR) 100 MG tablet Take 1 tablet (100 mg total) by mouth once daily.    omega-3 acid ethyl esters (LOVAZA) 1 gram capsule Take 1 capsule (1 g total) by mouth 2 (two) times daily.    ondansetron (ZOFRAN-ODT) 4 MG TbDL Take 1 tablet (4 mg total) by mouth every 8 (eight) hours as needed.    oxyCODONE-acetaminophen (PERCOCET) 7.5-325 mg per tablet Take 1 tablet by mouth every 6 (six) hours as needed for Pain.    BD CONOR 2ND GEN PEN NEEDLE 32 gauge x 5/32" Ndle once daily. Use    blood sugar diagnostic Strp To check BG one times daily, to use with insurance preferred meter    blood-glucose meter kit To check BG one times daily, to use with insurance preferred meter    gabapentin (NEURONTIN) 300 MG capsule Take 1 capsule (300 mg total) by mouth 3 (three) times daily.    lancets Misc To check BG one times daily, to use with insurance preferred meter    pen needle, diabetic 32 gauge x 5/16" Ndle 1 Units by Misc.(Non-Drug; Combo Route) route once daily.     Family History       Problem Relation (Age of Onset)    Cancer Maternal " Grandfather    Diabetes Maternal Grandfather, Mother    Heart disease Maternal Grandfather, Mother    Hypertension Maternal Grandmother    Stroke Mother          Tobacco Use    Smoking status: Former     Current packs/day: 0.00     Average packs/day: 0.3 packs/day for 5.0 years (1.3 ttl pk-yrs)     Types: Cigars, Cigarettes     Start date: 2017     Quit date: 2022     Years since quittin.1     Passive exposure: Past    Smokeless tobacco: Never    Tobacco comments:     One cigar a day   Substance and Sexual Activity    Alcohol use: Yes    Drug use: No    Sexual activity: Yes     Partners: Female     Review of Systems   Constitutional:  Positive for activity change and appetite change.   Respiratory: Negative.     Cardiovascular: Negative.    Gastrointestinal:  Positive for abdominal pain, diarrhea, nausea and vomiting. Negative for constipation.   Genitourinary: Negative.    Neurological: Negative.      Objective:     Vital Signs (Most Recent):  Temp: 98.1 °F (36.7 °C) (24 0918)  Pulse: 100 (24 1203)  Resp: 18 (24 0918)  BP: (!) 142/93 (24 1203)  SpO2: 97 % (24 1203) Vital Signs (24h Range):  Temp:  [98.1 °F (36.7 °C)] 98.1 °F (36.7 °C)  Pulse:  [] 100  Resp:  [18] 18  SpO2:  [95 %-97 %] 97 %  BP: (120-142)/(81-94) 142/93     Weight: 95.3 kg (210 lb)  Body mass index is 28.48 kg/m².     Physical Exam  Vitals reviewed.   Constitutional:       General: He is in acute distress.      Appearance: Normal appearance. He is not ill-appearing.   HENT:      Head: Normocephalic and atraumatic.      Mouth/Throat:      Mouth: Mucous membranes are dry.   Eyes:      Extraocular Movements: Extraocular movements intact.   Cardiovascular:      Rate and Rhythm: Normal rate.      Pulses: Normal pulses.      Heart sounds: No murmur heard.  Abdominal:      General: Bowel sounds are decreased.      Tenderness: There is generalized abdominal tenderness and tenderness in the epigastric area.    Musculoskeletal:         General: No swelling. Normal range of motion.      Cervical back: Neck supple.   Skin:     General: Skin is warm.   Neurological:      General: No focal deficit present.      Mental Status: He is alert. Mental status is at baseline.   Psychiatric:         Mood and Affect: Mood normal.                Significant Labs: All pertinent labs within the past 24 hours have been reviewed.  Recent Lab Results  (Last 5 results in the past 24 hours)        07/07/24  1217   07/07/24  1150   07/07/24  1035   07/07/24  1018   07/07/24  1017        Influenza A, Molecular     Negative           Influenza B, Molecular     Negative           Albumin         4.1       ALP         103       ALT         18       Anion Gap         10       Appearance, UA       Clear         AST         11       Bacteria, UA       Rare         Baso #         0.04       Basophil %         0.2       Beta-Hydroxybutyrate   0.0             Bilirubin (UA)       Negative         BILIRUBIN TOTAL         0.6  Comment: For infants and newborns, interpretation of results should be based  on gestational age, weight and in agreement with clinical  observations.    Premature Infant recommended reference ranges:  Up to 24 hours.............<8.0 mg/dL  Up to 48 hours............<12.0 mg/dL  3-5 days..................<15.0 mg/dL  6-29 days.................<15.0 mg/dL         BUN         38       Calcium         9.6       Chloride         102       CO2         24       Color, UA       Yellow         Creatinine         3.3       Differential Method         Automated       eGFR         23.6       Eos #         0.7       Eos %         4.0       Flu A & B Source     Nasal swab           Glucose         258       Glucose, UA       3+         Gran # (ANC)         13.6       Gran %         79.8       Hematocrit         48.5       Hemoglobin         15.4       Hyaline Casts, UA       3         Immature Grans (Abs)         0.08  Comment: Mild elevation in  immature granulocytes is non specific and   can be seen in a variety of conditions including stress response,   acute inflammation, trauma and pregnancy. Correlation with other   laboratory and clinical findings is essential.         Immature Granulocytes         0.5       Ketones, UA       Negative         Leukocyte Esterase, UA       Negative         Lipase         26       Lymph #         1.7       Lymph %         9.8       MCH         27.6       MCHC         31.8       MCV         87       Microscopic Comment       SEE COMMENT  Comment: Other formed elements not mentioned in the report are not   present in the microscopic examination.            Mono #         1.0       Mono %         5.7       MPV         9.5       NITRITE UA       Negative         nRBC         0       Blood, UA       TRACE         pH, UA       6.0         Platelet Count         418       POC Lactate 3.08               Potassium         4.4       PROTEIN TOTAL         7.7       Protein, UA       3+  Comment: Recommend a 24 hour urine protein or a urine   protein/creatinine ratio if globulin induced proteinuria is  clinically suspected.           RBC         5.58       RBC, UA       0         RDW         13.2       Sample VENOUS               SARS-CoV-2 RNA, Amplification, Qual     Negative  Comment: This test utilizes isothermal nucleic acid amplification technology   to   detect the SARS-CoV-2 RdRp nucleic acid segment. The analytical   sensitivity   (limit of detection) is 500 copies/swab.     A POSITIVE result is indicative of the presence of SARS-CoV-2 RNA;   clinical   correlation with patient history and other diagnostic information is   necessary to determine patient infection status.    A NEGATIVE result means that SARS-CoV-2 nucleic acids are not present   above   the limit of detection. A NEGATIVE result should be treated as   presumptive.   It does not rule out the possibility of COVID-19 and should not be   the sole   basis for  treatment decisions. If COVID-19 is strongly suspected   based on   clinical and exposure history, re-testing using an alternate   molecular assay   should be considered.     This test is FDA approved.Performance characteristics of this test   has been   independently verified by [facility] in conjunction   with   [facility] Department of Pathology and Laboratory   Medicine.             Sodium         136       Spec Grav UA       1.015         Specimen UA       Urine, Clean Catch         UROBILINOGEN UA       Negative         WBC, UA       1         WBC         17.06       Yeast, UA       None                                Significant Imaging: I have reviewed all pertinent imaging results/findings within the past 24 hours.  Imaging Results              X-Ray Chest AP Portable (Final result)  Result time 07/07/24 12:31:05      Final result by Jaycob Haskins Jr., MD (07/07/24 12:31:05)                   Impression:      No acute abnormality.      Electronically signed by: Jaycob Haskins MD  Date:    07/07/2024  Time:    12:31               Narrative:    EXAMINATION:  XR CHEST AP PORTABLE    CLINICAL HISTORY:  Sepsis;    TECHNIQUE:  Single frontal view of the chest was performed.    COMPARISON:  08/21/2023    FINDINGS:  The lungs are clear, with normal appearance of pulmonary vasculature and no pleural effusion or pneumothorax.    The cardiac silhouette is normal in size. The hilar and mediastinal contours are unremarkable.    Bones are intact.                                       CT Abdomen Pelvis  Without Contrast (Final result)  Result time 07/07/24 12:20:06      Final result by Jaycob Haskins Jr., MD (07/07/24 12:20:06)                   Impression:      1.  No CT evidence of acute intra-abdominal process.    2.  Spherical low-density focus arising from the lateral limb of the right RA gland favors the presence of a benign adrenal adenoma with no evidence appreciable change  in size.    3.  Dilated stomach without admixing of fluid in gas within the stomach lumen suggestive of factors of the gastroparesis.  Recommend correlation with nuclear medicine emptying study for further correlate.      Electronically signed by: Jaycob Haskins MD  Date:    07/07/2024  Time:    12:20               Narrative:    EXAMINATION:  CT ABDOMEN PELVIS WITHOUT CONTRAST    CLINICAL HISTORY:  Abdominal abscess/infection suspected;    TECHNIQUE:  Low dose axial images, sagittal and coronal reformations were obtained from the lung bases to the pubic symphysis.  No oral contrast was administered.  Lack of intravenous contrast imposed for examination limits diagnostic evaluation for evaluation of solid organ perfusion abnormalities and vascular anomalies that should be taken account upon review of this particular imaging    COMPARISON:  Correlation is made with the patient's prior CT scan of the abdomen pelvis without contrast 06/25/2024    FINDINGS:  Visualized portions of the lung bases are clear bilaterally.  No evidence of mass, infiltrate, or significant pleural effusion.  Lower heart is imaged appears unremarkable.    Liver maintains normal size with diffuse low attenuation of the hepatic architecture on the basis of fatty infiltration, without evidence of mass space occupying lesion, or cyst.  Slightly contracted gallbladder without pathologic findings.  No intraluminal stone formation.  Admixing of fluid in gas within the dilated stomach with each tapers abruptly at the level of the duodenum and pylorus region suggestive of manifestations of gastric outlet obstruction.  Correlation with nuclear medicine emptying scan would prove worthy.  Spherical fat containing focus along the lateral limb of the right adrenal gland appears unchanged favor adenoma.    Bowel loops appear normal in caliber without evidence of wall thickening.  The mesenteric fat is normal.  The vascular bundles of the central abdominal  "cavity are unremarkable.    Both kidneys appear rather small in size without evidence of mass or hydronephrosis.  Both ureters appear normal in size and overall caliber.    Pelvic evaluation shows normal appearance of the urinary bladder.  The prostate and seminal vessels are unremarkable in CT appearance.    Osseous structures reveal no significant finding.                                      Assessment/Plan:     * Diabetic gastroparesis  Vomiting, abdominal pain  CT abdomen suggestive of gastroparesis.    WBC 17, Tachycardic upon arrival to ER- likely related to vomiting and dehydration. IV zosyn given in ER empirically for elevated WBC, will not restart zosyn for now. Closely monitor WBC, awaiting blood cultures, no infectious process identified.    Keep npo for now.  IV hydration  IV reglan  IV emetics, IV dilaudid prn pain control  Follow up with GI outpatient  Tight blood glucose control.   Provided education regarding gastroparesis, complications of gastroparesis due to poorly controlled blood glucose levels.   Diabetic teaching.       Acute DVT (deep venous thrombosis)  6/30/24: US RLE:  occlusive appearing intraluminal thrombus of the right femoral vein, popliteal vein and posterior tibial veins. With additional intraluminal thrombus/DVT involving the right anterior tibial and peroneal veins.     Continue eliquis      Type 2 diabetes mellitus with stage 3 chronic kidney disease, without long-term current use of insulin  Patient's FSGs are uncontrolled due to hyperglycemia on current medication regimen.  Last A1c reviewed-   Lab Results   Component Value Date    HGBA1C 7.5 (H) 09/09/2023     Most recent fingerstick glucose reviewed- No results for input(s): "POCTGLUCOSE" in the last 24 hours.  Current correctional scale  Low  Maintain anti-hyperglycemic dose as follows-   Antihyperglycemics (From admission, onward)      Start     Stop Route Frequency Ordered    07/07/24 1404  insulin aspart U-100 pen 0-5 " Units         -- SubQ Before meals & nightly PRN 07/07/24 1305          Hold Oral hypoglycemics while patient is in the hospital.       History of IgA Nephropathy- patient of Dr. Flores.  Creatine stable for now. BMP reviewed- noted Estimated Creatinine Clearance: 36.4 mL/min (A) (based on SCr of 3.3 mg/dL (H)). according to latest data. Based on current GFR, CKD stage is stage 4 - GFR 15-29.  Monitor UOP and serial BMP and adjust therapy as needed. Renally dose meds. Avoid nephrotoxic medications and procedures.    IV hydration, if no improvement in kidney functions consider consulting nephrology.   Hold ARB for now    Hypertension associated with diabetes  Chronic, controlled. Latest blood pressure and vitals reviewed-     Temp:  [98.1 °F (36.7 °C)]   Pulse:  []   Resp:  [18]   BP: (120-142)/(81-94)   SpO2:  [95 %-97 %] .   Home meds for hypertension were reviewed and noted below.   Hypertension Medications               amLODIPine (NORVASC) 5 MG tablet Take 1 tablet (5 mg total) by mouth once daily.    losartan (COZAAR) 100 MG tablet Take 1 tablet (100 mg total) by mouth once daily.            While in the hospital, will manage blood pressure as follows; Continue home antihypertensive regimen hold Losartan for now due to CKD IV    Will utilize p.r.n. blood pressure medication only if patient's blood pressure greater than 180/110 and he develops symptoms such as worsening chest pain or shortness of breath.          VTE Risk Mitigation (From admission, onward)           Ordered     IP VTE LOW RISK PATIENT  Once         07/07/24 1305     Place sequential compression device  Until discontinued         07/07/24 1305                         On 07/07/2024, patient should be placed in hospital observation services under my care in collaboration with Dr. Chadwick  .      Pharmacist Renal Dose Adjustment Note    Jose Miguel Brennan is a 38 y.o. male being treated with the medication Metoclopramide    Patient  Data:    Vital Signs (Most Recent):  Temp: 98.1 °F (36.7 °C) (07/07/24 0918)  Pulse: 100 (07/07/24 1203)  Resp: 18 (07/07/24 0918)  BP: (!) 142/93 (07/07/24 1203)  SpO2: 97 % (07/07/24 1203) Vital Signs (72h Range):  Temp:  [98.1 °F (36.7 °C)]   Pulse:  []   Resp:  [18]   BP: (120-142)/(81-94)   SpO2:  [95 %-97 %]      Recent Labs   Lab 06/30/24  2309 07/07/24  1017   CREATININE 3.1* 3.3*     Serum creatinine: 3.3 mg/dL (H) 07/07/24 1017  Estimated creatinine clearance: 36.4 mL/min (A)    Medication:Metoclopramide dose: 10 mg frequency Q6H will be changed to medication:Metoclopramide dose:5 mg frequency:Q6H    Pharmacist's Name: Mónica Mejia  Pharmacist's Extension: [phone]      Bel Villegas NP  Department of Hospital Medicine  [facility] - Emergency Dept

## 2024-07-07 NOTE — ASSESSMENT & PLAN NOTE
"Patient's FSGs are uncontrolled due to hyperglycemia on current medication regimen.  Last A1c reviewed-   Lab Results   Component Value Date    HGBA1C 7.5 (H) 09/09/2023     Most recent fingerstick glucose reviewed- No results for input(s): "POCTGLUCOSE" in the last 24 hours.  Current correctional scale  Low  Maintain anti-hyperglycemic dose as follows-   Antihyperglycemics (From admission, onward)      Start     Stop Route Frequency Ordered    07/07/24 1404  insulin aspart U-100 pen 0-5 Units         -- SubQ Before meals & nightly PRN 07/07/24 1305          Hold Oral hypoglycemics while patient is in the hospital.       History of IgA Nephropathy- patient of Dr. Flores.  Creatine stable for now. BMP reviewed- noted Estimated Creatinine Clearance: 36.4 mL/min (A) (based on SCr of 3.3 mg/dL (H)). according to latest data. Based on current GFR, CKD stage is stage 4 - GFR 15-29.  Monitor UOP and serial BMP and adjust therapy as needed. Renally dose meds. Avoid nephrotoxic medications and procedures.    IV hydration, if no improvement in kidney functions consider consulting nephrology.   Hold ARB for now  "

## 2024-07-07 NOTE — ASSESSMENT & PLAN NOTE
Vomiting, abdominal pain  CT abdomen suggestive of gastroparesis.    WBC 17, Tachycardic upon arrival to ER- likely related to vomiting and dehydration. IV zosyn given in ER empirically for elevated WBC, will not restart zosyn for now. Closely monitor WBC, awaiting blood cultures, no infectious process identified.    Keep npo for now.  IV hydration  IV reglan  IV emetics, IV dilaudid prn pain control  Follow up with GI outpatient  Tight blood glucose control.   Provided education regarding gastroparesis, complications of gastroparesis due to poorly controlled blood glucose levels.   Diabetic teaching.

## 2024-07-08 VITALS
WEIGHT: 209.19 LBS | SYSTOLIC BLOOD PRESSURE: 127 MMHG | DIASTOLIC BLOOD PRESSURE: 86 MMHG | HEIGHT: 72 IN | OXYGEN SATURATION: 98 % | TEMPERATURE: 98 F | HEART RATE: 90 BPM | RESPIRATION RATE: 16 BRPM | BODY MASS INDEX: 28.33 KG/M2

## 2024-07-08 LAB
ALBUMIN SERPL BCP-MCNC: 3.4 G/DL (ref 3.5–5.2)
ALP SERPL-CCNC: 90 U/L (ref 55–135)
ALT SERPL W/O P-5'-P-CCNC: 19 U/L (ref 10–44)
ANION GAP SERPL CALC-SCNC: 7 MMOL/L (ref 8–16)
AST SERPL-CCNC: 11 U/L (ref 10–40)
BASOPHILS # BLD AUTO: 0.03 K/UL (ref 0–0.2)
BASOPHILS NFR BLD: 0.3 % (ref 0–1.9)
BILIRUB SERPL-MCNC: 0.8 MG/DL (ref 0.1–1)
BUN SERPL-MCNC: 32 MG/DL (ref 6–20)
CALCIUM SERPL-MCNC: 8.4 MG/DL (ref 8.7–10.5)
CHLORIDE SERPL-SCNC: 108 MMOL/L (ref 95–110)
CO2 SERPL-SCNC: 26 MMOL/L (ref 23–29)
CREAT SERPL-MCNC: 2.8 MG/DL (ref 0.5–1.4)
DIFFERENTIAL METHOD BLD: ABNORMAL
EOSINOPHIL # BLD AUTO: 1.1 K/UL (ref 0–0.5)
EOSINOPHIL NFR BLD: 10.8 % (ref 0–8)
ERYTHROCYTE [DISTWIDTH] IN BLOOD BY AUTOMATED COUNT: 13.4 % (ref 11.5–14.5)
EST. GFR  (NO RACE VARIABLE): 28.7 ML/MIN/1.73 M^2
ESTIMATED AVG GLUCOSE: 269 MG/DL (ref 68–131)
GLUCOSE SERPL-MCNC: 117 MG/DL (ref 70–110)
GLUCOSE SERPL-MCNC: 123 MG/DL (ref 70–110)
GLUCOSE SERPL-MCNC: 139 MG/DL (ref 70–110)
HBA1C MFR BLD: 11 % (ref 4.5–6.2)
HCT VFR BLD AUTO: 44.1 % (ref 40–54)
HGB BLD-MCNC: 13.7 G/DL (ref 14–18)
IMM GRANULOCYTES # BLD AUTO: 0.03 K/UL (ref 0–0.04)
IMM GRANULOCYTES NFR BLD AUTO: 0.3 % (ref 0–0.5)
LYMPHOCYTES # BLD AUTO: 2.8 K/UL (ref 1–4.8)
LYMPHOCYTES NFR BLD: 28.2 % (ref 18–48)
MAGNESIUM SERPL-MCNC: 1.9 MG/DL (ref 1.6–2.6)
MCH RBC QN AUTO: 27.5 PG (ref 27–31)
MCHC RBC AUTO-ENTMCNC: 31.1 G/DL (ref 32–36)
MCV RBC AUTO: 89 FL (ref 82–98)
MONOCYTES # BLD AUTO: 0.8 K/UL (ref 0.3–1)
MONOCYTES NFR BLD: 8 % (ref 4–15)
NEUTROPHILS # BLD AUTO: 5.1 K/UL (ref 1.8–7.7)
NEUTROPHILS NFR BLD: 52.4 % (ref 38–73)
NRBC BLD-RTO: 0 /100 WBC
PLATELET # BLD AUTO: 332 K/UL (ref 150–450)
PMV BLD AUTO: 9.5 FL (ref 9.2–12.9)
POTASSIUM SERPL-SCNC: 4.3 MMOL/L (ref 3.5–5.1)
PROT SERPL-MCNC: 6.1 G/DL (ref 6–8.4)
RBC # BLD AUTO: 4.98 M/UL (ref 4.6–6.2)
SODIUM SERPL-SCNC: 141 MMOL/L (ref 136–145)
WBC # BLD AUTO: 9.82 K/UL (ref 3.9–12.7)

## 2024-07-08 PROCEDURE — 25000003 PHARM REV CODE 250: Performed by: NURSE PRACTITIONER

## 2024-07-08 PROCEDURE — 85025 COMPLETE CBC W/AUTO DIFF WBC: CPT | Performed by: NURSE PRACTITIONER

## 2024-07-08 PROCEDURE — 36415 COLL VENOUS BLD VENIPUNCTURE: CPT | Performed by: NURSE PRACTITIONER

## 2024-07-08 PROCEDURE — 83735 ASSAY OF MAGNESIUM: CPT | Performed by: NURSE PRACTITIONER

## 2024-07-08 PROCEDURE — 96361 HYDRATE IV INFUSION ADD-ON: CPT

## 2024-07-08 PROCEDURE — 80053 COMPREHEN METABOLIC PANEL: CPT | Performed by: NURSE PRACTITIONER

## 2024-07-08 PROCEDURE — G0378 HOSPITAL OBSERVATION PER HR: HCPCS

## 2024-07-08 PROCEDURE — 63600175 PHARM REV CODE 636 W HCPCS: Performed by: NURSE PRACTITIONER

## 2024-07-08 PROCEDURE — 63600175 PHARM REV CODE 636 W HCPCS: Performed by: HOSPITALIST

## 2024-07-08 PROCEDURE — 25000003 PHARM REV CODE 250: Performed by: HOSPITALIST

## 2024-07-08 PROCEDURE — 83036 HEMOGLOBIN GLYCOSYLATED A1C: CPT | Performed by: NURSE PRACTITIONER

## 2024-07-08 PROCEDURE — 96376 TX/PRO/DX INJ SAME DRUG ADON: CPT

## 2024-07-08 RX ORDER — METOCLOPRAMIDE 5 MG/1
5 TABLET ORAL
Qty: 90 TABLET | Refills: 0 | Status: SHIPPED | OUTPATIENT
Start: 2024-07-08 | End: 2024-08-07

## 2024-07-08 RX ADMIN — METOCLOPRAMIDE 5 MG: 5 INJECTION, SOLUTION INTRAMUSCULAR; INTRAVENOUS at 01:07

## 2024-07-08 RX ADMIN — METOCLOPRAMIDE 5 MG: 5 INJECTION, SOLUTION INTRAMUSCULAR; INTRAVENOUS at 06:07

## 2024-07-08 RX ADMIN — HYDROMORPHONE HYDROCHLORIDE 0.5 MG: 0.5 INJECTION, SOLUTION INTRAMUSCULAR; INTRAVENOUS; SUBCUTANEOUS at 08:07

## 2024-07-08 RX ADMIN — APIXABAN 5 MG: 5 TABLET, FILM COATED ORAL at 08:07

## 2024-07-08 RX ADMIN — SODIUM CHLORIDE: 9 INJECTION, SOLUTION INTRAVENOUS at 06:07

## 2024-07-08 RX ADMIN — AMLODIPINE BESYLATE 5 MG: 5 TABLET ORAL at 08:07

## 2024-07-08 NOTE — DISCHARGE SUMMARY
[facility] Medicine  Discharge Summary      Patient Name: Jose Miguel Brennan  MRN: [id number]  [facility]: [id number]  Patient Class: OP- Observation  Admission Date: 7/7/2024  Hospital Length of Stay: 0 days  Discharge Date and Time: 7/8/2024 12:39 PM  Attending Physician: Rosa Galvan MD   Discharging Provider: Liane Leblanc NP  Primary Care Provider: Jose Grant NP    Primary Care Team: Networked reference to record PCT     HPI:   38 year old male with a past medical history of hypertension, hyperlipidemia, asthma, DVT, uncontrolled diabetes, IgA nephropathy, chronic kidney disease whom presents to the emergency room with reports of generalized abdominal pain, vomiting x1 day.  Upon arrival to the ER, patient tachycardic at 113, lactic 3, WBC 17 glucose to 5 8, beta-hydroxybutyrate 0, fluid COVID negative, UA with 3+ protein and 3+ glucose.  Negative for infection.  CT abdomen showed suggestive of gastroparesis.  Chest x-ray nonacute.  Septic workup began in ER patient given IV fluids, Zosyn, morphine for abdominal pain, Reglan admit to hospital medicine gastroparesis,  We will keep patient NPO for now for bowel rest due to not tolerating p.o. intake give IV hydration, pain control    * No surgery found *      Hospital Course:   Patient was admitted with gastroparesis.  He was placed on IV Reglan.  He was monitored closely during his stay.  His nausea was well controlled in his vomiting resolved.  His serum creatinine returned to baseline with IV fluid hydration.  He was able to tolerate ordered diet.  Ambulatory referral to Gastroenterology for gastroparesis was placed prior to discharge.  Discharge instructions as well as return precautions were discussed with patient with good understanding.  Patient was seen and evaluated on day of discharge and deemed appropriate.     Goals of Care Treatment Preferences:  Code Status: Full Code      Consults:   Consults (From admission,  onward)          Status Ordering Provider     Inpatient consult to Diabetes educator  Once        Provider:  (Not yet assigned)    Acknowledged ALICIA SANCHEZ            No new Assessment & Plan notes have been filed under this hospital service since the last note was generated.  Service: Hospital Medicine    Final Active Diagnoses:    Diagnosis Date Noted POA    PRINCIPAL PROBLEM:  Diabetic gastroparesis [E11.43, K31.84] 07/07/2024 Yes    Acute DVT (deep venous thrombosis) [I82.409] 07/07/2024 Yes     Chronic    Type 2 diabetes mellitus with stage 3 chronic kidney disease, without long-term current use of insulin [E11.22, N18.30] 04/10/2014 Yes    Hypertension associated with diabetes [E11.59, I15.2] 03/12/2014 Yes      Problems Resolved During this Admission:       Discharged Condition: good    Disposition: Home or Self Care    Follow Up:   Follow-up Information       Jose Grant NP. Go on 7/16/2024.    Specialty: Family Medicine  Why: 1:40, Hospital follow up  Contact information:  [address] [place] [id number]  [phone]                           Patient Instructions:      Ambulatory referral/consult to Gastroenterology   Standing Status: Future   Referral Priority: Routine Referral Type: Consultation   Referral Reason: Specialty Services Required   Requested Specialty: Gastroenterology   Number of Visits Requested: 1     Diet diabetic     Notify your health care provider if you experience any of the following:  temperature >100.4     Notify your health care provider if you experience any of the following:  persistent nausea and vomiting or diarrhea     Notify your health care provider if you experience any of the following:  severe uncontrolled pain     Notify your health care provider if you experience any of the following:  difficulty breathing or increased cough     Notify your health care provider if you experience any of the following:  persistent dizziness, light-headedness, or visual  disturbances     Notify your health care provider if you experience any of the following:  increased confusion or weakness     Activity as tolerated       Significant Diagnostic Studies: Labs: CMP   Recent Labs   Lab 07/07/24  1017 07/08/24  0413    141   K 4.4 4.3    108   CO2 24 26   * 123*   BUN 38* 32*   CREATININE 3.3* 2.8*   CALCIUM 9.6 8.4*   PROT 7.7 6.1   ALBUMIN 4.1 3.4*   BILITOT 0.6 0.8   ALKPHOS 103 90   AST 11 11   ALT 18 19   ANIONGAP 10 7*    and CBC   Recent Labs   Lab 07/07/24  1017 07/08/24  0413   WBC 17.06* 9.82   HGB 15.4 13.7*   HCT 48.5 44.1    332     Radiology: CT scan: CT ABDOMEN PELVIS WITHOUT CONTRAST:   Results for orders placed or performed during the hospital encounter of 07/07/24   CT Abdomen Pelvis  Without Contrast    Narrative    EXAMINATION:  CT ABDOMEN PELVIS WITHOUT CONTRAST    CLINICAL HISTORY:  Abdominal abscess/infection suspected;    TECHNIQUE:  Low dose axial images, sagittal and coronal reformations were obtained from the lung bases to the pubic symphysis.  No oral contrast was administered.  Lack of intravenous contrast imposed for examination limits diagnostic evaluation for evaluation of solid organ perfusion abnormalities and vascular anomalies that should be taken account upon review of this particular imaging    COMPARISON:  Correlation is made with the patient's prior CT scan of the abdomen pelvis without contrast 06/25/2024    FINDINGS:  Visualized portions of the lung bases are clear bilaterally.  No evidence of mass, infiltrate, or significant pleural effusion.  Lower heart is imaged appears unremarkable.    Liver maintains normal size with diffuse low attenuation of the hepatic architecture on the basis of fatty infiltration, without evidence of mass space occupying lesion, or cyst.  Slightly contracted gallbladder without pathologic findings.  No intraluminal stone formation.  Admixing of fluid in gas within the dilated stomach with  each tapers abruptly at the level of the duodenum and pylorus region suggestive of manifestations of gastric outlet obstruction.  Correlation with nuclear medicine emptying scan would prove worthy.  Spherical fat containing focus along the lateral limb of the right adrenal gland appears unchanged favor adenoma.    Bowel loops appear normal in caliber without evidence of wall thickening.  The mesenteric fat is normal.  The vascular bundles of the central abdominal cavity are unremarkable.    Both kidneys appear rather small in size without evidence of mass or hydronephrosis.  Both ureters appear normal in size and overall caliber.    Pelvic evaluation shows normal appearance of the urinary bladder.  The prostate and seminal vessels are unremarkable in CT appearance.    Osseous structures reveal no significant finding.      Impression    1.  No CT evidence of acute intra-abdominal process.    2.  Spherical low-density focus arising from the lateral limb of the right RA gland favors the presence of a benign adrenal adenoma with no evidence appreciable change in size.    3.  Dilated stomach without admixing of fluid in gas within the stomach lumen suggestive of factors of the gastroparesis.  Recommend correlation with nuclear medicine emptying study for further correlate.      Electronically signed by: Jaycob Haskins MD  Date:    07/07/2024  Time:    12:20       Pending Diagnostic Studies:       Procedure Component Value Units Date/Time    Hemoglobin A1c [[id number]] Collected: 07/08/24 0413    Order Status: Sent Lab Status: In process Updated: 07/08/24 1029    Specimen: Blood     Narrative:      Collection has been rescheduled by [facility] at 07/08/2024 10:07 Reason:   Add on            Medications:  Reconciled Home Medications:      Medication List        START taking these medications      metoclopramide HCl 5 MG tablet  Commonly known as: REGLAN  Take 1 tablet (5 mg total) by mouth 3 (three) times daily before meals.    "         CONTINUE taking these medications      amLODIPine 5 MG tablet  Commonly known as: NORVASC  Take 1 tablet (5 mg total) by mouth once daily.     atorvastatin 10 MG tablet  Commonly known as: LIPITOR  Take 1 tablet (10 mg total) by mouth every evening.     blood sugar diagnostic Strp  To check BG one times daily, to use with insurance preferred meter     blood-glucose meter kit  To check BG one times daily, to use with insurance preferred meter     ELIQUIS 5 mg Tab  Generic drug: apixaban  Take 5 mg by mouth 2 (two) times daily.     empagliflozin 10 mg tablet  Commonly known as: Jardiance  Take 1 tablet (10 mg total) by mouth once daily.     gabapentin 300 MG capsule  Commonly known as: NEURONTIN  Take 1 capsule (300 mg total) by mouth 3 (three) times daily.     HYDROcodone-acetaminophen  mg per tablet  Commonly known as: NORCO  Take 1 tablet by mouth every 4 (four) hours as needed for Pain.     lancets [facility] [place]  To check BG one times daily, to use with insurance preferred meter     losartan 100 MG tablet  Commonly known as: COZAAR  Take 1 tablet (100 mg total) by mouth once daily.     omega-3 acid ethyl esters 1 gram capsule  Commonly known as: LOVAZA  Take 1 capsule (1 g total) by mouth 2 (two) times daily.     ondansetron 4 MG Tbdl  Commonly known as: ZOFRAN-ODT  Take 1 tablet (4 mg total) by mouth every 8 (eight) hours as needed.     oxyCODONE-acetaminophen 7.5-325 mg per tablet  Commonly known as: PERCOCET  Take 1 tablet by mouth every 6 (six) hours as needed for Pain.     * pen needle, diabetic 32 gauge x 5/16" Ndle  1 Units by Misc.(Non-Drug; Combo Route) route once daily.     * BD CONOR 2ND GEN PEN NEEDLE 32 gauge x 5/32" Ndle  Generic drug: pen needle, diabetic  once daily. Use     TRULICITY 1.5 mg/0.5 mL pen injector  Generic drug: dulaglutide  Inject 1.5 mg into the skin every 7 days.           * This list has 2 medication(s) that are the same as other medications prescribed for you. Read the " directions carefully, and ask your doctor or other care provider to review them with you.                  Indwelling Lines/Drains at time of discharge:   Lines/Drains/Airways       None                   Time spent on the discharge of patient: 27 minutes         Liane Leblanc NP  Department of Hospital Medicine  [facility]

## 2024-07-08 NOTE — PLAN OF CARE
Problem: Adult Inpatient Plan of Care  Goal: Plan of Care Review  7/8/2024 0321 by Komal May RN  Outcome: Progressing  Flowsheets (Taken 7/8/2024 0321)  Plan of Care Reviewed With: patient  7/8/2024 0320 by Komal May RN  Outcome: Progressing     Problem: Diabetes Comorbidity  Goal: Blood Glucose Level Within Targeted Range  Intervention: Monitor and Manage Glycemia  7/8/2024 0328 by Komal May RN  Flowsheets (Taken 7/8/2024 0328)  Glycemic Management: blood glucose monitored  7/8/2024 0321 by Komal May RN  Flowsheets (Taken 7/8/2024 0321)  Glycemic Management: blood glucose monitored     Problem: Nausea and Vomiting  Goal: Nausea and Vomiting Relief  Outcome: Met  Intervention: Prevent and Manage Nausea and Vomiting  Flowsheets (Taken 7/8/2024 0328)  Nausea/Vomiting Interventions: sips of clear liquids given  Fluid/Electrolyte Management:   fluids adjusted   intravenous fluid replacement initiated

## 2024-07-08 NOTE — PLAN OF CARE
07/08/24 0906   Final Note   Assessment Type Final Discharge Note   Anticipated Discharge Disposition Home   Hospital Resources/Appts/Education Provided Appointments scheduled and added to AVS   Post-Acute Status   Discharge Delays None known at this time     Patient cleared for discharge from case management standpoint.    Follow up appointments scheduled and added to AVS.    Chart and discharge orders reviewed.  Patient discharged home with no further case management needs.

## 2024-07-08 NOTE — HOSPITAL COURSE
Patient was admitted with gastroparesis.  He was placed on IV Reglan.  He was monitored closely during his stay.  His nausea was well controlled in his vomiting resolved.  His serum creatinine returned to baseline with IV fluid hydration.  He was able to tolerate ordered diet.  Ambulatory referral to Gastroenterology for gastroparesis was placed prior to discharge.  Discharge instructions as well as return precautions were discussed with patient with good understanding.  Patient was seen and evaluated on day of discharge and deemed appropriate.

## 2024-07-09 ENCOUNTER — TELEPHONE (OUTPATIENT)
Dept: FAMILY MEDICINE | Facility: CLINIC | Age: 39
End: 2024-07-09
Payer: MEDICAID

## 2024-07-09 ENCOUNTER — PATIENT MESSAGE (OUTPATIENT)
Dept: FAMILY MEDICINE | Facility: CLINIC | Age: 39
End: 2024-07-09
Payer: MEDICAID

## 2024-07-09 ENCOUNTER — PATIENT OUTREACH (OUTPATIENT)
Dept: FAMILY MEDICINE | Facility: CLINIC | Age: 39
End: 2024-07-09
Payer: MEDICAID

## 2024-07-09 NOTE — TELEPHONE ENCOUNTER
Discharge Information     Discharge Date:   07/08/2024    Primary Discharge Diagnosis:  Diabetic gastroparesis      Discharge Summary:  Reviewed      Medication & Order Review     Were medication changes made or new medications added?   Yes    If so, has the patient filled the prescriptions?  No     Was Home Health ordered? No    If so, has Home Health contacted patient and/or initiated services?  No    Name of Home Health Agency? N/A    Durable Medical Equipment ordered?  No     If so, has the DME provider contacted patient and delivered equipment?  N/A    Follow Up               Any problems since discharge? No    How is the patient feeling since returning home?  Pt is still having a small amount of abd. Pain but he said it is getting better    Have you set up recommended follow up appointments?  (cardiology, surgery, etc.)follow up appt. With Jose Grant on 07/16/2024    Schedule Hospital Follow-up appointment within 7-14 days (preferably 7).      Notes:  pt said he is going today to  his medication of metodopramide  at the pharmacy          Danielle Moore

## 2024-07-12 LAB
BACTERIA BLD CULT: NORMAL
BACTERIA BLD CULT: NORMAL

## 2024-09-02 NOTE — PROGRESS NOTES
SUBJECTIVE:      Patient ID: Jose Miguel Brennan is a 38 y.o. male.    Chief Complaint: Hypertension and Diabetes (Pt needs a diabetic eye and foot exam.)    Patient is here today to fu on dm, hyperlipidemia and htn. He has not yet followed up with endo but says his fasting sugar has been avg 120-130.  He is schedule in Nov with endo. Recent hospitalization for gastroparesis, he says reglan has helped and taking it qd. Has not scheduled with gastro.     Diabetes  He presents for his follow-up diabetic visit. He has type 2 diabetes mellitus. His disease course has been worsening. There are no hypoglycemic associated symptoms. Pertinent negatives for hypoglycemia include no confusion, dizziness, headaches, nervousness/anxiousness, pallor, seizures or speech difficulty. Pertinent negatives for diabetes include no chest pain, no fatigue, no polyphagia and no weakness. There are no hypoglycemic complications. Symptoms are stable. Diabetic complications include nephropathy and peripheral neuropathy. Risk factors for coronary artery disease include diabetes mellitus, male sex and hypertension. Current diabetic treatment includes oral agent (monotherapy) (glp1). He is compliant with treatment some of the time. His weight is stable. He is following a generally unhealthy diet. When asked about meal planning, he reported none. He rarely participates in exercise. His breakfast blood glucose range is generally 110-130 mg/dl. An ACE inhibitor/angiotensin II receptor blocker is being taken. He does not see a podiatrist.Eye exam is not current.   Hypertension  This is a chronic problem. The problem is unchanged. The problem is controlled. Pertinent negatives include no chest pain, headaches, palpitations or shortness of breath. Risk factors for coronary artery disease include diabetes mellitus, dyslipidemia, male gender and smoking/tobacco exposure. Past treatments include angiotensin blockers and calcium channel blockers.  The current treatment provides moderate improvement.       Past Surgical History:   Procedure Laterality Date    COLONOSCOPY N/A 2020    Procedure: COLONOSCOPY;  Surgeon: Hammad Castorena III, MD;  Location: [facility];  Service: Endoscopy;  Laterality: N/A;    ESOPHAGOGASTRODUODENOSCOPY N/A 2020    Procedure: EGD (ESOPHAGOGASTRODUODENOSCOPY);  Surgeon: Hammad Castorena III, MD;  Location: [facility];  Service: Endoscopy;  Laterality: N/A;    nose polyp removal       Family History   Problem Relation Name Age of Onset    Hypertension Maternal Grandmother      Cancer Maternal Grandfather      Diabetes Maternal Grandfather      Heart disease Maternal Grandfather      Heart disease Mother      Stroke Mother      Diabetes Mother        Social History     Socioeconomic History    Marital status: Significant Other   Tobacco Use    Smoking status: Former     Current packs/day: 0.00     Average packs/day: 0.3 packs/day for 5.0 years (1.3 ttl pk-yrs)     Types: Cigars, Cigarettes     Start date: 2017     Quit date: 2022     Years since quittin.3     Passive exposure: Past    Smokeless tobacco: Never    Tobacco comments:     One cigar a day   Substance and Sexual Activity    Alcohol use: Yes    Drug use: No    Sexual activity: Yes     Partners: Female     Social Determinants of Health     Financial Resource Strain: Low Risk  (3/29/2023)    Overall Financial Resource Strain (CARDIA)     Difficulty of Paying Living Expenses: Not hard at all   Food Insecurity: No Food Insecurity (3/29/2023)    Hunger Vital Sign     Worried About Running Out of Food in the Last Year: Never true     Ran Out of Food in the Last Year: Never true   Transportation Needs: No Transportation Needs (3/29/2023)    PRAPARE - Transportation     Lack of Transportation (Medical): No     Lack of Transportation (Non-Medical): No   Physical Activity: Inactive (3/29/2023)    Exercise Vital Sign     Days of Exercise per Week: 0 days      "Minutes of Exercise per Session: 0 min   Stress: Stress Concern Present (1/27/2023)    [employer] - Occupational Stress Questionnaire     Feeling of Stress : To some extent   Housing Stability: Unknown (3/29/2023)    Housing Stability Vital Sign     Unable to Pay for Housing in the Last Year: No     Unstable Housing in the Last Year: No     Current Outpatient Medications   Medication Sig Dispense Refill    amLODIPine (NORVASC) 5 MG tablet Take 1 tablet (5 mg total) by mouth once daily. 90 tablet 1    atorvastatin (LIPITOR) 10 MG tablet Take 1 tablet (10 mg total) by mouth every evening. 90 tablet 1    ELIQUIS 5 mg Tab Take 5 mg by mouth 2 (two) times daily.      empagliflozin (JARDIANCE) 10 mg tablet Take 1 tablet (10 mg total) by mouth once daily. 90 tablet 0    losartan (COZAAR) 100 MG tablet Take 1 tablet (100 mg total) by mouth once daily. 90 tablet 1    metoclopramide HCl (REGLAN) 5 MG tablet Take 5 mg by mouth 4 (four) times daily.      omega-3 acid ethyl esters (LOVAZA) 1 gram capsule Take 1 capsule (1 g total) by mouth 2 (two) times daily. 180 capsule 3    ondansetron (ZOFRAN-ODT) 4 MG TbDL Take 1 tablet (4 mg total) by mouth every 8 (eight) hours as needed. 20 tablet 0    BD CONOR 2ND GEN PEN NEEDLE 32 gauge x 5/32" Ndle once daily. Use      blood sugar diagnostic Strp To check BG one times daily, to use with insurance preferred meter 100 strip 3    blood-glucose meter kit To check BG one times daily, to use with insurance preferred meter 1 each 0    dulaglutide (TRULICITY) 1.5 mg/0.5 mL pen injector Inject 1.5 mg into the skin every 7 days. 4 pen 1    lancets Misc To check BG one times daily, to use with insurance preferred meter 100 each 3    pen needle, diabetic 32 gauge x 5/16" Ndle 1 Units by Misc.(Non-Drug; Combo Route) route once daily. 100 each 1     No current facility-administered medications for this visit.     Review of patient's allergies indicates:   Allergen " Reactions    Zithromax [azithromycin] Rash      Past Medical History:   Diagnosis Date    Anxiety     Asthma     Depression     Diabetes mellitus     diet controlled    Diabetes mellitus, type 2     DVT (deep venous thrombosis)     GERD (gastroesophageal reflux disease)     Gout     Hyperlipidemia     Hypertension     IgA nephropathy     Insomnia      Past Surgical History:   Procedure Laterality Date    COLONOSCOPY N/A 5/8/2020    Procedure: COLONOSCOPY;  Surgeon: Hammad Castorena III, MD;  Location: [facility];  Service: Endoscopy;  Laterality: N/A;    ESOPHAGOGASTRODUODENOSCOPY N/A 5/8/2020    Procedure: EGD (ESOPHAGOGASTRODUODENOSCOPY);  Surgeon: Hammad Castorena III, MD;  Location: [facility] [place];  Service: Endoscopy;  Laterality: N/A;    nose polyp removal         Review of Systems   Constitutional:  Negative for appetite change, chills, diaphoresis, fatigue and unexpected weight change.   HENT:  Negative for ear discharge, facial swelling, hearing loss, nosebleeds and trouble swallowing.    Eyes:  Negative for photophobia, pain and visual disturbance.   Respiratory:  Negative for apnea, choking, shortness of breath and wheezing.    Cardiovascular:  Negative for chest pain and palpitations.   Gastrointestinal:  Negative for abdominal pain, blood in stool and vomiting.   Endocrine: Negative for polyphagia.   Genitourinary:  Negative for difficulty urinating and hematuria.   Musculoskeletal:  Negative for gait problem and joint swelling.   Skin:  Negative for pallor.   Neurological:  Negative for dizziness, seizures, speech difficulty, weakness and headaches.   Hematological:  Does not bruise/bleed easily.   Psychiatric/Behavioral:  Negative for agitation, confusion, self-injury, sleep disturbance and suicidal ideas. The patient is not nervous/anxious.       OBJECTIVE:      Vitals:    09/03/24 1030 09/03/24 1049   BP: (!) (P) 120/92 120/84   Pulse: 85    SpO2: 97%    Weight: 98.4 kg (217 lb)    Height: 6' (1.829  m)      Physical Exam  Vitals and nursing note reviewed.   Constitutional:       General: He is not in acute distress.     Appearance: He is well-developed.   HENT:      Head: Normocephalic and atraumatic.      Nose: Nose normal.      Mouth/Throat:      Pharynx: Uvula midline.   Eyes:      General: Lids are normal.      Conjunctiva/sclera: Conjunctivae normal.      Pupils: Pupils are equal, round, and reactive to light.      Right eye: Pupil is round and reactive.      Left eye: Pupil is round and reactive.   Neck:      Thyroid: No thyromegaly.      Vascular: No carotid bruit.   Cardiovascular:      Rate and Rhythm: Normal rate and regular rhythm.      Pulses: Normal pulses.      Heart sounds: Normal heart sounds. No murmur heard.  Pulmonary:      Effort: Pulmonary effort is normal.      Breath sounds: Normal breath sounds. No wheezing, rhonchi or rales.   Abdominal:      General: Bowel sounds are normal.      Palpations: Abdomen is soft. Abdomen is not rigid.      Tenderness: There is no abdominal tenderness.   Musculoskeletal:         General: Normal range of motion.      Cervical back: Normal range of motion and neck supple.      Right lower leg: No edema.      Left lower leg: No edema.   Lymphadenopathy:      Cervical: No cervical adenopathy.   Skin:     General: Skin is warm and dry.      Nails: There is no clubbing.   Neurological:      Mental Status: He is alert and oriented to person, place, and time.   Psychiatric:         Mood and Affect: Mood normal.         Speech: Speech normal.         Behavior: Behavior normal. Behavior is cooperative.         Thought Content: Thought content normal.         Judgment: Judgment normal.        Last visit note, most recent available labs, and health maintenance reviewed    Admission on 07/07/2024, Discharged on 07/08/2024   Component Date Value Ref Range Status    WBC 07/07/2024 17.06 (H)  3.90 - 12.70 K/uL Final    RBC 07/07/2024 5.58  4.60 - 6.20 M/uL Final     Hemoglobin 07/07/2024 15.4  14.0 - 18.0 g/dL Final    Hematocrit 07/07/2024 48.5  40.0 - 54.0 % Final    MCV 07/07/2024 87  82 - 98 fL Final    MCH 07/07/2024 27.6  27.0 - 31.0 pg Final    MCHC 07/07/2024 31.8 (L)  32.0 - 36.0 g/dL Final    RDW 07/07/2024 13.2  11.5 - 14.5 % Final    Platelets 07/07/2024 418  150 - 450 K/uL Final    MPV 07/07/2024 9.5  9.2 - 12.9 fL Final    Immature Granulocytes 07/07/2024 0.5  0.0 - 0.5 % Final    Gran # (ANC) 07/07/2024 13.6 (H)  1.8 - 7.7 K/uL Final    Immature Grans (Abs) 07/07/2024 0.08 (H)  0.00 - 0.04 K/uL Final    Comment: Mild elevation in immature granulocytes is non specific and   can be seen in a variety of conditions including stress response,   acute inflammation, trauma and pregnancy. Correlation with other   laboratory and clinical findings is essential.      Lymph # 07/07/2024 1.7  1.0 - 4.8 K/uL Final    Mono # 07/07/2024 1.0  0.3 - 1.0 K/uL Final    Eos # 07/07/2024 0.7 (H)  0.0 - 0.5 K/uL Final    Baso # 07/07/2024 0.04  0.00 - 0.20 K/uL Final    nRBC 07/07/2024 0  0 /100 WBC Final    Gran % 07/07/2024 79.8 (H)  38.0 - 73.0 % Final    Lymph % 07/07/2024 9.8 (L)  18.0 - 48.0 % Final    Mono % 07/07/2024 5.7  4.0 - 15.0 % Final    Eosinophil % 07/07/2024 4.0  0.0 - 8.0 % Final    Basophil % 07/07/2024 0.2  0.0 - 1.9 % Final    Differential Method 07/07/2024 Automated   Final    Sodium 07/07/2024 136  136 - 145 mmol/L Final    Potassium 07/07/2024 4.4  3.5 - 5.1 mmol/L Final    Chloride 07/07/2024 102  95 - 110 mmol/L Final    CO2 07/07/2024 24  23 - 29 mmol/L Final    Glucose 07/07/2024 258 (H)  70 - 110 mg/dL Final    BUN 07/07/2024 38 (H)  6 - 20 mg/dL Final    Creatinine 07/07/2024 3.3 (H)  0.5 - 1.4 mg/dL Final    Calcium 07/07/2024 9.6  8.7 - 10.5 mg/dL Final    Total Protein 07/07/2024 7.7  6.0 - 8.4 g/dL Final    Albumin 07/07/2024 4.1  3.5 - 5.2 g/dL Final    Total Bilirubin 07/07/2024 0.6  0.1 - 1.0 mg/dL Final    Comment: For infants and newborns,  interpretation of results should be based  on gestational age, weight and in agreement with clinical  observations.    Premature Infant recommended reference ranges:  Up to 24 hours.............<8.0 mg/dL  Up to 48 hours............<12.0 mg/dL  3-5 days..................<15.0 mg/dL  6-29 days.................<15.0 mg/dL      Alkaline Phosphatase 07/07/2024 103  55 - 135 U/L Final    AST 07/07/2024 11  10 - 40 U/L Final    ALT 07/07/2024 18  10 - 44 U/L Final    eGFR 07/07/2024 23.6 (A)  >60 mL/min/1.73 m^2 Final    Anion Gap 07/07/2024 10  8 - 16 mmol/L Final    Specimen UA 07/07/2024 Urine, Clean Catch   Final    Color, UA 07/07/2024 Yellow  Yellow, Straw, Kristina Final    Appearance, UA 07/07/2024 Clear  Clear Final    pH, UA 07/07/2024 6.0  5.0 - 8.0 Final    Specific Gravity, UA 07/07/2024 1.015  1.005 - 1.030 Final    Protein, UA 07/07/2024 3+ (A)  Negative Final    Comment: Recommend a 24 hour urine protein or a urine   protein/creatinine ratio if globulin induced proteinuria is  clinically suspected.      Glucose, UA 07/07/2024 3+ (A)  Negative Final    Ketones, UA 07/07/2024 Negative  Negative Final    Bilirubin (UA) 07/07/2024 Negative  Negative Final    Occult Blood UA 07/07/2024 TRACE  Negative Final    Nitrite, UA 07/07/2024 Negative  Negative Final    Urobilinogen, UA 07/07/2024 Negative  Negative EU/dL Final    Leukocytes, UA 07/07/2024 Negative  Negative Final    Lipase 07/07/2024 26  4 - 60 U/L Final    SARS-CoV-2 RNA, Amplification, Qual 07/07/2024 Negative  Negative Final    Comment: This test utilizes isothermal nucleic acid amplification technology   to   detect the SARS-CoV-2 RdRp nucleic acid segment. The analytical   sensitivity   (limit of detection) is 500 copies/swab.     A POSITIVE result is indicative of the presence of SARS-CoV-2 RNA;   clinical   correlation with patient history and other diagnostic information is   necessary to determine patient infection status.    A NEGATIVE result  means that SARS-CoV-2 nucleic acids are not present   above   the limit of detection. A NEGATIVE result should be treated as   presumptive.   It does not rule out the possibility of COVID-19 and should not be   the sole   basis for treatment decisions. If COVID-19 is strongly suspected   based on   clinical and exposure history, re-testing using an alternate   molecular assay   should be considered.     This test is FDA approved.Performance characteristics of this test   has been   independently verified by [facility] in conjunction   with   [facility] Department of Pathology and Laboratory   Medicine.      RBC, UA 07/07/2024 0  0 - 4 /hpf Final    WBC, UA 07/07/2024 1  0 - 5 /hpf Final    Bacteria 07/07/2024 Rare  None-Occ /hpf Final    Yeast, UA 07/07/2024 None  None Final    Hyaline Casts, UA 07/07/2024 3 (A)  0-1/lpf /lpf Final    Microscopic Comment 07/07/2024 SEE COMMENT   Final    Comment: Other formed elements not mentioned in the report are not   present in the microscopic examination.       Influenza A, Molecular 07/07/2024 Negative  Negative Final    Influenza B, Molecular 07/07/2024 Negative  Negative Final    Flu A & B Source 07/07/2024 Nasal swab   Final    Beta-Hydroxybutyrate 07/07/2024 0.0  0.0 - 0.5 mmol/L Final    Blood Culture, Routine 07/07/2024 No growth after 5 days.   Final    Blood Culture, Routine 07/07/2024 No growth after 5 days.   Final    Lactate (Lactic Acid) 07/07/2024 1.7  0.5 - 1.9 mmol/L Final    Comment: Falsely low lactic acid results can be found in samples   containing >=13.0 mg/dL total bilirubin and/or >=3.5 mg/dL   direct bilirubin.      QRS Duration 07/07/2024 86  ms Final    [facility] QTC Calculation 07/07/2024 447  ms Final    POC Lactate 07/07/2024 3.08 (H)  0.5 - 2.2 mmol/L Final    Sample 07/07/2024 VENOUS   Final    POC Glucose 07/07/2024 148 (H)  70 - 110 Final    POC Glucose 07/07/2024 133 (H)  70 - 110 Final     Sodium 07/08/2024 141  136 - 145 mmol/L Final    Potassium 07/08/2024 4.3  3.5 - 5.1 mmol/L Final    Chloride 07/08/2024 108  95 - 110 mmol/L Final    CO2 07/08/2024 26  23 - 29 mmol/L Final    Glucose 07/08/2024 123 (H)  70 - 110 mg/dL Final    BUN 07/08/2024 32 (H)  6 - 20 mg/dL Final    Creatinine 07/08/2024 2.8 (H)  0.5 - 1.4 mg/dL Final    Calcium 07/08/2024 8.4 (L)  8.7 - 10.5 mg/dL Final    Total Protein 07/08/2024 6.1  6.0 - 8.4 g/dL Final    Albumin 07/08/2024 3.4 (L)  3.5 - 5.2 g/dL Final    Total Bilirubin 07/08/2024 0.8  0.1 - 1.0 mg/dL Final    Comment: For infants and newborns, interpretation of results should be based  on gestational age, weight and in agreement with clinical  observations.    Premature Infant recommended reference ranges:  Up to 24 hours.............<8.0 mg/dL  Up to 48 hours............<12.0 mg/dL  3-5 days..................<15.0 mg/dL  6-29 days.................<15.0 mg/dL      Alkaline Phosphatase 07/08/2024 90  55 - 135 U/L Final    AST 07/08/2024 11  10 - 40 U/L Final    ALT 07/08/2024 19  10 - 44 U/L Final    eGFR 07/08/2024 28.7 (A)  >60 mL/min/1.73 m^2 Final    Anion Gap 07/08/2024 7 (L)  8 - 16 mmol/L Final    Magnesium 07/08/2024 1.9  1.6 - 2.6 mg/dL Final    WBC 07/08/2024 9.82  3.90 - 12.70 K/uL Final    RBC 07/08/2024 4.98  4.60 - 6.20 M/uL Final    Hemoglobin 07/08/2024 13.7 (L)  14.0 - 18.0 g/dL Final    Hematocrit 07/08/2024 44.1  40.0 - 54.0 % Final    MCV 07/08/2024 89  82 - 98 fL Final    MCH 07/08/2024 27.5  27.0 - 31.0 pg Final    MCHC 07/08/2024 31.1 (L)  32.0 - 36.0 g/dL Final    RDW 07/08/2024 13.4  11.5 - 14.5 % Final    Platelets 07/08/2024 332  150 - 450 K/uL Final    MPV 07/08/2024 9.5  9.2 - 12.9 fL Final    Immature Granulocytes 07/08/2024 0.3  0.0 - 0.5 % Final    Gran # (ANC) 07/08/2024 5.1  1.8 - 7.7 K/uL Final    Immature Grans (Abs) 07/08/2024 0.03  0.00 - 0.04 K/uL Final    Comment: Mild elevation in immature granulocytes is non specific and   can  be seen in a variety of conditions including stress response,   acute inflammation, trauma and pregnancy. Correlation with other   laboratory and clinical findings is essential.      Lymph # 07/08/2024 2.8  1.0 - 4.8 K/uL Final    Mono # 07/08/2024 0.8  0.3 - 1.0 K/uL Final    Eos # 07/08/2024 1.1 (H)  0.0 - 0.5 K/uL Final    Baso # 07/08/2024 0.03  0.00 - 0.20 K/uL Final    nRBC 07/08/2024 0  0 /100 WBC Final    Gran % 07/08/2024 52.4  38.0 - 73.0 % Final    Lymph % 07/08/2024 28.2  18.0 - 48.0 % Final    Mono % 07/08/2024 8.0  4.0 - 15.0 % Final    Eosinophil % 07/08/2024 10.8 (H)  0.0 - 8.0 % Final    Basophil % 07/08/2024 0.3  0.0 - 1.9 % Final    Differential Method 07/08/2024 Automated   Final    POC Glucose 07/08/2024 117 (H)  70 - 110 Final    Hemoglobin A1C 07/08/2024 11.0 (H)  4.5 - 6.2 % Final    Comment: According to ADA guidelines, hemoglobin A1C <7.0% represents  optimal control in non-pregnant diabetic patients.  Different  metrics may apply to specific populations.   Standards of Medical Care in Diabetes - 2016.    For the purpose of screening for the presence of diabetes:  <5.7%     Consistent with the absence of diabetes  5.7-6.4%  Consistent with increasing risk for diabetes   (prediabetes)  >or=6.5%  Consistent with diabetes    Currently no consensus exists for use of hemoglobin A1C  for diagnosis of diabetes for children.      Estimated Avg Glucose 07/08/2024 269 (H)  68 - 131 mg/dL Final    POC Glucose 07/08/2024 139 (H)  70 - 110 Final   Office Visit on 07/02/2024   Component Date Value Ref Range Status    Hemoglobin A1C, POC 07/02/2024 10.9  % Final   Admission on 06/30/2024, Discharged on 07/01/2024   Component Date Value Ref Range Status    WBC 06/30/2024 12.43  3.90 - 12.70 K/uL Final    RBC 06/30/2024 4.78  4.60 - 6.20 M/uL Final    Hemoglobin 06/30/2024 13.1 (L)  14.0 - 18.0 g/dL Final    Hematocrit 06/30/2024 41.3  40.0 - 54.0 % Final    MCV 06/30/2024 86  82 - 98 fL Final    MCH  06/30/2024 27.4  27.0 - 31.0 pg Final    MCHC 06/30/2024 31.7 (L)  32.0 - 36.0 g/dL Final    RDW 06/30/2024 13.2  11.5 - 14.5 % Final    Platelets 06/30/2024 331  150 - 450 K/uL Final    Reviewed by Technologist.    MPV 06/30/2024 9.6  9.2 - 12.9 fL Final    Immature Granulocytes 06/30/2024 0.5  0.0 - 0.5 % Final    Gran # (ANC) 06/30/2024 7.5  1.8 - 7.7 K/uL Final    Immature Grans (Abs) 06/30/2024 0.06 (H)  0.00 - 0.04 K/uL Final    Comment: Mild elevation in immature granulocytes is non specific and   can be seen in a variety of conditions including stress response,   acute inflammation, trauma and pregnancy. Correlation with other   laboratory and clinical findings is essential.      Lymph # 06/30/2024 3.1  1.0 - 4.8 K/uL Final    Mono # 06/30/2024 0.8  0.3 - 1.0 K/uL Final    Eos # 06/30/2024 0.9 (H)  0.0 - 0.5 K/uL Final    Baso # 06/30/2024 0.07  0.00 - 0.20 K/uL Final    nRBC 06/30/2024 0  0 /100 WBC Final    Gran % 06/30/2024 60.2  38.0 - 73.0 % Final    Lymph % 06/30/2024 25.1  18.0 - 48.0 % Final    Mono % 06/30/2024 6.4  4.0 - 15.0 % Final    Eosinophil % 06/30/2024 7.2  0.0 - 8.0 % Final    Basophil % 06/30/2024 0.6  0.0 - 1.9 % Final    Differential Method 06/30/2024 Automated   Final    Sodium 06/30/2024 135 (L)  136 - 145 mmol/L Final    Potassium 06/30/2024 4.6  3.5 - 5.1 mmol/L Final    Chloride 06/30/2024 103  95 - 110 mmol/L Final    CO2 06/30/2024 24  23 - 29 mmol/L Final    Glucose 06/30/2024 196 (H)  70 - 110 mg/dL Final    BUN 06/30/2024 45 (H)  6 - 20 mg/dL Final    Creatinine 06/30/2024 3.1 (H)  0.5 - 1.4 mg/dL Final    Calcium 06/30/2024 8.8  8.7 - 10.5 mg/dL Final    Total Protein 06/30/2024 6.8  6.0 - 8.4 g/dL Final    Albumin 06/30/2024 3.6  3.5 - 5.2 g/dL Final    Total Bilirubin 06/30/2024 0.4  0.1 - 1.0 mg/dL Final    Comment: For infants and newborns, interpretation of results should be based  on gestational age, weight and in agreement with clinical  observations.    Premature  Infant recommended reference ranges:  Up to 24 hours.............<8.0 mg/dL  Up to 48 hours............<12.0 mg/dL  3-5 days..................<15.0 mg/dL  6-29 days.................<15.0 mg/dL      Alkaline Phosphatase 06/30/2024 80  55 - 135 U/L Final    AST 06/30/2024 12  10 - 40 U/L Final    ALT 06/30/2024 18  10 - 44 U/L Final    eGFR 06/30/2024 25.4 (A)  >60 mL/min/1.73 m^2 Final    Anion Gap 06/30/2024 8  8 - 16 mmol/L Final   Admission on 06/25/2024, Discharged on 06/25/2024   Component Date Value Ref Range Status    WBC 06/25/2024 11.85  3.90 - 12.70 K/uL Final    RBC 06/25/2024 5.68  4.60 - 6.20 M/uL Final    Hemoglobin 06/25/2024 15.7  14.0 - 18.0 g/dL Final    Hematocrit 06/25/2024 48.0  40.0 - 54.0 % Final    MCV 06/25/2024 85  82 - 98 fL Final    MCH 06/25/2024 27.6  27.0 - 31.0 pg Final    MCHC 06/25/2024 32.7  32.0 - 36.0 g/dL Final    RDW 06/25/2024 13.2  11.5 - 14.5 % Final    Platelets 06/25/2024 220  150 - 450 K/uL Final    MPV 06/25/2024 10.1  9.2 - 12.9 fL Final    Immature Granulocytes 06/25/2024 0.4  0.0 - 0.5 % Final    Gran # (ANC) 06/25/2024 7.2  1.8 - 7.7 K/uL Final    Immature Grans (Abs) 06/25/2024 0.05 (H)  0.00 - 0.04 K/uL Final    Comment: Mild elevation in immature granulocytes is non specific and   can be seen in a variety of conditions including stress response,   acute inflammation, trauma and pregnancy. Correlation with other   laboratory and clinical findings is essential.      Lymph # 06/25/2024 2.8  1.0 - 4.8 K/uL Final    Mono # 06/25/2024 0.9  0.3 - 1.0 K/uL Final    Eos # 06/25/2024 0.9 (H)  0.0 - 0.5 K/uL Final    Baso # 06/25/2024 0.06  0.00 - 0.20 K/uL Final    nRBC 06/25/2024 0  0 /100 WBC Final    Gran % 06/25/2024 60.5  38.0 - 73.0 % Final    Lymph % 06/25/2024 23.7  18.0 - 48.0 % Final    Mono % 06/25/2024 7.6  4.0 - 15.0 % Final    Eosinophil % 06/25/2024 7.3  0.0 - 8.0 % Final    Basophil % 06/25/2024 0.5  0.0 - 1.9 % Final    Differential Method 06/25/2024  Automated   Final    Sodium 06/25/2024 134 (L)  136 - 145 mmol/L Final    Potassium 06/25/2024 5.2 (H)  3.5 - 5.1 mmol/L Final    Comment: Anion Gap reference range revised on 4/28/2023  Specimen slightly hemolyzed      Chloride 06/25/2024 101  95 - 110 mmol/L Final    CO2 06/25/2024 23  22 - 31 mmol/L Final    Glucose 06/25/2024 296 (H)  70 - 110 mg/dL Final    Comment: The ADA recommends the following guidelines for fasting glucose:    Normal:       less than 100 mg/dL    Prediabetes:  100 mg/dL to 125 mg/dL    Diabetes:     126 mg/dL or higher      BUN 06/25/2024 40 (H)  9 - 21 mg/dL Final    Creatinine 06/25/2024 2.97 (H)  0.50 - 1.40 mg/dL Final    Calcium 06/25/2024 9.5  8.4 - 10.2 mg/dL Final    Total Protein 06/25/2024 7.9  6.0 - 8.4 g/dL Final    Albumin 06/25/2024 4.4  3.5 - 5.2 g/dL Final    Total Bilirubin 06/25/2024 0.9  0.2 - 1.3 mg/dL Final    Alkaline Phosphatase 06/25/2024 112  38 - 145 U/L Final    AST 06/25/2024 31  17 - 59 U/L Final    ALT 06/25/2024 23  0 - 50 U/L Final    Anion Gap 06/25/2024 10  5 - 12 mmol/L Final    Anion Gap reference range revised on 4/28/2023    eGFR 06/25/2024 27 (A)  >60 mL/min/1.73 m^2 Final    Specimen UA 06/25/2024 Urine, Clean Catch   Final    Color, UA 06/25/2024 Yellow  Yellow, Straw, Kristina Final    Appearance, UA 06/25/2024 Clear  Clear Final    pH, UA 06/25/2024 5.5  5.0 - 8.0 Final    Specific Gravity, UA 06/25/2024 1.015  1.005 - 1.030 Final    Protein, UA 06/25/2024 2+ (A)  Negative Final    Comment: Recommend a 24 hour urine protein or a urine   protein/creatinine ratio if globulin induced proteinuria is  clinically suspected.      Glucose, UA 06/25/2024 3+ (A)  Negative Final    Ketones, UA 06/25/2024 Negative  Negative Final    Bilirubin (UA) 06/25/2024 Negative  Negative Final    Occult Blood UA 06/25/2024 Trace (A)  Negative Final    Nitrite, UA 06/25/2024 Negative  Negative Final    Urobilinogen, UA 06/25/2024 0.2  <2.0 EU/dL Final    Leukocytes, UA  06/25/2024 Negative  Negative Final    RBC, UA 06/25/2024 1  0 - 4 /hpf Final    WBC, UA 06/25/2024 0  0 - 5 /hpf Final    Bacteria 06/25/2024 Negative  Negative /hpf Final    Squam Epithel, UA 06/25/2024 0  /hpf Final    Hyaline Casts, UA 06/25/2024 0  0 - 1 /lpf Final    Microscopic Comment 06/25/2024 SEE COMMENT   Final    Comment: Other formed elements not mentioned in the report are not   present in the microscopic examination.       QRS Duration 06/25/2024 86  ms Final    [facility] QTC Calculation 06/25/2024 437  ms Final   Admission on 06/25/2024, Discharged on 06/25/2024   Component Date Value Ref Range Status    WBC 06/25/2024 13.00 (H)  3.90 - 12.70 K/uL Final    RBC 06/25/2024 5.42  4.60 - 6.20 M/uL Final    Hemoglobin 06/25/2024 15.4  14.0 - 18.0 g/dL Final    Hematocrit 06/25/2024 46.8  40.0 - 54.0 % Final    MCV 06/25/2024 86  82 - 98 fL Final    MCH 06/25/2024 28.4  27.0 - 31.0 pg Final    MCHC 06/25/2024 32.9  32.0 - 36.0 g/dL Final    RDW 06/25/2024 13.6  11.5 - 14.5 % Final    Platelets 06/25/2024 193  150 - 450 K/uL Final    MPV 06/25/2024 10.1  9.2 - 12.9 fL Final    Immature Granulocytes 06/25/2024 0.4  0.0 - 0.5 % Final    Gran # (ANC) 06/25/2024 8.6 (H)  1.8 - 7.7 K/uL Final    Immature Grans (Abs) 06/25/2024 0.05 (H)  0.00 - 0.04 K/uL Final    Comment: Mild elevation in immature granulocytes is non specific and   can be seen in a variety of conditions including stress response,   acute inflammation, trauma and pregnancy. Correlation with other   laboratory and clinical findings is essential.      Lymph # 06/25/2024 2.6  1.0 - 4.8 K/uL Final    Mono # 06/25/2024 1.0  0.3 - 1.0 K/uL Final    Eos # 06/25/2024 0.8 (H)  0.0 - 0.5 K/uL Final    Baso # 06/25/2024 0.04  0.00 - 0.20 K/uL Final    nRBC 06/25/2024 0  0 /100 WBC Final    Gran % 06/25/2024 65.7  38.0 - 73.0 % Final    Lymph % 06/25/2024 19.6  18.0 - 48.0 % Final    Mono % 06/25/2024 7.8  4.0 - 15.0 % Final    Eosinophil % 06/25/2024 6.2  0.0 -  8.0 % Final    Basophil % 06/25/2024 0.3  0.0 - 1.9 % Final    Differential Method 06/25/2024 Automated   Final    Sodium 06/25/2024 134 (L)  136 - 145 mmol/L Final    Potassium 06/25/2024 4.3  3.5 - 5.1 mmol/L Final    Chloride 06/25/2024 99  95 - 110 mmol/L Final    CO2 06/25/2024 26  23 - 29 mmol/L Final    Glucose 06/25/2024 210 (H)  70 - 110 mg/dL Final    BUN 06/25/2024 32 (H)  6 - 20 mg/dL Final    Creatinine 06/25/2024 3.0 (H)  0.5 - 1.4 mg/dL Final    Calcium 06/25/2024 9.3  8.7 - 10.5 mg/dL Final    Total Protein 06/25/2024 7.2  6.0 - 8.4 g/dL Final    Albumin 06/25/2024 4.0  3.5 - 5.2 g/dL Final    Total Bilirubin 06/25/2024 1.0  0.1 - 1.0 mg/dL Final    Comment: For infants and newborns, interpretation of results should be based  on gestational age, weight and in agreement with clinical  observations.    Premature Infant recommended reference ranges:  Up to 24 hours.............<8.0 mg/dL  Up to 48 hours............<12.0 mg/dL  3-5 days..................<15.0 mg/dL  6-29 days.................<15.0 mg/dL      Alkaline Phosphatase 06/25/2024 110  55 - 135 U/L Final    AST 06/25/2024 10  10 - 40 U/L Final    ALT 06/25/2024 13  10 - 44 U/L Final    eGFR 06/25/2024 26.4 (A)  >60 mL/min/1.73 m^2 Final    Anion Gap 06/25/2024 9  8 - 16 mmol/L Final    Lipase 06/25/2024 32  4 - 60 U/L Final    Specimen UA 06/25/2024 Urine, Clean Catch   Final    Color, UA 06/25/2024 Yellow  Yellow, Straw, Kristina Final    Appearance, UA 06/25/2024 Clear  Clear Final    pH, UA 06/25/2024 6.0  5.0 - 8.0 Final    Specific Gravity, UA 06/25/2024 1.010  1.005 - 1.030 Final    Protein, UA 06/25/2024 3+ (A)  Negative Final    Comment: Recommend a 24 hour urine protein or a urine   protein/creatinine ratio if globulin induced proteinuria is  clinically suspected.      Glucose, UA 06/25/2024 3+ (A)  Negative Final    Ketones, UA 06/25/2024 Trace (A)  Negative Final    Bilirubin (UA) 06/25/2024 Negative  Negative Final    Occult Blood UA  06/25/2024 TRACE  Negative Final    Nitrite, UA 06/25/2024 Negative  Negative Final    Urobilinogen, UA 06/25/2024 Negative  Negative EU/dL Final    Leukocytes, UA 06/25/2024 Negative  Negative Final    BNP 06/25/2024 28  0 - 99 pg/mL Final    Values of less than 100 pg/ml are consistent with non-CHF populations.    POC Lactate 06/25/2024 1.04  0.5 - 2.2 mmol/L Final    Sample 06/25/2024 VENOUS   Final    RBC, UA 06/25/2024 1  0 - 4 /hpf Final    WBC, UA 06/25/2024 3  0 - 5 /hpf Final    Bacteria 06/25/2024 None  None-Occ /hpf Final    Yeast, UA 06/25/2024 None  None Final    Hyaline Casts, UA 06/25/2024 0  0-1/lpf /lpf Final    Microscopic Comment 06/25/2024 SEE COMMENT   Final    Comment: Other formed elements not mentioned in the report are not   present in the microscopic examination.      ]  Assessment:       1. Mixed hyperlipidemia    2. Essential hypertension    3. Type 2 diabetes mellitus with diabetic nephropathy, without long-term current use of insulin        Plan:       Mixed hyperlipidemia  Stable on current meds    Essential hypertension  Stable on current meds    Type 2 diabetes mellitus with diabetic nephropathy, without long-term current use of insulin  -     dulaglutide (TRULICITY) 1.5 mg/0.5 mL pen injector; Inject 1.5 mg into the skin every 7 days.  Dispense: 4 pen ; Refill: 1  Cont jardiance    Pt encouraged to follow up with endo and nephrology      Follow up in about 6 months (around 3/3/2025) for htn .      9/3/2024 Jose Grant, WU, FNP

## 2024-09-03 ENCOUNTER — LAB VISIT (OUTPATIENT)
Dept: LAB | Facility: HOSPITAL | Age: 39
End: 2024-09-03
Attending: INTERNAL MEDICINE
Payer: MEDICAID

## 2024-09-03 ENCOUNTER — PATIENT MESSAGE (OUTPATIENT)
Dept: ADMINISTRATIVE | Facility: HOSPITAL | Age: 39
End: 2024-09-03
Payer: MEDICAID

## 2024-09-03 ENCOUNTER — OFFICE VISIT (OUTPATIENT)
Dept: FAMILY MEDICINE | Facility: CLINIC | Age: 39
End: 2024-09-03
Payer: MEDICAID

## 2024-09-03 ENCOUNTER — OFFICE VISIT (OUTPATIENT)
Dept: HEMATOLOGY/ONCOLOGY | Facility: CLINIC | Age: 39
End: 2024-09-03
Payer: MEDICAID

## 2024-09-03 VITALS
BODY MASS INDEX: 29.39 KG/M2 | SYSTOLIC BLOOD PRESSURE: 120 MMHG | HEART RATE: 85 BPM | WEIGHT: 217 LBS | HEIGHT: 72 IN | DIASTOLIC BLOOD PRESSURE: 84 MMHG | OXYGEN SATURATION: 97 %

## 2024-09-03 VITALS
BODY MASS INDEX: 30.01 KG/M2 | HEIGHT: 72 IN | RESPIRATION RATE: 16 BRPM | OXYGEN SATURATION: 97 % | WEIGHT: 221.56 LBS | SYSTOLIC BLOOD PRESSURE: 130 MMHG | TEMPERATURE: 98 F | HEART RATE: 83 BPM | DIASTOLIC BLOOD PRESSURE: 67 MMHG

## 2024-09-03 DIAGNOSIS — I10 ESSENTIAL HYPERTENSION: ICD-10-CM

## 2024-09-03 DIAGNOSIS — E78.2 MIXED HYPERLIPIDEMIA: Primary | ICD-10-CM

## 2024-09-03 DIAGNOSIS — E11.21 TYPE 2 DIABETES MELLITUS WITH DIABETIC NEPHROPATHY, WITHOUT LONG-TERM CURRENT USE OF INSULIN: ICD-10-CM

## 2024-09-03 DIAGNOSIS — I82.401 DEEP VEIN THROMBOSIS OF RIGHT LOWER LIMB: Primary | ICD-10-CM

## 2024-09-03 DIAGNOSIS — I82.401 RIGHT LEG DVT: ICD-10-CM

## 2024-09-03 LAB
ALBUMIN SERPL BCP-MCNC: 4 G/DL (ref 3.5–5.2)
ALP SERPL-CCNC: 98 U/L (ref 55–135)
ALT SERPL W/O P-5'-P-CCNC: 18 U/L (ref 10–44)
ANION GAP SERPL CALC-SCNC: 11 MMOL/L (ref 8–16)
AST SERPL-CCNC: 9 U/L (ref 10–40)
BASOPHILS # BLD AUTO: 0.05 K/UL (ref 0–0.2)
BASOPHILS NFR BLD: 0.6 % (ref 0–1.9)
BILIRUB SERPL-MCNC: 0.6 MG/DL (ref 0.1–1)
BUN SERPL-MCNC: 55 MG/DL (ref 6–20)
CALCIUM SERPL-MCNC: 8.9 MG/DL (ref 8.7–10.5)
CHLORIDE SERPL-SCNC: 100 MMOL/L (ref 95–110)
CO2 SERPL-SCNC: 20 MMOL/L (ref 23–29)
CREAT SERPL-MCNC: 3.1 MG/DL (ref 0.5–1.4)
DIFFERENTIAL METHOD BLD: ABNORMAL
EOSINOPHIL # BLD AUTO: 0.6 K/UL (ref 0–0.5)
EOSINOPHIL NFR BLD: 6.4 % (ref 0–8)
ERYTHROCYTE [DISTWIDTH] IN BLOOD BY AUTOMATED COUNT: 14.6 % (ref 11.5–14.5)
EST. GFR  (NO RACE VARIABLE): 25.4 ML/MIN/1.73 M^2
GLUCOSE SERPL-MCNC: 432 MG/DL (ref 70–110)
HCT VFR BLD AUTO: 44.6 % (ref 40–54)
HGB BLD-MCNC: 14.3 G/DL (ref 14–18)
IMM GRANULOCYTES # BLD AUTO: 0.06 K/UL (ref 0–0.04)
IMM GRANULOCYTES NFR BLD AUTO: 0.7 % (ref 0–0.5)
LYMPHOCYTES # BLD AUTO: 3 K/UL (ref 1–4.8)
LYMPHOCYTES NFR BLD: 34 % (ref 18–48)
MCH RBC QN AUTO: 27.3 PG (ref 27–31)
MCHC RBC AUTO-ENTMCNC: 32.1 G/DL (ref 32–36)
MCV RBC AUTO: 85 FL (ref 82–98)
MONOCYTES # BLD AUTO: 0.5 K/UL (ref 0.3–1)
MONOCYTES NFR BLD: 5.7 % (ref 4–15)
NEUTROPHILS # BLD AUTO: 4.7 K/UL (ref 1.8–7.7)
NEUTROPHILS NFR BLD: 52.6 % (ref 38–73)
NRBC BLD-RTO: 0 /100 WBC
PLATELET # BLD AUTO: 216 K/UL (ref 150–450)
PMV BLD AUTO: 10.5 FL (ref 9.2–12.9)
POTASSIUM SERPL-SCNC: 4.6 MMOL/L (ref 3.5–5.1)
PROT SERPL-MCNC: 6.9 G/DL (ref 6–8.4)
RBC # BLD AUTO: 5.24 M/UL (ref 4.6–6.2)
SODIUM SERPL-SCNC: 131 MMOL/L (ref 136–145)
WBC # BLD AUTO: 8.95 K/UL (ref 3.9–12.7)

## 2024-09-03 PROCEDURE — 85025 COMPLETE CBC W/AUTO DIFF WBC: CPT | Performed by: INTERNAL MEDICINE

## 2024-09-03 PROCEDURE — 3079F DIAST BP 80-89 MM HG: CPT | Mod: CPTII,S$GLB,, | Performed by: NURSE PRACTITIONER

## 2024-09-03 PROCEDURE — 86146 BETA-2 GLYCOPROTEIN ANTIBODY: CPT | Mod: 59 | Performed by: INTERNAL MEDICINE

## 2024-09-03 PROCEDURE — 99999 PR PBB SHADOW E&M-EST. PATIENT-LVL IV: CPT | Mod: PBBFAC,,, | Performed by: INTERNAL MEDICINE

## 2024-09-03 PROCEDURE — 85303 CLOT INHIBIT PROT C ACTIVITY: CPT | Performed by: INTERNAL MEDICINE

## 2024-09-03 PROCEDURE — 85732 THROMBOPLASTIN TIME PARTIAL: CPT | Performed by: INTERNAL MEDICINE

## 2024-09-03 PROCEDURE — 99214 OFFICE O/P EST MOD 30 MIN: CPT | Mod: PBBFAC,PN | Performed by: INTERNAL MEDICINE

## 2024-09-03 PROCEDURE — 99213 OFFICE O/P EST LOW 20 MIN: CPT | Mod: S$GLB,,, | Performed by: NURSE PRACTITIONER

## 2024-09-03 PROCEDURE — 3046F HEMOGLOBIN A1C LEVEL >9.0%: CPT | Mod: CPTII,,, | Performed by: INTERNAL MEDICINE

## 2024-09-03 PROCEDURE — 86147 CARDIOLIPIN ANTIBODY EA IG: CPT | Mod: 59 | Performed by: INTERNAL MEDICINE

## 2024-09-03 PROCEDURE — 1159F MED LIST DOCD IN RCRD: CPT | Mod: CPTII,,, | Performed by: INTERNAL MEDICINE

## 2024-09-03 PROCEDURE — 1159F MED LIST DOCD IN RCRD: CPT | Mod: CPTII,S$GLB,, | Performed by: NURSE PRACTITIONER

## 2024-09-03 PROCEDURE — 4010F ACE/ARB THERAPY RXD/TAKEN: CPT | Mod: CPTII,S$GLB,, | Performed by: NURSE PRACTITIONER

## 2024-09-03 PROCEDURE — 80053 COMPREHEN METABOLIC PANEL: CPT | Performed by: INTERNAL MEDICINE

## 2024-09-03 PROCEDURE — 3046F HEMOGLOBIN A1C LEVEL >9.0%: CPT | Mod: CPTII,S$GLB,, | Performed by: NURSE PRACTITIONER

## 2024-09-03 PROCEDURE — 85613 RUSSELL VIPER VENOM DILUTED: CPT | Performed by: INTERNAL MEDICINE

## 2024-09-03 PROCEDURE — 3008F BODY MASS INDEX DOCD: CPT | Mod: CPTII,S$GLB,, | Performed by: NURSE PRACTITIONER

## 2024-09-03 PROCEDURE — 36415 COLL VENOUS BLD VENIPUNCTURE: CPT | Performed by: INTERNAL MEDICINE

## 2024-09-03 PROCEDURE — 3074F SYST BP LT 130 MM HG: CPT | Mod: CPTII,S$GLB,, | Performed by: NURSE PRACTITIONER

## 2024-09-03 PROCEDURE — 3075F SYST BP GE 130 - 139MM HG: CPT | Mod: CPTII,,, | Performed by: INTERNAL MEDICINE

## 2024-09-03 PROCEDURE — 1160F RVW MEDS BY RX/DR IN RCRD: CPT | Mod: CPTII,S$GLB,, | Performed by: NURSE PRACTITIONER

## 2024-09-03 PROCEDURE — 83090 ASSAY OF HOMOCYSTEINE: CPT | Performed by: INTERNAL MEDICINE

## 2024-09-03 PROCEDURE — 4010F ACE/ARB THERAPY RXD/TAKEN: CPT | Mod: CPTII,,, | Performed by: INTERNAL MEDICINE

## 2024-09-03 PROCEDURE — 99204 OFFICE O/P NEW MOD 45 MIN: CPT | Mod: S$PBB,,, | Performed by: INTERNAL MEDICINE

## 2024-09-03 PROCEDURE — 85306 CLOT INHIBIT PROT S FREE: CPT | Performed by: INTERNAL MEDICINE

## 2024-09-03 PROCEDURE — 3008F BODY MASS INDEX DOCD: CPT | Mod: CPTII,,, | Performed by: INTERNAL MEDICINE

## 2024-09-03 PROCEDURE — 85240 CLOT FACTOR VIII AHG 1 STAGE: CPT | Performed by: INTERNAL MEDICINE

## 2024-09-03 PROCEDURE — 3078F DIAST BP <80 MM HG: CPT | Mod: CPTII,,, | Performed by: INTERNAL MEDICINE

## 2024-09-03 PROCEDURE — 85300 ANTITHROMBIN III ACTIVITY: CPT | Performed by: INTERNAL MEDICINE

## 2024-09-03 RX ORDER — METOCLOPRAMIDE 5 MG/1
5 TABLET ORAL 4 TIMES DAILY
COMMUNITY

## 2024-09-03 RX ORDER — DULAGLUTIDE 1.5 MG/.5ML
1.5 INJECTION, SOLUTION SUBCUTANEOUS
Qty: 4 PEN | Refills: 1 | Status: SHIPPED | OUTPATIENT
Start: 2024-09-03

## 2024-09-03 NOTE — PROGRESS NOTES
HPI      38 years old male  with history of diabetes type 2 acid reflux hypertension IgA nephropathy newly diagnosed right lower extremity DVT.  Ultrasound obtain  demonstrate persistent intramural thrombus involving right lower extremity occlusive appearance in the right femoral vein popliteal and post tibial.  Patient was given Eliquis loading dose and followed by maintenance dose.  Patient was only giving 1 month for medication he ran out the medication and presumed that the thrombus was resolved.  Symptomatically he reports pain and swelling has been improving.  He is only now follow-up with hematologist for further evaluation recommendation.  Denies family history of thrombosis.  Denies family history of malignancy.  Denies any malignancy by history himself.    Past Medical History:   Diagnosis Date    Anxiety     Asthma     Depression     Diabetes mellitus     diet controlled    Diabetes mellitus, type 2     DVT (deep venous thrombosis)     GERD (gastroesophageal reflux disease)     Gout     Hyperlipidemia     Hypertension     IgA nephropathy     Insomnia      Social History     Socioeconomic History    Marital status: Significant Other   Tobacco Use    Smoking status: Former     Current packs/day: 0.00     Average packs/day: 0.3 packs/day for 5.0 years (1.3 ttl pk-yrs)     Types: Cigars, Cigarettes     Start date: 2017     Quit date: 2022     Years since quittin.3     Passive exposure: Past    Smokeless tobacco: Never    Tobacco comments:     One cigar a day   Substance and Sexual Activity    Alcohol use: Yes    Drug use: No    Sexual activity: Yes     Partners: Female     Social Determinants of Health     Financial Resource Strain: Low Risk  (3/29/2023)    Overall Financial Resource Strain (CARDIA)     Difficulty of Paying Living Expenses: Not hard at all   Food Insecurity: No Food Insecurity (3/29/2023)    Hunger Vital Sign     Worried About Running Out of Food in the  Last Year: Never true     Ran Out of Food in the Last Year: Never true   Transportation Needs: No Transportation Needs (3/29/2023)    PRAPARE - Transportation     Lack of Transportation (Medical): No     Lack of Transportation (Non-Medical): No   Physical Activity: Inactive (3/29/2023)    Exercise Vital Sign     Days of Exercise per Week: 0 days     Minutes of Exercise per Session: 0 min   Stress: Stress Concern Present (1/27/2023)    [employer] [place] [employer] - Occupational Stress Questionnaire     Feeling of Stress : To some extent   Housing Stability: Unknown (3/29/2023)    Housing Stability Vital Sign     Unable to Pay for Housing in the Last Year: No     Unstable Housing in the Last Year: No         Subjective      Review of Systems   Constitutional: Negative for appetite change, fatigue and unexpected weight change.   HENT: Negative for mouth sores.   Eyes: Negative for visual disturbance.   Respiratory: Negative for cough and shortness of breath.   Cardiovascular: Negative for chest pain.   Gastrointestinal: Negative for diarrhea.   Genitourinary: Negative for frequency.   Musculoskeletal: Negative for back pain.   Skin: Negative for rash.   Neurological: Negative for headaches.   Hematological: Negative for adenopathy.   Psychiatric/Behavioral: The patient is not nervous/anxious.   All other systems reviewed and are negative.     Objective    Physical Exam   Vitals:    09/03/24 1450   Temp: 97.7 °F (36.5 °C)       Constitutional: patient is oriented to person, place, and time. patient appears well-developed and well-nourished. No distress.   HENT:   Right Ear: External ear normal.   Left Ear: External ear normal.   Nose: Nose normal.   Mouth/Throat: Oropharynx is clear and moist. No oropharyngeal exudate.   Teeth, gums and lips are normal   No sinus tenderness   Palate, tongue, posterior pharynx are normal   Eyes: Conjunctivae and lids are normal.   Neck: Trachea normal and normal range of  motion. No thyromegaly   Cardiovascular: Normal rate, regular rhythm, normal heart sounds, intact distal pulses and normal pulses.   No murmur heard.   No edema, no tenderness in the extremities.   Pulmonary/Chest: Effort normal and breath sounds normal. No accessory muscle usage. patient has no wheezes..   Abdominal: Soft. Normal appearance and bowel sounds are normal. patient exhibits no distension and no mass. There is no hepatosplenomegaly. There is no tenderness.   Musculoskeletal: Normal range of motion.   Gait is normal   No clubbing, cyanosis     Lymphadenopathy:   Head (right side): No submental and no submandibular adenopathy present.   Head (left side): No submental and no submandibular adenopathy present.   patient has no cervical adenopathy.   Right: No supraclavicular adenopathy present.   Left: No supraclavicular adenopathy present.   Neurological: patient is alert and oriented to person, place, and time. patient has normal strength and normal reflexes. No sensory deficit. Gait normal.   Skin: Skin is warm, dry and intact. No bruising, no lesion and no rash noted. No cyanosis. Nails show no clubbing.   No lesions   Psychiatric: patient has a normal mood and affect. patient speech is normal and behavior is normal. Judgment normal. Cognition and memory are normal.   Vitals reviewed.     Lab Results   Component Value Date    WBC 9.82 07/08/2024    HGB 13.7 (L) 07/08/2024    HCT 44.1 07/08/2024    MCV 89 07/08/2024     07/08/2024       CMP  Sodium   Date Value Ref Range Status   07/08/2024 141 136 - 145 mmol/L Final     Potassium   Date Value Ref Range Status   07/08/2024 4.3 3.5 - 5.1 mmol/L Final     Chloride   Date Value Ref Range Status   07/08/2024 108 95 - 110 mmol/L Final     CO2   Date Value Ref Range Status   07/08/2024 26 23 - 29 mmol/L Final     Glucose   Date Value Ref Range Status   07/08/2024 123 (H) 70 - 110 mg/dL Final     BUN   Date Value Ref Range Status   07/08/2024 32 (H) 6 - 20  mg/dL Final     Creatinine   Date Value Ref Range Status   07/08/2024 2.8 (H) 0.5 - 1.4 mg/dL Final     Calcium   Date Value Ref Range Status   07/08/2024 8.4 (L) 8.7 - 10.5 mg/dL Final     Total Protein   Date Value Ref Range Status   07/08/2024 6.1 6.0 - 8.4 g/dL Final     Albumin   Date Value Ref Range Status   07/08/2024 3.4 (L) 3.5 - 5.2 g/dL Final     Total Bilirubin   Date Value Ref Range Status   07/08/2024 0.8 0.1 - 1.0 mg/dL Final     Comment:     For infants and newborns, interpretation of results should be based  on gestational age, weight and in agreement with clinical  observations.    Premature Infant recommended reference ranges:  Up to 24 hours.............<8.0 mg/dL  Up to 48 hours............<12.0 mg/dL  3-5 days..................<15.0 mg/dL  6-29 days.................<15.0 mg/dL       Alkaline Phosphatase   Date Value Ref Range Status   07/08/2024 90 55 - 135 U/L Final     AST   Date Value Ref Range Status   07/08/2024 11 10 - 40 U/L Final     ALT   Date Value Ref Range Status   07/08/2024 19 10 - 44 U/L Final     Anion Gap   Date Value Ref Range Status   07/08/2024 7 (L) 8 - 16 mmol/L Final     eGFR   Date Value Ref Range Status   07/08/2024 28.7 (A) >60 mL/min/1.73 m^2 Final         Assessment    Right lower extremity thrombosis occlusive.  Ultrasound June 2024.  Patient was treated with 1 month of anticoagulation Eliquis.  Symptomatically improving of the right lower extremity.    History of IgA nephropathy.    Unprovoked right lower extremity DVT in the setting of IgA nephropathy        > check hypercoagulable workup  > repeat ultrasound right lower extremities  > restart Eliquis, patient only had 1 month of Eliquis I am unsure whether not patient has thrombus resolved.  In any case we will verify that at the meantime we will have patient restart on the Eliquis.  I will see him in 2 weeks.    Plan    Right leg DVT  -     Ambulatory referral/consult to Hematology /  Oncology

## 2024-09-04 ENCOUNTER — TELEPHONE (OUTPATIENT)
Dept: HEMATOLOGY/ONCOLOGY | Facility: CLINIC | Age: 39
End: 2024-09-04
Payer: MEDICAID

## 2024-09-04 ENCOUNTER — HOSPITAL ENCOUNTER (OUTPATIENT)
Dept: RADIOLOGY | Facility: HOSPITAL | Age: 39
Discharge: HOME | End: 2024-09-04
Attending: INTERNAL MEDICINE
Payer: MEDICAID

## 2024-09-04 DIAGNOSIS — I82.431 DEEP VEIN THROMBOSIS (DVT) OF POPLITEAL VEIN OF RIGHT LOWER EXTREMITY, UNSPECIFIED CHRONICITY: Primary | ICD-10-CM

## 2024-09-04 DIAGNOSIS — I82.401 RIGHT LEG DVT: ICD-10-CM

## 2024-09-04 PROCEDURE — 93971 EXTREMITY STUDY: CPT | Mod: TC,RT

## 2024-09-04 PROCEDURE — 93971 EXTREMITY STUDY: CPT | Mod: 26,RT,, | Performed by: RADIOLOGY

## 2024-09-04 NOTE — TELEPHONE ENCOUNTER
Spoke to Charlene pharm at [facility]. Per Charlene the patient's Eliquis script requires a PA and pt is leaving to go out of town for a month today. Per Charlene, she had spoken to multiple pharmacys in [place] and none have the loading dose in stock. This RN spoke to Dr. Barlow who changed script to what [facility] had in stock. Called back Charlene to let her know new script was ordered. Then called pt and advised him of new script ordered and that pharmacy was working on filling it. Advised pt to  script before he left for his trip. Pt verbalized understanding.       ----- Message from David Marley sent at 9/4/2024 10:23 AM CDT -----  Name of Who is Calling:MARLENY HAYNES [[id number]]     The [address]   Phone: [phone]  Fax: [phone]        What is the request in detail: the eliquest the need a prior authorization sent in for this right now if possible            Can the clinic reply by [facility]: No             What Number to Call Back if not in [facility]:Phone: [phone]  Fax: [phone]

## 2024-09-05 LAB — HCYS SERPL-SCNC: 16.2 UMOL/L (ref 0–14.5)

## 2024-09-06 LAB
AT III ACT/NOR PPP CHRO: 95 % (ref 75–135)
B2 GLYCOPROT1 IGA SER-ACNC: <9 GPI IGA UNITS (ref 0–25)
B2 GLYCOPROT1 IGG SER-ACNC: <9 GPI IGG UNITS (ref 0–20)
B2 GLYCOPROT1 IGM SER-ACNC: <9 GPI IGM UNITS (ref 0–32)
CARDIOLIPIN IGA SER IA-ACNC: <9 MPL U/ML (ref 0–12)
CARDIOLIPIN IGG SER IA-ACNC: <9 GPL U/ML (ref 0–14)
CARDIOLIPIN IGM SER IA-ACNC: <9 APL U/ML (ref 0–11)
FACT VIII ACT/NOR PPP: 153 % (ref 56–140)
LA 2 SCREEN W REFLEX-IMP: NORMAL
PROT C ACT/NOR PPP: 123 % (ref 73–180)
PROT S ACT/NOR PPP: 100 % (ref 63–140)
SCREEN APTT: 28.7 SEC (ref 0–43.5)
SCREEN DRVVT: 40.3 SEC (ref 0–47)

## 2024-10-07 ENCOUNTER — OFFICE VISIT (OUTPATIENT)
Dept: HEMATOLOGY/ONCOLOGY | Facility: CLINIC | Age: 39
End: 2024-10-07
Payer: MEDICAID

## 2024-10-07 VITALS
BODY MASS INDEX: 29.5 KG/M2 | TEMPERATURE: 97 F | OXYGEN SATURATION: 96 % | HEIGHT: 72 IN | WEIGHT: 217.81 LBS | RESPIRATION RATE: 16 BRPM | DIASTOLIC BLOOD PRESSURE: 100 MMHG | SYSTOLIC BLOOD PRESSURE: 138 MMHG | HEART RATE: 95 BPM

## 2024-10-07 DIAGNOSIS — I82.431 DEEP VEIN THROMBOSIS (DVT) OF POPLITEAL VEIN OF RIGHT LOWER EXTREMITY, UNSPECIFIED CHRONICITY: Primary | ICD-10-CM

## 2024-10-07 PROCEDURE — 3080F DIAST BP >= 90 MM HG: CPT | Mod: CPTII,,, | Performed by: INTERNAL MEDICINE

## 2024-10-07 PROCEDURE — 1159F MED LIST DOCD IN RCRD: CPT | Mod: CPTII,,, | Performed by: INTERNAL MEDICINE

## 2024-10-07 PROCEDURE — G2211 COMPLEX E/M VISIT ADD ON: HCPCS | Mod: S$PBB,,, | Performed by: INTERNAL MEDICINE

## 2024-10-07 PROCEDURE — 3075F SYST BP GE 130 - 139MM HG: CPT | Mod: CPTII,,, | Performed by: INTERNAL MEDICINE

## 2024-10-07 PROCEDURE — 4010F ACE/ARB THERAPY RXD/TAKEN: CPT | Mod: CPTII,,, | Performed by: INTERNAL MEDICINE

## 2024-10-07 PROCEDURE — 99214 OFFICE O/P EST MOD 30 MIN: CPT | Mod: S$PBB,,, | Performed by: INTERNAL MEDICINE

## 2024-10-07 PROCEDURE — 3046F HEMOGLOBIN A1C LEVEL >9.0%: CPT | Mod: CPTII,,, | Performed by: INTERNAL MEDICINE

## 2024-10-07 PROCEDURE — 99999 PR PBB SHADOW E&M-EST. PATIENT-LVL IV: CPT | Mod: PBBFAC,,, | Performed by: INTERNAL MEDICINE

## 2024-10-07 PROCEDURE — 3008F BODY MASS INDEX DOCD: CPT | Mod: CPTII,,, | Performed by: INTERNAL MEDICINE

## 2024-10-07 PROCEDURE — 99214 OFFICE O/P EST MOD 30 MIN: CPT | Mod: PBBFAC,PN | Performed by: INTERNAL MEDICINE

## 2024-10-07 NOTE — PROGRESS NOTES
HPI      38 years old male  with history of diabetes type 2 acid reflux hypertension IgA nephropathy newly diagnosed right lower extremity DVT.  Ultrasound obtain  demonstrate persistent intramural thrombus involving right lower extremity occlusive appearance in the right femoral vein popliteal and post tibial.  Patient was given Eliquis loading dose and followed by maintenance dose.  Patient was only giving 1 month for medication he ran out the medication and presumed that the thrombus was resolved.  Symptomatically he reports pain and swelling has been improving.  He is only now follow-up with hematologist for further evaluation recommendation.  Denies family history of thrombosis.  Denies family history of malignancy.  Denies any malignancy by history himself.    Past Medical History:   Diagnosis Date    Anxiety     Asthma     Depression     Diabetes mellitus     diet controlled    Diabetes mellitus, type 2     DVT (deep venous thrombosis)     GERD (gastroesophageal reflux disease)     Gout     Hyperlipidemia     Hypertension     IgA nephropathy     Insomnia      Social History     Socioeconomic History    Marital status: Significant Other   Tobacco Use    Smoking status: Former     Current packs/day: 0.00     Average packs/day: 0.3 packs/day for 5.0 years (1.3 ttl pk-yrs)     Types: Cigars, Cigarettes     Start date: 2017     Quit date: 2022     Years since quittin.4     Passive exposure: Past    Smokeless tobacco: Never    Tobacco comments:     One cigar a day   Substance and Sexual Activity    Alcohol use: Yes    Drug use: No    Sexual activity: Yes     Partners: Female     Social Drivers of Health     Financial Resource Strain: Low Risk  (3/29/2023)    Overall Financial Resource Strain (CARDIA)     Difficulty of Paying Living Expenses: Not hard at all   Food Insecurity: No Food Insecurity (3/29/2023)    Hunger Vital Sign     Worried About Running Out of Food in the Last  Year: Never true     Ran Out of Food in the Last Year: Never true   Transportation Needs: No Transportation Needs (3/29/2023)    PRAPARE - Transportation     Lack of Transportation (Medical): No     Lack of Transportation (Non-Medical): No   Physical Activity: Inactive (3/29/2023)    Exercise Vital Sign     Days of Exercise per Week: 0 days     Minutes of Exercise per Session: 0 min   Stress: Stress Concern Present (1/27/2023)    [employer] - Occupational Stress Questionnaire     Feeling of Stress : To some extent   Housing Stability: Unknown (3/29/2023)    Housing Stability Vital Sign     Unable to Pay for Housing in the Last Year: No     Unstable Housing in the Last Year: No         Subjective      Review of Systems   Constitutional: Negative for appetite change, fatigue and unexpected weight change.   HENT: Negative for mouth sores.   Eyes: Negative for visual disturbance.   Respiratory: Negative for cough and shortness of breath.   Cardiovascular: Negative for chest pain.   Gastrointestinal: Negative for diarrhea.   Genitourinary: Negative for frequency.   Musculoskeletal: Negative for back pain.   Skin: Negative for rash.   Neurological: Negative for headaches.   Hematological: Negative for adenopathy.   Psychiatric/Behavioral: The patient is not nervous/anxious.   All other systems reviewed and are negative.     Objective    Physical Exam   Vitals:    10/07/24 1103   BP: (!) 138/100   Pulse: 95   Resp: 16   Temp: 97.4 °F (36.3 °C)       Constitutional: patient is oriented to person, place, and time. patient appears well-developed and well-nourished. No distress.   HENT:   Right Ear: External ear normal.   Left Ear: External ear normal.   Nose: Nose normal.   Mouth/Throat: Oropharynx is clear and moist. No oropharyngeal exudate.   Teeth, gums and lips are normal   No sinus tenderness   Palate, tongue, posterior pharynx are normal   Eyes: Conjunctivae and lids are normal.   Neck:  Trachea normal and normal range of motion. No thyromegaly   Cardiovascular: Normal rate, regular rhythm, normal heart sounds, intact distal pulses and normal pulses.   No murmur heard.   No edema, no tenderness in the extremities.   Pulmonary/Chest: Effort normal and breath sounds normal. No accessory muscle usage. patient has no wheezes..   Abdominal: Soft. Normal appearance and bowel sounds are normal. patient exhibits no distension and no mass. There is no hepatosplenomegaly. There is no tenderness.   Musculoskeletal: Normal range of motion.   Gait is normal   No clubbing, cyanosis     Lymphadenopathy:   Head (right side): No submental and no submandibular adenopathy present.   Head (left side): No submental and no submandibular adenopathy present.   patient has no cervical adenopathy.   Right: No supraclavicular adenopathy present.   Left: No supraclavicular adenopathy present.   Neurological: patient is alert and oriented to person, place, and time. patient has normal strength and normal reflexes. No sensory deficit. Gait normal.   Skin: Skin is warm, dry and intact. No bruising, no lesion and no rash noted. No cyanosis. Nails show no clubbing.   No lesions   Psychiatric: patient has a normal mood and affect. patient speech is normal and behavior is normal. Judgment normal. Cognition and memory are normal.   Vitals reviewed.     Lab Results   Component Value Date    WBC 8.95 09/03/2024    HGB 14.3 09/03/2024    HCT 44.6 09/03/2024    MCV 85 09/03/2024     09/03/2024       CMP  Sodium   Date Value Ref Range Status   09/03/2024 131 (L) 136 - 145 mmol/L Final     Potassium   Date Value Ref Range Status   09/03/2024 4.6 3.5 - 5.1 mmol/L Final     Chloride   Date Value Ref Range Status   09/03/2024 100 95 - 110 mmol/L Final     CO2   Date Value Ref Range Status   09/03/2024 20 (L) 23 - 29 mmol/L Final     Glucose   Date Value Ref Range Status   09/03/2024 432 (H) 70 - 110 mg/dL Final     BUN   Date Value Ref  Range Status   09/03/2024 55 (H) 6 - 20 mg/dL Final     Creatinine   Date Value Ref Range Status   09/03/2024 3.1 (H) 0.5 - 1.4 mg/dL Final     Calcium   Date Value Ref Range Status   09/03/2024 8.9 8.7 - 10.5 mg/dL Final     Total Protein   Date Value Ref Range Status   09/03/2024 6.9 6.0 - 8.4 g/dL Final     Albumin   Date Value Ref Range Status   09/03/2024 4.0 3.5 - 5.2 g/dL Final     Total Bilirubin   Date Value Ref Range Status   09/03/2024 0.6 0.1 - 1.0 mg/dL Final     Comment:     For infants and newborns, interpretation of results should be based  on gestational age, weight and in agreement with clinical  observations.    Premature Infant recommended reference ranges:  Up to 24 hours.............<8.0 mg/dL  Up to 48 hours............<12.0 mg/dL  3-5 days..................<15.0 mg/dL  6-29 days.................<15.0 mg/dL       Alkaline Phosphatase   Date Value Ref Range Status   09/03/2024 98 55 - 135 U/L Final     AST   Date Value Ref Range Status   09/03/2024 9 (L) 10 - 40 U/L Final     ALT   Date Value Ref Range Status   09/03/2024 18 10 - 44 U/L Final     Anion Gap   Date Value Ref Range Status   09/03/2024 11 8 - 16 mmol/L Final     eGFR   Date Value Ref Range Status   09/03/2024 25.4 (A) >60 mL/min/1.73 m^2 Final         Assessment    Right lower extremity thrombosis occlusive.  Ultrasound June 2024.  Patient was treated with 1 month of anticoagulation Eliquis - Eliquis was restarted on last clinic follow-up.  Symptomatically improving of the right lower extremity.    History of IgA nephropathy.    Unprovoked right lower extremity DVT in the setting of IgA nephropathy.  Hypercoagulable workup shows no evidence of antiphospholipid antibodies.  Elevated factor 8.  Normal protein S protein C. normal antithrombin 3 level.        Repeat ultrasound lower extremity 09/04/2024 minimum improvement on examination persistent DVT right femoral veins popliteal and posterior tibial veins, but resolution  of DVT in the right ATV    Continue on Eliquis.  We will refill medication today.  RTC 6 months with repeat ultrasound and blood work.        Plan    There are no diagnoses linked to this encounter.

## 2024-10-08 ENCOUNTER — HOSPITAL ENCOUNTER (EMERGENCY)
Facility: HOSPITAL | Age: 39
Discharge: HOME | End: 2024-10-08
Attending: EMERGENCY MEDICINE
Payer: MEDICAID

## 2024-10-08 VITALS
HEART RATE: 92 BPM | TEMPERATURE: 98 F | OXYGEN SATURATION: 98 % | DIASTOLIC BLOOD PRESSURE: 81 MMHG | BODY MASS INDEX: 29.16 KG/M2 | SYSTOLIC BLOOD PRESSURE: 123 MMHG | RESPIRATION RATE: 21 BRPM | WEIGHT: 215 LBS

## 2024-10-08 DIAGNOSIS — R19.7 VOMITING AND DIARRHEA: Primary | ICD-10-CM

## 2024-10-08 DIAGNOSIS — R11.10 VOMITING AND DIARRHEA: Primary | ICD-10-CM

## 2024-10-08 DIAGNOSIS — R10.9 LEFT FLANK PAIN: ICD-10-CM

## 2024-10-08 DIAGNOSIS — R10.84 GENERALIZED ABDOMINAL PAIN: ICD-10-CM

## 2024-10-08 LAB
ALBUMIN SERPL BCP-MCNC: 4.1 G/DL (ref 3.5–5.2)
ALP SERPL-CCNC: 95 U/L (ref 55–135)
ALT SERPL W/O P-5'-P-CCNC: 14 U/L (ref 10–44)
ANION GAP SERPL CALC-SCNC: 10 MMOL/L (ref 8–16)
AST SERPL-CCNC: 9 U/L (ref 10–40)
BACTERIA #/AREA URNS HPF: NORMAL /HPF
BASOPHILS # BLD AUTO: 0.02 K/UL (ref 0–0.2)
BASOPHILS NFR BLD: 0.2 % (ref 0–1.9)
BILIRUB SERPL-MCNC: 1.2 MG/DL (ref 0.1–1)
BILIRUB UR QL STRIP: NEGATIVE
BUN SERPL-MCNC: 45 MG/DL (ref 6–20)
CALCIUM SERPL-MCNC: 8.7 MG/DL (ref 8.7–10.5)
CHLORIDE SERPL-SCNC: 101 MMOL/L (ref 95–110)
CLARITY UR: CLEAR
CO2 SERPL-SCNC: 25 MMOL/L (ref 23–29)
COLOR UR: YELLOW
CREAT SERPL-MCNC: 3.4 MG/DL (ref 0.5–1.4)
DIFFERENTIAL METHOD BLD: ABNORMAL
EOSINOPHIL # BLD AUTO: 0.4 K/UL (ref 0–0.5)
EOSINOPHIL NFR BLD: 3.2 % (ref 0–8)
ERYTHROCYTE [DISTWIDTH] IN BLOOD BY AUTOMATED COUNT: 14.2 % (ref 11.5–14.5)
EST. GFR  (NO RACE VARIABLE): 22.6 ML/MIN/1.73 M^2
GLUCOSE SERPL-MCNC: 247 MG/DL (ref 70–110)
GLUCOSE UR QL STRIP: ABNORMAL
HCT VFR BLD AUTO: 50.1 % (ref 40–54)
HGB BLD-MCNC: 16.5 G/DL (ref 14–18)
HGB UR QL STRIP: ABNORMAL
HYALINE CASTS #/AREA URNS LPF: 0 /LPF
IMM GRANULOCYTES # BLD AUTO: 0.05 K/UL (ref 0–0.04)
IMM GRANULOCYTES NFR BLD AUTO: 0.4 % (ref 0–0.5)
KETONES UR QL STRIP: NEGATIVE
LEUKOCYTE ESTERASE UR QL STRIP: NEGATIVE
LIPASE SERPL-CCNC: 36 U/L (ref 4–60)
LYMPHOCYTES # BLD AUTO: 1.8 K/UL (ref 1–4.8)
LYMPHOCYTES NFR BLD: 14.9 % (ref 18–48)
MCH RBC QN AUTO: 28.3 PG (ref 27–31)
MCHC RBC AUTO-ENTMCNC: 32.9 G/DL (ref 32–36)
MCV RBC AUTO: 86 FL (ref 82–98)
MICROSCOPIC COMMENT: NORMAL
MONOCYTES # BLD AUTO: 1 K/UL (ref 0.3–1)
MONOCYTES NFR BLD: 8.6 % (ref 4–15)
NEUTROPHILS # BLD AUTO: 8.6 K/UL (ref 1.8–7.7)
NEUTROPHILS NFR BLD: 72.7 % (ref 38–73)
NITRITE UR QL STRIP: NEGATIVE
NRBC BLD-RTO: 0 /100 WBC
PH UR STRIP: 6 [PH] (ref 5–8)
PLATELET # BLD AUTO: 233 K/UL (ref 150–450)
PMV BLD AUTO: 10.3 FL (ref 9.2–12.9)
POTASSIUM SERPL-SCNC: 4.4 MMOL/L (ref 3.5–5.1)
PROT SERPL-MCNC: 7 G/DL (ref 6–8.4)
PROT UR QL STRIP: ABNORMAL
RBC # BLD AUTO: 5.84 M/UL (ref 4.6–6.2)
RBC #/AREA URNS HPF: 0 /HPF (ref 0–4)
SODIUM SERPL-SCNC: 136 MMOL/L (ref 136–145)
SP GR UR STRIP: 1.01 (ref 1–1.03)
URN SPEC COLLECT METH UR: ABNORMAL
UROBILINOGEN UR STRIP-ACNC: NEGATIVE EU/DL
WBC # BLD AUTO: 11.84 K/UL (ref 3.9–12.7)
WBC #/AREA URNS HPF: 0 /HPF (ref 0–5)
YEAST URNS QL MICRO: NORMAL

## 2024-10-08 PROCEDURE — 96374 THER/PROPH/DIAG INJ IV PUSH: CPT

## 2024-10-08 PROCEDURE — 99285 EMERGENCY DEPT VISIT HI MDM: CPT | Mod: 25

## 2024-10-08 PROCEDURE — 81001 URINALYSIS AUTO W/SCOPE: CPT | Performed by: EMERGENCY MEDICINE

## 2024-10-08 PROCEDURE — 25000003 PHARM REV CODE 250: Performed by: EMERGENCY MEDICINE

## 2024-10-08 PROCEDURE — 63600175 PHARM REV CODE 636 W HCPCS: Performed by: EMERGENCY MEDICINE

## 2024-10-08 PROCEDURE — 96375 TX/PRO/DX INJ NEW DRUG ADDON: CPT

## 2024-10-08 PROCEDURE — 80053 COMPREHEN METABOLIC PANEL: CPT | Performed by: EMERGENCY MEDICINE

## 2024-10-08 PROCEDURE — 83690 ASSAY OF LIPASE: CPT | Performed by: EMERGENCY MEDICINE

## 2024-10-08 PROCEDURE — 85025 COMPLETE CBC W/AUTO DIFF WBC: CPT | Performed by: EMERGENCY MEDICINE

## 2024-10-08 PROCEDURE — 96361 HYDRATE IV INFUSION ADD-ON: CPT

## 2024-10-08 RX ORDER — MORPHINE SULFATE 4 MG/ML
8 INJECTION, SOLUTION INTRAMUSCULAR; INTRAVENOUS
Status: COMPLETED | OUTPATIENT
Start: 2024-10-08 | End: 2024-10-08

## 2024-10-08 RX ORDER — ONDANSETRON HYDROCHLORIDE 2 MG/ML
4 INJECTION, SOLUTION INTRAVENOUS
Status: COMPLETED | OUTPATIENT
Start: 2024-10-08 | End: 2024-10-08

## 2024-10-08 RX ORDER — ONDANSETRON 4 MG/1
4 TABLET, ORALLY DISINTEGRATING ORAL EVERY 8 HOURS PRN
Qty: 12 TABLET | Refills: 0 | Status: SHIPPED | OUTPATIENT
Start: 2024-10-08

## 2024-10-08 RX ADMIN — ONDANSETRON 4 MG: 2 INJECTION INTRAMUSCULAR; INTRAVENOUS at 08:10

## 2024-10-08 RX ADMIN — SODIUM CHLORIDE 1000 ML: 9 INJECTION, SOLUTION INTRAVENOUS at 11:10

## 2024-10-08 RX ADMIN — MORPHINE SULFATE 8 MG: 4 INJECTION, SOLUTION INTRAMUSCULAR; INTRAVENOUS at 08:10

## 2024-10-08 RX ADMIN — SODIUM CHLORIDE 1000 ML: 9 INJECTION, SOLUTION INTRAVENOUS at 08:10

## 2024-10-08 NOTE — ED PROVIDER NOTES
"Chief complaint:  Emesis (Vomiting and diarrhea since last night), Diarrhea, Dysuria (Burning with urination since last night), Abdominal Pain, and Flank Pain (Abdominal and flank pain x a few days)      HPI:  Jose Miguel Brennan is a 39 y.o. male with hx IgA nephropathy, asthma, DM with recent admit r/t gastroparesis treated with metoclopramide, DVT on apixaban, CKD presenting with a one day history of nonbilious, nonbloody vomiting and nonbloody diarrhea accompanied by crampy, intermittent, generalized abdominal pain as well as onset of left flank pain radiating to the left lower abdomen accompanied by lower urinary tract symptoms of dysuria absent hematuria, difficulty urinating, frequency, urgency, testicular pain.  Flank pain is intermittent as well.  He denies recent travel or sick contacts.  No known fever.    ROS: As per HPI and below:  No chest pain, dyspnea, edema, focal numbness or weakness, blood in the stools.    Review of patient's allergies indicates:   Allergen Reactions    Zithromax [azithromycin] Rash       Patient's Medications   New Prescriptions    ONDANSETRON (ZOFRAN-ODT) 4 MG TBDL    Take 1 tablet (4 mg total) by mouth every 8 (eight) hours as needed (nausea, vomiting).   Previous Medications    AMLODIPINE (NORVASC) 5 MG TABLET    Take 1 tablet (5 mg total) by mouth once daily.    APIXABAN (ELIQUIS) 5 MG TAB    Take 10 mg twice daily for 7 days then take 5mg twice daily.    APIXABAN (ELIQUIS) 5 MG TAB    Take 1 tablet (5 mg total) by mouth 2 (two) times daily.    ATORVASTATIN (LIPITOR) 10 MG TABLET    Take 1 tablet (10 mg total) by mouth every evening.    BD CONOR 2ND GEN PEN NEEDLE 32 GAUGE X 5/32" NDLE    once daily. Use    BLOOD SUGAR DIAGNOSTIC STRP    To check BG one times daily, to use with insurance preferred meter    BLOOD-GLUCOSE METER KIT    To check BG one times daily, to use with insurance preferred meter    DULAGLUTIDE (TRULICITY) 1.5 MG/0.5 ML PEN INJECTOR    Inject 1.5 mg into " "the skin every 7 days.    ELIQUIS 5 MG TAB    Take 5 mg by mouth 2 (two) times daily.    EMPAGLIFLOZIN (JARDIANCE) 10 MG TABLET    Take 1 tablet (10 mg total) by mouth once daily.    LANCETS MISC    To check BG one times daily, to use with insurance preferred meter    LOSARTAN (COZAAR) 100 MG TABLET    Take 1 tablet (100 mg total) by mouth once daily.    METOCLOPRAMIDE HCL (REGLAN) 5 MG TABLET    Take 5 mg by mouth 4 (four) times daily.    OMEGA-3 ACID ETHYL ESTERS (LOVAZA) 1 GRAM CAPSULE    Take 1 capsule (1 g total) by mouth 2 (two) times daily.    PEN NEEDLE, DIABETIC 32 GAUGE X 5/16" NDLE    1 Units by Misc.(Non-Drug; Combo Route) route once daily.   Modified Medications    No medications on file   Discontinued Medications    ONDANSETRON (ZOFRAN-ODT) 4 MG TBDL    Take 1 tablet (4 mg total) by mouth every 8 (eight) hours as needed.       PMH:  As per HPI and below:  Past Medical History:   Diagnosis Date    Anxiety     Asthma     Depression     Diabetes mellitus     diet controlled    Diabetes mellitus, type 2     DVT (deep venous thrombosis)     GERD (gastroesophageal reflux disease)     Gout     Hyperlipidemia     Hypertension     IgA nephropathy     Insomnia      Past Surgical History:   Procedure Laterality Date    COLONOSCOPY N/A 2020    Procedure: COLONOSCOPY;  Surgeon: Hammad Castorena III, MD;  Location: [facility];  Service: Endoscopy;  Laterality: N/A;    ESOPHAGOGASTRODUODENOSCOPY N/A 2020    Procedure: EGD (ESOPHAGOGASTRODUODENOSCOPY);  Surgeon: Hammad Castorena III, MD;  Location: [facility];  Service: Endoscopy;  Laterality: N/A;    nose polyp removal         Social History     Socioeconomic History    Marital status: Significant Other   Tobacco Use    Smoking status: Former     Current packs/day: 0.00     Average packs/day: 0.3 packs/day for 5.0 years (1.3 ttl pk-yrs)     Types: Cigars, Cigarettes     Start date: 2017     Quit date: 2022     Years since quittin.4     Passive " exposure: Past    Smokeless tobacco: Never    Tobacco comments:     One cigar a day   Substance and Sexual Activity    Alcohol use: Yes    Drug use: No    Sexual activity: Yes     Partners: Female     Social Drivers of Health     Financial Resource Strain: Low Risk  (3/29/2023)    Overall Financial Resource Strain (CARDIA)     Difficulty of Paying Living Expenses: Not hard at all   Food Insecurity: No Food Insecurity (3/29/2023)    Hunger Vital Sign     Worried About Running Out of Food in the Last Year: Never true     Ran Out of Food in the Last Year: Never true   Transportation Needs: No Transportation Needs (3/29/2023)    PRAPARE - Transportation     Lack of Transportation (Medical): No     Lack of Transportation (Non-Medical): No   Physical Activity: Inactive (3/29/2023)    Exercise Vital Sign     Days of Exercise per Week: 0 days     Minutes of Exercise per Session: 0 min   Stress: Stress Concern Present (1/27/2023)    [employer] - Occupational Stress Questionnaire     Feeling of Stress : To some extent   Housing Stability: Unknown (3/29/2023)    Housing Stability Vital Sign     Unable to Pay for Housing in the Last Year: No     Unstable Housing in the Last Year: No       Family History   Problem Relation Name Age of Onset    Hypertension Maternal Grandmother      Cancer Maternal Grandfather      Diabetes Maternal Grandfather      Heart disease Maternal Grandfather      Heart disease Mother      Stroke Mother      Diabetes Mother         Physical Exam:    Vitals:    10/08/24 0902   BP: 123/81   Pulse: 92   Resp:    Temp:      GENERAL:  No apparent distress.  Alert.    HEENT:  Moist mucous membranes.  Normocephalic and atraumatic.    NECK:  No swelling.  Midline trachea.   CARDIOVASCULAR:  Regular rate and rhythm.  2+ radial pulses.    PULMONARY:  Lungs clear to auscultation bilaterally.  No wheezes, rales, or rhonci.    ABDOMEN:  Mild bilateral lower quadrant tenderness to  palpation without voluntary or involuntary guarding.  No distention, masses, palpable hernias.  EXTREMITIES:  Warm and well perfused.  Brisk capillary refill.  No peripheral edema  NEUROLOGICAL:  Normal mental status.  Appropriate and conversant.  5/5 strength with equal sensation light touch in the distal lower extremities bilaterally.  SKIN:  No rashes or ecchymoses.    BACK:  Mild left CVA without other back tenderness to palpation    Labs Reviewed   CBC W/ AUTO DIFFERENTIAL - Abnormal       Result Value    WBC 11.84      RBC 5.84      Hemoglobin 16.5      Hematocrit 50.1      MCV 86      MCH 28.3      MCHC 32.9      RDW 14.2      Platelets 233      MPV 10.3      Immature Granulocytes 0.4      Gran # (ANC) 8.6 (*)     Immature Grans (Abs) 0.05 (*)     Lymph # 1.8      Mono # 1.0      Eos # 0.4      Baso # 0.02      nRBC 0      Gran % 72.7      Lymph % 14.9 (*)     Mono % 8.6      Eosinophil % 3.2      Basophil % 0.2      Differential Method Automated     COMPREHENSIVE METABOLIC PANEL - Abnormal    Sodium 136      Potassium 4.4      Chloride 101      CO2 25      Glucose 247 (*)     BUN 45 (*)     Creatinine 3.4 (*)     Calcium 8.7      Total Protein 7.0      Albumin 4.1      Total Bilirubin 1.2 (*)     Alkaline Phosphatase 95      AST 9 (*)     ALT 14      eGFR 22.6 (*)     Anion Gap 10     URINALYSIS, REFLEX TO URINE CULTURE - Abnormal    Specimen UA Urine, Clean Catch      Color, UA Yellow      Appearance, UA Clear      pH, UA 6.0      Specific Gravity, UA 1.010      Protein, UA 2+ (*)     Glucose, UA 4+ (*)     Ketones, UA Negative      Bilirubin (UA) Negative      Occult Blood UA TRACE      Nitrite, UA Negative      Urobilinogen, UA Negative      Leukocytes, UA Negative      Narrative:     Specimen Source->Urine   LIPASE    Lipase 36     URINALYSIS MICROSCOPIC    RBC, UA 0      WBC, UA 0      Bacteria None      Yeast, UA None      Hyaline Casts, UA 0      Microscopic Comment SEE COMMENT      Narrative:      "Specimen Source->Urine       Current Discharge Medication List        CONTINUE these medications which have CHANGED    Details   ondansetron (ZOFRAN-ODT) 4 MG TbDL Take 1 tablet (4 mg total) by mouth every 8 (eight) hours as needed (nausea, vomiting).  Qty: 12 tablet, Refills: 0           CONTINUE these medications which have NOT CHANGED    Details   amLODIPine (NORVASC) 5 MG tablet Take 1 tablet (5 mg total) by mouth once daily.  Qty: 90 tablet, Refills: 1    Comments: .  Associated Diagnoses: Essential hypertension      !! apixaban (ELIQUIS) 5 mg Tab Take 10 mg twice daily for 7 days then take 5mg twice daily.  Qty: 80 tablet, Refills: 3    Associated Diagnoses: Right leg DVT      !! apixaban (ELIQUIS) 5 mg Tab Take 1 tablet (5 mg total) by mouth 2 (two) times daily.  Qty: 60 tablet, Refills: 3    Associated Diagnoses: Deep vein thrombosis (DVT) of popliteal vein of right lower extremity, unspecified chronicity      atorvastatin (LIPITOR) 10 MG tablet Take 1 tablet (10 mg total) by mouth every evening.  Qty: 90 tablet, Refills: 1    Comments: This prescription was filled on 9/11/2023. Any refills authorized will be placed on file.  Associated Diagnoses: Mixed hyperlipidemia      BD CONOR 2ND GEN PEN NEEDLE 32 gauge x 5/32" Ndle once daily. Use      blood sugar diagnostic Strp To check BG one times daily, to use with insurance preferred meter  Qty: 100 strip, Refills: 3    Associated Diagnoses: Type 2 diabetes mellitus without complication, with long-term current use of insulin      blood-glucose meter kit To check BG one times daily, to use with insurance preferred meter  Qty: 1 each, Refills: 0    Associated Diagnoses: Type 2 diabetes mellitus without complication, with long-term current use of insulin      dulaglutide (TRULICITY) 1.5 mg/0.5 mL pen injector Inject 1.5 mg into the skin every 7 days.  Qty: 4 pen , Refills: 1    Associated Diagnoses: Type 2 diabetes mellitus with diabetic nephropathy, without " "long-term current use of insulin      !! ELIQUIS 5 mg Tab Take 5 mg by mouth 2 (two) times daily.      empagliflozin (JARDIANCE) 10 mg tablet Take 1 tablet (10 mg total) by mouth once daily.  Qty: 90 tablet, Refills: 0    Associated Diagnoses: Type 2 diabetes mellitus with diabetic nephropathy, without long-term current use of insulin      lancets Misc To check BG one times daily, to use with insurance preferred meter  Qty: 100 each, Refills: 3    Associated Diagnoses: Type 2 diabetes mellitus without complication, with long-term current use of insulin      losartan (COZAAR) 100 MG tablet Take 1 tablet (100 mg total) by mouth once daily.  Qty: 90 tablet, Refills: 1    Comments: .  Associated Diagnoses: Essential hypertension      metoclopramide HCl (REGLAN) 5 MG tablet Take 5 mg by mouth 4 (four) times daily.      omega-3 acid ethyl esters (LOVAZA) 1 gram capsule Take 1 capsule (1 g total) by mouth 2 (two) times daily.  Qty: 180 capsule, Refills: 3    Associated Diagnoses: Hypertriglyceridemia      pen needle, diabetic 32 gauge x 5/16" Ndle 1 Units by Misc.(Non-Drug; Combo Route) route once daily.  Qty: 100 each, Refills: 1    Associated Diagnoses: Type 2 diabetes mellitus without complication, with long-term current use of insulin       !! - Potential duplicate medications found. Please discuss with provider.          Orders Placed This Encounter   Procedures    CT Abdomen Pelvis  Without Contrast    CBC auto differential    Comprehensive metabolic panel    Lipase    Urinalysis, Reflex to Urine Culture Urine, Clean Catch    Urinalysis Microscopic    Vital signs    Insert Saline lock IV       Imaging Results              CT Abdomen Pelvis  Without Contrast (Final result)  Result time 10/08/24 12:54:49   Procedure changed from CT Abdomen Pelvis With IV Contrast NO Oral Contrast     Final result by Lori Emerson MD (10/08/24 12:54:49)                   Impression:      No acute abdominal or pelvic " abnormality.    Fatty infiltration of the liver      Electronically signed by: Lori Emerson  Date:    10/08/2024  Time:    12:54               Narrative:      CMS MANDATED QUALITY DATA - CT RADIATION - 436    All CT scans at this facility utilize dose modulation, iterative reconstruction, and/or weight based dosing when appropriate to reduce radiation dose to as low as reasonably achievable.    CLINICAL HISTORY:  ([id number])38 y/o  (1985) M    Abdominal pain, acute, nonlocalized;With L flank pain;    TECHNIQUE:  (A#[id number], exam time 10/8/2024 12:48)    CT ABDOMEN PELVIS WITHOUT CONTRAST [id number]    Axial CT images of the abdomen and pelvis were obtained from the dome of the diaphragm to the proximal thighs.    COMPARISON:  07/07/2024    FINDINGS:  Lower Thorax:    The lung bases are clear. The heart is normal in size.  There is coronary artery calcification.    CT Abdomen:    Liver: The liver is hypodense compatible with fatty infiltration.  There is no focal mass.    Gallbladder: The gallbladder is within normal limits.    Biliary Tree: No intra or extrahepatic ductal dilation.    Spleen: Normal.    Pancreas: The pancreas is normal.    Adrenal Glands: Stable 9 mm fat density mass involving the right adrenal gland compatible with myelolipoma.  The left adrenal gland is normal.    Kidneys: The kidneys are normal in imaging appearance without hydronephrosis or hydroureter.    Vasculature: Normal.    Lymph nodes: No abdominal lymphadenopathy is seen.    Intraperitoneal structures: There is no ascites.    Bowel: There are no thick-walled or dilated bowel loops.    Abdominal wall: The abdominal wall and musculature are normal.    Musculoskeletal: Normal.    CT Pelvis:    Bladder: Normal.    Reproductive Organs: Prostate gland is normal.    Pelvic Lymph nodes: No pelvic lymphadenopathy or pelvic mass is identified.                                  (Rad read)    The patient was informed of the incidental  finding(s) as well as the need for PCP or specialist follow-up for reevaluation and possible further investigation or monitoring.         MDM:    39 y.o. male with one day history of vomiting, diarrhea, poorly localized abdominal pain with primary tenderness in the lower abdomen, and associated intermittent left flank pain with lower urinary tract symptoms of dysuria.  Workup undertaken to assess for potential etiologies including urinalysis to exclude UTI.  Laboratory sent to exclude complication of diabetes such as DKA as well as electrolyte derangement or renal insult given volume losses and history of chronic kidney disease.  CT ordered to exclude life-threatening intra-abdominal process such as diverticulitis, abscess, obstructive uropathy with overall low suspicion.  Analgesia and antiemetics along with IV fluids initiated for resuscitation pending further workup.    Renal function within parameters of recent baseline with known CKD.  Symptoms are well controlled with analgesia and antiemetics here.  I will discharge on antiemetic with instructions for oral hydration and close outpatient follow-up.  I do not think requires admission for IV fluids at this point.  There is no sign of life-threatening process on CT.  Detailed return precautions reviewed.    Diagnoses:    1. Vomiting and diarrhea   2. Abdominal pain, lower   3.  Left flank pain       Uzair Ramirez MD  10/08/24 [id number]

## 2024-10-31 DIAGNOSIS — I10 ESSENTIAL HYPERTENSION: ICD-10-CM

## 2024-10-31 RX ORDER — AMLODIPINE BESYLATE 5 MG/1
5 TABLET ORAL DAILY
Qty: 90 TABLET | Refills: 1 | Status: SHIPPED | OUTPATIENT
Start: 2024-10-31

## 2024-12-03 DIAGNOSIS — I10 ESSENTIAL HYPERTENSION: ICD-10-CM

## 2024-12-03 DIAGNOSIS — I82.431 DEEP VEIN THROMBOSIS (DVT) OF POPLITEAL VEIN OF RIGHT LOWER EXTREMITY, UNSPECIFIED CHRONICITY: ICD-10-CM

## 2024-12-05 RX ORDER — AMLODIPINE BESYLATE 5 MG/1
5 TABLET ORAL DAILY
Qty: 90 TABLET | Refills: 1 | Status: SHIPPED | OUTPATIENT
Start: 2024-12-05

## 2025-02-04 ENCOUNTER — OCCUPATIONAL HEALTH (OUTPATIENT)
Dept: URGENT CARE | Facility: CLINIC | Age: 40
End: 2025-02-04

## 2025-02-04 PROCEDURE — 80305 DRUG TEST PRSMV DIR OPT OBS: CPT | Mod: S$GLB,,, | Performed by: EMERGENCY MEDICINE

## 2025-02-17 ENCOUNTER — PATIENT MESSAGE (OUTPATIENT)
Dept: ADMINISTRATIVE | Facility: HOSPITAL | Age: 40
End: 2025-02-17
Payer: MEDICAID

## 2025-02-27 ENCOUNTER — PATIENT MESSAGE (OUTPATIENT)
Dept: FAMILY MEDICINE | Facility: CLINIC | Age: 40
End: 2025-02-27
Payer: MEDICAID

## 2025-02-28 NOTE — TELEPHONE ENCOUNTER
3/5 is next week and we can discuss at that visit. Can you call to make sure he is stable to wait on visit

## 2025-02-28 NOTE — TELEPHONE ENCOUNTER
"Spoke to patient, denied any thoughts or feelings of harming himself. Offered an appointment with Jose Grant NP today, declined because he is in [place]. Said he thinks he "just needs fluids because he has bad kidneys and having blood in urine" so I urged him to seek treatment at an urgent care or ER in [place]. He said his insurance wont pay. Told him if it is urgent need he needs to go to ER.   "

## 2025-03-02 ENCOUNTER — HOSPITAL ENCOUNTER (EMERGENCY)
Facility: HOSPITAL | Age: 40
Discharge: HOME | End: 2025-03-03
Attending: STUDENT IN AN ORGANIZED HEALTH CARE EDUCATION/TRAINING PROGRAM
Payer: MEDICAID

## 2025-03-02 DIAGNOSIS — R10.9 FLANK PAIN: Primary | ICD-10-CM

## 2025-03-02 DIAGNOSIS — N02.B9 IGA NEPHROPATHY: ICD-10-CM

## 2025-03-02 DIAGNOSIS — R31.9 HEMATURIA, UNSPECIFIED TYPE: ICD-10-CM

## 2025-03-02 LAB
ALBUMIN SERPL BCP-MCNC: 4.3 G/DL (ref 3.5–5.2)
ALP SERPL-CCNC: 99 U/L (ref 55–135)
ALT SERPL W/O P-5'-P-CCNC: 18 U/L (ref 10–44)
ANION GAP SERPL CALC-SCNC: 10 MMOL/L (ref 8–16)
AST SERPL-CCNC: 17 U/L (ref 10–40)
BACTERIA #/AREA URNS HPF: NORMAL /HPF
BASOPHILS # BLD AUTO: 0.06 K/UL (ref 0–0.2)
BASOPHILS NFR BLD: 0.5 % (ref 0–1.9)
BILIRUB SERPL-MCNC: 0.5 MG/DL (ref 0.1–1)
BILIRUB UR QL STRIP: NEGATIVE
BUN SERPL-MCNC: 43 MG/DL (ref 6–20)
CALCIUM SERPL-MCNC: 9.7 MG/DL (ref 8.7–10.5)
CHLORIDE SERPL-SCNC: 100 MMOL/L (ref 95–110)
CLARITY UR: CLEAR
CO2 SERPL-SCNC: 23 MMOL/L (ref 23–29)
COLOR UR: COLORLESS
CREAT SERPL-MCNC: 2.9 MG/DL (ref 0.5–1.4)
DIFFERENTIAL METHOD BLD: ABNORMAL
EOSINOPHIL # BLD AUTO: 0.6 K/UL (ref 0–0.5)
EOSINOPHIL NFR BLD: 4.5 % (ref 0–8)
ERYTHROCYTE [DISTWIDTH] IN BLOOD BY AUTOMATED COUNT: 13.8 % (ref 11.5–14.5)
EST. GFR  (NO RACE VARIABLE): 27.4 ML/MIN/1.73 M^2
GLUCOSE SERPL-MCNC: 431 MG/DL (ref 70–110)
GLUCOSE UR QL STRIP: ABNORMAL
HCT VFR BLD AUTO: 46.5 % (ref 40–54)
HGB BLD-MCNC: 15.8 G/DL (ref 14–18)
HGB UR QL STRIP: ABNORMAL
HYALINE CASTS #/AREA URNS LPF: 0 /LPF
IMM GRANULOCYTES # BLD AUTO: 0.07 K/UL (ref 0–0.04)
IMM GRANULOCYTES NFR BLD AUTO: 0.6 % (ref 0–0.5)
KETONES UR QL STRIP: NEGATIVE
LEUKOCYTE ESTERASE UR QL STRIP: NEGATIVE
LIPASE SERPL-CCNC: 88 U/L (ref 4–60)
LYMPHOCYTES # BLD AUTO: 3.7 K/UL (ref 1–4.8)
LYMPHOCYTES NFR BLD: 29.3 % (ref 18–48)
MCH RBC QN AUTO: 29 PG (ref 27–31)
MCHC RBC AUTO-ENTMCNC: 34 G/DL (ref 32–36)
MCV RBC AUTO: 86 FL (ref 82–98)
MICROSCOPIC COMMENT: NORMAL
MONOCYTES # BLD AUTO: 0.7 K/UL (ref 0.3–1)
MONOCYTES NFR BLD: 5.5 % (ref 4–15)
NEUTROPHILS # BLD AUTO: 7.4 K/UL (ref 1.8–7.7)
NEUTROPHILS NFR BLD: 59.6 % (ref 38–73)
NITRITE UR QL STRIP: NEGATIVE
NRBC BLD-RTO: 0 /100 WBC
PH UR STRIP: 6 [PH] (ref 5–8)
PLATELET # BLD AUTO: 226 K/UL (ref 150–450)
PMV BLD AUTO: 11.1 FL (ref 9.2–12.9)
POTASSIUM SERPL-SCNC: 4.3 MMOL/L (ref 3.5–5.1)
PROT SERPL-MCNC: 7 G/DL (ref 6–8.4)
PROT UR QL STRIP: ABNORMAL
RBC # BLD AUTO: 5.44 M/UL (ref 4.6–6.2)
RBC #/AREA URNS HPF: 1 /HPF (ref 0–4)
SODIUM SERPL-SCNC: 133 MMOL/L (ref 136–145)
SP GR UR STRIP: 1.01 (ref 1–1.03)
SQUAMOUS #/AREA URNS HPF: 0 /HPF
URN SPEC COLLECT METH UR: ABNORMAL
UROBILINOGEN UR STRIP-ACNC: NEGATIVE EU/DL
WBC # BLD AUTO: 12.44 K/UL (ref 3.9–12.7)
WBC #/AREA URNS HPF: 0 /HPF (ref 0–5)
YEAST URNS QL MICRO: NORMAL

## 2025-03-02 PROCEDURE — 85025 COMPLETE CBC W/AUTO DIFF WBC: CPT

## 2025-03-02 PROCEDURE — 63600175 PHARM REV CODE 636 W HCPCS: Performed by: STUDENT IN AN ORGANIZED HEALTH CARE EDUCATION/TRAINING PROGRAM

## 2025-03-02 PROCEDURE — 80053 COMPREHEN METABOLIC PANEL: CPT

## 2025-03-02 PROCEDURE — 99285 EMERGENCY DEPT VISIT HI MDM: CPT | Mod: 25

## 2025-03-02 PROCEDURE — 96375 TX/PRO/DX INJ NEW DRUG ADDON: CPT

## 2025-03-02 PROCEDURE — 86803 HEPATITIS C AB TEST: CPT | Performed by: EMERGENCY MEDICINE

## 2025-03-02 PROCEDURE — 87389 HIV-1 AG W/HIV-1&-2 AB AG IA: CPT | Performed by: EMERGENCY MEDICINE

## 2025-03-02 PROCEDURE — 96374 THER/PROPH/DIAG INJ IV PUSH: CPT

## 2025-03-02 PROCEDURE — 83690 ASSAY OF LIPASE: CPT

## 2025-03-02 PROCEDURE — 81001 URINALYSIS AUTO W/SCOPE: CPT

## 2025-03-02 RX ORDER — MORPHINE SULFATE 4 MG/ML
4 INJECTION, SOLUTION INTRAMUSCULAR; INTRAVENOUS
Refills: 0 | Status: COMPLETED | OUTPATIENT
Start: 2025-03-02 | End: 2025-03-02

## 2025-03-02 RX ORDER — ONDANSETRON HYDROCHLORIDE 2 MG/ML
4 INJECTION, SOLUTION INTRAVENOUS
Status: COMPLETED | OUTPATIENT
Start: 2025-03-02 | End: 2025-03-02

## 2025-03-02 RX ADMIN — MORPHINE SULFATE 4 MG: 4 INJECTION INTRAVENOUS at 10:03

## 2025-03-02 RX ADMIN — ONDANSETRON 4 MG: 2 INJECTION INTRAMUSCULAR; INTRAVENOUS at 10:03

## 2025-03-03 VITALS
HEART RATE: 79 BPM | BODY MASS INDEX: 29.8 KG/M2 | DIASTOLIC BLOOD PRESSURE: 81 MMHG | HEIGHT: 72 IN | SYSTOLIC BLOOD PRESSURE: 121 MMHG | WEIGHT: 220 LBS | OXYGEN SATURATION: 96 % | RESPIRATION RATE: 22 BRPM | TEMPERATURE: 98 F

## 2025-03-03 LAB
HCV AB SERPL QL IA: NEGATIVE
HIV 1+2 AB+HIV1 P24 AG SERPL QL IA: NEGATIVE

## 2025-03-03 NOTE — ED PROVIDER NOTES
Encounter Date: 3/2/2025       History     Chief Complaint   Patient presents with    Flank Pain    Hematuria     HPI    Jose Miguel Brennan is a 39 y.o. male with a past medical history of CKD, type 2 diabetes, anxiety, previous DVT currently on Eliquis, IgA nephropathy, and previous kidney stones that presents emergency department for evaluation of left-sided flank pain and hematuria.  Patient experience intermittent hematuria for the past week but his left-sided flank pain started today.  Denies fever, nausea, vomiting, chest pain, shortness of breath, numbness, and weakness.  No diarrhea or constipation.    Review of patient's allergies indicates:   Allergen Reactions    Zithromax [azithromycin] Rash     Past Medical History:   Diagnosis Date    Anxiety     Asthma     Depression     Diabetes mellitus     diet controlled    Diabetes mellitus, type 2     DVT (deep venous thrombosis)     GERD (gastroesophageal reflux disease)     Gout     Hyperlipidemia     Hypertension     IgA nephropathy     Insomnia      Past Surgical History:   Procedure Laterality Date    COLONOSCOPY N/A 5/8/2020    Procedure: COLONOSCOPY;  Surgeon: Hammad Castorena III, MD;  Location: [facility];  Service: Endoscopy;  Laterality: N/A;    ESOPHAGOGASTRODUODENOSCOPY N/A 5/8/2020    Procedure: EGD (ESOPHAGOGASTRODUODENOSCOPY);  Surgeon: Hammad Castorena III, MD;  Location: [facility];  Service: Endoscopy;  Laterality: N/A;    nose polyp removal       Family History   Problem Relation Name Age of Onset    Hypertension Maternal Grandmother      Cancer Maternal Grandfather      Diabetes Maternal Grandfather      Heart disease Maternal Grandfather      Heart disease Mother      Stroke Mother      Diabetes Mother       Social History[1]  Review of Systems   Constitutional:  Negative for fever.   HENT:  Negative for sore throat.    Respiratory:  Negative for shortness of breath.    Cardiovascular:  Negative for chest pain.   Gastrointestinal:   Positive for abdominal pain. Negative for constipation, diarrhea, nausea and vomiting.   Genitourinary:  Positive for flank pain and hematuria. Negative for dysuria and frequency.   Musculoskeletal:  Negative for back pain.   Skin:  Negative for rash.   Neurological:  Negative for dizziness, weakness, numbness and headaches.   Hematological:  Does not bruise/bleed easily.       Physical Exam     Initial Vitals [03/02/25 2137]   BP Pulse Resp Temp SpO2   (!) 152/108 96 18 97.9 °F (36.6 °C) 99 %      MAP       --         Physical Exam    Nursing note and vitals reviewed.  Constitutional: He appears well-developed and well-nourished.   HENT:   Head: Normocephalic and atraumatic.   Eyes: EOM are normal. Pupils are equal, round, and reactive to light.   Neck:   Normal range of motion.  Cardiovascular:  Normal rate, regular rhythm and normal heart sounds.           Pulmonary/Chest: Breath sounds normal. No respiratory distress.   Abdominal: Abdomen is soft. He exhibits no distension. There is abdominal tenderness (Left flank). There is no rebound.   Musculoskeletal:         General: Normal range of motion.      Cervical back: Normal range of motion.     Neurological: He is alert and oriented to person, place, and time. GCS eye subscore is 4. GCS verbal subscore is 5. GCS motor subscore is 6.   Skin: Capillary refill takes less than 2 seconds.   Psychiatric: He has a normal mood and affect.         ED Course   Procedures  Labs Reviewed   CBC W/ AUTO DIFFERENTIAL - Abnormal       Result Value    WBC 12.44      RBC 5.44      Hemoglobin 15.8      Hematocrit 46.5      MCV 86      MCH 29.0      MCHC 34.0      RDW 13.8      Platelets 226      MPV 11.1      Immature Granulocytes 0.6 (*)     Gran # (ANC) 7.4      Immature Grans (Abs) 0.07 (*)     Lymph # 3.7      Mono # 0.7      Eos # 0.6 (*)     Baso # 0.06      nRBC 0      Gran % 59.6      Lymph % 29.3      Mono % 5.5      Eosinophil % 4.5      Basophil % 0.5      Differential  Method Automated      Narrative:     Release to patient->Immediate   COMPREHENSIVE METABOLIC PANEL - Abnormal    Sodium 133 (*)     Potassium 4.3      Chloride 100      CO2 23      Glucose 431 (*)     BUN 43 (*)     Creatinine 2.9 (*)     Calcium 9.7      Total Protein 7.0      Albumin 4.3      Total Bilirubin 0.5      Alkaline Phosphatase 99      AST 17      ALT 18      eGFR 27.4 (*)     Anion Gap 10      Narrative:     Release to patient->Immediate   LIPASE - Abnormal    Lipase 88 (*)     Narrative:     Release to patient->Immediate   URINALYSIS, REFLEX TO URINE CULTURE - Abnormal    Specimen UA Urine, Clean Catch      Color, UA Colorless (*)     Appearance, UA Clear      pH, UA 6.0      Specific Gravity, UA 1.010      Protein, UA 1+ (*)     Glucose, UA 4+ (*)     Ketones, UA Negative      Bilirubin (UA) Negative      Occult Blood UA TRACE      Nitrite, UA Negative      Urobilinogen, UA Negative      Leukocytes, UA Negative      Narrative:     Specimen Source->Urine   URINALYSIS MICROSCOPIC    RBC, UA 1      WBC, UA 0      Bacteria Rare      Yeast, UA None      Squam Epithel, UA 0      Hyaline Casts, UA 0      Microscopic Comment SEE COMMENT      Narrative:     Specimen Source->Urine   HEPATITIS C ANTIBODY   HIV 1 / 2 ANTIBODY          Imaging Results              CT Renal Stone Study ABD Pelvis WO (Final result)  Result time 03/02/25 23:57:09      Final result by Robinson Fitzpatrick MD (03/02/25 23:57:09)                   Impression:      Bladder wall thickening.  Correlate for cystitis.      Electronically signed by: Robinson Fitzpatrick  Date:    03/02/2025  Time:    23:57               Narrative:    EXAMINATION:  CT RENAL STONE STUDY ABD PELVIS WO    CLINICAL HISTORY:  Flank pain, kidney stone suspected;    TECHNIQUE:  Low dose axial images, sagittal and coronal reformations were obtained from the lung bases to the pubic symphysis.  Contrast was not administered.    COMPARISON:  10/08/2024    FINDINGS:  Soft  tissues: Unremarkable.    Bones: No acute osseous abnormality.    Lower chest: Normal heart size. No pericardial effusion.    Lung Bases: Well aerated, without consolidation or pleural fluid.    Liver: Fatty liver.    Gallbladder: No calcified gallstones.    Bile Ducts: No evidence of dilated ducts.    Pancreas: No mass or peripancreatic fat stranding.    Spleen: Unremarkable.    Adrenals: Right adrenal myelolipoma.    Kidneys/ Ureters: Unremarkable.    Bladder: Diffuse circumferential bladder wall thickening.    Reproductive organs: Unremarkable.    GI Tract/Mesentery: No evidence of bowel obstruction or inflammation.  Colonic diverticulosis.    Peritoneal Space: No ascites. No free air.    Lymphadenopathy: No significant adenopathy.    Vasculature: No significant atherosclerosis or aneurysm.                                       Medications   morphine injection 4 mg (4 mg Intravenous Given 3/2/25 2221)   ondansetron injection 4 mg (4 mg Intravenous Given 3/2/25 2224)     Medical Decision Making  39-year-old male with a past medical history of IgA nephropathy as well as kidney stones that presents emergency department for evaluation of left-sided flank pain and hematuria.  Vitals were stable.  Minimal tenderness to palpation along the left flank.  No peritoneal signs.  Differential includes but isn't limited to kidney stone, IgA nephropathy, UTI, and renal abscess.  UA shows trace blood but no nitrites or leuk esterase.  Renal stone study without evidence of kidney stone.  Hemoglobin stable.  Creatinine of 2.9 which is at baseline.  Lipase only mildly elevated.  Doubt pancreatitis.  Discussed findings with Nephrology given that he has intermittent hematuria.  They believe that his intermittent hematuria as a part of his IgA nephropathy disease process and there was no acute intervention to be had at this time.  Given this, return precautions given.  Instructed to follow up with primary care and  Nephrology.    Amount and/or Complexity of Data Reviewed  Radiology: ordered.    Risk  Prescription drug management.                                      Clinical Impression:  Final diagnoses:  [R10.9] Flank pain (Primary)  [R31.9] Hematuria, unspecified type  [N02.B9] IgA nephropathy          ED Disposition Condition    Discharge Stable          ED Prescriptions    None       Follow-up Information       Follow up With Specialties Details Why Contact Info Additional Information    [facility] - Emergency Dept Emergency Medicine  As needed, If symptoms worsen [address] [id number] [phone] 1st floor    Jose Grant NP Family Medicine In 2 days  [address] [facility]  [address]  [place] [id number]  [phone]       Mahnaz Conte MD Nephrology Call in 2 days  [address] [facility] [address]Y [address]  [place] [id number]  [phone]                    [1]   Social History  Tobacco Use    Smoking status: Former     Current packs/day: 0.00     Average packs/day: 0.3 packs/day for 5.0 years (1.3 ttl pk-yrs)     Types: Cigars, Cigarettes     Start date: 2017     Quit date: 2022     Years since quittin.8     Passive exposure: Past    Smokeless tobacco: Never    Tobacco comments:     One cigar a day   Substance Use Topics    Alcohol use: Yes    Drug use: No        Ruslan Larsen MD  [phone]

## 2025-03-03 NOTE — DISCHARGE INSTRUCTIONS
Please return to the emergency department if you have new or worsening symptoms.  Follow up with Nephrology for further evaluation of your IgA nephropathy.

## 2025-03-05 ENCOUNTER — OFFICE VISIT (OUTPATIENT)
Dept: FAMILY MEDICINE | Facility: CLINIC | Age: 40
End: 2025-03-05
Payer: MEDICAID

## 2025-03-05 VITALS
BODY MASS INDEX: 30.34 KG/M2 | WEIGHT: 224 LBS | HEART RATE: 90 BPM | OXYGEN SATURATION: 98 % | SYSTOLIC BLOOD PRESSURE: 130 MMHG | DIASTOLIC BLOOD PRESSURE: 84 MMHG | HEIGHT: 72 IN

## 2025-03-05 DIAGNOSIS — E11.21 TYPE 2 DIABETES MELLITUS WITH DIABETIC NEPHROPATHY, WITHOUT LONG-TERM CURRENT USE OF INSULIN: ICD-10-CM

## 2025-03-05 DIAGNOSIS — F41.8 DEPRESSION WITH ANXIETY: ICD-10-CM

## 2025-03-05 DIAGNOSIS — E78.2 MIXED HYPERLIPIDEMIA: ICD-10-CM

## 2025-03-05 DIAGNOSIS — E78.1 HYPERTRIGLYCERIDEMIA: ICD-10-CM

## 2025-03-05 DIAGNOSIS — I10 ESSENTIAL HYPERTENSION: Primary | ICD-10-CM

## 2025-03-05 LAB — HBA1C MFR BLD: 12.7 %

## 2025-03-05 RX ORDER — AMLODIPINE BESYLATE 5 MG/1
5 TABLET ORAL DAILY
Qty: 90 TABLET | Refills: 1 | Status: SHIPPED | OUTPATIENT
Start: 2025-03-05

## 2025-03-05 RX ORDER — LOSARTAN POTASSIUM 100 MG/1
100 TABLET ORAL DAILY
Qty: 90 TABLET | Refills: 1 | Status: SHIPPED | OUTPATIENT
Start: 2025-03-05

## 2025-03-05 RX ORDER — ATORVASTATIN CALCIUM 10 MG/1
10 TABLET, FILM COATED ORAL NIGHTLY
Qty: 90 TABLET | Refills: 1 | Status: SHIPPED | OUTPATIENT
Start: 2025-03-05

## 2025-03-05 RX ORDER — SERTRALINE HYDROCHLORIDE 25 MG/1
25 TABLET, FILM COATED ORAL NIGHTLY
Qty: 30 TABLET | Refills: 1 | Status: SHIPPED | OUTPATIENT
Start: 2025-03-05

## 2025-03-05 RX ORDER — OMEGA-3-ACID ETHYL ESTERS 1 G/1
1 CAPSULE, LIQUID FILLED ORAL 2 TIMES DAILY
Qty: 180 CAPSULE | Refills: 1 | Status: SHIPPED | OUTPATIENT
Start: 2025-03-05

## 2025-03-05 RX ORDER — INSULIN GLARGINE 100 [IU]/ML
24 INJECTION, SOLUTION SUBCUTANEOUS DAILY
Qty: 6 ML | Refills: 1 | Status: SHIPPED | OUTPATIENT
Start: 2025-03-05

## 2025-03-05 RX ORDER — DULAGLUTIDE 1.5 MG/.5ML
1.5 INJECTION, SOLUTION SUBCUTANEOUS
Qty: 4 PEN | Refills: 1 | Status: SHIPPED | OUTPATIENT
Start: 2025-03-05

## 2025-03-05 NOTE — PROGRESS NOTES
SUBJECTIVE:      Patient ID: Jose Miguel Brennan is a 39 y.o. male.    Chief Complaint: Hypertension    Patient is here today to f/u on dm and htn. He is following with Dr Conte for nephropathy. He has been to the ER twice in the past few weeks due to kidney pain. He has not seen Dr Conte in almost a year. He has not followed up with endo. Says he has had a rough couple of months. He has been battling depression, was on lexapro in the past but has not taken meds in years.     Diabetes  He presents for his follow-up diabetic visit. He has type 2 diabetes mellitus. The initial diagnosis of diabetes was made 10 years ago. His disease course has been worsening. Hypoglycemia symptoms include nervousness/anxiousness. Pertinent negatives for hypoglycemia include no confusion, dizziness, headaches, pallor, seizures or speech difficulty. Associated symptoms include fatigue, polyuria and weakness. Pertinent negatives for diabetes include no blurred vision, no chest pain, no foot paresthesias, no foot ulcerations, no polydipsia, no polyphagia and no visual change. There are no hypoglycemic complications. Symptoms are worsening. Diabetic complications include nephropathy. Risk factors for coronary artery disease include diabetes mellitus, male sex and hypertension. Current diabetic treatment includes oral agent (monotherapy) (GLP1). His weight is stable. He is following a generally unhealthy diet. When asked about meal planning, he reported none. He rarely participates in exercise. An ACE inhibitor/angiotensin II receptor blocker is being taken. He does not see a podiatrist.Eye exam is not current.   Hypertension  This is a chronic problem. The problem is unchanged. The problem is controlled. Associated symptoms include anxiety. Pertinent negatives include no blurred vision, chest pain, headaches, neck pain, palpitations or shortness of breath. Risk factors for coronary artery disease include diabetes mellitus,  "dyslipidemia, male gender and smoking/tobacco exposure. Past treatments include angiotensin blockers and calcium channel blockers. The current treatment provides moderate improvement.   Anxiety  Presents for follow-up visit. Symptoms include decreased concentration, depressed mood, excessive worry, irritability and nervous/anxious behavior. Patient reports no chest pain, confusion, dizziness, palpitations, shortness of breath or suicidal ideas. Symptoms occur most days. The severity of symptoms is moderate. The quality of sleep is fair. Nighttime awakenings: occasional.     Compliance with medications is 0-25%.     History of Present Illness    CHIEF COMPLAINT:  Jose Miguel presents today with worsening anxiety and depression along with hyperglycemia     RENAL CONCERNS:  He reports two recent ER visits for kidney pain - one to [facility] where pain medication was administered, and another to [facility] where IV fluids were given. He reports one episode of hematuria.    ANXIETY AND DEPRESSION:  He reports recent onset of irritability and anxiety, describing feeling of "loss of control." He previously tried Lexapro which provided temporary relief but ultimately caused adverse effects. He denies any specific triggering events for current mental health symptoms. He denies thoughts of harming himself or others    DIABETES:  His A1C is 12.7. He currently takes Trulicity 1.5 weekly and Jardiance for diabetes management. He previously used Lantus insulin but has discontinued use despite having remaining medication.    FOLLOW-UP CARE:  He is overdue for follow-up with Dr. Conte, having missed appointments last year due to provider unavailability. Recent physical exam for work requirements revealed slightly elevated numbers.         Past Surgical History:   Procedure Laterality Date    COLONOSCOPY N/A 5/8/2020    Procedure: COLONOSCOPY;  Surgeon: Hammad Castorena III, MD;  Location: [facility];  " Service: Endoscopy;  Laterality: N/A;    ESOPHAGOGASTRODUODENOSCOPY N/A 2020    Procedure: EGD (ESOPHAGOGASTRODUODENOSCOPY);  Surgeon: Hammad Castorena III, MD;  Location: [facility];  Service: Endoscopy;  Laterality: N/A;    nose polyp removal       Family History   Problem Relation Name Age of Onset    Hypertension Maternal Grandmother      Cancer Maternal Grandfather      Diabetes Maternal Grandfather      Heart disease Maternal Grandfather      Heart disease Mother      Stroke Mother      Diabetes Mother        Social History     Socioeconomic History    Marital status: Significant Other   Tobacco Use    Smoking status: Former     Current packs/day: 0.00     Average packs/day: 0.3 packs/day for 5.0 years (1.3 ttl pk-yrs)     Types: Cigars, Cigarettes     Start date: 2017     Quit date: 2022     Years since quittin.8     Passive exposure: Past    Smokeless tobacco: Never    Tobacco comments:     One cigar a day   Substance and Sexual Activity    Alcohol use: Yes    Drug use: No    Sexual activity: Yes     Partners: Female     Social Drivers of Health     Financial Resource Strain: Low Risk  (3/29/2023)    Overall Financial Resource Strain (CARDIA)     Difficulty of Paying Living Expenses: Not hard at all   Food Insecurity: No Food Insecurity (3/29/2023)    Hunger Vital Sign     Worried About Running Out of Food in the Last Year: Never true     Ran Out of Food in the Last Year: Never true   Transportation Needs: No Transportation Needs (3/29/2023)    PRAPARE - Transportation     Lack of Transportation (Medical): No     Lack of Transportation (Non-Medical): No   Physical Activity: Inactive (3/29/2023)    Exercise Vital Sign     Days of Exercise per Week: 0 days     Minutes of Exercise per Session: 0 min   Stress: Stress Concern Present (2023)    [employer] [place] [employer] - Occupational Stress Questionnaire     Feeling of Stress : To some extent   Housing Stability: Unknown  (3/29/2023)    Housing Stability Vital Sign     Unable to Pay for Housing in the Last Year: No     Unstable Housing in the Last Year: No     Current Medications[1]  Review of patient's allergies indicates:   Allergen Reactions    Zithromax [azithromycin] Rash      Past Medical History:   Diagnosis Date    Anxiety     Asthma     Depression     Diabetes mellitus     diet controlled    Diabetes mellitus, type 2     DVT (deep venous thrombosis)     GERD (gastroesophageal reflux disease)     Gout     Hyperlipidemia     Hypertension     IgA nephropathy     Insomnia      Past Surgical History:   Procedure Laterality Date    COLONOSCOPY N/A 5/8/2020    Procedure: COLONOSCOPY;  Surgeon: Hammad Castorena III, MD;  Location: [facility];  Service: Endoscopy;  Laterality: N/A;    ESOPHAGOGASTRODUODENOSCOPY N/A 5/8/2020    Procedure: EGD (ESOPHAGOGASTRODUODENOSCOPY);  Surgeon: Hammad Castorena III, MD;  Location: [facility];  Service: Endoscopy;  Laterality: N/A;    nose polyp removal         Review of Systems   Constitutional:  Positive for fatigue and irritability. Negative for appetite change, chills, diaphoresis and unexpected weight change.   HENT:  Negative for ear discharge, facial swelling, hearing loss, nosebleeds and trouble swallowing.    Eyes:  Negative for blurred vision, photophobia, pain and visual disturbance.   Respiratory:  Negative for apnea, choking, shortness of breath and wheezing.    Cardiovascular:  Negative for chest pain and palpitations.   Gastrointestinal:  Negative for abdominal pain, blood in stool and vomiting.   Endocrine: Positive for polyuria. Negative for polydipsia and polyphagia.   Genitourinary:  Negative for difficulty urinating and hematuria.   Musculoskeletal:  Negative for gait problem, joint swelling and neck pain.   Skin:  Negative for pallor.   Neurological:  Positive for weakness. Negative for dizziness, seizures, speech difficulty and headaches.   Hematological:  Does not  bruise/bleed easily.   Psychiatric/Behavioral:  Positive for decreased concentration and dysphoric mood. Negative for agitation, confusion, self-injury, sleep disturbance and suicidal ideas. The patient is nervous/anxious.       OBJECTIVE:      Vitals:    03/05/25 1443 03/05/25 1511   BP: (!) 130/90 130/84   Pulse: 90    SpO2: 98%    Weight: 101.6 kg (224 lb)    Height: 6' (1.829 m)      Physical Exam  Vitals and nursing note reviewed.   Constitutional:       General: He is not in acute distress.     Appearance: He is well-developed.   HENT:      Head: Normocephalic and atraumatic.      Nose: Nose normal.      Mouth/Throat:      Pharynx: Uvula midline.   Eyes:      General: Lids are normal.      Conjunctiva/sclera: Conjunctivae normal.      Pupils: Pupils are equal, round, and reactive to light.      Right eye: Pupil is round and reactive.      Left eye: Pupil is round and reactive.   Neck:      Thyroid: No thyromegaly.      Vascular: No carotid bruit.   Cardiovascular:      Rate and Rhythm: Normal rate and regular rhythm.      Pulses: Normal pulses.      Heart sounds: Normal heart sounds. No murmur heard.  Pulmonary:      Effort: Pulmonary effort is normal.      Breath sounds: Normal breath sounds. No wheezing, rhonchi or rales.   Abdominal:      General: Bowel sounds are normal.      Palpations: Abdomen is soft. Abdomen is not rigid.      Tenderness: There is no abdominal tenderness.   Musculoskeletal:         General: Normal range of motion.      Cervical back: Normal range of motion and neck supple.      Right lower leg: No edema.      Left lower leg: No edema.   Lymphadenopathy:      Cervical: No cervical adenopathy.   Skin:     General: Skin is warm and dry.      Nails: There is no clubbing.   Neurological:      Mental Status: He is alert and oriented to person, place, and time.   Psychiatric:         Mood and Affect: Mood normal.         Speech: Speech normal.         Behavior: Behavior normal. Behavior is  cooperative.         Thought Content: Thought content normal.         Judgment: Judgment normal.       Office Visit on 03/05/2025   Component Date Value Ref Range Status    Hemoglobin A1C, POC 03/05/2025 12.7  % Final   Admission on 03/04/2025, Discharged on 03/04/2025   Component Date Value Ref Range Status    WBC 03/04/2025 9.50  3.90 - 12.70 K/uL Final    RBC 03/04/2025 5.26  4.60 - 6.20 M/uL Final    Hemoglobin 03/04/2025 14.8  14.0 - 18.0 g/dL Final    Hematocrit 03/04/2025 44.7  40.0 - 54.0 % Final    MCV 03/04/2025 85  82 - 98 fL Final    MCH 03/04/2025 28.1  27.0 - 31.0 pg Final    MCHC 03/04/2025 33.1  32.0 - 36.0 g/dL Final    RDW 03/04/2025 13.6  11.5 - 14.5 % Final    Platelets 03/04/2025 203  150 - 450 K/uL Final    MPV 03/04/2025 10.7  9.2 - 12.9 fL Final    Immature Granulocytes 03/04/2025 0.4  0.0 - 0.5 % Final    Gran # (ANC) 03/04/2025 5.3  1.8 - 7.7 K/uL Final    Immature Grans (Abs) 03/04/2025 0.04  0.00 - 0.04 K/uL Final    Comment: Mild elevation in immature granulocytes is non specific and   can be seen in a variety of conditions including stress response,   acute inflammation, trauma and pregnancy. Correlation with other   laboratory and clinical findings is essential.      Lymph # 03/04/2025 3.0  1.0 - 4.8 K/uL Final    Mono # 03/04/2025 0.5  0.3 - 1.0 K/uL Final    Eos # 03/04/2025 0.6 (H)  0.0 - 0.5 K/uL Final    Baso # 03/04/2025 0.04  0.00 - 0.20 K/uL Final    nRBC 03/04/2025 0  0 /100 WBC Final    Gran % 03/04/2025 55.8  38.0 - 73.0 % Final    Lymph % 03/04/2025 32.0  18.0 - 48.0 % Final    Mono % 03/04/2025 5.1  4.0 - 15.0 % Final    Eosinophil % 03/04/2025 6.3  0.0 - 8.0 % Final    Basophil % 03/04/2025 0.4  0.0 - 1.9 % Final    Differential Method 03/04/2025 Automated   Final    Sodium 03/04/2025 139  136 - 145 mmol/L Final    Potassium 03/04/2025 4.3  3.5 - 5.1 mmol/L Final    Anion Gap reference range revised on 4/28/2023    Chloride 03/04/2025 106  95 - 110 mmol/L Final    CO2  03/04/2025 21 (L)  23 - 29 mmol/L Final    Glucose 03/04/2025 363 (H)  70 - 110 mg/dL Final    Comment: The ADA recommends the following guidelines for fasting glucose:    Normal:       less than 100 mg/dL    Prediabetes:  100 mg/dL to 125 mg/dL    Diabetes:     126 mg/dL or higher      BUN 03/04/2025 42 (H)  6 - 20 mg/dL Final    Creatinine 03/04/2025 2.67 (H)  0.50 - 1.40 mg/dL Final    Calcium 03/04/2025 9.0  8.7 - 10.5 mg/dL Final    Total Protein 03/04/2025 6.8  6.0 - 8.4 g/dL Final    Albumin 03/04/2025 3.8  3.5 - 5.2 g/dL Final    Total Bilirubin 03/04/2025 0.5  0.2 - 1.0 mg/dL Final    Alkaline Phosphatase 03/04/2025 102  40 - 150 U/L Final    AST 03/04/2025 20  10 - 40 U/L Final    ALT 03/04/2025 24  10 - 44 U/L Final    Anion Gap 03/04/2025 12  8 - 16 mmol/L Final    Anion Gap reference range revised on 4/28/2023    eGFR 03/04/2025 30 (A)  >60 mL/min/1.73 m^2 Final    Specimen UA 03/04/2025 Urine, Clean Catch   Final    Color, UA 03/04/2025 Yellow  Yellow, Straw, Kristina Final    Appearance, UA 03/04/2025 Clear  Clear Final    pH, UA 03/04/2025 5.0  5.0 - 8.0 Final    Specific Gravity, UA 03/04/2025 1.015  1.005 - 1.030 Final    Protein, UA 03/04/2025 3+ (A)  Negative Final    Comment: Recommend a 24 hour urine protein or a urine   protein/creatinine ratio if globulin induced proteinuria is  clinically suspected.      Glucose, UA 03/04/2025 3+ (A)  Negative Final    Ketones, UA 03/04/2025 Negative  Negative Final    Bilirubin (UA) 03/04/2025 Negative  Negative Final    Occult Blood UA 03/04/2025 Negative  Negative Final    Nitrite, UA 03/04/2025 Negative  Negative Final    Urobilinogen, UA 03/04/2025 0.2  <2.0 EU/dL Final    Leukocytes, UA 03/04/2025 Negative  Negative Final    RBC, UA 03/04/2025 0  0 - 4 /hpf Final    WBC, UA 03/04/2025 0  0 - 5 /hpf Final    Bacteria 03/04/2025 Negative  Negative /hpf Final    Squam Epithel, UA 03/04/2025 0  /hpf Final    Hyaline Casts, UA 03/04/2025 3 (A)  0 - 1 /lpf  Final    Microscopic Comment 03/04/2025 SEE COMMENT   Final    Comment: Other formed elements not mentioned in the report are not   present in the microscopic examination.      Admission on 03/02/2025, Discharged on 03/03/2025   Component Date Value Ref Range Status    Hepatitis C Ab 03/02/2025 Negative  Negative Final    HIV 1/2 Ag/Ab 03/02/2025 Negative  Negative Final    WBC 03/02/2025 12.44  3.90 - 12.70 K/uL Final    RBC 03/02/2025 5.44  4.60 - 6.20 M/uL Final    Hemoglobin 03/02/2025 15.8  14.0 - 18.0 g/dL Final    Hematocrit 03/02/2025 46.5  40.0 - 54.0 % Final    MCV 03/02/2025 86  82 - 98 fL Final    MCH 03/02/2025 29.0  27.0 - 31.0 pg Final    MCHC 03/02/2025 34.0  32.0 - 36.0 g/dL Final    RDW 03/02/2025 13.8  11.5 - 14.5 % Final    Platelets 03/02/2025 226  150 - 450 K/uL Final    MPV 03/02/2025 11.1  9.2 - 12.9 fL Final    Immature Granulocytes 03/02/2025 0.6 (H)  0.0 - 0.5 % Final    Gran # (ANC) 03/02/2025 7.4  1.8 - 7.7 K/uL Final    Immature Grans (Abs) 03/02/2025 0.07 (H)  0.00 - 0.04 K/uL Final    Comment: Mild elevation in immature granulocytes is non specific and   can be seen in a variety of conditions including stress response,   acute inflammation, trauma and pregnancy. Correlation with other   laboratory and clinical findings is essential.      Lymph # 03/02/2025 3.7  1.0 - 4.8 K/uL Final    Mono # 03/02/2025 0.7  0.3 - 1.0 K/uL Final    Eos # 03/02/2025 0.6 (H)  0.0 - 0.5 K/uL Final    Baso # 03/02/2025 0.06  0.00 - 0.20 K/uL Final    nRBC 03/02/2025 0  0 /100 WBC Final    Gran % 03/02/2025 59.6  38.0 - 73.0 % Final    Lymph % 03/02/2025 29.3  18.0 - 48.0 % Final    Mono % 03/02/2025 5.5  4.0 - 15.0 % Final    Eosinophil % 03/02/2025 4.5  0.0 - 8.0 % Final    Basophil % 03/02/2025 0.5  0.0 - 1.9 % Final    Differential Method 03/02/2025 Automated   Final    Sodium 03/02/2025 133 (L)  136 - 145 mmol/L Final    Potassium 03/02/2025 4.3  3.5 - 5.1 mmol/L Final    Chloride 03/02/2025 100  95 -  110 mmol/L Final    CO2 03/02/2025 23  23 - 29 mmol/L Final    Glucose 03/02/2025 431 (H)  70 - 110 mg/dL Final    BUN 03/02/2025 43 (H)  6 - 20 mg/dL Final    Creatinine 03/02/2025 2.9 (H)  0.5 - 1.4 mg/dL Final    Calcium 03/02/2025 9.7  8.7 - 10.5 mg/dL Final    Total Protein 03/02/2025 7.0  6.0 - 8.4 g/dL Final    Albumin 03/02/2025 4.3  3.5 - 5.2 g/dL Final    Total Bilirubin 03/02/2025 0.5  0.1 - 1.0 mg/dL Final    Comment: For infants and newborns, interpretation of results should be based  on gestational age, weight and in agreement with clinical  observations.    Premature Infant recommended reference ranges:  Up to 24 hours.............<8.0 mg/dL  Up to 48 hours............<12.0 mg/dL  3-5 days..................<15.0 mg/dL  6-29 days.................<15.0 mg/dL      Alkaline Phosphatase 03/02/2025 99  55 - 135 U/L Final    AST 03/02/2025 17  10 - 40 U/L Final    ALT 03/02/2025 18  10 - 44 U/L Final    eGFR 03/02/2025 27.4 (A)  >60 mL/min/1.73 m^2 Final    Anion Gap 03/02/2025 10  8 - 16 mmol/L Final    Lipase 03/02/2025 88 (H)  4 - 60 U/L Final    Specimen UA 03/02/2025 Urine, Clean Catch   Final    Color, UA 03/02/2025 Colorless (A)  Yellow, Straw, Kristina Final    Appearance, UA 03/02/2025 Clear  Clear Final    pH, UA 03/02/2025 6.0  5.0 - 8.0 Final    Specific Gravity, UA 03/02/2025 1.010  1.005 - 1.030 Final    Protein, UA 03/02/2025 1+ (A)  Negative Final    Comment: Recommend a 24 hour urine protein or a urine   protein/creatinine ratio if globulin induced proteinuria is  clinically suspected.      Glucose, UA 03/02/2025 4+ (A)  Negative Final    Ketones, UA 03/02/2025 Negative  Negative Final    Bilirubin (UA) 03/02/2025 Negative  Negative Final    Occult Blood UA 03/02/2025 TRACE  Negative Final    Nitrite, UA 03/02/2025 Negative  Negative Final    Urobilinogen, UA 03/02/2025 Negative  Negative EU/dL Final    Leukocytes, UA 03/02/2025 Negative  Negative Final    RBC, UA 03/02/2025 1  0 - 4 /hpf Final     WBC, UA 03/02/2025 0  0 - 5 /hpf Final    Bacteria 03/02/2025 Rare  None-Occ /hpf Final    Yeast, UA 03/02/2025 None  None Final    Squam Epithel, UA 03/02/2025 0  /hpf Final    Hyaline Casts, UA 03/02/2025 0  0-1/lpf /lpf Final    Microscopic Comment 03/02/2025 SEE COMMENT   Final    Comment: Other formed elements not mentioned in the report are not   present in the microscopic examination.         Assessment:       1. Essential hypertension    2. Type 2 diabetes mellitus with diabetic nephropathy, without long-term current use of insulin    3. Depression with anxiety    4. Mixed hyperlipidemia    5. Hypertriglyceridemia        Plan:       Essential hypertension  -     amLODIPine (NORVASC) 5 MG tablet; Take 1 tablet (5 mg total) by mouth once daily.  Dispense: 90 tablet; Refill: 1  -     losartan (COZAAR) 100 MG tablet; Take 1 tablet (100 mg total) by mouth once daily.  Dispense: 90 tablet; Refill: 1    Type 2 diabetes mellitus with diabetic nephropathy, without long-term current use of insulin  -     Ambulatory referral/consult to Ophthalmology; Future; Expected date: 03/05/2025  -     POCT HEMOGLOBIN A1C  -   start  insulin glargine U-100, Lantus, (LANTUS SOLOSTAR U-100 INSULIN) 100 unit/mL (3 mL) InPn pen; Inject 24 Units into the skin once daily.  Dispense: 6 mL; Refill: 1  -     empagliflozin (JARDIANCE) 10 mg tablet; Take 1 tablet (10 mg total) by mouth once daily.  Dispense: 90 tablet; Refill: 0  -     dulaglutide (TRULICITY) 1.5 mg/0.5 mL pen injector; Inject 1.5 mg into the skin every 7 days.  Dispense: 4 pen ; Refill: 1    Depression with anxiety  -    start sertraline (ZOLOFT) 25 MG tablet; Take 1 tablet (25 mg total) by mouth every evening.  Dispense: 30 tablet; Refill: 1    Mixed hyperlipidemia  -     atorvastatin (LIPITOR) 10 MG tablet; Take 1 tablet (10 mg total) by mouth every evening.  Dispense: 90 tablet; Refill: 1    Hypertriglyceridemia  -     omega-3 acid ethyl esters (LOVAZA) 1 gram  capsule; Take 1 capsule (1 g total) by mouth 2 (two) times daily.  Dispense: 180 capsule; Refill: 1    Follow up labs ordered    Assessment & Plan    DEPRESSION AND ANXIETY:  - Evaluated the patient's mental health concerns, including depression and irritability.  - Considered the patient's history with antidepressants, noting previous trial of Lexapro.  - Prescribed Zoloft 1 tablet nightly for 1 week, then increase to 1.5 tablets for 1 week, then 2 tablets nightly if tolerated.  - Selected Zoloft as the appropriate antidepressant considering the patient's kidney function.  - Educated the patient about the importance of consistent antidepressant use to avoid worsening mental health.  -  DIABETES:  - Reviewed diabetes management, noting suboptimal control with HbA1c of 12.7.  - Continued Trulicity 1.5 mg weekly injection.  - Continued Jardiance at current dose.  - Assessed the need for insulin and discussed titration plan.  - Prescribed Lantus insulin starting at 24 units, to be titrated based on glucose levels.  - Determined the need to restart insulin therapy due to inadequate glycemic control.  - Initiated Lantus insulin 24 units at bedtime.  - Instructed the patient to increase by 1 unit daily if fasting glucose >150 mg/dL, or by 2 units daily if >300 mg/dL.  - Jose Miguel to monitor glucose levels daily.    KIDNEY ISSUES:  - Assessed recent emergency department visits for kidney pain, noting negative scans and mostly normal urine tests.  - Considered the relationship between hyperglycemia and kidney issues.  - Noted that the patient received intravenous fluids at the emergency department.  - Recommend increasing water intake.  Encouraged f/u with nephrology    HYPERTENSION:  - Continued current antihypertensive medication.  - Refilled antihypertensive medication.  - Evaluated the patient's blood pressure, which appears to be well-controlled.    HYPERLIPIDEMIA:  - Continued current lipid-lowering medication.  -  "Refilled lipid-lowering medication.  - Noted that the patient's cholesterol levels have not been checked recently.    FOLLOW UP:  - Emphasized need for regular medical follow-ups to maintain health despite work constraints.  - Follow up in 1 month.  - Message doctor before next appointment to provide updates on condition.  - Contact the office if any issues arise before scheduled follow-up.         Follow up in about 4 weeks (around 4/2/2025) for dm .  This note was generated with the assistance of ambient listening technology. Verbal consent was obtained by the patient and accompanying visitor(s) for the recording of patient appointment to facilitate this note. I attest to having reviewed and edited the generated note for accuracy, though some syntax or spelling errors may persist. Please contact the author of this note for any clarification.        3/5/2025 WU Tucker, ROLAND             [1]   Current Outpatient Medications   Medication Sig Dispense Refill    apixaban (ELIQUIS) 5 mg Tab Take 5 mg by mouth 2 (two) times daily.      lancets Misc To check BG one times daily, to use with insurance preferred meter 100 each 3    ondansetron (ZOFRAN-ODT) 4 MG TbDL Take 1 tablet (4 mg total) by mouth every 8 (eight) hours as needed (nausea, vomiting). 12 tablet 0    amLODIPine (NORVASC) 5 MG tablet Take 1 tablet (5 mg total) by mouth once daily. 90 tablet 1    atorvastatin (LIPITOR) 10 MG tablet Take 1 tablet (10 mg total) by mouth every evening. 90 tablet 1    BD CONOR 2ND GEN PEN NEEDLE 32 gauge x 5/32" Ndle once daily. Use      blood sugar diagnostic Strp To check BG one times daily, to use with insurance preferred meter 100 strip 3    blood-glucose meter kit To check BG one times daily, to use with insurance preferred meter 1 each 0    dulaglutide (TRULICITY) 1.5 mg/0.5 mL pen injector Inject 1.5 mg into the skin every 7 days. 4 pen 1    empagliflozin (JARDIANCE) 10 mg tablet Take 1 tablet (10 mg total) by mouth " "once daily. 90 tablet 0    insulin glargine U-100, Lantus, (LANTUS SOLOSTAR U-100 INSULIN) 100 unit/mL (3 mL) InPn pen Inject 24 Units into the skin once daily. 6 mL 1    losartan (COZAAR) 100 MG tablet Take 1 tablet (100 mg total) by mouth once daily. 90 tablet 1    omega-3 acid ethyl esters (LOVAZA) 1 gram capsule Take 1 capsule (1 g total) by mouth 2 (two) times daily. 180 capsule 1    pen needle, diabetic 32 gauge x 5/16" Ndle 1 Units by Misc.(Non-Drug; Combo Route) route once daily. 100 each 1    sertraline (ZOLOFT) 25 MG tablet Take 1 tablet (25 mg total) by mouth every evening. 30 tablet 1     No current facility-administered medications for this visit.     "

## 2025-03-10 ENCOUNTER — PATIENT MESSAGE (OUTPATIENT)
Dept: ADMINISTRATIVE | Facility: HOSPITAL | Age: 40
End: 2025-03-10
Payer: MEDICAID

## 2025-03-14 ENCOUNTER — TELEPHONE (OUTPATIENT)
Dept: HEMATOLOGY/ONCOLOGY | Facility: CLINIC | Age: 40
End: 2025-03-14
Payer: MEDICAID

## 2025-03-17 ENCOUNTER — PATIENT MESSAGE (OUTPATIENT)
Dept: FAMILY MEDICINE | Facility: CLINIC | Age: 40
End: 2025-03-17
Payer: MEDICAID

## 2025-03-17 NOTE — TELEPHONE ENCOUNTER
Meds he is on are working good, the jardiance was making throat feel like closing, glands were swollen. Stopped and problem resolved. He said sugars running goo on insulin 100-150 so not sure he needs the additional med. Also changed his upcoming appt to 4/23

## 2025-03-19 ENCOUNTER — PATIENT MESSAGE (OUTPATIENT)
Dept: FAMILY MEDICINE | Facility: CLINIC | Age: 40
End: 2025-03-19
Payer: MEDICAID

## 2025-04-04 DIAGNOSIS — F41.8 DEPRESSION WITH ANXIETY: ICD-10-CM

## 2025-04-04 RX ORDER — SERTRALINE HYDROCHLORIDE 25 MG/1
25 TABLET, FILM COATED ORAL NIGHTLY
Qty: 30 TABLET | Refills: 1 | Status: SHIPPED | OUTPATIENT
Start: 2025-04-04

## 2025-04-23 ENCOUNTER — OFFICE VISIT (OUTPATIENT)
Dept: FAMILY MEDICINE | Facility: CLINIC | Age: 40
End: 2025-04-23
Payer: MEDICAID

## 2025-04-23 VITALS
DIASTOLIC BLOOD PRESSURE: 76 MMHG | OXYGEN SATURATION: 98 % | WEIGHT: 222 LBS | BODY MASS INDEX: 30.07 KG/M2 | HEART RATE: 90 BPM | SYSTOLIC BLOOD PRESSURE: 110 MMHG | HEIGHT: 72 IN

## 2025-04-23 DIAGNOSIS — F41.8 DEPRESSION WITH ANXIETY: ICD-10-CM

## 2025-04-23 DIAGNOSIS — I10 ESSENTIAL HYPERTENSION: Primary | ICD-10-CM

## 2025-04-23 DIAGNOSIS — E78.2 MIXED HYPERLIPIDEMIA: ICD-10-CM

## 2025-04-23 DIAGNOSIS — R11.0 NAUSEA: ICD-10-CM

## 2025-04-23 DIAGNOSIS — E11.21 TYPE 2 DIABETES MELLITUS WITH DIABETIC NEPHROPATHY, WITHOUT LONG-TERM CURRENT USE OF INSULIN: ICD-10-CM

## 2025-04-23 PROCEDURE — 3046F HEMOGLOBIN A1C LEVEL >9.0%: CPT | Mod: CPTII,S$GLB,, | Performed by: NURSE PRACTITIONER

## 2025-04-23 PROCEDURE — 1160F RVW MEDS BY RX/DR IN RCRD: CPT | Mod: CPTII,S$GLB,, | Performed by: NURSE PRACTITIONER

## 2025-04-23 PROCEDURE — 4010F ACE/ARB THERAPY RXD/TAKEN: CPT | Mod: CPTII,S$GLB,, | Performed by: NURSE PRACTITIONER

## 2025-04-23 PROCEDURE — 1159F MED LIST DOCD IN RCRD: CPT | Mod: CPTII,S$GLB,, | Performed by: NURSE PRACTITIONER

## 2025-04-23 PROCEDURE — 3074F SYST BP LT 130 MM HG: CPT | Mod: CPTII,S$GLB,, | Performed by: NURSE PRACTITIONER

## 2025-04-23 PROCEDURE — 3008F BODY MASS INDEX DOCD: CPT | Mod: CPTII,S$GLB,, | Performed by: NURSE PRACTITIONER

## 2025-04-23 PROCEDURE — 3078F DIAST BP <80 MM HG: CPT | Mod: CPTII,S$GLB,, | Performed by: NURSE PRACTITIONER

## 2025-04-23 PROCEDURE — 99214 OFFICE O/P EST MOD 30 MIN: CPT | Mod: S$GLB,,, | Performed by: NURSE PRACTITIONER

## 2025-04-23 RX ORDER — INSULIN GLARGINE 100 [IU]/ML
24 INJECTION, SOLUTION SUBCUTANEOUS DAILY
Qty: 9 ML | Refills: 1 | Status: SHIPPED | OUTPATIENT
Start: 2025-04-23 | End: 2025-04-23 | Stop reason: SDUPTHER

## 2025-04-23 RX ORDER — INSULIN GLARGINE 100 [IU]/ML
30 INJECTION, SOLUTION SUBCUTANEOUS DAILY
Qty: 9 ML | Refills: 1 | Status: SHIPPED | OUTPATIENT
Start: 2025-04-23

## 2025-04-23 RX ORDER — SERTRALINE HYDROCHLORIDE 25 MG/1
25 TABLET, FILM COATED ORAL NIGHTLY
Qty: 90 TABLET | Refills: 1 | Status: SHIPPED | OUTPATIENT
Start: 2025-04-23

## 2025-04-23 RX ORDER — DULAGLUTIDE 1.5 MG/.5ML
1.5 INJECTION, SOLUTION SUBCUTANEOUS
Qty: 4 PEN | Refills: 1 | Status: SHIPPED | OUTPATIENT
Start: 2025-04-23

## 2025-04-23 RX ORDER — ONDANSETRON 4 MG/1
4 TABLET, ORALLY DISINTEGRATING ORAL EVERY 8 HOURS PRN
Qty: 12 TABLET | Refills: 0 | Status: SHIPPED | OUTPATIENT
Start: 2025-04-23

## 2025-04-23 NOTE — PROGRESS NOTES
SUBJECTIVE:      Patient ID: Jose Miguel Brnenan is a 39 y.o. male.    Chief Complaint: Diabetes    HPI  History of Present Illness    CHIEF COMPLAINT:  Jose Miguel presents today for diabetes follow-up and f/u on anxiety    DIABETES:  He reports blood sugar ranges between 140-150, with some days running between 110-115. He is currently taking Lantus 30 units, Trulicity 1.5mg (maximum tolerated dose), and Jardiance. He experienced initial swelling with Jardiance which resolved after temporarily discontinuing and restarting the medication. He reports being out of Lantus insulin after unsuccessful attempts to refill through the [facility].    MEDICATIONS:  He continues cholesterol and blood pressure medications as prescribed. Zoloft is working well at current dose with good response.    GI CONCERNS:  He reports mild GI upset attributed to medication resumption.    FOLLOW-UP CARE:  He has not seen Dr. Conte and needs to schedule appointment. Previous appointment with hematologist for blood clot follow-up was canceled by provider and needs to be rescheduled.         Past Surgical History:   Procedure Laterality Date    COLONOSCOPY N/A 5/8/2020    Procedure: COLONOSCOPY;  Surgeon: Hammad Castorena III, MD;  Location: [facility];  Service: Endoscopy;  Laterality: N/A;    ESOPHAGOGASTRODUODENOSCOPY N/A 5/8/2020    Procedure: EGD (ESOPHAGOGASTRODUODENOSCOPY);  Surgeon: Hammad Castorena III, MD;  Location: [facility];  Service: Endoscopy;  Laterality: N/A;    nose polyp removal       Family History   Problem Relation Name Age of Onset    Hypertension Maternal Grandmother      Cancer Maternal Grandfather      Diabetes Maternal Grandfather      Heart disease Maternal Grandfather      Heart disease Mother      Stroke Mother      Diabetes Mother        Social History     Socioeconomic History    Marital status: Significant Other   Tobacco Use    Smoking status: Former     Current packs/day: 0.00     Average packs/day: 0.3  packs/day for 5.0 years (1.3 ttl pk-yrs)     Types: Cigars, Cigarettes     Start date: 2017     Quit date: 2022     Years since quittin.9     Passive exposure: Past    Smokeless tobacco: Never   Substance and Sexual Activity    Alcohol use: Yes    Drug use: No    Sexual activity: Yes     Partners: Female     Social Drivers of Health     Financial Resource Strain: Low Risk  (3/29/2023)    Overall Financial Resource Strain (CARDIA)     Difficulty of Paying Living Expenses: Not hard at all   Food Insecurity: No Food Insecurity (3/29/2023)    Hunger Vital Sign     Worried About Running Out of Food in the Last Year: Never true     Ran Out of Food in the Last Year: Never true   Transportation Needs: No Transportation Needs (3/29/2023)    PRAPARE - Transportation     Lack of Transportation (Medical): No     Lack of Transportation (Non-Medical): No   Physical Activity: Inactive (3/29/2023)    Exercise Vital Sign     Days of Exercise per Week: 0 days     Minutes of Exercise per Session: 0 min   Stress: Stress Concern Present (2023)    [employer] [place] [employer] - Occupational Stress Questionnaire     Feeling of Stress : To some extent   Housing Stability: Unknown (3/29/2023)    Housing Stability Vital Sign     Unable to Pay for Housing in the Last Year: No     Unstable Housing in the Last Year: No     Current Medications[1]  Review of patient's allergies indicates:   Allergen Reactions    Zithromax [azithromycin] Rash      Past Medical History:   Diagnosis Date    Anxiety     Asthma     Depression     Diabetes mellitus     diet controlled    Diabetes mellitus, type 2     DVT (deep venous thrombosis)     GERD (gastroesophageal reflux disease)     Gout     Hyperlipidemia     Hypertension     IgA nephropathy     Insomnia      Past Surgical History:   Procedure Laterality Date    COLONOSCOPY N/A 2020    Procedure: COLONOSCOPY;  Surgeon: Hammad Castorena III, MD;  Location: [facility];   Service: Endoscopy;  Laterality: N/A;    ESOPHAGOGASTRODUODENOSCOPY N/A 5/8/2020    Procedure: EGD (ESOPHAGOGASTRODUODENOSCOPY);  Surgeon: Hammad Castorena III, MD;  Location: [facility];  Service: Endoscopy;  Laterality: N/A;    nose polyp removal         Review of Systems   Constitutional:  Negative for appetite change, chills, diaphoresis and unexpected weight change.   HENT:  Negative for ear discharge, facial swelling, hearing loss, nosebleeds and trouble swallowing.    Eyes:  Negative for photophobia, pain and visual disturbance.   Respiratory:  Negative for apnea, choking, shortness of breath and wheezing.    Cardiovascular:  Negative for chest pain and palpitations.   Gastrointestinal:  Positive for nausea. Negative for abdominal pain, blood in stool and vomiting.   Endocrine: Negative for polyphagia.   Genitourinary:  Negative for difficulty urinating and hematuria.   Musculoskeletal:  Negative for gait problem and joint swelling.   Skin:  Negative for pallor.   Neurological:  Negative for dizziness, seizures, speech difficulty, weakness, light-headedness and headaches.   Hematological:  Does not bruise/bleed easily.   Psychiatric/Behavioral:  Negative for agitation, confusion, dysphoric mood, self-injury, sleep disturbance and suicidal ideas. The patient is not nervous/anxious.       OBJECTIVE:      Vitals:    04/23/25 1444   BP: 110/76   Pulse: 90   SpO2: 98%   Weight: 100.7 kg (222 lb)   Height: 6' (1.829 m)     Physical Exam  Vitals and nursing note reviewed.   Constitutional:       General: He is not in acute distress.     Appearance: He is well-developed.   HENT:      Head: Normocephalic and atraumatic.      Nose: Nose normal.      Mouth/Throat:      Pharynx: Uvula midline.   Eyes:      General: Lids are normal.      Conjunctiva/sclera: Conjunctivae normal.      Pupils: Pupils are equal, round, and reactive to light.      Right eye: Pupil is round and reactive.      Left eye: Pupil is round and  reactive.   Neck:      Thyroid: No thyromegaly.      Vascular: No carotid bruit.   Cardiovascular:      Rate and Rhythm: Normal rate and regular rhythm.      Pulses: Normal pulses.      Heart sounds: Normal heart sounds. No murmur heard.  Pulmonary:      Effort: Pulmonary effort is normal.      Breath sounds: No wheezing, rhonchi or rales.   Abdominal:      General: Bowel sounds are normal.      Palpations: Abdomen is soft. Abdomen is not rigid.      Tenderness: There is no abdominal tenderness.   Musculoskeletal:         General: Normal range of motion.      Cervical back: Normal range of motion and neck supple.      Right lower leg: No edema.      Left lower leg: No edema.      Right foot: No deformity.      Left foot: No deformity.   Feet:      Right foot:      Protective Sensation: 10 sites tested.  8 sites sensed.      Skin integrity: Callus and dry skin present. No ulcer, blister or skin breakdown.      Left foot:      Protective Sensation: 10 sites tested.  7 sites sensed.      Skin integrity: Callus and dry skin present. No ulcer, blister or skin breakdown.   Lymphadenopathy:      Cervical: No cervical adenopathy.   Skin:     General: Skin is warm and dry.      Nails: There is no clubbing.   Neurological:      Mental Status: He is alert and oriented to person, place, and time.   Psychiatric:         Mood and Affect: Mood normal.         Speech: Speech normal.         Behavior: Behavior normal. Behavior is cooperative.         Thought Content: Thought content normal.         Judgment: Judgment normal.       No visits with results within 1 Month(s) from this visit.   Latest known visit with results is:   Office Visit on 03/05/2025   Component Date Value Ref Range Status    Hemoglobin A1C, POC 03/05/2025 12.7  % Final      Assessment:       1. Essential hypertension    2. Type 2 diabetes mellitus with diabetic nephropathy, without long-term current use of insulin    3. Depression with anxiety    4. Mixed  hyperlipidemia    5. Nausea        Plan:       Essential hypertension  Stable on current meds    Type 2 diabetes mellitus with diabetic nephropathy, without long-term current use of insulin  -     insulin glargine U-100, Lantus, (LANTUS SOLOSTAR U-100 INSULIN) 100 unit/mL (3 mL) InPn pen; Inject 30 Units into the skin once daily.  Dispense: 9 mL; Refill: 1  -     dulaglutide (TRULICITY) 1.5 mg/0.5 mL pen injector; Inject 1.5 mg into the skin every 7 days.  Dispense: 4 pen ; Refill: 1  -     empagliflozin (JARDIANCE) 10 mg tablet; Take 1 tablet (10 mg total) by mouth once daily.  Dispense: 90 tablet; Refill: 1  -     Foot Exam Performed    Depression with anxiety  -     sertraline (ZOLOFT) 25 MG tablet; Take 1 tablet (25 mg total) by mouth every evening.  Dispense: 90 tablet; Refill: 1    Mixed hyperlipidemia  Stable on current meds    Nausea  -     ondansetron (ZOFRAN-ODT) 4 MG TbDL; Take 1 tablet (4 mg total) by mouth every 8 (eight) hours as needed (nausea, vomiting).  Dispense: 12 tablet; Refill: 0      Assessment & Plan    TYPE 2 DIABETES MELLITUS WITH HYPERGLYCEMIA:  - Monitored glucose levels, which are ranging 140-150 mg/dL on current regimen, with some days at 110-115 mg/dL.  - Evaluated current medication regimen: 30 units of Lantus insulin and Trulicity 1.5mg once a week.  - Assessed that blood sugar control needs improvement and determined need to titrate insulin dosage upward.  -- Continued Jardiance for kidney protection.  - Increased Lantus: Instructed to titrate up from 30 units.  - Advised to increase by 1 unit daily if glucose remains 140-150 mg/dL until target is reached(100-120).  - Continued Trulicity at 1.5 mg weekly, confirming this as maximum tolerated dose.  - Continued Jardiance at current dose, emphasizing renal protective benefits.  - Sent prescription refill for insulin to the pharmacy.    HYPERTENSION:  - Evaluated blood pressure and found it to be within acceptable range.  - Considered  "overall medication regimen, including antihypertensive medications; continued at current doses.    HYPERLIPIDEMIA:  - Considered overall medication regimen, including lipid-lowering medications; continued at current doses.    DEPRESSION:  - Assessed efficacy of current Zoloft dosage for mental health management.  - Jose Miguel reported doing well on current dose of Zoloft.  - Prescribed a 90-day refill of Zoloft at the current dose.    HISTORY OF dvt:  - Reminded the patient to reschedule appointment with hematologist for blood clot follow-up.    FOLLOW-UP:  - Ordered lab work (labs).  - Complete labs before next appointment.  - Follow up in 3 months after completing labs.         Follow up in about 3 months (around 7/23/2025) for DM .  This note was generated with the assistance of ambient listening technology. Verbal consent was obtained by the patient and accompanying visitor(s) for the recording of patient appointment to facilitate this note. I attest to having reviewed and edited the generated note for accuracy, though some syntax or spelling errors may persist. Please contact the author of this note for any clarification.        4/23/2025 WU Tucker, FNP             [1]   Current Outpatient Medications   Medication Sig Dispense Refill    amLODIPine (NORVASC) 5 MG tablet Take 1 tablet (5 mg total) by mouth once daily. 90 tablet 1    apixaban (ELIQUIS) 5 mg Tab Take 5 mg by mouth 2 (two) times daily.      atorvastatin (LIPITOR) 10 MG tablet Take 1 tablet (10 mg total) by mouth every evening. 90 tablet 1    lancets Misc To check BG one times daily, to use with insurance preferred meter 100 each 3    losartan (COZAAR) 100 MG tablet Take 1 tablet (100 mg total) by mouth once daily. 90 tablet 1    omega-3 acid ethyl esters (LOVAZA) 1 gram capsule Take 1 capsule (1 g total) by mouth 2 (two) times daily. 180 capsule 1    BD CONOR 2ND GEN PEN NEEDLE 32 gauge x 5/32" Ndle once daily. Use      blood sugar diagnostic " "Strp To check BG one times daily, to use with insurance preferred meter 100 strip 3    blood-glucose meter kit To check BG one times daily, to use with insurance preferred meter 1 each 0    dulaglutide (TRULICITY) 1.5 mg/0.5 mL pen injector Inject 1.5 mg into the skin every 7 days. 4 pen 1    empagliflozin (JARDIANCE) 10 mg tablet Take 1 tablet (10 mg total) by mouth once daily. 90 tablet 1    insulin glargine U-100, Lantus, (LANTUS SOLOSTAR U-100 INSULIN) 100 unit/mL (3 mL) InPn pen Inject 24 Units into the skin once daily. 9 mL 1    ondansetron (ZOFRAN-ODT) 4 MG TbDL Take 1 tablet (4 mg total) by mouth every 8 (eight) hours as needed (nausea, vomiting). 12 tablet 0    pen needle, diabetic 32 gauge x 5/16" Ndle 1 Units by Misc.(Non-Drug; Combo Route) route once daily. 100 each 1    sertraline (ZOLOFT) 25 MG tablet Take 1 tablet (25 mg total) by mouth every evening. 90 tablet 1     No current facility-administered medications for this visit.     "

## 2025-05-13 ENCOUNTER — PATIENT MESSAGE (OUTPATIENT)
Dept: FAMILY MEDICINE | Facility: CLINIC | Age: 40
End: 2025-05-13
Payer: MEDICAID

## 2025-05-13 DIAGNOSIS — Z79.4 TYPE 2 DIABETES MELLITUS WITHOUT COMPLICATION, WITH LONG-TERM CURRENT USE OF INSULIN: ICD-10-CM

## 2025-05-13 DIAGNOSIS — E11.9 TYPE 2 DIABETES MELLITUS WITHOUT COMPLICATION, WITH LONG-TERM CURRENT USE OF INSULIN: ICD-10-CM

## 2025-05-14 NOTE — TELEPHONE ENCOUNTER
Pended meds and reminded to get the July appt scheduled and labs done-prescriptions sent as rx request

## 2025-05-23 ENCOUNTER — PATIENT MESSAGE (OUTPATIENT)
Dept: ADMINISTRATIVE | Facility: HOSPITAL | Age: 40
End: 2025-05-23
Payer: MEDICAID

## 2025-05-27 ENCOUNTER — HOSPITAL ENCOUNTER (EMERGENCY)
Facility: HOSPITAL | Age: 40
Discharge: HOME | End: 2025-05-28
Attending: EMERGENCY MEDICINE
Payer: MEDICAID

## 2025-05-27 DIAGNOSIS — E11.21 TYPE 2 DIABETES MELLITUS WITH DIABETIC NEPHROPATHY, WITHOUT LONG-TERM CURRENT USE OF INSULIN: ICD-10-CM

## 2025-05-27 DIAGNOSIS — K85.90 ACUTE PANCREATITIS, UNSPECIFIED COMPLICATION STATUS, UNSPECIFIED PANCREATITIS TYPE: Primary | ICD-10-CM

## 2025-05-27 DIAGNOSIS — R11.0 NAUSEA: ICD-10-CM

## 2025-05-27 LAB
ABSOLUTE EOSINOPHIL (SMH): 0.59 K/UL
ABSOLUTE MONOCYTE (SMH): 0.61 K/UL (ref 0.3–1)
ABSOLUTE NEUTROPHIL COUNT (SMH): 5.8 K/UL (ref 1.8–7.7)
ALBUMIN SERPL-MCNC: 4.4 G/DL (ref 3.5–5.2)
ALP SERPL-CCNC: 96 UNIT/L (ref 55–135)
ALT SERPL-CCNC: 22 UNIT/L (ref 10–44)
ANION GAP (SMH): 10 MMOL/L (ref 8–16)
AST SERPL-CCNC: 13 UNIT/L (ref 10–40)
BASOPHILS # BLD AUTO: 0.04 K/UL
BASOPHILS NFR BLD AUTO: 0.4 %
BILIRUB SERPL-MCNC: 0.7 MG/DL (ref 0.1–1)
BILIRUB UR QL STRIP.AUTO: NEGATIVE
BUN SERPL-MCNC: 51 MG/DL (ref 6–20)
CALCIUM SERPL-MCNC: 9.9 MG/DL (ref 8.7–10.5)
CHLORIDE SERPL-SCNC: 104 MMOL/L (ref 95–110)
CLARITY UR: CLEAR
CO2 SERPL-SCNC: 25 MMOL/L (ref 23–29)
COLOR UR AUTO: COLORLESS
CREAT SERPL-MCNC: 2.8 MG/DL (ref 0.5–1.4)
ERYTHROCYTE [DISTWIDTH] IN BLOOD BY AUTOMATED COUNT: 13.7 % (ref 11.5–14.5)
GFR SERPLBLD CREATININE-BSD FMLA CKD-EPI: 29 ML/MIN/1.73/M2
GLUCOSE SERPL-MCNC: 158 MG/DL (ref 70–110)
GLUCOSE UR QL STRIP: ABNORMAL
HCT VFR BLD AUTO: 49.2 % (ref 40–54)
HGB BLD-MCNC: 15.9 GM/DL (ref 14–18)
HGB UR QL STRIP: NEGATIVE
IMM GRANULOCYTES # BLD AUTO: 0.06 K/UL (ref 0–0.04)
IMM GRANULOCYTES NFR BLD AUTO: 0.6 % (ref 0–0.5)
KETONES UR QL STRIP: NEGATIVE
LEUKOCYTE ESTERASE UR QL STRIP: NEGATIVE
LIPASE SERPL-CCNC: 442 U/L (ref 4–60)
LYMPHOCYTES # BLD AUTO: 3.68 K/UL (ref 1–4.8)
MCH RBC QN AUTO: 28.3 PG (ref 27–31)
MCHC RBC AUTO-ENTMCNC: 32.3 G/DL (ref 32–36)
MCV RBC AUTO: 88 FL (ref 82–98)
MICROSCOPIC COMMENT: NORMAL
NITRITE UR QL STRIP: NEGATIVE
NUCLEATED RBC (/100WBC) (SMH): 0 /100 WBC
PH UR STRIP: 6 [PH]
PLATELET # BLD AUTO: 220 K/UL (ref 150–450)
PMV BLD AUTO: 10.3 FL (ref 9.2–12.9)
POCT GLUCOSE: 152 MG/DL (ref 70–110)
POTASSIUM SERPL-SCNC: 3.9 MMOL/L (ref 3.5–5.1)
PROT SERPL-MCNC: 7.6 GM/DL (ref 6–8.4)
PROT UR QL STRIP: ABNORMAL
RBC # BLD AUTO: 5.61 M/UL (ref 4.6–6.2)
RBC #/AREA URNS AUTO: 0 /HPF
RELATIVE EOSINOPHIL (SMH): 5.5 % (ref 0–8)
RELATIVE LYMPHOCYTE (SMH): 34 % (ref 18–48)
RELATIVE MONOCYTE (SMH): 5.6 % (ref 4–15)
RELATIVE NEUTROPHIL (SMH): 53.9 % (ref 38–73)
SODIUM SERPL-SCNC: 139 MMOL/L (ref 136–145)
SP GR UR STRIP: 1.01
UROBILINOGEN UR STRIP-ACNC: NEGATIVE EU/DL
WBC # BLD AUTO: 10.82 K/UL (ref 3.9–12.7)
WBC #/AREA URNS AUTO: 0 /HPF

## 2025-05-27 PROCEDURE — 82962 GLUCOSE BLOOD TEST: CPT

## 2025-05-27 PROCEDURE — 83690 ASSAY OF LIPASE: CPT | Performed by: EMERGENCY MEDICINE

## 2025-05-27 PROCEDURE — 81003 URINALYSIS AUTO W/O SCOPE: CPT | Performed by: PHYSICIAN ASSISTANT

## 2025-05-27 PROCEDURE — 99285 EMERGENCY DEPT VISIT HI MDM: CPT | Mod: 25

## 2025-05-27 PROCEDURE — 85025 COMPLETE CBC W/AUTO DIFF WBC: CPT | Performed by: PHYSICIAN ASSISTANT

## 2025-05-27 PROCEDURE — 80053 COMPREHEN METABOLIC PANEL: CPT | Performed by: PHYSICIAN ASSISTANT

## 2025-05-27 RX ORDER — ONDANSETRON 4 MG/1
4 TABLET, ORALLY DISINTEGRATING ORAL EVERY 8 HOURS PRN
Qty: 12 TABLET | Refills: 0 | Status: SHIPPED | OUTPATIENT
Start: 2025-05-27

## 2025-05-27 RX ORDER — INSULIN GLARGINE 100 [IU]/ML
30 INJECTION, SOLUTION SUBCUTANEOUS DAILY
Qty: 9 ML | Refills: 1 | Status: CANCELLED | OUTPATIENT
Start: 2025-05-27

## 2025-05-27 RX ORDER — DULAGLUTIDE 1.5 MG/.5ML
1.5 INJECTION, SOLUTION SUBCUTANEOUS
Qty: 4 PEN | Refills: 1 | Status: SHIPPED | OUTPATIENT
Start: 2025-05-27

## 2025-05-27 NOTE — TELEPHONE ENCOUNTER
Please see the attached refill request. Last seen 4/23/2025.  I informed the patient that the lantus solarstar was sent to the medicine shop on 05/14/2025

## 2025-05-28 VITALS
TEMPERATURE: 98 F | OXYGEN SATURATION: 94 % | RESPIRATION RATE: 18 BRPM | DIASTOLIC BLOOD PRESSURE: 74 MMHG | SYSTOLIC BLOOD PRESSURE: 114 MMHG | WEIGHT: 218 LBS | HEIGHT: 72 IN | HEART RATE: 70 BPM | BODY MASS INDEX: 29.53 KG/M2

## 2025-05-28 PROCEDURE — 63600175 PHARM REV CODE 636 W HCPCS: Mod: JZ | Performed by: EMERGENCY MEDICINE

## 2025-05-28 PROCEDURE — 96375 TX/PRO/DX INJ NEW DRUG ADDON: CPT

## 2025-05-28 PROCEDURE — 96374 THER/PROPH/DIAG INJ IV PUSH: CPT

## 2025-05-28 RX ORDER — HYDROCODONE BITARTRATE AND ACETAMINOPHEN 5; 325 MG/1; MG/1
1 TABLET ORAL EVERY 6 HOURS PRN
Qty: 12 TABLET | Refills: 0 | Status: SHIPPED | OUTPATIENT
Start: 2025-05-28

## 2025-05-28 RX ORDER — ONDANSETRON HYDROCHLORIDE 2 MG/ML
4 INJECTION, SOLUTION INTRAVENOUS
Status: COMPLETED | OUTPATIENT
Start: 2025-05-28 | End: 2025-05-28

## 2025-05-28 RX ORDER — HYDROMORPHONE HYDROCHLORIDE 1 MG/ML
1 INJECTION, SOLUTION INTRAMUSCULAR; INTRAVENOUS; SUBCUTANEOUS
Refills: 0 | Status: COMPLETED | OUTPATIENT
Start: 2025-05-28 | End: 2025-05-28

## 2025-05-28 RX ORDER — ONDANSETRON 4 MG/1
4 TABLET, ORALLY DISINTEGRATING ORAL EVERY 6 HOURS PRN
Qty: 12 TABLET | Refills: 0 | Status: SHIPPED | OUTPATIENT
Start: 2025-05-28 | End: 2025-06-03 | Stop reason: RX

## 2025-05-28 RX ADMIN — ONDANSETRON 4 MG: 2 INJECTION INTRAMUSCULAR; INTRAVENOUS at 12:05

## 2025-05-28 RX ADMIN — HYDROMORPHONE HYDROCHLORIDE 1 MG: 1 INJECTION, SOLUTION INTRAMUSCULAR; INTRAVENOUS; SUBCUTANEOUS at 12:05

## 2025-06-03 ENCOUNTER — HOSPITAL ENCOUNTER (EMERGENCY)
Facility: HOSPITAL | Age: 40
Discharge: HOME | End: 2025-06-03
Attending: EMERGENCY MEDICINE
Payer: MEDICAID

## 2025-06-03 VITALS
DIASTOLIC BLOOD PRESSURE: 78 MMHG | WEIGHT: 218 LBS | RESPIRATION RATE: 17 BRPM | SYSTOLIC BLOOD PRESSURE: 126 MMHG | HEART RATE: 81 BPM | OXYGEN SATURATION: 99 % | HEIGHT: 72 IN | BODY MASS INDEX: 29.53 KG/M2 | TEMPERATURE: 98 F

## 2025-06-03 DIAGNOSIS — K85.90 ACUTE PANCREATITIS, UNSPECIFIED COMPLICATION STATUS, UNSPECIFIED PANCREATITIS TYPE: Primary | ICD-10-CM

## 2025-06-03 LAB
ABSOLUTE EOSINOPHIL (SMH): 0.64 K/UL
ABSOLUTE MONOCYTE (SMH): 0.58 K/UL (ref 0.3–1)
ABSOLUTE NEUTROPHIL COUNT (SMH): 5.6 K/UL (ref 1.8–7.7)
ALBUMIN SERPL-MCNC: 4.1 G/DL (ref 3.5–5.2)
ALP SERPL-CCNC: 79 UNIT/L (ref 55–135)
ALT SERPL-CCNC: 18 UNIT/L (ref 10–44)
ANION GAP (SMH): 10 MMOL/L (ref 8–16)
AST SERPL-CCNC: 11 UNIT/L (ref 10–40)
BASOPHILS # BLD AUTO: 0.06 K/UL
BASOPHILS NFR BLD AUTO: 0.6 %
BILIRUB SERPL-MCNC: 0.5 MG/DL (ref 0.1–1)
BILIRUB UR QL STRIP.AUTO: NEGATIVE
BUN SERPL-MCNC: 40 MG/DL (ref 6–20)
CALCIUM SERPL-MCNC: 9.4 MG/DL (ref 8.7–10.5)
CHLORIDE SERPL-SCNC: 104 MMOL/L (ref 95–110)
CLARITY UR: CLEAR
CO2 SERPL-SCNC: 24 MMOL/L (ref 23–29)
COLOR UR AUTO: YELLOW
CREAT SERPL-MCNC: 2.7 MG/DL (ref 0.5–1.4)
ERYTHROCYTE [DISTWIDTH] IN BLOOD BY AUTOMATED COUNT: 13.8 % (ref 11.5–14.5)
GFR SERPLBLD CREATININE-BSD FMLA CKD-EPI: 30 ML/MIN/1.73/M2
GLUCOSE SERPL-MCNC: 316 MG/DL (ref 70–110)
GLUCOSE UR QL STRIP: ABNORMAL
HCT VFR BLD AUTO: 47.5 % (ref 40–54)
HGB BLD-MCNC: 15.3 GM/DL (ref 14–18)
HGB UR QL STRIP: NEGATIVE
IMM GRANULOCYTES # BLD AUTO: 0.04 K/UL (ref 0–0.04)
IMM GRANULOCYTES NFR BLD AUTO: 0.4 % (ref 0–0.5)
KETONES UR QL STRIP: NEGATIVE
LEUKOCYTE ESTERASE UR QL STRIP: NEGATIVE
LIPASE SERPL-CCNC: 410 U/L (ref 4–60)
LYMPHOCYTES # BLD AUTO: 3.49 K/UL (ref 1–4.8)
MCH RBC QN AUTO: 28.3 PG (ref 27–31)
MCHC RBC AUTO-ENTMCNC: 32.2 G/DL (ref 32–36)
MCV RBC AUTO: 88 FL (ref 82–98)
MICROSCOPIC COMMENT: NORMAL
NITRITE UR QL STRIP: NEGATIVE
NUCLEATED RBC (/100WBC) (SMH): 0 /100 WBC
PH UR STRIP: 6 [PH]
PLATELET # BLD AUTO: 213 K/UL (ref 150–450)
PMV BLD AUTO: 10.4 FL (ref 9.2–12.9)
POTASSIUM SERPL-SCNC: 4.2 MMOL/L (ref 3.5–5.1)
PROT SERPL-MCNC: 7.1 GM/DL (ref 6–8.4)
PROT UR QL STRIP: ABNORMAL
RBC # BLD AUTO: 5.41 M/UL (ref 4.6–6.2)
RBC #/AREA URNS AUTO: <1 /HPF
RELATIVE EOSINOPHIL (SMH): 6.2 % (ref 0–8)
RELATIVE LYMPHOCYTE (SMH): 33.7 % (ref 18–48)
RELATIVE MONOCYTE (SMH): 5.6 % (ref 4–15)
RELATIVE NEUTROPHIL (SMH): 53.5 % (ref 38–73)
SODIUM SERPL-SCNC: 138 MMOL/L (ref 136–145)
SP GR UR STRIP: 1.01
UROBILINOGEN UR STRIP-ACNC: NEGATIVE EU/DL
WBC # BLD AUTO: 10.36 K/UL (ref 3.9–12.7)
WBC #/AREA URNS AUTO: <1 /HPF

## 2025-06-03 PROCEDURE — 96376 TX/PRO/DX INJ SAME DRUG ADON: CPT

## 2025-06-03 PROCEDURE — 84075 ASSAY ALKALINE PHOSPHATASE: CPT | Performed by: EMERGENCY MEDICINE

## 2025-06-03 PROCEDURE — 96361 HYDRATE IV INFUSION ADD-ON: CPT

## 2025-06-03 PROCEDURE — 25000003 PHARM REV CODE 250: Performed by: EMERGENCY MEDICINE

## 2025-06-03 PROCEDURE — 96375 TX/PRO/DX INJ NEW DRUG ADDON: CPT

## 2025-06-03 PROCEDURE — 81003 URINALYSIS AUTO W/O SCOPE: CPT | Performed by: EMERGENCY MEDICINE

## 2025-06-03 PROCEDURE — 83690 ASSAY OF LIPASE: CPT | Performed by: EMERGENCY MEDICINE

## 2025-06-03 PROCEDURE — 63600175 PHARM REV CODE 636 W HCPCS: Mod: JZ | Performed by: EMERGENCY MEDICINE

## 2025-06-03 PROCEDURE — 96374 THER/PROPH/DIAG INJ IV PUSH: CPT

## 2025-06-03 PROCEDURE — 99285 EMERGENCY DEPT VISIT HI MDM: CPT | Mod: 25

## 2025-06-03 PROCEDURE — 85025 COMPLETE CBC W/AUTO DIFF WBC: CPT | Performed by: EMERGENCY MEDICINE

## 2025-06-03 RX ORDER — ONDANSETRON 4 MG/1
8 TABLET, ORALLY DISINTEGRATING ORAL EVERY 12 HOURS PRN
Qty: 20 TABLET | Refills: 0 | Status: SHIPPED | OUTPATIENT
Start: 2025-06-03

## 2025-06-03 RX ORDER — ONDANSETRON HYDROCHLORIDE 2 MG/ML
8 INJECTION, SOLUTION INTRAVENOUS
Status: COMPLETED | OUTPATIENT
Start: 2025-06-03 | End: 2025-06-03

## 2025-06-03 RX ORDER — HYDROMORPHONE HYDROCHLORIDE 1 MG/ML
1 INJECTION, SOLUTION INTRAMUSCULAR; INTRAVENOUS; SUBCUTANEOUS
Refills: 0 | Status: COMPLETED | OUTPATIENT
Start: 2025-06-03 | End: 2025-06-03

## 2025-06-03 RX ORDER — OXYCODONE AND ACETAMINOPHEN 5; 325 MG/1; MG/1
1 TABLET ORAL EVERY 6 HOURS PRN
Qty: 15 TABLET | Refills: 0 | Status: SHIPPED | OUTPATIENT
Start: 2025-06-03

## 2025-06-03 RX ORDER — HYOSCYAMINE SULFATE 0.125 MG
125 TABLET ORAL EVERY 4 HOURS PRN
Qty: 30 TABLET | Refills: 0 | Status: SHIPPED | OUTPATIENT
Start: 2025-06-03

## 2025-06-03 RX ORDER — FAMOTIDINE 20 MG/1
20 TABLET, FILM COATED ORAL 2 TIMES DAILY
Qty: 20 TABLET | Refills: 0 | Status: SHIPPED | OUTPATIENT
Start: 2025-06-03 | End: 2026-06-03

## 2025-06-03 RX ORDER — PANTOPRAZOLE SODIUM 40 MG/10ML
40 INJECTION, POWDER, LYOPHILIZED, FOR SOLUTION INTRAVENOUS
Status: COMPLETED | OUTPATIENT
Start: 2025-06-03 | End: 2025-06-03

## 2025-06-03 RX ADMIN — PANTOPRAZOLE SODIUM 40 MG: 40 INJECTION, POWDER, FOR SOLUTION INTRAVENOUS at 10:06

## 2025-06-03 RX ADMIN — ONDANSETRON 8 MG: 2 INJECTION INTRAMUSCULAR; INTRAVENOUS at 09:06

## 2025-06-03 RX ADMIN — HYDROMORPHONE HYDROCHLORIDE 1 MG: 1 INJECTION, SOLUTION INTRAMUSCULAR; INTRAVENOUS; SUBCUTANEOUS at 01:06

## 2025-06-03 RX ADMIN — HYDROMORPHONE HYDROCHLORIDE 1 MG: 1 INJECTION, SOLUTION INTRAMUSCULAR; INTRAVENOUS; SUBCUTANEOUS at 09:06

## 2025-06-03 RX ADMIN — SODIUM CHLORIDE 1000 ML: 9 INJECTION, SOLUTION INTRAVENOUS at 01:06

## 2025-06-03 RX ADMIN — SODIUM CHLORIDE 1000 ML: 9 INJECTION, SOLUTION INTRAVENOUS at 09:06

## 2025-06-03 RX ADMIN — SODIUM CHLORIDE 1000 ML: 9 INJECTION, SOLUTION INTRAVENOUS at 10:06
